# Patient Record
Sex: MALE | Race: WHITE | NOT HISPANIC OR LATINO
[De-identification: names, ages, dates, MRNs, and addresses within clinical notes are randomized per-mention and may not be internally consistent; named-entity substitution may affect disease eponyms.]

---

## 2017-01-04 ENCOUNTER — TRANSCRIPTION ENCOUNTER (OUTPATIENT)
Age: 77
End: 2017-01-04

## 2017-07-09 ENCOUNTER — TRANSCRIPTION ENCOUNTER (OUTPATIENT)
Age: 77
End: 2017-07-09

## 2017-07-18 ENCOUNTER — TRANSCRIPTION ENCOUNTER (OUTPATIENT)
Age: 77
End: 2017-07-18

## 2017-12-07 ENCOUNTER — INPATIENT (INPATIENT)
Facility: HOSPITAL | Age: 77
LOS: 3 days | Discharge: HOME | DRG: 726 | End: 2017-12-11
Attending: INTERNAL MEDICINE | Admitting: HOSPITALIST
Payer: MEDICARE

## 2017-12-07 VITALS
DIASTOLIC BLOOD PRESSURE: 86 MMHG | TEMPERATURE: 98 F | HEART RATE: 99 BPM | RESPIRATION RATE: 18 BRPM | OXYGEN SATURATION: 98 % | SYSTOLIC BLOOD PRESSURE: 132 MMHG | WEIGHT: 315 LBS | HEIGHT: 73 IN

## 2017-12-07 DIAGNOSIS — Z98.89 OTHER SPECIFIED POSTPROCEDURAL STATES: Chronic | ICD-10-CM

## 2017-12-07 NOTE — ED PROVIDER NOTE - MEDICAL DECISION MAKING DETAILS
hematuria, no obstruction from catheter, UA shows +nitrite, IV abx, h/h 11/33, repeat due at 5am, urology consulted for the morning

## 2017-12-07 NOTE — ED PROVIDER NOTE - OBJECTIVE STATEMENT
78 yo male hx of CAD with stents on plavix c/o hematuria s/p felix placement this afternoon by urology Dr. Suazo for urinary incontinence.   Here in ED with complaint of continuous bleeding. No fever/chills, no back pain.  No abdominal pain. 78 yo male hx of CAD with stents on plavix c/o hematuria s/p felix placement this afternoon by urology Dr. Suazo for urinary incontinence.   Here in ED with complaint of continuous bleeding and pain at tip of penis. No fever/chills, no back pain.  No abdominal pain.

## 2017-12-07 NOTE — ED PROVIDER NOTE - PROGRESS NOTE DETAILS
discussed case with Dr. Cuevas, recommend to hold overnight, will see patient in the morning discussed case with Dr. Cuevas, recommend to hold overnight, will see patient in the morning, recommend repeat CBC in 4-6 hours Pt seen by Dr Cuevas, should admit for further eval / monitoring, serial H/H. Dw Dr Galeano, will admit.

## 2017-12-07 NOTE — ED PROVIDER NOTE - CARE PLAN
Principal Discharge DX:	Hematuria  Secondary Diagnosis:	Urinary tract infection with hematuria, site unspecified

## 2017-12-07 NOTE — ED ADULT TRIAGE NOTE - CHIEF COMPLAINT QUOTE
" I couldn't pee and went to the doctors office today and they put a felix catheter at 3pm and now I have blood in the felix bag" . pt denies fever, chills, abdominal pain

## 2017-12-07 NOTE — ED PROVIDER NOTE - PMH
CAD (coronary artery disease)  w/ 4 stents  DM (diabetes mellitus)    HLD (hyperlipidemia)    HTN (hypertension)

## 2017-12-08 DIAGNOSIS — I25.10 ATHEROSCLEROTIC HEART DISEASE OF NATIVE CORONARY ARTERY WITHOUT ANGINA PECTORIS: ICD-10-CM

## 2017-12-08 DIAGNOSIS — R31.9 HEMATURIA, UNSPECIFIED: ICD-10-CM

## 2017-12-08 DIAGNOSIS — E11.9 TYPE 2 DIABETES MELLITUS WITHOUT COMPLICATIONS: ICD-10-CM

## 2017-12-08 DIAGNOSIS — Z29.9 ENCOUNTER FOR PROPHYLACTIC MEASURES, UNSPECIFIED: ICD-10-CM

## 2017-12-08 DIAGNOSIS — D62 ACUTE POSTHEMORRHAGIC ANEMIA: ICD-10-CM

## 2017-12-08 DIAGNOSIS — I10 ESSENTIAL (PRIMARY) HYPERTENSION: ICD-10-CM

## 2017-12-08 DIAGNOSIS — E78.5 HYPERLIPIDEMIA, UNSPECIFIED: ICD-10-CM

## 2017-12-08 LAB
ALBUMIN SERPL ELPH-MCNC: 2.9 G/DL — LOW (ref 3.3–5)
ALP SERPL-CCNC: 40 U/L — SIGNIFICANT CHANGE UP (ref 40–120)
ALT FLD-CCNC: 45 U/L — SIGNIFICANT CHANGE UP (ref 12–78)
ANION GAP SERPL CALC-SCNC: 7 MMOL/L — SIGNIFICANT CHANGE UP (ref 5–17)
APPEARANCE UR: ABNORMAL
APTT BLD: 30.1 SEC — SIGNIFICANT CHANGE UP (ref 27.5–37.4)
AST SERPL-CCNC: 29 U/L — SIGNIFICANT CHANGE UP (ref 15–37)
BACTERIA # UR AUTO: ABNORMAL
BASOPHILS # BLD AUTO: 0 K/UL — SIGNIFICANT CHANGE UP (ref 0–0.2)
BASOPHILS NFR BLD AUTO: 0.3 % — SIGNIFICANT CHANGE UP (ref 0–2)
BILIRUB SERPL-MCNC: 0.7 MG/DL — SIGNIFICANT CHANGE UP (ref 0.2–1.2)
BILIRUB UR-MCNC: NEGATIVE — SIGNIFICANT CHANGE UP
BUN SERPL-MCNC: 27 MG/DL — HIGH (ref 7–23)
CALCIUM SERPL-MCNC: 8.5 MG/DL — SIGNIFICANT CHANGE UP (ref 8.5–10.1)
CHLORIDE SERPL-SCNC: 102 MMOL/L — SIGNIFICANT CHANGE UP (ref 96–108)
CO2 SERPL-SCNC: 29 MMOL/L — SIGNIFICANT CHANGE UP (ref 22–31)
COLOR SPEC: ABNORMAL
CREAT SERPL-MCNC: 1 MG/DL — SIGNIFICANT CHANGE UP (ref 0.5–1.3)
DIFF PNL FLD: ABNORMAL
EOSINOPHIL # BLD AUTO: 0.1 K/UL — SIGNIFICANT CHANGE UP (ref 0–0.5)
EOSINOPHIL NFR BLD AUTO: 1.3 % — SIGNIFICANT CHANGE UP (ref 0–6)
GLUCOSE SERPL-MCNC: 157 MG/DL — HIGH (ref 70–99)
GLUCOSE UR QL: NEGATIVE — SIGNIFICANT CHANGE UP
HCT VFR BLD CALC: 32.1 % — LOW (ref 39–50)
HCT VFR BLD CALC: 33.6 % — LOW (ref 39–50)
HCT VFR BLD CALC: 34.5 % — LOW (ref 39–50)
HGB BLD-MCNC: 10.1 G/DL — LOW (ref 13–17)
HGB BLD-MCNC: 10.8 G/DL — LOW (ref 13–17)
HGB BLD-MCNC: 11.2 G/DL — LOW (ref 13–17)
INR BLD: 1.16 RATIO — SIGNIFICANT CHANGE UP (ref 0.88–1.16)
KETONES UR-MCNC: NEGATIVE — SIGNIFICANT CHANGE UP
LEUKOCYTE ESTERASE UR-ACNC: NEGATIVE — SIGNIFICANT CHANGE UP
LYMPHOCYTES # BLD AUTO: 1.5 K/UL — SIGNIFICANT CHANGE UP (ref 1–3.3)
LYMPHOCYTES # BLD AUTO: 16.6 % — SIGNIFICANT CHANGE UP (ref 13–44)
MCHC RBC-ENTMCNC: 29 PG — SIGNIFICANT CHANGE UP (ref 27–34)
MCHC RBC-ENTMCNC: 29.1 PG — SIGNIFICANT CHANGE UP (ref 27–34)
MCHC RBC-ENTMCNC: 30.4 PG — SIGNIFICANT CHANGE UP (ref 27–34)
MCHC RBC-ENTMCNC: 31.4 GM/DL — LOW (ref 32–36)
MCHC RBC-ENTMCNC: 31.4 GM/DL — LOW (ref 32–36)
MCHC RBC-ENTMCNC: 33.3 GM/DL — SIGNIFICANT CHANGE UP (ref 32–36)
MCV RBC AUTO: 91.2 FL — SIGNIFICANT CHANGE UP (ref 80–100)
MCV RBC AUTO: 92.4 FL — SIGNIFICANT CHANGE UP (ref 80–100)
MCV RBC AUTO: 92.7 FL — SIGNIFICANT CHANGE UP (ref 80–100)
MONOCYTES # BLD AUTO: 0.4 K/UL — SIGNIFICANT CHANGE UP (ref 0–0.9)
MONOCYTES NFR BLD AUTO: 4.3 % — SIGNIFICANT CHANGE UP (ref 1–9)
NEUTROPHILS # BLD AUTO: 6.9 K/UL — SIGNIFICANT CHANGE UP (ref 1.8–7.4)
NEUTROPHILS NFR BLD AUTO: 77.6 % — HIGH (ref 43–77)
NITRITE UR-MCNC: POSITIVE
NT-PROBNP SERPL-SCNC: 523 PG/ML — HIGH (ref 0–450)
PH UR: 8 — SIGNIFICANT CHANGE UP (ref 5–8)
PLATELET # BLD AUTO: 252 K/UL — SIGNIFICANT CHANGE UP (ref 150–400)
PLATELET # BLD AUTO: 258 K/UL — SIGNIFICANT CHANGE UP (ref 150–400)
PLATELET # BLD AUTO: 269 K/UL — SIGNIFICANT CHANGE UP (ref 150–400)
POTASSIUM SERPL-MCNC: 4.6 MMOL/L — SIGNIFICANT CHANGE UP (ref 3.5–5.3)
POTASSIUM SERPL-SCNC: 4.6 MMOL/L — SIGNIFICANT CHANGE UP (ref 3.5–5.3)
PROT SERPL-MCNC: 7 G/DL — SIGNIFICANT CHANGE UP (ref 6–8.3)
PROT UR-MCNC: 500 MG/DL
PROTHROM AB SERPL-ACNC: 12.7 SEC — SIGNIFICANT CHANGE UP (ref 9.8–12.7)
RBC # BLD: 3.47 M/UL — LOW (ref 4.2–5.8)
RBC # BLD: 3.69 M/UL — LOW (ref 4.2–5.8)
RBC # BLD: 3.73 M/UL — LOW (ref 4.2–5.8)
RBC # FLD: 14.3 % — SIGNIFICANT CHANGE UP (ref 10.3–14.5)
RBC # FLD: 14.3 % — SIGNIFICANT CHANGE UP (ref 10.3–14.5)
RBC # FLD: 14.4 % — SIGNIFICANT CHANGE UP (ref 10.3–14.5)
RBC CASTS # UR COMP ASSIST: >50 /HPF (ref 0–4)
SODIUM SERPL-SCNC: 138 MMOL/L — SIGNIFICANT CHANGE UP (ref 135–145)
SP GR SPEC: 1.01 — SIGNIFICANT CHANGE UP (ref 1.01–1.02)
UROBILINOGEN FLD QL: NEGATIVE — SIGNIFICANT CHANGE UP
WBC # BLD: 7.5 K/UL — SIGNIFICANT CHANGE UP (ref 3.8–10.5)
WBC # BLD: 8.9 K/UL — SIGNIFICANT CHANGE UP (ref 3.8–10.5)
WBC # BLD: 9.3 K/UL — SIGNIFICANT CHANGE UP (ref 3.8–10.5)
WBC # FLD AUTO: 7.5 K/UL — SIGNIFICANT CHANGE UP (ref 3.8–10.5)
WBC # FLD AUTO: 8.9 K/UL — SIGNIFICANT CHANGE UP (ref 3.8–10.5)
WBC # FLD AUTO: 9.3 K/UL — SIGNIFICANT CHANGE UP (ref 3.8–10.5)
WBC UR QL: ABNORMAL

## 2017-12-08 PROCEDURE — 99223 1ST HOSP IP/OBS HIGH 75: CPT

## 2017-12-08 PROCEDURE — 99223 1ST HOSP IP/OBS HIGH 75: CPT | Mod: AI

## 2017-12-08 PROCEDURE — 99285 EMERGENCY DEPT VISIT HI MDM: CPT

## 2017-12-08 PROCEDURE — 93010 ELECTROCARDIOGRAM REPORT: CPT

## 2017-12-08 PROCEDURE — 71010: CPT | Mod: 26

## 2017-12-08 RX ORDER — DEXTROSE 50 % IN WATER 50 %
12.5 SYRINGE (ML) INTRAVENOUS ONCE
Qty: 0 | Refills: 0 | Status: DISCONTINUED | OUTPATIENT
Start: 2017-12-08 | End: 2017-12-11

## 2017-12-08 RX ORDER — INSULIN LISPRO 100/ML
3 VIAL (ML) SUBCUTANEOUS
Qty: 0 | Refills: 0 | Status: DISCONTINUED | OUTPATIENT
Start: 2017-12-08 | End: 2017-12-11

## 2017-12-08 RX ORDER — OXYCODONE AND ACETAMINOPHEN 5; 325 MG/1; MG/1
1 TABLET ORAL ONCE
Qty: 0 | Refills: 0 | Status: DISCONTINUED | OUTPATIENT
Start: 2017-12-08 | End: 2017-12-08

## 2017-12-08 RX ORDER — DEXTROSE 50 % IN WATER 50 %
25 SYRINGE (ML) INTRAVENOUS ONCE
Qty: 0 | Refills: 0 | Status: DISCONTINUED | OUTPATIENT
Start: 2017-12-08 | End: 2017-12-11

## 2017-12-08 RX ORDER — ATORVASTATIN CALCIUM 80 MG/1
80 TABLET, FILM COATED ORAL AT BEDTIME
Qty: 0 | Refills: 0 | Status: DISCONTINUED | OUTPATIENT
Start: 2017-12-08 | End: 2017-12-11

## 2017-12-08 RX ORDER — FUROSEMIDE 40 MG
20 TABLET ORAL DAILY
Qty: 0 | Refills: 0 | Status: DISCONTINUED | OUTPATIENT
Start: 2017-12-08 | End: 2017-12-11

## 2017-12-08 RX ORDER — SODIUM CHLORIDE 9 MG/ML
1000 INJECTION INTRAMUSCULAR; INTRAVENOUS; SUBCUTANEOUS ONCE
Qty: 0 | Refills: 0 | Status: COMPLETED | OUTPATIENT
Start: 2017-12-08 | End: 2017-12-08

## 2017-12-08 RX ORDER — TAMSULOSIN HYDROCHLORIDE 0.4 MG/1
0.4 CAPSULE ORAL AT BEDTIME
Qty: 0 | Refills: 0 | Status: DISCONTINUED | OUTPATIENT
Start: 2017-12-08 | End: 2017-12-10

## 2017-12-08 RX ORDER — INSULIN LISPRO 100/ML
VIAL (ML) SUBCUTANEOUS
Qty: 0 | Refills: 0 | Status: DISCONTINUED | OUTPATIENT
Start: 2017-12-08 | End: 2017-12-11

## 2017-12-08 RX ORDER — ACETAMINOPHEN 500 MG
650 TABLET ORAL EVERY 6 HOURS
Qty: 0 | Refills: 0 | Status: DISCONTINUED | OUTPATIENT
Start: 2017-12-08 | End: 2017-12-11

## 2017-12-08 RX ORDER — INSULIN GLARGINE 100 [IU]/ML
20 INJECTION, SOLUTION SUBCUTANEOUS AT BEDTIME
Qty: 0 | Refills: 0 | Status: DISCONTINUED | OUTPATIENT
Start: 2017-12-08 | End: 2017-12-08

## 2017-12-08 RX ORDER — CEFTRIAXONE 500 MG/1
1 INJECTION, POWDER, FOR SOLUTION INTRAMUSCULAR; INTRAVENOUS ONCE
Qty: 0 | Refills: 0 | Status: COMPLETED | OUTPATIENT
Start: 2017-12-08 | End: 2017-12-08

## 2017-12-08 RX ORDER — DEXTROSE 50 % IN WATER 50 %
1 SYRINGE (ML) INTRAVENOUS ONCE
Qty: 0 | Refills: 0 | Status: DISCONTINUED | OUTPATIENT
Start: 2017-12-08 | End: 2017-12-11

## 2017-12-08 RX ORDER — INSULIN GLARGINE 100 [IU]/ML
10 INJECTION, SOLUTION SUBCUTANEOUS AT BEDTIME
Qty: 0 | Refills: 0 | Status: DISCONTINUED | OUTPATIENT
Start: 2017-12-08 | End: 2017-12-11

## 2017-12-08 RX ORDER — MORPHINE SULFATE 50 MG/1
4 CAPSULE, EXTENDED RELEASE ORAL ONCE
Qty: 0 | Refills: 0 | Status: DISCONTINUED | OUTPATIENT
Start: 2017-12-08 | End: 2017-12-08

## 2017-12-08 RX ORDER — SODIUM CHLORIDE 9 MG/ML
1000 INJECTION, SOLUTION INTRAVENOUS
Qty: 0 | Refills: 0 | Status: DISCONTINUED | OUTPATIENT
Start: 2017-12-08 | End: 2017-12-11

## 2017-12-08 RX ORDER — INSULIN ASPART 100 [IU]/ML
10 INJECTION, SOLUTION SUBCUTANEOUS ONCE
Qty: 0 | Refills: 0 | Status: DISCONTINUED | OUTPATIENT
Start: 2017-12-08 | End: 2017-12-08

## 2017-12-08 RX ORDER — CEFTRIAXONE 500 MG/1
1 INJECTION, POWDER, FOR SOLUTION INTRAMUSCULAR; INTRAVENOUS EVERY 24 HOURS
Qty: 0 | Refills: 0 | Status: DISCONTINUED | OUTPATIENT
Start: 2017-12-09 | End: 2017-12-10

## 2017-12-08 RX ORDER — OXYCODONE AND ACETAMINOPHEN 5; 325 MG/1; MG/1
1 TABLET ORAL EVERY 6 HOURS
Qty: 0 | Refills: 0 | Status: DISCONTINUED | OUTPATIENT
Start: 2017-12-08 | End: 2017-12-11

## 2017-12-08 RX ORDER — INSULIN LISPRO 100/ML
10 VIAL (ML) SUBCUTANEOUS ONCE
Qty: 0 | Refills: 0 | Status: COMPLETED | OUTPATIENT
Start: 2017-12-08 | End: 2017-12-08

## 2017-12-08 RX ORDER — METOPROLOL TARTRATE 50 MG
25 TABLET ORAL
Qty: 0 | Refills: 0 | Status: DISCONTINUED | OUTPATIENT
Start: 2017-12-08 | End: 2017-12-11

## 2017-12-08 RX ORDER — GLUCAGON INJECTION, SOLUTION 0.5 MG/.1ML
1 INJECTION, SOLUTION SUBCUTANEOUS ONCE
Qty: 0 | Refills: 0 | Status: DISCONTINUED | OUTPATIENT
Start: 2017-12-08 | End: 2017-12-11

## 2017-12-08 RX ORDER — ASPIRIN/CALCIUM CARB/MAGNESIUM 324 MG
81 TABLET ORAL DAILY
Qty: 0 | Refills: 0 | Status: DISCONTINUED | OUTPATIENT
Start: 2017-12-08 | End: 2017-12-11

## 2017-12-08 RX ORDER — LISINOPRIL 2.5 MG/1
40 TABLET ORAL DAILY
Qty: 0 | Refills: 0 | Status: DISCONTINUED | OUTPATIENT
Start: 2017-12-08 | End: 2017-12-11

## 2017-12-08 RX ADMIN — MORPHINE SULFATE 4 MILLIGRAM(S): 50 CAPSULE, EXTENDED RELEASE ORAL at 02:37

## 2017-12-08 RX ADMIN — Medication 10 UNIT(S): at 01:58

## 2017-12-08 RX ADMIN — INSULIN GLARGINE 10 UNIT(S): 100 INJECTION, SOLUTION SUBCUTANEOUS at 21:25

## 2017-12-08 RX ADMIN — OXYCODONE AND ACETAMINOPHEN 1 TABLET(S): 5; 325 TABLET ORAL at 12:00

## 2017-12-08 RX ADMIN — Medication 81 MILLIGRAM(S): at 11:06

## 2017-12-08 RX ADMIN — OXYCODONE AND ACETAMINOPHEN 1 TABLET(S): 5; 325 TABLET ORAL at 01:58

## 2017-12-08 RX ADMIN — Medication 3 UNIT(S): at 12:07

## 2017-12-08 RX ADMIN — Medication 3 UNIT(S): at 17:20

## 2017-12-08 RX ADMIN — Medication 1: at 12:07

## 2017-12-08 RX ADMIN — OXYCODONE AND ACETAMINOPHEN 1 TABLET(S): 5; 325 TABLET ORAL at 21:24

## 2017-12-08 RX ADMIN — ATORVASTATIN CALCIUM 80 MILLIGRAM(S): 80 TABLET, FILM COATED ORAL at 21:24

## 2017-12-08 RX ADMIN — OXYCODONE AND ACETAMINOPHEN 1 TABLET(S): 5; 325 TABLET ORAL at 00:21

## 2017-12-08 RX ADMIN — OXYCODONE AND ACETAMINOPHEN 1 TABLET(S): 5; 325 TABLET ORAL at 22:24

## 2017-12-08 RX ADMIN — OXYCODONE AND ACETAMINOPHEN 1 TABLET(S): 5; 325 TABLET ORAL at 07:00

## 2017-12-08 RX ADMIN — Medication 25 MILLIGRAM(S): at 17:20

## 2017-12-08 RX ADMIN — OXYCODONE AND ACETAMINOPHEN 1 TABLET(S): 5; 325 TABLET ORAL at 08:11

## 2017-12-08 RX ADMIN — CEFTRIAXONE 100 GRAM(S): 500 INJECTION, POWDER, FOR SOLUTION INTRAMUSCULAR; INTRAVENOUS at 01:58

## 2017-12-08 RX ADMIN — OXYCODONE AND ACETAMINOPHEN 1 TABLET(S): 5; 325 TABLET ORAL at 11:00

## 2017-12-08 RX ADMIN — LISINOPRIL 40 MILLIGRAM(S): 2.5 TABLET ORAL at 21:24

## 2017-12-08 RX ADMIN — SODIUM CHLORIDE 1000 MILLILITER(S): 9 INJECTION INTRAMUSCULAR; INTRAVENOUS; SUBCUTANEOUS at 06:08

## 2017-12-08 NOTE — ED ADULT NURSE NOTE - PLAN OF CARE
Call bell/Explanation of exam/test/Position of comfort/Fall precautions/Side rails/Bedside visitors/NPO

## 2017-12-08 NOTE — CONSULT NOTE ADULT - SUBJECTIVE AND OBJECTIVE BOX
[facility] Consultation    CHIEF COMPLAINT: Patient is a 77y old  Male who presents with a chief complaint of Hematuria (08 Dec 2017 08:00)      HPI:  78 y/o male PMH of HTN, DM2, CAD (PCI stents X 4 , 14 years ago), BPH presents c/o gross hematuria and pain at the Kramer catheter placement site. Patient states that had left knee surgery done a month ago and was having problem urinating sometime with incontinence so he went to see Dr. Habermann and US showed BPH. He had Kramer Catheter placed yesterday and later in the day developed hematuria so he called Dr. Suazo and was advised to go to ER. Denies any other symptoms, complaints, no chest pain, palpitation, sob, nausea, vomiting or diarrhea. (08 Dec 2017 08:00)      PAST MEDICAL & SURGICAL HISTORY:  DM (diabetes mellitus)  HLD (hyperlipidemia)  CAD (coronary artery disease): w/ 4 stents  HTN (hypertension)  H/O knee surgery      SOCIAL HISTORY: No history of smoking, alcohol or illicit drugs    FAMILY HISTORY: FAMILY HISTORY:  Family history of essential hypertension (Father)      MEDICATIONS  (STANDING):  aspirin  chewable 81 milliGRAM(s) Oral daily  atorvastatin 80 milliGRAM(s) Oral at bedtime  dextrose 5%. 1000 milliLiter(s) (50 mL/Hr) IV Continuous <Continuous>  dextrose 50% Injectable 12.5 Gram(s) IV Push once  dextrose 50% Injectable 25 Gram(s) IV Push once  dextrose 50% Injectable 25 Gram(s) IV Push once  furosemide    Tablet 20 milliGRAM(s) Oral daily  insulin glargine Injectable (LANTUS) 20 Unit(s) SubCutaneous at bedtime  insulin lispro (HumaLOG) corrective regimen sliding scale   SubCutaneous three times a day before meals  insulin lispro Injectable (HumaLOG) 3 Unit(s) SubCutaneous three times a day before meals  lisinopril 40 milliGRAM(s) Oral daily  metoprolol     tartrate 25 milliGRAM(s) Oral two times a day  tamsulosin 0.4 milliGRAM(s) Oral at bedtime    MEDICATIONS  (PRN):  acetaminophen   Tablet. 650 milliGRAM(s) Oral every 6 hours PRN Mild to moderate pain  dextrose Gel 1 Dose(s) Oral once PRN Blood Glucose LESS THAN 70 milliGRAM(s)/deciliter  glucagon  Injectable 1 milliGRAM(s) IntraMuscular once PRN Glucose LESS THAN 70 milligrams/deciliter  oxyCODONE    5 mG/acetaminophen 325 mG 1 Tablet(s) Oral every 6 hours PRN Severe Pain (7 - 10)      Allergies    No Known Allergies    Intolerances        REVIEW OF SYSTEMS:    CONSTITUTIONAL: No weakness, fevers or chills  EYES: No visual changes, No diplopia  ENMT: No throat pain , No exudate  NECK: No pain or stiffness  RESPIRATORY: No cough, wheezing, hemoptysis; No shortness of breath  CARDIOVASCULAR: No chest pain or palpitations  GASTROINTESTINAL: No abdominal pain. No nausea, vomiting, or hematemesis; No diarrhea or constipation. No melena or hematochezia.  GENITOURINARY: No dysuria, frequency or hematuria  NEUROLOGICAL: No numbness or weakness  SKIN: No itching or rash  All other review of systems is negative unless indicated above    VITAL SIGNS:   Vital Signs Last 24 Hrs  T(C): 36.5 (08 Dec 2017 06:11), Max: 36.8 (07 Dec 2017 22:29)  T(F): 97.7 (08 Dec 2017 06:11), Max: 98.3 (07 Dec 2017 22:29)  HR: 88 (08 Dec 2017 08:50) (87 - 99)  BP: 104/50 (08 Dec 2017 08:50) (104/50 - 132/86)  BP(mean): --  RR: 17 (08 Dec 2017 08:50) (17 - 19)  SpO2: 98% (08 Dec 2017 08:50) (97% - 99%)    I&O's Summary      PHYSICAL EXAM:    Constitutional: NAD, awake and alert, well-developed  Eyes:  EOMI,  Pupils round, no lesions  ENMT: no exudate or erythema  Pulmonary: Non-labored, breath sounds are clear bilaterally, No wheezing, rales or rhonchi, decreased bs b/l bases  Cardiovascular: PMI not palpable non-displaced Regular S1 and S2, no murmurs, rubs, gallops or clicks  Gastrointestinal: Bowel Sounds present, soft, nontender, obese  Lymph: 3+ pitting edema b/l LE  Neurological: Alert, no focal deficits  Skin: No rashes. No changes of chronic venous stasis. No cyanosis.  Psych:  Mood & affect appropriate.    LABS: All Labs Reviewed:                        10.1   9.3   )-----------( 269      ( 08 Dec 2017 05:01 )             32.1                         11.2   8.9   )-----------( 252      ( 08 Dec 2017 00:17 )             33.6     08 Dec 2017 00:17    138    |  102    |  27     ----------------------------<  157    4.6     |  29     |  1.00     Ca    8.5        08 Dec 2017 00:17    TPro  7.0    /  Alb  2.9    /  TBili  0.7    /  DBili  x      /  AST  29     /  ALT  45     /  AlkPhos  40     08 Dec 2017 00:17    PT/INR - ( 08 Dec 2017 00:17 )   PT: 12.7 sec;   INR: 1.16 ratio         PTT - ( 08 Dec 2017 00:17 )  PTT:30.1 sec      Blood Culture:   12-08 @ 00:17  Pro Bnp 523        RADIOLOGY/EKG: [facility] Consultation    CHIEF COMPLAINT: Patient is a 77y old  Male who presents with a chief complaint of Hematuria (08 Dec 2017 08:00)      HPI:  76 y/o male PMH of HTN, DM2, CAD (PCI stents X 4 , 14 years ago), BPH presents c/o gross hematuria and pain at the Felix catheter placement site. Patient states that had left knee surgery done a month ago and was having problem urinating sometime with incontinence so he went to see Dr. Habermann and US showed BPH. He had Felix Catheter placed yesterday and later in the day developed hematuria so he called Dr. Suazo and was advised to go to ER. Denies any other symptoms, complaints, no chest pain, palpitation, sob, nausea, vomiting or diarrhea. (08 Dec 2017 08:00)    76 y/o m pmhx CAD s/p stents x4 (14 years ago), HLD, HTN, DMII, presents for urinary frequency, incontinence and gross hematuria. Pt states he continues to have gross blood in felix. Denies bleeding elsewhere. He has no other complaints. Denies CP, palpitations, SOB, HA, weakness, melena, BRBPR.     PAST MEDICAL & SURGICAL HISTORY:  DM (diabetes mellitus)  HLD (hyperlipidemia)  CAD (coronary artery disease): w/ 4 stents  HTN (hypertension)  H/O knee surgery      SOCIAL HISTORY: No history of smoking, alcohol or illicit drugs    FAMILY HISTORY: FAMILY HISTORY:  Family history of essential hypertension (Father)      MEDICATIONS  (STANDING):  aspirin  chewable 81 milliGRAM(s) Oral daily  atorvastatin 80 milliGRAM(s) Oral at bedtime  dextrose 5%. 1000 milliLiter(s) (50 mL/Hr) IV Continuous <Continuous>  dextrose 50% Injectable 12.5 Gram(s) IV Push once  dextrose 50% Injectable 25 Gram(s) IV Push once  dextrose 50% Injectable 25 Gram(s) IV Push once  furosemide    Tablet 20 milliGRAM(s) Oral daily  insulin glargine Injectable (LANTUS) 20 Unit(s) SubCutaneous at bedtime  insulin lispro (HumaLOG) corrective regimen sliding scale   SubCutaneous three times a day before meals  insulin lispro Injectable (HumaLOG) 3 Unit(s) SubCutaneous three times a day before meals  lisinopril 40 milliGRAM(s) Oral daily  metoprolol     tartrate 25 milliGRAM(s) Oral two times a day  tamsulosin 0.4 milliGRAM(s) Oral at bedtime    MEDICATIONS  (PRN):  acetaminophen   Tablet. 650 milliGRAM(s) Oral every 6 hours PRN Mild to moderate pain  dextrose Gel 1 Dose(s) Oral once PRN Blood Glucose LESS THAN 70 milliGRAM(s)/deciliter  glucagon  Injectable 1 milliGRAM(s) IntraMuscular once PRN Glucose LESS THAN 70 milligrams/deciliter  oxyCODONE    5 mG/acetaminophen 325 mG 1 Tablet(s) Oral every 6 hours PRN Severe Pain (7 - 10)      Allergies    No Known Allergies    Intolerances        REVIEW OF SYSTEMS:    CONSTITUTIONAL: No weakness, fevers or chills  EYES: No visual changes, No diplopia  ENMT: No throat pain , No exudate  NECK: No pain or stiffness  RESPIRATORY: No cough, wheezing, hemoptysis; No shortness of breath  CARDIOVASCULAR: No chest pain or palpitations  GASTROINTESTINAL: No abdominal pain. No nausea, vomiting, or hematemesis; No diarrhea or constipation. No melena or hematochezia.  GENITOURINARY: No dysuria, frequency or hematuria  NEUROLOGICAL: No numbness or weakness  SKIN: No itching or rash  All other review of systems is negative unless indicated above    VITAL SIGNS:   Vital Signs Last 24 Hrs  T(C): 36.5 (08 Dec 2017 06:11), Max: 36.8 (07 Dec 2017 22:29)  T(F): 97.7 (08 Dec 2017 06:11), Max: 98.3 (07 Dec 2017 22:29)  HR: 88 (08 Dec 2017 08:50) (87 - 99)  BP: 104/50 (08 Dec 2017 08:50) (104/50 - 132/86)  BP(mean): --  RR: 17 (08 Dec 2017 08:50) (17 - 19)  SpO2: 98% (08 Dec 2017 08:50) (97% - 99%)    I&O's Summary      PHYSICAL EXAM:    Constitutional: NAD, awake and alert, well-developed  Eyes:  EOMI,  Pupils round, no lesions  ENMT: no exudate or erythema  Pulmonary: Non-labored, breath sounds are clear bilaterally, No wheezing, rales or rhonchi, decreased bs b/l bases  Cardiovascular: PMI not palpable non-displaced Regular S1 and S2, no murmurs, rubs, gallops or clicks  Gastrointestinal: Bowel Sounds present, soft, nontender, obese  Lymph: 3+ pitting edema b/l LE  Neurological: Alert, no focal deficits  Skin: No rashes. No changes of chronic venous stasis. No cyanosis.  Psych:  Mood & affect appropriate.    LABS: All Labs Reviewed:                        10.1   9.3   )-----------( 269      ( 08 Dec 2017 05:01 )             32.1                         11.2   8.9   )-----------( 252      ( 08 Dec 2017 00:17 )             33.6     08 Dec 2017 00:17    138    |  102    |  27     ----------------------------<  157    4.6     |  29     |  1.00     Ca    8.5        08 Dec 2017 00:17    TPro  7.0    /  Alb  2.9    /  TBili  0.7    /  DBili  x      /  AST  29     /  ALT  45     /  AlkPhos  40     08 Dec 2017 00:17    PT/INR - ( 08 Dec 2017 00:17 )   PT: 12.7 sec;   INR: 1.16 ratio         PTT - ( 08 Dec 2017 00:17 )  PTT:30.1 sec      Blood Culture:   12-08 @ 00:17  Pro Bnp 523        RADIOLOGY/EKG:

## 2017-12-08 NOTE — CONSULT NOTE ADULT - ASSESSMENT
The pt had episode of somewhat lower BP during the night, Hgb down to 10.  Given these additional factors, agree that pt should be admitted.  Will f/u with serial H/H.  No need to keep NPO.  May consider TOV while pt is in hospital.  Thanks, will follow

## 2017-12-08 NOTE — H&P ADULT - NSHPREVIEWOFSYSTEMS_GEN_ALL_CORE
All 10 systems are reviewed and are negative except per HPI. CONSTITUTIONAL: No fever, weight loss, or fatigue  EYES: No eye pain, visual disturbances, or discharge  ENMT:  No difficulty hearing, tinnitus, vertigo; No sinus or throat pain  NECK: No pain or stiffness  RESPIRATORY: No cough, wheezing, chills or hemoptysis; No shortness of breath  CARDIOVASCULAR: No chest pain, palpitations, dizziness, or leg swelling  GASTROINTESTINAL: No abdominal or epigastric pain. No nausea, vomiting, or hematemesis; No diarrhea or constipation. No melena or hematochezia.  GENITOURINARY: + gross hematuria, Kramer in place.   NEUROLOGICAL: No headaches, memory loss, loss of strength, numbness, or tremors  SKIN: No itching, burning, rashes, or lesions   LYMPH NODES: No enlarged glands  ENDOCRINE: No heat or cold intolerance; No hair loss; No polydipsia or polyuria  MUSCULOSKELETAL: No joint pain or swelling; No muscle, back, or extremity pain  PSYCHIATRIC: No depression, anxiety, mood swings, or difficulty sleeping  HEME/LYMPH: No easy bruising, or bleeding gums  ALLERGY AND IMMUNOLOGIC: No hives or eczema

## 2017-12-08 NOTE — H&P ADULT - PROBLEM SELECTOR PLAN 5
ISS, Lantus, Pre meal humalog as prescribed. Hypoglycemia protocol.  Resume Novolog 70/30, upon discharge , home dose and frequency Continue Lasix, metoprolol as prescribed with hold parameters.

## 2017-12-08 NOTE — H&P ADULT - PROBLEM SELECTOR PLAN 6
IMPROVE VTE Individual Risk Assessment          RISK                                                          Points  [  ] Previous VTE                                                3  [  ] Thrombophilia                                             2  [  ] Lower limb paralysis                                   2        (unable to hold up >15 seconds)    [  ] Current Cancer                                             2         (within 6 months)  [ X ] Immobilization > 24 hrs                              1  [  ] ICU/CCU stay > 24 hours                             1  [ X ] Age > 60                                                         1    IMPROVE VTE Score: 2  SCD , no Ac secondary to hematuria ISS, Lantus, Pre meal humalog as prescribed. Hypoglycemia protocol.  Resume Novolog 70/30, upon discharge , home dose and frequency ISS, Lantus, Pre meal humalog as prescribed. Hypoglycemia protocol.  Resume Novolog 70/30, Actos, upon discharge , home dose and frequency

## 2017-12-08 NOTE — ED ADULT NURSE NOTE - ED COMFORT CARE
Family informed/Explanation of wait/Warm blanket/TV/Pillow/Darkened room/Patient informed/Incontinence care

## 2017-12-08 NOTE — H&P ADULT - NSHPLABSRESULTS_GEN_ALL_CORE
LABS:                        10.1   9.3   )-----------( 269      ( 08 Dec 2017 05:01 )             32.1     08 Dec 2017 00:17    138    |  102    |  27     ----------------------------<  157    4.6     |  29     |  1.00     Ca    8.5        08 Dec 2017 00:17    TPro  7.0    /  Alb  2.9    /  TBili  0.7    /  DBili  x      /  AST  29     /  ALT  45     /  AlkPhos  40     08 Dec 2017 00:17    PT/INR - ( 08 Dec 2017 00:17 )   PT: 12.7 sec;   INR: 1.16 ratio

## 2017-12-08 NOTE — H&P ADULT - NSHPPHYSICALEXAM_GEN_ALL_CORE
Vital Signs Last 24 Hrs  T(C): 36.5 (08 Dec 2017 06:11), Max: 36.8 (07 Dec 2017 22:29)  T(F): 97.7 (08 Dec 2017 06:11), Max: 98.3 (07 Dec 2017 22:29)  HR: 92 (08 Dec 2017 06:11) (87 - 99)  BP: 118/52 (08 Dec 2017 06:11) (105/68 - 132/86)  BP(mean): --  RR: 18 (08 Dec 2017 06:11) (18 - 19)  SpO2: 99% (08 Dec 2017 06:11) (97% - 99%)    PHYSICAL EXAM:  GENERAL: NAD, well-groomed, well-developed  HEAD:  Atraumatic, Normocephalic  EYES: EOMI, PERRLA, conjunctiva and sclera clear  ENMT: No tonsillar erythema, exudates, or enlargement; Moist mucous membranes, Good dentition, No lesions  NECK: Supple, No JVD, Normal thyroid  NERVOUS SYSTEM:  Alert & Oriented X3, Good concentration; Motor Strength 5/5 B/L upper and lower extremities; DTRs 2+ intact and symmetric  CHEST/LUNG: Clear to auscultation bilaterally; No rales, rhonchi, wheezing, or rubs  HEART: Regular rate and rhythm; No murmurs, rubs, or gallops  ABDOMEN: Soft, Nontender, Nondistended; Bowel sounds present  EXTREMITIES:  2+ Peripheral Pulses, No clubbing, cyanosis, or edema  LYMPH: No lymphadenopathy noted  SKIN: No rashes or lesions  : Kramer catheter in place. + gross hematuria

## 2017-12-08 NOTE — CONSULT NOTE ADULT - ASSESSMENT
78 y/o male PMH of HTN, DM2, CAD (PCI stents X 4 , 14 years ago), BPH presents c/o gross hematuria and pain at the Kramer catheter placement site admitted with gross hematuria.    Recommend:    - Hold Plavix for now, continue ASA. Pt prefers that any decisions regarding his cardiac care including the changing of medications be discussed with his cardiologist Dr. Robertson of [facility]  - No evidence of acute ischemia as pt asymptomatic. EKG pending.  - EKG pending  - ECHO: sees Dr. Chiki Robertson at [facility]  - Hypotension, possibly 2/2 acute blood loss vs medication, follow hold parameters on anti-hypertensives (Metoprolol, Lasix), give IVF as necessary  - Continue Lipitor  - Monitor and replete lytes, keep K>4, Mg>2  - Other cardiovascular workup will depend on clinical course  - All other workup per primary team  - Will follow 76 y/o male PMH of HTN, DM2, CAD (PCI stents X 4 , 14 years ago), BPH presents c/o gross hematuria and pain at the Kramer catheter placement site admitted with gross hematuria.    Recommend:    - Plavix discontinued as there is no clear indication for DAPT 14 years s/p stent placement (non-drug eluting), continue ASA. Pt to f/u with Dr. Robertson outpatient.  - No evidence of acute ischemia as pt asymptomatic.   - EKG pending  - Last stress test x1 month ago with Dr. Robertson at [facility]  - ECHO: sees Dr. Chiki Robertson at [facility]  - Hypotension, possibly 2/2 acute blood loss vs medication, follow hold parameters on anti-hypertensives (Metoprolol, Lasix), give IVF as necessary  - Continue Lipitor  - Monitor and replete lytes, keep K>4, Mg>2  - Other cardiovascular workup will depend on clinical course  - All other workup per primary team  - Will follow 78 y/o male PMH of HTN, DM2, CAD (PCI stents X 4 , 14 years ago), BPH presents c/o gross hematuria and pain at the Kramer catheter placement site admitted with gross hematuria.    Recommend:    - Plavix discontinued as there is no clear indication for DAPT 14 years s/p stent placement (non-drug eluting), continue ASA. Pt to f/u with Dr. Robertson outpatient.  - No evidence of acute ischemia as pt asymptomatic.   - EKG shows NSR at 98bpm, inverted p-waves (ectopic activity)  - Last stress test x1 month ago with Dr. Robertson at [facility]  - ECHO: sees Dr. Chiki Robertson at [facility]  - Hypotension, possibly 2/2 acute blood loss vs medication, follow hold parameters on anti-hypertensives (Metoprolol, Lasix), give IVF as necessary  - Continue Lipitor  - Monitor and replete lytes, keep K>4, Mg>2  - Other cardiovascular workup will depend on clinical course  - All other workup per primary team  - Will follow

## 2017-12-08 NOTE — H&P ADULT - PROBLEM SELECTOR PLAN 1
Admit to [facility].  Monitor H/H.  Flomax 0.4mg po QHS  Monitor urine out put.   NATHALIA Black   Tylenol PRn  Ceftriaxone 1g Q24 hours.

## 2017-12-08 NOTE — H&P ADULT - PROBLEM SELECTOR PLAN 2
Hold Plavix secondary to hematuria  Continue ASA 81mg po daily.  D/W Cardio Dr. Rodriges. Secondary to gross hematuria.  Monitor H/H   Transfuse to keep  Hb >8.00

## 2017-12-08 NOTE — CONSULT NOTE ADULT - SUBJECTIVE AND OBJECTIVE BOX
CHIEF COMPLAINT: gross hematuria    HISTORY OF PRESENT ILLNESS:76 yo male s/p recent TKR one month ago.  Presented to office yesterday with urinary incont, found to be in overflow and retention, cath'd by dr mcneil.  Gross hematuria began shortly after and became severe later in the evening.  Pt presented to ER here with signif gross hematuria via the felix.  Felix has also been very uncomfortable for him since yesterday, but draining well.    PAST MEDICAL & SURGICAL HISTORY:  DM (diabetes mellitus)  HLD (hyperlipidemia)  CAD (coronary artery disease): w/ 4 stents  HTN (hypertension)  H/O knee surgery      REVIEW OF SYSTEMS:    CONSTITUTIONAL: No weakness, fevers or chills  EYES/ENT: No visual changes;  No vertigo or throat pain   NECK: No pain or stiffness  RESPIRATORY: No cough, wheezing, hemoptysis; No shortness of breath  CARDIOVASCULAR: No chest pain or palpitations  GASTROINTESTINAL: No abdominal or epigastric pain. No nausea, vomiting, or hematemesis; No diarrhea or constipation. No melena or hematochezia.  GENITOURINARY: No dysuria, frequency or hematuria  NEUROLOGICAL: No numbness or weakness  SKIN: No itching, burning, rashes, or lesions   All other review of systems is negative unless indicated above.    MEDICATIONS  (STANDING):  See H & P    MEDICATIONS  (PRN):      Allergies    No Known Allergies    Intolerances        SOCIAL HISTORY:    FAMILY HISTORY:  No pertinent family history in first degree relatives      Vital Signs Last 24 Hrs  T(C): 36.5 (08 Dec 2017 06:11), Max: 36.8 (07 Dec 2017 22:29)  T(F): 97.7 (08 Dec 2017 06:11), Max: 98.3 (07 Dec 2017 22:29)  HR: 92 (08 Dec 2017 06:11) (87 - 99)  BP: 118/52 (08 Dec 2017 06:11) (105/68 - 132/86)  BP(mean): --  RR: 18 (08 Dec 2017 06:11) (18 - 19)  SpO2: 99% (08 Dec 2017 06:11) (97% - 99%)    PHYSICAL EXAM:    Constitutional: NAD, well-developed  HEENT: AMOS, EOMI, Normal Hearing, MMM  Neck: No LAD, No JVD  Back: Normal spine flexure, No CVA tenderness    Cardiovascular: S1 and S2, RRR, no M/G/R  Abd: BS+, soft, NT/ND, No CVAT, obese  : Normal phallus,open meatus,bilateral descended testes, no masses, felix per urethra, urine mod bloody no clots    Extremities: Mod peripheral edema both LE's  Vascular: 2+ peripheral pulses  Neurological: A/O x 3, no focal deficits  Psychiatric: Normal mood, normal affect  Musculoskeletal: 5/5 strength b/l upper and lower extremities  Skin: No rashes    LABS:                        10.1   9.3   )-----------( 269      ( 08 Dec 2017 05:01 )             32.1         138  |  102  |  27<H>  ----------------------------<  157<H>  4.6   |  29  |  1.00    Ca    8.5      08 Dec 2017 00:17    TPro  7.0  /  Alb  2.9<L>  /  TBili  0.7  /  DBili  x   /  AST  29  /  ALT  45  /  AlkPhos  40      PT/INR - ( 08 Dec 2017 00:17 )   PT: 12.7 sec;   INR: 1.16 ratio         PTT - ( 08 Dec 2017 00:17 )  PTT:30.1 sec  Urinalysis Basic - ( 08 Dec 2017 00:17 )    Color: Red / Appearance: Slightly Turbid / S.015 / pH: x  Gluc: x / Ketone: Negative  / Bili: Negative / Urobili: Negative   Blood: x / Protein: 500 mg/dL / Nitrite: Positive   Leuk Esterase: Negative / RBC: >50 /HPF / WBC 11-25   Sq Epi: x / Non Sq Epi: x / Bacteria: Few      Urine Culture:   Hemoglobin: 10.1 g/dL ( @ 05:01)  Hematocrit: 32.1 % ( @ 05:01)  Hemoglobin: 11.2 g/dL ( @ 00:17); f/u was approx 10  Hematocrit: 33.6 % ( @ 00:17)      RADIOLOGY & ADDITIONAL STUDIES:

## 2017-12-08 NOTE — H&P ADULT - ASSESSMENT
78 y/o male PMH of HTN, DM2, Cad (PCI stents X 4 , 14 years ago), BPH presents c/o gross hematuria and pain at the Kramer catheter placement site, placed by Dr. Suazo yesterday 78 y/o male PMH of HTN, DM2, Cad (PCI stents X 4 , 14 years ago), BPH s/p Kramer Catheter ( POA), presents c/o hematuria and pain at the Kramer Catheter site, place by NATHALIA Suazo yesterday as out patient in the office.

## 2017-12-08 NOTE — ED ADULT NURSE NOTE - OBJECTIVE STATEMENT
77 year old male presents to the ED with c/o urinary bleeding. A+O x 4. Wife at the bedside. States he went to the urologist today for frequent urination. Urologist scanned the bladder and found it to be full, so felix catheter was placed. Pt was sent home on a one time antibiotic and leg bag. Pt now states he has bloody urine through the catheter and severe pain at the penis. Abdomen round and obese. Not firm or distended. Bladder not non distended and non tender. Moderate bleeding noted from the penis around the catheter. Gross hematuria with clots noted in the leg bag. Leg bag last changed around 930 pm. 750 mL emptied on admission. Bilateral legs have + 4 edema. Weak Pedal pulses bilaterally. 77 year old male presents to the ED with c/o urinary bleeding. A+O x 4. Wife at the bedside. States he went to the urologist today for frequent urination. Urologist scanned the bladder and found it to be full, so felix catheter was placed. Pt was sent home on a one time antibiotic and leg bag. Pt now states he has bloody urine through the catheter and severe pain at the penis. Abdomen round and obese. Not firm or distended. Bladder not non distended and non tender. Moderate bleeding noted from the penis around the catheter. Gross hematuria with clots noted in the leg bag. Leg bag last changed around 930 pm. 750 mL emptied on admission. Bilateral legs have + 4 edema. Weak Pedal pulses bilaterally. Surgical incision noted to the left knee. Incision well approximated. no drainage or bleeding noted. Slight redness and swelling noted.

## 2017-12-08 NOTE — H&P ADULT - HISTORY OF PRESENT ILLNESS
78 y/o male PMH of HTN, DM2, Cad (PCI stents X 4 , 14 years ago), BPH presents c/o gross hematuria and pain at the Kramer catheter placement site. Patient states that had left knee surgery done a month ago and was having problem urinating sometime with incontinence so he went to see Dr. Habermann and US showed BPH. He had Kramer Catheter placed yesterday and later in the day developed hematuria so he called Dr. Suazo and was advised to go to ER. Denies any other symptoms, complaints, no chest pain, palpitation, sob, nausea, vomiting or diarrhea. 76 y/o male PMH of HTN, DM2, Cad (PCI stents X 4 , 14 years ago), BPH s/p  Kramer catheter (POA), presents c/o gross hematuria and pain at the Kramer catheter placement site. Patient states that had left knee surgery done a month ago and was having problem urinating sometime with incontinence so he went to see Dr. Habermann and US showed BPH. He had Kramer Catheter placed yesterday and later in the day developed hematuria so he called Dr. Suazo and was advised to go to ER. Denies any other symptoms, complaints, no chest pain, palpitation, sob, nausea, vomiting or diarrhea.

## 2017-12-08 NOTE — H&P ADULT - PROBLEM SELECTOR PLAN 3
Continue Lipitor 80mg po QHS Hold Plavix secondary to hematuria  Continue ASA 81mg po daily.  D/W Cardio Dr. Rodriges.

## 2017-12-08 NOTE — ED ADULT NURSE REASSESSMENT NOTE - NS ED NURSE REASSESS COMMENT FT1
As per Dr. Diaz, Kramer catheter not to be changed. Kramer newly inserted in Urology ( [facility]) office prior to ED arrival. Awaiting Urology consult.

## 2017-12-08 NOTE — ED ADULT NURSE REASSESSMENT NOTE - COMFORT CARE
assisted to bedpan/plan of care explained/po fluids offered/repositioned/side rails up/warm blanket provided/meal provided

## 2017-12-08 NOTE — H&P ADULT - PROBLEM SELECTOR PLAN 7
IMPROVE VTE Individual Risk Assessment          RISK                                                          Points  [  ] Previous VTE                                                3  [  ] Thrombophilia                                             2  [  ] Lower limb paralysis                                   2        (unable to hold up >15 seconds)    [  ] Current Cancer                                             2         (within 6 months)  [ X ] Immobilization > 24 hrs                              1  [  ] ICU/CCU stay > 24 hours                             1  [ X ] Age > 60                                                         1    IMPROVE VTE Score: 2  SCD , no Ac secondary to hematuria

## 2017-12-08 NOTE — ED ADULT NURSE REASSESSMENT NOTE - NS ED NURSE REASSESS COMMENT FT1
Patient and report received at 0700.  Patient will be admitted to hospitalist.  Requested hospital bed from Environmental Representative. Patient and report received at 0700.  Patient will be admitted to hospitalist.  Patient has bilateral +4 pedal edema.  Requested hospital bed from Environmental Representative.

## 2017-12-09 DIAGNOSIS — R33.9 RETENTION OF URINE, UNSPECIFIED: ICD-10-CM

## 2017-12-09 LAB
ANION GAP SERPL CALC-SCNC: 9 MMOL/L — SIGNIFICANT CHANGE UP (ref 5–17)
BUN SERPL-MCNC: 17 MG/DL — SIGNIFICANT CHANGE UP (ref 7–23)
CALCIUM SERPL-MCNC: 8.2 MG/DL — LOW (ref 8.5–10.1)
CHLORIDE SERPL-SCNC: 103 MMOL/L — SIGNIFICANT CHANGE UP (ref 96–108)
CO2 SERPL-SCNC: 28 MMOL/L — SIGNIFICANT CHANGE UP (ref 22–31)
CREAT SERPL-MCNC: 0.77 MG/DL — SIGNIFICANT CHANGE UP (ref 0.5–1.3)
CULTURE RESULTS: NO GROWTH — SIGNIFICANT CHANGE UP
GLUCOSE SERPL-MCNC: 138 MG/DL — HIGH (ref 70–99)
HBA1C BLD-MCNC: 6.7 % — HIGH (ref 4–5.6)
HCT VFR BLD CALC: 32.6 % — LOW (ref 39–50)
HGB BLD-MCNC: 10.2 G/DL — LOW (ref 13–17)
MCHC RBC-ENTMCNC: 29.2 PG — SIGNIFICANT CHANGE UP (ref 27–34)
MCHC RBC-ENTMCNC: 31.4 GM/DL — LOW (ref 32–36)
MCV RBC AUTO: 93.2 FL — SIGNIFICANT CHANGE UP (ref 80–100)
PLATELET # BLD AUTO: 268 K/UL — SIGNIFICANT CHANGE UP (ref 150–400)
POTASSIUM SERPL-MCNC: 3.9 MMOL/L — SIGNIFICANT CHANGE UP (ref 3.5–5.3)
POTASSIUM SERPL-SCNC: 3.9 MMOL/L — SIGNIFICANT CHANGE UP (ref 3.5–5.3)
RBC # BLD: 3.5 M/UL — LOW (ref 4.2–5.8)
RBC # FLD: 14.6 % — HIGH (ref 10.3–14.5)
SODIUM SERPL-SCNC: 140 MMOL/L — SIGNIFICANT CHANGE UP (ref 135–145)
SPECIMEN SOURCE: SIGNIFICANT CHANGE UP
WBC # BLD: 10 K/UL — SIGNIFICANT CHANGE UP (ref 3.8–10.5)
WBC # FLD AUTO: 10 K/UL — SIGNIFICANT CHANGE UP (ref 3.8–10.5)

## 2017-12-09 PROCEDURE — 99233 SBSQ HOSP IP/OBS HIGH 50: CPT

## 2017-12-09 PROCEDURE — 99232 SBSQ HOSP IP/OBS MODERATE 35: CPT

## 2017-12-09 RX ADMIN — Medication 3 UNIT(S): at 17:18

## 2017-12-09 RX ADMIN — Medication 650 MILLIGRAM(S): at 01:21

## 2017-12-09 RX ADMIN — INSULIN GLARGINE 10 UNIT(S): 100 INJECTION, SOLUTION SUBCUTANEOUS at 21:14

## 2017-12-09 RX ADMIN — Medication 1: at 17:18

## 2017-12-09 RX ADMIN — Medication 3 UNIT(S): at 12:16

## 2017-12-09 RX ADMIN — Medication 650 MILLIGRAM(S): at 02:21

## 2017-12-09 RX ADMIN — CEFTRIAXONE 100 GRAM(S): 500 INJECTION, POWDER, FOR SOLUTION INTRAMUSCULAR; INTRAVENOUS at 23:36

## 2017-12-09 RX ADMIN — CEFTRIAXONE 100 GRAM(S): 500 INJECTION, POWDER, FOR SOLUTION INTRAMUSCULAR; INTRAVENOUS at 01:31

## 2017-12-09 RX ADMIN — Medication 650 MILLIGRAM(S): at 23:45

## 2017-12-09 RX ADMIN — Medication 25 MILLIGRAM(S): at 17:18

## 2017-12-09 RX ADMIN — Medication 650 MILLIGRAM(S): at 23:12

## 2017-12-09 RX ADMIN — Medication 1: at 08:24

## 2017-12-09 RX ADMIN — LISINOPRIL 40 MILLIGRAM(S): 2.5 TABLET ORAL at 05:45

## 2017-12-09 RX ADMIN — Medication 25 MILLIGRAM(S): at 05:44

## 2017-12-09 RX ADMIN — TAMSULOSIN HYDROCHLORIDE 0.4 MILLIGRAM(S): 0.4 CAPSULE ORAL at 21:12

## 2017-12-09 RX ADMIN — ATORVASTATIN CALCIUM 80 MILLIGRAM(S): 80 TABLET, FILM COATED ORAL at 21:12

## 2017-12-09 RX ADMIN — Medication 1: at 12:15

## 2017-12-09 RX ADMIN — Medication 81 MILLIGRAM(S): at 12:15

## 2017-12-09 RX ADMIN — Medication 3 UNIT(S): at 08:24

## 2017-12-09 RX ADMIN — Medication 20 MILLIGRAM(S): at 05:44

## 2017-12-09 NOTE — PROGRESS NOTE ADULT - ASSESSMENT
78 y/o male PMH of HTN, DM2, Cad (PCI stents X 4 , 14 years ago), BPH s/p Kramer Catheter ( POA), presents c/o hematuria and pain at the Kramer Catheter site, placed by NATHALIA Suazo as out patient in the office.

## 2017-12-09 NOTE — PROGRESS NOTE ADULT - ASSESSMENT
76 y/o male PMH of HTN, DM2, CAD (PCI stents X 4 , 14 years ago), BPH presents c/o gross hematuria and pain at the Kramer catheter placement site admitted with gross hematuria.    - Patient s/p PCI with GINNY 14 years ago and remained on DAPT.  Presented with gross hematuria, found to have UTI  - Plavix discontinued as there is no clear indication for DAPT 14 years s/p stent placement (non-drug eluting), continue ASA. Pt to f/u with Dr. Chiki Robertson as outpatient.  - No evidence of acute ischemia as pt asymptomatic.   - EKG shows NSR at 98bpm, inverted p-waves (ectopic activity)  - Last stress test x1 month ago with Dr. Robertson at [facility], which according to patient was normal  - ECHO: sees Dr. Chiki Robertson at [facility]  - Hypotension possibly 2/2 acute blood loss vs medication now resolved, follow hold parameters on anti-hypertensives (Metoprolol, Lasix), give IVF as necessary  - Continue BB and ACEI  - Continue Lipitor  - Monitor and replete lytes, keep K>4, Mg>2  - Other cardiovascular workup will depend on clinical course  - All other workup per primary team  - Will follow    Socorro Villanueva NP  Cardiology

## 2017-12-09 NOTE — PROGRESS NOTE ADULT - PROBLEM SELECTOR PLAN 6
ISS, Lantus, Pre meal humalog as prescribed. Hypoglycemia protocol.  Resume Novolog 70/30, Actos, upon discharge , home dose and frequency

## 2017-12-10 LAB
ANION GAP SERPL CALC-SCNC: 7 MMOL/L — SIGNIFICANT CHANGE UP (ref 5–17)
BUN SERPL-MCNC: 20 MG/DL — SIGNIFICANT CHANGE UP (ref 7–23)
CALCIUM SERPL-MCNC: 8.2 MG/DL — LOW (ref 8.5–10.1)
CHLORIDE SERPL-SCNC: 104 MMOL/L — SIGNIFICANT CHANGE UP (ref 96–108)
CO2 SERPL-SCNC: 29 MMOL/L — SIGNIFICANT CHANGE UP (ref 22–31)
CREAT SERPL-MCNC: 0.82 MG/DL — SIGNIFICANT CHANGE UP (ref 0.5–1.3)
GLUCOSE SERPL-MCNC: 145 MG/DL — HIGH (ref 70–99)
HCT VFR BLD CALC: 33 % — LOW (ref 39–50)
HGB BLD-MCNC: 10.4 G/DL — LOW (ref 13–17)
MCHC RBC-ENTMCNC: 29.1 PG — SIGNIFICANT CHANGE UP (ref 27–34)
MCHC RBC-ENTMCNC: 31.4 GM/DL — LOW (ref 32–36)
MCV RBC AUTO: 92.8 FL — SIGNIFICANT CHANGE UP (ref 80–100)
PLATELET # BLD AUTO: 282 K/UL — SIGNIFICANT CHANGE UP (ref 150–400)
POTASSIUM SERPL-MCNC: 3.7 MMOL/L — SIGNIFICANT CHANGE UP (ref 3.5–5.3)
POTASSIUM SERPL-SCNC: 3.7 MMOL/L — SIGNIFICANT CHANGE UP (ref 3.5–5.3)
RBC # BLD: 3.56 M/UL — LOW (ref 4.2–5.8)
RBC # FLD: 14.4 % — SIGNIFICANT CHANGE UP (ref 10.3–14.5)
SODIUM SERPL-SCNC: 140 MMOL/L — SIGNIFICANT CHANGE UP (ref 135–145)
WBC # BLD: 7 K/UL — SIGNIFICANT CHANGE UP (ref 3.8–10.5)
WBC # FLD AUTO: 7 K/UL — SIGNIFICANT CHANGE UP (ref 3.8–10.5)

## 2017-12-10 PROCEDURE — 99232 SBSQ HOSP IP/OBS MODERATE 35: CPT

## 2017-12-10 PROCEDURE — 99233 SBSQ HOSP IP/OBS HIGH 50: CPT

## 2017-12-10 RX ORDER — TAMSULOSIN HYDROCHLORIDE 0.4 MG/1
0.8 CAPSULE ORAL AT BEDTIME
Qty: 0 | Refills: 0 | Status: DISCONTINUED | OUTPATIENT
Start: 2017-12-10 | End: 2017-12-11

## 2017-12-10 RX ADMIN — Medication 3 UNIT(S): at 07:45

## 2017-12-10 RX ADMIN — Medication 81 MILLIGRAM(S): at 12:38

## 2017-12-10 RX ADMIN — TAMSULOSIN HYDROCHLORIDE 0.8 MILLIGRAM(S): 0.4 CAPSULE ORAL at 21:09

## 2017-12-10 RX ADMIN — ATORVASTATIN CALCIUM 80 MILLIGRAM(S): 80 TABLET, FILM COATED ORAL at 21:09

## 2017-12-10 RX ADMIN — Medication 25 MILLIGRAM(S): at 05:29

## 2017-12-10 RX ADMIN — Medication 25 MILLIGRAM(S): at 17:17

## 2017-12-10 RX ADMIN — Medication 2: at 12:00

## 2017-12-10 RX ADMIN — Medication 2: at 17:16

## 2017-12-10 RX ADMIN — Medication 20 MILLIGRAM(S): at 05:29

## 2017-12-10 RX ADMIN — Medication 3 UNIT(S): at 17:17

## 2017-12-10 RX ADMIN — INSULIN GLARGINE 10 UNIT(S): 100 INJECTION, SOLUTION SUBCUTANEOUS at 21:40

## 2017-12-10 RX ADMIN — LISINOPRIL 40 MILLIGRAM(S): 2.5 TABLET ORAL at 05:29

## 2017-12-10 RX ADMIN — Medication 3 UNIT(S): at 12:39

## 2017-12-10 NOTE — PROGRESS NOTE ADULT - ASSESSMENT
78 y/o male PMH of HTN, DM2, CAD (PCI stents X 4 , 14 years ago), BPH presents c/o gross hematuria and pain at the Kramer catheter placement site admitted with gross hematuria.    - Patient s/p PCI with GINNY 14 years ago and remained on DAPT.  Presented with gross hematuria, found to have UTI  - Plavix discontinued as there is no clear indication for DAPT 14 years s/p stent placement (non-drug eluting), continue ASA. Pt to f/u with Dr. Chiki Robertson as outpatient.  - No evidence of acute ischemia as pt asymptomatic.   - EKG shows NSR at 98bpm, inverted p-waves (ectopic activity)  - Last stress test x1 month ago with Dr. Robertson at [facility], which according to patient was normal  - ECHO: sees Dr. Chiki Robertson at [facility]  - Hypotension possibly 2/2 acute blood loss vs medication now resolved, follow hold parameters on anti-hypertensives (Metoprolol, Lasix), give IVF as necessary  - Continue BB and ACEI   - Continue Lipitor  - Monitor and replete lytes, keep K>4, Mg>2  - Other cardiovascular workup will depend on clinical course  - All other workup per primary team  - Will follow    Belkis Vincent NP-C  Cardiology

## 2017-12-10 NOTE — PROCEDURE NOTE - NSURITECHNIQUE_GU_A_CORE
The catheter was appropriately lubricated/The collection bag is below the level of the patient and urinary bladder/Proper hand hygiene was performed/The catheter was secured with a securement device (e.g. StatLock)/The site was cleaned with soap/water and sterile solution (betadine)/The urinary drainage system is closed at the end of the procedure/All applicable medical record documentation is completed

## 2017-12-10 NOTE — PROGRESS NOTE ADULT - ASSESSMENT
BPH with urinary retention. Kramer replaced by MD. Greater than 600 cc in bladder.    Increase tamsulosin 0.8 mg daily.    Hematuria resolved.

## 2017-12-10 NOTE — PROGRESS NOTE ADULT - ASSESSMENT
76 y/o male PMH of HTN, DM2, Cad (PCI stents X 4 , 14 years ago), BPH s/p Kramer Catheter ( POA), presents c/o hematuria and pain at the Kramer Catheter site, placed by NATHALIA Suazo as out patient in the office.

## 2017-12-10 NOTE — PROGRESS NOTE ADULT - PROBLEM SELECTOR PLAN 2
On tamsulosin 0.4 mg daily.  Kramer removed. Oral fluid intake and ambulation encouraged. Bladder scan to assess PVR this PM.
Secondary to gross hematuria.  Monitor H/H
Secondary to gross hematuria.  Monitor H/H

## 2017-12-11 ENCOUNTER — TRANSCRIPTION ENCOUNTER (OUTPATIENT)
Age: 77
End: 2017-12-11

## 2017-12-11 VITALS
OXYGEN SATURATION: 95 % | HEART RATE: 90 BPM | TEMPERATURE: 99 F | DIASTOLIC BLOOD PRESSURE: 65 MMHG | RESPIRATION RATE: 16 BRPM | SYSTOLIC BLOOD PRESSURE: 112 MMHG

## 2017-12-11 LAB
ANION GAP SERPL CALC-SCNC: 5 MMOL/L — SIGNIFICANT CHANGE UP (ref 5–17)
BUN SERPL-MCNC: 20 MG/DL — SIGNIFICANT CHANGE UP (ref 7–23)
CALCIUM SERPL-MCNC: 8.4 MG/DL — LOW (ref 8.5–10.1)
CHLORIDE SERPL-SCNC: 106 MMOL/L — SIGNIFICANT CHANGE UP (ref 96–108)
CO2 SERPL-SCNC: 30 MMOL/L — SIGNIFICANT CHANGE UP (ref 22–31)
CREAT SERPL-MCNC: 0.83 MG/DL — SIGNIFICANT CHANGE UP (ref 0.5–1.3)
GLUCOSE SERPL-MCNC: 155 MG/DL — HIGH (ref 70–99)
HCT VFR BLD CALC: 31.9 % — LOW (ref 39–50)
HGB BLD-MCNC: 10.1 G/DL — LOW (ref 13–17)
MCHC RBC-ENTMCNC: 29.3 PG — SIGNIFICANT CHANGE UP (ref 27–34)
MCHC RBC-ENTMCNC: 31.8 GM/DL — LOW (ref 32–36)
MCV RBC AUTO: 92.2 FL — SIGNIFICANT CHANGE UP (ref 80–100)
PLATELET # BLD AUTO: 294 K/UL — SIGNIFICANT CHANGE UP (ref 150–400)
POTASSIUM SERPL-MCNC: 4.1 MMOL/L — SIGNIFICANT CHANGE UP (ref 3.5–5.3)
POTASSIUM SERPL-SCNC: 4.1 MMOL/L — SIGNIFICANT CHANGE UP (ref 3.5–5.3)
RBC # BLD: 3.46 M/UL — LOW (ref 4.2–5.8)
RBC # FLD: 15.4 % — HIGH (ref 10.3–14.5)
SODIUM SERPL-SCNC: 141 MMOL/L — SIGNIFICANT CHANGE UP (ref 135–145)
WBC # BLD: 7.9 K/UL — SIGNIFICANT CHANGE UP (ref 3.8–10.5)
WBC # FLD AUTO: 7.9 K/UL — SIGNIFICANT CHANGE UP (ref 3.8–10.5)

## 2017-12-11 PROCEDURE — 99232 SBSQ HOSP IP/OBS MODERATE 35: CPT

## 2017-12-11 PROCEDURE — 99239 HOSP IP/OBS DSCHRG MGMT >30: CPT

## 2017-12-11 RX ORDER — METFORMIN HYDROCHLORIDE 850 MG/1
1 TABLET ORAL
Qty: 0 | Refills: 0 | DISCHARGE
Start: 2017-12-11

## 2017-12-11 RX ORDER — ATORVASTATIN CALCIUM 80 MG/1
1 TABLET, FILM COATED ORAL
Qty: 0 | Refills: 0 | COMMUNITY

## 2017-12-11 RX ORDER — ATORVASTATIN CALCIUM 80 MG/1
1 TABLET, FILM COATED ORAL
Qty: 0 | Refills: 0 | DISCHARGE
Start: 2017-12-11

## 2017-12-11 RX ORDER — FUROSEMIDE 40 MG
1 TABLET ORAL
Qty: 0 | Refills: 0 | COMMUNITY

## 2017-12-11 RX ORDER — CLOPIDOGREL BISULFATE 75 MG/1
1 TABLET, FILM COATED ORAL
Qty: 30 | Refills: 0 | OUTPATIENT
Start: 2017-12-11 | End: 2018-01-09

## 2017-12-11 RX ORDER — ASPIRIN/CALCIUM CARB/MAGNESIUM 324 MG
1 TABLET ORAL
Qty: 0 | Refills: 0 | COMMUNITY

## 2017-12-11 RX ORDER — FUROSEMIDE 40 MG
1 TABLET ORAL
Qty: 0 | Refills: 0 | DISCHARGE
Start: 2017-12-11

## 2017-12-11 RX ORDER — METOPROLOL TARTRATE 50 MG
25 TABLET ORAL
Qty: 0 | Refills: 0 | COMMUNITY

## 2017-12-11 RX ORDER — METOPROLOL TARTRATE 50 MG
1 TABLET ORAL
Qty: 0 | Refills: 0 | DISCHARGE
Start: 2017-12-11

## 2017-12-11 RX ORDER — METFORMIN HYDROCHLORIDE 850 MG/1
1 TABLET ORAL
Qty: 0 | Refills: 0 | COMMUNITY

## 2017-12-11 RX ORDER — ASPIRIN/CALCIUM CARB/MAGNESIUM 324 MG
1 TABLET ORAL
Qty: 0 | Refills: 0 | COMMUNITY
Start: 2017-12-11

## 2017-12-11 RX ORDER — TAMSULOSIN HYDROCHLORIDE 0.4 MG/1
2 CAPSULE ORAL
Qty: 30 | Refills: 0 | OUTPATIENT
Start: 2017-12-11 | End: 2017-12-25

## 2017-12-11 RX ORDER — ATORVASTATIN CALCIUM 80 MG/1
1 TABLET, FILM COATED ORAL
Qty: 0 | Refills: 0 | COMMUNITY
Start: 2017-12-11

## 2017-12-11 RX ORDER — INSULIN ASPART 100 [IU]/ML
15 INJECTION, SUSPENSION SUBCUTANEOUS
Qty: 0 | Refills: 0 | COMMUNITY
Start: 2017-12-11

## 2017-12-11 RX ORDER — INSULIN ASPART 100 [IU]/ML
0 INJECTION, SUSPENSION SUBCUTANEOUS
Qty: 0 | Refills: 0 | COMMUNITY

## 2017-12-11 RX ORDER — INSULIN ASPART 100 [IU]/ML
0 INJECTION, SUSPENSION SUBCUTANEOUS
Qty: 0 | Refills: 0 | COMMUNITY
Start: 2017-12-11

## 2017-12-11 RX ORDER — CLOPIDOGREL BISULFATE 75 MG/1
1 TABLET, FILM COATED ORAL
Qty: 0 | Refills: 0 | COMMUNITY

## 2017-12-11 RX ADMIN — LISINOPRIL 40 MILLIGRAM(S): 2.5 TABLET ORAL at 07:00

## 2017-12-11 RX ADMIN — Medication 20 MILLIGRAM(S): at 07:00

## 2017-12-11 RX ADMIN — Medication 3 UNIT(S): at 11:30

## 2017-12-11 RX ADMIN — Medication 1: at 11:30

## 2017-12-11 RX ADMIN — Medication 3 UNIT(S): at 07:30

## 2017-12-11 RX ADMIN — Medication 81 MILLIGRAM(S): at 12:16

## 2017-12-11 RX ADMIN — Medication 1: at 07:30

## 2017-12-11 RX ADMIN — Medication 25 MILLIGRAM(S): at 07:00

## 2017-12-11 NOTE — DISCHARGE NOTE ADULT - CARE PLAN
Principal Discharge DX:	Hematuria  Goal:	Clinical resolve  Instructions for follow-up, activity and diet:	Resolved  Follow up with Urology within 1 week  Secondary Diagnosis:	CAD (coronary artery disease)  Instructions for follow-up, activity and diet:	Continue ASA  Discontinue Plavix  Follow up with Cardiology within 1 week  Secondary Diagnosis:	Essential hypertension  Instructions for follow-up, activity and diet:	Continue home meds  Secondary Diagnosis:	HLD (hyperlipidemia)  Instructions for follow-up, activity and diet:	Continue home meds  Secondary Diagnosis:	Urinary retention  Instructions for follow-up, activity and diet:	Go home on Kramer and Flomax  Follow up with Urology next week

## 2017-12-11 NOTE — DISCHARGE NOTE ADULT - NSTOBACCOHOTLINE_GEN_A_CS
[facility] Smokers Quitline ([id number]-AZ-[id number]) [facility] Smokers Quitline ([id number]-[id number])

## 2017-12-11 NOTE — PROGRESS NOTE ADULT - SUBJECTIVE AND OBJECTIVE BOX
HPI:  76 y/o male PMH of HTN, DM2, Cad (PCI stents X 4 , 14 years ago), BPH s/p  Felix catheter (POA), presents c/o gross hematuria and pain at the Felix catheter placement site. Patient states that had left knee surgery done a month ago and was having problem urinating sometime with incontinence so he went to see Dr. Habermann and US showed BPH. He had Felix Catheter placed yesterday and later in the day developed hematuria so he called Dr. Suazo and was advised to go to ER. Denies any other symptoms, complaints, no chest pain, palpitation, sob, nausea, vomiting or diarrhea. (08 Dec 2017 08:00)      SUBJECTIVE:  Patient is a 77y old  Male who presents with a chief complaint of " I went to Urologist clinic and instructed to ED" (08 Dec 2017 10:39)          OBJECTIVE:  Review Of Systems:  Constitutional: [ ] Fever [ ] Chills [ ] Fatigue [ ] Weight change   HEENT: [ ] Blurred vision [ ] Eye Pain [ ] Headache [ ] Runny nose [ ] Sore Throat   Respiratory: [ ] Cough [ ] Wheezing [ ] Shortness of breath  Cardiovascular: [ ] Chest Pain [ ] Palpitations [ ] THEODORE [ ] PND [ ] Orthopnea  Gastrointestinal: [ ] Abdominal Pain [ ] Diarrhea [ ] Constipation [ ] Hemorrhoids [ ] Nausea [ ] Vomiting  Genitourinary: [ ] Nocturia [ ] Dysuria [ ] Incontinence [x] hematuria s/p felix placed 2/2 obstructive BPH with urinary retention  Extremities: [ ] Swelling [ ] Joint Pain  Neurologic: [ ] Focal deficit [ ] Paresthesias [ ] Syncope  Lymphatic: [ ] Swelling [ ] Lymphadenopathy   Skin: [ ] Rash [ ] Ecchymoses [ ] Wounds [ ] Lesions  Psychiatry: [ ] Depression [ ] Suicidal/Homicidal Ideation [ ] Anxiety [ ] Sleep Disturbances  [ ] 10 point review of systems is otherwise negative except as mentioned above            [ ]Unable to obtain    Allergy:  Allergies    No Known Allergies    Intolerances        Medications:  MEDICATIONS  (STANDING):  aspirin  chewable 81 milliGRAM(s) Oral daily  atorvastatin 80 milliGRAM(s) Oral at bedtime  cefTRIAXone   IVPB 1 Gram(s) IV Intermittent every 24 hours  dextrose 5%. 1000 milliLiter(s) (50 mL/Hr) IV Continuous <Continuous>  dextrose 50% Injectable 12.5 Gram(s) IV Push once  dextrose 50% Injectable 25 Gram(s) IV Push once  dextrose 50% Injectable 25 Gram(s) IV Push once  furosemide    Tablet 20 milliGRAM(s) Oral daily  insulin glargine Injectable (LANTUS) 10 Unit(s) SubCutaneous at bedtime  insulin lispro (HumaLOG) corrective regimen sliding scale   SubCutaneous three times a day before meals  insulin lispro Injectable (HumaLOG) 3 Unit(s) SubCutaneous three times a day before meals  lisinopril 40 milliGRAM(s) Oral daily  metoprolol     tartrate 25 milliGRAM(s) Oral two times a day  tamsulosin 0.8 milliGRAM(s) Oral at bedtime    MEDICATIONS  (PRN):  acetaminophen   Tablet. 650 milliGRAM(s) Oral every 6 hours PRN Mild to moderate pain  dextrose Gel 1 Dose(s) Oral once PRN Blood Glucose LESS THAN 70 milliGRAM(s)/deciliter  glucagon  Injectable 1 milliGRAM(s) IntraMuscular once PRN Glucose LESS THAN 70 milligrams/deciliter  oxyCODONE    5 mG/acetaminophen 325 mG 1 Tablet(s) Oral every 6 hours PRN Severe Pain (7 - 10)      PMH/PSH/FH/SH: [ ] Unchanged    Vitals:  T(C): 36.9 (12-10-17 @ 12:24), Max: 36.9 (12-10-17 @ 12:24)  HR: 100 (12-10-17 @ 12:24) (86 - 109)  BP: 111/72 (12-10-17 @ 12:24) (110/65 - 146/78)  BP(mean): --  RR: 20 (12-10-17 @ 12:24) (18 - 21)  SpO2: 100% (12-10-17 @ 12:24) (93% - 100%)  Wt(kg): --  Daily     Daily   I&O's Summary    09 Dec 2017 07:01  -  10 Dec 2017 07:00  --------------------------------------------------------  IN: 690 mL / OUT: 698 mL / NET: -8 mL        Labs:                        10.4   7.0   )-----------( 282      ( 10 Dec 2017 07:42 )             33.0     12-10    140  |  104  |  20  ----------------------------<  145<H>  3.7   |  29  |  0.82    Ca    8.2<L>      10 Dec 2017 07:42                        ECG:  < from: 12 Lead ECG (12.08.17 @ 10:27) >  Ventricular Rate 98 BPM    Atrial Rate 98 BPM    P-R Interval 142 ms    QRS Duration 86 ms    Q-T Interval 374 ms    QTC Calculation(Bezet) 477 ms    P Axis 63 degrees    R Axis -43 degrees    T Axis 43 degrees    Diagnosis Line Normal sinus rhythm  Left axis deviation  Early R wave transition  Abnormal ECG  No previous ECGs available  Confirmed by Yovani Rodriges MD ([id number]) on 12/8/2017 2:23:23 PM    < end of copied text >    Echo:    Stress Testing:     Cath:    Imaging:  < from: Xray Chest 1 View AP-PORTABLE IMMEDIATE (12.08.17 @ 05:46) >  EXAM:  CHEST PORTABLE IMMED                            PROCEDURE DATE:  12/08/2017          INTERPRETATION:  Clinical information: Hematuria. No other clinical   information is provided.    Technique: Portable AP chest film.    Comparison: Prior chest x-ray examination from 1/2/2017.    Findings: There is unchanged mild elevation of the left hemidiaphragm.    The lungs are clear. The cardiomediastinal silhouette is normal. There   are mild multilevel degenerative changes of the thoracic spine.    IMPRESSION: Clear lungs without interval change.                ROLANDO VASQUEZ M.D., ATTENDING RADIOLOGIST  This document has been electronically signed. Dec  8 2017  9:09AM        < end of copied text >    Interpretation of Telemetry:    Not on Tele  Physical Exam:  Appearance: [ ] Normal  [ ] abnormal [x ] NAD [x] obese  Eyes: [x ] PERRL [ ] EOMI  HENT: [x ] Normal [ ] Abnormal oral muscosa [ x]NC/AT  Cardiovascular: [x ] S1 [x ] S2 [x ] tachy [ ] m/r/g [ ]edema [ ] JVP  Genitourinary: catheter draining hematuria   Respiratory: [x ] Clear to auscultation bilaterally  Gastrointestinal: [x ] Soft [ ] tenderness[ ] distension [x ] BS [x] obese abdomen  Musculoskeletal: [ ] clubbing [ ] joint deformity   Neurologic: [x ] Non-focal  Lymphatic: [ ] lymphadenopathy [x] chronic edema left LE 2/2 knee surgery 4+ > Rt  Psychiatry: [x ] AAOx3  [ ] confused [ ] disoriented [x ] Mood & affect appropriate  Skin: [ ]  rashes [ ] ecchymoses [ ] cyanosis
HPI:  76 y/o male PMH of HTN, DM2, Cad (PCI stents X 4 , 14 years ago), BPH s/p  Kramer catheter (POA), presents c/o gross hematuria and pain at the Kramer catheter placement site. Patient states that had left knee surgery done a month ago and was having problem urinating sometime with incontinence so he went to see Dr. Habermann and US showed BPH. He had Kramer Catheter placed yesterday and later in the day developed hematuria so he called Dr. Suazo and was advised to go to ER. Denies any other symptoms, complaints, no chest pain, palpitation, sob, nausea, vomiting or diarrhea. (08 Dec 2017 08:00)    SUBJECTIVE:  Patient is a 77y old  Male who presents with a chief complaint of " I went to Urologist clinic and instructed to ED" (08 Dec 2017 10:39)    seen and examined.  Denies CP, SOB.  Denies dysuria or flank pain.  States he still has some hematuria.    OBJECTIVE:  Review Of Systems:  Constitutional: [ ] Fever [ ] Chills [ ] Fatigue [ ] Weight change   HEENT: [ ] Blurred vision [ ] Eye Pain [ ] Headache [ ] Runny nose [ ] Sore Throat   Respiratory: [ ] Cough [ ] Wheezing [ ] Shortness of breath  Cardiovascular: [ ] Chest Pain [ ] Palpitations [ ] THEODORE [ ] PND [ ] Orthopnea  Gastrointestinal: [ ] Abdominal Pain [ ] Diarrhea [ ] Constipation [ ] Hemorrhoids [ ] Nausea [ ] Vomiting  Genitourinary: [ ] Nocturia [ ] Dysuria [ ] Incontinence  Extremities: [ ] Swelling [ ] Joint Pain  Neurologic: [ ] Focal deficit [ ] Paresthesias [ ] Syncope  Lymphatic: [ ] Swelling [ ] Lymphadenopathy   Skin: [ ] Rash [ ] Ecchymoses [ ] Wounds [ ] Lesions  Psychiatry: [ ] Depression [ ] Suicidal/Homicidal Ideation [ ] Anxiety [ ] Sleep Disturbances  [ x] 10 point review of systems is otherwise negative except as mentioned above            [ ]Unable to obtain    Allergy:  Allergies    No Known Allergies    Intolerances    Medications:  MEDICATIONS  (STANDING):  aspirin  chewable 81 milliGRAM(s) Oral daily  atorvastatin 80 milliGRAM(s) Oral at bedtime  cefTRIAXone   IVPB 1 Gram(s) IV Intermittent every 24 hours  dextrose 5%. 1000 milliLiter(s) (50 mL/Hr) IV Continuous <Continuous>  dextrose 50% Injectable 12.5 Gram(s) IV Push once  dextrose 50% Injectable 25 Gram(s) IV Push once  dextrose 50% Injectable 25 Gram(s) IV Push once  furosemide    Tablet 20 milliGRAM(s) Oral daily  insulin glargine Injectable (LANTUS) 10 Unit(s) SubCutaneous at bedtime  insulin lispro (HumaLOG) corrective regimen sliding scale   SubCutaneous three times a day before meals  insulin lispro Injectable (HumaLOG) 3 Unit(s) SubCutaneous three times a day before meals  lisinopril 40 milliGRAM(s) Oral daily  metoprolol     tartrate 25 milliGRAM(s) Oral two times a day  tamsulosin 0.4 milliGRAM(s) Oral at bedtime    MEDICATIONS  (PRN):  acetaminophen   Tablet. 650 milliGRAM(s) Oral every 6 hours PRN Mild to moderate pain  dextrose Gel 1 Dose(s) Oral once PRN Blood Glucose LESS THAN 70 milliGRAM(s)/deciliter  glucagon  Injectable 1 milliGRAM(s) IntraMuscular once PRN Glucose LESS THAN 70 milligrams/deciliter  oxyCODONE    5 mG/acetaminophen 325 mG 1 Tablet(s) Oral every 6 hours PRN Severe Pain (7 - 10)    PMH/PSH/FH/SH: [ ] Unchanged    Vitals:  T(C): 36.7 (17 @ 08:15), Max: 37.2 (17 @ 23:36)  HR: 86 (17 @ 08:15) (86 - 98)  BP: 120/82 (17 @ 08:15) (109/67 - 154/72)  BP(mean): --  RR: 19 (17 @ 08:15) (18 - 19)  SpO2: 97% (17 @ 08:15) (93% - 97%)  Wt(kg): --  Daily     Daily Weight in k.2 (09 Dec 2017 04:57)  I&O's Summary    08 Dec 2017 07:01  -  09 Dec 2017 07:00  --------------------------------------------------------  IN: 530 mL / OUT: 2500 mL / NET: -1970 mL    Labs:                        10.2   10.0  )-----------( 268      ( 09 Dec 2017 06:27 )             32.6     12    140  |  103  |  17  ----------------------------<  138<H>  3.9   |  28  |  0.77    Ca    8.2<L>      09 Dec 2017 06:27    TPro  7.0  /  Alb  2.9<L>  /  TBili  0.7  /  DBili  x   /  AST  29  /  ALT  45  /  AlkPhos  40  12    PT/INR - ( 08 Dec 2017 00:17 )   PT: 12.7 sec;   INR: 1.16 ratio       PTT - ( 08 Dec 2017 00:17 )  PTT:30.1 sec    ECG:  < from: 12 Lead ECG (17 @ 10:27) >    Ventricular Rate 98 BPM    Atrial Rate 98 BPM    P-R Interval 142 ms    QRS Duration 86 ms    Q-T Interval 374 ms    QTC Calculation(Bezet) 477 ms    P Axis 63 degrees    R Axis -43 degrees    T Axis 43 degrees    Diagnosis Line Normal sinus rhythm  Left axis deviation  Early R wave transition  Abnormal ECG  No previous ECGs available  Confirmed by Yovani Rodriges MD ([id number]) on 2017 2:23:23 PM    < end of copied text >    Echo:    Stress Testing:     Cath:    Imaging:    Interpretation of Telemetry:  Not on tele    Physical Exam:  Appearance: [ ] Normal  [ ] abnormal [ x] NAD [x] Obese  Eyes: [x ] PERRL [ ] EOMI  HENT: [ x] Normal [ ] Abnormal oral muscosa [ ]NC/AT  Cardiovascular: [x ] S1 [x ] S2 [ x] RRR [ ] m/r/g [ ]edema [ ] JVP  Procedural Access Site: [ ]  hematoma [ ] tender to palpation [ ] 2+ pulse [ ] bruit [ ] Ecchymosis  Respiratory: [ x] Clear to auscultation bilaterally  Gastrointestinal: [x ] Soft [ ] tenderness[ ] distension [ x] BS  Musculoskeletal: [ ] clubbing [ ] joint deformity   Neurologic: [ x] Non-focal  Lymphatic: [ ] lymphadenopathy  Psychiatry: [ x] AAOx3  [ ] confused [ ] disoriented [x ] Mood & affect appropriate  Skin: [ ]  rashes [ ] ecchymoses [ ] cyanosis
[facility] - Silverio Marcum, Antelmo, Avinash, Cherrie, Risa Mcmanus  Office Number:  [phone]    Patient resting comfortably in bed in NAD.  Laying flat with no respiratory distress.  No complaints of chest pain, dyspnea, palpitations, PND, or orthopnea.  Still with mild hematuria    MEDICATIONS  (STANDING):  aspirin  chewable 81 milliGRAM(s) Oral daily  atorvastatin 80 milliGRAM(s) Oral at bedtime  dextrose 5%. 1000 milliLiter(s) (50 mL/Hr) IV Continuous <Continuous>  dextrose 50% Injectable 12.5 Gram(s) IV Push once  dextrose 50% Injectable 25 Gram(s) IV Push once  dextrose 50% Injectable 25 Gram(s) IV Push once  furosemide    Tablet 20 milliGRAM(s) Oral daily  insulin glargine Injectable (LANTUS) 10 Unit(s) SubCutaneous at bedtime  insulin lispro (HumaLOG) corrective regimen sliding scale   SubCutaneous three times a day before meals  insulin lispro Injectable (HumaLOG) 3 Unit(s) SubCutaneous three times a day before meals  lisinopril 40 milliGRAM(s) Oral daily  metoprolol     tartrate 25 milliGRAM(s) Oral two times a day  tamsulosin 0.8 milliGRAM(s) Oral at bedtime    MEDICATIONS  (PRN):  acetaminophen   Tablet. 650 milliGRAM(s) Oral every 6 hours PRN Mild to moderate pain  dextrose Gel 1 Dose(s) Oral once PRN Blood Glucose LESS THAN 70 milliGRAM(s)/deciliter  glucagon  Injectable 1 milliGRAM(s) IntraMuscular once PRN Glucose LESS THAN 70 milligrams/deciliter  oxyCODONE    5 mG/acetaminophen 325 mG 1 Tablet(s) Oral every 6 hours PRN Severe Pain (7 - 10)      Allergies    No Known Allergies    Vital Signs Last 24 Hrs  T(C): 36.6 (11 Dec 2017 05:25), Max: 37.4 (10 Dec 2017 21:08)  T(F): 97.8 (11 Dec 2017 05:25), Max: 99.3 (10 Dec 2017 21:08)  HR: 101 (11 Dec 2017 05:25) (95 - 101)  BP: 131/86 (11 Dec 2017 05:25) (111/72 - 156/70)  BP(mean): --  RR: 20 (11 Dec 2017 05:25) (17 - 20)  SpO2: 94% (11 Dec 2017 05:25) (92% - 100%)    I&O's Summary    10 Dec 2017 07:01  -  11 Dec 2017 07:00  --------------------------------------------------------  IN: 340 mL / OUT: 750 mL / NET: -410 mL        ON EXAM:    General: NAD, awake and alert, oriented x 3  HEENT: Mucous membranes are moist, anicteric  Lungs: Non-labored, breath sounds are clear bilaterally, No wheezing, rales or rhonchi  Cardiovascular: Regular, S1 and S2, no murmurs, rubs, or gallops  Gastrointestinal: Bowel Sounds present, soft, nontender.   Lymph: b/l peripheral edema. No lymphadenopathy.  Skin: No rashes or ulcers  Psych:  Mood & affect appropriate    LABS: All Labs Reviewed:                        10.1   7.9   )-----------( 294      ( 11 Dec 2017 07:18 )             31.9                         10.4   7.0   )-----------( 282      ( 10 Dec 2017 07:42 )             33.0                         10.2   10.0  )-----------( 268      ( 09 Dec 2017 06:27 )             32.6     11 Dec 2017 07:18    141    |  106    |  20     ----------------------------<  155    4.1     |  30     |  0.83   10 Dec 2017 07:42    140    |  104    |  20     ----------------------------<  145    3.7     |  29     |  0.82   09 Dec 2017 06:27    140    |  103    |  17     ----------------------------<  138    3.9     |  28     |  0.77     Ca    8.4        11 Dec 2017 07:18  Ca    8.2        10 Dec 2017 07:42  Ca    8.2        09 Dec 2017 06:27            Blood Culture: Organism --  Gram Stain Blood -- Gram Stain --  Specimen Source .Urine Clean Catch (Midstream)  Culture-Blood --
INTERVAL HPI/OVERNIGHT EVENTS: Diffculty voiding    MEDICATIONS  (STANDING):  aspirin  chewable 81 milliGRAM(s) Oral daily  atorvastatin 80 milliGRAM(s) Oral at bedtime  cefTRIAXone   IVPB 1 Gram(s) IV Intermittent every 24 hours  dextrose 5%. 1000 milliLiter(s) (50 mL/Hr) IV Continuous <Continuous>  dextrose 50% Injectable 12.5 Gram(s) IV Push once  dextrose 50% Injectable 25 Gram(s) IV Push once  dextrose 50% Injectable 25 Gram(s) IV Push once  furosemide    Tablet 20 milliGRAM(s) Oral daily  insulin glargine Injectable (LANTUS) 10 Unit(s) SubCutaneous at bedtime  insulin lispro (HumaLOG) corrective regimen sliding scale   SubCutaneous three times a day before meals  insulin lispro Injectable (HumaLOG) 3 Unit(s) SubCutaneous three times a day before meals  lisinopril 40 milliGRAM(s) Oral daily  metoprolol     tartrate 25 milliGRAM(s) Oral two times a day  tamsulosin 0.4 milliGRAM(s) Oral at bedtime    MEDICATIONS  (PRN):  acetaminophen   Tablet. 650 milliGRAM(s) Oral every 6 hours PRN Mild to moderate pain  dextrose Gel 1 Dose(s) Oral once PRN Blood Glucose LESS THAN 70 milliGRAM(s)/deciliter  glucagon  Injectable 1 milliGRAM(s) IntraMuscular once PRN Glucose LESS THAN 70 milligrams/deciliter  oxyCODONE    5 mG/acetaminophen 325 mG 1 Tablet(s) Oral every 6 hours PRN Severe Pain (7 - 10)        Vital Signs Last 24 Hrs  T(C): 36.7 (10 Dec 2017 04:32), Max: 36.7 (09 Dec 2017 13:31)  T(F): 98.1 (10 Dec 2017 04:32), Max: 98.1 (09 Dec 2017 13:31)  HR: 102 (10 Dec 2017 04:32) (86 - 113)  BP: 110/65 (10 Dec 2017 04:32) (110/65 - 146/78)  BP(mean): --  RR: 18 (10 Dec 2017 04:32) (18 - 23)  SpO2: 93% (10 Dec 2017 04:32) (93% - 99%)    PHYSICAL EXAM:    ABDOMEN: Moderate SP distention and tenderness  GENITALIA: Normal. No scrotal swelling.    LABS:                        10.4   7.0   )-----------( 282      ( 10 Dec 2017 07:42 )             33.0     12-10    140  |  104  |  20  ----------------------------<  145<H>  3.7   |  29  |  0.82    Ca    8.2<L>      10 Dec 2017 07:42          Urine culture:  12-08 @ 08:52 --   No growth
INTERVAL HPI/OVERNIGHT EVENTS: Hematuria improving    MEDICATIONS  (STANDING):  aspirin  chewable 81 milliGRAM(s) Oral daily  atorvastatin 80 milliGRAM(s) Oral at bedtime  cefTRIAXone   IVPB 1 Gram(s) IV Intermittent every 24 hours  dextrose 5%. 1000 milliLiter(s) (50 mL/Hr) IV Continuous <Continuous>  dextrose 50% Injectable 12.5 Gram(s) IV Push once  dextrose 50% Injectable 25 Gram(s) IV Push once  dextrose 50% Injectable 25 Gram(s) IV Push once  furosemide    Tablet 20 milliGRAM(s) Oral daily  insulin glargine Injectable (LANTUS) 10 Unit(s) SubCutaneous at bedtime  insulin lispro (HumaLOG) corrective regimen sliding scale   SubCutaneous three times a day before meals  insulin lispro Injectable (HumaLOG) 3 Unit(s) SubCutaneous three times a day before meals  lisinopril 40 milliGRAM(s) Oral daily  metoprolol     tartrate 25 milliGRAM(s) Oral two times a day  tamsulosin 0.4 milliGRAM(s) Oral at bedtime    MEDICATIONS  (PRN):  acetaminophen   Tablet. 650 milliGRAM(s) Oral every 6 hours PRN Mild to moderate pain  dextrose Gel 1 Dose(s) Oral once PRN Blood Glucose LESS THAN 70 milliGRAM(s)/deciliter  glucagon  Injectable 1 milliGRAM(s) IntraMuscular once PRN Glucose LESS THAN 70 milligrams/deciliter  oxyCODONE    5 mG/acetaminophen 325 mG 1 Tablet(s) Oral every 6 hours PRN Severe Pain (7 - 10)        Vital Signs Last 24 Hrs  T(C): 36.4 (09 Dec 2017 04:57), Max: 37.2 (08 Dec 2017 23:36)  T(F): 97.6 (09 Dec 2017 04:57), Max: 99 (08 Dec 2017 23:36)  HR: 98 (09 Dec 2017 04:57) (88 - 100)  BP: 154/72 (09 Dec 2017 04:57) (104/50 - 154/72)  BP(mean): --  RR: 18 (09 Dec 2017 04:57) (17 - 19)  SpO2: 96% (09 Dec 2017 04:57) (93% - 98%)    PHYSICAL EXAM:    ABDOMEN: Soft, No SP tenderness or distention  GENITALIA: Kramer in place. Urine punch colored.    LABS:                        10.2   10.0  )-----------( 268      ( 09 Dec 2017 06:27 )             32.6         140  |  103  |  17  ----------------------------<  138<H>  3.9   |  28  |  0.77    Ca    8.2<L>      09 Dec 2017 06:27    TPro  7.0  /  Alb  2.9<L>  /  TBili  0.7  /  DBili  x   /  AST  29  /  ALT  45  /  AlkPhos  40  12    PT/INR - ( 08 Dec 2017 00:17 )   PT: 12.7 sec;   INR: 1.16 ratio         PTT - ( 08 Dec 2017 00:17 )  PTT:30.1 sec  Urinalysis Basic - ( 08 Dec 2017 00:17 )    Color: Red / Appearance: Slightly Turbid / S.015 / pH: x  Gluc: x / Ketone: Negative  / Bili: Negative / Urobili: Negative   Blood: x / Protein: 500 mg/dL / Nitrite: Positive   Leuk Esterase: Negative / RBC: >50 /HPF / WBC 11-25   Sq Epi: x / Non Sq Epi: x / Bacteria: Few
Patient is a 77y old  Male who presents with a chief complaint of " I went to Urologist clinic and instructed to ED" (08 Dec 2017 10:39)      INTERVAL HPI: Pt seen and examined bedside, has no complaints. Kramer removed. less hematuria  OVERNIGHT EVENTS:  T(F): 97.8 (17 @ 15:49), Max: 99 (17 @ 23:36)  HR: 109 (17 @ 15:49) (86 - 113)  BP: 146/78 (17 @ 15:49) (109/67 - 154/72)  RR: 18 (17 @ 15:49) (18 - 23)  SpO2: 95% (17 @ 15:49) (93% - 99%)  Wt(kg): --  I&O's Summary    08 Dec 2017 07:  -  09 Dec 2017 07:00  --------------------------------------------------------  IN: 530 mL / OUT: 2500 mL / NET: -1970 mL    09 Dec 2017 07:  -  09 Dec 2017 18:06  --------------------------------------------------------  IN: 0 mL / OUT: 300 mL / NET: -300 mL        REVIEW OF SYSTEM:    Constitutional: No fever, chills, fatigue  Neuro: No headache, numbness, weakness  Resp: No cough, wheezing, shortness of breath  CVS: No chest pain, palpitations, leg swelling  GI: No abdominal pain, nausea, vomiting, diarrhea   : No dysuria, frequency, incontinence  Skin: No itching, burning, rashes, or lesions   Msk: No joint pain or swelling  Psych: No depression, anxiety, mood swings          PHYSICAL EXAM:  GENERAL: NAD, well-developed  HEAD:  Atraumatic, Normocephalic  EYES: EOMI, PERRLA, conjunctiva and sclera clear  ENMT: No tonsillar erythema, exudates, or enlargement; Moist mucous membranes,   NECK: Supple, No JVD, Normal thyroid  HEART: Regular rate and rhythm; No murmurs, rubs, or gallops  RESPIRATORY: CTA B/L, No wheezing / rhonchi  ABDOMEN: Soft, Nontender, Nondistended; Bowel sounds present  NEUROLOGY: A&Ox3, nonfocal, moving all extremities.  EXTREMITIES:  2+ Peripheral Pulses, No clubbing, cyanosis, or edema  SKIN: warm, dry, normal color, no rash or abnormal lesions        LABS:                        10.2   10.0  )-----------( 268      ( 09 Dec 2017 06:27 )             32.6         140  |  103  |  17  ----------------------------<  138<H>  3.9   |  28  |  0.77    Ca    8.2<L>      09 Dec 2017 06:27    TPro  7.0  /  Alb  2.9<L>  /  TBili  0.7  /  DBili  x   /  AST  29  /  ALT  45  /  AlkPhos  40      PT/INR - ( 08 Dec 2017 00:17 )   PT: 12.7 sec;   INR: 1.16 ratio         PTT - ( 08 Dec 2017 00:17 )  PTT:30.1 sec  Urinalysis Basic - ( 08 Dec 2017 00:17 )    Color: Red / Appearance: Slightly Turbid / S.015 / pH: x  Gluc: x / Ketone: Negative  / Bili: Negative / Urobili: Negative   Blood: x / Protein: 500 mg/dL / Nitrite: Positive   Leuk Esterase: Negative / RBC: >50 /HPF / WBC 11-25   Sq Epi: x / Non Sq Epi: x / Bacteria: Few      CAPILLARY BLOOD GLUCOSE      POCT Blood Glucose.: 174 mg/dL (09 Dec 2017 16:35)  POCT Blood Glucose.: 153 mg/dL (09 Dec 2017 11:31)  POCT Blood Glucose.: 185 mg/dL (09 Dec 2017 08:03)  POCT Blood Glucose.: 251 mg/dL (08 Dec 2017 21:15)       @ 08:52   No growth  --  --          MEDICATIONS  (STANDING):  aspirin  chewable 81 milliGRAM(s) Oral daily  atorvastatin 80 milliGRAM(s) Oral at bedtime  cefTRIAXone   IVPB 1 Gram(s) IV Intermittent every 24 hours  dextrose 5%. 1000 milliLiter(s) (50 mL/Hr) IV Continuous <Continuous>  dextrose 50% Injectable 12.5 Gram(s) IV Push once  dextrose 50% Injectable 25 Gram(s) IV Push once  dextrose 50% Injectable 25 Gram(s) IV Push once  furosemide    Tablet 20 milliGRAM(s) Oral daily  insulin glargine Injectable (LANTUS) 10 Unit(s) SubCutaneous at bedtime  insulin lispro (HumaLOG) corrective regimen sliding scale   SubCutaneous three times a day before meals  insulin lispro Injectable (HumaLOG) 3 Unit(s) SubCutaneous three times a day before meals  lisinopril 40 milliGRAM(s) Oral daily  metoprolol     tartrate 25 milliGRAM(s) Oral two times a day  tamsulosin 0.4 milliGRAM(s) Oral at bedtime    MEDICATIONS  (PRN):  acetaminophen   Tablet. 650 milliGRAM(s) Oral every 6 hours PRN Mild to moderate pain  dextrose Gel 1 Dose(s) Oral once PRN Blood Glucose LESS THAN 70 milliGRAM(s)/deciliter  glucagon  Injectable 1 milliGRAM(s) IntraMuscular once PRN Glucose LESS THAN 70 milligrams/deciliter  oxyCODONE    5 mG/acetaminophen 325 mG 1 Tablet(s) Oral every 6 hours PRN Severe Pain (7 - 10)
Patient is a 77y old  Male who presents with a chief complaint of " I went to Urologist clinic and instructed to ED" (08 Dec 2017 10:39)      INTERVAL HPI: Pt seen and examined bedside, has no complaints. Kramer replaced today, earlier had no hematuria, now has gross hematuria with clots.  OVERNIGHT EVENTS:  T(F): 97.8 (17 @ 15:49), Max: 99 (17 @ 23:36)  HR: 109 (17 @ 15:49) (86 - 113)  BP: 146/78 (17 @ 15:49) (109/67 - 154/72)  RR: 18 (17 @ 15:49) (18 - 23)  SpO2: 95% (17 @ 15:49) (93% - 99%)  Wt(kg): --  I&O's Summary    08 Dec 2017 07:01  -  09 Dec 2017 07:00  --------------------------------------------------------  IN: 530 mL / OUT: 2500 mL / NET: -1970 mL    09 Dec 2017 07:  -  09 Dec 2017 18:06  --------------------------------------------------------  IN: 0 mL / OUT: 300 mL / NET: -300 mL        REVIEW OF SYSTEM:    Constitutional: No fever, chills, fatigue  Neuro: No headache, numbness, weakness  Resp: No cough, wheezing, shortness of breath  CVS: No chest pain, palpitations, leg swelling  GI: No abdominal pain, nausea, vomiting, diarrhea   : No dysuria, frequency, incontinence, + hematuria  Skin: No itching, burning, rashes, or lesions   Msk: No joint pain or swelling  Psych: No depression, anxiety, mood swings          PHYSICAL EXAM:  GENERAL: NAD, well-developed  HEAD:  Atraumatic, Normocephalic  EYES: EOMI, PERRLA, conjunctiva and sclera clear  ENMT: No tonsillar erythema, exudates, or enlargement; Moist mucous membranes,   NECK: Supple, No JVD, Normal thyroid  HEART: Regular rate and rhythm; No murmurs, rubs, or gallops  RESPIRATORY: CTA B/L, No wheezing / rhonchi  ABDOMEN: Soft, Nontender, Nondistended; Bowel sounds present  NEUROLOGY: A&Ox3, nonfocal, moving all extremities.  EXTREMITIES:  2+ Peripheral Pulses, No clubbing, cyanosis, 2+edema LE  SKIN: warm, dry, normal color, no rash or abnormal lesions        LABS:                        10.2   10.0  )-----------( 268      ( 09 Dec 2017 06:27 )             32.6         140  |  103  |  17  ----------------------------<  138<H>  3.9   |  28  |  0.77    Ca    8.2<L>      09 Dec 2017 06:27    TPro  7.0  /  Alb  2.9<L>  /  TBili  0.7  /  DBili  x   /  AST  29  /  ALT  45  /  AlkPhos  40  12    PT/INR - ( 08 Dec 2017 00:17 )   PT: 12.7 sec;   INR: 1.16 ratio         PTT - ( 08 Dec 2017 00:17 )  PTT:30.1 sec  Urinalysis Basic - ( 08 Dec 2017 00:17 )    Color: Red / Appearance: Slightly Turbid / S.015 / pH: x  Gluc: x / Ketone: Negative  / Bili: Negative / Urobili: Negative   Blood: x / Protein: 500 mg/dL / Nitrite: Positive   Leuk Esterase: Negative / RBC: >50 /HPF / WBC 11-25   Sq Epi: x / Non Sq Epi: x / Bacteria: Few      CAPILLARY BLOOD GLUCOSE      POCT Blood Glucose.: 174 mg/dL (09 Dec 2017 16:35)  POCT Blood Glucose.: 153 mg/dL (09 Dec 2017 11:31)  POCT Blood Glucose.: 185 mg/dL (09 Dec 2017 08:03)  POCT Blood Glucose.: 251 mg/dL (08 Dec 2017 21:15)      12-08 @ 08:52   No growth  --  --          MEDICATIONS  (STANDING):  aspirin  chewable 81 milliGRAM(s) Oral daily  atorvastatin 80 milliGRAM(s) Oral at bedtime  cefTRIAXone   IVPB 1 Gram(s) IV Intermittent every 24 hours  dextrose 5%. 1000 milliLiter(s) (50 mL/Hr) IV Continuous <Continuous>  dextrose 50% Injectable 12.5 Gram(s) IV Push once  dextrose 50% Injectable 25 Gram(s) IV Push once  dextrose 50% Injectable 25 Gram(s) IV Push once  furosemide    Tablet 20 milliGRAM(s) Oral daily  insulin glargine Injectable (LANTUS) 10 Unit(s) SubCutaneous at bedtime  insulin lispro (HumaLOG) corrective regimen sliding scale   SubCutaneous three times a day before meals  insulin lispro Injectable (HumaLOG) 3 Unit(s) SubCutaneous three times a day before meals  lisinopril 40 milliGRAM(s) Oral daily  metoprolol     tartrate 25 milliGRAM(s) Oral two times a day  tamsulosin 0.4 milliGRAM(s) Oral at bedtime    MEDICATIONS  (PRN):  acetaminophen   Tablet. 650 milliGRAM(s) Oral every 6 hours PRN Mild to moderate pain  dextrose Gel 1 Dose(s) Oral once PRN Blood Glucose LESS THAN 70 milliGRAM(s)/deciliter  glucagon  Injectable 1 milliGRAM(s) IntraMuscular once PRN Glucose LESS THAN 70 milligrams/deciliter  oxyCODONE    5 mG/acetaminophen 325 mG 1 Tablet(s) Oral every 6 hours PRN Severe Pain (7 - 10)
INTERVAL HPI/OVERNIGHT EVENTS: No clots, RN reports that felix irrigates easily    MEDICATIONS  (STANDING):  aspirin  chewable 81 milliGRAM(s) Oral daily  atorvastatin 80 milliGRAM(s) Oral at bedtime  dextrose 5%. 1000 milliLiter(s) (50 mL/Hr) IV Continuous <Continuous>  dextrose 50% Injectable 12.5 Gram(s) IV Push once  dextrose 50% Injectable 25 Gram(s) IV Push once  dextrose 50% Injectable 25 Gram(s) IV Push once  furosemide    Tablet 20 milliGRAM(s) Oral daily  insulin glargine Injectable (LANTUS) 10 Unit(s) SubCutaneous at bedtime  insulin lispro (HumaLOG) corrective regimen sliding scale   SubCutaneous three times a day before meals  insulin lispro Injectable (HumaLOG) 3 Unit(s) SubCutaneous three times a day before meals  lisinopril 40 milliGRAM(s) Oral daily  metoprolol     tartrate 25 milliGRAM(s) Oral two times a day  tamsulosin 0.8 milliGRAM(s) Oral at bedtime    MEDICATIONS  (PRN):  acetaminophen   Tablet. 650 milliGRAM(s) Oral every 6 hours PRN Mild to moderate pain  dextrose Gel 1 Dose(s) Oral once PRN Blood Glucose LESS THAN 70 milliGRAM(s)/deciliter  glucagon  Injectable 1 milliGRAM(s) IntraMuscular once PRN Glucose LESS THAN 70 milligrams/deciliter  oxyCODONE    5 mG/acetaminophen 325 mG 1 Tablet(s) Oral every 6 hours PRN Severe Pain (7 - 10)        Vital Signs Last 24 Hrs  T(C): 36.6 (11 Dec 2017 05:25), Max: 37.4 (10 Dec 2017 21:08)  T(F): 97.8 (11 Dec 2017 05:25), Max: 99.3 (10 Dec 2017 21:08)  HR: 101 (11 Dec 2017 05:25) (91 - 101)  BP: 131/86 (11 Dec 2017 05:25) (111/72 - 156/70)  BP(mean): --  RR: 20 (11 Dec 2017 05:25) (17 - 21)  SpO2: 94% (11 Dec 2017 05:25) (92% - 100%)    PHYSICAL EXAM:      GENITALIA: urine clear    LABS:                        10.4   7.0   )-----------( 282      ( 10 Dec 2017 07:42 )             33.0     12-10    140  |  104  |  20  ----------------------------<  145<H>  3.7   |  29  |  0.82    Ca    8.2<L>      10 Dec 2017 07:42          Urine culture:  12-08 @ 08:52 --   No growth

## 2017-12-11 NOTE — DISCHARGE NOTE ADULT - PLAN OF CARE
Clinical resolve Resolved  Follow up with Urology within 1 week Continue ASA  Discontinue Plavix  Follow up with Cardiology within 1 week Continue home meds Go home on Kramer and Flomax  Follow up with Urology next week

## 2017-12-11 NOTE — PROGRESS NOTE ADULT - ASSESSMENT
78 y/o male PMH of HTN, DM2, CAD (PCI stents X 4 , 14 years ago), BPH presents c/o gross hematuria and pain at the Kramer catheter placement site admitted with gross hematuria.    - Patient s/p PCI with GINNY 14 years ago and remained on DAPT.  Presented with gross hematuria, found to have UTI  - Plavix discontinued as there is no clear indication for DAPT 14 years s/p stent placement (non-drug eluting), continue ASA. Pt to f/u with Dr. Chiki Robertson as outpatient.  - No evidence of acute ischemia as pt asymptomatic.   - Last stress test x1 month ago with Dr. Robertson at [facility], which according to patient was normal  - BP controlled  - Continue BB and ACEI   - Continue Lipitor  - Monitor and replete lytes, keep K>4, Mg>2  - Other cardiovascular workup will depend on clinical course  - Will follow with you

## 2017-12-11 NOTE — DISCHARGE NOTE ADULT - MEDICATION SUMMARY - MEDICATIONS TO STOP TAKING
I will STOP taking the medications listed below when I get home from the hospital:    Antivert 25 mg oral tablet  -- 1 tab(s) by mouth 3 times a day, As Needed  -- May cause drowsiness.  Alcohol may intensify this effect.  Use care when operating dangerous machinery.    Plavix 75 mg oral tablet  -- 1 tab(s) by mouth once a day

## 2017-12-11 NOTE — PROGRESS NOTE ADULT - PROBLEM SELECTOR PLAN 1
Suggest d/c pt with isidro when medically stable for outpt voiding trial next week. He should continue flomax 0.8 q hs, and check with his cardiologist re plavix management. Discussed with pt fully
Resolving. Hemoglobin stable.
Kramer replaced today, earlier had no hematuria, now has gross hematuria with clots.  Flomax increased to 0.8mg po QHS  Monitor urine out put.  F/u with    Ceftriaxone 1g Q24 hours.
resolving, H/H stable  Flomax 0.4mg po QHS  Monitor urine out put.  NATHALIA Black   Ceftriaxone 1g Q24 hours.

## 2017-12-11 NOTE — DISCHARGE NOTE ADULT - CARE PROVIDER_API CALL
Ky Suazo), Urology  [address]  [address]  [place] [address]  Phone: [phone]  Fax: [phone]    Michoacano Disla (), Internal Medicine  [address] [place] [address]  [address]  [place] [address]  Phone: [phone]  Fax: [phone]

## 2017-12-11 NOTE — DISCHARGE NOTE ADULT - HOSPITAL COURSE
78 y/o male PMH of HTN, DM2, Cad (PCI stents X 4 , 14 years ago), BPH s/p  Kramer catheter (POA), presents c/o gross hematuria and pain at the Kramer catheter placement site. Patient states that had left knee surgery done a month ago and was having problem urinating sometime with incontinence so he went to see Dr. Habermann and US showed BPH. He had Kramer Catheter placed yesterday and later in the day developed hematuria so he called Dr. Suazo and was advised to go to ER. Denies any other symptoms, complaints, no chest pain, palpitation, sob, nausea, vomiting or diarrhea.     Patient admitted with 78 y/o male PMH of HTN, DM2, Cad (PCI stents X 4 , 14 years ago), BPH s/p  Felix catheter (POA), presents c/o gross hematuria and pain at the Felix catheter placement site. Patient states that had left knee surgery done a month ago and was having problem urinating sometime with incontinence so he went to see Dr. Habermann and US showed BPH. He had Felix Catheter placed yesterday and later in the day developed hematuria so he called Dr. Suazo and was advised to go to ER. Denies any other symptoms, complaints, no chest pain, palpitation, sob, nausea, vomiting or diarrhea.     Patient admitted with hematuria.  Pt was placed on a felix.  He was checked 78 y/o male PMH of HTN, DM2, Cad (PCI stents X 4 , 14 years ago), BPH s/p  Felix catheter (POA), presents c/o gross hematuria and pain at the Felix catheter placement site. Patient states that had left knee surgery done a month ago and was having problem urinating sometime with incontinence so he went to see Dr. Habermann and US showed BPH. He had Felix Catheter placed yesterday and later in the day developed hematuria so he called Dr. Suazo and was advised to go to ER. Denies any other symptoms, complaints, no chest pain, palpitation, sob, nausea, vomiting or diarrhea. Patient admitted with hematuria.      Pt was placed on a felix.  Initially, patient had no hematuria but then noticed to have gross hematuria with clots.  Pt Flomax increased.  Pt went an entire day without hematuria.  Had no complaints and felt well.  Cardiology Dr. Grier saw patient.  Plavix to be discontinued as stents were placed 14+ years ago, continue Aspirin.  To follow with Dr. Robertson as outpatient.  Pt stable to be discharged with felix and Flomax, to follow up with Dr. Suazo for outpatient voiding trial. 78 y/o male PMH of HTN, DM2, Cad (PCI stents X 4 , 14 years ago), BPH s/p  Felix catheter (POA), presents c/o gross hematuria and pain at the Felix catheter placement site. Patient states that had left knee surgery done a month ago and was having problem urinating sometime with incontinence so he went to see Dr. Habermann and US showed BPH. He had Felix Catheter placed yesterday and later in the day developed hematuria so he called Dr. Suazo and was advised to go to ER. Denies any other symptoms, complaints, no chest pain, palpitation, sob, nausea, vomiting or diarrhea.  Patient admitted with hematuria.      Pt was placed on a felix.  Initially, patient had no hematuria but then noticed to have gross hematuria with clots.  Pt Flomax increased.  Pt went an entire day without hematuria.  Had no complaints and felt well.  Cardiology Dr. Grier saw patient.  Plavix to be discontinued as stents were placed 14+ years ago, continue Aspirin.  To follow with Dr. Robertson as outpatient.  Pt stable to be discharged with felix and Flomax, to follow up with Dr. Suazo for outpatient voiding trial.

## 2017-12-11 NOTE — DISCHARGE NOTE ADULT - PATIENT PORTAL LINK FT
“You can access the [facility] Patient Portal, offered by [facility], by registering with the following website: [email or web address]”

## 2017-12-11 NOTE — DISCHARGE NOTE ADULT - MEDICATION SUMMARY - MEDICATIONS TO TAKE
I will START or STAY ON the medications listed below when I get home from the hospital:    aspirin 81 mg oral tablet, chewable  -- 1 tab(s) by mouth once a day  -- Indication: For CAD (coronary artery disease)    ramipril 10 mg oral capsule  -- 1 cap(s) by mouth 2 times a day  -- Indication: For Essential hypertension    tamsulosin 0.4 mg oral capsule  -- 2 cap(s) by mouth once a day (at bedtime)  -- Indication: For Urinary retention    Actos 30 mg oral tablet  -- 1 tab(s) by mouth once a day  -- Indication: For DM (diabetes mellitus)    insulin aspart protamine-insulin aspart 70 units-30 units/mL subcutaneous suspension  -- 15 unit(s) subcutaneous in the morning  10 unit(s) subcutaneous at night  -- Indication: For DM (diabetes mellitus)    metFORMIN 1000 mg oral tablet  -- 1 tab(s) by mouth 2 times a day  -- Indication: For DM (diabetes mellitus)    atorvastatin 80 mg oral tablet  -- 1 tab(s) by mouth once a day (at bedtime)  -- Indication: For HLD (hyperlipidemia)    metoprolol tartrate 25 mg oral tablet  -- 1 tab(s) by mouth 2 times a day  -- Indication: For Essential hypertension    furosemide 20 mg oral tablet  -- 1 tab(s) by mouth once a day  -- Indication: For LE edema

## 2017-12-19 PROCEDURE — 86850 RBC ANTIBODY SCREEN: CPT

## 2017-12-19 PROCEDURE — 86901 BLOOD TYPING SEROLOGIC RH(D): CPT

## 2017-12-19 PROCEDURE — 99285 EMERGENCY DEPT VISIT HI MDM: CPT | Mod: 25

## 2017-12-19 PROCEDURE — 86140 C-REACTIVE PROTEIN: CPT

## 2017-12-19 PROCEDURE — 86900 BLOOD TYPING SEROLOGIC ABO: CPT

## 2017-12-19 PROCEDURE — 81001 URINALYSIS AUTO W/SCOPE: CPT

## 2017-12-19 PROCEDURE — 83036 HEMOGLOBIN GLYCOSYLATED A1C: CPT

## 2017-12-19 PROCEDURE — 96375 TX/PRO/DX INJ NEW DRUG ADDON: CPT

## 2017-12-19 PROCEDURE — 96365 THER/PROPH/DIAG IV INF INIT: CPT

## 2017-12-19 PROCEDURE — 85610 PROTHROMBIN TIME: CPT

## 2017-12-19 PROCEDURE — 87086 URINE CULTURE/COLONY COUNT: CPT

## 2017-12-19 PROCEDURE — 83880 ASSAY OF NATRIURETIC PEPTIDE: CPT

## 2017-12-19 PROCEDURE — 80053 COMPREHEN METABOLIC PANEL: CPT

## 2017-12-19 PROCEDURE — 82962 GLUCOSE BLOOD TEST: CPT

## 2017-12-19 PROCEDURE — 80048 BASIC METABOLIC PNL TOTAL CA: CPT

## 2017-12-19 PROCEDURE — 71045 X-RAY EXAM CHEST 1 VIEW: CPT

## 2017-12-19 PROCEDURE — 93005 ELECTROCARDIOGRAM TRACING: CPT

## 2017-12-19 PROCEDURE — 85730 THROMBOPLASTIN TIME PARTIAL: CPT

## 2017-12-19 PROCEDURE — 85027 COMPLETE CBC AUTOMATED: CPT

## 2017-12-19 PROCEDURE — 96372 THER/PROPH/DIAG INJ SC/IM: CPT | Mod: XU

## 2018-01-16 ENCOUNTER — APPOINTMENT (OUTPATIENT)
Dept: ORTHOPEDIC SURGERY | Facility: CLINIC | Age: 78
End: 2018-01-16

## 2018-01-22 ENCOUNTER — OUTPATIENT (OUTPATIENT)
Dept: OUTPATIENT SERVICES | Facility: HOSPITAL | Age: 78
LOS: 1 days | End: 2018-01-22
Payer: MEDICARE

## 2018-01-22 VITALS
TEMPERATURE: 98 F | HEART RATE: 101 BPM | HEIGHT: 73 IN | RESPIRATION RATE: 16 BRPM | SYSTOLIC BLOOD PRESSURE: 136 MMHG | WEIGHT: 313.94 LBS | DIASTOLIC BLOOD PRESSURE: 73 MMHG

## 2018-01-22 DIAGNOSIS — Z95.9 PRESENCE OF CARDIAC AND VASCULAR IMPLANT AND GRAFT, UNSPECIFIED: Chronic | ICD-10-CM

## 2018-01-22 DIAGNOSIS — Z98.89 OTHER SPECIFIED POSTPROCEDURAL STATES: Chronic | ICD-10-CM

## 2018-01-22 DIAGNOSIS — N40.1 BENIGN PROSTATIC HYPERPLASIA WITH LOWER URINARY TRACT SYMPTOMS: ICD-10-CM

## 2018-01-22 DIAGNOSIS — Z01.818 ENCOUNTER FOR OTHER PREPROCEDURAL EXAMINATION: ICD-10-CM

## 2018-01-22 DIAGNOSIS — Z95.5 PRESENCE OF CORONARY ANGIOPLASTY IMPLANT AND GRAFT: ICD-10-CM

## 2018-01-22 DIAGNOSIS — E11.9 TYPE 2 DIABETES MELLITUS WITHOUT COMPLICATIONS: ICD-10-CM

## 2018-01-22 LAB
ALBUMIN SERPL ELPH-MCNC: 3.3 G/DL — SIGNIFICANT CHANGE UP (ref 3.3–5)
ALP SERPL-CCNC: 42 U/L — SIGNIFICANT CHANGE UP (ref 40–120)
ALT FLD-CCNC: 13 U/L — SIGNIFICANT CHANGE UP (ref 12–78)
ANION GAP SERPL CALC-SCNC: 9 MMOL/L — SIGNIFICANT CHANGE UP (ref 5–17)
AST SERPL-CCNC: 10 U/L — LOW (ref 15–37)
BILIRUB SERPL-MCNC: 0.7 MG/DL — SIGNIFICANT CHANGE UP (ref 0.2–1.2)
BUN SERPL-MCNC: 29 MG/DL — HIGH (ref 7–23)
CALCIUM SERPL-MCNC: 9.1 MG/DL — SIGNIFICANT CHANGE UP (ref 8.5–10.1)
CHLORIDE SERPL-SCNC: 104 MMOL/L — SIGNIFICANT CHANGE UP (ref 96–108)
CO2 SERPL-SCNC: 26 MMOL/L — SIGNIFICANT CHANGE UP (ref 22–31)
CREAT SERPL-MCNC: 1 MG/DL — SIGNIFICANT CHANGE UP (ref 0.5–1.3)
GLUCOSE SERPL-MCNC: 147 MG/DL — HIGH (ref 70–99)
HCT VFR BLD CALC: 35.6 % — LOW (ref 39–50)
HGB BLD-MCNC: 11.5 G/DL — LOW (ref 13–17)
MCHC RBC-ENTMCNC: 29.6 PG — SIGNIFICANT CHANGE UP (ref 27–34)
MCHC RBC-ENTMCNC: 32.3 GM/DL — SIGNIFICANT CHANGE UP (ref 32–36)
MCV RBC AUTO: 91.5 FL — SIGNIFICANT CHANGE UP (ref 80–100)
PLATELET # BLD AUTO: 267 K/UL — SIGNIFICANT CHANGE UP (ref 150–400)
POTASSIUM SERPL-MCNC: 4.4 MMOL/L — SIGNIFICANT CHANGE UP (ref 3.5–5.3)
POTASSIUM SERPL-SCNC: 4.4 MMOL/L — SIGNIFICANT CHANGE UP (ref 3.5–5.3)
PROT SERPL-MCNC: 7.7 G/DL — SIGNIFICANT CHANGE UP (ref 6–8.3)
RBC # BLD: 3.89 M/UL — LOW (ref 4.2–5.8)
RBC # FLD: 13.9 % — SIGNIFICANT CHANGE UP (ref 10.3–14.5)
SODIUM SERPL-SCNC: 139 MMOL/L — SIGNIFICANT CHANGE UP (ref 135–145)
WBC # BLD: 9.6 K/UL — SIGNIFICANT CHANGE UP (ref 3.8–10.5)
WBC # FLD AUTO: 9.6 K/UL — SIGNIFICANT CHANGE UP (ref 3.8–10.5)

## 2018-01-22 PROCEDURE — 80053 COMPREHEN METABOLIC PANEL: CPT

## 2018-01-22 PROCEDURE — G0463: CPT

## 2018-01-22 PROCEDURE — 85027 COMPLETE CBC AUTOMATED: CPT

## 2018-01-22 RX ORDER — GLUCAGON INJECTION, SOLUTION 0.5 MG/.1ML
1 INJECTION, SOLUTION SUBCUTANEOUS ONCE
Qty: 0 | Refills: 0 | Status: DISCONTINUED | OUTPATIENT
Start: 2018-01-31 | End: 2018-02-15

## 2018-01-22 RX ORDER — DEXTROSE 50 % IN WATER 50 %
12.5 SYRINGE (ML) INTRAVENOUS ONCE
Qty: 0 | Refills: 0 | Status: DISCONTINUED | OUTPATIENT
Start: 2018-01-31 | End: 2018-02-15

## 2018-01-22 RX ORDER — PIOGLITAZONE HYDROCHLORIDE 15 MG/1
1 TABLET ORAL
Qty: 0 | Refills: 0 | COMMUNITY

## 2018-01-22 RX ORDER — DEXTROSE 50 % IN WATER 50 %
1 SYRINGE (ML) INTRAVENOUS ONCE
Qty: 0 | Refills: 0 | Status: DISCONTINUED | OUTPATIENT
Start: 2018-01-31 | End: 2018-02-15

## 2018-01-22 RX ORDER — DEXTROSE 50 % IN WATER 50 %
25 SYRINGE (ML) INTRAVENOUS ONCE
Qty: 0 | Refills: 0 | Status: DISCONTINUED | OUTPATIENT
Start: 2018-01-31 | End: 2018-02-15

## 2018-01-22 RX ORDER — SODIUM CHLORIDE 9 MG/ML
1000 INJECTION, SOLUTION INTRAVENOUS
Qty: 0 | Refills: 0 | Status: DISCONTINUED | OUTPATIENT
Start: 2018-01-31 | End: 2018-02-15

## 2018-01-22 NOTE — H&P PST ADULT - PROBLEM SELECTOR PLAN 4
Last A1c 6.7 from 12/9. Hold Metformin night before and DOS. Ni insulin am of surgery. Fingerstick am of surgery. Verbalized understanding

## 2018-01-22 NOTE — H&P PST ADULT - PROBLEM SELECTOR PLAN 2
Labs - CBC, CMP, pending. EKG and CXR from Dec 2017.  Urine C/S to be obtained at Urology office today, while catheter is being changed.   MC with Dr. Disla  Pre op and Hibiclens instructions reviewed and given. Take routine am meds DOS with sip of water. Avoid NSAIDs and OTC supplements. Verbalized understanding

## 2018-01-22 NOTE — H&P PST ADULT - ASSESSMENT
76 yo male with CAD with 4 stents, presenting to PST with BPH scheduled for Greenlight Laser prostate on 1/31/18 with Dr. Davies.

## 2018-01-22 NOTE — H&P PST ADULT - PMH
Benign prostatic hyperplasia with lower urinary tract symptoms, symptom details unspecified    CAD (coronary artery disease)  w/ 4 stents  Chronic obstructive pulmonary disease, unspecified COPD type    Difficulty walking    DM (diabetes mellitus)    HLD (hyperlipidemia)    HTN (hypertension)    Obesity, morbid, BMI 40.0-49.9    SANTO on CPAP    Stented coronary artery

## 2018-01-22 NOTE — H&P PST ADULT - HISTORY OF PRESENT ILLNESS
76 y/o male PMH of HTN, DM2, Cad (PCI stents X 4 , 14 years ago), BPH s/p  Kramer catheter (POA), presents c/o gross hematuria and pain at the Kramer catheter placement site. Patient states that had left knee surgery done a month ago and was having problem urinating sometime with incontinence so he went to see Dr. Habermann and US showed BPH. He had Kramer Catheter placed yesterday and later in the day developed hematuria so he called Dr. Suazo and was advised to go to ER. Denies any other symptoms, complaints, no chest pain, palpitation, sob, nausea, vomiting or diarrhea.     Add-1/22/18- History noted above. Patient presents to PST scheduled for Greenlight Laser prostate on 1/31/18 with Dr. Davies. Kramer cathetrer still in place. Denies hematuria pain and discomfort.

## 2018-01-22 NOTE — H&P PST ADULT - PROBLEM SELECTOR PLAN 3
CC with Dr. TOSHA Robertson  Continue ASA secondary to GINNY. Cardiac meds am of surgery with sip of water. Verbalized understanding.

## 2018-01-29 RX ORDER — SODIUM CHLORIDE 9 MG/ML
1000 INJECTION, SOLUTION INTRAVENOUS
Qty: 0 | Refills: 0 | Status: DISCONTINUED | OUTPATIENT
Start: 2018-01-31 | End: 2018-01-31

## 2018-01-31 ENCOUNTER — TRANSCRIPTION ENCOUNTER (OUTPATIENT)
Age: 78
End: 2018-01-31

## 2018-01-31 ENCOUNTER — OUTPATIENT (OUTPATIENT)
Dept: OUTPATIENT SERVICES | Facility: HOSPITAL | Age: 78
LOS: 1 days | End: 2018-01-31
Payer: MEDICARE

## 2018-01-31 VITALS
OXYGEN SATURATION: 95 % | SYSTOLIC BLOOD PRESSURE: 116 MMHG | HEART RATE: 99 BPM | DIASTOLIC BLOOD PRESSURE: 68 MMHG | TEMPERATURE: 98 F | RESPIRATION RATE: 18 BRPM

## 2018-01-31 VITALS
WEIGHT: 313.94 LBS | TEMPERATURE: 98 F | SYSTOLIC BLOOD PRESSURE: 114 MMHG | RESPIRATION RATE: 16 BRPM | OXYGEN SATURATION: 96 % | HEART RATE: 89 BPM | DIASTOLIC BLOOD PRESSURE: 72 MMHG | HEIGHT: 73 IN

## 2018-01-31 DIAGNOSIS — Z98.89 OTHER SPECIFIED POSTPROCEDURAL STATES: Chronic | ICD-10-CM

## 2018-01-31 DIAGNOSIS — N40.1 BENIGN PROSTATIC HYPERPLASIA WITH LOWER URINARY TRACT SYMPTOMS: ICD-10-CM

## 2018-01-31 DIAGNOSIS — Z95.9 PRESENCE OF CARDIAC AND VASCULAR IMPLANT AND GRAFT, UNSPECIFIED: Chronic | ICD-10-CM

## 2018-01-31 DIAGNOSIS — Z01.818 ENCOUNTER FOR OTHER PREPROCEDURAL EXAMINATION: ICD-10-CM

## 2018-01-31 PROCEDURE — C1889: CPT

## 2018-01-31 PROCEDURE — 52648 LASER SURGERY OF PROSTATE: CPT

## 2018-01-31 PROCEDURE — 82962 GLUCOSE BLOOD TEST: CPT

## 2018-01-31 RX ORDER — HYDROMORPHONE HYDROCHLORIDE 2 MG/ML
0.5 INJECTION INTRAMUSCULAR; INTRAVENOUS; SUBCUTANEOUS
Qty: 0 | Refills: 0 | Status: DISCONTINUED | OUTPATIENT
Start: 2018-01-31 | End: 2018-01-31

## 2018-01-31 RX ORDER — CEFTRIAXONE 500 MG/1
1 INJECTION, POWDER, FOR SOLUTION INTRAMUSCULAR; INTRAVENOUS ONCE
Qty: 0 | Refills: 0 | Status: COMPLETED | OUTPATIENT
Start: 2018-01-31 | End: 2018-01-31

## 2018-01-31 RX ORDER — ONDANSETRON 8 MG/1
4 TABLET, FILM COATED ORAL ONCE
Qty: 0 | Refills: 0 | Status: DISCONTINUED | OUTPATIENT
Start: 2018-01-31 | End: 2018-01-31

## 2018-01-31 RX ORDER — SODIUM CHLORIDE 9 MG/ML
1000 INJECTION, SOLUTION INTRAVENOUS
Qty: 0 | Refills: 0 | Status: DISCONTINUED | OUTPATIENT
Start: 2018-01-31 | End: 2018-01-31

## 2018-01-31 RX ORDER — PHENAZOPYRIDINE HCL 100 MG
1 TABLET ORAL
Qty: 21 | Refills: 0 | OUTPATIENT
Start: 2018-01-31 | End: 2018-02-06

## 2018-01-31 RX ORDER — OXYCODONE HYDROCHLORIDE 5 MG/1
5 TABLET ORAL EVERY 4 HOURS
Qty: 0 | Refills: 0 | Status: DISCONTINUED | OUTPATIENT
Start: 2018-01-31 | End: 2018-01-31

## 2018-01-31 RX ORDER — CEFUROXIME AXETIL 250 MG
1 TABLET ORAL
Qty: 10 | Refills: 0 | OUTPATIENT
Start: 2018-01-31 | End: 2018-02-04

## 2018-01-31 RX ADMIN — SODIUM CHLORIDE 100 MILLILITER(S): 9 INJECTION, SOLUTION INTRAVENOUS at 09:35

## 2018-01-31 NOTE — BRIEF OPERATIVE NOTE - PROCEDURE
<<-----Click on this checkbox to enter Procedure Cystoscopy with photoselective vaporization of prostate (PVP) with 532 nm laser  01/31/2018    Active  MARIBELL

## 2018-01-31 NOTE — BRIEF OPERATIVE NOTE - POST-OP DX
BPH with obstruction/lower urinary tract symptoms  01/31/2018    Active  Hakan Davies  Urinary retention  01/31/2018    Active  Hakan Davies

## 2018-01-31 NOTE — ASU DISCHARGE PLAN (ADULT/PEDIATRIC). - MEDICATION SUMMARY - MEDICATIONS TO TAKE
I will START or STAY ON the medications listed below when I get home from the hospital:    aspirin 81 mg oral tablet, chewable  -- 1 tab(s) by mouth once a day  -- Indication: For as prior to admission    ramipril 10 mg oral capsule  -- 1 cap(s) by mouth 2 times a day  -- Indication: For as prior to admission    insulin aspart protamine-insulin aspart 70 units-30 units/mL subcutaneous suspension  -- 15 unit(s) subcutaneous in the morning  10 unit(s) subcutaneous at night  -- Indication: For as prior to admission    metFORMIN 1000 mg oral tablet  -- 1 tab(s) by mouth 2 times a day  -- Indication: For as prior to admission    atorvastatin 20 mg oral tablet  -- 1 tab(s) by mouth once a day (at bedtime)  -- Indication: For as prior to admission    metoprolol tartrate 25 mg oral tablet  -- 1 tab(s) by mouth 2 times a day  -- Indication: For as prior to admission    cefuroxime 500 mg oral tablet  -- 1 tab(s) by mouth every 12 hours   -- Finish all this medication unless otherwise directed by prescriber.  Medication should be taken with plenty of water.  Take with food or milk.    -- Indication: For antibiotic    furosemide 20 mg oral tablet  -- 1 tab(s) by mouth once a day  -- Indication: For as prior to admission    Pyridium 200 mg oral tablet  -- 1 tab(s) by mouth 3 times a day (after meals) as needed for urinary discomfort  -- May discolor urine or feces.  Medication should be taken with plenty of water.  Take with food or milk.    -- Indication: For as needed for urinary discomfort

## 2018-05-30 ENCOUNTER — INPATIENT (INPATIENT)
Facility: HOSPITAL | Age: 78
LOS: 6 days | Discharge: HOME | DRG: 378 | End: 2018-06-06
Attending: FAMILY MEDICINE | Admitting: FAMILY MEDICINE
Payer: MEDICARE

## 2018-05-30 ENCOUNTER — TRANSCRIPTION ENCOUNTER (OUTPATIENT)
Age: 78
End: 2018-05-30

## 2018-05-30 VITALS
HEART RATE: 108 BPM | SYSTOLIC BLOOD PRESSURE: 95 MMHG | OXYGEN SATURATION: 100 % | HEIGHT: 73 IN | DIASTOLIC BLOOD PRESSURE: 41 MMHG | TEMPERATURE: 98 F | WEIGHT: 285.94 LBS | RESPIRATION RATE: 18 BRPM

## 2018-05-30 DIAGNOSIS — K92.2 GASTROINTESTINAL HEMORRHAGE, UNSPECIFIED: ICD-10-CM

## 2018-05-30 DIAGNOSIS — Z98.89 OTHER SPECIFIED POSTPROCEDURAL STATES: Chronic | ICD-10-CM

## 2018-05-30 DIAGNOSIS — Z95.9 PRESENCE OF CARDIAC AND VASCULAR IMPLANT AND GRAFT, UNSPECIFIED: Chronic | ICD-10-CM

## 2018-05-30 DIAGNOSIS — J44.9 CHRONIC OBSTRUCTIVE PULMONARY DISEASE, UNSPECIFIED: ICD-10-CM

## 2018-05-30 DIAGNOSIS — K92.1 MELENA: ICD-10-CM

## 2018-05-30 DIAGNOSIS — Z95.5 PRESENCE OF CORONARY ANGIOPLASTY IMPLANT AND GRAFT: ICD-10-CM

## 2018-05-30 DIAGNOSIS — Z98.890 OTHER SPECIFIED POSTPROCEDURAL STATES: Chronic | ICD-10-CM

## 2018-05-30 DIAGNOSIS — G47.33 OBSTRUCTIVE SLEEP APNEA (ADULT) (PEDIATRIC): ICD-10-CM

## 2018-05-30 LAB
ABO RH CONFIRMATION: SIGNIFICANT CHANGE UP
ALBUMIN SERPL ELPH-MCNC: 2.6 G/DL — LOW (ref 3.3–5)
ALP SERPL-CCNC: 31 U/L — SIGNIFICANT CHANGE UP (ref 30–120)
ALT FLD-CCNC: 19 U/L DA — SIGNIFICANT CHANGE UP (ref 10–60)
ANION GAP SERPL CALC-SCNC: 8 MMOL/L — SIGNIFICANT CHANGE UP (ref 5–17)
APPEARANCE UR: CLEAR — SIGNIFICANT CHANGE UP
APTT BLD: 24.9 SEC — LOW (ref 27.5–37.4)
AST SERPL-CCNC: 14 U/L — SIGNIFICANT CHANGE UP (ref 10–40)
BASOPHILS # BLD AUTO: 0.1 K/UL — SIGNIFICANT CHANGE UP (ref 0–0.2)
BASOPHILS NFR BLD AUTO: 0.4 % — SIGNIFICANT CHANGE UP (ref 0–2)
BILIRUB SERPL-MCNC: 0.3 MG/DL — SIGNIFICANT CHANGE UP (ref 0.2–1.2)
BILIRUB UR-MCNC: NEGATIVE — SIGNIFICANT CHANGE UP
BUN SERPL-MCNC: 79 MG/DL — HIGH (ref 7–23)
CALCIUM SERPL-MCNC: 8.8 MG/DL — SIGNIFICANT CHANGE UP (ref 8.4–10.5)
CHLORIDE SERPL-SCNC: 102 MMOL/L — SIGNIFICANT CHANGE UP (ref 96–108)
CK MB BLD-MCNC: 6.4 % — HIGH (ref 0–3.5)
CK MB CFR SERPL CALC: 1.4 NG/ML — SIGNIFICANT CHANGE UP (ref 0–3.6)
CK SERPL-CCNC: 22 U/L — LOW (ref 39–308)
CO2 SERPL-SCNC: 26 MMOL/L — SIGNIFICANT CHANGE UP (ref 22–31)
COLOR SPEC: YELLOW — SIGNIFICANT CHANGE UP
CREAT SERPL-MCNC: 1.16 MG/DL — SIGNIFICANT CHANGE UP (ref 0.5–1.3)
DIFF PNL FLD: NEGATIVE — SIGNIFICANT CHANGE UP
EOSINOPHIL # BLD AUTO: 0 K/UL — SIGNIFICANT CHANGE UP (ref 0–0.5)
EOSINOPHIL NFR BLD AUTO: 0.3 % — SIGNIFICANT CHANGE UP (ref 0–6)
GLUCOSE SERPL-MCNC: 279 MG/DL — HIGH (ref 70–99)
GLUCOSE UR QL: NEGATIVE MG/DL — SIGNIFICANT CHANGE UP
HCT VFR BLD CALC: 14.6 % — CRITICAL LOW (ref 39–50)
HCT VFR BLD CALC: 18.1 % — CRITICAL LOW (ref 39–50)
HGB BLD-MCNC: 4.7 G/DL — CRITICAL LOW (ref 13–17)
HGB BLD-MCNC: 6.3 G/DL — CRITICAL LOW (ref 13–17)
INR BLD: 1.22 RATIO — HIGH (ref 0.88–1.16)
KETONES UR-MCNC: NEGATIVE — SIGNIFICANT CHANGE UP
LEUKOCYTE ESTERASE UR-ACNC: ABNORMAL
LYMPHOCYTES # BLD AUTO: 1.8 K/UL — SIGNIFICANT CHANGE UP (ref 1–3.3)
LYMPHOCYTES # BLD AUTO: 14.2 % — SIGNIFICANT CHANGE UP (ref 13–44)
MCHC RBC-ENTMCNC: 29.2 PG — SIGNIFICANT CHANGE UP (ref 27–34)
MCHC RBC-ENTMCNC: 29.9 PG — SIGNIFICANT CHANGE UP (ref 27–34)
MCHC RBC-ENTMCNC: 31.9 GM/DL — LOW (ref 32–36)
MCHC RBC-ENTMCNC: 34.7 GM/DL — SIGNIFICANT CHANGE UP (ref 32–36)
MCV RBC AUTO: 86.1 FL — SIGNIFICANT CHANGE UP (ref 80–100)
MCV RBC AUTO: 91.5 FL — SIGNIFICANT CHANGE UP (ref 80–100)
MONOCYTES # BLD AUTO: 0.5 K/UL — SIGNIFICANT CHANGE UP (ref 0–0.9)
MONOCYTES NFR BLD AUTO: 4.3 % — SIGNIFICANT CHANGE UP (ref 2–14)
NEUTROPHILS # BLD AUTO: 10.1 K/UL — HIGH (ref 1.8–7.4)
NEUTROPHILS NFR BLD AUTO: 80.9 % — HIGH (ref 43–77)
NITRITE UR-MCNC: NEGATIVE — SIGNIFICANT CHANGE UP
NT-PROBNP SERPL-SCNC: 336 PG/ML — SIGNIFICANT CHANGE UP (ref 0–450)
OB PNL STL: POSITIVE
PH UR: 5 — SIGNIFICANT CHANGE UP (ref 5–8)
PLATELET # BLD AUTO: 305 K/UL — SIGNIFICANT CHANGE UP (ref 150–400)
PLATELET # BLD AUTO: 374 K/UL — SIGNIFICANT CHANGE UP (ref 150–400)
POTASSIUM SERPL-MCNC: 4.9 MMOL/L — SIGNIFICANT CHANGE UP (ref 3.5–5.3)
POTASSIUM SERPL-SCNC: 4.9 MMOL/L — SIGNIFICANT CHANGE UP (ref 3.5–5.3)
PROT SERPL-MCNC: 6 G/DL — SIGNIFICANT CHANGE UP (ref 6–8.3)
PROT UR-MCNC: 15 MG/DL
PROTHROM AB SERPL-ACNC: 13.4 SEC — HIGH (ref 9.8–12.7)
RBC # BLD: 1.6 M/UL — LOW (ref 4.2–5.8)
RBC # BLD: 2.1 M/UL — LOW (ref 4.2–5.8)
RBC # FLD: 16.3 % — HIGH (ref 10.3–14.5)
RBC # FLD: 20.4 % — HIGH (ref 10.3–14.5)
SODIUM SERPL-SCNC: 136 MMOL/L — SIGNIFICANT CHANGE UP (ref 135–145)
SP GR SPEC: 1.01 — SIGNIFICANT CHANGE UP (ref 1.01–1.02)
TROPONIN I SERPL-MCNC: 0 NG/ML — LOW (ref 0.02–0.06)
UROBILINOGEN FLD QL: NEGATIVE MG/DL — SIGNIFICANT CHANGE UP
WBC # BLD: 11.6 K/UL — HIGH (ref 3.8–10.5)
WBC # BLD: 12.5 K/UL — HIGH (ref 3.8–10.5)
WBC # FLD AUTO: 11.6 K/UL — HIGH (ref 3.8–10.5)
WBC # FLD AUTO: 12.5 K/UL — HIGH (ref 3.8–10.5)

## 2018-05-30 PROCEDURE — 93010 ELECTROCARDIOGRAM REPORT: CPT

## 2018-05-30 PROCEDURE — 71045 X-RAY EXAM CHEST 1 VIEW: CPT | Mod: 26

## 2018-05-30 PROCEDURE — 93306 TTE W/DOPPLER COMPLETE: CPT | Mod: 26

## 2018-05-30 PROCEDURE — 99291 CRITICAL CARE FIRST HOUR: CPT

## 2018-05-30 PROCEDURE — 99223 1ST HOSP IP/OBS HIGH 75: CPT | Mod: AI

## 2018-05-30 RX ORDER — PANTOPRAZOLE SODIUM 20 MG/1
8 TABLET, DELAYED RELEASE ORAL
Qty: 80 | Refills: 0 | Status: DISCONTINUED | OUTPATIENT
Start: 2018-05-30 | End: 2018-06-02

## 2018-05-30 RX ORDER — DEXTROSE 50 % IN WATER 50 %
12.5 SYRINGE (ML) INTRAVENOUS ONCE
Qty: 0 | Refills: 0 | Status: DISCONTINUED | OUTPATIENT
Start: 2018-05-30 | End: 2018-06-06

## 2018-05-30 RX ORDER — SODIUM CHLORIDE 9 MG/ML
1000 INJECTION INTRAMUSCULAR; INTRAVENOUS; SUBCUTANEOUS ONCE
Qty: 0 | Refills: 0 | Status: COMPLETED | OUTPATIENT
Start: 2018-05-30 | End: 2018-05-30

## 2018-05-30 RX ORDER — DEXTROSE 50 % IN WATER 50 %
15 SYRINGE (ML) INTRAVENOUS ONCE
Qty: 0 | Refills: 0 | Status: DISCONTINUED | OUTPATIENT
Start: 2018-05-30 | End: 2018-06-06

## 2018-05-30 RX ORDER — INSULIN LISPRO 100/ML
VIAL (ML) SUBCUTANEOUS EVERY 6 HOURS
Qty: 0 | Refills: 0 | Status: DISCONTINUED | OUTPATIENT
Start: 2018-05-30 | End: 2018-05-31

## 2018-05-30 RX ORDER — ALBUTEROL 90 UG/1
2 AEROSOL, METERED ORAL EVERY 6 HOURS
Qty: 0 | Refills: 0 | Status: DISCONTINUED | OUTPATIENT
Start: 2018-05-30 | End: 2018-06-06

## 2018-05-30 RX ORDER — SUCRALFATE 1 G
1 TABLET ORAL EVERY 6 HOURS
Qty: 0 | Refills: 0 | Status: DISCONTINUED | OUTPATIENT
Start: 2018-05-30 | End: 2018-06-06

## 2018-05-30 RX ORDER — SODIUM CHLORIDE 9 MG/ML
1000 INJECTION, SOLUTION INTRAVENOUS
Qty: 0 | Refills: 0 | Status: DISCONTINUED | OUTPATIENT
Start: 2018-05-30 | End: 2018-06-06

## 2018-05-30 RX ORDER — METOPROLOL TARTRATE 50 MG
25 TABLET ORAL
Qty: 0 | Refills: 0 | Status: DISCONTINUED | OUTPATIENT
Start: 2018-05-30 | End: 2018-06-06

## 2018-05-30 RX ORDER — LISINOPRIL 2.5 MG/1
40 TABLET ORAL DAILY
Qty: 0 | Refills: 0 | Status: DISCONTINUED | OUTPATIENT
Start: 2018-05-30 | End: 2018-05-30

## 2018-05-30 RX ORDER — ATORVASTATIN CALCIUM 80 MG/1
20 TABLET, FILM COATED ORAL AT BEDTIME
Qty: 0 | Refills: 0 | Status: DISCONTINUED | OUTPATIENT
Start: 2018-05-30 | End: 2018-06-06

## 2018-05-30 RX ORDER — GLUCAGON INJECTION, SOLUTION 0.5 MG/.1ML
1 INJECTION, SOLUTION SUBCUTANEOUS ONCE
Qty: 0 | Refills: 0 | Status: DISCONTINUED | OUTPATIENT
Start: 2018-05-30 | End: 2018-06-06

## 2018-05-30 RX ADMIN — PANTOPRAZOLE SODIUM 10 MG/HR: 20 TABLET, DELAYED RELEASE ORAL at 14:13

## 2018-05-30 RX ADMIN — Medication 25 MILLIGRAM(S): at 17:49

## 2018-05-30 RX ADMIN — ATORVASTATIN CALCIUM 20 MILLIGRAM(S): 80 TABLET, FILM COATED ORAL at 22:16

## 2018-05-30 RX ADMIN — SODIUM CHLORIDE 1000 MILLILITER(S): 9 INJECTION INTRAMUSCULAR; INTRAVENOUS; SUBCUTANEOUS at 11:15

## 2018-05-30 RX ADMIN — Medication 1 GRAM(S): at 17:49

## 2018-05-30 RX ADMIN — PANTOPRAZOLE SODIUM 10 MG/HR: 20 TABLET, DELAYED RELEASE ORAL at 23:25

## 2018-05-30 RX ADMIN — SODIUM CHLORIDE 1000 MILLILITER(S): 9 INJECTION INTRAMUSCULAR; INTRAVENOUS; SUBCUTANEOUS at 12:24

## 2018-05-30 NOTE — H&P ADULT - HISTORY OF PRESENT ILLNESS
Patient is 78 yo male with hx of CAD S/P stent 14 yrs ago, Carpel tunnel syndrome, and HTN presenting with Dizziness, generalized weakness, and SOB on exertional that has been going on for the past week, and progresively worsen.  Patient was started on steroid 1week for carpel tunnel syndrome.  + dark tarry stool.  Patient denies any Chest pain, headache, blurry vision, abdominal pain, N/V/D, fever, chills, numbness or cough Patient is 76 yo male with hx of CAD S/P stent 14 yrs ago, Carpel tunnel syndrome, and HTN presenting with Dizziness, generalized weakness, and SOB on exertional that has been going on for the past week, and progresively worsen.  Patient was started on steroid 1week for carpel tunnel syndrome.  + dark tarry stool.  Patient denies any Chest pain, headache, blurry vision, abdominal pain, N/V/D, fever, chills, numbness or cough    In the ED, HB is 4.  BP improved with IVF bolus.  Stool occult +.  2 unit of RBC ordered

## 2018-05-30 NOTE — ED ADULT NURSE NOTE - PSH
H/O knee surgery  Left - 11/7/2017  History of prostate surgery  laser to relieve a blockage.  S/P angioplasty with stent  2004 and 2005 (Has 4 stents)

## 2018-05-30 NOTE — CONSULT NOTE ADULT - SUBJECTIVE AND OBJECTIVE BOX
Date/Time Patient Seen:  		  Referring MD:   Data Reviewed	       Patient is a 77y old  Male who presents with a chief complaint of Dizziness/SOB (30 May 2018 12:59)      Subjective/HPI  in bed  seen and examined  vs and meds reviewed  GI bleed and anemia admission  pt with extensive medical hx and problem list  pt uses CPAP at home  lives at home  retired , , ex smoker  lives with wife and children  now with dizziness and SOB and weakness  in ER Hgb noted to be 4    H and P and ER provider note reviewed    78 yo male with hx of CAD S/P stent 14 yrs ago, Carpel tunnel syndrome, and HTN presenting with Dizziness, generalized weakness, and SOB on exertional that has been going on for the past week, and progresively worsen.  Patient was started on steroid 1week for carpel tunnel syndrome.  + dark tarry stool.  Patient denies any Chest pain, headache, blurry vision, abdominal pain, N/V/D, fever, chills, numbness or cough    recently started on Prednisone      PAST MEDICAL & SURGICAL HISTORY:  Difficulty walking  Obesity, morbid, BMI 40.0-49.9  Chronic obstructive pulmonary disease, unspecified COPD type  SANTO on CPAP  Stented coronary artery  Benign prostatic hyperplasia with lower urinary tract symptoms, symptom details unspecified  DM (diabetes mellitus)  HLD (hyperlipidemia)  CAD (coronary artery disease): w/ 4 stents  HTN (hypertension)  History of prostate surgery: laser to relieve a blockage.  S/P angioplasty with stent: 2004 and 2005 (Has 4 stents)  H/O knee surgery: Left - 11/7/2017        Medication list         MEDICATIONS  (STANDING):  atorvastatin 20 milliGRAM(s) Oral at bedtime  dextrose 5%. 1000 milliLiter(s) (50 mL/Hr) IV Continuous <Continuous>  dextrose 50% Injectable 12.5 Gram(s) IV Push once  insulin lispro (HumaLOG) corrective regimen sliding scale   SubCutaneous every 6 hours  metoprolol tartrate 25 milliGRAM(s) Oral two times a day  pantoprazole Infusion 8 mG/Hr (10 mL/Hr) IV Continuous <Continuous>  sucralfate suspension 1 Gram(s) Oral every 6 hours    MEDICATIONS  (PRN):  dextrose 40% Gel 15 Gram(s) Oral once PRN Blood Glucose LESS THAN 70 milliGRAM(s)/deciliter  glucagon  Injectable 1 milliGRAM(s) IntraMuscular once PRN Glucose LESS THAN 70 milligrams/deciliter         Vitals log        ICU Vital Signs Last 24 Hrs  T(C): 36.7 (30 May 2018 14:15), Max: 36.7 (30 May 2018 14:15)  T(F): 98.1 (30 May 2018 14:15), Max: 98.1 (30 May 2018 14:15)  HR: 99 (30 May 2018 14:15) (93 - 111)  BP: 131/64 (30 May 2018 14:15) (82/38 - 131/64)  BP(mean): 71 (30 May 2018 12:59) (71 - 71)  ABP: --  ABP(mean): --  RR: 18 (30 May 2018 14:15) (14 - 20)  SpO2: 100% (30 May 2018 14:15) (99% - 100%)           Input and Output:  I&O's Detail    30 May 2018 07:01  -  30 May 2018 14:54  --------------------------------------------------------  IN:    Packed Red Blood Cells: 332 mL    pantoprazole Infusion: 100 mL    Sodium Chloride 0.9% IV Bolus: 2000 mL  Total IN: 2432 mL    OUT:    Voided: 250 mL  Total OUT: 250 mL    Total NET: 2182 mL          Lab Data                        4.7    12.5  )-----------( 374      ( 30 May 2018 11:30 )             14.6     05-30    136  |  102  |  79<H>  ----------------------------<  279<H>  4.9   |  26  |  1.16    Ca    8.8      30 May 2018 11:30    TPro  6.0  /  Alb  2.6<L>  /  TBili  0.3  /  DBili  x   /  AST  14  /  ALT  19  /  AlkPhos  31  05-30      CARDIAC MARKERS ( 30 May 2018 11:30 )  .000 ng/mL / x     / 22 U/L / x     / 1.4 ng/mL      ex smoker    lives at home    Review of Systems	    weakness  dizziness  sob    Objective     Physical Examination    head at  heart s1s2  lung dec BS  abd soft      Pertinent Lab findings & Imaging      Kramer:  NO   Adequate UO     I&O's Detail    30 May 2018 07:01  -  30 May 2018 14:54  --------------------------------------------------------  IN:    Packed Red Blood Cells: 332 mL    pantoprazole Infusion: 100 mL    Sodium Chloride 0.9% IV Bolus: 2000 mL  Total IN: 2432 mL    OUT:    Voided: 250 mL  Total OUT: 250 mL    Total NET: 2182 mL               Discussed with:     Cultures:	        Radiology    EXAM:  XR CHEST PORTABLE IMMED 1V                                  PROCEDURE DATE:  05/30/2018          INTERPRETATION:  Clinical information: Shortness of breath.    Technique: Frontal view of the chest.    Comparison: Prior chest x-ray examination from 12/18/2017.    Findings: There is unchanged elevation of the left diaphragm. There is   left lung base linear atelectasis. A trace left sided pleural effusion is   noted. The right lung is clear. The heart size is normal. There are mild   multilevel degenerative changes of the thoracic spine.     IMPRESSION: Trace left-sided pleural effusion.                  ROLANDO VASQUEZ M.D., ATTENDING RADIOLOGIST  This document has been electronically signed. May 30 2018 11:58AM

## 2018-05-30 NOTE — ED PROVIDER NOTE - CONSTITUTIONAL, MLM
normal... Well appearing, AFEBRILE, well nourished, awake, alert, oriented to person, place, time/situation and in no apparent distress.

## 2018-05-30 NOTE — ED PROVIDER NOTE - PROGRESS NOTE DETAILS
Pt examined by ED attending, Dr. Condon who agreed with disposition and plan. Handoff given to hospitalist, Dr. Au, discussed case and findings, will admit pt to [facility] for acute GI bleed.

## 2018-05-30 NOTE — CONSULT NOTE ADULT - PROBLEM SELECTOR RECOMMENDATION 9
monitor cbc, transfuse prn,  carafate 1 gram bid   PPI drip.  hold A/C and NSAIDs  EGD once optimized

## 2018-05-30 NOTE — CONSULT NOTE ADULT - SUBJECTIVE AND OBJECTIVE BOX
Chief Complaint:  Patient is a 77y old  Male who presents with a chief complaint of Dizziness/Sob Patient is 76 yo male with hx of CAD S/P stent 14 yrs ago, Carpel tunnel syndrome, and HTN presenting with Dizziness, generalized weakness, and SOB on exertional that has been going on for the past week, and progresively worsen.  Patient was started on steroid 1week for carpel tunnel syndrome.  + dark tarry stool.  Patient denies any Chest pain, headache, blurry vision, abdominal pain, N/V/D, fever, chills, numbness or cough    In the ED, HB is 4.  BP improved with IVF bolus.  Stool occult +.  2 unit of RBC ordered never had a bleed colonosiocpy many years ago    Allergies:  No Known Allergies      Medications:  ALBUTerol    90 MICROgram(s) HFA Inhaler 2 Puff(s) Inhalation every 6 hours PRN  atorvastatin 20 milliGRAM(s) Oral at bedtime  dextrose 40% Gel 15 Gram(s) Oral once PRN  dextrose 5%. 1000 milliLiter(s) IV Continuous <Continuous>  dextrose 50% Injectable 12.5 Gram(s) IV Push once  glucagon  Injectable 1 milliGRAM(s) IntraMuscular once PRN  insulin lispro (HumaLOG) corrective regimen sliding scale   SubCutaneous every 6 hours  metoprolol tartrate 25 milliGRAM(s) Oral two times a day  pantoprazole Infusion 8 mG/Hr IV Continuous <Continuous>  sucralfate suspension 1 Gram(s) Oral every 6 hours      PMHX/PSHX:  Difficulty walking  Obesity, morbid, BMI 40.0-49.9  Chronic obstructive pulmonary disease, unspecified COPD type  SANTO on CPAP  Stented coronary artery  Benign prostatic hyperplasia with lower urinary tract symptoms, symptom details unspecified  DM (diabetes mellitus)  HLD (hyperlipidemia)  CAD (coronary artery disease)  HTN (hypertension)  History of prostate surgery  S/P angioplasty with stent  H/O knee surgery      Family history:  Family history of essential hypertension (Father)  No pertinent family history in first degree relatives      Social History:  Lives with wife.  denies    ROS:     General:  No wt loss, fevers, chills, night sweats, fatigue,   Eyes:  Good vision, no reported pain  ENT:  No sore throat, pain, runny nose, dysphagia  CV:  No pain, palpitations, hypo/hypertension  Resp:  No dyspnea, cough, tachypnea, wheezing  GI:  No pain, No nausea, No vomiting, No diarrhea, No constipation, No weight loss, No fever, No pruritis, No rectal bleeding, No tarry stools, No dysphagia,  :  No pain, bleeding, incontinence, nocturia  Muscle:  No pain, weakness  Neuro:  No weakness, tingling, memory problems  Psych:  No fatigue, insomnia, mood problems, depression  Endocrine:  No polyuria, polydipsia, cold/heat intolerance  Heme:  No petechiae, ecchymosis, easy bruisability  Skin:  No rash, tattoos, scars, edema      PHYSICAL EXAM:   Vital Signs:  Vital Signs Last 24 Hrs  T(C): 36.7 (30 May 2018 14:15), Max: 36.7 (30 May 2018 14:15)  T(F): 98.1 (30 May 2018 14:15), Max: 98.1 (30 May 2018 14:15)  HR: 109 (30 May 2018 15:00) (93 - 111)  BP: 117/58 (30 May 2018 15:00) (82/38 - 131/64)  BP(mean): 75 (30 May 2018 15:00) (71 - 75)  RR: 19 (30 May 2018 15:00) (14 - 20)  SpO2: 100% (30 May 2018 15:00) (99% - 100%)  Daily Height in cm: 185.42 (30 May 2018 10:52)    Daily     GENERAL:  Appears stated age, well-groomed, well-nourished, no distress  HEENT:  NC/AT,  conjunctivae clear and pink, no thyromegaly, nodules, adenopathy, no JVD, sclera -anicteric  CHEST:  Full & symmetric excursion, no increased effort, breath sounds clear  HEART:  Regular rhythm, S1, S2, no murmur/rub/S3/S4, no abdominal bruit, no edema  ABDOMEN:  Soft, non-tender, non-distended, normoactive bowel sounds,  no masses ,no hepato-splenomegaly, no signs of chronic liver disease  EXTEREMITIES:  no cyanosis,clubbing or edema  SKIN:  No rash/erythema/ecchymoses/petechiae/wounds/abscess/warm/dry  NEURO:  Alert, oriented, no asterixis, no tremor, no encephalopathy    LABS:                        4.7    12.5  )-----------( 374      ( 30 May 2018 11:30 )             14.6     05-30    136  |  102  |  79<H>  ----------------------------<  279<H>  4.9   |  26  |  1.16    Ca    8.8      30 May 2018 11:30    TPro  6.0  /  Alb  2.6<L>  /  TBili  0.3  /  DBili  x   /  AST  14  /  ALT  19  /  AlkPhos  31  05-30    LIVER FUNCTIONS - ( 30 May 2018 11:30 )  Alb: 2.6 g/dL / Pro: 6.0 g/dL / ALK PHOS: 31 U/L / ALT: 19 U/L DA / AST: 14 U/L / GGT: x           PT/INR - ( 30 May 2018 11:30 )   PT: 13.4 sec;   INR: 1.22 ratio         PTT - ( 30 May 2018 11:30 )  PTT:24.9 sec        Imaging:

## 2018-05-30 NOTE — ED ADULT NURSE NOTE - LINE DESCRIPTION (INCLUDE FLUIDS IF APPLICABLE)
site intact left AC 18g with transfusion infusing, Site intact left metacarpal 20g with protonix infusing

## 2018-05-30 NOTE — CONSULT NOTE ADULT - PROBLEM SELECTOR RECOMMENDATION 2
copd  dyspnea likely due to anemia  monitor vs and Sat  keep sat > 88 pct  I bridgette  proventil PRN

## 2018-05-30 NOTE — ED ADULT NURSE NOTE - OBJECTIVE STATEMENT
Patient c/o weakness and dizziness increasing in the past 1 week with dyspnea on exertion. Patient started prednisone 1 week ago for carpal tunnel pain and swelling and states the concerning symptoms developed 2 days after initiation of prednisone. Patient presents pale and c/o dyspnea on exertion. Patient states he has been sleeping in a recliner to breath better. Patient with 4+ pedal edema right foot and 3+ pedal edema left foot. Capillary refill delayed. Patient c/o weakness and dizziness increasing in the past 1 week with dyspnea on exertion. Patient started prednisone 1 week ago for carpal tunnel pain and swelling and states the concerning symptoms developed 2 days after initiation of prednisone. Patient presents pale and c/o dyspnea on exertion. Patient states he has been sleeping in a recliner to breath better. Patient with 3+ pedal edema right foot and 4+ pedal edema left foot. Capillary refill delayed.

## 2018-05-30 NOTE — H&P ADULT - NEUROLOGICAL DETAILS
responds to verbal commands/deep reflexes intact/responds to pain/cranial nerves intact/alert and oriented x 3/normal strength

## 2018-05-30 NOTE — PATIENT PROFILE ADULT. - LIVING ARRANGEMENTS, TEMPORARY FAMILY, PROFILE
01/04[birth year]        Samanta Angulo [id number]      Dear Charles Ross records indicate that you have outstanding lab work and or testing that was ordered for you and has not yet been completed:  Orders Placed This Encounter none required

## 2018-05-30 NOTE — ED ADULT NURSE REASSESSMENT NOTE - NS ED NURSE REASSESS COMMENT FT1
IV site patent left AC # 18 gauge. First unit PRBCs initiated at this time. Patient on CM showing NSR 90s Patient and patient's family informed of signs of transfusion reaction to watch for during transfusion process.
Confirmatory blood bank tube drawn and sent.

## 2018-05-30 NOTE — H&P ADULT - ASSESSMENT
Patient is 78 yo male with hx of CAD S/P stent 14yrs ago, HTN, and HLD presenting with     1. Anemia.  Seems to be secondary to UGIB.  + melena.  + stool occult.  Being transfused 2 units of RBC.  Monitor post-transfusion CBC.  Will start patient on Protonix drip, NPO, IVF, and Monitor for signs of volume overload.  Hold ASA, steroid and Plavix for now.  GI consult    2. HX of CAD.  Continue with BB, and ACEI.  EKG shows no ischemic changes.  TTE.  No CP.  Last stent was placed in 14 yrs ago.  Hold ASA, plavix for now pending cardiology consult    3. HTN.  Continue with metoprolol, and lisinopril with holding parameter.  Monitor BP Closely    4.  SANTO. Continue with CPAP.  Pulmonary consult    5.  DM2.  Hold oral medications.  Continue with ISS, and Monitor POC.  HBA1C in am    6.  HLD.  Continue with statin    Plan of care was discussed with patient, and wife in great details, All questions were answered to their satisfaction  Seems to understand, and in agreement

## 2018-05-30 NOTE — CONSULT NOTE ADULT - PROBLEM SELECTOR RECOMMENDATION 4
GI Bleed  anemia  serial Hgb  Scope as per GI  monitor HD and VS  admission to ICU  PPI  monitor sx and cont supportive care and regimen  Seq teds for DVT p

## 2018-05-30 NOTE — ED ADULT NURSE NOTE - NURSING MUSC EXTREMITY LIMITED ROM
left upper extremity/right upper extremity/generalized weakness/right lower extremity/left lower extremity

## 2018-05-30 NOTE — CONSULT NOTE ADULT - ASSESSMENT
The patient is a 77 year old male with a history of HTN, HL, DM, SANTO on CPAP, BPH, CAD s/p GINNY to RCAx3 and LAD in 2004/2005, carpal tunnel syndrome who is admitted with UGIB.    Plan:  - Hold aspirin and clopidogrel  - Patient is 14 years out from PCI. Will eventually be restarted on single antiplatelet therapy.  - Continue atorvastatin 20 mg daily  - Hold furosemide  - Hold ramipril. Resume if BPs trend up.  - Continue metoprolol tartrate 25 mg bid  - Transfuse. Monitor hemoglobin.  - Echocardiogram ordered  - GI follow-up. Patient may proceed from a cardiac perspective for urgent EGD.

## 2018-05-30 NOTE — ED PROVIDER NOTE - MUSCULOSKELETAL, MLM
Spine appears normal, range of motion is not limited, no muscle or joint tenderness Spine appears normal, range of motion is not limited, no muscle or joint tenderness; pitting edema noted to B feet, calf NT B

## 2018-05-30 NOTE — CONSULT NOTE ADULT - SUBJECTIVE AND OBJECTIVE BOX
History of Present Illness: The patient is a 77 year old male with a history of HTN, HL, DM, SANTO on CPAP, BPH, CAD s/p GINNY to RCAx3 and LAD in 2004/2005, carpal tunnel syndrome who presents with dizziness and melena. Symptoms started about one week ago. He was started on prednisone one week ago for carpal tunnel syndrome. Some shortness of breath but no chest pain. Chronic lower extremity edema.    Past Medical/Surgical History:  HTN, HL, DM, SANTO on CPAP, BPH, CAD s/p GINNY to RCAx3 and LAD in 2004/2005, carpal tunnel syndrome    Medications:  Home Medications:  aspirin 81 mg oral tablet, chewable: 1 tab(s) orally once a day (30 May 2018 12:20)  atorvastatin 20 mg oral tablet: 1 tab(s) orally once a day (at bedtime) (30 May 2018 12:20)  furosemide 20 mg oral tablet: 1 tab(s) orally once a day (30 May 2018 12:20)  metFORMIN 1000 mg oral tablet: 1 tab(s) orally 2 times a day (30 May 2018 12:20)  metoprolol tartrate 25 mg oral tablet: 1 tab(s) orally 2 times a day (30 May 2018 12:20)  NovoLOG Mix 70/30 subcutaneous suspension: 10 unit(s) subcutaneous 2 times a day (30 May 2018 12:51)  Plavix 75 mg oral tablet: 1 tab(s) orally once a day (30 May 2018 12:51)  predniSONE 10 mg oral tablet: 1 tab(s) orally once a day (30 May 2018 12:51)  ramipril 10 mg oral capsule: 1 cap(s) orally 2 times a day (30 May 2018 12:20)      Family History: Non-contributory family history of premature cardiovascular atherosclerotic disease    Social History: No tobacco, alcohol or drug use    Review of Systems:  General: No fevers, chills, weight loss or gain  Skin: No rashes, color changes  Cardiovascular: No chest pain, orthopnea  Respiratory: No shortness of breath, cough  Gastrointestinal: No nausea, abdominal pain  Genitourinary: No incontinence, pain with urination  Musculoskeletal: No pain, swelling, decreased range of motion  Neurological: No headache, weakness  Psychiatric: No depression, anxiety  Endocrine: No weight loss or gain, increased thirst  All other systems are comprehensively negative.    Physical Exam:  Vitals:        Vital Signs Last 24 Hrs  T(C): 36.4 (30 May 2018 13:35), Max: 36.6 (30 May 2018 10:52)  T(F): 97.6 (30 May 2018 13:35), Max: 97.8 (30 May 2018 10:52)  HR: 97 (30 May 2018 13:35) (93 - 111)  BP: 111/60 (30 May 2018 13:35) (82/38 - 112/51)  BP(mean): 71 (30 May 2018 12:59) (71 - 71)  RR: 14 (30 May 2018 13:35) (14 - 20)  SpO2: 100% (30 May 2018 13:35) (99% - 100%)  General: NAD  HEENT: MMM  Neck: No JVD, no carotid bruit  Lungs: CTAB  CV: RRR, nl S1/S2, no M/R/G  Abdomen: S/NT/ND, +BS  Extremities: No LE edema, no cyanosis  Neuro: AAOx3, non-focal  Skin: No rash    Labs:                        4.7    12.5  )-----------( 374      ( 30 May 2018 11:30 )             14.6     05-30    136  |  102  |  79<H>  ----------------------------<  279<H>  4.9   |  26  |  1.16    Ca    8.8      30 May 2018 11:30    TPro  6.0  /  Alb  2.6<L>  /  TBili  0.3  /  DBili  x   /  AST  14  /  ALT  19  /  AlkPhos  31  05-30    CARDIAC MARKERS ( 30 May 2018 11:30 )  .000 ng/mL / x     / 22 U/L / x     / 1.4 ng/mL      PT/INR - ( 30 May 2018 11:30 )   PT: 13.4 sec;   INR: 1.22 ratio         PTT - ( 30 May 2018 11:30 )  PTT:24.9 sec    ECG: NSR, LAD, early transition R wave progression

## 2018-05-30 NOTE — ED PROVIDER NOTE - MEDICAL DECISION MAKING DETAILS
78 y/o M with hx of CAD on plavix and asa, CHF, DM, HTN here with generalized weakness and dizziness for few days s/p starting on prednisone for carpal tunnel syndrome, also c/o black stools x 2 days, noted to be pale and hypotensive, tachycardic, pt with acute GI bleed; will give IVF, PRBCs, labs, stool occult, admit

## 2018-05-30 NOTE — ED PROVIDER NOTE - OBJECTIVE STATEMENT
77 y.o M wit hx of CHF on Plavix c/o weakness and dizziness for several days since starting prednisone for carpal tunnel syndrome. Noted black stools for the past 2 days. Denies fever, shortness of breath, chest pain, or vomiting. diabetic on insulin and metformin.   PMD- Dr. Disla. 77 y.o M wit hx of CAD with stents, DM, HTN, CHF on Plavix, aspirin c/o weakness and dizziness for several days since starting prednisone for carpal tunnel syndrome. Noted black stools for the past 2 days. Denies fever, shortness of breath, chest pain, or vomiting. diabetic on insulin and metformin.   PMD- Dr. Disla.

## 2018-05-31 ENCOUNTER — APPOINTMENT (OUTPATIENT)
Dept: RHEUMATOLOGY | Facility: CLINIC | Age: 78
End: 2018-05-31

## 2018-05-31 DIAGNOSIS — D62 ACUTE POSTHEMORRHAGIC ANEMIA: ICD-10-CM

## 2018-05-31 DIAGNOSIS — I10 ESSENTIAL (PRIMARY) HYPERTENSION: ICD-10-CM

## 2018-05-31 DIAGNOSIS — I25.10 ATHEROSCLEROTIC HEART DISEASE OF NATIVE CORONARY ARTERY WITHOUT ANGINA PECTORIS: ICD-10-CM

## 2018-05-31 DIAGNOSIS — K92.2 GASTROINTESTINAL HEMORRHAGE, UNSPECIFIED: ICD-10-CM

## 2018-05-31 LAB
ANION GAP SERPL CALC-SCNC: 6 MMOL/L — SIGNIFICANT CHANGE UP (ref 5–17)
BUN SERPL-MCNC: 67 MG/DL — HIGH (ref 7–23)
CALCIUM SERPL-MCNC: 8.4 MG/DL — SIGNIFICANT CHANGE UP (ref 8.4–10.5)
CHLORIDE SERPL-SCNC: 108 MMOL/L — SIGNIFICANT CHANGE UP (ref 96–108)
CO2 SERPL-SCNC: 26 MMOL/L — SIGNIFICANT CHANGE UP (ref 22–31)
CREAT SERPL-MCNC: 0.8 MG/DL — SIGNIFICANT CHANGE UP (ref 0.5–1.3)
CULTURE RESULTS: SIGNIFICANT CHANGE UP
GLUCOSE SERPL-MCNC: 151 MG/DL — HIGH (ref 70–99)
HBA1C BLD-MCNC: 5.5 % — SIGNIFICANT CHANGE UP (ref 4–5.6)
HCT VFR BLD CALC: 20.5 % — CRITICAL LOW (ref 39–50)
HCT VFR BLD CALC: 24.1 % — LOW (ref 39–50)
HCT VFR BLD CALC: 24.2 % — LOW (ref 39–50)
HGB BLD-MCNC: 7.3 G/DL — LOW (ref 13–17)
HGB BLD-MCNC: 8.3 G/DL — LOW (ref 13–17)
HGB BLD-MCNC: 8.5 G/DL — LOW (ref 13–17)
MCHC RBC-ENTMCNC: 30.2 PG — SIGNIFICANT CHANGE UP (ref 27–34)
MCHC RBC-ENTMCNC: 30.6 PG — SIGNIFICANT CHANGE UP (ref 27–34)
MCHC RBC-ENTMCNC: 31.8 PG — SIGNIFICANT CHANGE UP (ref 27–34)
MCHC RBC-ENTMCNC: 34.5 GM/DL — SIGNIFICANT CHANGE UP (ref 32–36)
MCHC RBC-ENTMCNC: 35.1 GM/DL — SIGNIFICANT CHANGE UP (ref 32–36)
MCHC RBC-ENTMCNC: 35.4 GM/DL — SIGNIFICANT CHANGE UP (ref 32–36)
MCV RBC AUTO: 87 FL — SIGNIFICANT CHANGE UP (ref 80–100)
MCV RBC AUTO: 87.5 FL — SIGNIFICANT CHANGE UP (ref 80–100)
MCV RBC AUTO: 89.8 FL — SIGNIFICANT CHANGE UP (ref 80–100)
PLATELET # BLD AUTO: 266 K/UL — SIGNIFICANT CHANGE UP (ref 150–400)
PLATELET # BLD AUTO: 272 K/UL — SIGNIFICANT CHANGE UP (ref 150–400)
PLATELET # BLD AUTO: 284 K/UL — SIGNIFICANT CHANGE UP (ref 150–400)
POTASSIUM SERPL-MCNC: 4.3 MMOL/L — SIGNIFICANT CHANGE UP (ref 3.5–5.3)
POTASSIUM SERPL-SCNC: 4.3 MMOL/L — SIGNIFICANT CHANGE UP (ref 3.5–5.3)
RBC # BLD: 2.28 M/UL — LOW (ref 4.2–5.8)
RBC # BLD: 2.75 M/UL — LOW (ref 4.2–5.8)
RBC # BLD: 2.79 M/UL — LOW (ref 4.2–5.8)
RBC # FLD: 16.1 % — HIGH (ref 10.3–14.5)
RBC # FLD: 16.4 % — HIGH (ref 10.3–14.5)
RBC # FLD: 16.5 % — HIGH (ref 10.3–14.5)
SODIUM SERPL-SCNC: 140 MMOL/L — SIGNIFICANT CHANGE UP (ref 135–145)
SPECIMEN SOURCE: SIGNIFICANT CHANGE UP
WBC # BLD: 10.7 K/UL — HIGH (ref 3.8–10.5)
WBC # BLD: 9.4 K/UL — SIGNIFICANT CHANGE UP (ref 3.8–10.5)
WBC # BLD: 9.8 K/UL — SIGNIFICANT CHANGE UP (ref 3.8–10.5)
WBC # FLD AUTO: 10.7 K/UL — HIGH (ref 3.8–10.5)
WBC # FLD AUTO: 9.4 K/UL — SIGNIFICANT CHANGE UP (ref 3.8–10.5)
WBC # FLD AUTO: 9.8 K/UL — SIGNIFICANT CHANGE UP (ref 3.8–10.5)

## 2018-05-31 PROCEDURE — 99233 SBSQ HOSP IP/OBS HIGH 50: CPT

## 2018-05-31 RX ORDER — FUROSEMIDE 40 MG
20 TABLET ORAL ONCE
Qty: 0 | Refills: 0 | Status: COMPLETED | OUTPATIENT
Start: 2018-05-31 | End: 2018-05-31

## 2018-05-31 RX ORDER — SODIUM CHLORIDE 9 MG/ML
1000 INJECTION, SOLUTION INTRAVENOUS
Qty: 0 | Refills: 0 | Status: DISCONTINUED | OUTPATIENT
Start: 2018-05-31 | End: 2018-05-31

## 2018-05-31 RX ORDER — FENTANYL CITRATE 50 UG/ML
25 INJECTION INTRAVENOUS
Qty: 0 | Refills: 0 | Status: DISCONTINUED | OUTPATIENT
Start: 2018-05-31 | End: 2018-05-31

## 2018-05-31 RX ORDER — INSULIN GLARGINE 100 [IU]/ML
5 INJECTION, SOLUTION SUBCUTANEOUS AT BEDTIME
Qty: 0 | Refills: 0 | Status: DISCONTINUED | OUTPATIENT
Start: 2018-05-31 | End: 2018-06-06

## 2018-05-31 RX ORDER — INSULIN LISPRO 100/ML
VIAL (ML) SUBCUTANEOUS
Qty: 0 | Refills: 0 | Status: DISCONTINUED | OUTPATIENT
Start: 2018-05-31 | End: 2018-06-04

## 2018-05-31 RX ORDER — INSULIN LISPRO 100/ML
VIAL (ML) SUBCUTANEOUS AT BEDTIME
Qty: 0 | Refills: 0 | Status: DISCONTINUED | OUTPATIENT
Start: 2018-05-31 | End: 2018-06-04

## 2018-05-31 RX ORDER — ONDANSETRON 8 MG/1
4 TABLET, FILM COATED ORAL ONCE
Qty: 0 | Refills: 0 | Status: DISCONTINUED | OUTPATIENT
Start: 2018-05-31 | End: 2018-05-31

## 2018-05-31 RX ADMIN — Medication 25 MILLIGRAM(S): at 17:02

## 2018-05-31 RX ADMIN — Medication 1: at 06:06

## 2018-05-31 RX ADMIN — Medication 3: at 12:22

## 2018-05-31 RX ADMIN — Medication 25 MILLIGRAM(S): at 06:06

## 2018-05-31 RX ADMIN — Medication 1 GRAM(S): at 06:06

## 2018-05-31 RX ADMIN — Medication 1 GRAM(S): at 12:21

## 2018-05-31 RX ADMIN — PANTOPRAZOLE SODIUM 10 MG/HR: 20 TABLET, DELAYED RELEASE ORAL at 17:02

## 2018-05-31 RX ADMIN — SODIUM CHLORIDE 100 MILLILITER(S): 9 INJECTION, SOLUTION INTRAVENOUS at 10:44

## 2018-05-31 RX ADMIN — Medication 4: at 17:01

## 2018-05-31 RX ADMIN — ATORVASTATIN CALCIUM 20 MILLIGRAM(S): 80 TABLET, FILM COATED ORAL at 21:36

## 2018-05-31 RX ADMIN — Medication 1 GRAM(S): at 17:02

## 2018-05-31 RX ADMIN — Medication 1 GRAM(S): at 00:05

## 2018-05-31 RX ADMIN — Medication 1 GRAM(S): at 23:24

## 2018-05-31 RX ADMIN — Medication 20 MILLIGRAM(S): at 08:45

## 2018-05-31 NOTE — DIETITIAN INITIAL EVALUATION ADULT. - OTHER INFO
Pt c Pt c hx CAD, DM, CTS, HLD, HTN, COPD, obesity Pt found to have UGIB c occult blood. Pt s/p transfusion and EGD. Diet was advanced to clears. Pt states he was able to tolerate s trouble. Pt c Dm and he manages it c metformin and novolog 70/30 twice daily. He states his hgbA1c was 7.0. Briefly reviewed strategies for DM management. He appears knowledgeable/compliant c diet and medication.  He states that PTA he had decreased appetite and that he lost about 40# over the past 6 months. He states that he had some hospitalizations and a rehab stay and attributes some of it to that. He states over the past week or two he has had decreased intake. No N/V/D or pain just decreased appetite. Pt also started prednisone which can affect intake but pt does not think that is the cause. Weight loss is 13.9% which is significant for 6 months. At this time pt still on clears: will monitor tolerance/appetite once diet advanced. Pt c hx CAD, DM, CTS, HLD, HTN, COPD, obesity Pt found to have UGIB c occult blood. Pt s/p transfusion and EGD. Diet was advanced to clears. Pt states he was able to tolerate s trouble. Pt c Dm and he manages it c metformin and novolog 70/30 twice daily. He states his hgbA1c was 7.0. Briefly reviewed strategies for DM management. He appears knowledgeable/compliant c diet and medication.  He states that PTA he had decreased appetite and that he lost about 40# over the past 6 months. He states that he had some hospitalizations and a rehab stay and attributes some of it to that. He states over the past week or two he has had decreased intake. No N/V/D or pain just decreased appetite. Pt also started prednisone which can affect intake but pt does not think that is the cause. Noted edema 4+ b/l ankles, feet which would likely result in weight gain as opposed to loss: Pt may have had fluid fluctuation Weight loss is 13.9% which is significant for 6 months. At this time pt still on clears: will monitor tolerance/appetite once diet advanced..

## 2018-05-31 NOTE — PROVIDER CONTACT NOTE (CRITICAL VALUE NOTIFICATION) - BACKGROUND
Patient presented to the ED with feeling weak and having dyspnea on exertion that began 5 days ago after starting a prednisone regimen for carpal tunnel. Patient has had dark stools since starting the prednisone.
2 units PRBC's administered and completed
pt has received and completed 4 units PRBC's

## 2018-06-01 DIAGNOSIS — K26.9 DUODENAL ULCER, UNSPECIFIED AS ACUTE OR CHRONIC, WITHOUT HEMORRHAGE OR PERFORATION: ICD-10-CM

## 2018-06-01 DIAGNOSIS — E11.9 TYPE 2 DIABETES MELLITUS WITHOUT COMPLICATIONS: ICD-10-CM

## 2018-06-01 LAB
ALBUMIN SERPL ELPH-MCNC: 2.5 G/DL — LOW (ref 3.3–5)
ALP SERPL-CCNC: 31 U/L — SIGNIFICANT CHANGE UP (ref 30–120)
ALT FLD-CCNC: 16 U/L DA — SIGNIFICANT CHANGE UP (ref 10–60)
ANION GAP SERPL CALC-SCNC: 6 MMOL/L — SIGNIFICANT CHANGE UP (ref 5–17)
AST SERPL-CCNC: 14 U/L — SIGNIFICANT CHANGE UP (ref 10–40)
BILIRUB SERPL-MCNC: 0.9 MG/DL — SIGNIFICANT CHANGE UP (ref 0.2–1.2)
BUN SERPL-MCNC: 42 MG/DL — HIGH (ref 7–23)
CALCIUM SERPL-MCNC: 8.5 MG/DL — SIGNIFICANT CHANGE UP (ref 8.4–10.5)
CHLORIDE SERPL-SCNC: 107 MMOL/L — SIGNIFICANT CHANGE UP (ref 96–108)
CO2 SERPL-SCNC: 28 MMOL/L — SIGNIFICANT CHANGE UP (ref 22–31)
CREAT SERPL-MCNC: 0.79 MG/DL — SIGNIFICANT CHANGE UP (ref 0.5–1.3)
GLUCOSE SERPL-MCNC: 119 MG/DL — HIGH (ref 70–99)
HBA1C BLD-MCNC: 5.6 % — SIGNIFICANT CHANGE UP (ref 4–5.6)
HCT VFR BLD CALC: 22.5 % — LOW (ref 39–50)
HCT VFR BLD CALC: 22.9 % — LOW (ref 39–50)
HCT VFR BLD CALC: 23.3 % — LOW (ref 39–50)
HGB BLD-MCNC: 7.4 G/DL — LOW (ref 13–17)
HGB BLD-MCNC: 8 G/DL — LOW (ref 13–17)
HGB BLD-MCNC: 8.1 G/DL — LOW (ref 13–17)
MAGNESIUM SERPL-MCNC: 1.4 MG/DL — LOW (ref 1.6–2.6)
MCHC RBC-ENTMCNC: 30 PG — SIGNIFICANT CHANGE UP (ref 27–34)
MCHC RBC-ENTMCNC: 30.3 PG — SIGNIFICANT CHANGE UP (ref 27–34)
MCHC RBC-ENTMCNC: 30.8 PG — SIGNIFICANT CHANGE UP (ref 27–34)
MCHC RBC-ENTMCNC: 33 GM/DL — SIGNIFICANT CHANGE UP (ref 32–36)
MCHC RBC-ENTMCNC: 34.7 GM/DL — SIGNIFICANT CHANGE UP (ref 32–36)
MCHC RBC-ENTMCNC: 34.9 GM/DL — SIGNIFICANT CHANGE UP (ref 32–36)
MCV RBC AUTO: 87.3 FL — SIGNIFICANT CHANGE UP (ref 80–100)
MCV RBC AUTO: 88.1 FL — SIGNIFICANT CHANGE UP (ref 80–100)
MCV RBC AUTO: 90.9 FL — SIGNIFICANT CHANGE UP (ref 80–100)
PHOSPHATE SERPL-MCNC: 3.6 MG/DL — SIGNIFICANT CHANGE UP (ref 2.5–4.5)
PLATELET # BLD AUTO: 248 K/UL — SIGNIFICANT CHANGE UP (ref 150–400)
PLATELET # BLD AUTO: 256 K/UL — SIGNIFICANT CHANGE UP (ref 150–400)
PLATELET # BLD AUTO: 262 K/UL — SIGNIFICANT CHANGE UP (ref 150–400)
POTASSIUM SERPL-MCNC: 3.8 MMOL/L — SIGNIFICANT CHANGE UP (ref 3.5–5.3)
POTASSIUM SERPL-SCNC: 3.8 MMOL/L — SIGNIFICANT CHANGE UP (ref 3.5–5.3)
PROT SERPL-MCNC: 5.4 G/DL — LOW (ref 6–8.3)
RBC # BLD: 2.48 M/UL — LOW (ref 4.2–5.8)
RBC # BLD: 2.6 M/UL — LOW (ref 4.2–5.8)
RBC # BLD: 2.67 M/UL — LOW (ref 4.2–5.8)
RBC # FLD: 16.7 % — HIGH (ref 10.3–14.5)
RBC # FLD: 17 % — HIGH (ref 10.3–14.5)
RBC # FLD: 17.3 % — HIGH (ref 10.3–14.5)
SODIUM SERPL-SCNC: 141 MMOL/L — SIGNIFICANT CHANGE UP (ref 135–145)
WBC # BLD: 7.8 K/UL — SIGNIFICANT CHANGE UP (ref 3.8–10.5)
WBC # BLD: 8.8 K/UL — SIGNIFICANT CHANGE UP (ref 3.8–10.5)
WBC # BLD: 8.9 K/UL — SIGNIFICANT CHANGE UP (ref 3.8–10.5)
WBC # FLD AUTO: 7.8 K/UL — SIGNIFICANT CHANGE UP (ref 3.8–10.5)
WBC # FLD AUTO: 8.8 K/UL — SIGNIFICANT CHANGE UP (ref 3.8–10.5)
WBC # FLD AUTO: 8.9 K/UL — SIGNIFICANT CHANGE UP (ref 3.8–10.5)

## 2018-06-01 PROCEDURE — 99233 SBSQ HOSP IP/OBS HIGH 50: CPT

## 2018-06-01 RX ORDER — SODIUM CHLORIDE 9 MG/ML
500 INJECTION INTRAMUSCULAR; INTRAVENOUS; SUBCUTANEOUS ONCE
Qty: 0 | Refills: 0 | Status: COMPLETED | OUTPATIENT
Start: 2018-06-01 | End: 2018-06-01

## 2018-06-01 RX ORDER — TRAMADOL HYDROCHLORIDE 50 MG/1
25 TABLET ORAL
Qty: 0 | Refills: 0 | Status: DISCONTINUED | OUTPATIENT
Start: 2018-06-01 | End: 2018-06-06

## 2018-06-01 RX ORDER — MAGNESIUM SULFATE 500 MG/ML
1 VIAL (ML) INJECTION ONCE
Qty: 0 | Refills: 0 | Status: COMPLETED | OUTPATIENT
Start: 2018-06-01 | End: 2018-06-01

## 2018-06-01 RX ADMIN — Medication 1: at 16:38

## 2018-06-01 RX ADMIN — Medication 1: at 07:25

## 2018-06-01 RX ADMIN — Medication 100 GRAM(S): at 09:55

## 2018-06-01 RX ADMIN — Medication 25 MILLIGRAM(S): at 05:35

## 2018-06-01 RX ADMIN — Medication 1 GRAM(S): at 05:35

## 2018-06-01 RX ADMIN — Medication 1 GRAM(S): at 12:21

## 2018-06-01 RX ADMIN — SODIUM CHLORIDE 500 MILLILITER(S): 9 INJECTION INTRAMUSCULAR; INTRAVENOUS; SUBCUTANEOUS at 09:55

## 2018-06-01 RX ADMIN — Medication 1 GRAM(S): at 17:48

## 2018-06-01 RX ADMIN — TRAMADOL HYDROCHLORIDE 25 MILLIGRAM(S): 50 TABLET ORAL at 07:59

## 2018-06-01 RX ADMIN — Medication 1: at 12:23

## 2018-06-01 RX ADMIN — TRAMADOL HYDROCHLORIDE 25 MILLIGRAM(S): 50 TABLET ORAL at 08:44

## 2018-06-01 RX ADMIN — ATORVASTATIN CALCIUM 20 MILLIGRAM(S): 80 TABLET, FILM COATED ORAL at 21:12

## 2018-06-01 RX ADMIN — PANTOPRAZOLE SODIUM 10 MG/HR: 20 TABLET, DELAYED RELEASE ORAL at 01:40

## 2018-06-01 RX ADMIN — PANTOPRAZOLE SODIUM 10 MG/HR: 20 TABLET, DELAYED RELEASE ORAL at 19:12

## 2018-06-01 RX ADMIN — Medication 25 MILLIGRAM(S): at 17:53

## 2018-06-01 NOTE — OCCUPATIONAL THERAPY INITIAL EVALUATION ADULT - RANGE OF MOTION EXAMINATION, UPPER EXTREMITY
Pt states he needs carpal tunnel surgery Deric hands, Right shoulder ff AROM ~ 80 deg, abd 80 deg, PROM ~ 110 with tighness and discomfort, elbow to wrist AROM WNLs. nPt reports pulling in hand with wrist flexion. Pt states he needs carpal tunnel surgery Deric hands, Right shoulder ff AROM ~ 80 deg, abd 80 deg, PROM ~ 110 with tighness and discomfort, elbow to wrist AROM WNLs. Pt reports pulling in hand with wrist flexion. Left shoulder ff AROM initially ~ 30 deg, after OT performed PRON/stretching to ~ 90 deg with +tightness and discomfort pt able to actively ff shoulder ~ 60 deg with difficulty. Elbow to wrist AROM WNLs. Deric Hands: unable to achieve full grasp actively (passively unable to achieve full grasp with pt c/o pain and tightness), pt only able to oppose thumb to 2nd digit right and thumb to 2nd & 3rd digit left. Pt states he is right hand dominant. Pt with dec FMC, GG , pinch and grasp strength. Pt given built up handle and pink/green resistive hand strengthening sponges and educated re exercises. Pt has Deric wrist cock up splints which he states he usually wears during night. Pt states he also has edema gloves he wears as needed.

## 2018-06-01 NOTE — PROVIDER CONTACT NOTE (OTHER) - ACTION/TREATMENT ORDERED:
Dr. Mortensen aware of BP 84/48, 500ML NS bolus ordered.  Pt back to bed.
no treatment  at this time continue to obeserve for active bleeding

## 2018-06-01 NOTE — OCCUPATIONAL THERAPY INITIAL EVALUATION ADULT - IMPAIRED TRANSFERS: BED/CHAIR, REHAB EVAL
Patient Seen in: [facility] AND [facility] Emergency Department    History   Patient presents with:  Hip Pain      HPI    Patient presents complaining of right hip pain. She states pain is sharp and severe. Only present with moving.   Pain is been increasing ov arthralgias. Negative for back pain and neck pain. Skin: Negative for rash and wound. Neurological: Negative for syncope and headaches. Constitutional and vital signs reviewed. All other systems reviewed and negative except as noted above. right lateral gluteal muscles on examination, no obvious hip injury or deformity. X-rays obtained, no significant acute abnormalities. Patient given pain meds in the ED and felt improved. Able to ambulate in the ED.   Stable for discharge home, patient w decreased strength/decreased flexibility/pain

## 2018-06-01 NOTE — PHYSICAL THERAPY INITIAL EVALUATION ADULT - ACTIVE RANGE OF MOTION EXAMINATION, REHAB EVAL
Bilat UE limited A/PROM all joints/planes due to carpel tunnel syndrome. shoulder pain, weakness. Right knee decreased flexion/extension due to stiffness and pain./deficits as listed below

## 2018-06-01 NOTE — OCCUPATIONAL THERAPY INITIAL EVALUATION ADULT - LEVEL OF INDEPENDENCE: DRESS LOWER BODY, OT EVAL
dependent (less than 25% patients effort)/tba pt in hosp gown . + edema Deric feet and ankles. Pt states he has hip kit at home and wears TEDS.

## 2018-06-01 NOTE — OCCUPATIONAL THERAPY INITIAL EVALUATION ADULT - ADDITIONAL COMMENTS
1 SUN into doorway (pt states he needs assist & uses SAC with spouse behind him). + tub with TTB & grab bar. + commode, RW, SAC 1 SUN into doorway (pt states he needs assist & uses SAC with spouse behind him). + tub with TTB & grab bar. + commode, RW, SAC, hip kit

## 2018-06-01 NOTE — PROVIDER CONTACT NOTE (OTHER) - BACKGROUND
8.0/22.9. Patient s/s 5 untis prbc's and 1 ffp this admit.  s/p egd  with healing bleeding ulcer. Patient s/p soft 500 bolus for this am

## 2018-06-01 NOTE — PHYSICAL THERAPY INITIAL EVALUATION ADULT - ADDITIONAL COMMENTS
Pt lives in house with 1 step to enter into doorway, no handrails. Owns a cane and RW. Was receiving out patient PT.

## 2018-06-01 NOTE — OCCUPATIONAL THERAPY INITIAL EVALUATION ADULT - ANTICIPATED DISCHARGE DISPOSITION, OT EVAL
Acute Rehab rec. pt with deficits in Deric UEs/hands and LE weakness. Pt states recent h/o falls and that he needs right TKR/rehabilitation facility

## 2018-06-01 NOTE — OCCUPATIONAL THERAPY INITIAL EVALUATION ADULT - LOWER BODY DRESSING, PREVIOUS LEVEL OF FUNCTION, OT EVAL
needed assist/Pt states he has hip kit but difficulty using secondary UE & hand weakness. pt states spouse assists with MARLI stockings

## 2018-06-01 NOTE — PHYSICAL THERAPY INITIAL EVALUATION ADULT - IMPAIRMENTS FOUND, PT EVAL
joint integrity and mobility/gait, locomotion, and balance/muscle strength/ROM/gross motor/aerobic capacity/endurance

## 2018-06-01 NOTE — OCCUPATIONAL THERAPY INITIAL EVALUATION ADULT - BED MOBILITY/TRANSFERS, PREVIOUS LEVEL OF FUNCTION, OT EVAL
independent pt states recently was getting weaker and used SAC/RW at times. Pt states he fell this past Tues/Wed while using RW secondary to weakness

## 2018-06-01 NOTE — OCCUPATIONAL THERAPY INITIAL EVALUATION ADULT - EATING, ASSISTIVE DEVICE, OT EVAL
pt on liquid diet at this time. Pt given built up handle to use as needed with utensils as Deric hand  weak

## 2018-06-01 NOTE — OCCUPATIONAL THERAPY INITIAL EVALUATION ADULT - IMPAIRED TRANSFERS: SIT/STAND, REHAB EVAL
Pt states slight dizziness seated EOB. /72, HR 93. Pt able to side step with RW & assist x 2 to high hip bedside chair. /66, 02 100% on RA , HR 96. EMMA Alfredo aware/decreased flexibility/decreased strength/pain

## 2018-06-01 NOTE — OCCUPATIONAL THERAPY INITIAL EVALUATION ADULT - PERTINENT HX OF CURRENT PROBLEM, REHAB EVAL
GI Bleed (UGIB) with occult blood GI Bleed (UGIB) with occult blood. Pt states recent falls and needs right TKR and deric CTS with weakness Deric UEs & LEs

## 2018-06-02 DIAGNOSIS — G56.00 CARPAL TUNNEL SYNDROME, UNSPECIFIED UPPER LIMB: ICD-10-CM

## 2018-06-02 DIAGNOSIS — K27.4 CHRONIC OR UNSPECIFIED PEPTIC ULCER, SITE UNSPECIFIED, WITH HEMORRHAGE: ICD-10-CM

## 2018-06-02 LAB
ANION GAP SERPL CALC-SCNC: 3 MMOL/L — LOW (ref 5–17)
BUN SERPL-MCNC: 26 MG/DL — HIGH (ref 7–23)
CALCIUM SERPL-MCNC: 8.2 MG/DL — LOW (ref 8.4–10.5)
CHLORIDE SERPL-SCNC: 105 MMOL/L — SIGNIFICANT CHANGE UP (ref 96–108)
CO2 SERPL-SCNC: 27 MMOL/L — SIGNIFICANT CHANGE UP (ref 22–31)
CREAT SERPL-MCNC: 0.65 MG/DL — SIGNIFICANT CHANGE UP (ref 0.5–1.3)
GLUCOSE SERPL-MCNC: 133 MG/DL — HIGH (ref 70–99)
HCT VFR BLD CALC: 24.9 % — LOW (ref 39–50)
HCT VFR BLD CALC: 27.2 % — LOW (ref 39–50)
HGB BLD-MCNC: 8.4 G/DL — LOW (ref 13–17)
HGB BLD-MCNC: 8.9 G/DL — LOW (ref 13–17)
MAGNESIUM SERPL-MCNC: 1.7 MG/DL — SIGNIFICANT CHANGE UP (ref 1.6–2.6)
MCHC RBC-ENTMCNC: 30.4 PG — SIGNIFICANT CHANGE UP (ref 27–34)
MCHC RBC-ENTMCNC: 33.8 GM/DL — SIGNIFICANT CHANGE UP (ref 32–36)
MCV RBC AUTO: 89.9 FL — SIGNIFICANT CHANGE UP (ref 80–100)
PHOSPHATE SERPL-MCNC: 3.9 MG/DL — SIGNIFICANT CHANGE UP (ref 2.5–4.5)
PLATELET # BLD AUTO: 239 K/UL — SIGNIFICANT CHANGE UP (ref 150–400)
POTASSIUM SERPL-MCNC: 3.7 MMOL/L — SIGNIFICANT CHANGE UP (ref 3.5–5.3)
POTASSIUM SERPL-SCNC: 3.7 MMOL/L — SIGNIFICANT CHANGE UP (ref 3.5–5.3)
RBC # BLD: 2.77 M/UL — LOW (ref 4.2–5.8)
RBC # FLD: 16.4 % — HIGH (ref 10.3–14.5)
SODIUM SERPL-SCNC: 135 MMOL/L — SIGNIFICANT CHANGE UP (ref 135–145)
WBC # BLD: 7 K/UL — SIGNIFICANT CHANGE UP (ref 3.8–10.5)
WBC # FLD AUTO: 7 K/UL — SIGNIFICANT CHANGE UP (ref 3.8–10.5)

## 2018-06-02 PROCEDURE — 74177 CT ABD & PELVIS W/CONTRAST: CPT | Mod: 26

## 2018-06-02 PROCEDURE — 99233 SBSQ HOSP IP/OBS HIGH 50: CPT

## 2018-06-02 RX ORDER — IOHEXOL 300 MG/ML
30 INJECTION, SOLUTION INTRAVENOUS ONCE
Qty: 0 | Refills: 0 | Status: COMPLETED | OUTPATIENT
Start: 2018-06-02 | End: 2018-06-02

## 2018-06-02 RX ORDER — POTASSIUM CHLORIDE 20 MEQ
40 PACKET (EA) ORAL ONCE
Qty: 0 | Refills: 0 | Status: COMPLETED | OUTPATIENT
Start: 2018-06-02 | End: 2018-06-02

## 2018-06-02 RX ORDER — PANTOPRAZOLE SODIUM 20 MG/1
40 TABLET, DELAYED RELEASE ORAL
Qty: 0 | Refills: 0 | Status: DISCONTINUED | OUTPATIENT
Start: 2018-06-02 | End: 2018-06-06

## 2018-06-02 RX ORDER — MAGNESIUM SULFATE 500 MG/ML
2 VIAL (ML) INJECTION ONCE
Qty: 0 | Refills: 0 | Status: COMPLETED | OUTPATIENT
Start: 2018-06-02 | End: 2018-06-02

## 2018-06-02 RX ADMIN — Medication 1 GRAM(S): at 23:17

## 2018-06-02 RX ADMIN — Medication 40 MILLIEQUIVALENT(S): at 07:04

## 2018-06-02 RX ADMIN — PANTOPRAZOLE SODIUM 10 MG/HR: 20 TABLET, DELAYED RELEASE ORAL at 17:18

## 2018-06-02 RX ADMIN — Medication 1 GRAM(S): at 00:24

## 2018-06-02 RX ADMIN — Medication 1 GRAM(S): at 06:10

## 2018-06-02 RX ADMIN — Medication 1: at 12:06

## 2018-06-02 RX ADMIN — Medication 25 MILLIGRAM(S): at 17:18

## 2018-06-02 RX ADMIN — Medication 25 MILLIGRAM(S): at 06:10

## 2018-06-02 RX ADMIN — Medication 1 GRAM(S): at 12:06

## 2018-06-02 RX ADMIN — PANTOPRAZOLE SODIUM 10 MG/HR: 20 TABLET, DELAYED RELEASE ORAL at 06:15

## 2018-06-02 RX ADMIN — ATORVASTATIN CALCIUM 20 MILLIGRAM(S): 80 TABLET, FILM COATED ORAL at 21:32

## 2018-06-02 RX ADMIN — Medication 2: at 16:34

## 2018-06-02 RX ADMIN — IOHEXOL 30 MILLILITER(S): 300 INJECTION, SOLUTION INTRAVENOUS at 11:38

## 2018-06-02 RX ADMIN — Medication 1 GRAM(S): at 17:18

## 2018-06-02 RX ADMIN — Medication 50 GRAM(S): at 07:04

## 2018-06-03 LAB
ALBUMIN SERPL ELPH-MCNC: 2.6 G/DL — LOW (ref 3.3–5)
ALP SERPL-CCNC: 37 U/L — SIGNIFICANT CHANGE UP (ref 30–120)
ALT FLD-CCNC: 14 U/L DA — SIGNIFICANT CHANGE UP (ref 10–60)
ANION GAP SERPL CALC-SCNC: 7 MMOL/L — SIGNIFICANT CHANGE UP (ref 5–17)
AST SERPL-CCNC: 11 U/L — SIGNIFICANT CHANGE UP (ref 10–40)
BILIRUB SERPL-MCNC: 1 MG/DL — SIGNIFICANT CHANGE UP (ref 0.2–1.2)
BUN SERPL-MCNC: 22 MG/DL — SIGNIFICANT CHANGE UP (ref 7–23)
CALCIUM SERPL-MCNC: 8.5 MG/DL — SIGNIFICANT CHANGE UP (ref 8.4–10.5)
CHLORIDE SERPL-SCNC: 103 MMOL/L — SIGNIFICANT CHANGE UP (ref 96–108)
CO2 SERPL-SCNC: 26 MMOL/L — SIGNIFICANT CHANGE UP (ref 22–31)
CREAT SERPL-MCNC: 0.91 MG/DL — SIGNIFICANT CHANGE UP (ref 0.5–1.3)
GLUCOSE SERPL-MCNC: 174 MG/DL — HIGH (ref 70–99)
HCT VFR BLD CALC: 27.4 % — LOW (ref 39–50)
HGB BLD-MCNC: 9.4 G/DL — LOW (ref 13–17)
MCHC RBC-ENTMCNC: 31.1 PG — SIGNIFICANT CHANGE UP (ref 27–34)
MCHC RBC-ENTMCNC: 34.2 GM/DL — SIGNIFICANT CHANGE UP (ref 32–36)
MCV RBC AUTO: 90.9 FL — SIGNIFICANT CHANGE UP (ref 80–100)
PLATELET # BLD AUTO: 283 K/UL — SIGNIFICANT CHANGE UP (ref 150–400)
POTASSIUM SERPL-MCNC: 4 MMOL/L — SIGNIFICANT CHANGE UP (ref 3.5–5.3)
POTASSIUM SERPL-SCNC: 4 MMOL/L — SIGNIFICANT CHANGE UP (ref 3.5–5.3)
PROT SERPL-MCNC: 6.4 G/DL — SIGNIFICANT CHANGE UP (ref 6–8.3)
RBC # BLD: 3.02 M/UL — LOW (ref 4.2–5.8)
RBC # FLD: 16.1 % — HIGH (ref 10.3–14.5)
SODIUM SERPL-SCNC: 136 MMOL/L — SIGNIFICANT CHANGE UP (ref 135–145)
WBC # BLD: 7.5 K/UL — SIGNIFICANT CHANGE UP (ref 3.8–10.5)
WBC # FLD AUTO: 7.5 K/UL — SIGNIFICANT CHANGE UP (ref 3.8–10.5)

## 2018-06-03 PROCEDURE — 99233 SBSQ HOSP IP/OBS HIGH 50: CPT

## 2018-06-03 RX ADMIN — Medication 1 GRAM(S): at 23:14

## 2018-06-03 RX ADMIN — Medication 1 GRAM(S): at 05:09

## 2018-06-03 RX ADMIN — Medication 2: at 16:41

## 2018-06-03 RX ADMIN — Medication 1: at 12:16

## 2018-06-03 RX ADMIN — Medication 1 GRAM(S): at 17:52

## 2018-06-03 RX ADMIN — Medication 25 MILLIGRAM(S): at 05:10

## 2018-06-03 RX ADMIN — PANTOPRAZOLE SODIUM 40 MILLIGRAM(S): 20 TABLET, DELAYED RELEASE ORAL at 05:10

## 2018-06-03 RX ADMIN — Medication 1: at 08:04

## 2018-06-03 RX ADMIN — ATORVASTATIN CALCIUM 20 MILLIGRAM(S): 80 TABLET, FILM COATED ORAL at 22:26

## 2018-06-03 RX ADMIN — PANTOPRAZOLE SODIUM 40 MILLIGRAM(S): 20 TABLET, DELAYED RELEASE ORAL at 17:52

## 2018-06-03 RX ADMIN — Medication 1 GRAM(S): at 12:16

## 2018-06-03 NOTE — PROGRESS NOTE ADULT - PROBLEM SELECTOR PLAN 2
Observe
Related to UGIB  Stable now s/p 6u PRBC and 1u FFP  Will continue to trend H&H and transfuse for HgB < 7 or for symptomatic anemia.
Related to UGIB.   Stable now s/p 5u PRBC and 1u FFP  Patient noted to have drop in Hgb but Hct is stable, hemodynamically stable, no tachycardia, no active bleeding. Patient was volume resuscitated today, likely dilutional changes. Will hold off on transfusion for now as patient is asymptomatic, and continue to observe.  Will continue to trend H&H and transfuse for HgB < 7 or for symptomatic anemia.
Related to UGIB.   Stable now s/p 6u PRBC and 1u FFP  Will continue to trend H&H and transfuse for HgB < 7 or for symptomatic anemia.
Related to UGIB. Stable now s/p 5u PRBC. Will continue to trend H&H and transfuse for HgB < 7 or for symptomatic anemia.

## 2018-06-03 NOTE — PROGRESS NOTE ADULT - PROBLEM SELECTOR PLAN 5
Accuchecks
BP stable currently. Medication management as above.  DASH diet
BP stable currently. Medication management as above.
BP stable currently. Medication management as above.  DASH diet

## 2018-06-03 NOTE — PROGRESS NOTE ADULT - PROBLEM SELECTOR PLAN 7
Monitor FS AC and HS  Started on Lantus for hyperglycemia  Cover with ISS, has been hyperglycemic will add HS coverage  Consistent carb clear liquid diet
Monitor FS AC and HS  Cover with ISS  Consistent carb clear liquid diet
Monitor FS AC and HS  Started on Lantus for hyperglycemia  Cover with ISS  Consistent carb clear liquid diet

## 2018-06-03 NOTE — PROGRESS NOTE ADULT - PROBLEM SELECTOR PLAN 4
Holding ASA and Plavix. Cardiology note appreciated- will likely restart only one antiplatelet agent when appropriate given remote history of PCI once cleared by GI.   Continue BB and statin.   Will continue to hold ACE-I as BP's have been on lower side, will restart as hemodynamically tolerated.
Inhaler prn
BP stable currently. Medication management as above.
Holding ASA and Plavix. Cardiology note appreciated- will likely restart only one antiplatelet agent when appropriate given remote history of PCI once cleared by GI.   Continue BB and statin.   Will continue to hold ACE-I as BP's have been on lower side, will restart as hemodynamically tolerated.
Holding ASA and Plavix. Cardiology note appreciated- will likely restart only one antiplatelet agent when appropriate given remote history of PCI once cleared by GI.   Continue BB and statin.   Will continue to hold ACE-I as BP's have been on lower side, will restart as hemodynamically tolerated.

## 2018-06-03 NOTE — PROGRESS NOTE ADULT - PROBLEM SELECTOR PROBLEM 2
Acute blood loss anemia
Duodenal ulcer
Acute blood loss anemia

## 2018-06-03 NOTE — PROGRESS NOTE ADULT - PROBLEM SELECTOR PROBLEM 4
CAD (coronary artery disease)
Chronic obstructive pulmonary disease, unspecified COPD type
CAD (coronary artery disease)
CAD (coronary artery disease)
HTN (hypertension)

## 2018-06-03 NOTE — PROGRESS NOTE ADULT - PROBLEM SELECTOR PROBLEM 3
Peptic ulcer disease with hemorrhage
SANTO on CPAP
CAD (coronary artery disease)
Peptic ulcer disease with hemorrhage
Peptic ulcer disease with hemorrhage

## 2018-06-03 NOTE — CHART NOTE - NSCHARTNOTEFT_GEN_A_CORE
Assessment: 76 yo male seen for F/U; current diet (as of 6/2/18) ConsCHO, regular consistency with HS snack, which is well tolerated; feeds self without difficulty and makes his needs known; NKFA; S/P EGD which revealed a healing gastric ulcer; pt was admitted with upper GI bleed and required 5x PRBC due to low H/H; POC glucose range 132-264 recently, which is monitored; per previous RD note, pt with unintended wt loss PTA; current wt 320.5#, which indicates some variability of wts since admit; Admit wt of ~286.6# likely inaccurate per visual observation; current wt status also being affected by noted +1-2 edema of Deric UE; wt loss would benefit pt and overall health status; however, pt does not appear to be receptive to wt loss and instead focusing on previously noted unintended wt loss; will re-attempt education    Factors impacting intake: [ X] none [ ] nausea  [ ] vomiting [ ] diarrhea [ ] constipation  [ ]chewing problems [ ] swallowing issues  [ ] other:     Diet Prescription: Diet, Consistent Carbohydrate w/Evening Snack (06-02-18 @ 14:46)    Intake: good (%)    Current Weight: Weight: 320.5#    Pertinent Medications: MEDICATIONS  (STANDING):  atorvastatin 20 milliGRAM(s) Oral at bedtime  dextrose 5%. 1000 milliLiter(s) (50 mL/Hr) IV Continuous <Continuous>  dextrose 50% Injectable 12.5 Gram(s) IV Push once  insulin glargine Injectable (LANTUS) 5 Unit(s) SubCutaneous at bedtime  insulin lispro (HumaLOG) corrective regimen sliding scale   SubCutaneous three times a day before meals  insulin lispro (HumaLOG) corrective regimen sliding scale   SubCutaneous at bedtime  metoprolol tartrate 25 milliGRAM(s) Oral two times a day  pantoprazole  Injectable 40 milliGRAM(s) IV Push two times a day  sucralfate suspension 1 Gram(s) Oral every 6 hours    MEDICATIONS  (PRN):  ALBUTerol    90 MICROgram(s) HFA Inhaler 2 Puff(s) Inhalation every 6 hours PRN Shortness of Breath and/or Wheezing  dextrose 40% Gel 15 Gram(s) Oral once PRN Blood Glucose LESS THAN 70 milliGRAM(s)/deciliter  glucagon  Injectable 1 milliGRAM(s) IntraMuscular once PRN Glucose LESS THAN 70 milligrams/deciliter  traMADol 25 milliGRAM(s) Oral four times a day PRN Moderate Pain (4 - 6)    Pertinent Labs: 06-03 Na136 mmol/L Glu 174 mg/dL<H> K+ 4.0 mmol/L Cr  0.91 mg/dL BUN 22 mg/dL 06-02 Phos 3.9 mg/dL 06-03 Alb 2.6 g/dL<L> 06-01 [id number] 5.6 %    CAPILLARY BLOOD GLUCOSE  POCT Blood Glucose.: 172 mg/dL (03 Jun 2018 11:52)  POCT Blood Glucose.: 155 mg/dL (03 Jun 2018 07:36)  POCT Blood Glucose.: 170 mg/dL (02 Jun 2018 21:30)  POCT Blood Glucose.: 214 mg/dL (02 Jun 2018 16:31)    Skin: intact    Estimated Needs:   [ ] no change since previous assessment  [X ] recalculated: Adj BW ~207# (est needs = 2350 - 2800 kcal, 75-94 gm protein, 1880 ml fluids)    Previous Nutrition Diagnosis:   [ ] Inadequate Energy Intake [ ]Inadequate Oral Intake [ ] Excessive Energy Intake   [ ] Underweight [ ] Increased Nutrient Needs [ ] Overweight/Obesity   [ ] Altered GI Function [X ] Unintended Weight Loss [ ] Food & Nutrition Related Knowledge Deficit [ ] Malnutrition     Nutrition Diagnosis is [  ] ongoing  [X ] resolved [ ] not applicable     New Nutrition Diagnosis: [ X ] obesity,                          related to excess calorie intake and decreased physical activity                         as evidenced by BMI = 42, abnormal lab values (POC glucose range 132-264), impaired endocrine functioning (type 2 DM), edema      Interventions:   Recommend  [ ] Change Diet To:  [ ] Nutrition Supplement  [ ] Nutrition Support  [ ] Other:     Monitoring and Evaluation:   [X ] PO intake [ x ] Tolerance to diet prescription [ x ] weights [ x ] labs[ x ] follow up per protocol  [ ] other: Assessment: 78 yo male seen for F/U; current diet (as of 6/2/18) ConsCHO, regular consistency with HS snack, which is well tolerated; feeds self without difficulty and makes his needs known; NKFA; S/P EGD which revealed a healing gastric ulcer; pt was admitted with upper GI bleed and required 5x PRBC due to low H/H; POC glucose range 132-264 recently, which is monitored; per previous RD note, pt with unintended wt loss PTA; current wt 320.5#, which indicates some variability of wts since admit; Admit wt of ~286.6# likely inaccurate per visual observation; current wt status also being affected by noted +1-2 edema of Deric UE and edema of LE; wt loss would benefit pt and overall health status; however, pt does not appear to be receptive to wt loss and instead focusing on previously noted unintended wt loss; will re-attempt education    Factors impacting intake: [ X] none [ ] nausea  [ ] vomiting [ ] diarrhea [ ] constipation  [ ]chewing problems [ ] swallowing issues  [ ] other:     Diet Prescription: Diet, Consistent Carbohydrate w/Evening Snack (06-02-18 @ 14:46)    Intake: good (%)    Current Weight: Weight: 320.5#    Pertinent Medications: MEDICATIONS  (STANDING):  atorvastatin 20 milliGRAM(s) Oral at bedtime  dextrose 5%. 1000 milliLiter(s) (50 mL/Hr) IV Continuous <Continuous>  dextrose 50% Injectable 12.5 Gram(s) IV Push once  insulin glargine Injectable (LANTUS) 5 Unit(s) SubCutaneous at bedtime  insulin lispro (HumaLOG) corrective regimen sliding scale   SubCutaneous three times a day before meals  insulin lispro (HumaLOG) corrective regimen sliding scale   SubCutaneous at bedtime  metoprolol tartrate 25 milliGRAM(s) Oral two times a day  pantoprazole  Injectable 40 milliGRAM(s) IV Push two times a day  sucralfate suspension 1 Gram(s) Oral every 6 hours    MEDICATIONS  (PRN):  ALBUTerol    90 MICROgram(s) HFA Inhaler 2 Puff(s) Inhalation every 6 hours PRN Shortness of Breath and/or Wheezing  dextrose 40% Gel 15 Gram(s) Oral once PRN Blood Glucose LESS THAN 70 milliGRAM(s)/deciliter  glucagon  Injectable 1 milliGRAM(s) IntraMuscular once PRN Glucose LESS THAN 70 milligrams/deciliter  traMADol 25 milliGRAM(s) Oral four times a day PRN Moderate Pain (4 - 6)    Pertinent Labs: 06-03 Na136 mmol/L Glu 174 mg/dL<H> K+ 4.0 mmol/L Cr  0.91 mg/dL BUN 22 mg/dL 06-02 Phos 3.9 mg/dL 06-03 Alb 2.6 g/dL<L> 06-01 [id number] 5.6 %    CAPILLARY BLOOD GLUCOSE  POCT Blood Glucose.: 172 mg/dL (03 Jun 2018 11:52)  POCT Blood Glucose.: 155 mg/dL (03 Jun 2018 07:36)  POCT Blood Glucose.: 170 mg/dL (02 Jun 2018 21:30)  POCT Blood Glucose.: 214 mg/dL (02 Jun 2018 16:31)    Skin: intact    Estimated Needs:   [ ] no change since previous assessment  [X ] recalculated: Adj BW ~207# (est needs = 2350 - 2800 kcal, 75-94 gm protein, 1880 ml fluids)    Previous Nutrition Diagnosis:   [ ] Inadequate Energy Intake [ ]Inadequate Oral Intake [ ] Excessive Energy Intake   [ ] Underweight [ ] Increased Nutrient Needs [ ] Overweight/Obesity   [ ] Altered GI Function [X ] Unintended Weight Loss [ ] Food & Nutrition Related Knowledge Deficit [ ] Malnutrition     Nutrition Diagnosis is [  ] ongoing  [X ] resolved [ ] not applicable     New Nutrition Diagnosis: [ X ] obesity,                          related to excess calorie intake and decreased physical activity                         as evidenced by BMI = 42, abnormal lab values (POC glucose range 132-264), impaired endocrine functioning (type 2 DM), edema      Interventions:   Recommend  [ ] Change Diet To:  [ ] Nutrition Supplement  [ ] Nutrition Support  [ ] Other:     Monitoring and Evaluation:   [X ] PO intake [ x ] Tolerance to diet prescription [ x ] weights [ x ] labs[ x ] follow up per protocol  [ ] other:

## 2018-06-04 LAB
ALBUMIN SERPL ELPH-MCNC: 2.2 G/DL — LOW (ref 3.3–5)
ALP SERPL-CCNC: 32 U/L — SIGNIFICANT CHANGE UP (ref 30–120)
ALT FLD-CCNC: 15 U/L DA — SIGNIFICANT CHANGE UP (ref 10–60)
ANION GAP SERPL CALC-SCNC: 7 MMOL/L — SIGNIFICANT CHANGE UP (ref 5–17)
AST SERPL-CCNC: 11 U/L — SIGNIFICANT CHANGE UP (ref 10–40)
BILIRUB SERPL-MCNC: 0.8 MG/DL — SIGNIFICANT CHANGE UP (ref 0.2–1.2)
BUN SERPL-MCNC: 22 MG/DL — SIGNIFICANT CHANGE UP (ref 7–23)
CALCIUM SERPL-MCNC: 8.3 MG/DL — LOW (ref 8.4–10.5)
CHLORIDE SERPL-SCNC: 106 MMOL/L — SIGNIFICANT CHANGE UP (ref 96–108)
CO2 SERPL-SCNC: 26 MMOL/L — SIGNIFICANT CHANGE UP (ref 22–31)
CREAT SERPL-MCNC: 0.75 MG/DL — SIGNIFICANT CHANGE UP (ref 0.5–1.3)
GLUCOSE SERPL-MCNC: 144 MG/DL — HIGH (ref 70–99)
HCT VFR BLD CALC: 24.7 % — LOW (ref 39–50)
HCT VFR BLD CALC: 29.4 % — LOW (ref 39–50)
HGB BLD-MCNC: 8.3 G/DL — LOW (ref 13–17)
HGB BLD-MCNC: 9.7 G/DL — LOW (ref 13–17)
MCHC RBC-ENTMCNC: 29.8 PG — SIGNIFICANT CHANGE UP (ref 27–34)
MCHC RBC-ENTMCNC: 30.4 PG — SIGNIFICANT CHANGE UP (ref 27–34)
MCHC RBC-ENTMCNC: 32.9 GM/DL — SIGNIFICANT CHANGE UP (ref 32–36)
MCHC RBC-ENTMCNC: 33.5 GM/DL — SIGNIFICANT CHANGE UP (ref 32–36)
MCV RBC AUTO: 90.5 FL — SIGNIFICANT CHANGE UP (ref 80–100)
MCV RBC AUTO: 90.8 FL — SIGNIFICANT CHANGE UP (ref 80–100)
PLATELET # BLD AUTO: 255 K/UL — SIGNIFICANT CHANGE UP (ref 150–400)
PLATELET # BLD AUTO: 305 K/UL — SIGNIFICANT CHANGE UP (ref 150–400)
POTASSIUM SERPL-MCNC: 3.7 MMOL/L — SIGNIFICANT CHANGE UP (ref 3.5–5.3)
POTASSIUM SERPL-SCNC: 3.7 MMOL/L — SIGNIFICANT CHANGE UP (ref 3.5–5.3)
PROT SERPL-MCNC: 5.6 G/DL — LOW (ref 6–8.3)
RBC # BLD: 2.72 M/UL — LOW (ref 4.2–5.8)
RBC # BLD: 3.25 M/UL — LOW (ref 4.2–5.8)
RBC # FLD: 15.7 % — HIGH (ref 10.3–14.5)
RBC # FLD: 16 % — HIGH (ref 10.3–14.5)
SODIUM SERPL-SCNC: 139 MMOL/L — SIGNIFICANT CHANGE UP (ref 135–145)
WBC # BLD: 5.4 K/UL — SIGNIFICANT CHANGE UP (ref 3.8–10.5)
WBC # BLD: 6.5 K/UL — SIGNIFICANT CHANGE UP (ref 3.8–10.5)
WBC # FLD AUTO: 5.4 K/UL — SIGNIFICANT CHANGE UP (ref 3.8–10.5)
WBC # FLD AUTO: 6.5 K/UL — SIGNIFICANT CHANGE UP (ref 3.8–10.5)

## 2018-06-04 PROCEDURE — 99233 SBSQ HOSP IP/OBS HIGH 50: CPT

## 2018-06-04 RX ORDER — FUROSEMIDE 40 MG
20 TABLET ORAL DAILY
Qty: 0 | Refills: 0 | Status: DISCONTINUED | OUTPATIENT
Start: 2018-06-04 | End: 2018-06-06

## 2018-06-04 RX ORDER — INSULIN LISPRO 100/ML
VIAL (ML) SUBCUTANEOUS
Qty: 0 | Refills: 0 | Status: DISCONTINUED | OUTPATIENT
Start: 2018-06-04 | End: 2018-06-06

## 2018-06-04 RX ADMIN — PANTOPRAZOLE SODIUM 40 MILLIGRAM(S): 20 TABLET, DELAYED RELEASE ORAL at 05:48

## 2018-06-04 RX ADMIN — Medication 25 MILLIGRAM(S): at 17:07

## 2018-06-04 RX ADMIN — Medication 8: at 11:40

## 2018-06-04 RX ADMIN — PANTOPRAZOLE SODIUM 40 MILLIGRAM(S): 20 TABLET, DELAYED RELEASE ORAL at 17:07

## 2018-06-04 RX ADMIN — Medication 20 MILLIGRAM(S): at 10:40

## 2018-06-04 RX ADMIN — Medication 1 GRAM(S): at 11:40

## 2018-06-04 RX ADMIN — Medication 6: at 22:26

## 2018-06-04 RX ADMIN — Medication 1 GRAM(S): at 05:48

## 2018-06-04 RX ADMIN — Medication 1 GRAM(S): at 17:07

## 2018-06-04 RX ADMIN — Medication 2: at 16:42

## 2018-06-04 RX ADMIN — Medication 1 GRAM(S): at 23:45

## 2018-06-04 RX ADMIN — Medication 25 MILLIGRAM(S): at 05:48

## 2018-06-04 RX ADMIN — ATORVASTATIN CALCIUM 20 MILLIGRAM(S): 80 TABLET, FILM COATED ORAL at 21:15

## 2018-06-05 ENCOUNTER — TRANSCRIPTION ENCOUNTER (OUTPATIENT)
Age: 78
End: 2018-06-05

## 2018-06-05 LAB
ALBUMIN SERPL ELPH-MCNC: 2.5 G/DL — LOW (ref 3.3–5)
ALP SERPL-CCNC: 26 U/L — LOW (ref 30–120)
ALT FLD-CCNC: 17 U/L DA — SIGNIFICANT CHANGE UP (ref 10–60)
ANION GAP SERPL CALC-SCNC: 9 MMOL/L — SIGNIFICANT CHANGE UP (ref 5–17)
AST SERPL-CCNC: 11 U/L — SIGNIFICANT CHANGE UP (ref 10–40)
BILIRUB SERPL-MCNC: 0.8 MG/DL — SIGNIFICANT CHANGE UP (ref 0.2–1.2)
BUN SERPL-MCNC: 18 MG/DL — SIGNIFICANT CHANGE UP (ref 7–23)
CALCIUM SERPL-MCNC: 8.7 MG/DL — SIGNIFICANT CHANGE UP (ref 8.4–10.5)
CHLORIDE SERPL-SCNC: 105 MMOL/L — SIGNIFICANT CHANGE UP (ref 96–108)
CO2 SERPL-SCNC: 27 MMOL/L — SIGNIFICANT CHANGE UP (ref 22–31)
CREAT SERPL-MCNC: 0.73 MG/DL — SIGNIFICANT CHANGE UP (ref 0.5–1.3)
GLUCOSE SERPL-MCNC: 129 MG/DL — HIGH (ref 70–99)
HCT VFR BLD CALC: 26.3 % — LOW (ref 39–50)
HGB BLD-MCNC: 8.6 G/DL — LOW (ref 13–17)
MAGNESIUM SERPL-MCNC: 1.7 MG/DL — SIGNIFICANT CHANGE UP (ref 1.6–2.6)
MCHC RBC-ENTMCNC: 29.7 PG — SIGNIFICANT CHANGE UP (ref 27–34)
MCHC RBC-ENTMCNC: 32.8 GM/DL — SIGNIFICANT CHANGE UP (ref 32–36)
MCV RBC AUTO: 90.5 FL — SIGNIFICANT CHANGE UP (ref 80–100)
PHOSPHATE SERPL-MCNC: 3.7 MG/DL — SIGNIFICANT CHANGE UP (ref 2.5–4.5)
PLATELET # BLD AUTO: 298 K/UL — SIGNIFICANT CHANGE UP (ref 150–400)
POTASSIUM SERPL-MCNC: 3.9 MMOL/L — SIGNIFICANT CHANGE UP (ref 3.5–5.3)
POTASSIUM SERPL-SCNC: 3.9 MMOL/L — SIGNIFICANT CHANGE UP (ref 3.5–5.3)
PROT SERPL-MCNC: 5.7 G/DL — LOW (ref 6–8.3)
RBC # BLD: 2.91 M/UL — LOW (ref 4.2–5.8)
RBC # FLD: 16 % — HIGH (ref 10.3–14.5)
SODIUM SERPL-SCNC: 141 MMOL/L — SIGNIFICANT CHANGE UP (ref 135–145)
WBC # BLD: 6.5 K/UL — SIGNIFICANT CHANGE UP (ref 3.8–10.5)
WBC # FLD AUTO: 6.5 K/UL — SIGNIFICANT CHANGE UP (ref 3.8–10.5)

## 2018-06-05 PROCEDURE — 99232 SBSQ HOSP IP/OBS MODERATE 35: CPT

## 2018-06-05 RX ORDER — HYDROCORTISONE 1 %
1 OINTMENT (GRAM) TOPICAL
Qty: 0 | Refills: 0 | Status: DISCONTINUED | OUTPATIENT
Start: 2018-06-05 | End: 2018-06-06

## 2018-06-05 RX ORDER — NYSTATIN CREAM 100000 [USP'U]/G
1 CREAM TOPICAL
Qty: 0 | Refills: 0 | Status: DISCONTINUED | OUTPATIENT
Start: 2018-06-05 | End: 2018-06-06

## 2018-06-05 RX ADMIN — Medication 1 GRAM(S): at 17:29

## 2018-06-05 RX ADMIN — ATORVASTATIN CALCIUM 20 MILLIGRAM(S): 80 TABLET, FILM COATED ORAL at 21:38

## 2018-06-05 RX ADMIN — Medication 1 GRAM(S): at 11:59

## 2018-06-05 RX ADMIN — Medication 1 GRAM(S): at 23:21

## 2018-06-05 RX ADMIN — Medication 1 APPLICATION(S): at 17:30

## 2018-06-05 RX ADMIN — PANTOPRAZOLE SODIUM 40 MILLIGRAM(S): 20 TABLET, DELAYED RELEASE ORAL at 06:30

## 2018-06-05 RX ADMIN — PANTOPRAZOLE SODIUM 40 MILLIGRAM(S): 20 TABLET, DELAYED RELEASE ORAL at 17:30

## 2018-06-05 RX ADMIN — Medication 1 GRAM(S): at 06:30

## 2018-06-05 RX ADMIN — Medication 4: at 16:49

## 2018-06-05 RX ADMIN — Medication 2: at 21:38

## 2018-06-05 RX ADMIN — Medication 20 MILLIGRAM(S): at 06:30

## 2018-06-05 RX ADMIN — Medication 4: at 12:44

## 2018-06-05 RX ADMIN — Medication 25 MILLIGRAM(S): at 06:30

## 2018-06-05 RX ADMIN — Medication 25 MILLIGRAM(S): at 17:30

## 2018-06-05 RX ADMIN — NYSTATIN CREAM 1 APPLICATION(S): 100000 CREAM TOPICAL at 17:30

## 2018-06-05 NOTE — DISCHARGE NOTE ADULT - PATIENT PORTAL LINK FT
You can access the [facility] Patient Portal, offered by [facility], by registering with the following website: [email or web address]

## 2018-06-05 NOTE — DISCHARGE NOTE ADULT - SECONDARY DIAGNOSIS.
CAD (coronary artery disease) Benign prostatic hyperplasia with lower urinary tract symptoms, symptom details unspecified DM (diabetes mellitus) HTN (hypertension) S/P angioplasty with stent

## 2018-06-05 NOTE — DISCHARGE NOTE ADULT - MEDICATION SUMMARY - MEDICATIONS TO TAKE
I will START or STAY ON the medications listed below when I get home from the hospital:    traMADol 50 mg oral tablet  -- 0.5 tab(s) by mouth 4 times a day, As needed, Moderate Pain (4 - 6)  -- Indication: For Pain    ramipril 10 mg oral capsule  -- 1 cap(s) by mouth 2 times a day  -- Indication: For HTN (hypertension)    metFORMIN 1000 mg oral tablet  -- 1 tab(s) by mouth 2 times a day  -- Indication: For DM (diabetes mellitus)    NovoLOG Mix 70/30 subcutaneous suspension  -- 10 unit(s) subcutaneous 2 times a day  -- Indication: For DM (diabetes mellitus)    atorvastatin 20 mg oral tablet  -- 1 tab(s) by mouth once a day (at bedtime)  -- Indication: For Hld    Plavix 75 mg oral tablet  -- 1 tab(s) by mouth once a day  -- Indication: For Do not take    metoprolol tartrate 25 mg oral tablet  -- 1 tab(s) by mouth 2 times a day  -- Indication: For HTN (hypertension)    hydrocortisone 0.5% topical cream  -- 1 application on skin 2 times a day  -- Indication: For rash    nystatin 100,000 units/g topical cream  -- 1 application on skin 2 times a day  -- Indication: For rash    furosemide 20 mg oral tablet  -- 1 tab(s) by mouth once a day  -- Indication: For venous insuffiency    sucralfate 1 g/10 mL oral suspension  -- 10 milliliter(s) by mouth every 6 hours  -- Indication: For Gastrointestinal hemorrhage    pantoprazole 40 mg intravenous injection  -- 40 milligram(s) intravenous 2 times a day  -- Indication: For Gastrointestinal hemorrhage

## 2018-06-05 NOTE — DISCHARGE NOTE ADULT - PLAN OF CARE
secondary to UGIB secondary to gastritis/duodenal ulcers likely from polypharmacology avoid asa/plavix/nsaids/ until gi clears  repeat CBC on 6/7/18 stable resume home meds resume home med hold asa/plavix for now as pt has massive gi bleed required about 5 units prbc

## 2018-06-05 NOTE — DISCHARGE NOTE ADULT - MEDICATION SUMMARY - MEDICATIONS TO STOP TAKING
I will STOP taking the medications listed below when I get home from the hospital:    aspirin 81 mg oral tablet, chewable  -- 1 tab(s) by mouth once a day    predniSONE 10 mg oral tablet  -- 1 tab(s) by mouth once a day    Plavix 75 mg oral tablet  -- 1 tab(s) by mouth once a day

## 2018-06-05 NOTE — DISCHARGE NOTE ADULT - CARE PROVIDER_API CALL
Olvin Garcia (DO), Gastroenterology; Internal Medicine  [address] [place] [address]  Phone: [phone]  Fax: [phone]

## 2018-06-05 NOTE — DISCHARGE NOTE ADULT - HOSPITAL COURSE
Patient is 76 yo male with hx of CAD S/P stent 14yrs ago, HTN, and HLD presenting with     1. Anemia.  Seems to be secondary to UGIB.  + melena.  + stool occult.  S/P 5 units of RBC transfusion.  H/H droped by 1 gram from 9.5 to 8.5, no overt signs of bleeding, will repeat HH around noon .  EGD shows Gastric ulcer, likely gastric  ulcer bleed secondary to being on nsaids/predinsone/asa/plavix/, no further overt signs of bleeding    Continue with Protonix  tolerating advancement diet  OOB  Physical therapy eval   HH stable  d/c planning     2. HX of CAD.  Continue with BB, and ACEI.  EKG shows no ischemic changes.  TTE shows LVEF estimated at 62% with mild diastolic dysfunction.  No CP.  Last stent was placed in 14 yrs ago.  Hold ASA, plavix for now pending further recommendation by GI.  Cardiology to follow up     3. HTN.  Continue with metoprolol, and lisinopril with holding parameter.  Monitor BP Closely    4.  SANTO. Continue with CPAP.  Pulmonary to follow up     5.  DM2.  Hold oral medications.  Continue with lantus, and ISS, and Monitor POC.  HBA1C in am    6.  HLD.  Continue with statin    7.  Carpel tunnel syndrome.  Ortho consult    Plan of care was discussed with patient in great details, All questions were answered to their satisfaction  Seems to understand, and in agreement 78 yo male with hx of CAD S/P stent 14yrs ago, HTN, and HLD presenting with     1. Anemia.  Seems to be secondary to UGIB.  + melena.  + stool occult.  S/P 5 units of RBC transfusion.    EGD shows Gastric ulcer, likely gastric  ulcer bleed secondary to being on nsaids/predinsone/asa/plavix/, no further overt signs of bleeding    Continue with Protonix  tolerating advancement diet  OOB  Physical therapy eval   HH stable  d/c planning   hold asa/plavix/predisone until gi deems safe    HX of CAD.  Continue with BB, and ACEI.  EKG shows no ischemic changes.  TTE shows LVEF estimated at 62% with mild diastolic dysfunction.  No CP.  Last stent was placed in 14 yrs ago.  Hold ASA, plavix for now pending further recommendation by GI.  Cardiology to follow up     3. HTN.  Continue with metoprolol, and lisinopril with holding parameter.  Monitor BP Closely    4.  SANTO. Continue with CPAP.  Pulmonary to follow up     5.  DM2.  Hold oral medications.  Continue with lantus, and ISS, and Monitor POC.    6.  HLD.  Continue with statin    7.  Carpel tunnel syndrome.  Ortho consult    Plan of care was discussed with patient in great details, All questions were answered to their satisfaction  Seems to understand, and in agreement      PE   NAD  chest s1, s2  lungs decrease air entr jadiel the bases  abd:BS+   pt can proceed with d/c planning

## 2018-06-06 VITALS
OXYGEN SATURATION: 95 % | TEMPERATURE: 98 F | HEART RATE: 93 BPM | DIASTOLIC BLOOD PRESSURE: 70 MMHG | RESPIRATION RATE: 18 BRPM | SYSTOLIC BLOOD PRESSURE: 104 MMHG

## 2018-06-06 LAB
ALBUMIN SERPL ELPH-MCNC: 2.4 G/DL — LOW (ref 3.3–5)
ALP SERPL-CCNC: 31 U/L — SIGNIFICANT CHANGE UP (ref 30–120)
ALT FLD-CCNC: 15 U/L DA — SIGNIFICANT CHANGE UP (ref 10–60)
ANION GAP SERPL CALC-SCNC: 5 MMOL/L — SIGNIFICANT CHANGE UP (ref 5–17)
AST SERPL-CCNC: 11 U/L — SIGNIFICANT CHANGE UP (ref 10–40)
BILIRUB SERPL-MCNC: 0.6 MG/DL — SIGNIFICANT CHANGE UP (ref 0.2–1.2)
BUN SERPL-MCNC: 19 MG/DL — SIGNIFICANT CHANGE UP (ref 7–23)
CALCIUM SERPL-MCNC: 8.7 MG/DL — SIGNIFICANT CHANGE UP (ref 8.4–10.5)
CHLORIDE SERPL-SCNC: 106 MMOL/L — SIGNIFICANT CHANGE UP (ref 96–108)
CO2 SERPL-SCNC: 28 MMOL/L — SIGNIFICANT CHANGE UP (ref 22–31)
CREAT SERPL-MCNC: 0.84 MG/DL — SIGNIFICANT CHANGE UP (ref 0.5–1.3)
GLUCOSE SERPL-MCNC: 152 MG/DL — HIGH (ref 70–99)
HCT VFR BLD CALC: 25.6 % — LOW (ref 39–50)
HGB BLD-MCNC: 8.5 G/DL — LOW (ref 13–17)
MCHC RBC-ENTMCNC: 29.9 PG — SIGNIFICANT CHANGE UP (ref 27–34)
MCHC RBC-ENTMCNC: 33.4 GM/DL — SIGNIFICANT CHANGE UP (ref 32–36)
MCV RBC AUTO: 89.3 FL — SIGNIFICANT CHANGE UP (ref 80–100)
PLATELET # BLD AUTO: 303 K/UL — SIGNIFICANT CHANGE UP (ref 150–400)
POTASSIUM SERPL-MCNC: 3.7 MMOL/L — SIGNIFICANT CHANGE UP (ref 3.5–5.3)
POTASSIUM SERPL-SCNC: 3.7 MMOL/L — SIGNIFICANT CHANGE UP (ref 3.5–5.3)
PROT SERPL-MCNC: 6.3 G/DL — SIGNIFICANT CHANGE UP (ref 6–8.3)
RBC # BLD: 2.86 M/UL — LOW (ref 4.2–5.8)
RBC # FLD: 15.8 % — HIGH (ref 10.3–14.5)
SODIUM SERPL-SCNC: 139 MMOL/L — SIGNIFICANT CHANGE UP (ref 135–145)
WBC # BLD: 6.5 K/UL — SIGNIFICANT CHANGE UP (ref 3.8–10.5)
WBC # FLD AUTO: 6.5 K/UL — SIGNIFICANT CHANGE UP (ref 3.8–10.5)

## 2018-06-06 PROCEDURE — 83880 ASSAY OF NATRIURETIC PEPTIDE: CPT

## 2018-06-06 PROCEDURE — 97166 OT EVAL MOD COMPLEX 45 MIN: CPT

## 2018-06-06 PROCEDURE — 80053 COMPREHEN METABOLIC PANEL: CPT

## 2018-06-06 PROCEDURE — 97530 THERAPEUTIC ACTIVITIES: CPT

## 2018-06-06 PROCEDURE — 97162 PT EVAL MOD COMPLEX 30 MIN: CPT

## 2018-06-06 PROCEDURE — 97535 SELF CARE MNGMENT TRAINING: CPT

## 2018-06-06 PROCEDURE — 80048 BASIC METABOLIC PNL TOTAL CA: CPT

## 2018-06-06 PROCEDURE — 85610 PROTHROMBIN TIME: CPT

## 2018-06-06 PROCEDURE — 36430 TRANSFUSION BLD/BLD COMPNT: CPT

## 2018-06-06 PROCEDURE — 84100 ASSAY OF PHOSPHORUS: CPT

## 2018-06-06 PROCEDURE — 82272 OCCULT BLD FECES 1-3 TESTS: CPT

## 2018-06-06 PROCEDURE — P9016: CPT

## 2018-06-06 PROCEDURE — 86901 BLOOD TYPING SEROLOGIC RH(D): CPT

## 2018-06-06 PROCEDURE — 84484 ASSAY OF TROPONIN QUANT: CPT

## 2018-06-06 PROCEDURE — 99239 HOSP IP/OBS DSCHRG MGMT >30: CPT

## 2018-06-06 PROCEDURE — 85027 COMPLETE CBC AUTOMATED: CPT

## 2018-06-06 PROCEDURE — 97116 GAIT TRAINING THERAPY: CPT

## 2018-06-06 PROCEDURE — 86900 BLOOD TYPING SEROLOGIC ABO: CPT

## 2018-06-06 PROCEDURE — 93306 TTE W/DOPPLER COMPLETE: CPT

## 2018-06-06 PROCEDURE — 82553 CREATINE MB FRACTION: CPT

## 2018-06-06 PROCEDURE — 85730 THROMBOPLASTIN TIME PARTIAL: CPT

## 2018-06-06 PROCEDURE — 81001 URINALYSIS AUTO W/SCOPE: CPT

## 2018-06-06 PROCEDURE — 99285 EMERGENCY DEPT VISIT HI MDM: CPT

## 2018-06-06 PROCEDURE — 71045 X-RAY EXAM CHEST 1 VIEW: CPT

## 2018-06-06 PROCEDURE — 83036 HEMOGLOBIN GLYCOSYLATED A1C: CPT

## 2018-06-06 PROCEDURE — 93005 ELECTROCARDIOGRAM TRACING: CPT

## 2018-06-06 PROCEDURE — 74177 CT ABD & PELVIS W/CONTRAST: CPT

## 2018-06-06 PROCEDURE — 86850 RBC ANTIBODY SCREEN: CPT

## 2018-06-06 PROCEDURE — 85018 HEMOGLOBIN: CPT

## 2018-06-06 PROCEDURE — 97110 THERAPEUTIC EXERCISES: CPT

## 2018-06-06 PROCEDURE — 87086 URINE CULTURE/COLONY COUNT: CPT

## 2018-06-06 PROCEDURE — 36415 COLL VENOUS BLD VENIPUNCTURE: CPT

## 2018-06-06 PROCEDURE — 94660 CPAP INITIATION&MGMT: CPT

## 2018-06-06 PROCEDURE — 82550 ASSAY OF CK (CPK): CPT

## 2018-06-06 PROCEDURE — 85014 HEMATOCRIT: CPT

## 2018-06-06 PROCEDURE — P9059: CPT

## 2018-06-06 PROCEDURE — 83735 ASSAY OF MAGNESIUM: CPT

## 2018-06-06 PROCEDURE — 86923 COMPATIBILITY TEST ELECTRIC: CPT

## 2018-06-06 PROCEDURE — 82962 GLUCOSE BLOOD TEST: CPT

## 2018-06-06 RX ORDER — SUCRALFATE 1 G
10 TABLET ORAL
Qty: 0 | Refills: 0 | DISCHARGE
Start: 2018-06-06

## 2018-06-06 RX ORDER — PANTOPRAZOLE SODIUM 20 MG/1
40 TABLET, DELAYED RELEASE ORAL
Qty: 0 | Refills: 0 | DISCHARGE
Start: 2018-06-06

## 2018-06-06 RX ORDER — TRAMADOL HYDROCHLORIDE 50 MG/1
0.5 TABLET ORAL
Qty: 0 | Refills: 0 | DISCHARGE
Start: 2018-06-06

## 2018-06-06 RX ORDER — CLOPIDOGREL BISULFATE 75 MG/1
75 TABLET, FILM COATED ORAL DAILY
Qty: 0 | Refills: 0 | Status: DISCONTINUED | OUTPATIENT
Start: 2018-06-06 | End: 2018-06-06

## 2018-06-06 RX ORDER — NYSTATIN CREAM 100000 [USP'U]/G
1 CREAM TOPICAL
Qty: 0 | Refills: 0 | DISCHARGE
Start: 2018-06-06

## 2018-06-06 RX ORDER — HYDROCORTISONE 1 %
1 OINTMENT (GRAM) TOPICAL
Qty: 0 | Refills: 0 | DISCHARGE
Start: 2018-06-06

## 2018-06-06 RX ADMIN — Medication 1 APPLICATION(S): at 06:09

## 2018-06-06 RX ADMIN — Medication 1 GRAM(S): at 11:10

## 2018-06-06 RX ADMIN — Medication 25 MILLIGRAM(S): at 06:09

## 2018-06-06 RX ADMIN — Medication 20 MILLIGRAM(S): at 06:09

## 2018-06-06 RX ADMIN — PANTOPRAZOLE SODIUM 40 MILLIGRAM(S): 20 TABLET, DELAYED RELEASE ORAL at 06:08

## 2018-06-06 RX ADMIN — Medication 2: at 12:22

## 2018-06-06 RX ADMIN — CLOPIDOGREL BISULFATE 75 MILLIGRAM(S): 75 TABLET, FILM COATED ORAL at 11:10

## 2018-06-06 RX ADMIN — Medication 2: at 08:10

## 2018-06-06 RX ADMIN — NYSTATIN CREAM 1 APPLICATION(S): 100000 CREAM TOPICAL at 06:09

## 2018-06-06 RX ADMIN — Medication 1 GRAM(S): at 06:08

## 2018-06-06 NOTE — PROGRESS NOTE ADULT - SUBJECTIVE AND OBJECTIVE BOX
INTERVAL HPI/OVERNIGHT EVENTS:  No new overnight event.  No N/V/D.  Tolerating diet.      MEDICATIONS  (STANDING):  atorvastatin 20 milliGRAM(s) Oral at bedtime  dextrose 5%. 1000 milliLiter(s) (50 mL/Hr) IV Continuous <Continuous>  dextrose 50% Injectable 12.5 Gram(s) IV Push once  furosemide    Tablet 20 milliGRAM(s) Oral daily  hydrocortisone 0.5% Cream 1 Application(s) Topical two times a day  insulin glargine Injectable (LANTUS) 5 Unit(s) SubCutaneous at bedtime  insulin lispro (HumaLOG) corrective regimen sliding scale   SubCutaneous Before meals and at bedtime  metoprolol tartrate 25 milliGRAM(s) Oral two times a day  nystatin Cream 1 Application(s) Topical two times a day  pantoprazole  Injectable 40 milliGRAM(s) IV Push two times a day  sucralfate suspension 1 Gram(s) Oral every 6 hours    MEDICATIONS  (PRN):  ALBUTerol    90 MICROgram(s) HFA Inhaler 2 Puff(s) Inhalation every 6 hours PRN Shortness of Breath and/or Wheezing  dextrose 40% Gel 15 Gram(s) Oral once PRN Blood Glucose LESS THAN 70 milliGRAM(s)/deciliter  glucagon  Injectable 1 milliGRAM(s) IntraMuscular once PRN Glucose LESS THAN 70 milligrams/deciliter  traMADol 25 milliGRAM(s) Oral four times a day PRN Moderate Pain (4 - 6)      Allergies    No Known Allergies    Intolerances          General:  No wt loss, fevers, chills, night sweats, fatigue,   Eyes:  Good vision, no reported pain  ENT:  No sore throat, pain, runny nose, dysphagia  CV:  No pain, palpitations, hypo/hypertension  Resp:  No dyspnea, cough, tachypnea, wheezing  GI:  No pain, No nausea, No vomiting, No diarrhea, No constipation, No weight loss, No fever, No pruritis, No rectal bleeding, No tarry stools, No dysphagia,  :  No pain, bleeding, incontinence, nocturia  Muscle:  No pain, weakness  Neuro:  No weakness, tingling, memory problems  Psych:  No fatigue, insomnia, mood problems, depression  Endocrine:  No polyuria, polydipsia, cold/heat intolerance  Heme:  No petechiae, ecchymosis, easy bruisability  Skin:  No rash, tattoos, scars, edema      PHYSICAL EXAM:   Vital Signs:  Vital Signs Last 24 Hrs  T(C): 36.7 (05 Jun 2018 14:24), Max: 36.9 (04 Jun 2018 22:04)  T(F): 98 (05 Jun 2018 14:24), Max: 98.4 (04 Jun 2018 22:04)  HR: 85 (05 Jun 2018 14:24) (68 - 98)  BP: 128/81 (05 Jun 2018 14:24) (110/73 - 131/79)  BP(mean): 86 (04 Jun 2018 20:00) (86 - 95)  RR: 16 (05 Jun 2018 14:24) (16 - 21)  SpO2: 96% (05 Jun 2018 14:24) (92% - 99%)  Daily     Daily I&O's Summary    04 Jun 2018 07:01  -  05 Jun 2018 07:00  --------------------------------------------------------  IN: 300 mL / OUT: 600 mL / NET: -300 mL    05 Jun 2018 07:01  -  05 Jun 2018 16:50  --------------------------------------------------------  IN: 400 mL / OUT: 300 mL / NET: 100 mL        GENERAL:  Appears stated age, well-groomed, well-nourished, no distress  HEENT:  NC/AT,  conjunctivae clear and pink, no thyromegaly, nodules, adenopathy, no JVD, sclera -anicteric  CHEST:  Full & symmetric excursion, no increased effort, breath sounds clear  HEART:  Regular rhythm, S1, S2, no murmur/rub/S3/S4, no abdominal bruit, no edema  ABDOMEN:  Soft, non-tender, non-distended, normoactive bowel sounds,  no masses ,no hepato-splenomegaly, no signs of chronic liver disease  EXTEREMITIES:  no cyanosis,clubbing or edema  SKIN:  No rash/erythema/ecchymoses/petechiae/wounds/abscess/warm/dry  NEURO:  Alert, oriented, no asterixis, no tremor, no encephalopathy      LABS:                        8.6    6.5   )-----------( 298      ( 05 Jun 2018 07:32 )             26.3     06-05    141  |  105  |  18  ----------------------------<  129<H>  3.9   |  27  |  0.73    Ca    8.7      05 Jun 2018 07:32  Phos  3.7     06-05  Mg     1.7     06-05    TPro  5.7<L>  /  Alb  2.5<L>  /  TBili  0.8  /  DBili  x   /  AST  11  /  ALT  17  /  AlkPhos  26<L>  06-05        amylase   lipase  RADIOLOGY & ADDITIONAL TESTS:
PULMONARY/CRITICAL CARE      INTERVAL HPI/OVERNIGHT EVENTS:  Pt. stable, dark stool but no abd pain. C/o pain right wrist chronically. EGD noted--duodenal ulcer. CPAP used.    77y MaleHPI:  Patient is 76 yo male with hx of CAD S/P stent 14 yrs ago, Carpel tunnel syndrome, and HTN presenting with Dizziness, generalized weakness, and SOB on exertional that has been going on for the past week, and progressively worsen.  Patient was started on steroid 1week for carpel tunnel syndrome.  + dark tarry stool.  Patient denies any Chest pain, headache, blurry vision, abdominal pain, N/V/D, fever, chills, numbness or cough    In the ED, HB is 4.  BP improved with IVF bolus.  Stool occult +.  2 unit of RBC ordered (30 May 2018 12:59)        PAST MEDICAL & SURGICAL HISTORY:  Difficulty walking  Obesity, morbid, BMI 40.0-49.9  Chronic obstructive pulmonary disease, unspecified COPD type  SANTO on CPAP  Stented coronary artery  Benign prostatic hyperplasia with lower urinary tract symptoms, symptom details unspecified  DM (diabetes mellitus)  HLD (hyperlipidemia)  CAD (coronary artery disease): w/ 4 stents  HTN (hypertension)  History of prostate surgery: laser to relieve a blockage.  S/P angioplasty with stent:  and  (Has 4 stents)  H/O knee surgery: Left - 2017        ICU Vital Signs Last 24 Hrs  T(C): 36.9 (2018 04:01), Max: 36.9 (2018 04:01)  T(F): 98.4 (2018 04:01), Max: 98.4 (2018 04:01)  HR: 94 (2018 06:00) (78 - 103)  BP: 129/71 (2018 06:00) (97/82 - 133/81)  BP(mean): 88 (2018 06:00) (78 - 97)  ABP: --  ABP(mean): --  RR: 15 (2018 06:00) (14 - 24)  SpO2: 94% (2018 06:00) (94% - 100%)    Qtts:     I&O's Summary    31 May 2018 07:01  -  2018 07:00  --------------------------------------------------------  IN: 2179 mL / OUT: 2895 mL / NET: -716 mL    2018 07:  -  2018 07:52  --------------------------------------------------------  IN: 10 mL / OUT: 0 mL / NET: 10 mL            REVIEW OF SYSTEMS:    CONSTITUTIONAL: No fever, weight loss, or fatigue  EYES: No eye pain, visual disturbances, or discharge  ENMT:  No difficulty hearing, tinnitus, vertigo; No sinus or throat pain  NECK: No pain or stiffness  BREASTS: No pain, masses, or nipple discharge  RESPIRATORY: No cough, wheezing, chills or hemoptysis; No shortness of breath  CARDIOVASCULAR: No chest pain, palpitations, dizziness, or leg swelling  GASTROINTESTINAL: No abdominal or epigastric pain. No nausea, vomiting, or hematemesis; No diarrhea or constipation. some melena  GENITOURINARY: No dysuria, frequency, hematuria, or incontinence  NEUROLOGICAL: No headaches, memory loss, loss of strength, numbness, or tremors  SKIN: No itching, burning, rashes, or lesions   LYMPH NODES: No enlarged glands  ENDOCRINE: No heat or cold intolerance; No hair loss  MUSCULOSKELETAL: No joint pain or swelling; right upper extremity pain, no calf tenderness  PSYCHIATRIC: No depression, anxiety, mood swings, or difficulty sleeping  HEME/LYMPH: No easy bruising, or bleeding gums  ALLERGY AND IMMUNOLOGIC: No hives or eczema      PHYSICAL EXAM:    GENERAL: NAD, well-groomed, well-developed, NAD  HEAD:  Atraumatic, Normocephalic  EYES: EOMI, PERRLA, conjunctiva and sclera clear  ENMT: No tonsillar erythema, exudates, or enlargement; Moist mucous membranes, Good dentition, No lesions  NECK: Supple, No JVD, Normal thyroid  NERVOUS SYSTEM:  Alert & Oriented X3, Good concentration; Motor Strength 5/5 B/L upper and lower extremities  CHEST/LUNG: Clear to percussion bilaterally; No rales, rhonchi, wheezing, or rubs  HEART: Regular rate and rhythm; No murmurs, rubs, or gallops  ABDOMEN: Soft, Nontender, Nondistended; Bowel sounds present  EXTREMITIES:  2+ Peripheral Pulses, No clubbing, cyanosis, or edema  LYMPH: No lymphadenopathy noted  SKIN: No rashes or lesions        LABS:                        8.1    8.8   )-----------( 262      ( 2018 05:53 )             23.3     06-01    141  |  107  |  42<H>  ----------------------------<  119<H>  3.8   |  28  |  0.79    Ca    8.5      2018 05:53  Phos  3.6     -  Mg     1.4     -    TPro  5.4<L>  /  Alb  2.5<L>  /  TBili  0.9  /  DBili  x   /  AST  14  /  ALT  16  /  AlkPhos  31      PT/INR - ( 30 May 2018 11:30 )   PT: 13.4 sec;   INR: 1.22 ratio         PTT - ( 30 May 2018 11:30 )  PTT:24.9 sec  Urinalysis Basic - ( 30 May 2018 15:35 )    Color: Yellow / Appearance: Clear / S.010 / pH: x  Gluc: x / Ketone: Negative  / Bili: Negative / Urobili: Negative mg/dL   Blood: x / Protein: 15 mg/dL / Nitrite: Negative   Leuk Esterase: Small / RBC: 0-2 /HPF / WBC 6-10   Sq Epi: x / Non Sq Epi: Few / Bacteria: Few        vanco through     RADIOLOGY & ADDITIONAL STUDIES:< from: US Transthoracic Echocardiogram w/Doppler Complete (18 @ 14:29) >  EXAM:  US TTE W DOPPLER COMPLETE                                  PROCEDURE DATE:  2018          INTERPRETATION:  Ordering Physician: RUT MCCULLOUGH [id number]    Indication: Shortness of breath    Technician: Yasmani Stone    Study Quality: Poor     Height: 6 feet 1 inch  Weight: 284 pounds  Blood Pressure: 124/76    MEASUREMENTS  LA: 3.3 cm  Aortic Root: 3.8 cm  LVIDd: 4.4 cm  LVIDs: 3.0 cm    LVEF: 62%    FINDINGS  Left Ventricle: Endocardium not well visualized. Limited views suggest   grossly normal left ventricular size and overall systolic function. LVEF   estimated at 62%. Mild diastolic dysfunction (stage I).  Right Ventricle: Right ventricle not well visualized; grossly normal size   and function on limited views.  Left Atrium: Normal left atrium.  Right Atrium: Normal right atrium.  Mitral Valve: Normal mitral valve. Minimal mitral regurgitation.  Aortic Valve: Aortic valve not well visualized; grossly normal.  Tricuspid Valve: Normal tricuspid valve. Mild tricuspid regurgitation.   Pulmonary artery systolic pressure estimated at 35 mmHg, assuming a right   atrial pressure of 3 mmHg, which is normal.  Pulmonic Valve: Pulmonic valve not well visualized.  Pericardium/Pleura: No pericardial effusion.      CONCLUSIONS:  1. Technically difficult and limited study with poor acoustic windows.  2. Endocardium not well visualized. Limited views suggest grossly normal   left ventricular size and overall systolic function. LVEF estimated at   62%.   3. Mild diastolicdysfunction (stage I).  4. Mild tricuspid regurgitation.                     GLADYS MORENO M.D., ATTENDING CARDIOLOGIST  This document has been electronically signed. May 31 2018  7:40AM    < end of copied text >        CRITICAL CARE TIME SPENT:
Patient is a 77y Male with a known history of :  Peptic ulcer disease with hemorrhage (K27.4)  DM (diabetes mellitus) (E11.9)  Duodenal ulcer (K26.9)  HTN (hypertension) (I10)  CAD (coronary artery disease) (I25.10)  Acute blood loss anemia (D62)  UGIB (upper gastrointestinal bleed) (K92.2)  Gastrointestinal hemorrhage with melena (K92.1)  GI bleed (K92.2)  Stented coronary artery (Z95.5)  Chronic obstructive pulmonary disease, unspecified COPD type (J44.9)  SANTO on CPAP (G47.33)    HPI:  Patient is 78 yo male with hx of CAD S/P stent 14 yrs ago, Carpel tunnel syndrome, and HTN presenting with Dizziness, generalized weakness, and SOB on exertional that has been going on for the past week, and progresively worsen.  Patient was started on steroid 1week for carpel tunnel syndrome.  + dark tarry stool.  Patient denies any Chest pain, headache, blurry vision, abdominal pain, N/V/D, fever, chills, numbness or cough    In the ED, HB is 4.  BP improved with IVF bolus.  Stool occult +.  2 unit of RBC ordered (30 May 2018 12:59)      REVIEW OF SYSTEMS:    CONSTITUTIONAL: No fever, weight loss, or fatigue  EYES: No eye pain, visual disturbances, or discharge  ENMT:  No difficulty hearing, tinnitus, vertigo; No sinus or throat pain  NECK: No pain or stiffness  BREASTS: No pain, masses, or nipple discharge  RESPIRATORY: No cough, wheezing, chills or hemoptysis; No shortness of breath  CARDIOVASCULAR: No chest pain, palpitations, dizziness, or leg swelling  GASTROINTESTINAL: No abdominal or epigastric pain. No nausea, vomiting, or hematemesis; No diarrhea or constipation. No melena or hematochezia.  GENITOURINARY: No dysuria, frequency, hematuria, or incontinence  NEUROLOGICAL: No headaches, memory loss, loss of strength, numbness, or tremors  SKIN: No itching, burning, rashes, or lesions   LYMPH NODES: No enlarged glands  ENDOCRINE: No heat or cold intolerance; No hair loss  MUSCULOSKELETAL: No joint pain or swelling; No muscle, back, or extremity pain  PSYCHIATRIC: No depression, anxiety, mood swings, or difficulty sleeping  HEME/LYMPH: No easy bruising, or bleeding gums  ALLERGY AND IMMUNOLOGIC: No hives or eczema    MEDICATIONS  (STANDING):  atorvastatin 20 milliGRAM(s) Oral at bedtime  dextrose 5%. 1000 milliLiter(s) (50 mL/Hr) IV Continuous <Continuous>  dextrose 50% Injectable 12.5 Gram(s) IV Push once  insulin glargine Injectable (LANTUS) 5 Unit(s) SubCutaneous at bedtime  insulin lispro (HumaLOG) corrective regimen sliding scale   SubCutaneous three times a day before meals  insulin lispro (HumaLOG) corrective regimen sliding scale   SubCutaneous at bedtime  metoprolol tartrate 25 milliGRAM(s) Oral two times a day  pantoprazole Infusion 8 mG/Hr (10 mL/Hr) IV Continuous <Continuous>  sucralfate suspension 1 Gram(s) Oral every 6 hours    MEDICATIONS  (PRN):  ALBUTerol    90 MICROgram(s) HFA Inhaler 2 Puff(s) Inhalation every 6 hours PRN Shortness of Breath and/or Wheezing  dextrose 40% Gel 15 Gram(s) Oral once PRN Blood Glucose LESS THAN 70 milliGRAM(s)/deciliter  glucagon  Injectable 1 milliGRAM(s) IntraMuscular once PRN Glucose LESS THAN 70 milligrams/deciliter  traMADol 25 milliGRAM(s) Oral four times a day PRN Moderate Pain (4 - 6)      ALLERGIES: No Known Allergies      FAMILY HISTORY:  Family history of essential hypertension (Father)      Social history:  Alochol:   Smoking:   Drug Use:   Marital Status:     PHYSICAL EXAMINATION:  -----------------------------  T(C): 36.7 (06-02-18 @ 08:39), Max: 36.9 (06-01-18 @ 20:00)  HR: 88 (06-02-18 @ 10:00) (75 - 96)  BP: 128/80 (06-02-18 @ 10:00) (101/64 - 128/80)  RR: 17 (06-02-18 @ 10:00) (13 - 21)  SpO2: 99% (06-02-18 @ 10:00) (97% - 100%)  Wt(kg): --    06-01 @ 07:01  -  06-02 @ 07:00  --------------------------------------------------------  IN:    IV PiggyBack: 100 mL    Oral Fluid: 640 mL    pantoprazole Infusion: 230 mL    RBC Washed Cells: 344 mL    Sodium Chloride 0.9% IV Bolus: 500 mL  Total IN: 1814 mL    OUT:    Voided: 1685 mL  Total OUT: 1685 mL    Total NET: 129 mL      06-02 @ 07:01  -  06-02 @ 11:50  --------------------------------------------------------  IN:    Oral Fluid: 300 mL    pantoprazole Infusion: 40 mL  Total IN: 340 mL    OUT:    Voided: 500 mL  Total OUT: 500 mL    Total NET: -160 mL            Constitutional: well developed, normal appearance, well groomed, well nourished, no deformities and no acute distress.   Eyes: the conjunctiva exhibited no abnormalities and the eyelids demonstrated no xanthelasmas.   HEENT: normal oral mucosa, no oral pallor and no oral cyanosis.   Neck: normal jugular venous A waves present, normal jugular venous V waves present and no jugular venous fung A waves.   Pulmonary: no respiratory distress, normal respiratory rhythm and effort, no accessory muscle use and lungs were clear to auscultation bilaterally. Anteriorly  Cardiovascular: heart rate and rhythm were normal, normal S1 and S2 and no murmur, gallop, rub, heave or thrill are present.    Musculoskeletal: the gait could not be assessed.  Extremities: B/L 2-Plus edema.  Skin: normal skin color and pigmentation, no rash, no venous stasis, no skin lesions, no skin ulcer and no xanthoma was observed.   Psychiatric: oriented to person, place, and time, the affect was normal, the mood was normal and not feeling anxious.     LABS:   --------  06-02    135  |  105  |  26<H>  ----------------------------<  133<H>  3.7   |  27  |  0.65    Ca    8.2<L>      02 Jun 2018 06:02  Phos  3.9     06-02  Mg     1.7     06-02    TPro  5.4<L>  /  Alb  2.5<L>  /  TBili  0.9  /  DBili  x   /  AST  14  /  ALT  16  /  AlkPhos  31  06-01                         8.4    7.0   )-----------( 239      ( 02 Jun 2018 06:02 )             24.9                   Radiology:    < from: US Transthoracic Echocardiogram w/Doppler Complete (05.30.18 @ 14:29) >    EXAM:  US TTE W DOPPLER COMPLETE                                  PROCEDURE DATE:  05/30/2018          INTERPRETATION:  Ordering Physician: RUT MCCULLOUGH [id number]    Indication: Shortness of breath    Technician: aYsmani Stone    Study Quality: Poor     Height: 6 feet 1 inch  Weight: 284 pounds  Blood Pressure: 124/76    MEASUREMENTS  LA: 3.3 cm  Aortic Root: 3.8 cm  LVIDd: 4.4 cm  LVIDs: 3.0 cm    LVEF: 62%    FINDINGS  Left Ventricle: Endocardium not well visualized. Limited views suggest   grossly normal left ventricular size and overall systolic function. LVEF   estimated at 62%. Mild diastolic dysfunction (stage I).  Right Ventricle: Right ventricle not well visualized; grossly normal size   and function on limited views.  Left Atrium: Normal left atrium.  Right Atrium: Normal right atrium.  Mitral Valve: Normal mitral valve. Minimal mitral regurgitation.  Aortic Valve: Aortic valve not well visualized; grossly normal.  Tricuspid Valve: Normal tricuspid valve. Mild tricuspid regurgitation.   Pulmonary artery systolic pressure estimated at 35 mmHg, assuming a right   atrial pressure of 3 mmHg, which is normal.  Pulmonic Valve: Pulmonic valve not well visualized.  Pericardium/Pleura: No pericardial effusion.      CONCLUSIONS:  1. Technically difficult and limited study with poor acoustic windows.  2. Endocardium not well visualized. Limited views suggest grossly normal   left ventricular size and overall systolic function. LVEF estimated at   62%.   3. Mild diastolicdysfunction (stage I).  4. Mild tricuspid regurgitation.                     GLADYS MORENO M.D., ATTENDING CARDIOLOGIST  This document has been electronically signed. May 31 2018  7:40AM                < end of copied text >
Patient is a 77y old  Male who presents with a chief complaint of Dizziness/SOB (30 May 2018 15:52)      BRIEF HOSPITAL COURSE: 76 y/o M with a h/o CAD (s/p remote PCI), Carpel tunnel syndrome, HTN, admitted on 5/30 with progressively worsening dizziness, generalized weakness, and SOB on exertion x 1 week.  Patient was started on steroid 1 week ago for carpel tunnel syndrome. Patient also reported melena. Noted to be profoundly anemic (HgB: 4.7). Received 5u PRBC and 1u FFP total. s/p EGD 6/1/18 healing gastric ulcer found, no intervention performed     Events last 24 hours: Hemodynamically stable, no further bleeding, tolerating diet    PAST MEDICAL & SURGICAL HISTORY:  Difficulty walking  Obesity, morbid, BMI 40.0-49.9  Chronic obstructive pulmonary disease, unspecified COPD type  SANTO on CPAP  Stented coronary artery  Benign prostatic hyperplasia with lower urinary tract symptoms, symptom details unspecified  DM (diabetes mellitus)  HLD (hyperlipidemia)  CAD (coronary artery disease): w/ 4 stents  HTN (hypertension)  History of prostate surgery: laser to relieve a blockage.  S/P angioplasty with stent: 2004 and 2005 (Has 4 stents)  H/O knee surgery: Left - 11/7/2017      Review of Systems:  CONSTITUTIONAL: No fever, chills, or fatigue  EYES: No eye pain, visual disturbances, or discharge  ENMT:  No difficulty hearing, tinnitus, vertigo; No sinus or throat pain  NECK: No pain or stiffness  RESPIRATORY: No cough, wheezing, chills or hemoptysis; No shortness of breath  CARDIOVASCULAR: No chest pain, palpitations, dizziness, or leg swelling  GASTROINTESTINAL: No abdominal or epigastric pain. No nausea, vomiting, or hematemesis; No diarrhea or constipation. No melena or hematochezia.  GENITOURINARY: No dysuria, frequency, hematuria, or incontinence  NEUROLOGICAL: No headaches, memory loss, loss of strength, numbness, or tremors  SKIN: No itching, burning, rashes, or lesions   MUSCULOSKELETAL: + R WRIST PAIN SWELLING  PSYCHIATRIC: No depression, anxiety, mood swings, or difficulty sleeping        Medications:  metoprolol tartrate 25 milliGRAM(s) Oral two times a day  ALBUTerol    90 MICROgram(s) HFA Inhaler 2 Puff(s) Inhalation every 6 hours PRN  traMADol 25 milliGRAM(s) Oral four times a day PRN  pantoprazole  Injectable 40 milliGRAM(s) IV Push two times a day  sucralfate suspension 1 Gram(s) Oral every 6 hours  atorvastatin 20 milliGRAM(s) Oral at bedtime  dextrose 40% Gel 15 Gram(s) Oral once PRN  dextrose 50% Injectable 12.5 Gram(s) IV Push once  glucagon  Injectable 1 milliGRAM(s) IntraMuscular once PRN  insulin glargine Injectable (LANTUS) 5 Unit(s) SubCutaneous at bedtime  insulin lispro (HumaLOG) corrective regimen sliding scale   SubCutaneous three times a day before meals  insulin lispro (HumaLOG) corrective regimen sliding scale   SubCutaneous at bedtime  dextrose 5%. 1000 milliLiter(s) IV Continuous <Continuous>      ICU Vital Signs Last 24 Hrs  T(C): 36.9 (03 Jun 2018 20:12), Max: 36.9 (03 Jun 2018 16:24)  T(F): 98.4 (03 Jun 2018 20:12), Max: 98.4 (03 Jun 2018 16:24)  HR: 76 (04 Jun 2018 00:06) (76 - 106)  BP: 115/68 (04 Jun 2018 00:00) (108/79 - 128/76)  BP(mean): 81 (04 Jun 2018 00:00) (77 - 95)  ABP: --  ABP(mean): --  RR: 18 (04 Jun 2018 00:00) (14 - 25)  SpO2: 99% (04 Jun 2018 00:06) (95% - 100%)          I&O's Detail    02 Jun 2018 07:01  -  03 Jun 2018 07:00  --------------------------------------------------------  IN:    Oral Fluid: 1920 mL    pantoprazole Infusion: 120 mL  Total IN: 2040 mL    OUT:    Voided: 2500 mL  Total OUT: 2500 mL    Total NET: -460 mL      03 Jun 2018 07:01  -  04 Jun 2018 00:46  --------------------------------------------------------  IN:    Oral Fluid: 1130 mL  Total IN: 1130 mL    OUT:    Voided: 700 mL  Total OUT: 700 mL    Total NET: 430 mL            LABS:                        9.4    7.5   )-----------( 283      ( 03 Jun 2018 11:55 )             27.4     06-03    136  |  103  |  22  ----------------------------<  174<H>  4.0   |  26  |  0.91    Ca    8.5      03 Jun 2018 11:55  Phos  3.9     06-02  Mg     1.7     06-02    TPro  6.4  /  Alb  2.6<L>  /  TBili  1.0  /  DBili  x   /  AST  11  /  ALT  14  /  AlkPhos  37  06-03          CAPILLARY BLOOD GLUCOSE      POCT Blood Glucose.: 212 mg/dL (03 Jun 2018 22:23)        CULTURES:  Culture Results:   10,000 - 49,000 CFU/mL Coag Negative Staphylococcus (05-30 @ 20:47)      Physical Examination:    General: No acute distress.      HEENT: Pupils equal, reactive to light.  Symmetric.    PULM: Clear to auscultation bilaterally, no significant sputum production    CVS: Regular rate and rhythm, no murmurs, rubs, or gallops    ABD: Soft, nondistended, nontender, normoactive bowel sounds, no masses    EXT: No edema, nontender    SKIN: Warm and well perfused, no rashes noted.    NEURO: Alert, oriented, interactive, nonfocal    RADIOLOGY: old images reviewed    INVASIVE LINES: X   INDWELLING BADILLO: X   VTE PROPHYLAXIS: N/A GIB  CAM ICU: N   CODE STATUS: FULL    TIME SPENT: 21 minutes assessing presenting problems of acute illness, which pose high probability of life threatening deterioration or end organ damage/dysfunction, as well as medical decision making including initiating plan of care, reviewing data, reviewing radiologic exams, discussing with multidisciplinary team, non-inclusive of procedures performed, discussing goals of care with patient/family
CC.  Dizziness/SOB  HPI.  Patient reports dizziness, and SOB are  Resolving.  + generalized weakness.  No BM overnight.  denies any Chest pain, palpitation, headache, dizziness, blurry vision, abdominal pain, N/V/D, numbness or fever    Patient is complaining of B/L Wrist pain.  Patient was diagnosed with carpel tunnel syndrome, and was started on Prednisone.  Now prednisone on hold due to UGIB                Constitutional: No fever, fatigue or weight loss.  Skin: No rash.  Eyes: No recent vision problems or eye pain.  ENT: No congestion, ear pain, or sore throat.  Endocrine: No thyroid problems.  Cardiovascular: No chest pain or palpation.  Respiratory: No cough, or wheezing.  Gastrointestinal: No abdominal pain, nausea, vomiting, or diarrhea.  Genitourinary: No dysuria.  Musculoskeletal: No joint swelling.  Neurologic: No headache.      Vital Signs Last 24 Hrs  T(C): 36.7 (2018 07:15), Max: 36.9 (2018 04:01)  T(F): 98 (2018 07:15), Max: 98.4 (2018 04:01)  HR: 87 (2018 10:00) (67 - 103)  BP: 134/75 (2018 10:00) (84/48 - 134/75)  BP(mean): 93 (2018 10:00) (59 - 97)  RR: 18 (2018 10:00) (14 - 24)  SpO2: 100% (2018 10:00) (94% - 100%)        PHYSICAL EXAM-  GENERAL: Chronic ill appearing male  HEAD:  Atraumatic, Normocephalic  EYES: EOMI, PERRLA, conjunctiva and sclera clear  NECK: Supple, No JVD, Normal thyroid  NERVOUS SYSTEM:  Alert & Oriented X3, Moving all extremities  CHEST/LUNG: Clear to percussion bilaterally; No rales, rhonchi, wheezing, or rubs  HEART: Regular rate and rhythm; No murmurs, rubs, or gallops  ABDOMEN: Soft, Nontender, Nondistended; Bowel sounds present  EXTREMITIES:  No clubbing, cyanosis  SKIN: No rashes or lesions                                8.1    8.8   )-----------( 262      ( 2018 05:53 )             23.3     06-    141  |  107  |  42<H>  ----------------------------<  119<H>  3.8   |  28  |  0.79    Ca    8.5      2018 05:53  Phos  3.6     06-  Mg     1.4         TPro  5.4<L>  /  Alb  2.5<L>  /  TBili  0.9  /  DBili  x   /  AST  14  /  ALT  16  /  AlkPhos  31      CARDIAC MARKERS ( 30 May 2018 11:30 )  .000 ng/mL / x     / 22 U/L / x     / 1.4 ng/mL        Urinalysis Basic - ( 30 May 2018 15:35 )    Color: Yellow / Appearance: Clear / S.010 / pH: x  Gluc: x / Ketone: Negative  / Bili: Negative / Urobili: Negative mg/dL   Blood: x / Protein: 15 mg/dL / Nitrite: Negative   Leuk Esterase: Small / RBC: 0-2 /HPF / WBC 6-10   Sq Epi: x / Non Sq Epi: Few / Bacteria: Few      PT/INR - ( 30 May 2018 11:30 )   PT: 13.4 sec;   INR: 1.22 ratio         PTT - ( 30 May 2018 11:30 )  PTT:24.9 sec    MEDICATIONS  (STANDING):  atorvastatin 20 milliGRAM(s) Oral at bedtime  dextrose 5%. 1000 milliLiter(s) (50 mL/Hr) IV Continuous <Continuous>  dextrose 50% Injectable 12.5 Gram(s) IV Push once  insulin glargine Injectable (LANTUS) 5 Unit(s) SubCutaneous at bedtime  insulin lispro (HumaLOG) corrective regimen sliding scale   SubCutaneous three times a day before meals  insulin lispro (HumaLOG) corrective regimen sliding scale   SubCutaneous at bedtime  metoprolol tartrate 25 milliGRAM(s) Oral two times a day  pantoprazole Infusion 8 mG/Hr (10 mL/Hr) IV Continuous <Continuous>  sucralfate suspension 1 Gram(s) Oral every 6 hours    MEDICATIONS  (PRN):  ALBUTerol    90 MICROgram(s) HFA Inhaler 2 Puff(s) Inhalation every 6 hours PRN Shortness of Breath and/or Wheezing  dextrose 40% Gel 15 Gram(s) Oral once PRN Blood Glucose LESS THAN 70 milliGRAM(s)/deciliter  glucagon  Injectable 1 milliGRAM(s) IntraMuscular once PRN Glucose LESS THAN 70 milligrams/deciliter  traMADol 25 milliGRAM(s) Oral four times a day PRN Moderate Pain (4 - 6)
CC.  Dizziness/SOB  HPI.  Patient reports dizziness, and SOB are improving.  + generalized weakness.  No BM overnight.  denies any Chest pain, palpitation, headache, dizziness, blurry vision, abdominal pain, N/V/D, numbness or fever                Constitutional: No fever, fatigue or weight loss.  Skin: No rash.  Eyes: No recent vision problems or eye pain.  ENT: No congestion, ear pain, or sore throat.  Endocrine: No thyroid problems.  Cardiovascular: No chest pain or palpation.  Respiratory: No cough, or wheezing.  Gastrointestinal: No abdominal pain, nausea, vomiting, or diarrhea.  Genitourinary: No dysuria.  Musculoskeletal: No joint swelling.  Neurologic: No headache.      Vital Signs Last 24 Hrs  T(C): 36.7 (18 @ 04:01), Max: 37 (18 @ 21:50)  T(F): 98 (18 @ 04:01), Max: 98.6 (18 @ 21:50)  HR: 88 (18 @ 09:00) (76 - 111)  BP: 105/83 (18 @ 09:00) (82/38 - 131/64)  BP(mean): 92 (18 @ 09:00) (71 - 92)  RR: 20 (18 @ 09:00) (14 - 22)  SpO2: 100% (18 @ 09:00) (99% - 100%)        PHYSICAL EXAM-  GENERAL: Chronic ill appearing male  HEAD:  Atraumatic, Normocephalic  EYES: EOMI, PERRLA, conjunctiva and sclera clear  NECK: Supple, No JVD, Normal thyroid  NERVOUS SYSTEM:  Alert & Oriented X3, Moving all extremities  CHEST/LUNG: Clear to percussion bilaterally; No rales, rhonchi, wheezing, or rubs  HEART: Regular rate and rhythm; No murmurs, rubs, or gallops  ABDOMEN: Soft, Nontender, Nondistended; Bowel sounds present  EXTREMITIES:  No clubbing, cyanosis  SKIN: No rashes or lesions                                  7.3    10.7  )-----------( 266      ( 31 May 2018 06:25 )             20.5     -    140  |  108  |  67<H>  ----------------------------<  151<H>  4.3   |  26  |  0.80    Ca    8.4      31 May 2018 06:25    TPro  6.0  /  Alb  2.6<L>  /  TBili  0.3  /  DBili  x   /  AST  14  /  ALT  19  /  AlkPhos  31  05-30    CARDIAC MARKERS ( 30 May 2018 11:30 )  .000 ng/mL / x     / 22 U/L / x     / 1.4 ng/mL        Urinalysis Basic - ( 30 May 2018 15:35 )    Color: Yellow / Appearance: Clear / S.010 / pH: x  Gluc: x / Ketone: Negative  / Bili: Negative / Urobili: Negative mg/dL   Blood: x / Protein: 15 mg/dL / Nitrite: Negative   Leuk Esterase: Small / RBC: 0-2 /HPF / WBC 6-10   Sq Epi: x / Non Sq Epi: Few / Bacteria: Few      PT/INR - ( 30 May 2018 11:30 )   PT: 13.4 sec;   INR: 1.22 ratio         PTT - ( 30 May 2018 11:30 )  PTT:24.9 sec        MEDICATIONS  (STANDING):  atorvastatin 20 milliGRAM(s) Oral at bedtime  dextrose 5%. 1000 milliLiter(s) (50 mL/Hr) IV Continuous <Continuous>  dextrose 50% Injectable 12.5 Gram(s) IV Push once  insulin lispro (HumaLOG) corrective regimen sliding scale   SubCutaneous every 6 hours  metoprolol tartrate 25 milliGRAM(s) Oral two times a day  pantoprazole Infusion 8 mG/Hr (10 mL/Hr) IV Continuous <Continuous>  sucralfate suspension 1 Gram(s) Oral every 6 hours    MEDICATIONS  (PRN):  ALBUTerol    90 MICROgram(s) HFA Inhaler 2 Puff(s) Inhalation every 6 hours PRN Shortness of Breath and/or Wheezing  dextrose 40% Gel 15 Gram(s) Oral once PRN Blood Glucose LESS THAN 70 milliGRAM(s)/deciliter  glucagon  Injectable 1 milliGRAM(s) IntraMuscular once PRN Glucose LESS THAN 70 milligrams/deciliter          RADIOLOGY RESULTS:
Chief Complaint: UGIB    Interval Events: No events overnight. No complaints.    Review of Systems:  General: No fevers, chills, weight loss or gain  Skin: No rashes, color changes  Cardiovascular: No chest pain, orthopnea  Respiratory: No shortness of breath, cough  Gastrointestinal: No nausea, abdominal pain  Genitourinary: No incontinence, pain with urination  Musculoskeletal: No pain, swelling, decreased range of motion  Neurological: No headache, weakness  Psychiatric: No depression, anxiety  Endocrine: No weight loss or gain, increased thirst  All other systems are comprehensively negative.    Physical Exam:  Vital Signs Last 24 Hrs  T(C): 36.7 (04 Jun 2018 08:25), Max: 36.9 (03 Jun 2018 16:24)  T(F): 98.1 (04 Jun 2018 08:25), Max: 98.5 (04 Jun 2018 04:01)  HR: 89 (04 Jun 2018 08:00) (76 - 106)  BP: 114/81 (04 Jun 2018 08:00) (108/79 - 126/80)  BP(mean): 92 (04 Jun 2018 08:00) (77 - 95)  RR: 16 (04 Jun 2018 08:00) (14 - 25)  SpO2: 97% (04 Jun 2018 08:00) (95% - 100%)  General: NAD  HEENT: MMM  Neck: No JVD, no carotid bruit  Lungs: CTAB  CV: RRR, nl S1/S2, no M/R/G  Abdomen: S/NT/ND, +BS  Extremities: 2+ LE edema, no cyanosis  Neuro: AAOx3, non-focal  Skin: No rash    Labs:             06-04    139  |  106  |  22  ----------------------------<  144<H>  3.7   |  26  |  0.75    Ca    8.3<L>      04 Jun 2018 06:03    TPro  5.6<L>  /  Alb  2.2<L>  /  TBili  0.8  /  DBili  x   /  AST  11  /  ALT  15  /  AlkPhos  32  06-04                        8.3    5.4   )-----------( 255      ( 04 Jun 2018 06:03 )             24.7         Telemetry: Sinus rhythm
Chief Complaint: UGIB    Interval Events: No events overnight. No complaints.    Review of Systems:  General: No fevers, chills, weight loss or gain  Skin: No rashes, color changes  Cardiovascular: No chest pain, orthopnea  Respiratory: No shortness of breath, cough  Gastrointestinal: No nausea, abdominal pain  Genitourinary: No incontinence, pain with urination  Musculoskeletal: No pain, swelling, decreased range of motion  Neurological: No headache, weakness  Psychiatric: No depression, anxiety  Endocrine: No weight loss or gain, increased thirst  All other systems are comprehensively negative.    Physical Exam:  Vital Signs Last 24 Hrs  T(C): 36.7 (05 Jun 2018 09:53), Max: 36.9 (04 Jun 2018 16:05)  T(F): 98 (05 Jun 2018 09:53), Max: 98.5 (04 Jun 2018 16:05)  HR: 98 (05 Jun 2018 09:53) (68 - 103)  BP: 120/74 (05 Jun 2018 09:53) (110/73 - 131/79)  BP(mean): 86 (04 Jun 2018 20:00) (84 - 95)  RR: 18 (05 Jun 2018 09:53) (18 - 22)  SpO2: 94% (05 Jun 2018 09:53) (92% - 100%)  General: NAD  HEENT: MMM  Neck: No JVD, no carotid bruit  Lungs: CTAB  CV: RRR, nl S1/S2, no M/R/G  Abdomen: S/NT/ND, +BS  Extremities: 2+ LE edema, no cyanosis  Neuro: AAOx3, non-focal  Skin: No rash    Labs:             06-05    141  |  105  |  18  ----------------------------<  129<H>  3.9   |  27  |  0.73    Ca    8.7      05 Jun 2018 07:32  Phos  3.7     06-05  Mg     1.7     06-05    TPro  5.7<L>  /  Alb  2.5<L>  /  TBili  0.8  /  DBili  x   /  AST  11  /  ALT  17  /  AlkPhos  26<L>  06-05                        8.6    6.5   )-----------( 298      ( 05 Jun 2018 07:32 )             26.3         Telemetry: Sinus rhythm
Chief Complaint: UGIB    Interval Events: No events overnight. No complaints.    Review of Systems:  General: No fevers, chills, weight loss or gain  Skin: No rashes, color changes  Cardiovascular: No chest pain, orthopnea  Respiratory: No shortness of breath, cough  Gastrointestinal: No nausea, abdominal pain  Genitourinary: No incontinence, pain with urination  Musculoskeletal: No pain, swelling, decreased range of motion  Neurological: No headache, weakness  Psychiatric: No depression, anxiety  Endocrine: No weight loss or gain, increased thirst  All other systems are comprehensively negative.    Physical Exam:  Vital Signs Last 24 Hrs  T(C): 36.7 (06 Jun 2018 09:39), Max: 36.8 (05 Jun 2018 17:10)  T(F): 98.1 (06 Jun 2018 09:39), Max: 98.2 (05 Jun 2018 17:10)  HR: 93 (06 Jun 2018 09:39) (68 - 99)  BP: 104/70 (06 Jun 2018 09:39) (104/70 - 129/78)  BP(mean): --  RR: 18 (06 Jun 2018 09:39) (16 - 18)  SpO2: 95% (06 Jun 2018 09:39) (95% - 98%)  General: NAD  HEENT: MMM  Neck: No JVD, no carotid bruit  Lungs: CTAB  CV: RRR, nl S1/S2, no M/R/G  Abdomen: S/NT/ND, +BS  Extremities: 2+ LE edema, no cyanosis  Neuro: AAOx3, non-focal  Skin: No rash    Labs:             06-06    139  |  106  |  19  ----------------------------<  152<H>  3.7   |  28  |  0.84    Ca    8.7      06 Jun 2018 07:37  Phos  3.7     06-05  Mg     1.7     06-05    TPro  6.3  /  Alb  2.4<L>  /  TBili  0.6  /  DBili  x   /  AST  11  /  ALT  15  /  AlkPhos  31  06-06                        8.5    6.5   )-----------( 303      ( 06 Jun 2018 07:37 )             25.6         Telemetry: Sinus rhythm
Chief Complaint: UGIB    Interval Events: No events overnight. No complaints.    Review of Systems:  General: No fevers, chills, weight loss or gain  Skin: No rashes, color changes  Cardiovascular: No chest pain, orthopnea  Respiratory: No shortness of breath, cough  Gastrointestinal: No nausea, abdominal pain  Genitourinary: No incontinence, pain with urination  Musculoskeletal: No pain, swelling, decreased range of motion  Neurological: No headache, weakness  Psychiatric: No depression, anxiety  Endocrine: No weight loss or gain, increased thirst  All other systems are comprehensively negative.    Physical Exam:  Vitals:        Vital Signs Last 24 Hrs  T(C): 36.8 (31 May 2018 08:40), Max: 37 (30 May 2018 21:50)  T(F): 98.2 (31 May 2018 08:40), Max: 98.6 (30 May 2018 21:50)  HR: 88 (31 May 2018 09:00) (76 - 111)  BP: 105/83 (31 May 2018 09:00) (82/38 - 131/64)  BP(mean): 92 (31 May 2018 09:00) (71 - 92)  RR: 20 (31 May 2018 09:00) (14 - 22)  SpO2: 100% (31 May 2018 09:00) (99% - 100%)  General: NAD  HEENT: MMM  Neck: No JVD, no carotid bruit  Lungs: CTAB  CV: RRR, nl S1/S2, no M/R/G  Abdomen: S/NT/ND, +BS  Extremities: No LE edema, no cyanosis  Neuro: AAOx3, non-focal  Skin: No rash    Labs:                        7.3    10.7  )-----------( 266      ( 31 May 2018 06:25 )             20.5     05-31    140  |  108  |  67<H>  ----------------------------<  151<H>  4.3   |  26  |  0.80    Ca    8.4      31 May 2018 06:25    TPro  6.0  /  Alb  2.6<L>  /  TBili  0.3  /  DBili  x   /  AST  14  /  ALT  19  /  AlkPhos  31  05-30    CARDIAC MARKERS ( 30 May 2018 11:30 )  .000 ng/mL / x     / 22 U/L / x     / 1.4 ng/mL      PT/INR - ( 30 May 2018 11:30 )   PT: 13.4 sec;   INR: 1.22 ratio         PTT - ( 30 May 2018 11:30 )  PTT:24.9 sec    Telemetry: Sinus rhythm, PACs, atrial run
Chief Complaint: UGIB    Interval Events: Orthostatic hypotension this morning    Review of Systems:  General: No fevers, chills, weight loss or gain  Skin: No rashes, color changes  Cardiovascular: No chest pain, orthopnea  Respiratory: No shortness of breath, cough  Gastrointestinal: No nausea, abdominal pain  Genitourinary: No incontinence, pain with urination  Musculoskeletal: No pain, swelling, decreased range of motion  Neurological: No headache, weakness  Psychiatric: No depression, anxiety  Endocrine: No weight loss or gain, increased thirst  All other systems are comprehensively negative.    Physical Exam:  Vital Signs Last 24 Hrs  T(C): 36.7 (01 Jun 2018 07:15), Max: 36.9 (01 Jun 2018 04:01)  T(F): 98 (01 Jun 2018 07:15), Max: 98.4 (01 Jun 2018 04:01)  HR: 87 (01 Jun 2018 10:00) (67 - 103)  BP: 134/75 (01 Jun 2018 10:00) (84/48 - 134/75)  BP(mean): 93 (01 Jun 2018 10:00) (59 - 97)  RR: 18 (01 Jun 2018 10:00) (14 - 24)  SpO2: 100% (01 Jun 2018 10:00) (94% - 100%)  General: NAD  HEENT: MMM  Neck: No JVD, no carotid bruit  Lungs: CTAB  CV: RRR, nl S1/S2, no M/R/G  Abdomen: S/NT/ND, +BS  Extremities: No LE edema, no cyanosis  Neuro: AAOx3, non-focal  Skin: No rash    Labs:             06-01    141  |  107  |  42<H>  ----------------------------<  119<H>  3.8   |  28  |  0.79    Ca    8.5      01 Jun 2018 05:53  Phos  3.6     06-01  Mg     1.4     06-01    TPro  5.4<L>  /  Alb  2.5<L>  /  TBili  0.9  /  DBili  x   /  AST  14  /  ALT  16  /  AlkPhos  31  06-01                        8.1    8.8   )-----------( 262      ( 01 Jun 2018 05:53 )             23.3       Telemetry: Sinus rhythm, PACs
Date/Time Patient Seen:  		  Referring MD:   Data Reviewed	       Patient is a 77y old  Male who presents with a chief complaint of Dizziness/SOB (05 Jun 2018 13:38)  in bed  seen and examined  vs and meds reviewed  planned for JAGJIT  CM notes reviewed        Subjective/HPI     PAST MEDICAL & SURGICAL HISTORY:  Difficulty walking  Obesity, morbid, BMI 40.0-49.9  Chronic obstructive pulmonary disease, unspecified COPD type  SANTO on CPAP  Stented coronary artery  Benign prostatic hyperplasia with lower urinary tract symptoms, symptom details unspecified  DM (diabetes mellitus)  HLD (hyperlipidemia)  CAD (coronary artery disease): w/ 4 stents  HTN (hypertension)  History of prostate surgery: laser to relieve a blockage.  S/P angioplasty with stent: 2004 and 2005 (Has 4 stents)  H/O knee surgery: Left - 11/7/2017        Medication list         MEDICATIONS  (STANDING):  atorvastatin 20 milliGRAM(s) Oral at bedtime  dextrose 5%. 1000 milliLiter(s) (50 mL/Hr) IV Continuous <Continuous>  dextrose 50% Injectable 12.5 Gram(s) IV Push once  furosemide    Tablet 20 milliGRAM(s) Oral daily  hydrocortisone 0.5% Cream 1 Application(s) Topical two times a day  insulin glargine Injectable (LANTUS) 5 Unit(s) SubCutaneous at bedtime  insulin lispro (HumaLOG) corrective regimen sliding scale   SubCutaneous Before meals and at bedtime  metoprolol tartrate 25 milliGRAM(s) Oral two times a day  nystatin Cream 1 Application(s) Topical two times a day  pantoprazole  Injectable 40 milliGRAM(s) IV Push two times a day  sucralfate suspension 1 Gram(s) Oral every 6 hours    MEDICATIONS  (PRN):  ALBUTerol    90 MICROgram(s) HFA Inhaler 2 Puff(s) Inhalation every 6 hours PRN Shortness of Breath and/or Wheezing  dextrose 40% Gel 15 Gram(s) Oral once PRN Blood Glucose LESS THAN 70 milliGRAM(s)/deciliter  glucagon  Injectable 1 milliGRAM(s) IntraMuscular once PRN Glucose LESS THAN 70 milligrams/deciliter  traMADol 25 milliGRAM(s) Oral four times a day PRN Moderate Pain (4 - 6)         Vitals log        ICU Vital Signs Last 24 Hrs  T(C): 36.7 (06 Jun 2018 05:49), Max: 36.8 (05 Jun 2018 17:10)  T(F): 98 (06 Jun 2018 05:49), Max: 98.2 (05 Jun 2018 17:10)  HR: 94 (06 Jun 2018 05:49) (68 - 99)  BP: 129/78 (06 Jun 2018 05:49) (118/69 - 129/78)  BP(mean): --  ABP: --  ABP(mean): --  RR: 18 (06 Jun 2018 05:49) (16 - 18)  SpO2: 98% (06 Jun 2018 05:49) (94% - 98%)           Input and Output:  I&O's Detail    05 Jun 2018 07:01  -  06 Jun 2018 07:00  --------------------------------------------------------  IN:    Oral Fluid: 400 mL  Total IN: 400 mL    OUT:    Voided: 1100 mL  Total OUT: 1100 mL    Total NET: -700 mL          Lab Data                        8.6    6.5   )-----------( 298      ( 05 Jun 2018 07:32 )             26.3     06-05    141  |  105  |  18  ----------------------------<  129<H>  3.9   |  27  |  0.73    Ca    8.7      05 Jun 2018 07:32  Phos  3.7     06-05  Mg     1.7     06-05    TPro  5.7<L>  /  Alb  2.5<L>  /  TBili  0.8  /  DBili  x   /  AST  11  /  ALT  17  /  AlkPhos  26<L>  06-05            Review of Systems	      Objective     Physical Examination  head at  heart s1s2  lung dec BS  abd soft  cn grossly int        Pertinent Lab findings & Imaging      Williams:  NO   Adequate UO     I&O's Detail    05 Jun 2018 07:01  -  06 Jun 2018 07:00  --------------------------------------------------------  IN:    Oral Fluid: 400 mL  Total IN: 400 mL    OUT:    Voided: 1100 mL  Total OUT: 1100 mL    Total NET: -700 mL               Discussed with:     Cultures:	        Radiology
Date/Time Patient Seen:  		  Referring MD:   Data Reviewed	       Patient is a 77y old  Male who presents with a chief complaint of Dizziness/SOB (30 May 2018 15:52)    in bed  seen and examined  vs and meds reviewed      Subjective/HPI     PAST MEDICAL & SURGICAL HISTORY:  Difficulty walking  Obesity, morbid, BMI 40.0-49.9  Chronic obstructive pulmonary disease, unspecified COPD type  SANTO on CPAP  Stented coronary artery  Benign prostatic hyperplasia with lower urinary tract symptoms, symptom details unspecified  DM (diabetes mellitus)  HLD (hyperlipidemia)  CAD (coronary artery disease): w/ 4 stents  HTN (hypertension)  History of prostate surgery: laser to relieve a blockage.  S/P angioplasty with stent: 2004 and 2005 (Has 4 stents)  H/O knee surgery: Left - 11/7/2017        Medication list         MEDICATIONS  (STANDING):  atorvastatin 20 milliGRAM(s) Oral at bedtime  dextrose 5%. 1000 milliLiter(s) (50 mL/Hr) IV Continuous <Continuous>  dextrose 50% Injectable 12.5 Gram(s) IV Push once  insulin glargine Injectable (LANTUS) 5 Unit(s) SubCutaneous at bedtime  insulin lispro (HumaLOG) corrective regimen sliding scale   SubCutaneous Before meals and at bedtime  metoprolol tartrate 25 milliGRAM(s) Oral two times a day  pantoprazole  Injectable 40 milliGRAM(s) IV Push two times a day  sucralfate suspension 1 Gram(s) Oral every 6 hours    MEDICATIONS  (PRN):  ALBUTerol    90 MICROgram(s) HFA Inhaler 2 Puff(s) Inhalation every 6 hours PRN Shortness of Breath and/or Wheezing  dextrose 40% Gel 15 Gram(s) Oral once PRN Blood Glucose LESS THAN 70 milliGRAM(s)/deciliter  glucagon  Injectable 1 milliGRAM(s) IntraMuscular once PRN Glucose LESS THAN 70 milligrams/deciliter  traMADol 25 milliGRAM(s) Oral four times a day PRN Moderate Pain (4 - 6)         Vitals log        ICU Vital Signs Last 24 Hrs  T(C): 36.9 (04 Jun 2018 04:01), Max: 36.9 (03 Jun 2018 16:24)  T(F): 98.5 (04 Jun 2018 04:01), Max: 98.5 (04 Jun 2018 04:01)  HR: 85 (04 Jun 2018 04:00) (76 - 106)  BP: 126/80 (04 Jun 2018 04:00) (108/79 - 126/80)  BP(mean): 92 (04 Jun 2018 04:00) (77 - 95)  ABP: --  ABP(mean): --  RR: 16 (04 Jun 2018 04:00) (14 - 25)  SpO2: 100% (04 Jun 2018 04:00) (95% - 100%)           Input and Output:  I&O's Detail    02 Jun 2018 07:01  -  03 Jun 2018 07:00  --------------------------------------------------------  IN:    Oral Fluid: 1920 mL    pantoprazole Infusion: 120 mL  Total IN: 2040 mL    OUT:    Voided: 2500 mL  Total OUT: 2500 mL    Total NET: -460 mL      03 Jun 2018 07:01  -  04 Jun 2018 06:36  --------------------------------------------------------  IN:    Oral Fluid: 1130 mL  Total IN: 1130 mL    OUT:    Voided: 900 mL  Total OUT: 900 mL    Total NET: 230 mL          Lab Data                        8.3    5.4   )-----------( 255      ( 04 Jun 2018 06:03 )             24.7     06-04    139  |  106  |  22  ----------------------------<  144<H>  3.7   |  26  |  0.75    Ca    8.3<L>      04 Jun 2018 06:03    TPro  5.6<L>  /  Alb  2.2<L>  /  TBili  0.8  /  DBili  x   /  AST  11  /  ALT  15  /  AlkPhos  32  06-04            Review of Systems	      Objective     Physical Examination  head at  heart s1s2  lung dec BS  abd soft        Pertinent Lab findings & Imaging      Williams:  NO   Adequate UO     I&O's Detail    02 Jun 2018 07:01  -  03 Jun 2018 07:00  --------------------------------------------------------  IN:    Oral Fluid: 1920 mL    pantoprazole Infusion: 120 mL  Total IN: 2040 mL    OUT:    Voided: 2500 mL  Total OUT: 2500 mL    Total NET: -460 mL      03 Jun 2018 07:01  -  04 Jun 2018 06:36  --------------------------------------------------------  IN:    Oral Fluid: 1130 mL  Total IN: 1130 mL    OUT:    Voided: 900 mL  Total OUT: 900 mL    Total NET: 230 mL               Discussed with:     Cultures:	        Radiology
Date/Time Patient Seen:  		  Referring MD:   Data Reviewed	       Patient is a 77y old  Male who presents with a chief complaint of Dizziness/SOB (30 May 2018 15:52)  in bed  seen and examined  vs and meds reviewed  on CPAP overnight      Subjective/HPI     PAST MEDICAL & SURGICAL HISTORY:  Difficulty walking  Obesity, morbid, BMI 40.0-49.9  Chronic obstructive pulmonary disease, unspecified COPD type  SANTO on CPAP  Stented coronary artery  Benign prostatic hyperplasia with lower urinary tract symptoms, symptom details unspecified  DM (diabetes mellitus)  HLD (hyperlipidemia)  CAD (coronary artery disease): w/ 4 stents  HTN (hypertension)  History of prostate surgery: laser to relieve a blockage.  S/P angioplasty with stent: 2004 and 2005 (Has 4 stents)  H/O knee surgery: Left - 11/7/2017        Medication list         MEDICATIONS  (STANDING):  atorvastatin 20 milliGRAM(s) Oral at bedtime  dextrose 5%. 1000 milliLiter(s) (50 mL/Hr) IV Continuous <Continuous>  dextrose 50% Injectable 12.5 Gram(s) IV Push once  insulin glargine Injectable (LANTUS) 5 Unit(s) SubCutaneous at bedtime  insulin lispro (HumaLOG) corrective regimen sliding scale   SubCutaneous three times a day before meals  insulin lispro (HumaLOG) corrective regimen sliding scale   SubCutaneous at bedtime  metoprolol tartrate 25 milliGRAM(s) Oral two times a day  pantoprazole  Injectable 40 milliGRAM(s) IV Push two times a day  sucralfate suspension 1 Gram(s) Oral every 6 hours    MEDICATIONS  (PRN):  ALBUTerol    90 MICROgram(s) HFA Inhaler 2 Puff(s) Inhalation every 6 hours PRN Shortness of Breath and/or Wheezing  dextrose 40% Gel 15 Gram(s) Oral once PRN Blood Glucose LESS THAN 70 milliGRAM(s)/deciliter  glucagon  Injectable 1 milliGRAM(s) IntraMuscular once PRN Glucose LESS THAN 70 milligrams/deciliter  traMADol 25 milliGRAM(s) Oral four times a day PRN Moderate Pain (4 - 6)         Vitals log        ICU Vital Signs Last 24 Hrs  T(C): 36.8 (03 Jun 2018 04:34), Max: 36.9 (02 Jun 2018 16:00)  T(F): 98.3 (03 Jun 2018 04:34), Max: 98.4 (02 Jun 2018 16:00)  HR: 84 (03 Jun 2018 06:00) (79 - 98)  BP: 117/70 (03 Jun 2018 06:00) (116/69 - 129/78)  BP(mean): 81 (03 Jun 2018 06:00) (81 - 95)  ABP: --  ABP(mean): --  RR: 17 (03 Jun 2018 06:00) (15 - 21)  SpO2: 98% (03 Jun 2018 06:00) (97% - 100%)           Input and Output:  I&O's Detail    01 Jun 2018 07:01  -  02 Jun 2018 07:00  --------------------------------------------------------  IN:    IV PiggyBack: 100 mL    Oral Fluid: 640 mL    pantoprazole Infusion: 230 mL    RBC Washed Cells: 344 mL    Sodium Chloride 0.9% IV Bolus: 500 mL  Total IN: 1814 mL    OUT:    Voided: 1685 mL  Total OUT: 1685 mL    Total NET: 129 mL      02 Jun 2018 07:01  -  03 Jun 2018 06:36  --------------------------------------------------------  IN:    Oral Fluid: 1920 mL    pantoprazole Infusion: 120 mL  Total IN: 2040 mL    OUT:    Voided: 2500 mL  Total OUT: 2500 mL    Total NET: -460 mL          Lab Data                        8.9    x     )-----------( x        ( 02 Jun 2018 16:06 )             27.2     06-02    135  |  105  |  26<H>  ----------------------------<  133<H>  3.7   |  27  |  0.65    Ca    8.2<L>      02 Jun 2018 06:02  Phos  3.9     06-02  Mg     1.7     06-02              Review of Systems	      Objective     Physical Examination    head at  heart s1s2  lung dec BS  abd soft  cn grossly int      Pertinent Lab findings & Imaging      Williams:  NO   Adequate UO     I&O's Detail    01 Jun 2018 07:01  -  02 Jun 2018 07:00  --------------------------------------------------------  IN:    IV PiggyBack: 100 mL    Oral Fluid: 640 mL    pantoprazole Infusion: 230 mL    RBC Washed Cells: 344 mL    Sodium Chloride 0.9% IV Bolus: 500 mL  Total IN: 1814 mL    OUT:    Voided: 1685 mL  Total OUT: 1685 mL    Total NET: 129 mL      02 Jun 2018 07:01  -  03 Jun 2018 06:36  --------------------------------------------------------  IN:    Oral Fluid: 1920 mL    pantoprazole Infusion: 120 mL  Total IN: 2040 mL    OUT:    Voided: 2500 mL  Total OUT: 2500 mL    Total NET: -460 mL               Discussed with:     Cultures:	        Radiology
Date/Time Patient Seen:  		  Referring MD:   Data Reviewed	       Patient is a 77y old  Male who presents with a chief complaint of Dizziness/SOB (30 May 2018 15:52)  in bed  seen and examined  vs and meds reviewed  on CPAP overnight  am labs pending      Subjective/HPI     PAST MEDICAL & SURGICAL HISTORY:  Difficulty walking  Obesity, morbid, BMI 40.0-49.9  Chronic obstructive pulmonary disease, unspecified COPD type  SANTO on CPAP  Stented coronary artery  Benign prostatic hyperplasia with lower urinary tract symptoms, symptom details unspecified  DM (diabetes mellitus)  HLD (hyperlipidemia)  CAD (coronary artery disease): w/ 4 stents  HTN (hypertension)  History of prostate surgery: laser to relieve a blockage.  S/P angioplasty with stent: 2004 and 2005 (Has 4 stents)  H/O knee surgery: Left - 11/7/2017        Medication list         MEDICATIONS  (STANDING):  atorvastatin 20 milliGRAM(s) Oral at bedtime  dextrose 5%. 1000 milliLiter(s) (50 mL/Hr) IV Continuous <Continuous>  dextrose 50% Injectable 12.5 Gram(s) IV Push once  insulin glargine Injectable (LANTUS) 5 Unit(s) SubCutaneous at bedtime  insulin lispro (HumaLOG) corrective regimen sliding scale   SubCutaneous three times a day before meals  insulin lispro (HumaLOG) corrective regimen sliding scale   SubCutaneous at bedtime  metoprolol tartrate 25 milliGRAM(s) Oral two times a day  pantoprazole Infusion 8 mG/Hr (10 mL/Hr) IV Continuous <Continuous>  sucralfate suspension 1 Gram(s) Oral every 6 hours    MEDICATIONS  (PRN):  ALBUTerol    90 MICROgram(s) HFA Inhaler 2 Puff(s) Inhalation every 6 hours PRN Shortness of Breath and/or Wheezing  dextrose 40% Gel 15 Gram(s) Oral once PRN Blood Glucose LESS THAN 70 milliGRAM(s)/deciliter  glucagon  Injectable 1 milliGRAM(s) IntraMuscular once PRN Glucose LESS THAN 70 milligrams/deciliter  traMADol 25 milliGRAM(s) Oral four times a day PRN Moderate Pain (4 - 6)         Vitals log        ICU Vital Signs Last 24 Hrs  T(C): 36.7 (02 Jun 2018 04:01), Max: 36.9 (01 Jun 2018 20:00)  T(F): 98.1 (02 Jun 2018 04:01), Max: 98.5 (02 Jun 2018 01:01)  HR: 86 (02 Jun 2018 06:02) (67 - 96)  BP: 127/77 (02 Jun 2018 06:02) (84/48 - 134/75)  BP(mean): 92 (02 Jun 2018 06:02) (59 - 97)  ABP: --  ABP(mean): --  RR: 16 (02 Jun 2018 06:02) (13 - 21)  SpO2: 99% (02 Jun 2018 06:02) (97% - 100%)           Input and Output:  I&O's Detail    31 May 2018 07:01  -  01 Jun 2018 07:00  --------------------------------------------------------  IN:    FFP (Fresh Frozen Plasma): 320 mL    lactated ringers.: 250 mL    Oral Fluid: 940 mL    pantoprazole Infusion: 210 mL    PRBCs (Packed Red Blood Cells): 459 mL  Total IN: 2179 mL    OUT:    Voided: 2895 mL  Total OUT: 2895 mL    Total NET: -716 mL      01 Jun 2018 07:01  -  02 Jun 2018 06:26  --------------------------------------------------------  IN:    IV PiggyBack: 100 mL    Oral Fluid: 640 mL    pantoprazole Infusion: 220 mL    RBC Washed Cells: 344 mL    Sodium Chloride 0.9% IV Bolus: 500 mL  Total IN: 1804 mL    OUT:    Voided: 1685 mL  Total OUT: 1685 mL    Total NET: 119 mL          Lab Data                        8.4    7.0   )-----------( 239      ( 02 Jun 2018 06:02 )             24.9     06-01    141  |  107  |  42<H>  ----------------------------<  119<H>  3.8   |  28  |  0.79    Ca    8.5      01 Jun 2018 05:53  Phos  3.6     06-01  Mg     1.4     06-01    TPro  5.4<L>  /  Alb  2.5<L>  /  TBili  0.9  /  DBili  x   /  AST  14  /  ALT  16  /  AlkPhos  31  06-01            Review of Systems	      Objective     Physical Examination    head at  heart s1s2  lung dec BS  abd soft  cn grossly int      Pertinent Lab findings & Imaging      Williams:  NO   Adequate UO     I&O's Detail    31 May 2018 07:01  -  01 Jun 2018 07:00  --------------------------------------------------------  IN:    FFP (Fresh Frozen Plasma): 320 mL    lactated ringers.: 250 mL    Oral Fluid: 940 mL    pantoprazole Infusion: 210 mL    PRBCs (Packed Red Blood Cells): 459 mL  Total IN: 2179 mL    OUT:    Voided: 2895 mL  Total OUT: 2895 mL    Total NET: -716 mL      01 Jun 2018 07:01  -  02 Jun 2018 06:26  --------------------------------------------------------  IN:    IV PiggyBack: 100 mL    Oral Fluid: 640 mL    pantoprazole Infusion: 220 mL    RBC Washed Cells: 344 mL    Sodium Chloride 0.9% IV Bolus: 500 mL  Total IN: 1804 mL    OUT:    Voided: 1685 mL  Total OUT: 1685 mL    Total NET: 119 mL               Discussed with:     Cultures:	        Radiology
Date/Time Patient Seen:  		  Referring MD:   Data Reviewed	       Patient is a 77y old  Male who presents with a chief complaint of Dizziness/SOB (30 May 2018 15:52)  in bed  seen and examined  vs and meds reviewed  on PPI  planned for EGD this am  used CPAP overnight        Subjective/HPI     PAST MEDICAL & SURGICAL HISTORY:  Difficulty walking  Obesity, morbid, BMI 40.0-49.9  Chronic obstructive pulmonary disease, unspecified COPD type  SANTO on CPAP  Stented coronary artery  Benign prostatic hyperplasia with lower urinary tract symptoms, symptom details unspecified  DM (diabetes mellitus)  HLD (hyperlipidemia)  CAD (coronary artery disease): w/ 4 stents  HTN (hypertension)  History of prostate surgery: laser to relieve a blockage.  S/P angioplasty with stent: 2004 and 2005 (Has 4 stents)  H/O knee surgery: Left - 11/7/2017        Medication list         MEDICATIONS  (STANDING):  atorvastatin 20 milliGRAM(s) Oral at bedtime  dextrose 5%. 1000 milliLiter(s) (50 mL/Hr) IV Continuous <Continuous>  dextrose 50% Injectable 12.5 Gram(s) IV Push once  insulin lispro (HumaLOG) corrective regimen sliding scale   SubCutaneous every 6 hours  metoprolol tartrate 25 milliGRAM(s) Oral two times a day  pantoprazole Infusion 8 mG/Hr (10 mL/Hr) IV Continuous <Continuous>  sucralfate suspension 1 Gram(s) Oral every 6 hours    MEDICATIONS  (PRN):  ALBUTerol    90 MICROgram(s) HFA Inhaler 2 Puff(s) Inhalation every 6 hours PRN Shortness of Breath and/or Wheezing  dextrose 40% Gel 15 Gram(s) Oral once PRN Blood Glucose LESS THAN 70 milliGRAM(s)/deciliter  glucagon  Injectable 1 milliGRAM(s) IntraMuscular once PRN Glucose LESS THAN 70 milligrams/deciliter         Vitals log        ICU Vital Signs Last 24 Hrs  T(C): 36.7 (31 May 2018 04:01), Max: 37 (30 May 2018 21:50)  T(F): 98 (31 May 2018 04:01), Max: 98.6 (30 May 2018 21:50)  HR: 93 (31 May 2018 06:00) (76 - 111)  BP: 114/61 (31 May 2018 06:00) (82/38 - 131/64)  BP(mean): 76 (31 May 2018 06:00) (71 - 88)  ABP: --  ABP(mean): --  RR: 14 (31 May 2018 06:00) (14 - 22)  SpO2: 100% (31 May 2018 06:00) (99% - 100%)           Input and Output:  I&O's Detail    30 May 2018 07:01  -  31 May 2018 07:00  --------------------------------------------------------  IN:    Packed Red Blood Cells: 1212 mL    pantoprazole Infusion: 270 mL    Sodium Chloride 0.9% IV Bolus: 2000 mL  Total IN: 3482 mL    OUT:    Voided: 3125 mL  Total OUT: 3125 mL    Total NET: 357 mL          Lab Data                        7.3    10.7  )-----------( 266      ( 31 May 2018 06:25 )             20.5     05-31    140  |  108  |  67<H>  ----------------------------<  151<H>  4.3   |  26  |  0.80    Ca    8.4      31 May 2018 06:25    TPro  6.0  /  Alb  2.6<L>  /  TBili  0.3  /  DBili  x   /  AST  14  /  ALT  19  /  AlkPhos  31  05-30      CARDIAC MARKERS ( 30 May 2018 11:30 )  .000 ng/mL / x     / 22 U/L / x     / 1.4 ng/mL        Review of Systems	      Objective     Physical Examination  head at  heart s1s2  lung dec BS  abd soft  cn grossly int        Pertinent Lab findings & Imaging      Williams:  NO   Adequate UO     I&O's Detail    30 May 2018 07:01  -  31 May 2018 07:00  --------------------------------------------------------  IN:    Packed Red Blood Cells: 1212 mL    pantoprazole Infusion: 270 mL    Sodium Chloride 0.9% IV Bolus: 2000 mL  Total IN: 3482 mL    OUT:    Voided: 3125 mL  Total OUT: 3125 mL    Total NET: 357 mL               Discussed with:     Cultures:	        Radiology
Date/Time Patient Seen:  		  Referring MD:   Data Reviewed	       Patient is a 77y old  Male who presents with a chief complaint of Dizziness/SOB (30 May 2018 15:52)  in bed  seen and examined  vs and meds reviewed  on room air  on LASIX         Subjective/HPI     PAST MEDICAL & SURGICAL HISTORY:  Difficulty walking  Obesity, morbid, BMI 40.0-49.9  Chronic obstructive pulmonary disease, unspecified COPD type  SANTO on CPAP  Stented coronary artery  Benign prostatic hyperplasia with lower urinary tract symptoms, symptom details unspecified  DM (diabetes mellitus)  HLD (hyperlipidemia)  CAD (coronary artery disease): w/ 4 stents  HTN (hypertension)  History of prostate surgery: laser to relieve a blockage.  S/P angioplasty with stent: 2004 and 2005 (Has 4 stents)  H/O knee surgery: Left - 11/7/2017        Medication list         MEDICATIONS  (STANDING):  atorvastatin 20 milliGRAM(s) Oral at bedtime  dextrose 5%. 1000 milliLiter(s) (50 mL/Hr) IV Continuous <Continuous>  dextrose 50% Injectable 12.5 Gram(s) IV Push once  furosemide    Tablet 20 milliGRAM(s) Oral daily  insulin glargine Injectable (LANTUS) 5 Unit(s) SubCutaneous at bedtime  insulin lispro (HumaLOG) corrective regimen sliding scale   SubCutaneous Before meals and at bedtime  metoprolol tartrate 25 milliGRAM(s) Oral two times a day  pantoprazole  Injectable 40 milliGRAM(s) IV Push two times a day  sucralfate suspension 1 Gram(s) Oral every 6 hours    MEDICATIONS  (PRN):  ALBUTerol    90 MICROgram(s) HFA Inhaler 2 Puff(s) Inhalation every 6 hours PRN Shortness of Breath and/or Wheezing  dextrose 40% Gel 15 Gram(s) Oral once PRN Blood Glucose LESS THAN 70 milliGRAM(s)/deciliter  glucagon  Injectable 1 milliGRAM(s) IntraMuscular once PRN Glucose LESS THAN 70 milligrams/deciliter  traMADol 25 milliGRAM(s) Oral four times a day PRN Moderate Pain (4 - 6)         Vitals log        ICU Vital Signs Last 24 Hrs  T(C): 36.7 (05 Jun 2018 05:23), Max: 36.9 (04 Jun 2018 16:05)  T(F): 98.1 (05 Jun 2018 05:23), Max: 98.5 (04 Jun 2018 16:05)  HR: 91 (05 Jun 2018 05:23) (68 - 103)  BP: 112/71 (05 Jun 2018 05:23) (110/73 - 131/79)  BP(mean): 86 (04 Jun 2018 20:00) (84 - 95)  ABP: --  ABP(mean): --  RR: 18 (05 Jun 2018 05:23) (16 - 25)  SpO2: 92% (05 Jun 2018 05:23) (92% - 100%)           Input and Output:  I&O's Detail    04 Jun 2018 07:01  -  05 Jun 2018 07:00  --------------------------------------------------------  IN:    Oral Fluid: 300 mL  Total IN: 300 mL    OUT:    Voided: 600 mL  Total OUT: 600 mL    Total NET: -300 mL          Lab Data                        9.7    6.5   )-----------( 305      ( 04 Jun 2018 14:10 )             29.4     06-04    139  |  106  |  22  ----------------------------<  144<H>  3.7   |  26  |  0.75    Ca    8.3<L>      04 Jun 2018 06:03    TPro  5.6<L>  /  Alb  2.2<L>  /  TBili  0.8  /  DBili  x   /  AST  11  /  ALT  15  /  AlkPhos  32  06-04            Review of Systems	      Objective     Physical Examination    head at  heart s1s2  lung dec BS  abd soft      Pertinent Lab findings & Imaging      Williams:  NO   Adequate UO     I&O's Detail    04 Jun 2018 07:01  -  05 Jun 2018 07:00  --------------------------------------------------------  IN:    Oral Fluid: 300 mL  Total IN: 300 mL    OUT:    Voided: 600 mL  Total OUT: 600 mL    Total NET: -300 mL               Discussed with:     Cultures:	        Radiology
INTERVAL HPI/OVERNIGHT EVENTS:  HPI:  No new overnight event.  No N/V/D.  Tolerating diet.  no brbpr    MEDICATIONS  (STANDING):  atorvastatin 20 milliGRAM(s) Oral at bedtime  dextrose 5%. 1000 milliLiter(s) (50 mL/Hr) IV Continuous <Continuous>  dextrose 50% Injectable 12.5 Gram(s) IV Push once  insulin glargine Injectable (LANTUS) 5 Unit(s) SubCutaneous at bedtime  insulin lispro (HumaLOG) corrective regimen sliding scale   SubCutaneous three times a day before meals  insulin lispro (HumaLOG) corrective regimen sliding scale   SubCutaneous at bedtime  metoprolol tartrate 25 milliGRAM(s) Oral two times a day  pantoprazole Infusion 8 mG/Hr (10 mL/Hr) IV Continuous <Continuous>  sucralfate suspension 1 Gram(s) Oral every 6 hours    MEDICATIONS  (PRN):  ALBUTerol    90 MICROgram(s) HFA Inhaler 2 Puff(s) Inhalation every 6 hours PRN Shortness of Breath and/or Wheezing  dextrose 40% Gel 15 Gram(s) Oral once PRN Blood Glucose LESS THAN 70 milliGRAM(s)/deciliter  glucagon  Injectable 1 milliGRAM(s) IntraMuscular once PRN Glucose LESS THAN 70 milligrams/deciliter  traMADol 25 milliGRAM(s) Oral four times a day PRN Moderate Pain (4 - 6)      Allergies    No Known Allergies    Intolerances          General:  No wt loss, fevers, chills, night sweats, fatigue,   Eyes:  Good vision, no reported pain  ENT:  No sore throat, pain, runny nose, dysphagia  CV:  No pain, palpitations, hypo/hypertension  Resp:  No dyspnea, cough, tachypnea, wheezing  GI:  No pain, No nausea, No vomiting, No diarrhea, No constipation, No weight loss, No fever, No pruritis, No rectal bleeding, No tarry stools, No dysphagia,  :  No pain, bleeding, incontinence, nocturia  Muscle:  No pain, weakness  Neuro:  No weakness, tingling, memory problems  Psych:  No fatigue, insomnia, mood problems, depression  Endocrine:  No polyuria, polydipsia, cold/heat intolerance  Heme:  No petechiae, ecchymosis, easy bruisability  Skin:  No rash, tattoos, scars, edema      PHYSICAL EXAM:   Vital Signs:  Vital Signs Last 24 Hrs  T(C): 36.7 (2018 16:14), Max: 36.9 (2018 04:01)  T(F): 98.1 (2018 16:14), Max: 98.4 (2018 04:01)  HR: 86 (2018 16:14) (67 - 96)  BP: 115/68 (2018 16:14) (84/48 - 134/75)  BP(mean): 79 (2018 12:26) (59 - 97)  RR: 15 (2018 16:14) (14 - 24)  SpO2: 99% (2018 16:14) (94% - 100%)  Daily     Daily Weight in k.1 (2018 06:58)I&O's Summary    31 May 2018 07:  -  2018 07:00  --------------------------------------------------------  IN: 2179 mL / OUT: 2895 mL / NET: -716 mL    2018 07:01  -  2018 17:31  --------------------------------------------------------  IN: 680 mL / OUT: 460 mL / NET: 220 mL        GENERAL:  Appears stated age, well-groomed, well-nourished, no distress  HEENT:  NC/AT,  conjunctivae clear and pink, no thyromegaly, nodules, adenopathy, no JVD, sclera -anicteric  CHEST:  Full & symmetric excursion, no increased effort, breath sounds clear  HEART:  Regular rhythm, S1, S2, no murmur/rub/S3/S4, no abdominal bruit, no edema  ABDOMEN:  Soft, non-tender, non-distended, normoactive bowel sounds,  no masses ,no hepato-splenomegaly, no signs of chronic liver disease  EXTEREMITIES:  no cyanosis,clubbing or edema  SKIN:  No rash/erythema/ecchymoses/petechiae/wounds/abscess/warm/dry  NEURO:  Alert, oriented, no asterixis, no tremor, no encephalopathy      LABS:                        8.0    8.9   )-----------( 256      ( 2018 11:41 )             22.9     06-    141  |  107  |  42<H>  ----------------------------<  119<H>  3.8   |  28  |  0.79    Ca    8.5      2018 05:53  Phos  3.6     06-  Mg     1.4     06-    TPro  5.4<L>  /  Alb  2.5<L>  /  TBili  0.9  /  DBili  x   /  AST  14  /  ALT  16  /  AlkPhos  31  06-        amylase   lipase  RADIOLOGY & ADDITIONAL TESTS:
INTERVAL HPI/OVERNIGHT EVENTS:  no BM    MEDICATIONS  (STANDING):  atorvastatin 20 milliGRAM(s) Oral at bedtime  dextrose 5%. 1000 milliLiter(s) (50 mL/Hr) IV Continuous <Continuous>  dextrose 50% Injectable 12.5 Gram(s) IV Push once  insulin glargine Injectable (LANTUS) 5 Unit(s) SubCutaneous at bedtime  insulin lispro (HumaLOG) corrective regimen sliding scale   SubCutaneous three times a day before meals  insulin lispro (HumaLOG) corrective regimen sliding scale   SubCutaneous at bedtime  metoprolol tartrate 25 milliGRAM(s) Oral two times a day  pantoprazole  Injectable 40 milliGRAM(s) IV Push two times a day  sucralfate suspension 1 Gram(s) Oral every 6 hours    MEDICATIONS  (PRN):  ALBUTerol    90 MICROgram(s) HFA Inhaler 2 Puff(s) Inhalation every 6 hours PRN Shortness of Breath and/or Wheezing  dextrose 40% Gel 15 Gram(s) Oral once PRN Blood Glucose LESS THAN 70 milliGRAM(s)/deciliter  glucagon  Injectable 1 milliGRAM(s) IntraMuscular once PRN Glucose LESS THAN 70 milligrams/deciliter  traMADol 25 milliGRAM(s) Oral four times a day PRN Moderate Pain (4 - 6)      Allergies    No Known Allergies    Intolerances        Review of Systems:    General:  No wt loss, fevers, chills, night sweats,fatigue,   Eyes:  Good vision, no reported pain  ENT:  No sore throat, pain, runny nose, dysphagia  CV:  No pain, palpitatioins, hypo/hypertension  Resp:  No dyspnea, cough, tachypnea, wheezing  GI:  No pain, No nausea, No vomiting, No diarrhea, No constipatiion, No weight loss, No fever, No pruritis, No rectal bleeding, No tarry stools, No dysphagia,  :  No pain, bleeding, incontinence, nocturia  Muscle:  No pain, weakness  Neuro:  No weakness, tingling, memory problems  Psych:  No fatigue, insomnia, mood problems, depression  Endocrine:  No polyuria, polydypsia, cold/heat intolerance  Heme:  No petechiae, ecchymosis, easy bruisability  Skin:  No rash, tattoos, scars, edema      Vital Signs Last 24 Hrs  T(C): 36.7 (03 Jun 2018 08:27), Max: 36.9 (02 Jun 2018 16:00)  T(F): 98.1 (03 Jun 2018 08:27), Max: 98.4 (02 Jun 2018 16:00)  HR: 88 (03 Jun 2018 08:58) (79 - 98)  BP: 124/76 (03 Jun 2018 08:00) (117/69 - 129/78)  BP(mean): 91 (03 Jun 2018 08:00) (81 - 94)  RR: 14 (03 Jun 2018 08:00) (14 - 21)  SpO2: 100% (03 Jun 2018 08:58) (97% - 100%)    PHYSICAL EXAM:    Constitutional: NAD, well-developed  HEENT: EOMI, throat clear  Neck: No LAD, supple  Respiratory: CTA and P  Cardiovascular: S1 and S2, RRR, no M  Gastrointestinal: BS+, soft, NT/ND, neg HSM,  Extremities: No peripheral edema, neg clubing, cyanosis  Vascular: 2+ peripheral pulses  Neurological: A/O x 3, no focal deficits  Psychiatric: Normal mood, normal affect  Skin: No rashes      LABS:                        8.9    x     )-----------( x        ( 02 Jun 2018 16:06 )             27.2     06-02    135  |  105  |  26<H>  ----------------------------<  133<H>  3.7   |  27  |  0.65    Ca    8.2<L>      02 Jun 2018 06:02  Phos  3.9     06-02  Mg     1.7     06-02            RADIOLOGY & ADDITIONAL TESTS:
INTERVAL HPI/OVERNIGHT EVENTS:  no melena no brbpr  No new overnight event.  No N/V/D.  Tolerating diet.      MEDICATIONS  (STANDING):  atorvastatin 20 milliGRAM(s) Oral at bedtime  dextrose 5%. 1000 milliLiter(s) (50 mL/Hr) IV Continuous <Continuous>  dextrose 50% Injectable 12.5 Gram(s) IV Push once  furosemide    Tablet 20 milliGRAM(s) Oral daily  insulin glargine Injectable (LANTUS) 5 Unit(s) SubCutaneous at bedtime  insulin lispro (HumaLOG) corrective regimen sliding scale   SubCutaneous Before meals and at bedtime  metoprolol tartrate 25 milliGRAM(s) Oral two times a day  pantoprazole  Injectable 40 milliGRAM(s) IV Push two times a day  sucralfate suspension 1 Gram(s) Oral every 6 hours    MEDICATIONS  (PRN):  ALBUTerol    90 MICROgram(s) HFA Inhaler 2 Puff(s) Inhalation every 6 hours PRN Shortness of Breath and/or Wheezing  dextrose 40% Gel 15 Gram(s) Oral once PRN Blood Glucose LESS THAN 70 milliGRAM(s)/deciliter  glucagon  Injectable 1 milliGRAM(s) IntraMuscular once PRN Glucose LESS THAN 70 milligrams/deciliter  traMADol 25 milliGRAM(s) Oral four times a day PRN Moderate Pain (4 - 6)      Allergies    No Known Allergies    Intolerances          General:  No wt loss, fevers, chills, night sweats, fatigue,   Eyes:  Good vision, no reported pain  ENT:  No sore throat, pain, runny nose, dysphagia  CV:  No pain, palpitations, hypo/hypertension  Resp:  No dyspnea, cough, tachypnea, wheezing  GI:  No pain, No nausea, No vomiting, No diarrhea, No constipation, No weight loss, No fever, No pruritis, No rectal bleeding, No tarry stools, No dysphagia,  :  No pain, bleeding, incontinence, nocturia  Muscle:  No pain, weakness  Neuro:  No weakness, tingling, memory problems  Psych:  No fatigue, insomnia, mood problems, depression  Endocrine:  No polyuria, polydipsia, cold/heat intolerance  Heme:  No petechiae, ecchymosis, easy bruisability  Skin:  No rash, tattoos, scars, edema      PHYSICAL EXAM:   Vital Signs:  Vital Signs Last 24 Hrs  T(C): 36.9 (2018 16:05), Max: 36.9 (2018 16:24)  T(F): 98.5 (2018 16:05), Max: 98.5 (2018 04:01)  HR: 103 (2018 14:00) (76 - 106)  BP: 110/74 (2018 14:00) (108/79 - 126/80)  BP(mean): 84 (2018 14:00) (77 - 92)  RR: 19 (2018 14:00) (15 - 25)  SpO2: 100% (2018 14:00) (95% - 100%)  Daily     Daily Weight in k.1 (2018 04:01)I&O's Summary    2018 07:01  -  2018 07:00  --------------------------------------------------------  IN: 1250 mL / OUT: 900 mL / NET: 350 mL    2018 07:01  -  2018 16:09  --------------------------------------------------------  IN: 0 mL / OUT: 600 mL / NET: -600 mL        GENERAL:  Appears stated age, well-groomed, well-nourished, no distress  HEENT:  NC/AT,  conjunctivae clear and pink, no thyromegaly, nodules, adenopathy, no JVD, sclera -anicteric  CHEST:  Full & symmetric excursion, no increased effort, breath sounds clear  HEART:  Regular rhythm, S1, S2, no murmur/rub/S3/S4, no abdominal bruit, no edema  ABDOMEN:  Soft, non-tender, non-distended, normoactive bowel sounds,  no masses ,no hepato-splenomegaly, no signs of chronic liver disease  EXTEREMITIES:  no cyanosis,clubbing or edema  SKIN:  No rash/erythema/ecchymoses/petechiae/wounds/abscess/warm/dry  NEURO:  Alert, oriented, no asterixis, no tremor, no encephalopathy      LABS:                        9.7    6.5   )-----------( 305      ( 2018 14:10 )             29.4     06-04    139  |  106  |  22  ----------------------------<  144<H>  3.7   |  26  |  0.75    Ca    8.3<L>      2018 06:03    TPro  5.6<L>  /  Alb  2.2<L>  /  TBili  0.8  /  DBili  x   /  AST  11  /  ALT  15  /  AlkPhos  32  06-04        amylase   lipase  RADIOLOGY & ADDITIONAL TESTS:
INTERVAL HPI/OVERNIGHT EVENTS:  sp 1 unit yesterday   no BM     MEDICATIONS  (STANDING):  atorvastatin 20 milliGRAM(s) Oral at bedtime  dextrose 5%. 1000 milliLiter(s) (50 mL/Hr) IV Continuous <Continuous>  dextrose 50% Injectable 12.5 Gram(s) IV Push once  insulin glargine Injectable (LANTUS) 5 Unit(s) SubCutaneous at bedtime  insulin lispro (HumaLOG) corrective regimen sliding scale   SubCutaneous three times a day before meals  insulin lispro (HumaLOG) corrective regimen sliding scale   SubCutaneous at bedtime  iohexol 300 mG (iodine)/mL Oral Solution 30 milliLiter(s) Oral once  metoprolol tartrate 25 milliGRAM(s) Oral two times a day  pantoprazole Infusion 8 mG/Hr (10 mL/Hr) IV Continuous <Continuous>  sucralfate suspension 1 Gram(s) Oral every 6 hours    MEDICATIONS  (PRN):  ALBUTerol    90 MICROgram(s) HFA Inhaler 2 Puff(s) Inhalation every 6 hours PRN Shortness of Breath and/or Wheezing  dextrose 40% Gel 15 Gram(s) Oral once PRN Blood Glucose LESS THAN 70 milliGRAM(s)/deciliter  glucagon  Injectable 1 milliGRAM(s) IntraMuscular once PRN Glucose LESS THAN 70 milligrams/deciliter  traMADol 25 milliGRAM(s) Oral four times a day PRN Moderate Pain (4 - 6)      Allergies    No Known Allergies    Intolerances        Review of Systems:    General:  No wt loss, fevers, chills, night sweats,fatigue,   Eyes:  Good vision, no reported pain  ENT:  No sore throat, pain, runny nose, dysphagia  CV:  No pain, palpitatioins, hypo/hypertension  Resp:  No dyspnea, cough, tachypnea, wheezing  GI:  No pain, No nausea, No vomiting, No diarrhea, No constipatiion, No weight loss, No fever, No pruritis, No rectal bleeding, No tarry stools, No dysphagia,  :  No pain, bleeding, incontinence, nocturia  Muscle:  No pain, weakness  Neuro:  No weakness, tingling, memory problems  Psych:  No fatigue, insomnia, mood problems, depression  Endocrine:  No polyuria, polydypsia, cold/heat intolerance  Heme:  No petechiae, ecchymosis, easy bruisability  Skin:  No rash, tattoos, scars, edema      Vital Signs Last 24 Hrs  T(C): 36.7 (02 Jun 2018 08:39), Max: 36.9 (01 Jun 2018 20:00)  T(F): 98.1 (02 Jun 2018 08:39), Max: 98.5 (02 Jun 2018 01:01)  HR: 82 (02 Jun 2018 08:00) (75 - 96)  BP: 116/69 (02 Jun 2018 08:00) (101/64 - 127/77)  BP(mean): 84 (02 Jun 2018 08:00) (75 - 97)  RR: 17 (02 Jun 2018 08:00) (13 - 21)  SpO2: 98% (02 Jun 2018 08:00) (97% - 100%)    PHYSICAL EXAM:    Constitutional: NAD, well-developed  HEENT: EOMI, throat clear  Neck: No LAD, supple  Respiratory: CTA and P  Cardiovascular: S1 and S2, RRR, no M  Gastrointestinal: BS+, soft, NT/ND, neg HSM,  Extremities: No peripheral edema, neg clubing, cyanosis  Vascular: 2+ peripheral pulses  Neurological: A/O x 3, no focal deficits  Psychiatric: Normal mood, normal affect  Skin: No rashes      LABS:                        8.4    7.0   )-----------( 239      ( 02 Jun 2018 06:02 )             24.9     06-02    135  |  105  |  26<H>  ----------------------------<  133<H>  3.7   |  27  |  0.65    Ca    8.2<L>      02 Jun 2018 06:02  Phos  3.9     06-02  Mg     1.7     06-02    TPro  5.4<L>  /  Alb  2.5<L>  /  TBili  0.9  /  DBili  x   /  AST  14  /  ALT  16  /  AlkPhos  31  06-01          RADIOLOGY & ADDITIONAL TESTS:
Patient is a 77y Male with a known history of :  Carpal tunnel syndrome (G56.00)  Peptic ulcer disease with hemorrhage (K27.4)  DM (diabetes mellitus) (E11.9)  Duodenal ulcer (K26.9)  HTN (hypertension) (I10)  CAD (coronary artery disease) (I25.10)  Acute blood loss anemia (D62)  UGIB (upper gastrointestinal bleed) (K92.2)  Gastrointestinal hemorrhage with melena (K92.1)  GI bleed (K92.2)  Stented coronary artery (Z95.5)  Chronic obstructive pulmonary disease, unspecified COPD type (J44.9)  SANTO on CPAP (G47.33)    HPI:  Patient is 78 yo male with hx of CAD S/P stent 14 yrs ago, Carpel tunnel syndrome, and HTN presenting with Dizziness, generalized weakness, and SOB on exertional that has been going on for the past week, and progresively worsen.  Patient was started on steroid 1week for carpel tunnel syndrome.  + dark tarry stool.  Patient denies any Chest pain, headache, blurry vision, abdominal pain, N/V/D, fever, chills, numbness or cough    In the ED, HB is 4.  BP improved with IVF bolus.  Stool occult +.  2 unit of RBC ordered (30 May 2018 12:59)      REVIEW OF SYSTEMS:    CONSTITUTIONAL: No fever, weight loss, or fatigue  EYES: No eye pain, visual disturbances, or discharge  ENMT:  No difficulty hearing, tinnitus, vertigo; No sinus or throat pain  NECK: No pain or stiffness  BREASTS: No pain, masses, or nipple discharge  RESPIRATORY: No cough, wheezing, chills or hemoptysis; No shortness of breath  CARDIOVASCULAR: No chest pain, palpitations, dizziness, or leg swelling  GASTROINTESTINAL: No abdominal or epigastric pain. No nausea, vomiting, or hematemesis; No diarrhea or constipation. No melena or hematochezia.  GENITOURINARY: No dysuria, frequency, hematuria, or incontinence  NEUROLOGICAL: No headaches, memory loss, loss of strength, numbness, or tremors  SKIN: No itching, burning, rashes, or lesions   LYMPH NODES: No enlarged glands  ENDOCRINE: No heat or cold intolerance; No hair loss  MUSCULOSKELETAL: No joint pain or swelling; No muscle, back, or extremity pain  PSYCHIATRIC: No depression, anxiety, mood swings, or difficulty sleeping  HEME/LYMPH: No easy bruising, or bleeding gums  ALLERGY AND IMMUNOLOGIC: No hives or eczema    MEDICATIONS  (STANDING):  atorvastatin 20 milliGRAM(s) Oral at bedtime  dextrose 5%. 1000 milliLiter(s) (50 mL/Hr) IV Continuous <Continuous>  dextrose 50% Injectable 12.5 Gram(s) IV Push once  insulin glargine Injectable (LANTUS) 5 Unit(s) SubCutaneous at bedtime  insulin lispro (HumaLOG) corrective regimen sliding scale   SubCutaneous three times a day before meals  insulin lispro (HumaLOG) corrective regimen sliding scale   SubCutaneous at bedtime  metoprolol tartrate 25 milliGRAM(s) Oral two times a day  pantoprazole  Injectable 40 milliGRAM(s) IV Push two times a day  sucralfate suspension 1 Gram(s) Oral every 6 hours    MEDICATIONS  (PRN):  ALBUTerol    90 MICROgram(s) HFA Inhaler 2 Puff(s) Inhalation every 6 hours PRN Shortness of Breath and/or Wheezing  dextrose 40% Gel 15 Gram(s) Oral once PRN Blood Glucose LESS THAN 70 milliGRAM(s)/deciliter  glucagon  Injectable 1 milliGRAM(s) IntraMuscular once PRN Glucose LESS THAN 70 milligrams/deciliter  traMADol 25 milliGRAM(s) Oral four times a day PRN Moderate Pain (4 - 6)      ALLERGIES: No Known Allergies      FAMILY HISTORY:  Family history of essential hypertension (Father)      Social history:  Alochol:   Smoking:   Drug Use:   Marital Status:     PHYSICAL EXAMINATION:  -----------------------------  T(C): 36.7 (06-03-18 @ 08:27), Max: 36.9 (06-02-18 @ 16:00)  HR: 88 (06-03-18 @ 08:58) (79 - 98)  BP: 124/76 (06-03-18 @ 08:00) (117/69 - 129/78)  RR: 14 (06-03-18 @ 08:00) (14 - 21)  SpO2: 100% (06-03-18 @ 08:58) (97% - 100%)  Wt(kg): --    06-02 @ 07:01  -  06-03 @ 07:00  --------------------------------------------------------  IN:    Oral Fluid: 1920 mL    pantoprazole Infusion: 120 mL  Total IN: 2040 mL    OUT:    Voided: 2500 mL  Total OUT: 2500 mL    Total NET: -460 mL            Constitutional: well developed, normal appearance, well groomed, well nourished, no deformities and no acute distress.   Eyes: the conjunctiva exhibited no abnormalities and the eyelids demonstrated no xanthelasmas.   HEENT: normal oral mucosa, no oral pallor and no oral cyanosis.   Neck: normal jugular venous A waves present, normal jugular venous V waves present and no jugular venous fung A waves.   Pulmonary: no respiratory distress, normal respiratory rhythm and effort, no accessory muscle use and lungs were clear to auscultation bilaterally.   Cardiovascular: heart rate and rhythm were normal, normal S1 and S2 and no murmur, gallop, rub, heave or thrill are present.    Musculoskeletal: the gait could not be assessed.   Extremities: B/L soft 1-2 Plus edema.   Skin: normal skin color and pigmentation, no rash, no venous stasis, no skin lesions, no skin ulcer and no xanthoma was observed.   Psychiatric: oriented to person, place, and time, the affect was normal, the mood was normal and not feeling anxious.     LABS:   --------  06-02    135  |  105  |  26<H>  ----------------------------<  133<H>  3.7   |  27  |  0.65    Ca    8.2<L>      02 Jun 2018 06:02  Phos  3.9     06-02  Mg     1.7     06-02                           8.9    x     )-----------( x        ( 02 Jun 2018 16:06 )             27.2                   Radiology:
Patient is a 77y old  Male who presents with a chief complaint of Dizziness/SOB (05 Jun 2018 13:38)        HPI:  Patient is 76 yo male with hx of CAD S/P stent 14 yrs ago, Carpel tunnel syndrome, and HTN presenting with Dizziness, generalized weakness, and SOB on exertional that has been going on for the past week, and progresively worsen.  Patient was started on steroid 1week for carpel tunnel syndrome.  + dark tarry stool.  Patient denies any Chest pain, headache, blurry vision, abdominal pain, N/V/D, fever, chills, numbness or cough    In the ED, HB is 4.  BP improved with IVF bolus.  Stool occult +.  2 unit of RBC ordered (30 May 2018 12:59)      SUBJECTIVE & OBJECTIVE: Pt seen and examined at bedside.     PHYSICAL EXAM:  T(C): 36.7 (06-06-18 @ 05:49), Max: 36.8 (06-05-18 @ 17:10)  HR: 94 (06-06-18 @ 05:49) (68 - 99)  BP: 129/78 (06-06-18 @ 05:49) (118/69 - 129/78)  RR: 18 (06-06-18 @ 05:49) (16 - 18)  SpO2: 98% (06-06-18 @ 05:49) (94% - 98%)  Wt(kg): --   GENERAL: NAD, well-groomed, well-developed  HEAD:  Atraumatic, Normocephalic  EYES: EOMI, PERRLA, conjunctiva and sclera clear  ENMT: Moist mucous membranes  NECK: Supple, No JVD  NERVOUS SYSTEM:  Alert & Oriented X3, Motor Strength 5/5 B/L upper and lower extremities; DTRs 2+ intact and symmetric  CHEST/LUNG: Clear to auscultation bilaterally; No rales, rhonchi, wheezing, or rubs  HEART: Regular rate and rhythm; No murmurs, rubs, or gallops  ABDOMEN: Soft, Nontender, Nondistended; Bowel sounds present  EXTREMITIES:  2+ Peripheral Pulses, No clubbing, cyanosis, or edema        MEDICATIONS  (STANDING):  atorvastatin 20 milliGRAM(s) Oral at bedtime  dextrose 5%. 1000 milliLiter(s) (50 mL/Hr) IV Continuous <Continuous>  dextrose 50% Injectable 12.5 Gram(s) IV Push once  furosemide    Tablet 20 milliGRAM(s) Oral daily  hydrocortisone 0.5% Cream 1 Application(s) Topical two times a day  insulin glargine Injectable (LANTUS) 5 Unit(s) SubCutaneous at bedtime  insulin lispro (HumaLOG) corrective regimen sliding scale   SubCutaneous Before meals and at bedtime  metoprolol tartrate 25 milliGRAM(s) Oral two times a day  nystatin Cream 1 Application(s) Topical two times a day  pantoprazole  Injectable 40 milliGRAM(s) IV Push two times a day  sucralfate suspension 1 Gram(s) Oral every 6 hours    MEDICATIONS  (PRN):  ALBUTerol    90 MICROgram(s) HFA Inhaler 2 Puff(s) Inhalation every 6 hours PRN Shortness of Breath and/or Wheezing  dextrose 40% Gel 15 Gram(s) Oral once PRN Blood Glucose LESS THAN 70 milliGRAM(s)/deciliter  glucagon  Injectable 1 milliGRAM(s) IntraMuscular once PRN Glucose LESS THAN 70 milligrams/deciliter  traMADol 25 milliGRAM(s) Oral four times a day PRN Moderate Pain (4 - 6)      LABS:                        8.5    6.5   )-----------( 303      ( 06 Jun 2018 07:37 )             25.6     06-06    139  |  106  |  19  ----------------------------<  152<H>  3.7   |  28  |  0.84    Ca    8.7      06 Jun 2018 07:37  Phos  3.7     06-05  Mg     1.7     06-05    TPro  6.3  /  Alb  2.4<L>  /  TBili  0.6  /  DBili  x   /  AST  11  /  ALT  15  /  AlkPhos  31  06-06          CAPILLARY BLOOD GLUCOSE      POCT Blood Glucose.: 173 mg/dL (06 Jun 2018 07:57)  POCT Blood Glucose.: 186 mg/dL (05 Jun 2018 21:34)  POCT Blood Glucose.: 231 mg/dL (05 Jun 2018 16:41)  POCT Blood Glucose.: 250 mg/dL (05 Jun 2018 12:23)      CAPILLARY BLOOD GLUCOSE      POCT Blood Glucose.: 173 mg/dL (06 Jun 2018 07:57)  POCT Blood Glucose.: 186 mg/dL (05 Jun 2018 21:34)  POCT Blood Glucose.: 231 mg/dL (05 Jun 2018 16:41)  POCT Blood Glucose.: 250 mg/dL (05 Jun 2018 12:23)    CAPILLARY BLOOD GLUCOSE      POCT Blood Glucose.: 173 mg/dL (06 Jun 2018 07:57)            RECENT CULTURES:      RADIOLOGY & ADDITIONAL TESTS:                        DVT/GI ppx  Discussed with pt @ bedside
Patient is a 77y old  Male who presents with a chief complaint of Dizziness/SOB (30 May 2018 15:52)        HPI:  Patient is 76 yo male with hx of CAD S/P stent 14 yrs ago, Carpel tunnel syndrome, and HTN presenting with Dizziness, generalized weakness, and SOB on exertional that has been going on for the past week, and progresively worsen.  Patient was started on steroid 1week for carpel tunnel syndrome.  + dark tarry stool.  Patient denies any Chest pain, headache, blurry vision, abdominal pain, N/V/D, fever, chills, numbness or cough    In the ED, HB is 4.  BP improved with IVF bolus.  Stool occult +.  2 unit of RBC ordered (30 May 2018 12:59)      SUBJECTIVE & OBJECTIVE: Pt seen and examined at bedside, no acute complaints, drop in HH     PHYSICAL EXAM:  T(C): 36.7 (06-04-18 @ 08:25), Max: 36.9 (06-03-18 @ 16:24)  HR: 89 (06-04-18 @ 08:00) (76 - 106)  BP: 114/81 (06-04-18 @ 08:00) (108/79 - 126/80)  RR: 16 (06-04-18 @ 08:00) (14 - 25)  SpO2: 97% (06-04-18 @ 08:00) (95% - 100%)  Wt(kg): --   GENERAL: NAD, well-groomed, well-developed  HEAD:  Atraumatic, Normocephalic  EYES: EOMI, PERRLA, conjunctiva and sclera clear  ENMT: Moist mucous membranes  NECK: Supple, No JVD  NERVOUS SYSTEM:  Alert & Oriented X3,  CHEST/LUNG: Clear to auscultation bilaterally; No rales, rhonchi, wheezing, or rubs  HEART: Regular rate and rhythm; No murmurs, rubs, or gallops  ABDOMEN: Soft, Nontender, Nondistended; Bowel sounds present  EXTREMITIES:  2+ Peripheral Pulses, No clubbing, cyanosis, or edema        MEDICATIONS  (STANDING):  atorvastatin 20 milliGRAM(s) Oral at bedtime  dextrose 5%. 1000 milliLiter(s) (50 mL/Hr) IV Continuous <Continuous>  dextrose 50% Injectable 12.5 Gram(s) IV Push once  furosemide    Tablet 20 milliGRAM(s) Oral daily  insulin glargine Injectable (LANTUS) 5 Unit(s) SubCutaneous at bedtime  insulin lispro (HumaLOG) corrective regimen sliding scale   SubCutaneous Before meals and at bedtime  metoprolol tartrate 25 milliGRAM(s) Oral two times a day  pantoprazole  Injectable 40 milliGRAM(s) IV Push two times a day  sucralfate suspension 1 Gram(s) Oral every 6 hours    MEDICATIONS  (PRN):  ALBUTerol    90 MICROgram(s) HFA Inhaler 2 Puff(s) Inhalation every 6 hours PRN Shortness of Breath and/or Wheezing  dextrose 40% Gel 15 Gram(s) Oral once PRN Blood Glucose LESS THAN 70 milliGRAM(s)/deciliter  glucagon  Injectable 1 milliGRAM(s) IntraMuscular once PRN Glucose LESS THAN 70 milligrams/deciliter  traMADol 25 milliGRAM(s) Oral four times a day PRN Moderate Pain (4 - 6)      LABS:                        8.3    5.4   )-----------( 255      ( 04 Jun 2018 06:03 )             24.7     06-04    139  |  106  |  22  ----------------------------<  144<H>  3.7   |  26  |  0.75    Ca    8.3<L>      04 Jun 2018 06:03    TPro  5.6<L>  /  Alb  2.2<L>  /  TBili  0.8  /  DBili  x   /  AST  11  /  ALT  15  /  AlkPhos  32  06-04          CAPILLARY BLOOD GLUCOSE      POCT Blood Glucose.: 148 mg/dL (04 Jun 2018 07:45)  POCT Blood Glucose.: 212 mg/dL (03 Jun 2018 22:23)  POCT Blood Glucose.: 238 mg/dL (03 Jun 2018 16:20)  POCT Blood Glucose.: 172 mg/dL (03 Jun 2018 11:52)      CAPILLARY BLOOD GLUCOSE      POCT Blood Glucose.: 148 mg/dL (04 Jun 2018 07:45)  POCT Blood Glucose.: 212 mg/dL (03 Jun 2018 22:23)  POCT Blood Glucose.: 238 mg/dL (03 Jun 2018 16:20)  POCT Blood Glucose.: 172 mg/dL (03 Jun 2018 11:52)    CAPILLARY BLOOD GLUCOSE      POCT Blood Glucose.: 148 mg/dL (04 Jun 2018 07:45)            RECENT CULTURES:      RADIOLOGY & ADDITIONAL TESTS:                        DVT/GI ppx  Discussed with pt @ bedside
Patient is a 77y old  Male who presents with a chief complaint of Dizziness/SOB (30 May 2018 15:52)        HPI:  Patient is 76 yo male with hx of CAD S/P stent 14 yrs ago, Carpel tunnel syndrome, and HTN presenting with Dizziness, generalized weakness, and SOB on exertional that has been going on for the past week, and progresively worsen.  Patient was started on steroid 1week for carpel tunnel syndrome.  + dark tarry stool.  Patient denies any Chest pain, headache, blurry vision, abdominal pain, N/V/D, fever, chills, numbness or cough    In the ED, HB is 4.  BP improved with IVF bolus.  Stool occult +.  2 unit of RBC ordered (30 May 2018 12:59)      SUBJECTIVE & OBJECTIVE: Pt seen and examined at bedside, no acute complaints, no recurrent episodes of gi bleed reported overnight    PHYSICAL EXAM:  T(C): 36.7 (06-02-18 @ 04:01), Max: 36.9 (06-01-18 @ 20:00)  HR: 86 (06-02-18 @ 06:02) (67 - 96)  BP: 127/77 (06-02-18 @ 06:02) (84/48 - 134/75)  RR: 16 (06-02-18 @ 06:02) (13 - 21)  SpO2: 99% (06-02-18 @ 06:02) (97% - 100%)  Wt(kg): --   GENERAL: NAD, well-groomed, well-developed  HEAD:  Atraumatic, Normocephalic  EYES: EOMI, PERRLA, conjunctiva and sclera clear  ENMT: Moist mucous membranes  NECK: Supple, No JVD  NERVOUS SYSTEM:  Alert & Oriented X3, Motor Strength 5/5 B/L upper and lower extremities; DTRs 2+ intact and symmetric  CHEST/LUNG: Clear to auscultation bilaterally; No rales, rhonchi, wheezing, or rubs  HEART: Regular rate and rhythm; No murmurs, rubs, or gallops  ABDOMEN: Soft, Nontender, Nondistended; Bowel sounds present  EXTREMITIES:  2+ Peripheral Pulses, No clubbing, cyanosis, or edema        MEDICATIONS  (STANDING):  atorvastatin 20 milliGRAM(s) Oral at bedtime  dextrose 5%. 1000 milliLiter(s) (50 mL/Hr) IV Continuous <Continuous>  dextrose 50% Injectable 12.5 Gram(s) IV Push once  insulin glargine Injectable (LANTUS) 5 Unit(s) SubCutaneous at bedtime  insulin lispro (HumaLOG) corrective regimen sliding scale   SubCutaneous three times a day before meals  insulin lispro (HumaLOG) corrective regimen sliding scale   SubCutaneous at bedtime  metoprolol tartrate 25 milliGRAM(s) Oral two times a day  pantoprazole Infusion 8 mG/Hr (10 mL/Hr) IV Continuous <Continuous>  sucralfate suspension 1 Gram(s) Oral every 6 hours    MEDICATIONS  (PRN):  ALBUTerol    90 MICROgram(s) HFA Inhaler 2 Puff(s) Inhalation every 6 hours PRN Shortness of Breath and/or Wheezing  dextrose 40% Gel 15 Gram(s) Oral once PRN Blood Glucose LESS THAN 70 milliGRAM(s)/deciliter  glucagon  Injectable 1 milliGRAM(s) IntraMuscular once PRN Glucose LESS THAN 70 milligrams/deciliter  traMADol 25 milliGRAM(s) Oral four times a day PRN Moderate Pain (4 - 6)      LABS:                        8.4    7.0   )-----------( 239      ( 02 Jun 2018 06:02 )             24.9     06-02    135  |  105  |  26<H>  ----------------------------<  133<H>  3.7   |  27  |  0.65    Ca    8.2<L>      02 Jun 2018 06:02  Phos  3.9     06-02  Mg     1.7     06-02    TPro  5.4<L>  /  Alb  2.5<L>  /  TBili  0.9  /  DBili  x   /  AST  14  /  ALT  16  /  AlkPhos  31  06-01        Magnesium, Serum: 1.7 mg/dL (06-02 @ 06:02)    CAPILLARY BLOOD GLUCOSE      POCT Blood Glucose.: 138 mg/dL (02 Jun 2018 06:11)  POCT Blood Glucose.: 164 mg/dL (01 Jun 2018 21:11)  POCT Blood Glucose.: 178 mg/dL (01 Jun 2018 16:31)  POCT Blood Glucose.: 193 mg/dL (01 Jun 2018 11:43)  POCT Blood Glucose.: 208 mg/dL (01 Jun 2018 09:36)      CAPILLARY BLOOD GLUCOSE      POCT Blood Glucose.: 138 mg/dL (02 Jun 2018 06:11)  POCT Blood Glucose.: 164 mg/dL (01 Jun 2018 21:11)  POCT Blood Glucose.: 178 mg/dL (01 Jun 2018 16:31)  POCT Blood Glucose.: 193 mg/dL (01 Jun 2018 11:43)  POCT Blood Glucose.: 208 mg/dL (01 Jun 2018 09:36)    CAPILLARY BLOOD GLUCOSE      POCT Blood Glucose.: 138 mg/dL (02 Jun 2018 06:11)            RECENT CULTURES:      RADIOLOGY & ADDITIONAL TESTS:                        DVT/GI ppx  Discussed with pt @ bedside
Patient is a 77y old  Male who presents with a chief complaint of Dizziness/SOB (30 May 2018 15:52)        HPI:  Patient is 78 yo male with hx of CAD S/P stent 14 yrs ago, Carpel tunnel syndrome, and HTN presenting with Dizziness, generalized weakness, and SOB on exertional that has been going on for the past week, and progresively worsen.  Patient was started on steroid 1week for carpel tunnel syndrome.  + dark tarry stool.  Patient denies any Chest pain, headache, blurry vision, abdominal pain, N/V/D, fever, chills, numbness or cough    In the ED, HB is 4.  BP improved with IVF bolus.  Stool occult +.  2 unit of RBC ordered (30 May 2018 12:59)      SUBJECTIVE & OBJECTIVE: Pt seen and examined at bedside, no acute complaints, no further episode of bleeding     PHYSICAL EXAM:  T(C): 36.7 (06-05-18 @ 09:53), Max: 36.9 (06-04-18 @ 16:05)  HR: 98 (06-05-18 @ 09:53) (68 - 103)  BP: 120/74 (06-05-18 @ 09:53) (110/73 - 131/79)  RR: 18 (06-05-18 @ 09:53) (18 - 21)  SpO2: 94% (06-05-18 @ 09:53) (92% - 100%)  Wt(kg): --   GENERAL: NAD, well-groomed, well-developed  HEAD:  Atraumatic, Normocephalic  EYES: EOMI, PERRLA, conjunctiva and sclera clear  ENMT: Moist mucous membranes  NECK: Supple, No JVD  NERVOUS SYSTEM:  Alert & Oriented X3,   CHEST/LUNG: Clear to auscultation bilaterally; No rales, rhonchi, wheezing, or rubs  HEART: Regular rate and rhythm; No murmurs, rubs, or gallops  ABDOMEN: Soft, Nontender, Nondistended; Bowel sounds present  EXTREMITIES:  2+ Peripheral Pulses, No clubbing, cyanosis, or edema        MEDICATIONS  (STANDING):  atorvastatin 20 milliGRAM(s) Oral at bedtime  dextrose 5%. 1000 milliLiter(s) (50 mL/Hr) IV Continuous <Continuous>  dextrose 50% Injectable 12.5 Gram(s) IV Push once  furosemide    Tablet 20 milliGRAM(s) Oral daily  insulin glargine Injectable (LANTUS) 5 Unit(s) SubCutaneous at bedtime  insulin lispro (HumaLOG) corrective regimen sliding scale   SubCutaneous Before meals and at bedtime  metoprolol tartrate 25 milliGRAM(s) Oral two times a day  pantoprazole  Injectable 40 milliGRAM(s) IV Push two times a day  sucralfate suspension 1 Gram(s) Oral every 6 hours    MEDICATIONS  (PRN):  ALBUTerol    90 MICROgram(s) HFA Inhaler 2 Puff(s) Inhalation every 6 hours PRN Shortness of Breath and/or Wheezing  dextrose 40% Gel 15 Gram(s) Oral once PRN Blood Glucose LESS THAN 70 milliGRAM(s)/deciliter  glucagon  Injectable 1 milliGRAM(s) IntraMuscular once PRN Glucose LESS THAN 70 milligrams/deciliter  traMADol 25 milliGRAM(s) Oral four times a day PRN Moderate Pain (4 - 6)      LABS:                        8.6    6.5   )-----------( 298      ( 05 Jun 2018 07:32 )             26.3     06-05    141  |  105  |  18  ----------------------------<  129<H>  3.9   |  27  |  0.73    Ca    8.7      05 Jun 2018 07:32  Phos  3.7     06-05  Mg     1.7     06-05    TPro  5.7<L>  /  Alb  2.5<L>  /  TBili  0.8  /  DBili  x   /  AST  11  /  ALT  17  /  AlkPhos  26<L>  06-05        Magnesium, Serum: 1.7 mg/dL (06-05 @ 07:32)    CAPILLARY BLOOD GLUCOSE      POCT Blood Glucose.: 250 mg/dL (05 Jun 2018 12:23)  POCT Blood Glucose.: 136 mg/dL (05 Jun 2018 07:55)  POCT Blood Glucose.: 282 mg/dL (04 Jun 2018 22:13)  POCT Blood Glucose.: 156 mg/dL (04 Jun 2018 16:36)      CAPILLARY BLOOD GLUCOSE      POCT Blood Glucose.: 250 mg/dL (05 Jun 2018 12:23)  POCT Blood Glucose.: 136 mg/dL (05 Jun 2018 07:55)  POCT Blood Glucose.: 282 mg/dL (04 Jun 2018 22:13)  POCT Blood Glucose.: 156 mg/dL (04 Jun 2018 16:36)    CAPILLARY BLOOD GLUCOSE      POCT Blood Glucose.: 250 mg/dL (05 Jun 2018 12:23)            RECENT CULTURES:      RADIOLOGY & ADDITIONAL TESTS:                        DVT/GI ppx  Discussed with pt @ bedside
Patient is a 77y old  Male who presents with a chief complaint of Dizziness/SOB (30 May 2018 15:52)        HPI:  Patient is 78 yo male with hx of CAD S/P stent 14 yrs ago, Carpel tunnel syndrome, and HTN presenting with Dizziness, generalized weakness, and SOB on exertional that has been going on for the past week, and progresively worsen.  Patient was started on steroid 1week for carpel tunnel syndrome.  + dark tarry stool.  Patient denies any Chest pain, headache, blurry vision, abdominal pain, N/V/D, fever, chills, numbness or cough    In the ED, HB is 4.  BP improved with IVF bolus.  Stool occult +.  2 unit of RBC ordered (30 May 2018 12:59)      SUBJECTIVE & OBJECTIVE: Pt seen and examined at bedside.     PHYSICAL EXAM:  T(C): 36.8 (06-03-18 @ 04:34), Max: 36.9 (06-02-18 @ 16:00)  HR: 84 (06-03-18 @ 06:00) (79 - 98)  BP: 117/70 (06-03-18 @ 06:00) (116/69 - 129/78)  RR: 17 (06-03-18 @ 06:00) (15 - 21)  SpO2: 98% (06-03-18 @ 06:00) (97% - 100%)  Wt(kg): --   GENERAL: NAD, well-groomed, well-developed  HEAD:  Atraumatic, Normocephalic  EYES: EOMI, PERRLA, conjunctiva and sclera clear  ENMT: Moist mucous membranes  NECK: Supple, No JVD  NERVOUS SYSTEM:  Alert & Oriented X3, Motor Strength 5/5 B/L upper and lower extremities; DTRs 2+ intact and symmetric  CHEST/LUNG: Clear to auscultation bilaterally; No rales, rhonchi, wheezing, or rubs  HEART: Regular rate and rhythm; No murmurs, rubs, or gallops  ABDOMEN: Soft, Nontender, Nondistended; Bowel sounds present  EXTREMITIES:  2+ Peripheral Pulses, No clubbing, cyanosis, or edema        MEDICATIONS  (STANDING):  atorvastatin 20 milliGRAM(s) Oral at bedtime  dextrose 5%. 1000 milliLiter(s) (50 mL/Hr) IV Continuous <Continuous>  dextrose 50% Injectable 12.5 Gram(s) IV Push once  insulin glargine Injectable (LANTUS) 5 Unit(s) SubCutaneous at bedtime  insulin lispro (HumaLOG) corrective regimen sliding scale   SubCutaneous three times a day before meals  insulin lispro (HumaLOG) corrective regimen sliding scale   SubCutaneous at bedtime  metoprolol tartrate 25 milliGRAM(s) Oral two times a day  pantoprazole  Injectable 40 milliGRAM(s) IV Push two times a day  sucralfate suspension 1 Gram(s) Oral every 6 hours    MEDICATIONS  (PRN):  ALBUTerol    90 MICROgram(s) HFA Inhaler 2 Puff(s) Inhalation every 6 hours PRN Shortness of Breath and/or Wheezing  dextrose 40% Gel 15 Gram(s) Oral once PRN Blood Glucose LESS THAN 70 milliGRAM(s)/deciliter  glucagon  Injectable 1 milliGRAM(s) IntraMuscular once PRN Glucose LESS THAN 70 milligrams/deciliter  traMADol 25 milliGRAM(s) Oral four times a day PRN Moderate Pain (4 - 6)      LABS:                        8.9    x     )-----------( x        ( 02 Jun 2018 16:06 )             27.2     06-02    135  |  105  |  26<H>  ----------------------------<  133<H>  3.7   |  27  |  0.65    Ca    8.2<L>      02 Jun 2018 06:02  Phos  3.9     06-02  Mg     1.7     06-02            CAPILLARY BLOOD GLUCOSE      POCT Blood Glucose.: 155 mg/dL (03 Jun 2018 07:36)  POCT Blood Glucose.: 170 mg/dL (02 Jun 2018 21:30)  POCT Blood Glucose.: 214 mg/dL (02 Jun 2018 16:31)  POCT Blood Glucose.: 173 mg/dL (02 Jun 2018 11:52)      CAPILLARY BLOOD GLUCOSE      POCT Blood Glucose.: 155 mg/dL (03 Jun 2018 07:36)  POCT Blood Glucose.: 170 mg/dL (02 Jun 2018 21:30)  POCT Blood Glucose.: 214 mg/dL (02 Jun 2018 16:31)  POCT Blood Glucose.: 173 mg/dL (02 Jun 2018 11:52)    CAPILLARY BLOOD GLUCOSE      POCT Blood Glucose.: 155 mg/dL (03 Jun 2018 07:36)            RECENT CULTURES:      RADIOLOGY & ADDITIONAL TESTS:                        DVT/GI ppx  Discussed with pt @ bedside
Patient is a 77y old  Male who presents with a chief complaint of Dizziness/SOB (30 May 2018 15:52)      BRIEF HOSPITAL COURSE: 76 y/o M with a h/o CAD (s/p remote PCI), Carpel tunnel syndrome, HTN, admitted on 5/30 with progressively worsening dizziness, generalized weakness, and SOB on exertion x 1 week.  Patient was started on steroid 1 week ago for carpel tunnel syndrome. Patient also reported melena. Noted to be profoundly anemic (HgB: 4.7). Received 5u PRBC and 1u FFP total. s/p EGD 6/1/18 healing gastric ulcer found, no intervention performed     Events last 24 hours: Patient laying in bed comfortably without complaints, reports no BM yet since admission. No further bleeding noted. Hemodynamically stable, no tachycardia HR 80's. Had a drop in Hgb from 8.0 --> 7.4, though Hct stable. Requesting transition to full diet      PAST MEDICAL & SURGICAL HISTORY:  Difficulty walking  Obesity, morbid, BMI 40.0-49.9  Chronic obstructive pulmonary disease, unspecified COPD type  SANTO on CPAP  Stented coronary artery  Benign prostatic hyperplasia with lower urinary tract symptoms, symptom details unspecified  DM (diabetes mellitus)  HLD (hyperlipidemia)  CAD (coronary artery disease): w/ 4 stents  HTN (hypertension)  History of prostate surgery: laser to relieve a blockage.  S/P angioplasty with stent: 2004 and 2005 (Has 4 stents)  H/O knee surgery: Left - 11/7/2017      Review of Systems:  CONSTITUTIONAL: No fever, chills, or fatigue  EYES: No eye pain, visual disturbances, or discharge  ENMT:  No difficulty hearing, tinnitus, vertigo; No sinus or throat pain  NECK: No pain or stiffness  RESPIRATORY: No cough, wheezing, chills or hemoptysis; No shortness of breath  CARDIOVASCULAR: No chest pain, palpitations, dizziness, or leg swelling  GASTROINTESTINAL: +CONSTIPATION. No abdominal or epigastric pain. No nausea, vomiting, or hematemesis; No melena or hematochezia.  GENITOURINARY: No dysuria, frequency, hematuria, or incontinence  NEUROLOGICAL: No headaches, memory loss, loss of strength, numbness, or tremors  SKIN: No itching, burning, rashes, or lesions   MUSCULOSKELETAL: No joint pain or swelling; No muscle, back, or extremity pain  PSYCHIATRIC: No depression, anxiety, mood swings, or difficulty sleeping      Medications:  metoprolol tartrate 25 milliGRAM(s) Oral two times a day  ALBUTerol    90 MICROgram(s) HFA Inhaler 2 Puff(s) Inhalation every 6 hours PRN  traMADol 25 milliGRAM(s) Oral four times a day PRN  pantoprazole Infusion 8 mG/Hr IV Continuous <Continuous>  sucralfate suspension 1 Gram(s) Oral every 6 hours  atorvastatin 20 milliGRAM(s) Oral at bedtime  dextrose 40% Gel 15 Gram(s) Oral once PRN  dextrose 50% Injectable 12.5 Gram(s) IV Push once  glucagon  Injectable 1 milliGRAM(s) IntraMuscular once PRN  insulin glargine Injectable (LANTUS) 5 Unit(s) SubCutaneous at bedtime  insulin lispro (HumaLOG) corrective regimen sliding scale   SubCutaneous three times a day before meals  insulin lispro (HumaLOG) corrective regimen sliding scale   SubCutaneous at bedtime  dextrose 5%. 1000 milliLiter(s) IV Continuous <Continuous>      ICU Vital Signs Last 24 Hrs  T(C): 36.9 (02 Jun 2018 01:01), Max: 36.9 (01 Jun 2018 04:01)  T(F): 98.5 (02 Jun 2018 01:01), Max: 98.5 (02 Jun 2018 01:01)  HR: 77 (02 Jun 2018 01:01) (67 - 96)  BP: 108/90 (02 Jun 2018 01:01) (84/48 - 134/75)  BP(mean): 97 (02 Jun 2018 01:01) (59 - 97)  ABP: --  ABP(mean): --  RR: 15 (02 Jun 2018 01:01) (13 - 24)  SpO2: 100% (02 Jun 2018 01:01) (94% - 100%)          I&O's Detail    31 May 2018 07:01  -  01 Jun 2018 07:00  --------------------------------------------------------  IN:    FFP (Fresh Frozen Plasma): 320 mL    lactated ringers.: 250 mL    Oral Fluid: 940 mL    pantoprazole Infusion: 210 mL    PRBCs (Packed Red Blood Cells): 459 mL  Total IN: 2179 mL    OUT:    Voided: 2895 mL  Total OUT: 2895 mL    Total NET: -716 mL      01 Jun 2018 07:01  -  02 Jun 2018 02:13  --------------------------------------------------------  IN:    IV PiggyBack: 100 mL    Oral Fluid: 400 mL    pantoprazole Infusion: 110 mL    Sodium Chloride 0.9% IV Bolus: 500 mL  Total IN: 1110 mL    OUT:    Voided: 1460 mL  Total OUT: 1460 mL    Total NET: -350 mL            LABS:                        7.4    7.8   )-----------( 248      ( 01 Jun 2018 19:52 )             22.5     06-01    141  |  107  |  42<H>  ----------------------------<  119<H>  3.8   |  28  |  0.79    Ca    8.5      01 Jun 2018 05:53  Phos  3.6     06-01  Mg     1.4     06-01    TPro  5.4<L>  /  Alb  2.5<L>  /  TBili  0.9  /  DBili  x   /  AST  14  /  ALT  16  /  AlkPhos  31  06-01          CAPILLARY BLOOD GLUCOSE      POCT Blood Glucose.: 164 mg/dL (01 Jun 2018 21:11)        CULTURES:  Culture Results:   10,000 - 49,000 CFU/mL Coag Negative Staphylococcus (05-30 @ 20:47)      Physical Examination:    General: No acute distress.      HEENT: Pupils equal, reactive to light.  Symmetric.    PULM: Clear to auscultation bilaterally, no significant sputum production    CVS: Regular rate and rhythm, no murmurs, rubs, or gallops    ABD: Soft, nondistended, nontender, normoactive bowel sounds, no masses    EXT: generalized edema of R hand. No pedal edema    SKIN: Warm and well perfused, no rashes noted.    NEURO: Alert, oriented, interactive, nonfocal    RADIOLOGY: Old images reviewed      < from: US Transthoracic Echocardiogram w/Doppler Complete (05.30.18 @ 14:29) >  CONCLUSIONS:  1. Technically difficult and limited study with poor acoustic windows.  2. Endocardium not well visualized. Limited views suggest grossly normal   left ventricular size and overall systolic function. LVEF estimated at   62%.   3. Mild diastolicdysfunction (stage I).  4. Mild tricuspid regurgitation.     GLADYS MORENO M.D., ATTENDING CARDIOLOGIST  This document has been electronically signed. May 31 2018  7:40AM        < end of copied text >      INVASIVE LINES:  INDWELLING BADILLO:   VTE PROPHYLAXIS: n/a in the setting of GIB  CAM ICU: negative  CODE STATUS: FULL    TIME SPENT: 40 minutes assessing presenting problems of acute illness, which pose high probability of life threatening deterioration or end organ damage/dysfunction, as well as medical decision making including initiating plan of care, reviewing data, reviewing radiologic exams, discussing with multidisciplinary team, non-inclusive of procedures performed, discussing goals of care with patient/family
Patient is a 77y old  Male who presents with a chief complaint of Dizziness/SOB (30 May 2018 15:52)      BRIEF HOSPITAL COURSE: 78 y/o M with a h/o CAD (s/p remote PCI), Carpel tunnel syndrome, HTN, admitted on  with progressively worsening dizziness, generalized weakness, and SOB on exertion x 1 week.  Patient was started on steroid 1 week ago for carpel tunnel syndrome. Patient also reported melena. Noted to be profoundly anemic (HgB: 4.7). Received 5u PRBC and 1u FFP total.     Events last 24 hours: Patient s/p EGD- healing gastric ulcer found- no intervention performed (official report not posted yet). Patient laying in bed comfortably without complaints, reports no BM yet today. No further bleeding noted. H&H stable. Hemodynamically stable.    PAST MEDICAL & SURGICAL HISTORY:  Difficulty walking  Obesity, morbid, BMI 40.0-49.9  Chronic obstructive pulmonary disease, unspecified COPD type  SANTO on CPAP  Stented coronary artery  Benign prostatic hyperplasia with lower urinary tract symptoms, symptom details unspecified  DM (diabetes mellitus)  HLD (hyperlipidemia)  CAD (coronary artery disease): w/ 4 stents  HTN (hypertension)  History of prostate surgery: laser to relieve a blockage.  S/P angioplasty with stent:  and  (Has 4 stents)  H/O knee surgery: Left - 2017      Review of Systems:  CONSTITUTIONAL: No fever, chills, or fatigue  EYES: No eye pain, visual disturbances, or discharge  ENMT:  No difficulty hearing, tinnitus, vertigo; No sinus or throat pain  NECK: No pain or stiffness  RESPIRATORY: No cough, wheezing, chills or hemoptysis; No shortness of breath  CARDIOVASCULAR: No chest pain, palpitations, dizziness, or leg swelling  GASTROINTESTINAL: No abdominal or epigastric pain. No nausea, vomiting, or hematemesis; No diarrhea or constipation. No melena or hematochezia.  GENITOURINARY: No dysuria, frequency, hematuria, or incontinence  NEUROLOGICAL: No headaches, memory loss, loss of strength, numbness, or tremors  SKIN: No itching, burning, rashes, or lesions   MUSCULOSKELETAL: No joint pain or swelling; No muscle, back, or extremity pain  PSYCHIATRIC: No depression, anxiety, mood swings, or difficulty sleeping      Medications:    metoprolol tartrate 25 milliGRAM(s) Oral two times a day  ALBUTerol    90 MICROgram(s) HFA Inhaler 2 Puff(s) Inhalation every 6 hours PRN  pantoprazole Infusion 8 mG/Hr IV Continuous <Continuous>  sucralfate suspension 1 Gram(s) Oral every 6 hours  atorvastatin 20 milliGRAM(s) Oral at bedtime  dextrose 40% Gel 15 Gram(s) Oral once PRN  dextrose 50% Injectable 12.5 Gram(s) IV Push once  glucagon  Injectable 1 milliGRAM(s) IntraMuscular once PRN  insulin glargine Injectable (LANTUS) 5 Unit(s) SubCutaneous at bedtime  insulin lispro (HumaLOG) corrective regimen sliding scale   SubCutaneous three times a day before meals  insulin lispro (HumaLOG) corrective regimen sliding scale   SubCutaneous at bedtime  dextrose 5%. 1000 milliLiter(s) IV Continuous <Continuous>      ICU Vital Signs Last 24 Hrs  T(C): 36.5 (31 May 2018 20:08), Max: 37 (30 May 2018 21:50)  T(F): 97.7 (31 May 2018 20:08), Max: 98.6 (30 May 2018 21:50)  HR: 87 (31 May 2018 20:08) (76 - 107)  BP: 124/74 (31 May 2018 20:08) (97/82 - 133/81)  BP(mean): 88 (31 May 2018 20:08) (74 - 97)  ABP: --  ABP(mean): --  RR: 18 (31 May 2018 20:08) (14 - 21)  SpO2: 100% (31 May 2018 20:08) (97% - 100%)          I&O's Detail    30 May 2018 07:01  -  31 May 2018 07:00  --------------------------------------------------------  IN:    pantoprazole Infusion: 270 mL    PRBCs (Packed Red Blood Cells): 1212 mL    Sodium Chloride 0.9% IV Bolus: 2000 mL  Total IN: 3482 mL    OUT:    Voided: 3125 mL  Total OUT: 3125 mL    Total NET: 357 mL      31 May 2018 07:01  -  31 May 2018 20:30  --------------------------------------------------------  IN:    FFP (Fresh Frozen Plasma): 320 mL    lactated ringers.: 250 mL    Oral Fluid: 820 mL    pantoprazole Infusion: 90 mL    PRBCs (Packed Red Blood Cells): 459 mL  Total IN: 1939 mL    OUT:    Voided: 1595 mL  Total OUT: 1595 mL    Total NET: 344 mL            LABS:                        8.3    9.8   )-----------( 284      ( 31 May 2018 16:20 )             24.1     05-    140  |  108  |  67<H>  ----------------------------<  151<H>  4.3   |  26  |  0.80    Ca    8.4      31 May 2018 06:25    TPro  6.0  /  Alb  2.6<L>  /  TBili  0.3  /  DBili  x   /  AST  14  /  ALT  19  /  AlkPhos  31        CARDIAC MARKERS ( 30 May 2018 11:30 )  .000 ng/mL / x     / 22 U/L / x     / 1.4 ng/mL      CAPILLARY BLOOD GLUCOSE      POCT Blood Glucose.: 264 mg/dL (31 May 2018 12:19)    PT/INR - ( 30 May 2018 11:30 )   PT: 13.4 sec;   INR: 1.22 ratio         PTT - ( 30 May 2018 11:30 )  PTT:24.9 sec  Urinalysis Basic - ( 30 May 2018 15:35 )    Color: Yellow / Appearance: Clear / S.010 / pH: x  Gluc: x / Ketone: Negative  / Bili: Negative / Urobili: Negative mg/dL   Blood: x / Protein: 15 mg/dL / Nitrite: Negative   Leuk Esterase: Small / RBC: 0-2 /HPF / WBC 6-10   Sq Epi: x / Non Sq Epi: Few / Bacteria: Few      CULTURES:      Physical Examination:    General: No acute distress.  Alert, oriented, interactive, nonfocal    HEENT: Pupils equal, reactive to light.  Symmetric.    PULM: Clear to auscultation bilaterally, no significant sputum production    CVS: Regular rate and rhythm, no murmurs, rubs, or gallops    ABD: Soft, nondistended, nontender, normoactive bowel sounds, no masses    EXT: b/l trace edema, nontender    SKIN: Warm and well perfused, no rashes noted.    NEURO: A&Ox3, strength 5/5 all extremities, cranial nerves grossly intact, no focal deficits      RADIOLOGY:     < from: Xray Chest 1 View-PORTABLE IMMEDIATE (18 @ 11:56) >  Findings: There is unchanged elevation of the left diaphragm. There is   left lung base linear atelectasis. A trace left sided pleural effusion is   noted. The right lung is clear. The heart size is normal. There are mild   multilevel degenerative changes of the thoracic spine.     IMPRESSION: Trace left-sided pleural effusion.        TOTAL TIME SPENT SPENT ON ENCOUNTER: 38 MINS  Evaluating/treating patient, reviewing data/labs/imaging, discussing case with multidisciplinary team, discussing plan/goals of care with patient/family. Non-inclusive of procedure time.
Patient is a 77y old  Male who presents with a chief complaint of Dizziness/SOB (30 May 2018 15:52)      BRIEF HOSPITAL COURSE: 78 y/o M with a h/o CAD (s/p remote PCI), Carpel tunnel syndrome, HTN, admitted on 5/30 with progressively worsening dizziness, generalized weakness, and SOB on exertion x 1 week.  Patient was started on steroid 1 week ago for carpel tunnel syndrome. Patient also reported melena. Noted to be profoundly anemic (HgB: 4.7). Received 5u PRBC and 1u FFP total. s/p EGD 6/1/18 healing gastric ulcer found, no intervention performed     Events last 24 hours: Diet advanced today, tolerating well. No further bleeding. H/H stabilized today, hemodynamically stable.    PAST MEDICAL & SURGICAL HISTORY:  Difficulty walking  Obesity, morbid, BMI 40.0-49.9  Chronic obstructive pulmonary disease, unspecified COPD type  SANTO on CPAP  Stented coronary artery  Benign prostatic hyperplasia with lower urinary tract symptoms, symptom details unspecified  DM (diabetes mellitus)  HLD (hyperlipidemia)  CAD (coronary artery disease): w/ 4 stents  HTN (hypertension)  History of prostate surgery: laser to relieve a blockage.  S/P angioplasty with stent: 2004 and 2005 (Has 4 stents)  H/O knee surgery: Left - 11/7/2017      Review of Systems:  CONSTITUTIONAL: No fever, chills, or fatigue  EYES: No eye pain, visual disturbances, or discharge  ENMT:  No difficulty hearing, tinnitus, vertigo; No sinus or throat pain  NECK: No pain or stiffness  RESPIRATORY: No cough, wheezing, chills or hemoptysis; No shortness of breath  CARDIOVASCULAR: No chest pain, palpitations, dizziness, or leg swelling  GASTROINTESTINAL: No abdominal or epigastric pain. No nausea, vomiting, or hematemesis; No diarrhea or constipation. No melena or hematochezia.  GENITOURINARY: No dysuria, frequency, hematuria, or incontinence  NEUROLOGICAL: No headaches, memory loss, loss of strength, numbness, or tremors  SKIN: No itching, burning, rashes, or lesions   MUSCULOSKELETAL: No joint pain or swelling; No muscle, back, or extremity pain  PSYCHIATRIC: No depression, anxiety, mood swings, or difficulty sleeping      Medications:  metoprolol tartrate 25 milliGRAM(s) Oral two times a day  ALBUTerol    90 MICROgram(s) HFA Inhaler 2 Puff(s) Inhalation every 6 hours PRN  traMADol 25 milliGRAM(s) Oral four times a day PRN  pantoprazole  Injectable 40 milliGRAM(s) IV Push two times a day  sucralfate suspension 1 Gram(s) Oral every 6 hours  atorvastatin 20 milliGRAM(s) Oral at bedtime  dextrose 40% Gel 15 Gram(s) Oral once PRN  dextrose 50% Injectable 12.5 Gram(s) IV Push once  glucagon  Injectable 1 milliGRAM(s) IntraMuscular once PRN  insulin glargine Injectable (LANTUS) 5 Unit(s) SubCutaneous at bedtime  insulin lispro (HumaLOG) corrective regimen sliding scale   SubCutaneous three times a day before meals  insulin lispro (HumaLOG) corrective regimen sliding scale   SubCutaneous at bedtime  dextrose 5%. 1000 milliLiter(s) IV Continuous <Continuous>      ICU Vital Signs Last 24 Hrs  T(C): 36.7 (02 Jun 2018 20:14), Max: 36.9 (02 Jun 2018 01:01)  T(F): 98.1 (02 Jun 2018 20:14), Max: 98.5 (02 Jun 2018 01:01)  HR: 94 (02 Jun 2018 18:00) (75 - 98)  BP: 127/79 (02 Jun 2018 18:00) (103/61 - 129/78)  BP(mean): 94 (02 Jun 2018 18:00) (75 - 97)  ABP: --  ABP(mean): --  RR: 19 (02 Jun 2018 18:00) (13 - 21)  SpO2: 98% (02 Jun 2018 18:00) (97% - 100%)      I&O's Detail    01 Jun 2018 07:01  -  02 Jun 2018 07:00  --------------------------------------------------------  IN:    IV PiggyBack: 100 mL    Oral Fluid: 640 mL    pantoprazole Infusion: 230 mL    RBC Washed Cells: 344 mL    Sodium Chloride 0.9% IV Bolus: 500 mL  Total IN: 1814 mL    OUT:    Voided: 1685 mL  Total OUT: 1685 mL    Total NET: 129 mL      02 Jun 2018 07:01  -  02 Jun 2018 20:58  --------------------------------------------------------  IN:    Oral Fluid: 1420 mL    pantoprazole Infusion: 120 mL  Total IN: 1540 mL    OUT:    Voided: 1400 mL  Total OUT: 1400 mL    Total NET: 140 mL        LABS:                        8.9    x     )-----------( x        ( 02 Jun 2018 16:06 )             27.2     06-02    135  |  105  |  26<H>  ----------------------------<  133<H>  3.7   |  27  |  0.65    Ca    8.2<L>      02 Jun 2018 06:02  Phos  3.9     06-02  Mg     1.7     06-02    TPro  5.4<L>  /  Alb  2.5<L>  /  TBili  0.9  /  DBili  x   /  AST  14  /  ALT  16  /  AlkPhos  31  06-01          CAPILLARY BLOOD GLUCOSE      POCT Blood Glucose.: 214 mg/dL (02 Jun 2018 16:31)        CULTURES:  Culture Results:   10,000 - 49,000 CFU/mL Coag Negative Staphylococcus (05-30 @ 20:47)      Physical Examination:    General: No acute distress.      HEENT: Pupils equal, reactive to light.  Symmetric.    PULM: Clear to auscultation bilaterally, no significant sputum production    CVS: Regular rate and rhythm, no murmurs, rubs, or gallops    ABD: Soft, nondistended, nontender, normoactive bowel sounds, no masses    EXT: No edema, nontender    SKIN: Warm and well perfused, no rashes noted.    NEURO: Alert, oriented, interactive, nonfocal    RADIOLOGY:   < from: CT Abdomen and Pelvis w/ Oral Cont and w/ IV Cont (06.02.18 @ 13:58) >  EXAM:  CT ABDOMEN AND PELVIS OC IC                          PROCEDURE DATE:  06/02/2018      INTERPRETATION:  History: Anemia, evaluate for occult malignancy.     CT abdomen and pelvis oral and IV contrast.  95 cc Omnipaque 350 injectedintravenously. Prior dated 12/5/2011.  Patchy atelectasis/infiltrate at the left base. Coronary artery   calcification.  Gallbladder liver pancreas spleen not remarkable. Left upper quadrant   splenule. No adrenal nodules. Stable bilateral renal cysts.   Atherosclerotic nonaneurysmal aorta. No suspicious retroperitoneal   mesenteric adenopathy.   No evidence of appendicitis actively inflamed or obstructed bowel.   Moderate retained colonic stool. Loss Prostate bladder not remarkable. No   extraluminal fluid or gas.  No aggressive osseous lesions.    Impression:    No evidence of abdominal pelvic neoplasia.    YVONNE ELLISON M.D., ATTENDING RADIOLOGIST  This document has been electronically signed. Jun 2 2018  2:12PM        INVASIVE LINES: X   INDWELLING BADILLO: X  VTE PROPHYLAXIS: N/A in the setting of GIB  CAM ICU: N  CODE STATUS: FULL    CRITICAL CARE TIME SPENT: 25 minutes assessing presenting problems of acute illness, which pose high probability of life threatening deterioration or end organ damage/dysfunction, as well as medical decision making including initiating plan of care, reviewing data, reviewing radiologic exams, discussing with multidisciplinary team, non-inclusive of procedures performed, discussing goals of care with patient/family

## 2018-06-06 NOTE — PROGRESS NOTE ADULT - ASSESSMENT
76 y/o M with a h/o CAD (s/p remote PCI), Carpel tunnel syndrome, HTN, with UGIB, acute blood loss anemia secondary to PUD.
Pt stable post EGD. Duodenal ulcer with bleeding which has stopped.  Hx SANTO  May try Ultram for chronic wrist pain.
The patient is a 77 year old male with a history of HTN, HL, DM, SANTO on CPAP, BPH, CAD s/p GINNY to RCAx3 and LAD in 2004/2005, carpal tunnel syndrome who is admitted with UGIB.    6/2/18  Patient sitting up.  Wife at bedside.  Feeling better.  S/P transfusion yesterday.    Plan:  - Hold aspirin and clopidogrel  - Patient is 14 years out from PCI. Will eventually be restarted on single antiplatelet therapy when ok with GI.  - Continue atorvastatin 20 mg daily  - Hold furosemide  - Hold ramipril. Remains hypotensive  - Continue IVF  - Continue metoprolol tartrate 25 mg bid with holding parameters
76 y/o M with a h/o CAD (s/p remote PCI), Carpel tunnel syndrome, HTN, with UGIB, acute blood loss anemia secondary to PUD.
76 y/o M with a h/o CAD (s/p remote PCI), Carpel tunnel syndrome, HTN, with UGIB, acute blood loss anemia.
78 y/o M with a h/o CAD (s/p remote PCI), Carpel tunnel syndrome, HTN, with UGIB, acute blood loss anemia secondary to PUD.
Patient is 76 yo male with hx of CAD S/P stent 14yrs ago, HTN, and HLD presenting with     1. Anemia.  Seems to be secondary to UGIB.  + melena.  + stool occult.  S/P 4 units of RBC transfusion.  H/H is still low.  Will transfuse one unit of RBC + IV lasix.  Risks, and benefit of blood transfusion were discussed with patient in details.  Seems to understand, and in agreement.  Monitor H/H closely.  Continue with Protonix drip, NPO, IVF, and Monitor for signs of volume overload.  Hold ASA, steroid and Plavix for now.  Medical optimized for Emergency EGD.  GI to follow up    2. HX of CAD.  Continue with BB, and ACEI.  EKG shows no ischemic changes.  TTE shows LVEF estimated at 62% with mild diastolic dysfunction.  No CP.  Last stent was placed in 14 yrs ago.  Hold ASA, plavix for now.  Cardiology to follow up     3. HTN.  Continue with metoprolol, and lisinopril with holding parameter.  Monitor BP Closely    4.  SANTO. Continue with CPAP.  Pulmonary to follow up     5.  DM2.  Hold oral medications.  Continue with ISS, and Monitor POC.  HBA1C in am    6.  HLD.  Continue with statin    Plan of care was discussed with patient, and wife in great details, All questions were answered to their satisfaction  Seems to understand, and in agreement
Patient is 76 yo male with hx of CAD S/P stent 14yrs ago, HTN, and HLD presenting with     1. Anemia.  Seems to be secondary to UGIB.  + melena.  + stool occult.  S/P 5 units of RBC transfusion.  H/H droped by 1 gram from 9.5 to 8.5, no overt signs of bleeding, and HH sill 8.5 .  EGD shows Gastric ulcer, likely gastric  ulcer bleed secondary to being on nsaids/predinsone/asa/plavix/, no further overt signs of bleeding    Continue with Protonix  tolerating advancement diet  OOB  Physical therapy eval   HH stable  d/c planning     2. HX of CAD.  Continue with BB, and ACEI.  EKG shows no ischemic changes.  TTE shows LVEF estimated at 62% with mild diastolic dysfunction.  No CP.  Last stent was placed in 14 yrs ago.  Hold ASA, plavix for now pending further recommendation by GI.  Cardiology to follow up     3. HTN.  Continue with metoprolol, and lisinopril with holding parameter.  Monitor BP Closely    4.  SANTO. Continue with CPAP.  Pulmonary to follow up     5.  DM2.  Hold oral medications.  Continue with lantus, and ISS, and Monitor POC.    6.  HLD.  Continue with statin    7.  Carpel tunnel syndrome.  Ortho consult    Plan of care was discussed with patient in great details, All questions were answered to their satisfaction  Seems to understand, and in agreement
Patient is 76 yo male with hx of CAD S/P stent 14yrs ago, HTN, and HLD presenting with     1. Anemia.  Seems to be secondary to UGIB.  + melena.  + stool occult.  S/P 5 units of RBC transfusion.  H/H stable.  Monitor H/H closely.  EGD shows Gastric ulcer, likely gastric ulcer bleed secondary to being on nsaids/predinsone/asa/plavix/, no further overt signs of bleeding    Continue with Protonix drip, tolerating advancement diet  OOB  Physical therapy eval     2. HX of CAD.  Continue with BB, and ACEI.  EKG shows no ischemic changes.  TTE shows LVEF estimated at 62% with mild diastolic dysfunction.  No CP.  Last stent was placed in 14 yrs ago.  Hold ASA, plavix for now pending further recommendation by GI.  Cardiology to follow up     3. HTN.  Continue with metoprolol, and lisinopril with holding parameter.  Monitor BP Closely    4.  SANTO. Continue with CPAP.  Pulmonary to follow up     5.  DM2.  Hold oral medications.  Continue with lantus, and ISS, and Monitor POC.  HBA1C in am    6.  HLD.  Continue with statin    7.  Carpel tunnel syndrome.  Ortho consult    Plan of care was discussed with patient in great details, All questions were answered to their satisfaction  Seems to understand, and in agreement
Patient is 78 yo male with hx of CAD S/P stent 14yrs ago, HTN, and HLD presenting with     1. Anemia.  Seems to be secondary to UGIB.  + melena.  + stool occult.  S/P 5 units of RBC transfusion.  H/H droped by 1 gram from 9.5 to 8.5, no overt signs of bleeding, will repeat HH around noon .  EGD shows Gastric ulcer, likely gastric  ulcer bleed secondary to being on nsaids/predinsone/asa/plavix/, no further overt signs of bleeding    Continue with Protonix  tolerating advancement diet  OOB  Physical therapy eval   HH stable  d/c planning     2. HX of CAD.  Continue with BB, and ACEI.  EKG shows no ischemic changes.  TTE shows LVEF estimated at 62% with mild diastolic dysfunction.  No CP.  Last stent was placed in 14 yrs ago.  Hold ASA, plavix for now pending further recommendation by GI.  Cardiology to follow up     3. HTN.  Continue with metoprolol, and lisinopril with holding parameter.  Monitor BP Closely    4.  SANTO. Continue with CPAP.  Pulmonary to follow up     5.  DM2.  Hold oral medications.  Continue with lantus, and ISS, and Monitor POC.  HBA1C in am    6.  HLD.  Continue with statin    7.  Carpel tunnel syndrome.  Ortho consult    Plan of care was discussed with patient in great details, All questions were answered to their satisfaction  Seems to understand, and in agreement
Patient is 78 yo male with hx of CAD S/P stent 14yrs ago, HTN, and HLD presenting with     1. Anemia.  Seems to be secondary to UGIB.  + melena.  + stool occult.  S/P 5 units of RBC transfusion.  H/H droped by 1 gram from 9.5 to 8.5, no overt signs of bleeding, will repeat HH around noon .  EGD shows Gastric ulcer, likely gastric  ulcer bleed secondary to being on nsaids/predinsone/asa/plavix/, no further overt signs of bleeding    Continue with Protonix drip, tolerating advancement diet  OOB  Physical therapy eval     2. HX of CAD.  Continue with BB, and ACEI.  EKG shows no ischemic changes.  TTE shows LVEF estimated at 62% with mild diastolic dysfunction.  No CP.  Last stent was placed in 14 yrs ago.  Hold ASA, plavix for now pending further recommendation by GI.  Cardiology to follow up     3. HTN.  Continue with metoprolol, and lisinopril with holding parameter.  Monitor BP Closely    4.  SANTO. Continue with CPAP.  Pulmonary to follow up     5.  DM2.  Hold oral medications.  Continue with lantus, and ISS, and Monitor POC.  HBA1C in am    6.  HLD.  Continue with statin    7.  Carpel tunnel syndrome.  Ortho consult    Plan of care was discussed with patient in great details, All questions were answered to their satisfaction  Seems to understand, and in agreement
Patient is 78 yo male with hx of CAD S/P stent 14yrs ago, HTN, and HLD presenting with     1. Anemia.  Seems to be secondary to UGIB.  + melena.  + stool occult.  S/P 5 units of RBC transfusion.  H/H stable.  Monitor H/H closely.  EGD shows Gastric ulcer.  Continue with Protonix drip, Clear, IVF, and Monitor for signs of volume overload.  Hold ASA, steroid and Plavix for now.  GI to follow up    2. HX of CAD.  Continue with BB, and ACEI.  EKG shows no ischemic changes.  TTE shows LVEF estimated at 62% with mild diastolic dysfunction.  No CP.  Last stent was placed in 14 yrs ago.  Hold ASA, plavix for now pending further recommendation by GI.  Cardiology to follow up     3. HTN.  Continue with metoprolol, and lisinopril with holding parameter.  Monitor BP Closely    4.  SANTO. Continue with CPAP.  Pulmonary to follow up     5.  DM2.  Hold oral medications.  Continue with lantus, and ISS, and Monitor POC.  HBA1C in am    6.  HLD.  Continue with statin    7.  Carpel tunnel syndrome.  Ortho consult    Plan of care was discussed with patient in great details, All questions were answered to their satisfaction  Seems to understand, and in agreement
Patient is 78 yo male with hx of CAD S/P stent 14yrs ago, HTN, and HLD presenting with     1. Anemia.  Seems to be secondary to UGIB.  + melena.  + stool occult.  S/P 5 units of RBC transfusion.  H/H stable.  Monitor H/H closely.  EGD shows Gastric ulcer.  Continue with Protonix drip, Clear, IVF, and Monitor for signs of volume overload.  Hold ASA, steroid and Plavix for now.  GI to follow up  PPI, tolerating clears, had drop in HH,s/p transfused 1 unit on 6/2 , raised appropirately , no recurrent episodes noted       2. HX of CAD.  Continue with BB, and ACEI.  EKG shows no ischemic changes.  TTE shows LVEF estimated at 62% with mild diastolic dysfunction.  No CP.  Last stent was placed in 14 yrs ago.  Hold ASA, plavix for now pending further recommendation by GI.  Cardiology to follow up     3. HTN.  Continue with metoprolol, and lisinopril with holding parameter.  Monitor BP Closely    4.  SANTO. Continue with CPAP.  Pulmonary to follow up     5.  DM2.  Hold oral medications.  Continue with lantus, and ISS, and Monitor POC.  HBA1C in am    6.  HLD.  Continue with statin    7.  Carpel tunnel syndrome.  Ortho consult    Plan of care was discussed with patient in great details, All questions were answered to their satisfaction  Seems to understand, and in agreement
The patient is a 77 year old male with a history of HTN, HL, DM, SANTO on CPAP, BPH, CAD s/p GINNY to RCAx3 and LAD in 2004/2005, carpal tunnel syndrome who is admitted with UGIB.    6/3/18  Patient sitting up.  No significant complaints offered.  Monitor compatible with NSR.    Plan:  - Hold aspirin and clopidogrel  - Patient is 14 years out from PCI. Will eventually be restarted on single antiplatelet therapy when ok with GI.  - Continue atorvastatin 20 mg daily  - Hold furosemide  - Hold ramipril. Remains hypotensive  - Continue IVF  - Continue metoprolol tartrate 25 mg bid with holding parameters  - Follow labs
The patient is a 77 year old male with a history of HTN, HL, DM, SANTO on CPAP, BPH, CAD s/p GINNY to RCAx3 and LAD in 2004/2005, carpal tunnel syndrome who is admitted with UGIB.    Plan:  - Hold aspirin and clopidogrel  - Patient is 14 years out from PCI. Will eventually be restarted on single antiplatelet therapy when ok with GI.  - Continue atorvastatin 20 mg daily  - Hold furosemide  - Hold ramipril. Remains hypotensive  - Continue IVF  - Continue metoprolol tartrate 25 mg bid with holding parameters
The patient is a 77 year old male with a history of HTN, HL, DM, SANTO on CPAP, BPH, CAD s/p GINNY to RCAx3 and LAD in 2004/2005, carpal tunnel syndrome who is admitted with UGIB.    Plan:  - Hold aspirin and clopidogrel  - Patient is 14 years out from PCI. Will eventually be restarted on single antiplatelet therapy.  - Continue atorvastatin 20 mg daily  - Hold furosemide  - Hold ramipril. Resume if BPs trend up.  - Continue metoprolol tartrate 25 mg bid  - Echocardiogram with normal LV systolic function, no significant valve issues  - For EGD this morning
The patient is a 77 year old male with a history of HTN, HL, DM, SANTO on CPAP, BPH, CAD s/p GINNY to RCAx3 and LAD in 2004/2005, carpal tunnel syndrome who is admitted with UGIB.    Plan:  - Restart clopidogrel 75 mg daily  - Continue atorvastatin 20 mg daily  - Continue furosemide 20 mg PO daily  - Hold ramipril. Would not restart on discharge. Can be re-evaluated as outpatient.  - Continue metoprolol tartrate 25 mg bid  - GI follow-up  - Discharge planning
The patient is a 77 year old male with a history of HTN, HL, DM, SANTO on CPAP, BPH, CAD s/p GINNY to RCAx3 and LAD in 2004/2005, carpal tunnel syndrome who is admitted with UGIB.    Plan:  - Resume single antiplatelet therapy when ok with GI  - Continue atorvastatin 20 mg daily  - Continue furosemide 20 mg PO daily  - Hold ramipril  - Continue metoprolol tartrate 25 mg bid  - GI follow-up  - Discharge planning
The patient is a 77 year old male with a history of HTN, HL, DM, SANTO on CPAP, BPH, CAD s/p GINNY to RCAx3 and LAD in 2004/2005, carpal tunnel syndrome who is admitted with UGIB.    Plan:  - Resume single antiplatelet therapy when ok with GI  - Continue atorvastatin 20 mg daily  - Resume furosemide 20 mg PO daily  - Hold ramipril  - Continue metoprolol tartrate 25 mg bid  - GI follow-up

## 2018-06-06 NOTE — PROGRESS NOTE ADULT - PROBLEM SELECTOR PROBLEM 1
UGIB (upper gastrointestinal bleed)
Gastrointestinal hemorrhage with melena
Gastrointestinal hemorrhage with melena
Peptic ulcer disease with hemorrhage
Peptic ulcer disease with hemorrhage
UGIB (upper gastrointestinal bleed)

## 2018-06-06 NOTE — PROGRESS NOTE ADULT - PROVIDER SPECIALTY LIST ADULT
Cardiology
Critical Care
Gastroenterology
Hospitalist
Pulmonology
Cardiology
Gastroenterology
Critical Care
Critical Care

## 2018-06-11 ENCOUNTER — FORM ENCOUNTER (OUTPATIENT)
Age: 78
End: 2018-06-11

## 2018-06-12 ENCOUNTER — OUTPATIENT (OUTPATIENT)
Dept: OUTPATIENT SERVICES | Facility: HOSPITAL | Age: 78
LOS: 1 days | End: 2018-06-12
Payer: MEDICARE

## 2018-06-12 DIAGNOSIS — I82.401 ACUTE EMBOLISM AND THROMBOSIS OF UNSPECIFIED DEEP VEINS OF RIGHT LOWER EXTREMITY: ICD-10-CM

## 2018-06-12 DIAGNOSIS — Z98.89 OTHER SPECIFIED POSTPROCEDURAL STATES: Chronic | ICD-10-CM

## 2018-06-12 DIAGNOSIS — Z98.890 OTHER SPECIFIED POSTPROCEDURAL STATES: Chronic | ICD-10-CM

## 2018-06-12 DIAGNOSIS — Z95.9 PRESENCE OF CARDIAC AND VASCULAR IMPLANT AND GRAFT, UNSPECIFIED: Chronic | ICD-10-CM

## 2018-06-12 PROCEDURE — 93970 EXTREMITY STUDY: CPT | Mod: 26

## 2018-06-12 PROCEDURE — 93970 EXTREMITY STUDY: CPT

## 2019-02-05 PROBLEM — Z95.5 PRESENCE OF CORONARY ANGIOPLASTY IMPLANT AND GRAFT: Chronic | Status: ACTIVE | Noted: 2018-01-22

## 2019-02-05 PROBLEM — E66.01 MORBID (SEVERE) OBESITY DUE TO EXCESS CALORIES: Chronic | Status: ACTIVE | Noted: 2018-01-22

## 2019-02-05 PROBLEM — G47.33 OBSTRUCTIVE SLEEP APNEA (ADULT) (PEDIATRIC): Chronic | Status: ACTIVE | Noted: 2018-01-22

## 2019-02-05 PROBLEM — J44.9 CHRONIC OBSTRUCTIVE PULMONARY DISEASE, UNSPECIFIED: Chronic | Status: ACTIVE | Noted: 2018-01-22

## 2019-02-05 PROBLEM — N40.1 BENIGN PROSTATIC HYPERPLASIA WITH LOWER URINARY TRACT SYMPTOMS: Chronic | Status: ACTIVE | Noted: 2018-01-22

## 2019-02-05 PROBLEM — R26.2 DIFFICULTY IN WALKING, NOT ELSEWHERE CLASSIFIED: Chronic | Status: ACTIVE | Noted: 2018-01-22

## 2019-09-26 NOTE — OCCUPATIONAL THERAPY INITIAL EVALUATION ADULT - LEVEL OF INDEPENDENCE: TOILET, REHAB EVAL
88yo F recently s/p cholecystectomy about 3 weeks ago presented to [facility] with worsening SOB/GONZÁLES x1wks worsening x2 days found to have extensive bilateral submassive PE.
tba pt using urinal with assist to hold as per nursing since dec hand use

## 2019-10-11 NOTE — PHYSICAL THERAPY INITIAL EVALUATION ADULT - PERTINENT HX OF CURRENT PROBLEM, REHAB EVAL
90.7 Admitted with c/o dizzy and SOB, GI Bleed, carpal tunnel syndrome, low H/H. Hx of fall recently at home.

## 2019-10-17 NOTE — ED ADULT NURSE NOTE - PRIVACY CONCERNS
[>50% of Time Spent on Counseling for ____] : Greater than 50% of the encounter time was spent on counseling for [unfilled] [Time Spent: ___ minutes] : I have spent [unfilled] minutes of face to face time with the patient No

## 2019-10-30 NOTE — PROGRESS NOTE ADULT - PROBLEM SELECTOR PLAN 3
Cpap hs
Management as above
Holding ASA and Plavix. Cardiology note appreciated- will likely restart only one antiplatelet agent when appropriate given remote history of PCI. Continue BB and statin. Will continue to hold ACE-I as BP's have been on lower side- will restart as necessary.
Management as above
Management as above
n/a

## 2019-12-04 NOTE — DISCHARGE NOTE ADULT - NS MD DC PLAN IMMU FLU PROVIDE INFO
Negative
Risks/benefits discussed with patient or patient surrogate/Vaccine Information Sheet (VIS) provided-VIS date: 8/07/15

## 2021-06-25 NOTE — PATIENT PROFILE ADULT. - PRO SERVICES AT [facility]
none Terbinafine Counseling: Patient counseling regarding adverse effects of terbinafine including but not limited to headache, diarrhea, rash, upset stomach, liver function test abnormalities, itching, taste/smell disturbance, nausea, abdominal pain, and flatulence.  There is a rare possibility of liver failure that can occur when taking terbinafine.  The patient understands that a baseline LFT and kidney function test may be required. The patient verbalized understanding of the proper use and possible adverse effects of terbinafine.  All of the patient's questions and concerns were addressed.

## 2021-08-22 ENCOUNTER — FORM ENCOUNTER (OUTPATIENT)
Age: 81
End: 2021-08-22

## 2021-08-23 ENCOUNTER — TRANSCRIPTION ENCOUNTER (OUTPATIENT)
Age: 81
End: 2021-08-23

## 2021-08-29 ENCOUNTER — TRANSCRIPTION ENCOUNTER (OUTPATIENT)
Age: 81
End: 2021-08-29

## 2021-08-30 ENCOUNTER — TRANSCRIPTION ENCOUNTER (OUTPATIENT)
Age: 81
End: 2021-08-30

## 2021-08-30 ENCOUNTER — FORM ENCOUNTER (OUTPATIENT)
Age: 81
End: 2021-08-30

## 2022-05-10 ENCOUNTER — OUTPATIENT (OUTPATIENT)
Dept: OUTPATIENT SERVICES | Facility: HOSPITAL | Age: 82
LOS: 1 days | End: 2022-05-10
Payer: MEDICARE

## 2022-05-10 ENCOUNTER — APPOINTMENT (OUTPATIENT)
Dept: ULTRASOUND IMAGING | Facility: HOSPITAL | Age: 82
End: 2022-05-10
Payer: MEDICARE

## 2022-05-10 DIAGNOSIS — Z98.890 OTHER SPECIFIED POSTPROCEDURAL STATES: Chronic | ICD-10-CM

## 2022-05-10 DIAGNOSIS — Z00.8 ENCOUNTER FOR OTHER GENERAL EXAMINATION: ICD-10-CM

## 2022-05-10 DIAGNOSIS — Z95.9 PRESENCE OF CARDIAC AND VASCULAR IMPLANT AND GRAFT, UNSPECIFIED: Chronic | ICD-10-CM

## 2022-05-10 DIAGNOSIS — Z98.89 OTHER SPECIFIED POSTPROCEDURAL STATES: Chronic | ICD-10-CM

## 2022-05-10 PROCEDURE — 93971 EXTREMITY STUDY: CPT

## 2022-05-10 PROCEDURE — 93971 EXTREMITY STUDY: CPT | Mod: 26,LT

## 2022-07-05 NOTE — PROGRESS NOTE ADULT - PROBLEM SELECTOR PLAN 1
cbc stable    ppi bid   hold AC  advance diet   carafate qid   can transfer to floor
GI Bleed  Anemia  off Steroids and NSAID  EGD shows Gastric Ulcer  PPI  serial labs  monitor vs and HD and Sat  GI follow up  SANTO - cpap nightly  keep sat > 88 pct  sleep hygiene discussed  overall better  transfer from [facility] to medical floor
GI Bleed and Anemia  SANTO and CAD  OA and CTS  bronchodilators for Asthma  CPAP for night time use for hx of SANTO  keep sat > 88 pct  sleep hygiene discussed  PPI and serial Hgb monitoring  keep Hgb > 7 in the light of CAD  out of bed as tolerated  monitor vs and HD and I and O  overall better
GI bleed  anemia  serial labs  PPI and Carafate  supportive measures  assist with ADL  CPAP for night time for SANTO  prognosis guarded  will follow
GI bleed  anemia and SANTO  OA and Carpal T Syndrome  for EGD this am  no pulm objections  cont cpap for night time for SANTO  serial Hgb  monitor vs and HD and Sat  keep sat > 88 pct  keep Hgb > 7 in pt with CAD  cardio and GI follow up  will follow  pt is s/p PRBC overnight
Likely related to gastric ulcer. Now s/p EGD. No intervention performed.   Tolerating full diet  PPI BID  Carafate  Avoid NSAIDS, off steroids for now  GI following
PPI  OOB   GI fu
PUD, SANTO, OA, CTS  CPAP nightly  keep sat > 88 pct  oral and skin care  cvs regimen and BP control  keep Hgb > 7  I and O  monitor VS and HD and Sat  out of bed as tolerated
cad, gi bleed, anemia, santo, copd, weakness  planned for JAGJIT  serial labs  keep Hgb > 7  CPAP for night time for SANTO, keep sat > 88 pct  pt with COPD - on proventil PRN, keep sat > 88 pct  dc planning under way  overall much better  assist with ADL
cbc stable    ppi bid   hold AC  advance diet   carafate qid   can transfer to floor
cbc stable    ppi bid   hold AC  advance diet   carafate qid   can transfer to floor
ivf   repeat cbc  in and out  ppi drip and carafate 1 gram q 6 hours
ivf   repeat cbc  in and out  ppi drip and carafate 1 gram q 6 hours  check CT ab/pel today to r/o occult malignancy  monitor for signs of bleeding
Likely related to gastric ulcer. Now s/p EGD. No intervention performed.   Continue protonix infusion for now, consider switching to IV BID tomorrow.   Continue Carafate  Clear liquid diet- advance as per GI.  Avoid NSAIDS, off steroids for now
Likely related to gastric ulcer. Now s/p EGD. No intervention performed.   Diet advanced to full  PPI infusion d/c, transitioned to PPI BID  Continue Carafate  Avoid NSAIDS, off steroids for now  GI following
Likely related to gastric ulcer. Now s/p EGD. No intervention performed. Continue protonix infusion for now, consider switching to IV BID tomorrow. Monitor BM's. Clear liquid diet- advance as per GI.
General

## 2022-09-17 ENCOUNTER — EMERGENCY (EMERGENCY)
Facility: HOSPITAL | Age: 82
LOS: 1 days | Discharge: HOME | End: 2022-09-17
Attending: EMERGENCY MEDICINE | Admitting: EMERGENCY MEDICINE
Payer: MEDICARE

## 2022-09-17 VITALS
TEMPERATURE: 98 F | OXYGEN SATURATION: 97 % | HEART RATE: 97 BPM | HEIGHT: 73 IN | WEIGHT: 300.05 LBS | DIASTOLIC BLOOD PRESSURE: 92 MMHG | RESPIRATION RATE: 20 BRPM | SYSTOLIC BLOOD PRESSURE: 160 MMHG

## 2022-09-17 VITALS
SYSTOLIC BLOOD PRESSURE: 136 MMHG | OXYGEN SATURATION: 97 % | HEART RATE: 74 BPM | DIASTOLIC BLOOD PRESSURE: 86 MMHG | RESPIRATION RATE: 18 BRPM

## 2022-09-17 DIAGNOSIS — Z98.890 OTHER SPECIFIED POSTPROCEDURAL STATES: Chronic | ICD-10-CM

## 2022-09-17 DIAGNOSIS — Z98.89 OTHER SPECIFIED POSTPROCEDURAL STATES: Chronic | ICD-10-CM

## 2022-09-17 DIAGNOSIS — Z95.9 PRESENCE OF CARDIAC AND VASCULAR IMPLANT AND GRAFT, UNSPECIFIED: Chronic | ICD-10-CM

## 2022-09-17 PROCEDURE — 99282 EMERGENCY DEPT VISIT SF MDM: CPT

## 2022-09-17 PROCEDURE — 99283 EMERGENCY DEPT VISIT LOW MDM: CPT

## 2022-09-17 RX ORDER — INSULIN ASPART 100 [IU]/ML
10 INJECTION, SUSPENSION SUBCUTANEOUS
Qty: 0 | Refills: 0 | DISCHARGE

## 2022-09-17 RX ORDER — RAMIPRIL 5 MG
1 CAPSULE ORAL
Qty: 0 | Refills: 0 | DISCHARGE

## 2022-09-17 RX ORDER — CLOPIDOGREL BISULFATE 75 MG/1
1 TABLET, FILM COATED ORAL
Qty: 0 | Refills: 0 | DISCHARGE

## 2022-09-17 NOTE — ED ADULT NURSE NOTE - OBJECTIVE STATEMENT
woke up with cpap gurgling, nose bleeding not resolved with few gauge packings as previous successful attempts

## 2022-09-17 NOTE — ED PROVIDER NOTE - CARE PROVIDER_API CALL
Murphy Mendoza)  Otolaryngology  [address], [address]  [place] [address]  Phone: [phone]  Fax: [phone]  Follow Up Time: 1-3 Days

## 2022-09-17 NOTE — ED PROVIDER NOTE - PATIENT PORTAL LINK FT
You can access the FollowMyHealth Patient Portal offered by [facility] by registering at the following website: [email or web address]. By joining [employer]’s FollowMyHealth portal, you will also be able to view your health information using other applications (apps) compatible with our system.

## 2022-09-17 NOTE — ED PROVIDER NOTE - CLINICAL SUMMARY MEDICAL DECISION MAKING FREE TEXT BOX
81 male with minor nosebleed appears to have stopped.  If recurs may need cauterization or packing.  Has ENT follow-up next week.

## 2022-09-17 NOTE — ED PROVIDER NOTE - OBJECTIVE STATEMENT
81-year-old male with history of DVT on Eliquis complaining of right nostril nosebleed this morning.  States he wears CPAP mask at night while sleeping and feels like mask dries out his nostrils.  Denies bleeding at present.  Denies fever cough pain shortness of breath or other symptom.  His PCP is Dr. Cardona.  Patient has appointment with ENT next week.

## 2022-09-17 NOTE — ED PROVIDER NOTE - NSICDXPASTSURGICALHX_GEN_ALL_CORE_FT
PAST SURGICAL HISTORY:  H/O knee surgery Left - 11/7/2017    History of prostate surgery laser to relieve a blockage.    S/P angioplasty with stent 2004 and 2005 (Has 4 stents)

## 2022-09-17 NOTE — ED PROVIDER NOTE - MUSCULOSKELETAL, MLM
Spine appears normal, range of motion is not limited, no muscle or joint tenderness. +2 pitting edema noted in both feet.

## 2022-09-17 NOTE — ED PROVIDER NOTE - NSICDXPASTMEDICALHX_GEN_ALL_CORE_FT
PAST MEDICAL HISTORY:  Benign prostatic hyperplasia with lower urinary tract symptoms, symptom details unspecified     CAD (coronary artery disease) w/ 4 stents    Chronic obstructive pulmonary disease, unspecified COPD type     Difficulty walking     DM (diabetes mellitus)     HLD (hyperlipidemia)     HTN (hypertension)     Obesity, morbid, BMI 40.0-49.9     SANTO on CPAP     Stented coronary artery

## 2022-09-17 NOTE — ED PROVIDER NOTE - ENMT, MLM
Airway patent, Dried blood noted in right nostril no active bleeding, no mass or lesion.  Left nostril clear posterior pharynx clear.. Mouth with normal mucosa. Throat has no vesicles, no oropharyngeal exudates and uvula is midline.

## 2022-09-17 NOTE — ED ADULT NURSE REASSESSMENT NOTE - NS ED NURSE REASSESS COMMENT FT1
. NO BENDING OVER. NO NOSE BLOWING.  NO HOT SHOWERS. NO ALCOHOLIC BEVERAGES. NO HOT FOODS OR LIQUIDS.

## 2022-09-20 ENCOUNTER — APPOINTMENT (OUTPATIENT)
Dept: OTOLARYNGOLOGY | Facility: CLINIC | Age: 82
End: 2022-09-20

## 2022-09-20 VITALS
DIASTOLIC BLOOD PRESSURE: 86 MMHG | HEIGHT: 73 IN | BODY MASS INDEX: 39.76 KG/M2 | SYSTOLIC BLOOD PRESSURE: 142 MMHG | WEIGHT: 300 LBS | HEART RATE: 89 BPM

## 2022-09-20 DIAGNOSIS — Z83.2 FAMILY HISTORY OF DISEASES OF THE BLOOD AND BLOOD-FORMING ORGANS AND CERTAIN DISORDERS INVOLVING THE IMMUNE MECHANISM: ICD-10-CM

## 2022-09-20 DIAGNOSIS — Z86.39 PERSONAL HISTORY OF OTHER ENDOCRINE, NUTRITIONAL AND METABOLIC DISEASE: ICD-10-CM

## 2022-09-20 DIAGNOSIS — Z83.3 FAMILY HISTORY OF DIABETES MELLITUS: ICD-10-CM

## 2022-09-20 PROCEDURE — 99203 OFFICE O/P NEW LOW 30 MIN: CPT | Mod: 25

## 2022-09-20 PROCEDURE — 30901 CONTROL OF NOSEBLEED: CPT

## 2022-09-20 NOTE — ASSESSMENT
[FreeTextEntry1] : Jignesh Mccain presents for evaluation of recurrent rihgt epistaxis s/p silver nitrate cautery in the ED on 9/17/22. He has bilateral septal deviation. Clot was suctioned and area of rpevious cautery visualized. Silver nitrate was used to cauterize two areas of the caudal septum. Epistaxs precautions were provided.\par \par Prevention of epistaxis:\par 1. The goal is to maintain good hydration of the lining of the nose. Dryness inside the nose is a\par major cause of bleeding.\par 2. Use 2-3 sprays of nasal saline to both sides of nose several times daily.\par 3. Apply small amount of antibiotic ointment to lining of the front of nose daily.\par 4. Avoid the use of ceiling fans or blowing air which can dry out your nose.\par 5. Consider use of a humidifier in the bedroom.\par 6. Avoid vigorous nose blowing. Cough and sneeze with your mouth open.\par 7. Your doctor may recommend stopping medicated nasal sprays for allergy or sinusitis, as\par these can sometimes worsen nosebleeds.\par 8. Maintain good control of your blood pressure.\par 9. If you are taking blood thinning medications, maintain careful follow-up with your prescribing\par physician to make sure these are properly dosed.\par \par What to do if you have a nosebleed:\par 1. Use 2-3 sprays of topical nasal decongestant (ex: oxymetazoline, Afrin) on each side of the\par nose.\par 2. Hold firm pressure to soft part of nose for at least 5 minutes. Repeat as needed.\par 3. If bleeding is severe or does not resolve with these directions, seek immediate medical care.\par \par Follow up prn

## 2022-09-20 NOTE — PHYSICAL EXAM
[] : septum deviated bilaterally [de-identified] : Clot of right nasal cavity suctioned. Prior area of cautery of right caudal septum visualized. Two areas of bleeding cauterized [Midline] : trachea located in midline position [Normal] : no rashes

## 2022-09-20 NOTE — HISTORY OF PRESENT ILLNESS
[de-identified] : Jignesh Mccain is a 82 yo male with hx CAD s/p stent placement currently on eliquis who presents for evaluation of right epistaxis. He states that on 9/17, he took his CPAP off and noted right epistaxis. He had several instances that day and went to the ED. He states that he had silver nitrate cautery at that time. Since then, he has several bleeds from the right nostril. He notes that each nosebleed lasts for about 10 minutes and he stops this by stuffing his nose with gauze. He denies headaches, lightheadedness, or visoin changes. He denies fevers or chills.

## 2022-09-30 ENCOUNTER — APPOINTMENT (OUTPATIENT)
Dept: OTOLARYNGOLOGY | Facility: CLINIC | Age: 82
End: 2022-09-30

## 2022-09-30 VITALS
TEMPERATURE: 97.7 F | DIASTOLIC BLOOD PRESSURE: 85 MMHG | HEART RATE: 89 BPM | BODY MASS INDEX: 39.76 KG/M2 | SYSTOLIC BLOOD PRESSURE: 140 MMHG | OXYGEN SATURATION: 96 % | HEIGHT: 73 IN | WEIGHT: 300 LBS

## 2022-09-30 DIAGNOSIS — J34.2 DEVIATED NASAL SEPTUM: ICD-10-CM

## 2022-09-30 DIAGNOSIS — R04.0 EPISTAXIS: ICD-10-CM

## 2022-09-30 PROCEDURE — 99214 OFFICE O/P EST MOD 30 MIN: CPT | Mod: 25

## 2022-09-30 PROCEDURE — 31231 NASAL ENDOSCOPY DX: CPT

## 2022-10-11 NOTE — HISTORY OF PRESENT ILLNESS
[de-identified] : 81M presents for evaluation of R-sided epistaxis\par Underwent cauterization w Dr. Ngo 9/20-- note reviewed\par On eliquis s/p stent placement for CAD\par Minor oozing since cauterization but no other major bleeding\par Mild nasal congestion\par No other sinonasal sx

## 2023-01-10 NOTE — H&P ADULT - PMH
[FreeTextEntry1] : The patient wants to reduce her statins to 5 mg simvastatin daily.  Check lipid profile after 4 to 6 weeks.  Also CPK.  Target LDL discussed.  Dietary changes and restrictions also discussed.\par \par It would be important to obtain records of the previous cardiac work-up.  If it shows moderate coronary atherosclerosis, the patient would be a candidate for PCSK9 inhibitor treatment in case she cannot tolerate statins\par \par Patient should have echo and ETT.\par \par Follow-up after above tests\par \par Thank you for this referral and allowing me to participate in the care of this patient.  If I can be of any further help or  if you have any questions, please do not hesitate to contact me\par \par \par Sincerely,\par \par Meño Dumont MD, FACC, DANA\par \par  Benign prostatic hyperplasia with lower urinary tract symptoms, symptom details unspecified    CAD (coronary artery disease)  w/ 4 stents  Chronic obstructive pulmonary disease, unspecified COPD type    Difficulty walking    DM (diabetes mellitus)    HLD (hyperlipidemia)    HTN (hypertension)    Obesity, morbid, BMI 40.0-49.9    SANTO on CPAP    Stented coronary artery

## 2023-08-18 ENCOUNTER — APPOINTMENT (OUTPATIENT)
Dept: INTERNAL MEDICINE | Facility: CLINIC | Age: 83
End: 2023-08-18
Payer: MEDICARE

## 2023-08-18 VITALS
HEIGHT: 73 IN | DIASTOLIC BLOOD PRESSURE: 60 MMHG | BODY MASS INDEX: 39.23 KG/M2 | TEMPERATURE: 97.4 F | RESPIRATION RATE: 16 BRPM | HEART RATE: 103 BPM | SYSTOLIC BLOOD PRESSURE: 134 MMHG | WEIGHT: 296 LBS | OXYGEN SATURATION: 94 %

## 2023-08-18 PROCEDURE — 99203 OFFICE O/P NEW LOW 30 MIN: CPT

## 2023-08-18 RX ORDER — CYANOCOBALAMIN (VITAMIN B-12) 1000 MCG
1000 TABLET ORAL
Qty: 90 | Refills: 1 | Status: ACTIVE | COMMUNITY
Start: 2023-08-18

## 2023-08-18 RX ORDER — METOPROLOL SUCCINATE 25 MG/1
25 TABLET, EXTENDED RELEASE ORAL
Refills: 0 | Status: DISCONTINUED | COMMUNITY
End: 2023-08-18

## 2023-08-18 RX ORDER — APIXABAN 5 MG/1
TABLET, FILM COATED ORAL
Refills: 0 | Status: DISCONTINUED | COMMUNITY
End: 2023-08-18

## 2023-08-18 RX ORDER — FUROSEMIDE 20 MG/1
20 TABLET ORAL DAILY
Qty: 30 | Refills: 1 | Status: ACTIVE | COMMUNITY

## 2023-08-18 RX ORDER — METFORMIN HYDROCHLORIDE 1000 MG/1
1000 TABLET, COATED ORAL
Refills: 3 | Status: ACTIVE | COMMUNITY

## 2023-08-18 RX ORDER — HUMAN INSULIN 100 [IU]/ML
(70-30) 100 INJECTION, SUSPENSION SUBCUTANEOUS
Refills: 0 | Status: DISCONTINUED | COMMUNITY
Start: 2023-08-18 | End: 2023-08-18

## 2023-08-18 RX ORDER — CLOPIDOGREL BISULFATE 75 MG/1
75 TABLET, FILM COATED ORAL
Qty: 30 | Refills: 0 | Status: ACTIVE | COMMUNITY
Start: 2023-08-18

## 2023-08-18 NOTE — ASSESSMENT
[FreeTextEntry1] : CHD has coronary stents continue clopidogrel and aspirin continue metoprolol  DM continue metformin and novolog sees ophthalmologist  B12 deficiency continue B12  leg edema continue lasix  hyperlipidemia continue atorvastatin

## 2023-08-18 NOTE — HEALTH RISK ASSESSMENT
[Very Good] : ~his/her~  mood as very good [No] : No [Little interest or pleasure doing things] : 1) Little interest or pleasure doing things [Feeling down, depressed, or hopeless] : 2) Feeling down, depressed, or hopeless [0] : 2) Feeling down, depressed, or hopeless: Not at all (0) [PHQ-2 Negative - No further assessment needed] : PHQ-2 Negative - No further assessment needed [Fully functional (bathing, dressing, toileting, transferring, walking, feeding)] : Fully functional (bathing, dressing, toileting, transferring, walking, feeding) [Fully functional (using the telephone, shopping, preparing meals, housekeeping, doing laundry, using] : Fully functional and needs no help or supervision to perform IADLs (using the telephone, shopping, preparing meals, housekeeping, doing laundry, using transportation, managing medications and managing finances) [Former] : Former [> 15 Years] : > 15 Years [GRG1Thbre] : 0 [de-identified] : quit 35 yrs ago

## 2023-08-18 NOTE — HISTORY OF PRESENT ILLNESS
[de-identified] : Here to establish care and has multiple med conditions.  Recently seen by Dr Farris for low WBC. His B12 is low.  h/o bilat knee replacements, CTS.  had green laser treatment for the prostate and his numbers have been good.  Has h/o 5 stents in LAD/ RCA. Most recent was RCA 2 weeks ago.  Cardiologist = Dr Chiki Robertson.  Uses a cane to walk. Has venous insufficiency.  [FreeTextEntry1] : DM

## 2023-11-08 NOTE — CONSULT NOTE ADULT - PROBLEM SELECTOR RECOMMENDATION 2
Wellness Visit for Adults   AMBULATORY CARE:   A wellness visit  is when you see your healthcare provider to get screened for health problems. Your healthcare provider will also give you advice on how to stay healthy. Write down your questions so you remember to ask them. Ask your healthcare provider how often you should have a wellness visit. What happens at a wellness visit:  Your healthcare provider will ask about your health, and your family history of health problems. This includes high blood pressure, heart disease, and cancer. He or she will ask if you have symptoms that concern you, if you smoke, and about your mood. You may also be asked about your intake of medicines, supplements, food, and alcohol. Any of the following may be done: Your weight  will be checked. Your height may also be checked so your body mass index (BMI) can be calculated. Your BMI shows if you are at a healthy weight. Your blood pressure  and heart rate will be checked. Your temperature may also be checked. Blood and urine tests  may be done. Blood tests may be done to check your cholesterol levels. Abnormal cholesterol levels increase your risk for heart disease and stroke. You may also need a blood or urine test to check for diabetes if you are at increased risk. Urine tests may be done to look for signs of an infection or kidney disease. A physical exam  includes checking your heartbeat and lungs with a stethoscope. Your healthcare provider may also check your skin to look for sun damage. Screening tests  may be recommended. A screening test is done to check for diseases that may not cause symptoms. The screening tests you may need depend on your age, gender, family history, and lifestyle habits. For example, colorectal screening may be recommended if you are 48years old or older. Screening tests you need if you are a woman:   A Pap smear  is used to screen for cervical cancer.  Pap smears are usually done every 3 to 5 years depending on your age. You may need them more often if you have had abnormal Pap smear test results in the past. Ask your healthcare provider how often you should have a Pap smear. A mammogram  is an x-ray of your breasts to screen for breast cancer. Experts recommend mammograms every 2 years starting at age 48 years. You may need a mammogram at age 52 years or younger if you have an increased risk for breast cancer. Talk to your healthcare provider about when you should start having mammograms and how often you need them. Vaccines you may need:   Get an influenza vaccine  every year. The influenza vaccine protects you from the flu. Several types of viruses cause the flu. The viruses change over time, so new vaccines are made each year. Get a tetanus-diphtheria (Td) booster vaccine  every 10 years. This vaccine protects you against tetanus and diphtheria. Tetanus is a severe infection that may cause painful muscle spasms and lockjaw. Diphtheria is a severe bacterial infection that causes a thick covering in the back of your mouth and throat. Get a human papillomavirus (HPV) vaccine  if you are female and aged 23 to 32 or male 23 to 24 and never received it. This vaccine protects you from HPV infection. HPV is the most common infection spread by sexual contact. HPV may also cause vaginal, penile, and anal cancers. Get a pneumococcal vaccine  if you are aged 72 years or older. The pneumococcal vaccine is an injection given to protect you from pneumococcal disease. Pneumococcal disease is an infection caused by pneumococcal bacteria. The infection may cause pneumonia, meningitis, or an ear infection. Get a shingles vaccine  if you are 60 or older, even if you have had shingles before. The shingles vaccine is an injection to protect you from the varicella-zoster virus. This is the same virus that causes chickenpox.  Shingles is a painful rash that develops in people who had chickenpox or have been exposed to the virus. How to eat healthy:  My Plate is a model for planning healthy meals. It shows the types and amounts of foods that should go on your plate. Fruits and vegetables make up about half of your plate, and grains and protein make up the other half. A serving of dairy is included on the side of your plate. The amount of calories and serving sizes you need depends on your age, gender, weight, and height. Examples of healthy foods are listed below:  Eat a variety of vegetables  such as dark green, red, and orange vegetables. You can also include canned vegetables low in sodium (salt) and frozen vegetables without added butter or sauces. Eat a variety of fresh fruits , canned fruit in 100% juice, frozen fruit, and dried fruit. Include whole grains. At least half of the grains you eat should be whole grains. Examples include whole-wheat bread, wheat pasta, brown rice, and whole-grain cereals such as oatmeal.    Eat a variety of protein foods such as seafood (fish and shellfish), lean meat, and poultry without skin (turkey and chicken). Examples of lean meats include pork leg, shoulder, or tenderloin, and beef round, sirloin, tenderloin, and extra lean ground beef. Other protein foods include eggs and egg substitutes, beans, peas, soy products, nuts, and seeds. Choose low-fat dairy products such as skim or 1% milk or low-fat yogurt, cheese, and cottage cheese. Limit unhealthy fats  such as butter, hard margarine, and shortening. Exercise:  Exercise at least 30 minutes per day on most days of the week. Some examples of exercise include walking, biking, dancing, and swimming. You can also fit in more physical activity by taking the stairs instead of the elevator or parking farther away from stores. Include muscle strengthening activities 2 days each week. Regular exercise provides many health benefits.  It helps you manage your weight, and decreases your risk for type 2 diabetes, heart disease, stroke, and high blood pressure. Exercise can also help improve your mood. Ask your healthcare provider about the best exercise plan for you. General health and safety guidelines:   Do not smoke. Nicotine and other chemicals in cigarettes and cigars can cause lung damage. Ask your healthcare provider for information if you currently smoke and need help to quit. E-cigarettes or smokeless tobacco still contain nicotine. Talk to your healthcare provider before you use these products. Limit alcohol. A drink of alcohol is 12 ounces of beer, 5 ounces of wine, or 1½ ounces of liquor. Lose weight, if needed. Being overweight increases your risk of certain health conditions. These include heart disease, high blood pressure, type 2 diabetes, and certain types of cancer. Protect your skin. Do not sunbathe or use tanning beds. Use sunscreen with a SPF 15 or higher. Apply sunscreen at least 15 minutes before you go outside. Reapply sunscreen every 2 hours. Wear protective clothing, hats, and sunglasses when you are outside. Drive safely. Always wear your seatbelt. Make sure everyone in your car wears a seatbelt. A seatbelt can save your life if you are in an accident. Do not use your cell phone when you are driving. This could distract you and cause an accident. Pull over if you need to make a call or send a text message. Practice safe sex. Use latex condoms if are sexually active and have more than one partner. Your healthcare provider may recommend screening tests for sexually transmitted infections (STIs). Wear helmets, lifejackets, and protective gear. Always wear a helmet when you ride a bike or motorcycle, go skiing, or play sports that could cause a head injury. Wear protective equipment when you play sports. Wear a lifejacket when you are on a boat or doing water sports.     © Copyright [employer] 2023 Information is for End User's use only and may not be sold, redistributed or otherwise used for commercial purposes. The above information is an  only. It is not intended as medical advice for individual conditions or treatments. Talk to your doctor, nurse or pharmacist before following any medical regimen to see if it is safe and effective for you. hold a/c   in and out

## 2023-11-14 ENCOUNTER — APPOINTMENT (OUTPATIENT)
Dept: INTERNAL MEDICINE | Facility: CLINIC | Age: 83
End: 2023-11-14
Payer: MEDICARE

## 2023-11-14 ENCOUNTER — APPOINTMENT (OUTPATIENT)
Dept: RADIOLOGY | Facility: CLINIC | Age: 83
End: 2023-11-14
Payer: MEDICARE

## 2023-11-14 ENCOUNTER — OUTPATIENT (OUTPATIENT)
Dept: OUTPATIENT SERVICES | Facility: HOSPITAL | Age: 83
LOS: 1 days | End: 2023-11-14
Payer: MEDICARE

## 2023-11-14 VITALS
TEMPERATURE: 97.6 F | BODY MASS INDEX: 58.11 KG/M2 | WEIGHT: 296 LBS | RESPIRATION RATE: 16 BRPM | DIASTOLIC BLOOD PRESSURE: 84 MMHG | HEART RATE: 103 BPM | OXYGEN SATURATION: 95 % | SYSTOLIC BLOOD PRESSURE: 142 MMHG | HEIGHT: 60 IN

## 2023-11-14 DIAGNOSIS — Z98.890 OTHER SPECIFIED POSTPROCEDURAL STATES: Chronic | ICD-10-CM

## 2023-11-14 DIAGNOSIS — Z95.9 PRESENCE OF CARDIAC AND VASCULAR IMPLANT AND GRAFT, UNSPECIFIED: Chronic | ICD-10-CM

## 2023-11-14 DIAGNOSIS — D51.8 OTHER VITAMIN B12 DEFICIENCY ANEMIAS: ICD-10-CM

## 2023-11-14 DIAGNOSIS — Z98.89 OTHER SPECIFIED POSTPROCEDURAL STATES: Chronic | ICD-10-CM

## 2023-11-14 DIAGNOSIS — D64.89 OTHER SPECIFIED ANEMIAS: ICD-10-CM

## 2023-11-14 LAB — SARS-COV-2 AG RESP QL IA.RAPID: NEGATIVE

## 2023-11-14 PROCEDURE — 77075 RADEX OSSEOUS SURVEY COMPL: CPT

## 2023-11-14 PROCEDURE — 87811 SARS-COV-2 COVID19 W/OPTIC: CPT | Mod: QW

## 2023-11-14 PROCEDURE — 99213 OFFICE O/P EST LOW 20 MIN: CPT | Mod: 25

## 2023-11-14 PROCEDURE — 77075 RADEX OSSEOUS SURVEY COMPL: CPT | Mod: 26

## 2023-11-20 ENCOUNTER — APPOINTMENT (OUTPATIENT)
Dept: INTERNAL MEDICINE | Facility: CLINIC | Age: 83
End: 2023-11-20
Payer: MEDICARE

## 2023-11-20 VITALS
RESPIRATION RATE: 15 BRPM | WEIGHT: 296 LBS | OXYGEN SATURATION: 94 % | HEIGHT: 73 IN | HEART RATE: 119 BPM | TEMPERATURE: 97.8 F | DIASTOLIC BLOOD PRESSURE: 84 MMHG | BODY MASS INDEX: 39.23 KG/M2 | SYSTOLIC BLOOD PRESSURE: 120 MMHG

## 2023-11-20 DIAGNOSIS — J06.9 ACUTE UPPER RESPIRATORY INFECTION, UNSPECIFIED: ICD-10-CM

## 2023-11-20 PROCEDURE — 99214 OFFICE O/P EST MOD 30 MIN: CPT

## 2023-11-20 RX ORDER — AZITHROMYCIN 250 MG/1
250 TABLET, FILM COATED ORAL
Qty: 1 | Refills: 0 | Status: DISCONTINUED | COMMUNITY
Start: 2023-11-14 | End: 2023-11-20

## 2023-11-29 RX ORDER — INSULIN ASPART 100 [IU]/ML
(70-30) 100 INJECTION, SUSPENSION SUBCUTANEOUS
Qty: 1 | Refills: 3 | Status: ACTIVE | COMMUNITY
Start: 2023-08-18 | End: 1900-01-01

## 2024-01-18 NOTE — CONSULT NOTE ADULT - NSHPATTENDINGPLANDISCUSS_GEN_ALL_CORE
Structural Cardiology Clinic Note  Date: 1/18/24    Patient: Dulce Root, 1954, [id number]  Primary Care Provider: Patrick Hernandez MD     Chief Complaint/Reason for Referral: prosthetic valve dysfunction     Subjective:       Dulce Root is a 69 y.o. female who presents for follow-up. They were referred by Dr. Sims. She had previously followed at [facility]. Kaylah her daughter is with her.     Did not tolerate Entresto or higher GDMT doses because of hypotension. Lightheaded and SOB with minimal walking. Cannot go up a flight of stairs or lift her pet. Almost fainted walking to garbage can. No pathologic bleeding. No new symptoms today.    Focused Past History includes:  CAD status post CABG and AVR with Medium Perceval in 2017 (at [facility]):   Cath October 2023: Severely elevated right and left filling pressures with severely reduced cardiac output.  Widely patent SVG that supplies most of the LAD distribution; occluded SVG to RCA (Widely patent RCA).  Equivocal ISR in the ostial circumflex, negative by IFR.    Dobutamine stress echo December 2023:  LVEF 15%.  Maximum stroke volume index reached 34.5.  Severe aortic stenosis with area 0.86, peak velocity 3.7, mean gradient 33, dimensionless index 0.3.  Achieved 78% MPHR  Chronic HFrEF  History of Hodgkin's lymphoma status post chemo and radiation - 1991  Carotid stenosis.   Duplex ultrasound 1/2024:  ANNA 50-69%, LICA 70-99%. 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  Former smoker 15 p-y, quit in 1981     Review of Systems  Constitutional: negative for fevers, night sweats, and weight loss  Eyes: negative for visual disturbance, diplopia  Respiratory: negative for cough, hemoptysis, sputum, and wheezing  Cardiovascular: see HPI  Gastrointestinal: negative for abdominal pain, bright red blood per rectum, change in bowel habits, dysphagia, melena, and reflux symptoms  Genitourinary:negative  for dysuria, frequency, and hematuria  Hematologic/lymphatic: negative for bleeding, easy bruising, and lymphadenopathy  Musculoskeletal:negative for arthralgias, back pain, and myalgias  Neurological: negative for gait problems, paresthesia, speech problems, vertigo, and weakness  Behavioral/Psych: negative for excessive alcohol consumption, illegal drug usage, and sleep disturbance    ----------------------------  Allergy  Anticoagulant long-term use      Comment:  Plavix  Breast cancer  Carotid stenosis, bilateral  CHF (congestive heart failure)  Coronary artery disease  Coronary artery disease involving coronary bypass graft of native   heart without angina pectoris      Comment:  2/19  1.  Left main is a small vessel with a 60%-65%                ostial lesion. 2.  LAD is a medium-sized vessel diffuse                70% proximally  Ramus intermedius is a medium size vessel               with a 40%-50% ostial lesion. 3.  Circumflex 60%-70%                ostial  remainder of circumflex and obtuse marginal                normal in appearance. Right coronary artery is normal                size vessel, short discrete 70%mid 4.  Vein graft to                right coronary is occ  Coronary artery disease involving native coronary artery of native   heart without angina pectoris  DCIS (ductal carcinoma in situ) of breast  Encounter for blood transfusion  Epigastric pain  Essential hypertension  GERD (gastroesophageal reflux disease)  GI bleed  Hematemesis with nausea  Hodgkin lymphoma  Hx of Hodgkin's disease - s/p chemo and radiation  Hyperlipidemia  Hyperlipidemia      Comment:  The patient presents with hyperlipidemia.  The patient                reports tolerating the medication well and is in                excellent compliance.  There have been no medication side               effects.  The patient denies chest pain, neuropathy, and                myalgias.  The patient has reduced fat intake and has                 been exercising.  Current treatment has included the                medications listed in the med card.   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  She has had CAD and                bypasses in the past in 2017.    Thyroid disease  ----------------------------  Angiogram, coronary, with left heart catheterization      Comment:  Procedure: Left Heart Cath w/Graft study;  Surgeon:                Jeff Guzman MD;  Location: [facility] CATH;  Service:                Cardiology;;  Aortography      Comment:  Procedure: AO Root angiogram;  Surgeon: Jeff Guzman MD;                Location: [facility];  Service: Cardiology;  Laterality:                N/A;  Arteriography of aortic root      Comment:  Procedure: ARTERIOGRAM, AORTIC ROOT;  Surgeon: Sujit Chaudhry MD;  Location: [facility];  Service: Cardiology;                 Laterality: N/A;  Axillary node dissection      Comment:  Procedure: LYMPHADENECTOMY, AXILLARY-Left;  Surgeon: Rosa Maradiaga MD;  Location: [facility];  Service: General;                 Laterality: Left;  Breast biopsy  Breast reconstruction      Comment:  bilateral mastectomy  Cardiac catheterization      Comment:  stents x 2  Cardiac surgery      Comment:  Aortic valve replacement  , CABG 2 vessel  Cardiac valve replacement      Comment:  aortic valve, bovine per pt  Coronary angiography      Comment:  Procedure: ANGIOGRAM, CORONARY ARTERY;  Surgeon: Sujit Chaudhry MD;  Location: [facility];  Service: Cardiology;                 Laterality: N/A;  Coronary angiography      Comment:  Procedure: ANGIOGRAM, CORONARY ARTERY;  Surgeon: Sujit Chaudhry MD;  [facility];  Service: Cardiology;                 Laterality: N/A;  Coronary bypass graft angiography      Comment:  Procedure: Bypass graft study;  Surgeon: Sujit Chaudhry MD;  [facility];  Service: Cardiology;;  Cosmetic surgery      Comment:  bereast reconstruction  Esophagogastroduodenoscopy      Comment:  Procedure: EGD (ESOPHAGOGASTRODUODENOSCOPY);  Surgeon:                Tracy Flower MD;  Location: [facility];  Service:                Endoscopy;  Laterality: N/A;  Esophagogastroduodenoscopy      Comment:  Procedure: EGD (ESOPHAGOGASTRODUODENOSCOPY);  Surgeon:                Tracy Flower MD;  Location: [facility];  Service:                Endoscopy;  Laterality: N/A;  Esophagogastroduodenoscopy      Comment:  Procedure: EGD (ESOPHAGOGASTRODUODENOSCOPY);  Surgeon:                Tracy Flower MD;  Location: [facility];  Service:                Endoscopy;  Laterality: N/A;  Esophagogastroduodenoscopy      Comment:  Procedure: EGD (ESOPHAGOGASTRODUODENOSCOPY);  Surgeon:                Mk Sanchez MD;  [facility]                 Service: Endoscopy;  Laterality: N/A;  inst via                portal  cardiac clearance-see encounter dated                5/31/23  precall confirmed 6/2 EB  Eye surgery      Comment:  eye lids  Fractional flow reserve (ffr), coronary      Comment:  Procedure: (IFR) Ramus;  Surgeon: Jeff Guzman MD;                 Location: [facility] CATH;  Service: Cardiology;;  Injection for sentinel node identification      Comment:   Procedure: INJECTION, FOR SENTINEL NODE                IDENTIFICATION-Left;  Surgeon: Rosa Maradiaga MD;                 Location: [facility] OR;  Service: General;  Laterality: Left;  Instantaneous wave-free ratio (ifr)      Comment:  Procedure: (IFR) LCX;  Surgeon: Jeff Guzman MD;                 Location: [facility];  Service: Cardiology;;  Left heart catheterization      Comment:  Procedure: Left heart cath;  Surgeon: Sujit Chaudhry MD;               Location: [facility];  Service: Cardiology;  Laterality:                Right;  Left heart catheterization      Comment:  Procedure: Left heart cath;  Surgeon: Sujit Chaudhry MD;               Location: [facility];  Service: Cardiology;  Laterality:                Left;  Left heart catheterization      Comment:  Procedure: Left heart cath;  Surgeon: Jeff Guzman MD;                 Location: [facility];  Service: Cardiology;;  Lumbar laminectomy with discectomy      Comment:  Procedure: LAMINECTOMY, SPINE, LUMBAR, WITH DISCECTOMY;                Surgeon: Vimal Villegas MD;  Location: [facility] OR;                 Service: Neurosurgery;  Laterality: N/A;  left                L5-S1  Laminectomy L4-5  Lymphadenectomy  Mastectomy  Mastectomy, partial      Comment:  Procedure: MASTECTOMY, PARTIAL-Left ultrasound guided;                 Surgeon: Rosa Maradiaga MD;  Location: [facility];  Service:               General;  Laterality: Left;  Right heart catheterization      Comment:  Procedure: INSERTION, CATHETER, RIGHT HEART;  Surgeon:                Sujit Chaudhry MD;  Location: [facility]ATH;  Service:                Cardiology;  Laterality: Right;  Right heart catheterization      Comment:  Procedure: Right heart cath;  Surgeon: Jeff Guzman MD;                 Location: [facility]ATH;  Service: Cardiology;;  Sabina lymph node biopsy      Comment:  Procedure: BIOPSY, LYMPH NODE, SENTINEL-Left;  Surgeon:                Rosa Maradiaga MD;  Location: [facility];  Service: General;                Laterality: Left;  Skin biopsy  Splenectomy, total  Transforaminal epidural injection of steroid      Comment:  Procedure: Left L5/S1 TF SKIP with local;  Surgeon:                Canelo Niño MD;  Location: [facility] PAIN MGT;  Service:                Pain Management;  Laterality: Left;  Tumor removal      Comment:  neck- lymphoma     Family History   Problem Relation Age of Onset    Hypertension Mother     Hypertension Father     Cancer Neg Hx      Social History     Tobacco Use    Smoking status: Former     Current packs/day: 0.00     Average packs/day: 1 pack/day for 20.0 years (20.0 ttl pk-yrs)     Types: Cigarettes     Start date: 1961     Quit date: 1981     Years since quittin.2    Smokeless tobacco: Never   Substance Use Topics    Alcohol use: No     Comment: 2 drinks/month; none 72 hrs prior to surgery    Drug use: No       Current Outpatient Medications   Medication Sig Dispense Refill    ALPRAZolam (XANAX) 1 MG tablet Take 1 mg by mouth nightly as needed for Anxiety.      aspirin (ECOTRIN) 81 MG EC tablet Take 1 tablet (81 mg total) by mouth once daily. 30 tablet 11    calcium carbonate/vitamin D3 (CALCIUM WITH VITAMIN D3 ORAL) Take 1 Dose by mouth 2 (two) times a day. 1 dose = 2 gummies      cholecalciferol, vitamin D3, (VITAMIN D3 ORAL) Take 1 Dose by mouth 2 (two) times a day. 1 dose = 1 gummy      clopidogreL (PLAVIX) 75 mg tablet Take 1 tablet (75 mg total) by mouth once daily. 90 tablet 3    diphenhydrAMINE (BENADRYL) 50 MG capsule Take 50mg by mouth 1 hour before contrast administration. 1 capsule 0    empagliflozin (JARDIANCE) 10 mg tablet Take 1 tablet (10 mg total) by mouth once daily. 90 tablet 3    EScitalopram oxalate (LEXAPRO) 10 MG tablet Take 1 tablet (10 mg total) by mouth once daily. 90 tablet 3    folic acid (FOLVITE) 1 MG tablet Take 1 mg by mouth once daily. Except on Friday      furosemide (LASIX) 20 MG tablet Take 1 tablet (20 mg total) by mouth once daily. 90 tablet 3     hypromellose (TEARS LUBRICANT EYE DROP OPHT) Place 1 drop into both eyes daily as needed (dry eyes).      levothyroxine (SYNTHROID) 75 MCG tablet Take 1 tablet (75 mcg total) by mouth before breakfast. 90 tablet 3    lifitegrast (XIIDRA) 5 % Dpet Place 1 drop into both eyes 2 (two) times daily as needed (dry eyes).      losartan (COZAAR) 25 MG tablet Take 1 tablet (25 mg total) by mouth once daily. 90 tablet 3    methotrexate 2.5 MG Tab Take 5 mg by mouth 2 (two) times a day. On Friday only.      metoprolol succinate (TOPROL-XL) 25 MG 24 hr tablet Take 1 tablet (25 mg total) by mouth every evening. 90 tablet 3    pantoprazole (PROTONIX) 40 MG tablet TAKE 1 TABLET BY MOUTH EVERY DAY 90 tablet 3    predniSONE (DELTASONE) 50 MG Tab Take 50mg by mouth at 13 hours, 7 hours, and 1 hour before contrast administration. 3 tablet 0    rosuvastatin (CRESTOR) 20 MG tablet Take 1 tablet (20 mg total) by mouth once daily. 90 tablet 3    sod picosulf-mag ox-citric ac (CLENPIQ) 10 mg-3.5 gram- 12 gram/175 mL Soln Take 2 Bottles by mouth As instructed (use as directed by facility). 350 mL 0     Current Facility-Administered Medications   Medication Dose Route Frequency Provider Last Rate Last Admin    sodium chloride 0.9% flush 10 mL  10 mL Intravenous PRN Manuel Sims MD            Objective:      Physical Exam  deferred      Lab Review   Lab Results   Component Value Date    WBC 5.55 11/06/2023    HGB 11.7 (L) 11/06/2023    HCT 36.1 (L) 11/06/2023    MCV 98 11/06/2023     11/06/2023         BMP  Lab Results   Component Value Date     11/06/2023    K 3.1 (L) 11/06/2023     11/06/2023    CO2 29 11/06/2023    BUN 16 11/06/2023    CREATININE 0.7 11/06/2023    CALCIUM 9.6 11/06/2023    ANIONGAP 10 11/06/2023    ESTGFRAFRICA >60 04/01/2022    EGFRNONAA >60 04/01/2022       Lab Results   Component Value Date    LABPROT 11.3 03/31/2023    ALBUMIN 4.0 11/06/2023       Lab Results   Component Value Date    ALT 19  2023    AST 23 2023    ALKPHOS 86 2023    BILITOT 0.9 2023       Lab Results   Component Value Date    TSH 1.328 2023       Lab Results   Component Value Date    CHOL 112 (L) 2023    CHOL 130 2022    CHOL 166 2020     Lab Results   Component Value Date    HDL 39 (L) 2023    HDL 30 (L) 2022    HDL 53 2020     Lab Results   Component Value Date    LDLCALC 58.8 (L) 2023    LDLCALC 76.2 2022    LDLCALC 89.8 2020     Lab Results   Component Value Date    TRIG 71 2023    TRIG 119 2022    TRIG 116 2020     Lab Results   Component Value Date    CHOLHDL 34.8 2023    CHOLHDL 23.1 2022    CHOLHDL 31.9 2020      LVOT VTI AoV VTI AoV MG JORI      0 11.1 34.41 11 [id number]  LVOT area 2.8   5 13.4 35.7 10 1.09833      10 16.3 41.9 14 1.07115      15 16 57 22 0.946444      20 20.4 62.5 29 0.50600      reserve [id number]          EKG 2023:  Normal sinus rhythm with left bundle-branch block,      Assessment & Plan:      This is a 69 y.o. pleasant  female with NYHA class 3 functional class and severely reduced LVEF.  She is adequately revascularized and on maximum tolerated heart failure medical therapy.  She has severe prosthetic aortic valve dysfunction due to a combination of valve degeneration and patient prosthesis mismatch.  Despite her severely reduced LVEF she is able to mount a maximum velocity of 3.7 and has excellent contractile reserve.  I personally performed measurements from her dobutamine stress echo and they are summarized above.  The measurements are consistent with adequate contractile reserve and severe obstruction at her aortic valve.     1. Prosthetic valve dysfunction, sequela        2. S/P AVR (aortic valve replacement)        3. Carotid stenosis, bilateral        4. LBBB (left bundle branch block)        5. S/P CABG x 2        6. Chronic HFrEF (heart failure with  reduced ejection fraction)               She would definitely benefit from biventricular pacing.  She will see Dr. Gill regarding CRT-D placement  She will also meet Dr. Reynoso for TAVR workup and discuss her carotid stenosis  Will discuss her case at [facility] meeting next Monday 1/22  Despite her chronological age being young she has not a candidate for redo open heart surgery given her severe LV dysfunction, chest radiation, carotid disease, immunosuppression, insufficient internal mammary conduit (noted during her first open heart surgery)  She would also benefit from valve in valve TAVR to relieve both her patient prosthesis mismatch and prosthetic valve degeneration especially given the expandability of the Perceval valve. Her TAVR CTAs were personally analyzed and anatomy is amenable to TF TAVR with a 23mm CRYSTAL 3 Ultra      I appreciate the opportunity to participate in Dulce Root 's care today.  Please follow up with me in 2 months tentatively.      Jeff Guzman MD, Located within [facility]  Interventional Cardiology/Structural Heart Disease  [facility]  Office: [phone]     Parts of this note were completed using voice recognition software. Please excuse any misspellings or syntax errors and reach out to me with questions.    The patient location is: Home   The chief complaint leading to consultation is:  Please see problem list above  Visit type: Virtual visit with synchronous audio and video  Total time spent with patient: 15 minutes   Each patient to whom he or she provides medical services by telemedicine is:  (1) informed of the relationship between the physician and patient and the respective role of any other health care provider with respect to management of the patient; and (2) notified that he or she may decline to receive medical services by telemedicine and may withdraw from such care at any time.    Notes: See above        team

## 2024-02-21 ENCOUNTER — APPOINTMENT (OUTPATIENT)
Dept: INTERNAL MEDICINE | Facility: CLINIC | Age: 84
End: 2024-02-21
Payer: MEDICARE

## 2024-02-21 VITALS
BODY MASS INDEX: 38.17 KG/M2 | HEART RATE: 97 BPM | RESPIRATION RATE: 15 BRPM | WEIGHT: 288 LBS | OXYGEN SATURATION: 93 % | TEMPERATURE: 97.6 F | SYSTOLIC BLOOD PRESSURE: 122 MMHG | DIASTOLIC BLOOD PRESSURE: 70 MMHG | HEIGHT: 73 IN

## 2024-02-21 DIAGNOSIS — M62.81 MUSCLE WEAKNESS (GENERALIZED): ICD-10-CM

## 2024-02-21 DIAGNOSIS — R05.9 COUGH, UNSPECIFIED: ICD-10-CM

## 2024-02-21 PROCEDURE — 99214 OFFICE O/P EST MOD 30 MIN: CPT

## 2024-02-21 PROCEDURE — 87811 SARS-COV-2 COVID19 W/OPTIC: CPT | Mod: QW

## 2024-02-21 PROCEDURE — G2211 COMPLEX E/M VISIT ADD ON: CPT

## 2024-02-21 RX ORDER — ADHESIVE TAPE 3"X 2.3 YD
50 MCG TAPE, NON-MEDICATED TOPICAL
Refills: 0 | Status: ACTIVE | COMMUNITY
Start: 2024-02-21

## 2024-02-21 NOTE — PHYSICAL EXAM
[No Acute Distress] : no acute distress [Well Nourished] : well nourished [Well Developed] : well developed [Well-Appearing] : well-appearing [No Respiratory Distress] : no respiratory distress  [No Accessory Muscle Use] : no accessory muscle use [Clear to Auscultation] : lungs were clear to auscultation bilaterally [Normal Rate] : normal rate  [Regular Rhythm] : with a regular rhythm [Normal S1, S2] : normal S1 and S2 [No Murmur] : no murmur heard [Soft] : abdomen soft [Non Tender] : non-tender [Non-distended] : non-distended [No Masses] : no abdominal mass palpated [No HSM] : no HSM [Normal Bowel Sounds] : normal bowel sounds [No Rash] : no rash [Normal Affect] : the affect was normal [Normal Insight/Judgement] : insight and judgment were intact [de-identified] : edema bilateral legs [de-identified] : weakness LE's, using a walker [de-identified] : unsteady gait

## 2024-02-21 NOTE — HEALTH RISK ASSESSMENT
[Little interest or pleasure doing things] : 1) Little interest or pleasure doing things [Feeling down, depressed, or hopeless] : 2) Feeling down, depressed, or hopeless [0] : 2) Feeling down, depressed, or hopeless: Not at all (0) [PHQ-2 Negative - No further assessment needed] : PHQ-2 Negative - No further assessment needed [[id number]] : 0 [Never] : Never

## 2024-02-21 NOTE — HISTORY OF PRESENT ILLNESS
[FreeTextEntry1] : BP check and follow up [de-identified] : c/o nagging cough for 2 weeks. It is dry. No fever. Has a runny nose. Chills at night.  Not sob.

## 2024-02-21 NOTE — ASSESSMENT
[FreeTextEntry1] : cough wants covid testing = negative  DM continue insulin and metformin  hyperlipidema continue atorvastatin  CAD continue clopidogrel and metoprolol  f/u 3-4 months

## 2024-02-22 LAB — SARS-COV-2 AG RESP QL IA.RAPID: NEGATIVE

## 2024-03-06 NOTE — PROGRESS NOTE ADULT - PROBLEM SELECTOR PLAN 7
Fadi  Received: Today  Sheila Britt D Np Dima Nurse Msg Pool  Skyrizi 150mg pen reauth - Pending Provider Response     Rx Insurance: Medicaid     Pending signed PA/PDL.     3/6/24  Sheila Britt   IMPROVE VTE Individual Risk Assessment          RISK                                                          Points  [  ] Previous VTE                                                3  [  ] Thrombophilia                                             2  [  ] Lower limb paralysis                                   2        (unable to hold up >15 seconds)    [  ] Current Cancer                                             2         (within 6 months)  [ X ] Immobilization > 24 hrs                              1  [  ] ICU/CCU stay > 24 hours                             1  [ X ] Age > 60                                                         1    IMPROVE VTE Score: 2  SCD , no Ac secondary to hematuria

## 2024-03-14 VITALS — WEIGHT: 285.94 LBS | HEIGHT: 73 IN

## 2024-03-14 RX ORDER — APIXABAN 2.5 MG/1
1 TABLET, FILM COATED ORAL
Qty: 0 | Refills: 0 | DISCHARGE

## 2024-03-14 RX ORDER — ASPIRIN/CALCIUM CARB/MAGNESIUM 324 MG
0 TABLET ORAL
Refills: 0 | DISCHARGE

## 2024-03-14 NOTE — ASU PATIENT PROFILE, ADULT - NS PREOP MEDICATION GIVEN
[Former] : Former [10-14] : 10-14 [Yes] : Yes [2 - 4 times a month (2 pts)] : 2-4 times a month (2 points) [1 or 2 (0 pts)] : 1 or 2 (0 points) [Never (0 pts)] : Never (0 points) [No] : In the past 12 months have you used drugs other than those required for medical reasons? No [No falls in past year] : Patient reported no falls in the past year [0] : 2) Feeling down, depressed, or hopeless: Not at all (0) [PHQ-2 Negative - No further assessment needed] : PHQ-2 Negative - No further assessment needed [Patient reported colonoscopy was normal] : Patient reported colonoscopy was normal [YearQuit] : 2000 [Audit-CScore] : 2 [[id number]] : 0 [ColonoscopyDate] : 06/21 yes

## 2024-03-14 NOTE — ASU PATIENT PROFILE, ADULT - FALL HARM RISK - RISK INTERVENTIONS

## 2024-03-14 NOTE — ASU PATIENT PROFILE, ADULT - MEDICATIONS TO HOLD
Hold plavix x 5days.hold Metformin 3/28/24 Do not take insulin or metformin am of procedure, stay off Plavix as instructed Hold plavix x 5days.

## 2024-03-14 NOTE — ASU PATIENT PROFILE, ADULT - MEDICATIONS TO TAKE
Continue BASA ,insulin Novolog 70/30 18 units at nite on 3/27/24 Take metoprolol in am with sip of water, continue ASA as instructed by your MD Continue BASA ,

## 2024-03-14 NOTE — ASU PATIENT PROFILE, ADULT - NSICDXPASTMEDICALHX_GEN_ALL_CORE_FT
PAST MEDICAL HISTORY:  Benign prostatic hyperplasia with lower urinary tract symptoms, symptom details unspecified     CAD (coronary artery disease) w/ 4 stents    Chronic obstructive pulmonary disease, unspecified COPD type     Difficulty walking     DM (diabetes mellitus)     HLD (hyperlipidemia)     HTN (hypertension)     Obesity, morbid, BMI 40.0-49.9     SANTO on CPAP     Stented coronary artery      PAST MEDICAL HISTORY:  Benign prostatic hyperplasia with lower urinary tract symptoms, symptom details unspecified     CAD (coronary artery disease) w/ 4 stents    Chronic obstructive pulmonary disease, unspecified COPD type     Difficulty walking     DM (diabetes mellitus)     GI bleed 2020    History of blood transfusion Transfusion of Plasma    HLD (hyperlipidemia)     HTN (hypertension)     Obesity, morbid, BMI 40.0-49.9     SANTO on CPAP     Stented coronary artery

## 2024-03-26 NOTE — ASU PATIENT PROFILE, ADULT - AS SC BRADEN NUTRITION
2000: Son at bedside.  Updated on plan of care.  2130: Rounds completed with Dr. Lara.  Updated on plan of care.  Marginal blood pressures with occasional MAPs low 60s.  Dr RAHMAN with MAPs and midodrine increased in afternoon.     (3) adequate

## 2024-04-01 ENCOUNTER — RX RENEWAL (OUTPATIENT)
Age: 84
End: 2024-04-01

## 2024-04-01 RX ORDER — METOPROLOL TARTRATE 25 MG/1
25 TABLET, FILM COATED ORAL TWICE DAILY
Qty: 180 | Refills: 1 | Status: ACTIVE | COMMUNITY
Start: 2023-08-18 | End: 1900-01-01

## 2024-04-04 ENCOUNTER — TRANSCRIPTION ENCOUNTER (OUTPATIENT)
Age: 84
End: 2024-04-04

## 2024-04-04 ENCOUNTER — OUTPATIENT (OUTPATIENT)
Dept: OUTPATIENT SERVICES | Facility: HOSPITAL | Age: 84
LOS: 1 days | End: 2024-04-04
Payer: MEDICARE

## 2024-04-04 VITALS
OXYGEN SATURATION: 95 % | SYSTOLIC BLOOD PRESSURE: 113 MMHG | HEART RATE: 93 BPM | RESPIRATION RATE: 18 BRPM | DIASTOLIC BLOOD PRESSURE: 80 MMHG

## 2024-04-04 DIAGNOSIS — D64.89 OTHER SPECIFIED ANEMIAS: ICD-10-CM

## 2024-04-04 DIAGNOSIS — Z95.9 PRESENCE OF CARDIAC AND VASCULAR IMPLANT AND GRAFT, UNSPECIFIED: Chronic | ICD-10-CM

## 2024-04-04 DIAGNOSIS — Z98.89 OTHER SPECIFIED POSTPROCEDURAL STATES: Chronic | ICD-10-CM

## 2024-04-04 DIAGNOSIS — Z98.890 OTHER SPECIFIED POSTPROCEDURAL STATES: Chronic | ICD-10-CM

## 2024-04-04 PROCEDURE — 88365 INSITU HYBRIDIZATION (FISH): CPT | Mod: 26

## 2024-04-04 PROCEDURE — 88341 IMHCHEM/IMCYTCHM EA ADD ANTB: CPT | Mod: 26

## 2024-04-04 PROCEDURE — 85097 BONE MARROW INTERPRETATION: CPT

## 2024-04-04 PROCEDURE — 88360 TUMOR IMMUNOHISTOCHEM/MANUAL: CPT | Mod: 26,59

## 2024-04-04 PROCEDURE — 88313 SPECIAL STAINS GROUP 2: CPT | Mod: 26

## 2024-04-04 PROCEDURE — 88305 TISSUE EXAM BY PATHOLOGIST: CPT | Mod: 26

## 2024-04-04 PROCEDURE — 88364 INSITU HYBRIDIZATION (FISH): CPT | Mod: 26

## 2024-04-04 PROCEDURE — 77012 CT SCAN FOR NEEDLE BIOPSY: CPT | Mod: 26

## 2024-04-04 PROCEDURE — 20225 BONE BIOPSY TROCAR/NDL DEEP: CPT

## 2024-04-04 PROCEDURE — 88300 SURGICAL PATH GROSS: CPT | Mod: 26,59

## 2024-04-04 PROCEDURE — 88312 SPECIAL STAINS GROUP 1: CPT | Mod: 26

## 2024-04-04 PROCEDURE — 88342 IMHCHEM/IMCYTCHM 1ST ANTB: CPT | Mod: 26

## 2024-04-04 PROCEDURE — 88291 CYTO/MOLECULAR REPORT: CPT

## 2024-04-04 NOTE — ASU DISCHARGE PLAN (ADULT/PEDIATRIC) - PHYSICIAN SECTION COMPLETE
[facility] OB: Delivery Note





- Delivery


  ** A


Date of Birth: [birth year]


 Sex: Female


Gestational Age in Weeks and Days at Delivery: 39 Weeks and 4 Days


Delivery Method: Spontaneous Vaginal


Labor: Induced


Amniotic Fluid: Clear


Estimated Blood Loss: 250


Anesthesia/Analgesia: CEI for Labor





- Nursery


Level of Nursery: Regular/Bedside





- Perineum


Perineal Injury Comment: abrasion repaired


Perineal Repair: By Delivering Practioner





- Events


Delivery Events of Note: Pitocin During Labor
Yes

## 2024-04-04 NOTE — ASU PREOP CHECKLIST - TEMPERATURE IN CELSIUS (DEGREES C)
Pt  Denies new symptoms or concerns  Labs reviewed and within normal parameters for chemotherapy ordered today  36.5

## 2024-04-04 NOTE — H&P ADULT - HISTORY OF PRESENT ILLNESS
Interventional Radiology    HPI: 83y Male with PMHx HTN, HLD, DM, UGI bleed, DVT s/p R knee surgery on A/C, PVD, B/L knee replacement, B/L carpal tunnel surgery, laser prostate surgery for BPH, and 5 cardiac stents (most recent 2023) presents to IR today for bone marrow biopsy.  Per cardiology, patient held eliquis 2 days prior to surgery and plavix 5 days prior.    Review of Systems:   Constitutional: No fever  Neurological: No headaches  Respiratory: No cough, No shortness of breath  Cardiovascular: No chest pain, palpitations  Gastrointestinal: No abdominal or epigastric pain. No nausea, vomiting  Skin: No rashes  Musculoskeletal: + back pain    Allergies: No Known Allergies    Medications (Abx/Cardiac/Anticoagulation/Blood Products)      Data:  185.4  129.7  T(C): 36.5  HR: 102  BP: 103/57  RR: 16  SpO2: 99%      Physical Exam  General: No acute distress, nontoxic, A&Ox3  Chest: Non labored breathing  Abdomen: Non-distended, non-tender  Extremities: No swelling  Skin: No rashes or lesions    RADIOLOGY & ADDITIONAL TESTS:    Imaging Reviewed    H & P Note Reviewed from: __3/1/24_____    Plan: 83y Male presents for bone marrow biopsy  -Risks/Benefits/alternatives explained with the patient and/or healthcare proxy and witnessed informed consent obtained.    Interventional Radiology    HPI: 83y Male with PMHx HTN, HLD, DM, UGI bleed, DVT s/p R knee surgery on A/C, PVD, B/L knee replacement, B/L carpal tunnel surgery, laser prostate surgery for BPH, and 5 cardiac stents (most recent 2023) presents to IR today for bone marrow biopsy.  Per cardiology, patient held plavix 5 days prior to procedure and per Dr. Vides, patient may resume tomorrow.    Review of Systems:   Constitutional: No fever  Neurological: No headaches  Respiratory: No cough, No shortness of breath  Cardiovascular: No chest pain, palpitations  Gastrointestinal: No abdominal or epigastric pain. No nausea, vomiting  Skin: No rashes  Musculoskeletal: + back pain    Allergies: No Known Allergies    Medications (Abx/Cardiac/Anticoagulation/Blood Products)      Data:  185.4  129.7  T(C): 36.5  HR: 102  BP: 103/57  RR: 16  SpO2: 99%      Physical Exam  General: No acute distress, nontoxic, A&Ox3  Chest: Non labored breathing  Abdomen: Non-distended, non-tender  Extremities: No swelling  Skin: No rashes or lesions    RADIOLOGY & ADDITIONAL TESTS:    Imaging Reviewed    H & P Note Reviewed from: __3/1/24_____    Plan: 83y Male presents for bone marrow biopsy  -Risks/Benefits/alternatives explained with the patient and/or healthcare proxy and witnessed informed consent obtained.

## 2024-04-04 NOTE — ASU DISCHARGE PLAN (ADULT/PEDIATRIC) - ASU DC SPECIAL INSTRUCTIONSFT
Biopsy Discharge    Discharge Instructions  - You have had a bone marrow biopsy  - You may shower in 24 hours. No soaking or swimming until the site is completely healed.  - Keep the area covered and dry for the next 24 hours.  - Do not perform any heavy lifting for the next few days or until the site is healed.  - You may resume your normal diet.  - You may resume your normal medications however you should wait 24 hours before restarting aspirin, plavix, or blood thinners.  - It is normal to experience some pain over the site for the next few days. You may take apply ice to the area (20 minutes on, 20 minutes off) and take Tylenol for that pain. Do not take more frequently than every 6 hours and do not exceed more than 3000mg of Tylenol in a 24 hour period.    - You were given conscious sedation which may make you drowsy, therefore you need someone to stay with you until the morning following the procedure.  - Do not drive, engage in heavy lifting or strenuous activity, or drink any alcoholic beverages for the next 24 hours.   - You may resume normal activity in 24 hours.    Notify your primary physician and/or Interventional Radiology IMMEDIATELY if you experience any of the following       - Fever of 100.4F or 38C       - Chills or Rigors/ Shakes       - Swelling and/or Redness in the area around the biopsy site       - Worsening Pain       - Blood soaked bandages or worsening bleeding       - Lightheadedness and/or dizziness upon standing       - Chest Pain/ Tightness       - Shortness of Breath       - Difficulty walking    If you have a problem that you believe requires IMMEDIATE attention, please go to your NEAREST Emergency Room. If you believe your problem can safely wait until you speak to a physician, please call Interventional Radiology for any concerns.    Please feel free to contact us at [phone] if any problems arise. Biopsy Discharge    Discharge Instructions  - You have had a left iliac bone marrow biopsy  - You may shower in 24 hours. No soaking or swimming until the site is completely healed.  - Keep the area covered and dry for the next 24 hours.  - Do not perform any heavy lifting for the next few days or until the site is healed.  - You may resume your normal diet.  - You may resume your normal medications however you should wait 24 hours before restarting aspirin, plavix, or blood thinners.  - It is normal to experience some pain over the site for the next few days. You may take apply ice to the area (20 minutes on, 20 minutes off) and take Tylenol for that pain. Do not take more frequently than every 6 hours and do not exceed more than 3000mg of Tylenol in a 24 hour period.    - You were given conscious sedation which may make you drowsy, therefore you need someone to stay with you until the morning following the procedure.  - Do not drive, engage in heavy lifting or strenuous activity, or drink any alcoholic beverages for the next 24 hours.   - You may resume normal activity in 24 hours.    Notify your primary physician and/or Interventional Radiology IMMEDIATELY if you experience any of the following       - Fever of 100.4F or 38C       - Chills or Rigors/ Shakes       - Swelling and/or Redness in the area around the biopsy site       - Worsening Pain       - Blood soaked bandages or worsening bleeding       - Lightheadedness and/or dizziness upon standing       - Chest Pain/ Tightness       - Shortness of Breath       - Difficulty walking    If you have a problem that you believe requires IMMEDIATE attention, please go to your NEAREST Emergency Room. If you believe your problem can safely wait until you speak to a physician, please call Interventional Radiology for any concerns.    Please feel free to contact us at [phone] if any problems arise. Biopsy Discharge    Discharge Instructions  - You have had a left iliac bone marrow biopsy  - You may shower in 24 hours. No soaking or swimming until the site is completely healed.  - Keep the area covered and dry for the next 24 hours.  - Do not perform any heavy lifting for the next few days or until the site is healed.  - You may resume your normal diet.  - You may resume your normal medications however you should wait 24 hours before restarting plavix, or blood thinners.  - It is normal to experience some pain over the site for the next few days. You may take apply ice to the area (20 minutes on, 20 minutes off) and take Tylenol for that pain. Do not take more frequently than every 6 hours and do not exceed more than 3000mg of Tylenol in a 24 hour period.    - You were given conscious sedation which may make you drowsy, therefore you need someone to stay with you until the morning following the procedure.  - Do not drive, engage in heavy lifting or strenuous activity, or drink any alcoholic beverages for the next 24 hours.   - You may resume normal activity in 24 hours.    Notify your primary physician and/or Interventional Radiology IMMEDIATELY if you experience any of the following       - Fever of 100.4F or 38C       - Chills or Rigors/ Shakes       - Swelling and/or Redness in the area around the biopsy site       - Worsening Pain       - Blood soaked bandages or worsening bleeding       - Lightheadedness and/or dizziness upon standing       - Chest Pain/ Tightness       - Shortness of Breath       - Difficulty walking    If you have a problem that you believe requires IMMEDIATE attention, please go to your NEAREST Emergency Room. If you believe your problem can safely wait until you speak to a physician, please call Interventional Radiology for any concerns.    Please feel free to contact us at [phone] if any problems arise.

## 2024-04-29 PROCEDURE — 88360 TUMOR IMMUNOHISTOCHEM/MANUAL: CPT

## 2024-04-29 PROCEDURE — 88289 CHROMOSOME STUDY ADDITIONAL: CPT

## 2024-04-29 PROCEDURE — 88280 CHROMOSOME KARYOTYPE STUDY: CPT

## 2024-04-29 PROCEDURE — 88313 SPECIAL STAINS GROUP 2: CPT

## 2024-04-29 PROCEDURE — 77012 CT SCAN FOR NEEDLE BIOPSY: CPT

## 2024-04-29 PROCEDURE — 88305 TISSUE EXAM BY PATHOLOGIST: CPT

## 2024-04-29 PROCEDURE — 87205 SMEAR GRAM STAIN: CPT

## 2024-04-29 PROCEDURE — 88312 SPECIAL STAINS GROUP 1: CPT

## 2024-04-29 PROCEDURE — 20225 BONE BIOPSY TROCAR/NDL DEEP: CPT

## 2024-04-29 PROCEDURE — 88185 FLOWCYTOMETRY/TC ADD-ON: CPT

## 2024-04-29 PROCEDURE — 88275 CYTOGENETICS 100-300: CPT

## 2024-04-29 PROCEDURE — 88342 IMHCHEM/IMCYTCHM 1ST ANTB: CPT | Mod: XU

## 2024-04-29 PROCEDURE — 88365 INSITU HYBRIDIZATION (FISH): CPT

## 2024-04-29 PROCEDURE — 38222 DX BONE MARROW BX & ASPIR: CPT

## 2024-04-29 PROCEDURE — 88364 INSITU HYBRIDIZATION (FISH): CPT

## 2024-04-29 PROCEDURE — 82962 GLUCOSE BLOOD TEST: CPT

## 2024-04-29 PROCEDURE — 88271 CYTOGENETICS DNA PROBE: CPT | Mod: XU

## 2024-04-29 PROCEDURE — 88184 FLOWCYTOMETRY/ TC 1 MARKER: CPT

## 2024-04-29 PROCEDURE — 88237 TISSUE CULTURE BONE MARROW: CPT

## 2024-04-29 PROCEDURE — 88285 CHROMOSOME COUNT ADDITIONAL: CPT

## 2024-04-29 PROCEDURE — 88341 IMHCHEM/IMCYTCHM EA ADD ANTB: CPT

## 2024-04-29 PROCEDURE — 85097 BONE MARROW INTERPRETATION: CPT

## 2024-04-29 PROCEDURE — 88300 SURGICAL PATH GROSS: CPT

## 2024-05-05 ENCOUNTER — RX RENEWAL (OUTPATIENT)
Age: 84
End: 2024-05-05

## 2024-05-05 RX ORDER — ATORVASTATIN CALCIUM 80 MG/1
80 TABLET, FILM COATED ORAL
Qty: 90 | Refills: 1 | Status: ACTIVE | COMMUNITY
Start: 2023-08-18 | End: 1900-01-01

## 2024-05-09 ENCOUNTER — RX RENEWAL (OUTPATIENT)
Age: 84
End: 2024-05-09

## 2024-05-13 ENCOUNTER — RX RENEWAL (OUTPATIENT)
Age: 84
End: 2024-05-13

## 2024-06-24 ENCOUNTER — APPOINTMENT (OUTPATIENT)
Dept: INTERNAL MEDICINE | Facility: CLINIC | Age: 84
End: 2024-06-24
Payer: MEDICARE

## 2024-06-24 VITALS
DIASTOLIC BLOOD PRESSURE: 68 MMHG | OXYGEN SATURATION: 92 % | HEART RATE: 102 BPM | WEIGHT: 288 LBS | HEIGHT: 73 IN | TEMPERATURE: 98.3 F | SYSTOLIC BLOOD PRESSURE: 122 MMHG | RESPIRATION RATE: 16 BRPM | BODY MASS INDEX: 38.17 KG/M2

## 2024-06-24 DIAGNOSIS — E78.5 HYPERLIPIDEMIA, UNSPECIFIED: ICD-10-CM

## 2024-06-24 DIAGNOSIS — R26.81 UNSTEADINESS ON FEET: ICD-10-CM

## 2024-06-24 DIAGNOSIS — E11.9 TYPE 2 DIABETES MELLITUS W/OUT COMPLICATIONS: ICD-10-CM

## 2024-06-24 DIAGNOSIS — I25.10 ATHEROSCLEROTIC HEART DISEASE OF NATIVE CORONARY ARTERY W/OUT ANGINA PECTORIS: ICD-10-CM

## 2024-06-24 DIAGNOSIS — R60.0 LOCALIZED EDEMA: ICD-10-CM

## 2024-06-24 PROCEDURE — G2211 COMPLEX E/M VISIT ADD ON: CPT

## 2024-06-24 PROCEDURE — 99214 OFFICE O/P EST MOD 30 MIN: CPT

## 2024-06-24 RX ORDER — BENZONATATE 200 MG/1
200 CAPSULE ORAL 3 TIMES DAILY
Qty: 30 | Refills: 0 | Status: DISCONTINUED | COMMUNITY
Start: 2023-11-14 | End: 2024-06-24

## 2024-06-28 ENCOUNTER — INPATIENT (INPATIENT)
Facility: HOSPITAL | Age: 84
LOS: 7 days | Discharge: ANOTHER HOSPITAL | DRG: 195 | End: 2024-07-06
Attending: STUDENT IN AN ORGANIZED HEALTH CARE EDUCATION/TRAINING PROGRAM | Admitting: STUDENT IN AN ORGANIZED HEALTH CARE EDUCATION/TRAINING PROGRAM
Payer: MEDICARE

## 2024-06-28 VITALS
HEIGHT: 70 IN | RESPIRATION RATE: 26 BRPM | DIASTOLIC BLOOD PRESSURE: 88 MMHG | TEMPERATURE: 103 F | OXYGEN SATURATION: 93 % | SYSTOLIC BLOOD PRESSURE: 172 MMHG | HEART RATE: 134 BPM

## 2024-06-28 DIAGNOSIS — Z98.890 OTHER SPECIFIED POSTPROCEDURAL STATES: Chronic | ICD-10-CM

## 2024-06-28 DIAGNOSIS — Z95.9 PRESENCE OF CARDIAC AND VASCULAR IMPLANT AND GRAFT, UNSPECIFIED: Chronic | ICD-10-CM

## 2024-06-28 DIAGNOSIS — Z98.89 OTHER SPECIFIED POSTPROCEDURAL STATES: Chronic | ICD-10-CM

## 2024-06-28 LAB
ACANTHOCYTES BLD QL SMEAR: SLIGHT — SIGNIFICANT CHANGE UP
ALBUMIN SERPL ELPH-MCNC: 2.8 G/DL — LOW (ref 3.3–5)
ALP SERPL-CCNC: 26 U/L — LOW (ref 30–120)
ALT FLD-CCNC: 21 U/L — SIGNIFICANT CHANGE UP (ref 10–60)
ANION GAP SERPL CALC-SCNC: 10 MMOL/L — SIGNIFICANT CHANGE UP (ref 5–17)
APPEARANCE UR: ABNORMAL
APTT BLD: 22.2 SEC — LOW (ref 24.5–35.6)
AST SERPL-CCNC: 23 U/L — SIGNIFICANT CHANGE UP (ref 10–40)
BACTERIA # UR AUTO: NEGATIVE /HPF — SIGNIFICANT CHANGE UP
BASOPHILS # BLD AUTO: 0 K/UL — SIGNIFICANT CHANGE UP (ref 0–0.2)
BASOPHILS NFR BLD AUTO: 0 % — SIGNIFICANT CHANGE UP (ref 0–2)
BILIRUB SERPL-MCNC: 1.8 MG/DL — HIGH (ref 0.2–1.2)
BILIRUB UR-MCNC: NEGATIVE — SIGNIFICANT CHANGE UP
BUN SERPL-MCNC: 31 MG/DL — HIGH (ref 7–23)
BURR CELLS BLD QL SMEAR: SLIGHT — SIGNIFICANT CHANGE UP
CALCIUM SERPL-MCNC: 8.3 MG/DL — LOW (ref 8.4–10.5)
CHLORIDE SERPL-SCNC: 102 MMOL/L — SIGNIFICANT CHANGE UP (ref 96–108)
CK MB BLD-MCNC: 0.5 % — SIGNIFICANT CHANGE UP (ref 0–3.5)
CK MB CFR SERPL CALC: 0.6 NG/ML — SIGNIFICANT CHANGE UP (ref 0–3.6)
CK SERPL-CCNC: 123 U/L — SIGNIFICANT CHANGE UP (ref 39–308)
CO2 SERPL-SCNC: 27 MMOL/L — SIGNIFICANT CHANGE UP (ref 22–31)
COLOR SPEC: YELLOW — SIGNIFICANT CHANGE UP
CREAT SERPL-MCNC: 1.71 MG/DL — HIGH (ref 0.5–1.3)
DIFF PNL FLD: ABNORMAL
EGFR: 40 ML/MIN/1.73M2 — LOW
EGFR: 40 ML/MIN/1.73M2 — LOW
EOSINOPHIL # BLD AUTO: 0 K/UL — SIGNIFICANT CHANGE UP (ref 0–0.5)
EOSINOPHIL NFR BLD AUTO: 0 % — SIGNIFICANT CHANGE UP (ref 0–6)
EPI CELLS # UR: PRESENT
FLUAV AG NPH QL: SIGNIFICANT CHANGE UP
FLUBV AG NPH QL: SIGNIFICANT CHANGE UP
GLUCOSE SERPL-MCNC: 190 MG/DL — HIGH (ref 70–99)
GLUCOSE UR QL: 100 MG/DL
HCT VFR BLD CALC: 32.3 % — LOW (ref 39–50)
HGB BLD-MCNC: 9.9 G/DL — LOW (ref 13–17)
HYPOCHROMIA BLD QL: SLIGHT — SIGNIFICANT CHANGE UP
INR BLD: 1.48 RATIO — HIGH (ref 0.85–1.18)
KETONES UR-MCNC: NEGATIVE MG/DL — SIGNIFICANT CHANGE UP
LACTATE SERPL-SCNC: 4 MMOL/L — CRITICAL HIGH (ref 0.7–2)
LEUKOCYTE ESTERASE UR-ACNC: NEGATIVE — SIGNIFICANT CHANGE UP
LYMPHOCYTES # BLD AUTO: 0.32 K/UL — LOW (ref 1–3.3)
LYMPHOCYTES # BLD AUTO: 28 % — SIGNIFICANT CHANGE UP (ref 13–44)
MANUAL SMEAR VERIFICATION: SIGNIFICANT CHANGE UP
MCHC RBC-ENTMCNC: 30.7 GM/DL — LOW (ref 32–36)
MCHC RBC-ENTMCNC: 31.7 PG — SIGNIFICANT CHANGE UP (ref 27–34)
MCV RBC AUTO: 103.5 FL — HIGH (ref 80–100)
MONOCYTES # BLD AUTO: 0.07 K/UL — SIGNIFICANT CHANGE UP (ref 0–0.9)
MONOCYTES NFR BLD AUTO: 6 % — SIGNIFICANT CHANGE UP (ref 2–14)
NEUTROPHILS # BLD AUTO: 0.72 K/UL — LOW (ref 1.8–7.4)
NEUTROPHILS NFR BLD AUTO: 60 % — SIGNIFICANT CHANGE UP (ref 43–77)
NEUTS BAND # BLD: 2 % — SIGNIFICANT CHANGE UP (ref 0–8)
NEUTS BAND NFR BLD: 2 % — SIGNIFICANT CHANGE UP (ref 0–8)
NITRITE UR-MCNC: NEGATIVE — SIGNIFICANT CHANGE UP
NRBC # BLD: 0 /100 WBCS — SIGNIFICANT CHANGE UP (ref 0–0)
NRBC # BLD: SIGNIFICANT CHANGE UP /100 WBCS (ref 0–0)
NRBC BLD-RTO: 0 /100 WBCS — SIGNIFICANT CHANGE UP (ref 0–0)
NRBC BLD-RTO: SIGNIFICANT CHANGE UP /100 WBCS (ref 0–0)
NT-PROBNP SERPL-SCNC: 4321 PG/ML — HIGH (ref 0–450)
OVALOCYTES BLD QL SMEAR: SLIGHT — SIGNIFICANT CHANGE UP
PH UR: 5 — SIGNIFICANT CHANGE UP (ref 5–8)
PLAT MORPH BLD: NORMAL — SIGNIFICANT CHANGE UP
PLATELET # BLD AUTO: 159 K/UL — SIGNIFICANT CHANGE UP (ref 150–400)
POIKILOCYTOSIS BLD QL AUTO: SLIGHT — SIGNIFICANT CHANGE UP
POTASSIUM SERPL-MCNC: 3.9 MMOL/L — SIGNIFICANT CHANGE UP (ref 3.5–5.3)
POTASSIUM SERPL-SCNC: 3.9 MMOL/L — SIGNIFICANT CHANGE UP (ref 3.5–5.3)
PROT SERPL-MCNC: 7 G/DL — SIGNIFICANT CHANGE UP (ref 6–8.3)
PROT UR-MCNC: 300 MG/DL
PROTHROM AB SERPL-ACNC: 16.4 SEC — HIGH (ref 9.5–13)
RBC # BLD: 3.12 M/UL — LOW (ref 4.2–5.8)
RBC # FLD: 17.5 % — HIGH (ref 10.3–14.5)
RBC BLD AUTO: ABNORMAL
RBC CASTS # UR COMP ASSIST: 2 /HPF — SIGNIFICANT CHANGE UP (ref 0–4)
RSV RNA NPH QL NAA+NON-PROBE: SIGNIFICANT CHANGE UP
SARS-COV-2 RNA SPEC QL NAA+PROBE: SIGNIFICANT CHANGE UP
SCHISTOCYTES BLD QL AUTO: SLIGHT — SIGNIFICANT CHANGE UP
SODIUM SERPL-SCNC: 139 MMOL/L — SIGNIFICANT CHANGE UP (ref 135–145)
SP GR SPEC: 1.02 — SIGNIFICANT CHANGE UP (ref 1–1.03)
TARGETS BLD QL SMEAR: SLIGHT — SIGNIFICANT CHANGE UP
TROPONIN I, HIGH SENSITIVITY RESULT: 47.4 NG/L — SIGNIFICANT CHANGE UP
UROBILINOGEN FLD QL: 1 MG/DL — SIGNIFICANT CHANGE UP (ref 0.2–1)
VARIANT LYMPHS # BLD: 4 % — SIGNIFICANT CHANGE UP (ref 0–6)
VARIANT LYMPHS NFR BLD MANUAL: 4 % — SIGNIFICANT CHANGE UP (ref 0–6)
WBC # BLD: 1.16 K/UL — LOW (ref 3.8–10.5)
WBC # FLD AUTO: 1.16 K/UL — LOW (ref 3.8–10.5)
WBC UR QL: 0 /HPF — SIGNIFICANT CHANGE UP (ref 0–5)

## 2024-06-28 PROCEDURE — 99285 EMERGENCY DEPT VISIT HI MDM: CPT

## 2024-06-28 PROCEDURE — 71045 X-RAY EXAM CHEST 1 VIEW: CPT | Mod: 26

## 2024-06-28 PROCEDURE — 93010 ELECTROCARDIOGRAM REPORT: CPT

## 2024-06-28 RX ORDER — CEFTRIAXONE 500 MG/1
1000 INJECTION, POWDER, FOR SOLUTION INTRAMUSCULAR; INTRAVENOUS ONCE
Refills: 0 | Status: COMPLETED | OUTPATIENT
Start: 2024-06-28 | End: 2024-06-28

## 2024-06-28 RX ORDER — ACETAMINOPHEN 500 MG/5ML
1000 LIQUID (ML) ORAL ONCE
Refills: 0 | Status: COMPLETED | OUTPATIENT
Start: 2024-06-28 | End: 2024-06-28

## 2024-06-28 RX ORDER — CHOLECALCIFEROL (VITAMIN D3) 125 MCG
1 CAPSULE ORAL
Refills: 0 | DISCHARGE

## 2024-06-28 RX ORDER — INSULIN ASPART 100 [IU]/ML
12 INJECTION, SUSPENSION SUBCUTANEOUS
Qty: 0 | Refills: 0 | DISCHARGE

## 2024-06-28 RX ORDER — PREGABALIN 225 MG/1
1 CAPSULE ORAL
Refills: 0 | DISCHARGE

## 2024-06-28 RX ORDER — AZITHROMYCIN 250 MG
500 CAPSULE ORAL ONCE
Refills: 0 | Status: COMPLETED | OUTPATIENT
Start: 2024-06-28 | End: 2024-06-28

## 2024-06-28 RX ORDER — FUROSEMIDE 40 MG
1 TABLET ORAL
Qty: 0 | Refills: 0 | DISCHARGE

## 2024-06-28 RX ORDER — ASPIRIN/CALCIUM CARB/MAGNESIUM 324 MG
0 TABLET ORAL
Refills: 0 | DISCHARGE

## 2024-06-28 RX ORDER — INSULIN ASPART 100 [IU]/ML
22 INJECTION, SUSPENSION SUBCUTANEOUS
Qty: 0 | Refills: 0 | DISCHARGE

## 2024-06-28 RX ORDER — ATORVASTATIN CALCIUM 80 MG/1
1 TABLET, FILM COATED ORAL
Refills: 0 | DISCHARGE

## 2024-06-28 RX ORDER — CLOPIDOGREL BISULFATE 75 MG/1
1 TABLET, FILM COATED ORAL
Refills: 0 | DISCHARGE

## 2024-06-28 RX ORDER — FUROSEMIDE 10 MG/ML
40 INJECTION INTRAMUSCULAR; INTRAVENOUS ONCE
Refills: 0 | Status: COMPLETED | OUTPATIENT
Start: 2024-06-28 | End: 2024-06-28

## 2024-06-28 RX ADMIN — Medication 1000 MILLIGRAM(S): at 22:10

## 2024-06-28 RX ADMIN — Medication 1000 MILLILITER(S): at 22:53

## 2024-06-28 RX ADMIN — Medication 255 MILLIGRAM(S): at 22:53

## 2024-06-28 RX ADMIN — Medication 400 MILLIGRAM(S): at 21:54

## 2024-06-28 RX ADMIN — CEFTRIAXONE 100 MILLIGRAM(S): 500 INJECTION, POWDER, FOR SOLUTION INTRAMUSCULAR; INTRAVENOUS at 22:35

## 2024-06-28 RX ADMIN — FUROSEMIDE 40 MILLIGRAM(S): 10 INJECTION INTRAMUSCULAR; INTRAVENOUS at 21:53

## 2024-06-28 RX ADMIN — CEFTRIAXONE 1000 MILLIGRAM(S): 500 INJECTION, POWDER, FOR SOLUTION INTRAMUSCULAR; INTRAVENOUS at 23:00

## 2024-06-29 DIAGNOSIS — J18.9 PNEUMONIA, UNSPECIFIED ORGANISM: ICD-10-CM

## 2024-06-29 LAB
FERRITIN SERPL-MCNC: 319 NG/ML — SIGNIFICANT CHANGE UP (ref 30–400)
FOLATE SERPL-MCNC: 10.4 NG/ML — SIGNIFICANT CHANGE UP
GI PCR PANEL: ABNORMAL
GLUCOSE BLDC GLUCOMTR-MCNC: 142 MG/DL — HIGH (ref 70–99)
GLUCOSE BLDC GLUCOMTR-MCNC: 152 MG/DL — HIGH (ref 70–99)
GLUCOSE BLDC GLUCOMTR-MCNC: 164 MG/DL — HIGH (ref 70–99)
GLUCOSE BLDC GLUCOMTR-MCNC: 166 MG/DL — HIGH (ref 70–99)
IRON SATN MFR SERPL: 13 UG/DL — LOW (ref 45–165)
IRON SATN MFR SERPL: 7 % — LOW (ref 16–55)
LACTATE SERPL-SCNC: 2.9 MMOL/L — HIGH (ref 0.7–2)
LACTATE SERPL-SCNC: 3.4 MMOL/L — HIGH (ref 0.7–2)
LACTATE SERPL-SCNC: 3.5 MMOL/L — HIGH (ref 0.7–2)
LDH SERPL L TO P-CCNC: 218 U/L — SIGNIFICANT CHANGE UP (ref 50–242)
LEGIONELLA AG UR QL: NEGATIVE — SIGNIFICANT CHANGE UP
MRSA PCR RESULT.: SIGNIFICANT CHANGE UP
NOROVIRUS GI+II RNA STL QL NAA+NON-PROBE: ABNORMAL
PROCALCITONIN SERPL-MCNC: 25.7 NG/ML — HIGH (ref 0.02–0.1)
RAPID RVP RESULT: SIGNIFICANT CHANGE UP
S AUREUS DNA NOSE QL NAA+PROBE: DETECTED
S PNEUM AG UR QL: NEGATIVE — SIGNIFICANT CHANGE UP
SARS-COV-2 RNA SPEC QL NAA+PROBE: SIGNIFICANT CHANGE UP
TIBC SERPL-MCNC: 196 UG/DL — LOW (ref 220–430)
TRANSFERRIN SERPL-MCNC: 150 MG/DL — LOW (ref 200–360)
UIBC SERPL-MCNC: 183 UG/DL — SIGNIFICANT CHANGE UP (ref 110–370)
VIT B12 SERPL-MCNC: 385 PG/ML — SIGNIFICANT CHANGE UP (ref 232–1245)

## 2024-06-29 PROCEDURE — 99223 1ST HOSP IP/OBS HIGH 75: CPT | Mod: AI

## 2024-06-29 PROCEDURE — 93970 EXTREMITY STUDY: CPT | Mod: 26

## 2024-06-29 PROCEDURE — 71250 CT THORAX DX C-: CPT | Mod: 26,MC

## 2024-06-29 RX ORDER — ASPIRIN 325 MG
81 TABLET ORAL DAILY
Refills: 0 | Status: DISCONTINUED | OUTPATIENT
Start: 2024-06-29 | End: 2024-07-06

## 2024-06-29 RX ORDER — CYANOCOBALAMIN 1000 UG/ML
1000 INJECTION INTRAMUSCULAR; SUBCUTANEOUS DAILY
Refills: 0 | Status: DISCONTINUED | OUTPATIENT
Start: 2024-06-29 | End: 2024-07-06

## 2024-06-29 RX ORDER — SODIUM CHLORIDE 9 G/1000ML
500 INJECTION, SOLUTION INTRAVENOUS ONCE
Refills: 0 | Status: COMPLETED | OUTPATIENT
Start: 2024-06-29 | End: 2024-06-29

## 2024-06-29 RX ORDER — MAGNESIUM, ALUMINUM HYDROXIDE 200-200 MG
30 TABLET,CHEWABLE ORAL EVERY 4 HOURS
Refills: 0 | Status: DISCONTINUED | OUTPATIENT
Start: 2024-06-29 | End: 2024-07-06

## 2024-06-29 RX ORDER — PIPERACILLIN-TAZO-DEXTROSE,ISO 3.375G/5
3.38 IV SOLUTION, PIGGYBACK PREMIX FROZEN(ML) INTRAVENOUS EVERY 8 HOURS
Refills: 0 | Status: DISCONTINUED | OUTPATIENT
Start: 2024-06-29 | End: 2024-07-06

## 2024-06-29 RX ORDER — PIPERACILLIN-TAZO-DEXTROSE,ISO 3.375G/5
3.38 IV SOLUTION, PIGGYBACK PREMIX FROZEN(ML) INTRAVENOUS ONCE
Refills: 0 | Status: COMPLETED | OUTPATIENT
Start: 2024-06-29 | End: 2024-06-29

## 2024-06-29 RX ORDER — IBUPROFEN 200 MG
400 TABLET ORAL ONCE
Refills: 0 | Status: COMPLETED | OUTPATIENT
Start: 2024-06-29 | End: 2024-06-29

## 2024-06-29 RX ORDER — CEFTRIAXONE 500 MG/1
1000 INJECTION, POWDER, FOR SOLUTION INTRAMUSCULAR; INTRAVENOUS EVERY 24 HOURS
Refills: 0 | Status: DISCONTINUED | OUTPATIENT
Start: 2024-06-29 | End: 2024-06-29

## 2024-06-29 RX ORDER — ATORVASTATIN CALCIUM 80 MG/1
80 TABLET, FILM COATED ORAL AT BEDTIME
Refills: 0 | Status: DISCONTINUED | OUTPATIENT
Start: 2024-06-29 | End: 2024-07-06

## 2024-06-29 RX ORDER — SODIUM CHLORIDE 9 G/1000ML
1000 INJECTION, SOLUTION INTRAVENOUS
Refills: 0 | Status: DISCONTINUED | OUTPATIENT
Start: 2024-06-29 | End: 2024-07-06

## 2024-06-29 RX ORDER — DEXTROSE 50 % IN WATER 50 %
12.5 SYRINGE (ML) INTRAVENOUS ONCE
Refills: 0 | Status: DISCONTINUED | OUTPATIENT
Start: 2024-06-29 | End: 2024-07-06

## 2024-06-29 RX ORDER — DEXTROSE 50 % IN WATER 50 %
25 SYRINGE (ML) INTRAVENOUS ONCE
Refills: 0 | Status: DISCONTINUED | OUTPATIENT
Start: 2024-06-29 | End: 2024-07-06

## 2024-06-29 RX ORDER — INSULIN LISPRO 100 U/ML
INJECTION, SOLUTION INTRAVENOUS; SUBCUTANEOUS AT BEDTIME
Refills: 0 | Status: DISCONTINUED | OUTPATIENT
Start: 2024-06-29 | End: 2024-07-06

## 2024-06-29 RX ORDER — AZITHROMYCIN 250 MG
500 CAPSULE ORAL EVERY 24 HOURS
Refills: 0 | Status: DISCONTINUED | OUTPATIENT
Start: 2024-06-29 | End: 2024-06-29

## 2024-06-29 RX ORDER — ACETAMINOPHEN 500 MG/5ML
650 LIQUID (ML) ORAL EVERY 6 HOURS
Refills: 0 | Status: DISCONTINUED | OUTPATIENT
Start: 2024-06-29 | End: 2024-07-06

## 2024-06-29 RX ORDER — METOPROLOL SUCCINATE 50 MG/1
25 TABLET, EXTENDED RELEASE ORAL
Refills: 0 | Status: DISCONTINUED | OUTPATIENT
Start: 2024-06-29 | End: 2024-07-06

## 2024-06-29 RX ORDER — DEXTROSE 50 % IN WATER 50 %
15 SYRINGE (ML) INTRAVENOUS ONCE
Refills: 0 | Status: DISCONTINUED | OUTPATIENT
Start: 2024-06-29 | End: 2024-07-06

## 2024-06-29 RX ORDER — DEXTROSE MONOHYDRATE 100 G/1000ML
125 INJECTION, SOLUTION INTRAVENOUS ONCE
Refills: 0 | Status: DISCONTINUED | OUTPATIENT
Start: 2024-06-29 | End: 2024-07-06

## 2024-06-29 RX ORDER — METOPROLOL SUCCINATE 50 MG/1
25 TABLET, EXTENDED RELEASE ORAL
Refills: 0 | Status: DISCONTINUED | OUTPATIENT
Start: 2024-06-29 | End: 2024-06-29

## 2024-06-29 RX ORDER — ONDANSETRON HCL/PF 4 MG/2 ML
4 VIAL (ML) INJECTION EVERY 8 HOURS
Refills: 0 | Status: DISCONTINUED | OUTPATIENT
Start: 2024-06-29 | End: 2024-07-06

## 2024-06-29 RX ORDER — INSULIN LISPRO 100 U/ML
INJECTION, SOLUTION INTRAVENOUS; SUBCUTANEOUS
Refills: 0 | Status: DISCONTINUED | OUTPATIENT
Start: 2024-06-29 | End: 2024-07-06

## 2024-06-29 RX ORDER — GLUCAGON 3 MG/1
1 POWDER NASAL ONCE
Refills: 0 | Status: DISCONTINUED | OUTPATIENT
Start: 2024-06-29 | End: 2024-07-06

## 2024-06-29 RX ORDER — MELATONIN 5 MG
3 TABLET ORAL AT BEDTIME
Refills: 0 | Status: DISCONTINUED | OUTPATIENT
Start: 2024-06-29 | End: 2024-07-06

## 2024-06-29 RX ORDER — CLOPIDOGREL BISULFATE 75 MG/1
75 TABLET, FILM COATED ORAL DAILY
Refills: 0 | Status: DISCONTINUED | OUTPATIENT
Start: 2024-06-29 | End: 2024-07-06

## 2024-06-29 RX ORDER — SODIUM CHLORIDE 9 G/1000ML
500 INJECTION, SOLUTION INTRAVENOUS
Refills: 0 | Status: DISCONTINUED | OUTPATIENT
Start: 2024-06-29 | End: 2024-06-29

## 2024-06-29 RX ORDER — MIDODRINE HYDROCHLORIDE 5 MG/1
5 TABLET ORAL THREE TIMES A DAY
Refills: 0 | Status: DISCONTINUED | OUTPATIENT
Start: 2024-06-29 | End: 2024-06-30

## 2024-06-29 RX ADMIN — Medication 81 MILLIGRAM(S): at 11:26

## 2024-06-29 RX ADMIN — Medication 25 GRAM(S): at 13:07

## 2024-06-29 RX ADMIN — SODIUM CHLORIDE 500 MILLILITER(S): 9 INJECTION, SOLUTION INTRAVENOUS at 14:10

## 2024-06-29 RX ADMIN — Medication 25 GRAM(S): at 21:45

## 2024-06-29 RX ADMIN — Medication 400 MILLIGRAM(S): at 18:05

## 2024-06-29 RX ADMIN — SODIUM CHLORIDE 500 MILLILITER(S): 9 INJECTION, SOLUTION INTRAVENOUS at 18:30

## 2024-06-29 RX ADMIN — METOPROLOL SUCCINATE 25 MILLIGRAM(S): 50 TABLET, EXTENDED RELEASE ORAL at 13:49

## 2024-06-29 RX ADMIN — Medication 650 MILLIGRAM(S): at 09:38

## 2024-06-29 RX ADMIN — Medication 650 MILLIGRAM(S): at 18:05

## 2024-06-29 RX ADMIN — CLOPIDOGREL BISULFATE 75 MILLIGRAM(S): 75 TABLET, FILM COATED ORAL at 11:26

## 2024-06-29 RX ADMIN — INSULIN LISPRO 1: 100 INJECTION, SOLUTION INTRAVENOUS; SUBCUTANEOUS at 17:01

## 2024-06-29 RX ADMIN — MIDODRINE HYDROCHLORIDE 5 MILLIGRAM(S): 5 TABLET ORAL at 18:30

## 2024-06-29 RX ADMIN — Medication 2000 UNIT(S): at 11:26

## 2024-06-29 RX ADMIN — Medication 650 MILLIGRAM(S): at 08:19

## 2024-06-29 RX ADMIN — Medication 400 MILLIGRAM(S): at 13:49

## 2024-06-29 RX ADMIN — CYANOCOBALAMIN 1000 MICROGRAM(S): 1000 INJECTION INTRAMUSCULAR; SUBCUTANEOUS at 11:26

## 2024-06-29 RX ADMIN — ATORVASTATIN CALCIUM 80 MILLIGRAM(S): 80 TABLET, FILM COATED ORAL at 21:45

## 2024-06-29 RX ADMIN — INSULIN LISPRO 1: 100 INJECTION, SOLUTION INTRAVENOUS; SUBCUTANEOUS at 12:15

## 2024-06-29 RX ADMIN — Medication 200 GRAM(S): at 09:53

## 2024-06-29 RX ADMIN — Medication 500 MILLILITER(S): at 06:35

## 2024-06-29 RX ADMIN — Medication 650 MILLIGRAM(S): at 14:22

## 2024-06-30 PROBLEM — Z92.89 PERSONAL HISTORY OF OTHER MEDICAL TREATMENT: Chronic | Status: ACTIVE | Noted: 2024-03-14

## 2024-06-30 PROBLEM — K92.2 GASTROINTESTINAL HEMORRHAGE, UNSPECIFIED: Chronic | Status: ACTIVE | Noted: 2024-03-14

## 2024-06-30 LAB
A1C WITH ESTIMATED AVERAGE GLUCOSE RESULT: 7.9 % — HIGH (ref 4–5.6)
ALBUMIN SERPL ELPH-MCNC: 2 G/DL — LOW (ref 3.3–5)
ALP SERPL-CCNC: 15 U/L — LOW (ref 30–120)
ALT FLD-CCNC: 22 U/L — SIGNIFICANT CHANGE UP (ref 10–60)
ANION GAP SERPL CALC-SCNC: 10 MMOL/L — SIGNIFICANT CHANGE UP (ref 5–17)
AST SERPL-CCNC: 31 U/L — SIGNIFICANT CHANGE UP (ref 10–40)
BASOPHILS # BLD AUTO: 0 K/UL — SIGNIFICANT CHANGE UP (ref 0–0.2)
BASOPHILS NFR BLD AUTO: 0 % — SIGNIFICANT CHANGE UP (ref 0–2)
BILIRUB SERPL-MCNC: 1 MG/DL — SIGNIFICANT CHANGE UP (ref 0.2–1.2)
BUN SERPL-MCNC: 45 MG/DL — HIGH (ref 7–23)
CALCIUM SERPL-MCNC: 7.9 MG/DL — LOW (ref 8.4–10.5)
CHLORIDE SERPL-SCNC: 102 MMOL/L — SIGNIFICANT CHANGE UP (ref 96–108)
CHOLEST SERPL-MCNC: 83 MG/DL — SIGNIFICANT CHANGE UP
CO2 SERPL-SCNC: 26 MMOL/L — SIGNIFICANT CHANGE UP (ref 22–31)
CREAT SERPL-MCNC: 2.54 MG/DL — HIGH (ref 0.5–1.3)
EGFR: 25 ML/MIN/1.73M2 — LOW
EGFR: 25 ML/MIN/1.73M2 — LOW
EOSINOPHIL # BLD AUTO: 0.03 K/UL — SIGNIFICANT CHANGE UP (ref 0–0.5)
EOSINOPHIL NFR BLD AUTO: 1 % — SIGNIFICANT CHANGE UP (ref 0–6)
ESTIMATED AVERAGE GLUCOSE: 180 MG/DL — HIGH (ref 68–114)
GI PCR PANEL: ABNORMAL
GLUCOSE BLDC GLUCOMTR-MCNC: 135 MG/DL — HIGH (ref 70–99)
GLUCOSE BLDC GLUCOMTR-MCNC: 139 MG/DL — HIGH (ref 70–99)
GLUCOSE BLDC GLUCOMTR-MCNC: 140 MG/DL — HIGH (ref 70–99)
GLUCOSE BLDC GLUCOMTR-MCNC: 165 MG/DL — HIGH (ref 70–99)
GLUCOSE SERPL-MCNC: 136 MG/DL — HIGH (ref 70–99)
GRAM STN FLD: ABNORMAL
HCT VFR BLD CALC: 29 % — LOW (ref 39–50)
HDLC SERPL-MCNC: 43 MG/DL — SIGNIFICANT CHANGE UP
HGB BLD-MCNC: 8.9 G/DL — LOW (ref 13–17)
LDLC SERPL-MCNC: 23 MG/DL — SIGNIFICANT CHANGE UP
LIPID PNL WITH DIRECT LDL SERPL: 23 MG/DL — SIGNIFICANT CHANGE UP
LYMPHOCYTES # BLD AUTO: 0.18 K/UL — LOW (ref 1–3.3)
LYMPHOCYTES # BLD AUTO: 7 % — LOW (ref 13–44)
MCHC RBC-ENTMCNC: 30.7 GM/DL — LOW (ref 32–36)
MCHC RBC-ENTMCNC: 31.7 PG — SIGNIFICANT CHANGE UP (ref 27–34)
MCV RBC AUTO: 103.2 FL — HIGH (ref 80–100)
METHOD TYPE: SIGNIFICANT CHANGE UP
MONOCYTES # BLD AUTO: 0.18 K/UL — SIGNIFICANT CHANGE UP (ref 0–0.9)
MONOCYTES NFR BLD AUTO: 7 % — SIGNIFICANT CHANGE UP (ref 2–14)
NEUTROPHILS # BLD AUTO: 1.99 K/UL — SIGNIFICANT CHANGE UP (ref 1.8–7.4)
NEUTROPHILS NFR BLD AUTO: 71 % — SIGNIFICANT CHANGE UP (ref 43–77)
NONHDLC SERPL-MCNC: 40 MG/DL — SIGNIFICANT CHANGE UP
NOROVIRUS GI+II RNA STL QL NAA+NON-PROBE: ABNORMAL
NRBC # BLD: SIGNIFICANT CHANGE UP /100 WBCS (ref 0–0)
NRBC BLD-RTO: SIGNIFICANT CHANGE UP /100 WBCS (ref 0–0)
P AERUGINOSA DNA BLD POS NAA+NON-PROBE: SIGNIFICANT CHANGE UP
PLATELET # BLD AUTO: 121 K/UL — LOW (ref 150–400)
POTASSIUM SERPL-MCNC: 3.5 MMOL/L — SIGNIFICANT CHANGE UP (ref 3.5–5.3)
POTASSIUM SERPL-SCNC: 3.5 MMOL/L — SIGNIFICANT CHANGE UP (ref 3.5–5.3)
PROT SERPL-MCNC: 5.9 G/DL — LOW (ref 6–8.3)
RBC # BLD: 2.81 M/UL — LOW (ref 4.2–5.8)
RBC # FLD: 17.4 % — HIGH (ref 10.3–14.5)
SODIUM SERPL-SCNC: 138 MMOL/L — SIGNIFICANT CHANGE UP (ref 135–145)
SPECIMEN SOURCE: SIGNIFICANT CHANGE UP
TRIGL SERPL-MCNC: 84 MG/DL — SIGNIFICANT CHANGE UP
WBC # BLD: 2.55 K/UL — LOW (ref 3.8–10.5)
WBC # FLD AUTO: 2.55 K/UL — LOW (ref 3.8–10.5)

## 2024-06-30 PROCEDURE — 93306 TTE W/DOPPLER COMPLETE: CPT | Mod: 26

## 2024-06-30 PROCEDURE — 93356 MYOCRD STRAIN IMG SPCKL TRCK: CPT

## 2024-06-30 PROCEDURE — 99233 SBSQ HOSP IP/OBS HIGH 50: CPT

## 2024-06-30 RX ORDER — METOPROLOL SUCCINATE 50 MG/1
25 TABLET, EXTENDED RELEASE ORAL ONCE
Refills: 0 | Status: COMPLETED | OUTPATIENT
Start: 2024-06-30 | End: 2024-06-30

## 2024-06-30 RX ORDER — AMMONIUM LACTATE 12 %
1 LOTION (ML) TOPICAL
Refills: 0 | Status: DISCONTINUED | OUTPATIENT
Start: 2024-06-30 | End: 2024-07-06

## 2024-06-30 RX ORDER — SODIUM CHLORIDE 9 G/1000ML
1000 INJECTION, SOLUTION INTRAVENOUS ONCE
Refills: 0 | Status: COMPLETED | OUTPATIENT
Start: 2024-06-30 | End: 2024-06-30

## 2024-06-30 RX ORDER — MIDODRINE HYDROCHLORIDE 5 MG/1
10 TABLET ORAL EVERY 8 HOURS
Refills: 0 | Status: DISCONTINUED | OUTPATIENT
Start: 2024-06-30 | End: 2024-07-01

## 2024-06-30 RX ORDER — MIDODRINE HYDROCHLORIDE 5 MG/1
10 TABLET ORAL EVERY 8 HOURS
Refills: 0 | Status: DISCONTINUED | OUTPATIENT
Start: 2024-06-30 | End: 2024-06-30

## 2024-06-30 RX ADMIN — Medication 25 GRAM(S): at 21:30

## 2024-06-30 RX ADMIN — SODIUM CHLORIDE 1000 MILLILITER(S): 9 INJECTION, SOLUTION INTRAVENOUS at 09:24

## 2024-06-30 RX ADMIN — CLOPIDOGREL BISULFATE 75 MILLIGRAM(S): 75 TABLET, FILM COATED ORAL at 11:13

## 2024-06-30 RX ADMIN — METOPROLOL SUCCINATE 25 MILLIGRAM(S): 50 TABLET, EXTENDED RELEASE ORAL at 23:03

## 2024-06-30 RX ADMIN — Medication 650 MILLIGRAM(S): at 06:03

## 2024-06-30 RX ADMIN — CYANOCOBALAMIN 1000 MICROGRAM(S): 1000 INJECTION INTRAMUSCULAR; SUBCUTANEOUS at 11:12

## 2024-06-30 RX ADMIN — SODIUM CHLORIDE 1000 MILLILITER(S): 9 INJECTION, SOLUTION INTRAVENOUS at 10:59

## 2024-06-30 RX ADMIN — Medication 650 MILLIGRAM(S): at 16:12

## 2024-06-30 RX ADMIN — Medication 40 MILLIGRAM(S): at 05:32

## 2024-06-30 RX ADMIN — Medication 650 MILLIGRAM(S): at 05:33

## 2024-06-30 RX ADMIN — Medication 650 MILLIGRAM(S): at 18:39

## 2024-06-30 RX ADMIN — Medication 2000 UNIT(S): at 11:12

## 2024-06-30 RX ADMIN — ATORVASTATIN CALCIUM 80 MILLIGRAM(S): 80 TABLET, FILM COATED ORAL at 21:30

## 2024-06-30 RX ADMIN — INSULIN LISPRO 1: 100 INJECTION, SOLUTION INTRAVENOUS; SUBCUTANEOUS at 17:19

## 2024-06-30 RX ADMIN — MIDODRINE HYDROCHLORIDE 5 MILLIGRAM(S): 5 TABLET ORAL at 09:24

## 2024-06-30 RX ADMIN — METOPROLOL SUCCINATE 25 MILLIGRAM(S): 50 TABLET, EXTENDED RELEASE ORAL at 05:32

## 2024-06-30 RX ADMIN — Medication 81 MILLIGRAM(S): at 11:13

## 2024-06-30 RX ADMIN — Medication 1 APPLICATION(S): at 18:39

## 2024-06-30 RX ADMIN — MIDODRINE HYDROCHLORIDE 10 MILLIGRAM(S): 5 TABLET ORAL at 21:30

## 2024-06-30 RX ADMIN — Medication 25 GRAM(S): at 05:32

## 2024-06-30 RX ADMIN — Medication 25 GRAM(S): at 13:06

## 2024-07-01 ENCOUNTER — TRANSCRIPTION ENCOUNTER (OUTPATIENT)
Age: 84
End: 2024-07-01

## 2024-07-01 LAB
-  AZTREONAM: SIGNIFICANT CHANGE UP
-  CEFEPIME: SIGNIFICANT CHANGE UP
-  CEFTAZIDIME: SIGNIFICANT CHANGE UP
-  CIPROFLOXACIN: SIGNIFICANT CHANGE UP
-  IMIPENEM: SIGNIFICANT CHANGE UP
-  LEVOFLOXACIN: SIGNIFICANT CHANGE UP
-  MEROPENEM: SIGNIFICANT CHANGE UP
-  PIPERACILLIN/TAZOBACTAM: SIGNIFICANT CHANGE UP
ANION GAP SERPL CALC-SCNC: 12 MMOL/L — SIGNIFICANT CHANGE UP (ref 5–17)
B19V DNA FLD QL NAA+PROBE: SIGNIFICANT CHANGE UP IU/ML
B19V DNA FLD QL NAA+PROBE: SIGNIFICANT CHANGE UP IU/ML
BASOPHILS # BLD AUTO: 0 K/UL — SIGNIFICANT CHANGE UP (ref 0–0.2)
BASOPHILS NFR BLD AUTO: 0 % — SIGNIFICANT CHANGE UP (ref 0–2)
BUN SERPL-MCNC: 62 MG/DL — HIGH (ref 7–23)
CALCIUM SERPL-MCNC: 7.5 MG/DL — LOW (ref 8.4–10.5)
CHLORIDE SERPL-SCNC: 102 MMOL/L — SIGNIFICANT CHANGE UP (ref 96–108)
CO2 SERPL-SCNC: 24 MMOL/L — SIGNIFICANT CHANGE UP (ref 22–31)
CREAT SERPL-MCNC: 2.78 MG/DL — HIGH (ref 0.5–1.3)
CULTURE RESULTS: ABNORMAL
EGFR: 22 ML/MIN/1.73M2 — LOW
EGFR: 22 ML/MIN/1.73M2 — LOW
EOSINOPHIL # BLD AUTO: 0.28 K/UL — SIGNIFICANT CHANGE UP (ref 0–0.5)
EOSINOPHIL NFR BLD AUTO: 10 % — HIGH (ref 0–6)
GLUCOSE BLDC GLUCOMTR-MCNC: 109 MG/DL — HIGH (ref 70–99)
GLUCOSE BLDC GLUCOMTR-MCNC: 133 MG/DL — HIGH (ref 70–99)
GLUCOSE BLDC GLUCOMTR-MCNC: 154 MG/DL — HIGH (ref 70–99)
GLUCOSE BLDC GLUCOMTR-MCNC: 172 MG/DL — HIGH (ref 70–99)
GLUCOSE SERPL-MCNC: 106 MG/DL — HIGH (ref 70–99)
HCT VFR BLD CALC: 27.3 % — LOW (ref 39–50)
HGB BLD-MCNC: 8.6 G/DL — LOW (ref 13–17)
LYMPHOCYTES # BLD AUTO: 0.17 K/UL — LOW (ref 1–3.3)
LYMPHOCYTES # BLD AUTO: 6 % — LOW (ref 13–44)
MAGNESIUM SERPL-MCNC: 1 MG/DL — CRITICAL LOW (ref 1.6–2.6)
MCHC RBC-ENTMCNC: 31.5 GM/DL — LOW (ref 32–36)
MCHC RBC-ENTMCNC: 31.9 PG — SIGNIFICANT CHANGE UP (ref 27–34)
MCV RBC AUTO: 101.1 FL — HIGH (ref 80–100)
METHOD TYPE: SIGNIFICANT CHANGE UP
MONOCYTES # BLD AUTO: 0.19 K/UL — SIGNIFICANT CHANGE UP (ref 0–0.9)
MONOCYTES NFR BLD AUTO: 7 % — SIGNIFICANT CHANGE UP (ref 2–14)
NEUTROPHILS # BLD AUTO: 2.02 K/UL — SIGNIFICANT CHANGE UP (ref 1.8–7.4)
NEUTROPHILS NFR BLD AUTO: 73 % — SIGNIFICANT CHANGE UP (ref 43–77)
NRBC # BLD: SIGNIFICANT CHANGE UP /100 WBCS (ref 0–0)
NRBC BLD-RTO: SIGNIFICANT CHANGE UP /100 WBCS (ref 0–0)
ORGANISM # SPEC MICROSCOPIC CNT: ABNORMAL
PLATELET # BLD AUTO: 126 K/UL — LOW (ref 150–400)
POTASSIUM SERPL-MCNC: 3.7 MMOL/L — SIGNIFICANT CHANGE UP (ref 3.5–5.3)
POTASSIUM SERPL-SCNC: 3.7 MMOL/L — SIGNIFICANT CHANGE UP (ref 3.5–5.3)
RBC # BLD: 2.7 M/UL — LOW (ref 4.2–5.8)
RBC # FLD: 17.7 % — HIGH (ref 10.3–14.5)
SODIUM SERPL-SCNC: 138 MMOL/L — SIGNIFICANT CHANGE UP (ref 135–145)
SPECIMEN SOURCE: SIGNIFICANT CHANGE UP
WBC # BLD: 2.77 K/UL — LOW (ref 3.8–10.5)
WBC # FLD AUTO: 2.77 K/UL — LOW (ref 3.8–10.5)

## 2024-07-01 PROCEDURE — 99233 SBSQ HOSP IP/OBS HIGH 50: CPT

## 2024-07-01 PROCEDURE — 76770 US EXAM ABDO BACK WALL COMP: CPT | Mod: 26

## 2024-07-01 RX ORDER — ALBUMIN (HUMAN) 12.5 G/50ML
50 INJECTION, SOLUTION INTRAVENOUS EVERY 8 HOURS
Refills: 0 | Status: COMPLETED | OUTPATIENT
Start: 2024-07-01 | End: 2024-07-01

## 2024-07-01 RX ORDER — MIDODRINE HYDROCHLORIDE 5 MG/1
10 TABLET ORAL EVERY 8 HOURS
Refills: 0 | Status: DISCONTINUED | OUTPATIENT
Start: 2024-07-01 | End: 2024-07-06

## 2024-07-01 RX ORDER — MAGNESIUM OXIDE 400 MG
400 TABLET ORAL
Refills: 0 | Status: COMPLETED | OUTPATIENT
Start: 2024-07-01 | End: 2024-07-01

## 2024-07-01 RX ADMIN — Medication 40 MILLIGRAM(S): at 05:17

## 2024-07-01 RX ADMIN — Medication 650 MILLIGRAM(S): at 13:30

## 2024-07-01 RX ADMIN — METOPROLOL SUCCINATE 25 MILLIGRAM(S): 50 TABLET, EXTENDED RELEASE ORAL at 17:04

## 2024-07-01 RX ADMIN — Medication 400 MILLIGRAM(S): at 09:57

## 2024-07-01 RX ADMIN — Medication 650 MILLIGRAM(S): at 23:24

## 2024-07-01 RX ADMIN — MIDODRINE HYDROCHLORIDE 10 MILLIGRAM(S): 5 TABLET ORAL at 13:57

## 2024-07-01 RX ADMIN — Medication 81 MILLIGRAM(S): at 12:30

## 2024-07-01 RX ADMIN — CLOPIDOGREL BISULFATE 75 MILLIGRAM(S): 75 TABLET, FILM COATED ORAL at 12:30

## 2024-07-01 RX ADMIN — Medication 25 GRAM(S): at 22:43

## 2024-07-01 RX ADMIN — Medication 25 GRAM(S): at 05:17

## 2024-07-01 RX ADMIN — Medication 650 MILLIGRAM(S): at 12:30

## 2024-07-01 RX ADMIN — Medication 25 GRAM(S): at 13:57

## 2024-07-01 RX ADMIN — MIDODRINE HYDROCHLORIDE 10 MILLIGRAM(S): 5 TABLET ORAL at 05:17

## 2024-07-01 RX ADMIN — CYANOCOBALAMIN 1000 MICROGRAM(S): 1000 INJECTION INTRAMUSCULAR; SUBCUTANEOUS at 12:30

## 2024-07-01 RX ADMIN — Medication 3 MILLIGRAM(S): at 23:23

## 2024-07-01 RX ADMIN — METOPROLOL SUCCINATE 25 MILLIGRAM(S): 50 TABLET, EXTENDED RELEASE ORAL at 06:27

## 2024-07-01 RX ADMIN — Medication 1 APPLICATION(S): at 17:05

## 2024-07-01 RX ADMIN — ALBUMIN (HUMAN) 50 MILLILITER(S): 12.5 INJECTION, SOLUTION INTRAVENOUS at 21:48

## 2024-07-01 RX ADMIN — ATORVASTATIN CALCIUM 80 MILLIGRAM(S): 80 TABLET, FILM COATED ORAL at 22:01

## 2024-07-01 RX ADMIN — Medication 2000 UNIT(S): at 12:30

## 2024-07-01 RX ADMIN — INSULIN LISPRO 1: 100 INJECTION, SOLUTION INTRAVENOUS; SUBCUTANEOUS at 17:05

## 2024-07-01 RX ADMIN — Medication 400 MILLIGRAM(S): at 17:05

## 2024-07-01 RX ADMIN — ALBUMIN (HUMAN) 50 MILLILITER(S): 12.5 INJECTION, SOLUTION INTRAVENOUS at 15:48

## 2024-07-01 RX ADMIN — MIDODRINE HYDROCHLORIDE 10 MILLIGRAM(S): 5 TABLET ORAL at 22:01

## 2024-07-01 RX ADMIN — Medication 1 APPLICATION(S): at 06:27

## 2024-07-01 RX ADMIN — Medication 650 MILLIGRAM(S): at 23:55

## 2024-07-02 ENCOUNTER — TRANSCRIPTION ENCOUNTER (OUTPATIENT)
Age: 84
End: 2024-07-02

## 2024-07-02 LAB
ALBUMIN SERPL ELPH-MCNC: 1.8 G/DL — LOW (ref 3.3–5)
ALP SERPL-CCNC: 15 U/L — LOW (ref 30–120)
ALT FLD-CCNC: 72 U/L — HIGH (ref 10–60)
ANION GAP SERPL CALC-SCNC: 11 MMOL/L — SIGNIFICANT CHANGE UP (ref 5–17)
AST SERPL-CCNC: 82 U/L — HIGH (ref 10–40)
B19V IGG SER-ACNC: 1.92 INDEX — HIGH (ref 0–0.9)
B19V IGG SER-ACNC: 3.01 INDEX — HIGH (ref 0–0.9)
B19V IGG+IGM SER-IMP: POSITIVE
B19V IGG+IGM SER-IMP: POSITIVE
B19V IGG+IGM SER-IMP: SIGNIFICANT CHANGE UP
B19V IGG+IGM SER-IMP: SIGNIFICANT CHANGE UP
B19V IGM FLD-ACNC: 0.11 INDEX — SIGNIFICANT CHANGE UP (ref 0–0.9)
B19V IGM FLD-ACNC: 0.12 INDEX — SIGNIFICANT CHANGE UP (ref 0–0.9)
B19V IGM SER-ACNC: NEGATIVE — SIGNIFICANT CHANGE UP
B19V IGM SER-ACNC: NEGATIVE — SIGNIFICANT CHANGE UP
BILIRUB SERPL-MCNC: 0.9 MG/DL — SIGNIFICANT CHANGE UP (ref 0.2–1.2)
BUN SERPL-MCNC: 69 MG/DL — HIGH (ref 7–23)
CALCIUM SERPL-MCNC: 7.6 MG/DL — LOW (ref 8.4–10.5)
CHLORIDE SERPL-SCNC: 100 MMOL/L — SIGNIFICANT CHANGE UP (ref 96–108)
CO2 SERPL-SCNC: 25 MMOL/L — SIGNIFICANT CHANGE UP (ref 22–31)
CREAT SERPL-MCNC: 3.08 MG/DL — HIGH (ref 0.5–1.3)
EGFR: 20 ML/MIN/1.73M2 — LOW
EGFR: 20 ML/MIN/1.73M2 — LOW
FOLATE SERPL-MCNC: 6.3 NG/ML — SIGNIFICANT CHANGE UP
GLUCOSE BLDC GLUCOMTR-MCNC: 136 MG/DL — HIGH (ref 70–99)
GLUCOSE BLDC GLUCOMTR-MCNC: 145 MG/DL — HIGH (ref 70–99)
GLUCOSE BLDC GLUCOMTR-MCNC: 181 MG/DL — HIGH (ref 70–99)
GLUCOSE BLDC GLUCOMTR-MCNC: 200 MG/DL — HIGH (ref 70–99)
GLUCOSE SERPL-MCNC: 129 MG/DL — HIGH (ref 70–99)
HCT VFR BLD CALC: 25.2 % — LOW (ref 39–50)
HGB BLD-MCNC: 8.1 G/DL — LOW (ref 13–17)
IRON SATN MFR SERPL: 10 % — LOW (ref 16–55)
IRON SATN MFR SERPL: 14 UG/DL — LOW (ref 45–165)
M PROTEIN 24H UR ELPH-MRATE: SIGNIFICANT CHANGE UP
MAGNESIUM SERPL-MCNC: 1.3 MG/DL — LOW (ref 1.6–2.6)
MCHC RBC-ENTMCNC: 32.1 GM/DL — SIGNIFICANT CHANGE UP (ref 32–36)
MCHC RBC-ENTMCNC: 32.3 PG — SIGNIFICANT CHANGE UP (ref 27–34)
MCV RBC AUTO: 100.4 FL — HIGH (ref 80–100)
NRBC # BLD: 1 /100 WBCS — HIGH (ref 0–0)
NRBC BLD-RTO: 1 /100 WBCS — HIGH (ref 0–0)
PLATELET # BLD AUTO: 128 K/UL — LOW (ref 150–400)
POTASSIUM SERPL-MCNC: 3.7 MMOL/L — SIGNIFICANT CHANGE UP (ref 3.5–5.3)
POTASSIUM SERPL-SCNC: 3.7 MMOL/L — SIGNIFICANT CHANGE UP (ref 3.5–5.3)
PROCALCITONIN SERPL-MCNC: 44.4 NG/ML — HIGH (ref 0.02–0.1)
PROT ?TM UR-MCNC: 96 MG/DL — HIGH (ref 0–12)
PROT SERPL-MCNC: 6 G/DL — SIGNIFICANT CHANGE UP (ref 6–8.3)
RBC # BLD: 2.51 M/UL — LOW (ref 4.2–5.8)
RBC # FLD: 17.6 % — HIGH (ref 10.3–14.5)
SODIUM SERPL-SCNC: 136 MMOL/L — SIGNIFICANT CHANGE UP (ref 135–145)
TIBC SERPL-MCNC: 134 UG/DL — LOW (ref 220–430)
UIBC SERPL-MCNC: 121 UG/DL — SIGNIFICANT CHANGE UP (ref 110–370)
VIT B12 SERPL-MCNC: >2000 PG/ML — HIGH (ref 232–1245)
WBC # BLD: 2.88 K/UL — LOW (ref 3.8–10.5)
WBC # FLD AUTO: 2.88 K/UL — LOW (ref 3.8–10.5)

## 2024-07-02 PROCEDURE — 99233 SBSQ HOSP IP/OBS HIGH 50: CPT

## 2024-07-02 RX ORDER — FUROSEMIDE 10 MG/ML
40 INJECTION INTRAMUSCULAR; INTRAVENOUS ONCE
Refills: 0 | Status: COMPLETED | OUTPATIENT
Start: 2024-07-02 | End: 2024-07-02

## 2024-07-02 RX ORDER — FERROUS SULFATE 137(45) MG
325 TABLET, EXTENDED RELEASE ORAL DAILY
Refills: 0 | Status: DISCONTINUED | OUTPATIENT
Start: 2024-07-02 | End: 2024-07-06

## 2024-07-02 RX ORDER — MAGNESIUM SULFATE 500 MG/ML
2 SYRINGE (ML) INJECTION ONCE
Refills: 0 | Status: COMPLETED | OUTPATIENT
Start: 2024-07-02 | End: 2024-07-02

## 2024-07-02 RX ORDER — MAGNESIUM OXIDE 400 MG
400 TABLET ORAL
Refills: 0 | Status: COMPLETED | OUTPATIENT
Start: 2024-07-02 | End: 2024-07-02

## 2024-07-02 RX ADMIN — Medication 650 MILLIGRAM(S): at 23:03

## 2024-07-02 RX ADMIN — ATORVASTATIN CALCIUM 80 MILLIGRAM(S): 80 TABLET, FILM COATED ORAL at 22:25

## 2024-07-02 RX ADMIN — MIDODRINE HYDROCHLORIDE 10 MILLIGRAM(S): 5 TABLET ORAL at 13:01

## 2024-07-02 RX ADMIN — Medication 25 GRAM(S): at 13:00

## 2024-07-02 RX ADMIN — Medication 650 MILLIGRAM(S): at 13:06

## 2024-07-02 RX ADMIN — Medication 81 MILLIGRAM(S): at 11:07

## 2024-07-02 RX ADMIN — Medication 650 MILLIGRAM(S): at 13:33

## 2024-07-02 RX ADMIN — CYANOCOBALAMIN 1000 MICROGRAM(S): 1000 INJECTION INTRAMUSCULAR; SUBCUTANEOUS at 11:07

## 2024-07-02 RX ADMIN — METOPROLOL SUCCINATE 25 MILLIGRAM(S): 50 TABLET, EXTENDED RELEASE ORAL at 05:52

## 2024-07-02 RX ADMIN — Medication 25 GRAM(S): at 22:24

## 2024-07-02 RX ADMIN — Medication 400 MILLIGRAM(S): at 17:04

## 2024-07-02 RX ADMIN — Medication 25 GRAM(S): at 05:50

## 2024-07-02 RX ADMIN — MIDODRINE HYDROCHLORIDE 10 MILLIGRAM(S): 5 TABLET ORAL at 05:51

## 2024-07-02 RX ADMIN — Medication 25 GRAM(S): at 17:04

## 2024-07-02 RX ADMIN — Medication 2000 UNIT(S): at 11:06

## 2024-07-02 RX ADMIN — Medication 650 MILLIGRAM(S): at 22:25

## 2024-07-02 RX ADMIN — Medication 1 APPLICATION(S): at 17:15

## 2024-07-02 RX ADMIN — INSULIN LISPRO 1: 100 INJECTION, SOLUTION INTRAVENOUS; SUBCUTANEOUS at 17:07

## 2024-07-02 RX ADMIN — Medication 40 MILLIGRAM(S): at 05:52

## 2024-07-02 RX ADMIN — FUROSEMIDE 40 MILLIGRAM(S): 10 INJECTION INTRAMUSCULAR; INTRAVENOUS at 22:24

## 2024-07-02 RX ADMIN — Medication 1 APPLICATION(S): at 05:50

## 2024-07-02 RX ADMIN — CLOPIDOGREL BISULFATE 75 MILLIGRAM(S): 75 TABLET, FILM COATED ORAL at 11:07

## 2024-07-02 RX ADMIN — Medication 325 MILLIGRAM(S): at 17:06

## 2024-07-02 RX ADMIN — Medication 400 MILLIGRAM(S): at 13:01

## 2024-07-02 RX ADMIN — Medication 3 MILLIGRAM(S): at 22:25

## 2024-07-02 RX ADMIN — METOPROLOL SUCCINATE 25 MILLIGRAM(S): 50 TABLET, EXTENDED RELEASE ORAL at 17:05

## 2024-07-03 LAB
ANION GAP SERPL CALC-SCNC: 13 MMOL/L — SIGNIFICANT CHANGE UP (ref 5–17)
BUN SERPL-MCNC: 77 MG/DL — HIGH (ref 7–23)
CALCIUM SERPL-MCNC: 7.9 MG/DL — LOW (ref 8.4–10.5)
CHLORIDE SERPL-SCNC: 99 MMOL/L — SIGNIFICANT CHANGE UP (ref 96–108)
CO2 SERPL-SCNC: 24 MMOL/L — SIGNIFICANT CHANGE UP (ref 22–31)
CREAT SERPL-MCNC: 3.2 MG/DL — HIGH (ref 0.5–1.3)
EGFR: 19 ML/MIN/1.73M2 — LOW
EGFR: 19 ML/MIN/1.73M2 — LOW
GLUCOSE BLDC GLUCOMTR-MCNC: 168 MG/DL — HIGH (ref 70–99)
GLUCOSE BLDC GLUCOMTR-MCNC: 175 MG/DL — HIGH (ref 70–99)
GLUCOSE BLDC GLUCOMTR-MCNC: 179 MG/DL — HIGH (ref 70–99)
GLUCOSE BLDC GLUCOMTR-MCNC: 210 MG/DL — HIGH (ref 70–99)
GLUCOSE SERPL-MCNC: 166 MG/DL — HIGH (ref 70–99)
HCT VFR BLD CALC: 24.9 % — LOW (ref 39–50)
HGB BLD-MCNC: 8 G/DL — LOW (ref 13–17)
MAGNESIUM SERPL-MCNC: 1.8 MG/DL — SIGNIFICANT CHANGE UP (ref 1.6–2.6)
MCHC RBC-ENTMCNC: 32.1 GM/DL — SIGNIFICANT CHANGE UP (ref 32–36)
MCHC RBC-ENTMCNC: 32.3 PG — SIGNIFICANT CHANGE UP (ref 27–34)
MCV RBC AUTO: 100.4 FL — HIGH (ref 80–100)
NRBC # BLD: 0 /100 WBCS — SIGNIFICANT CHANGE UP (ref 0–0)
NRBC BLD-RTO: 0 /100 WBCS — SIGNIFICANT CHANGE UP (ref 0–0)
PLATELET # BLD AUTO: 150 K/UL — SIGNIFICANT CHANGE UP (ref 150–400)
POTASSIUM SERPL-MCNC: 3.8 MMOL/L — SIGNIFICANT CHANGE UP (ref 3.5–5.3)
POTASSIUM SERPL-SCNC: 3.8 MMOL/L — SIGNIFICANT CHANGE UP (ref 3.5–5.3)
RBC # BLD: 2.48 M/UL — LOW (ref 4.2–5.8)
RBC # FLD: 18 % — HIGH (ref 10.3–14.5)
SODIUM SERPL-SCNC: 136 MMOL/L — SIGNIFICANT CHANGE UP (ref 135–145)
SODIUM UR-SCNC: <20 MMOL/L — SIGNIFICANT CHANGE UP
WBC # BLD: 7.71 K/UL — SIGNIFICANT CHANGE UP (ref 3.8–10.5)
WBC # FLD AUTO: 7.71 K/UL — SIGNIFICANT CHANGE UP (ref 3.8–10.5)

## 2024-07-03 PROCEDURE — 99232 SBSQ HOSP IP/OBS MODERATE 35: CPT

## 2024-07-03 PROCEDURE — 71045 X-RAY EXAM CHEST 1 VIEW: CPT | Mod: 26

## 2024-07-03 RX ADMIN — CYANOCOBALAMIN 1000 MICROGRAM(S): 1000 INJECTION INTRAMUSCULAR; SUBCUTANEOUS at 11:15

## 2024-07-03 RX ADMIN — Medication 325 MILLIGRAM(S): at 11:16

## 2024-07-03 RX ADMIN — Medication 1 APPLICATION(S): at 05:52

## 2024-07-03 RX ADMIN — Medication 81 MILLIGRAM(S): at 11:15

## 2024-07-03 RX ADMIN — Medication 3 MILLIGRAM(S): at 22:05

## 2024-07-03 RX ADMIN — INSULIN LISPRO 1: 100 INJECTION, SOLUTION INTRAVENOUS; SUBCUTANEOUS at 08:16

## 2024-07-03 RX ADMIN — Medication 1 APPLICATION(S): at 17:15

## 2024-07-03 RX ADMIN — Medication 40 MILLIGRAM(S): at 05:51

## 2024-07-03 RX ADMIN — METOPROLOL SUCCINATE 25 MILLIGRAM(S): 50 TABLET, EXTENDED RELEASE ORAL at 17:07

## 2024-07-03 RX ADMIN — MIDODRINE HYDROCHLORIDE 10 MILLIGRAM(S): 5 TABLET ORAL at 22:05

## 2024-07-03 RX ADMIN — ATORVASTATIN CALCIUM 80 MILLIGRAM(S): 80 TABLET, FILM COATED ORAL at 22:05

## 2024-07-03 RX ADMIN — Medication 2000 UNIT(S): at 11:15

## 2024-07-03 RX ADMIN — Medication 25 GRAM(S): at 05:50

## 2024-07-03 RX ADMIN — CLOPIDOGREL BISULFATE 75 MILLIGRAM(S): 75 TABLET, FILM COATED ORAL at 11:15

## 2024-07-03 RX ADMIN — INSULIN LISPRO 1: 100 INJECTION, SOLUTION INTRAVENOUS; SUBCUTANEOUS at 17:08

## 2024-07-03 RX ADMIN — Medication 25 GRAM(S): at 22:05

## 2024-07-03 RX ADMIN — Medication 25 GRAM(S): at 14:16

## 2024-07-03 RX ADMIN — MIDODRINE HYDROCHLORIDE 10 MILLIGRAM(S): 5 TABLET ORAL at 05:52

## 2024-07-03 RX ADMIN — METOPROLOL SUCCINATE 25 MILLIGRAM(S): 50 TABLET, EXTENDED RELEASE ORAL at 05:52

## 2024-07-03 RX ADMIN — MIDODRINE HYDROCHLORIDE 10 MILLIGRAM(S): 5 TABLET ORAL at 14:17

## 2024-07-03 RX ADMIN — INSULIN LISPRO 1: 100 INJECTION, SOLUTION INTRAVENOUS; SUBCUTANEOUS at 12:30

## 2024-07-04 LAB
ANION GAP SERPL CALC-SCNC: 17 MMOL/L — SIGNIFICANT CHANGE UP (ref 5–17)
BUN SERPL-MCNC: 81 MG/DL — HIGH (ref 7–23)
CALCIUM SERPL-MCNC: 8.2 MG/DL — LOW (ref 8.4–10.5)
CHLORIDE SERPL-SCNC: 97 MMOL/L — SIGNIFICANT CHANGE UP (ref 96–108)
CO2 SERPL-SCNC: 20 MMOL/L — LOW (ref 22–31)
CREAT SERPL-MCNC: 3.46 MG/DL — HIGH (ref 0.5–1.3)
CULTURE RESULTS: SIGNIFICANT CHANGE UP
EGFR: 17 ML/MIN/1.73M2 — LOW
EGFR: 17 ML/MIN/1.73M2 — LOW
GLUCOSE BLDC GLUCOMTR-MCNC: 158 MG/DL — HIGH (ref 70–99)
GLUCOSE BLDC GLUCOMTR-MCNC: 187 MG/DL — HIGH (ref 70–99)
GLUCOSE BLDC GLUCOMTR-MCNC: 195 MG/DL — HIGH (ref 70–99)
GLUCOSE BLDC GLUCOMTR-MCNC: 200 MG/DL — HIGH (ref 70–99)
GLUCOSE SERPL-MCNC: 163 MG/DL — HIGH (ref 70–99)
HCT VFR BLD CALC: 24.6 % — LOW (ref 39–50)
HGB BLD-MCNC: 7.7 G/DL — LOW (ref 13–17)
MCHC RBC-ENTMCNC: 31.3 GM/DL — LOW (ref 32–36)
MCHC RBC-ENTMCNC: 31.7 PG — SIGNIFICANT CHANGE UP (ref 27–34)
MCV RBC AUTO: 101.2 FL — HIGH (ref 80–100)
NRBC # BLD: 0 /100 WBCS — SIGNIFICANT CHANGE UP (ref 0–0)
NRBC BLD-RTO: 0 /100 WBCS — SIGNIFICANT CHANGE UP (ref 0–0)
PLATELET # BLD AUTO: 186 K/UL — SIGNIFICANT CHANGE UP (ref 150–400)
POTASSIUM SERPL-MCNC: 4.2 MMOL/L — SIGNIFICANT CHANGE UP (ref 3.5–5.3)
POTASSIUM SERPL-SCNC: 4.2 MMOL/L — SIGNIFICANT CHANGE UP (ref 3.5–5.3)
RBC # BLD: 2.43 M/UL — LOW (ref 4.2–5.8)
RBC # FLD: 18.4 % — HIGH (ref 10.3–14.5)
SODIUM SERPL-SCNC: 134 MMOL/L — LOW (ref 135–145)
SPECIMEN SOURCE: SIGNIFICANT CHANGE UP
WBC # BLD: 11.52 K/UL — HIGH (ref 3.8–10.5)
WBC # FLD AUTO: 11.52 K/UL — HIGH (ref 3.8–10.5)

## 2024-07-04 PROCEDURE — 99233 SBSQ HOSP IP/OBS HIGH 50: CPT

## 2024-07-04 PROCEDURE — 93010 ELECTROCARDIOGRAM REPORT: CPT

## 2024-07-04 RX ORDER — SODIUM BICARBONATE 1 MEQ/ML
325 SYRINGE (ML) INTRAVENOUS THREE TIMES A DAY
Refills: 0 | Status: DISCONTINUED | OUTPATIENT
Start: 2024-07-04 | End: 2024-07-06

## 2024-07-04 RX ORDER — HEPARIN SODIUM 1000 [USP'U]/ML
5000 INJECTION INTRAVENOUS; SUBCUTANEOUS EVERY 12 HOURS
Refills: 0 | Status: DISCONTINUED | OUTPATIENT
Start: 2024-07-04 | End: 2024-07-06

## 2024-07-04 RX ORDER — FUROSEMIDE 10 MG/ML
80 INJECTION INTRAMUSCULAR; INTRAVENOUS ONCE
Refills: 0 | Status: COMPLETED | OUTPATIENT
Start: 2024-07-04 | End: 2024-07-04

## 2024-07-04 RX ADMIN — METOPROLOL SUCCINATE 25 MILLIGRAM(S): 50 TABLET, EXTENDED RELEASE ORAL at 17:40

## 2024-07-04 RX ADMIN — Medication 1 APPLICATION(S): at 18:59

## 2024-07-04 RX ADMIN — MIDODRINE HYDROCHLORIDE 10 MILLIGRAM(S): 5 TABLET ORAL at 06:02

## 2024-07-04 RX ADMIN — ATORVASTATIN CALCIUM 80 MILLIGRAM(S): 80 TABLET, FILM COATED ORAL at 22:12

## 2024-07-04 RX ADMIN — Medication 40 MILLIGRAM(S): at 06:03

## 2024-07-04 RX ADMIN — Medication 325 MILLIGRAM(S): at 22:12

## 2024-07-04 RX ADMIN — INSULIN LISPRO 1: 100 INJECTION, SOLUTION INTRAVENOUS; SUBCUTANEOUS at 12:06

## 2024-07-04 RX ADMIN — HEPARIN SODIUM 5000 UNIT(S): 1000 INJECTION INTRAVENOUS; SUBCUTANEOUS at 17:39

## 2024-07-04 RX ADMIN — CLOPIDOGREL BISULFATE 75 MILLIGRAM(S): 75 TABLET, FILM COATED ORAL at 14:02

## 2024-07-04 RX ADMIN — Medication 2000 UNIT(S): at 14:02

## 2024-07-04 RX ADMIN — Medication 325 MILLIGRAM(S): at 14:02

## 2024-07-04 RX ADMIN — Medication 81 MILLIGRAM(S): at 14:01

## 2024-07-04 RX ADMIN — MIDODRINE HYDROCHLORIDE 10 MILLIGRAM(S): 5 TABLET ORAL at 14:02

## 2024-07-04 RX ADMIN — MIDODRINE HYDROCHLORIDE 10 MILLIGRAM(S): 5 TABLET ORAL at 22:12

## 2024-07-04 RX ADMIN — Medication 1 APPLICATION(S): at 06:03

## 2024-07-04 RX ADMIN — CYANOCOBALAMIN 1000 MICROGRAM(S): 1000 INJECTION INTRAMUSCULAR; SUBCUTANEOUS at 14:02

## 2024-07-04 RX ADMIN — INSULIN LISPRO 1: 100 INJECTION, SOLUTION INTRAVENOUS; SUBCUTANEOUS at 17:40

## 2024-07-04 RX ADMIN — Medication 25 GRAM(S): at 22:12

## 2024-07-04 RX ADMIN — METOPROLOL SUCCINATE 25 MILLIGRAM(S): 50 TABLET, EXTENDED RELEASE ORAL at 06:03

## 2024-07-04 RX ADMIN — FUROSEMIDE 80 MILLIGRAM(S): 10 INJECTION INTRAMUSCULAR; INTRAVENOUS at 09:59

## 2024-07-04 RX ADMIN — Medication 25 GRAM(S): at 14:02

## 2024-07-04 RX ADMIN — INSULIN LISPRO 1: 100 INJECTION, SOLUTION INTRAVENOUS; SUBCUTANEOUS at 07:58

## 2024-07-04 RX ADMIN — Medication 325 MILLIGRAM(S): at 16:53

## 2024-07-04 RX ADMIN — Medication 25 GRAM(S): at 05:41

## 2024-07-05 ENCOUNTER — TRANSCRIPTION ENCOUNTER (OUTPATIENT)
Age: 84
End: 2024-07-05

## 2024-07-05 LAB
ANION GAP SERPL CALC-SCNC: 16 MMOL/L — SIGNIFICANT CHANGE UP (ref 5–17)
BUN SERPL-MCNC: 83 MG/DL — HIGH (ref 7–23)
CALCIUM SERPL-MCNC: 8.5 MG/DL — SIGNIFICANT CHANGE UP (ref 8.4–10.5)
CHLORIDE SERPL-SCNC: 95 MMOL/L — LOW (ref 96–108)
CO2 SERPL-SCNC: 23 MMOL/L — SIGNIFICANT CHANGE UP (ref 22–31)
CREAT SERPL-MCNC: 4 MG/DL — HIGH (ref 0.5–1.3)
EGFR: 14 ML/MIN/1.73M2 — LOW
EGFR: 14 ML/MIN/1.73M2 — LOW
GLUCOSE BLDC GLUCOMTR-MCNC: 157 MG/DL — HIGH (ref 70–99)
GLUCOSE BLDC GLUCOMTR-MCNC: 161 MG/DL — HIGH (ref 70–99)
GLUCOSE BLDC GLUCOMTR-MCNC: 161 MG/DL — HIGH (ref 70–99)
GLUCOSE BLDC GLUCOMTR-MCNC: 178 MG/DL — HIGH (ref 70–99)
GLUCOSE SERPL-MCNC: 187 MG/DL — HIGH (ref 70–99)
HCT VFR BLD CALC: 24.9 % — LOW (ref 39–50)
HGB BLD-MCNC: 7.9 G/DL — LOW (ref 13–17)
MCHC RBC-ENTMCNC: 31.7 GM/DL — LOW (ref 32–36)
MCHC RBC-ENTMCNC: 32.5 PG — SIGNIFICANT CHANGE UP (ref 27–34)
MCV RBC AUTO: 102.5 FL — HIGH (ref 80–100)
NRBC # BLD: 0 /100 WBCS — SIGNIFICANT CHANGE UP (ref 0–0)
NRBC BLD-RTO: 0 /100 WBCS — SIGNIFICANT CHANGE UP (ref 0–0)
PLATELET # BLD AUTO: 212 K/UL — SIGNIFICANT CHANGE UP (ref 150–400)
POTASSIUM SERPL-MCNC: 3.7 MMOL/L — SIGNIFICANT CHANGE UP (ref 3.5–5.3)
POTASSIUM SERPL-SCNC: 3.7 MMOL/L — SIGNIFICANT CHANGE UP (ref 3.5–5.3)
RBC # BLD: 2.43 M/UL — LOW (ref 4.2–5.8)
RBC # FLD: 18.7 % — HIGH (ref 10.3–14.5)
SODIUM SERPL-SCNC: 134 MMOL/L — LOW (ref 135–145)
WBC # BLD: 12.17 K/UL — HIGH (ref 3.8–10.5)
WBC # FLD AUTO: 12.17 K/UL — HIGH (ref 3.8–10.5)

## 2024-07-05 PROCEDURE — 99233 SBSQ HOSP IP/OBS HIGH 50: CPT

## 2024-07-05 RX ORDER — FERROUS SULFATE 137(45) MG
1 TABLET, EXTENDED RELEASE ORAL
Qty: 0 | Refills: 0 | DISCHARGE
Start: 2024-07-05

## 2024-07-05 RX ORDER — AMMONIUM LACTATE 12 %
1 LOTION (ML) TOPICAL
Qty: 0 | Refills: 0 | DISCHARGE
Start: 2024-07-05

## 2024-07-05 RX ORDER — MIDODRINE HYDROCHLORIDE 5 MG/1
1 TABLET ORAL
Qty: 0 | Refills: 0 | DISCHARGE
Start: 2024-07-05

## 2024-07-05 RX ORDER — NITROGLYCERIN 20 MG/G
1 OINTMENT TOPICAL ONCE
Refills: 0 | Status: COMPLETED | OUTPATIENT
Start: 2024-07-05 | End: 2024-07-05

## 2024-07-05 RX ORDER — SODIUM BICARBONATE 1 MEQ/ML
1 SYRINGE (ML) INTRAVENOUS
Qty: 0 | Refills: 0 | DISCHARGE
Start: 2024-07-05

## 2024-07-05 RX ORDER — LACTOBACILLUS ACIDOPHILUS/PECT 75 MM-100
1 CAPSULE ORAL
Qty: 0 | Refills: 0 | DISCHARGE
Start: 2024-07-05

## 2024-07-05 RX ORDER — SODIUM CHLORIDE 9 G/1000ML
1000 INJECTION, SOLUTION INTRAVENOUS
Refills: 0 | Status: DISCONTINUED | OUTPATIENT
Start: 2024-07-05 | End: 2024-07-05

## 2024-07-05 RX ORDER — LACTOBACILLUS ACIDOPHILUS/PECT 75 MM-100
1 CAPSULE ORAL
Refills: 0 | Status: DISCONTINUED | OUTPATIENT
Start: 2024-07-05 | End: 2024-07-06

## 2024-07-05 RX ORDER — PIPERACILLIN-TAZO-DEXTROSE,ISO 3.375G/5
3.38 IV SOLUTION, PIGGYBACK PREMIX FROZEN(ML) INTRAVENOUS
Qty: 0 | Refills: 0 | DISCHARGE
Start: 2024-07-05

## 2024-07-05 RX ORDER — HEPARIN SODIUM 1000 [USP'U]/ML
5000 INJECTION INTRAVENOUS; SUBCUTANEOUS
Qty: 0 | Refills: 0 | DISCHARGE
Start: 2024-07-05

## 2024-07-05 RX ORDER — MELATONIN 5 MG
1 TABLET ORAL
Qty: 0 | Refills: 0 | DISCHARGE
Start: 2024-07-05

## 2024-07-05 RX ORDER — ACETAMINOPHEN 500 MG/5ML
2 LIQUID (ML) ORAL
Qty: 0 | Refills: 0 | DISCHARGE
Start: 2024-07-05

## 2024-07-05 RX ADMIN — NITROGLYCERIN 1 INCH(S): 20 OINTMENT TOPICAL at 01:08

## 2024-07-05 RX ADMIN — INSULIN LISPRO 1: 100 INJECTION, SOLUTION INTRAVENOUS; SUBCUTANEOUS at 17:16

## 2024-07-05 RX ADMIN — Medication 325 MILLIGRAM(S): at 13:03

## 2024-07-05 RX ADMIN — Medication 81 MILLIGRAM(S): at 13:03

## 2024-07-05 RX ADMIN — Medication 1 APPLICATION(S): at 18:02

## 2024-07-05 RX ADMIN — METOPROLOL SUCCINATE 25 MILLIGRAM(S): 50 TABLET, EXTENDED RELEASE ORAL at 05:40

## 2024-07-05 RX ADMIN — Medication 650 MILLIGRAM(S): at 07:06

## 2024-07-05 RX ADMIN — MIDODRINE HYDROCHLORIDE 10 MILLIGRAM(S): 5 TABLET ORAL at 13:05

## 2024-07-05 RX ADMIN — INSULIN LISPRO 1: 100 INJECTION, SOLUTION INTRAVENOUS; SUBCUTANEOUS at 12:49

## 2024-07-05 RX ADMIN — Medication 325 MILLIGRAM(S): at 13:04

## 2024-07-05 RX ADMIN — ATORVASTATIN CALCIUM 80 MILLIGRAM(S): 80 TABLET, FILM COATED ORAL at 21:52

## 2024-07-05 RX ADMIN — Medication 2000 UNIT(S): at 13:11

## 2024-07-05 RX ADMIN — Medication 325 MILLIGRAM(S): at 21:52

## 2024-07-05 RX ADMIN — Medication 25 GRAM(S): at 21:53

## 2024-07-05 RX ADMIN — CLOPIDOGREL BISULFATE 75 MILLIGRAM(S): 75 TABLET, FILM COATED ORAL at 13:03

## 2024-07-05 RX ADMIN — METOPROLOL SUCCINATE 25 MILLIGRAM(S): 50 TABLET, EXTENDED RELEASE ORAL at 17:32

## 2024-07-05 RX ADMIN — Medication 1 TABLET(S): at 17:32

## 2024-07-05 RX ADMIN — Medication 1 APPLICATION(S): at 05:40

## 2024-07-05 RX ADMIN — INSULIN LISPRO 1: 100 INJECTION, SOLUTION INTRAVENOUS; SUBCUTANEOUS at 08:34

## 2024-07-05 RX ADMIN — MIDODRINE HYDROCHLORIDE 10 MILLIGRAM(S): 5 TABLET ORAL at 05:40

## 2024-07-05 RX ADMIN — CYANOCOBALAMIN 1000 MICROGRAM(S): 1000 INJECTION INTRAMUSCULAR; SUBCUTANEOUS at 13:04

## 2024-07-05 RX ADMIN — Medication 650 MILLIGRAM(S): at 06:26

## 2024-07-05 RX ADMIN — HEPARIN SODIUM 5000 UNIT(S): 1000 INJECTION INTRAVENOUS; SUBCUTANEOUS at 05:40

## 2024-07-05 RX ADMIN — Medication 40 MILLIGRAM(S): at 05:40

## 2024-07-05 RX ADMIN — HEPARIN SODIUM 5000 UNIT(S): 1000 INJECTION INTRAVENOUS; SUBCUTANEOUS at 17:32

## 2024-07-05 RX ADMIN — Medication 25 GRAM(S): at 05:39

## 2024-07-05 RX ADMIN — SODIUM CHLORIDE 75 MILLILITER(S): 9 INJECTION, SOLUTION INTRAVENOUS at 13:46

## 2024-07-05 RX ADMIN — Medication 75 MILLILITER(S): at 15:17

## 2024-07-05 RX ADMIN — NITROGLYCERIN 1 INCH(S): 20 OINTMENT TOPICAL at 10:00

## 2024-07-05 RX ADMIN — Medication 325 MILLIGRAM(S): at 05:40

## 2024-07-05 RX ADMIN — Medication 25 GRAM(S): at 13:05

## 2024-07-05 RX ADMIN — MIDODRINE HYDROCHLORIDE 10 MILLIGRAM(S): 5 TABLET ORAL at 21:52

## 2024-07-06 ENCOUNTER — INPATIENT (INPATIENT)
Facility: HOSPITAL | Age: 84
LOS: 9 days | Discharge: NURSING, REHAB OR LONG-TERM CARE | End: 2024-07-16
Attending: INTERNAL MEDICINE | Admitting: INTERNAL MEDICINE
Payer: MEDICARE

## 2024-07-06 VITALS
HEART RATE: 87 BPM | DIASTOLIC BLOOD PRESSURE: 68 MMHG | RESPIRATION RATE: 18 BRPM | OXYGEN SATURATION: 94 % | TEMPERATURE: 98 F | SYSTOLIC BLOOD PRESSURE: 124 MMHG

## 2024-07-06 VITALS
WEIGHT: 304.24 LBS | TEMPERATURE: 97 F | DIASTOLIC BLOOD PRESSURE: 50 MMHG | RESPIRATION RATE: 18 BRPM | OXYGEN SATURATION: 98 % | HEIGHT: 73 IN | HEART RATE: 98 BPM | SYSTOLIC BLOOD PRESSURE: 104 MMHG

## 2024-07-06 DIAGNOSIS — Z98.89 OTHER SPECIFIED POSTPROCEDURAL STATES: Chronic | ICD-10-CM

## 2024-07-06 DIAGNOSIS — Z95.9 PRESENCE OF CARDIAC AND VASCULAR IMPLANT AND GRAFT, UNSPECIFIED: Chronic | ICD-10-CM

## 2024-07-06 DIAGNOSIS — Z55.0 ILLITERACY AND LOW-LEVEL LITERACY: ICD-10-CM

## 2024-07-06 DIAGNOSIS — Z98.890 OTHER SPECIFIED POSTPROCEDURAL STATES: Chronic | ICD-10-CM

## 2024-07-06 LAB
ALBUMIN SERPL ELPH-MCNC: 2.5 G/DL — LOW (ref 3.3–5)
ALP SERPL-CCNC: 56 U/L — SIGNIFICANT CHANGE UP (ref 40–120)
ALT FLD-CCNC: 53 U/L — HIGH (ref 4–41)
ANION GAP SERPL CALC-SCNC: 15 MMOL/L — SIGNIFICANT CHANGE UP (ref 5–17)
ANION GAP SERPL CALC-SCNC: 19 MMOL/L — HIGH (ref 7–14)
ANISOCYTOSIS BLD QL: SLIGHT — SIGNIFICANT CHANGE UP
ASO AB SER QL: 96 IU/ML — SIGNIFICANT CHANGE UP (ref 0–199)
AST SERPL-CCNC: 35 U/L — SIGNIFICANT CHANGE UP (ref 4–40)
BASOPHILS # BLD AUTO: 0.14 K/UL — SIGNIFICANT CHANGE UP (ref 0–0.2)
BASOPHILS NFR BLD AUTO: 1.8 % — SIGNIFICANT CHANGE UP (ref 0–2)
BILIRUB SERPL-MCNC: 0.5 MG/DL — SIGNIFICANT CHANGE UP (ref 0.2–1.2)
BUN SERPL-MCNC: 90 MG/DL — HIGH (ref 7–23)
BUN SERPL-MCNC: 94 MG/DL — HIGH (ref 7–23)
BURR CELLS BLD QL SMEAR: PRESENT — SIGNIFICANT CHANGE UP
CALCIUM SERPL-MCNC: 8.3 MG/DL — LOW (ref 8.4–10.5)
CALCIUM SERPL-MCNC: 8.7 MG/DL — SIGNIFICANT CHANGE UP (ref 8.4–10.5)
CHLORIDE SERPL-SCNC: 96 MMOL/L — LOW (ref 98–107)
CHLORIDE SERPL-SCNC: 96 MMOL/L — SIGNIFICANT CHANGE UP (ref 96–108)
CO2 SERPL-SCNC: 19 MMOL/L — LOW (ref 22–31)
CO2 SERPL-SCNC: 24 MMOL/L — SIGNIFICANT CHANGE UP (ref 22–31)
CREAT SERPL-MCNC: 4.45 MG/DL — HIGH (ref 0.5–1.3)
CREAT SERPL-MCNC: 4.59 MG/DL — HIGH (ref 0.5–1.3)
DACRYOCYTES BLD QL SMEAR: SLIGHT — SIGNIFICANT CHANGE UP
EGFR: 12 ML/MIN/1.73M2 — LOW
EGFR: 12 ML/MIN/1.73M2 — LOW
EGFR: 13 ML/MIN/1.73M2 — LOW
ELLIPTOCYTES BLD QL SMEAR: SLIGHT — SIGNIFICANT CHANGE UP
EOSINOPHIL # BLD AUTO: 0 K/UL — SIGNIFICANT CHANGE UP (ref 0–0.5)
EOSINOPHIL NFR BLD AUTO: 0 % — SIGNIFICANT CHANGE UP (ref 0–6)
GIANT PLATELETS BLD QL SMEAR: PRESENT — SIGNIFICANT CHANGE UP
GLUCOSE BLDC GLUCOMTR-MCNC: 167 MG/DL — HIGH (ref 70–99)
GLUCOSE BLDC GLUCOMTR-MCNC: 236 MG/DL — HIGH (ref 70–99)
GLUCOSE BLDC GLUCOMTR-MCNC: 249 MG/DL — HIGH (ref 70–99)
GLUCOSE SERPL-MCNC: 163 MG/DL — HIGH (ref 70–99)
GLUCOSE SERPL-MCNC: 196 MG/DL — HIGH (ref 70–99)
HCT VFR BLD CALC: 24.8 % — LOW (ref 39–50)
HCT VFR BLD CALC: 25.5 % — LOW (ref 39–50)
HGB BLD-MCNC: 7.8 G/DL — LOW (ref 13–17)
HGB BLD-MCNC: 8 G/DL — LOW (ref 13–17)
HYPOCHROMIA BLD QL: SIGNIFICANT CHANGE UP
IANC: 7.01 K/UL — SIGNIFICANT CHANGE UP (ref 1.8–7.4)
LYMPHOCYTES # BLD AUTO: 0.13 K/UL — LOW (ref 1–3.3)
LYMPHOCYTES # BLD AUTO: 1.7 % — LOW (ref 13–44)
MACROCYTES BLD QL: SLIGHT — SIGNIFICANT CHANGE UP
MAGNESIUM SERPL-MCNC: 2.3 MG/DL — SIGNIFICANT CHANGE UP (ref 1.6–2.6)
MCHC RBC-ENTMCNC: 31.4 GM/DL — LOW (ref 32–36)
MCHC RBC-ENTMCNC: 31.5 GM/DL — LOW (ref 32–36)
MCHC RBC-ENTMCNC: 31.5 PG — SIGNIFICANT CHANGE UP (ref 27–34)
MCHC RBC-ENTMCNC: 32.4 PG — SIGNIFICANT CHANGE UP (ref 27–34)
MCV RBC AUTO: 100 FL — SIGNIFICANT CHANGE UP (ref 80–100)
MCV RBC AUTO: 103.2 FL — HIGH (ref 80–100)
MICROCYTES BLD QL: SLIGHT — SIGNIFICANT CHANGE UP
MONOCYTES # BLD AUTO: 0.13 K/UL — SIGNIFICANT CHANGE UP (ref 0–0.9)
MONOCYTES NFR BLD AUTO: 1.7 % — LOW (ref 2–14)
MYELOPEROXIDASE AB SER-ACNC: <5 UNITS — SIGNIFICANT CHANGE UP
MYELOPEROXIDASE CELLS FLD QL: NEGATIVE — SIGNIFICANT CHANGE UP
NEUTROPHILS # BLD AUTO: 7.17 K/UL — SIGNIFICANT CHANGE UP (ref 1.8–7.4)
NEUTROPHILS NFR BLD AUTO: 94.8 % — HIGH (ref 43–77)
NRBC # BLD: 0 /100 WBCS — SIGNIFICANT CHANGE UP (ref 0–0)
NRBC BLD-RTO: 0 /100 WBCS — SIGNIFICANT CHANGE UP (ref 0–0)
PHOSPHATE SERPL-MCNC: 9.3 MG/DL — HIGH (ref 2.5–4.5)
PLAT MORPH BLD: NORMAL — SIGNIFICANT CHANGE UP
PLATELET # BLD AUTO: 181 K/UL — SIGNIFICANT CHANGE UP (ref 150–400)
PLATELET # BLD AUTO: 197 K/UL — SIGNIFICANT CHANGE UP (ref 150–400)
PLATELET COUNT - ESTIMATE: NORMAL — SIGNIFICANT CHANGE UP
POIKILOCYTOSIS BLD QL AUTO: SIGNIFICANT CHANGE UP
POLYCHROMASIA BLD QL SMEAR: SIGNIFICANT CHANGE UP
POTASSIUM SERPL-MCNC: 3.9 MMOL/L — SIGNIFICANT CHANGE UP (ref 3.5–5.3)
POTASSIUM SERPL-MCNC: 3.9 MMOL/L — SIGNIFICANT CHANGE UP (ref 3.5–5.3)
POTASSIUM SERPL-SCNC: 3.9 MMOL/L — SIGNIFICANT CHANGE UP (ref 3.5–5.3)
POTASSIUM SERPL-SCNC: 3.9 MMOL/L — SIGNIFICANT CHANGE UP (ref 3.5–5.3)
PROT SERPL-MCNC: 6.1 G/DL — SIGNIFICANT CHANGE UP (ref 6–8.3)
PROTEINASE3 AB FLD-ACNC: <5 UNITS — SIGNIFICANT CHANGE UP
PROTEINASE3 AB SER-ACNC: NEGATIVE — SIGNIFICANT CHANGE UP
RBC # BLD: 2.47 M/UL — LOW (ref 4.2–5.8)
RBC # BLD: 2.48 M/UL — LOW (ref 4.2–5.8)
RBC # FLD: 18.7 % — HIGH (ref 10.3–14.5)
RBC # FLD: 18.8 % — HIGH (ref 10.3–14.5)
RBC BLD AUTO: SIGNIFICANT CHANGE UP
SCHISTOCYTES BLD QL AUTO: SIGNIFICANT CHANGE UP
SODIUM SERPL-SCNC: 134 MMOL/L — LOW (ref 135–145)
SODIUM SERPL-SCNC: 135 MMOL/L — SIGNIFICANT CHANGE UP (ref 135–145)
TARGETS BLD QL SMEAR: SIGNIFICANT CHANGE UP
WBC # BLD: 7.56 K/UL — SIGNIFICANT CHANGE UP (ref 3.8–10.5)
WBC # BLD: 8.9 K/UL — SIGNIFICANT CHANGE UP (ref 3.8–10.5)
WBC # FLD AUTO: 7.56 K/UL — SIGNIFICANT CHANGE UP (ref 3.8–10.5)
WBC # FLD AUTO: 8.9 K/UL — SIGNIFICANT CHANGE UP (ref 3.8–10.5)

## 2024-07-06 PROCEDURE — 93356 MYOCRD STRAIN IMG SPCKL TRCK: CPT

## 2024-07-06 PROCEDURE — 99239 HOSP IP/OBS DSCHRG MGMT >30: CPT

## 2024-07-06 PROCEDURE — 85730 THROMBOPLASTIN TIME PARTIAL: CPT

## 2024-07-06 PROCEDURE — 84466 ASSAY OF TRANSFERRIN: CPT

## 2024-07-06 PROCEDURE — 82728 ASSAY OF FERRITIN: CPT

## 2024-07-06 PROCEDURE — 82962 GLUCOSE BLOOD TEST: CPT

## 2024-07-06 PROCEDURE — 83605 ASSAY OF LACTIC ACID: CPT

## 2024-07-06 PROCEDURE — 71045 X-RAY EXAM CHEST 1 VIEW: CPT

## 2024-07-06 PROCEDURE — 87150 DNA/RNA AMPLIFIED PROBE: CPT

## 2024-07-06 PROCEDURE — 83036 HEMOGLOBIN GLYCOSYLATED A1C: CPT

## 2024-07-06 PROCEDURE — 84145 PROCALCITONIN (PCT): CPT

## 2024-07-06 PROCEDURE — 86325 OTHER IMMUNOELECTROPHORESIS: CPT

## 2024-07-06 PROCEDURE — 81001 URINALYSIS AUTO W/SCOPE: CPT

## 2024-07-06 PROCEDURE — 83540 ASSAY OF IRON: CPT

## 2024-07-06 PROCEDURE — 87641 MR-STAPH DNA AMP PROBE: CPT

## 2024-07-06 PROCEDURE — 97116 GAIT TRAINING THERAPY: CPT

## 2024-07-06 PROCEDURE — 82607 VITAMIN B-12: CPT

## 2024-07-06 PROCEDURE — 80053 COMPREHEN METABOLIC PANEL: CPT

## 2024-07-06 PROCEDURE — 96375 TX/PRO/DX INJ NEW DRUG ADDON: CPT

## 2024-07-06 PROCEDURE — 84300 ASSAY OF URINE SODIUM: CPT

## 2024-07-06 PROCEDURE — 87899 AGENT NOS ASSAY W/OPTIC: CPT

## 2024-07-06 PROCEDURE — 83550 IRON BINDING TEST: CPT

## 2024-07-06 PROCEDURE — 87507 IADNA-DNA/RNA PROBE TQ 12-25: CPT

## 2024-07-06 PROCEDURE — 93306 TTE W/DOPPLER COMPLETE: CPT

## 2024-07-06 PROCEDURE — 86160 COMPLEMENT ANTIGEN: CPT

## 2024-07-06 PROCEDURE — 93005 ELECTROCARDIOGRAM TRACING: CPT

## 2024-07-06 PROCEDURE — 82550 ASSAY OF CK (CPK): CPT

## 2024-07-06 PROCEDURE — 80048 BASIC METABOLIC PNL TOTAL CA: CPT

## 2024-07-06 PROCEDURE — 86747 PARVOVIRUS ANTIBODY: CPT

## 2024-07-06 PROCEDURE — 83880 ASSAY OF NATRIURETIC PEPTIDE: CPT

## 2024-07-06 PROCEDURE — 87449 NOS EACH ORGANISM AG IA: CPT

## 2024-07-06 PROCEDURE — 0225U NFCT DS DNA&RNA 21 SARSCOV2: CPT

## 2024-07-06 PROCEDURE — 97110 THERAPEUTIC EXERCISES: CPT

## 2024-07-06 PROCEDURE — 84156 ASSAY OF PROTEIN URINE: CPT

## 2024-07-06 PROCEDURE — 86060 ANTISTREPTOLYSIN O TITER: CPT

## 2024-07-06 PROCEDURE — 82553 CREATINE MB FRACTION: CPT

## 2024-07-06 PROCEDURE — 83735 ASSAY OF MAGNESIUM: CPT

## 2024-07-06 PROCEDURE — 76770 US EXAM ABDO BACK WALL COMP: CPT

## 2024-07-06 PROCEDURE — 87040 BLOOD CULTURE FOR BACTERIA: CPT

## 2024-07-06 PROCEDURE — P9047: CPT

## 2024-07-06 PROCEDURE — 71250 CT THORAX DX C-: CPT | Mod: MC

## 2024-07-06 PROCEDURE — 84484 ASSAY OF TROPONIN QUANT: CPT

## 2024-07-06 PROCEDURE — 87637 SARSCOV2&INF A&B&RSV AMP PRB: CPT

## 2024-07-06 PROCEDURE — 93970 EXTREMITY STUDY: CPT

## 2024-07-06 PROCEDURE — 83615 LACTATE (LD) (LDH) ENZYME: CPT

## 2024-07-06 PROCEDURE — 83516 IMMUNOASSAY NONANTIBODY: CPT

## 2024-07-06 PROCEDURE — 87640 STAPH A DNA AMP PROBE: CPT

## 2024-07-06 PROCEDURE — 99285 EMERGENCY DEPT VISIT HI MDM: CPT

## 2024-07-06 PROCEDURE — 85610 PROTHROMBIN TIME: CPT

## 2024-07-06 PROCEDURE — 85025 COMPLETE CBC W/AUTO DIFF WBC: CPT

## 2024-07-06 PROCEDURE — 85027 COMPLETE CBC AUTOMATED: CPT

## 2024-07-06 PROCEDURE — 87186 SC STD MICRODIL/AGAR DIL: CPT

## 2024-07-06 PROCEDURE — 87799 DETECT AGENT NOS DNA QUANT: CPT

## 2024-07-06 PROCEDURE — 36415 COLL VENOUS BLD VENIPUNCTURE: CPT

## 2024-07-06 PROCEDURE — 80061 LIPID PANEL: CPT

## 2024-07-06 PROCEDURE — 96374 THER/PROPH/DIAG INJ IV PUSH: CPT

## 2024-07-06 PROCEDURE — 97161 PT EVAL LOW COMPLEX 20 MIN: CPT

## 2024-07-06 PROCEDURE — 82746 ASSAY OF FOLIC ACID SERUM: CPT

## 2024-07-06 RX ORDER — LIDOCAINE HYDROCHLORIDE 20 MG/ML
2 JELLY TOPICAL DAILY
Refills: 0 | Status: DISCONTINUED | OUTPATIENT
Start: 2024-07-06 | End: 2024-07-06

## 2024-07-06 RX ORDER — LIDOCAINE HYDROCHLORIDE 20 MG/ML
2 JELLY TOPICAL
Qty: 0 | Refills: 0 | DISCHARGE
Start: 2024-07-06

## 2024-07-06 RX ORDER — ACETAMINOPHEN 325 MG
650 TABLET ORAL EVERY 6 HOURS
Refills: 0 | Status: DISCONTINUED | OUTPATIENT
Start: 2024-07-06 | End: 2024-07-16

## 2024-07-06 RX ORDER — ONDANSETRON HYDROCHLORIDE 2 MG/ML
4 INJECTION INTRAMUSCULAR; INTRAVENOUS EVERY 8 HOURS
Refills: 0 | Status: DISCONTINUED | OUTPATIENT
Start: 2024-07-06 | End: 2024-07-16

## 2024-07-06 RX ORDER — MIDODRINE HYDROCHLORIDE 5 MG/1
5 TABLET ORAL EVERY 8 HOURS
Refills: 0 | Status: DISCONTINUED | OUTPATIENT
Start: 2024-07-06 | End: 2024-07-06

## 2024-07-06 RX ORDER — PIPERACILLIN SODIUM AND TAZOBACTAM SODIUM 3; .375 G/15ML; G/15ML
3.38 INJECTION, POWDER, LYOPHILIZED, FOR SOLUTION INTRAVENOUS EVERY 12 HOURS
Refills: 0 | Status: DISCONTINUED | OUTPATIENT
Start: 2024-07-06 | End: 2024-07-07

## 2024-07-06 RX ORDER — MAGNESIUM, ALUMINUM HYDROXIDE 400-400
30 TABLET,CHEWABLE ORAL EVERY 4 HOURS
Refills: 0 | Status: DISCONTINUED | OUTPATIENT
Start: 2024-07-06 | End: 2024-07-16

## 2024-07-06 RX ORDER — MIDODRINE HYDROCHLORIDE 5 MG/1
1 TABLET ORAL
Qty: 0 | Refills: 0 | DISCHARGE
Start: 2024-07-06

## 2024-07-06 RX ADMIN — Medication 25 GRAM(S): at 13:54

## 2024-07-06 RX ADMIN — METOPROLOL SUCCINATE 25 MILLIGRAM(S): 50 TABLET, EXTENDED RELEASE ORAL at 05:59

## 2024-07-06 RX ADMIN — Medication 325 MILLIGRAM(S): at 05:59

## 2024-07-06 RX ADMIN — Medication 2000 UNIT(S): at 11:46

## 2024-07-06 RX ADMIN — Medication 1 TABLET(S): at 11:46

## 2024-07-06 RX ADMIN — INSULIN LISPRO 2: 100 INJECTION, SOLUTION INTRAVENOUS; SUBCUTANEOUS at 17:06

## 2024-07-06 RX ADMIN — Medication 1 APPLICATION(S): at 17:07

## 2024-07-06 RX ADMIN — Medication 650 MILLIGRAM(S): at 07:43

## 2024-07-06 RX ADMIN — Medication 81 MILLIGRAM(S): at 11:46

## 2024-07-06 RX ADMIN — INSULIN LISPRO 1: 100 INJECTION, SOLUTION INTRAVENOUS; SUBCUTANEOUS at 08:28

## 2024-07-06 RX ADMIN — CLOPIDOGREL BISULFATE 75 MILLIGRAM(S): 75 TABLET, FILM COATED ORAL at 11:46

## 2024-07-06 RX ADMIN — METOPROLOL SUCCINATE 25 MILLIGRAM(S): 50 TABLET, EXTENDED RELEASE ORAL at 17:06

## 2024-07-06 RX ADMIN — Medication 40 MILLIGRAM(S): at 05:59

## 2024-07-06 RX ADMIN — Medication 25 GRAM(S): at 05:58

## 2024-07-06 RX ADMIN — INSULIN LISPRO 2: 100 INJECTION, SOLUTION INTRAVENOUS; SUBCUTANEOUS at 12:55

## 2024-07-06 RX ADMIN — Medication 650 MILLIGRAM(S): at 08:13

## 2024-07-06 RX ADMIN — LIDOCAINE HYDROCHLORIDE 2 PATCH: 20 JELLY TOPICAL at 11:46

## 2024-07-06 RX ADMIN — HEPARIN SODIUM 5000 UNIT(S): 1000 INJECTION INTRAVENOUS; SUBCUTANEOUS at 17:06

## 2024-07-06 RX ADMIN — CYANOCOBALAMIN 1000 MICROGRAM(S): 1000 INJECTION INTRAMUSCULAR; SUBCUTANEOUS at 11:46

## 2024-07-06 RX ADMIN — HEPARIN SODIUM 5000 UNIT(S): 1000 INJECTION INTRAVENOUS; SUBCUTANEOUS at 05:59

## 2024-07-06 RX ADMIN — Medication 325 MILLIGRAM(S): at 13:54

## 2024-07-06 RX ADMIN — MIDODRINE HYDROCHLORIDE 10 MILLIGRAM(S): 5 TABLET ORAL at 05:59

## 2024-07-06 RX ADMIN — Medication 1 TABLET(S): at 17:06

## 2024-07-06 RX ADMIN — Medication 325 MILLIGRAM(S): at 11:46

## 2024-07-06 RX ADMIN — Medication 1 TABLET(S): at 07:43

## 2024-07-06 SDOH — EDUCATIONAL SECURITY - EDUCATION ATTAINMENT: ILITERACY AND LOW LEVEL LITERACY: Z55.0

## 2024-07-07 DIAGNOSIS — Z79.899 OTHER LONG TERM (CURRENT) DRUG THERAPY: ICD-10-CM

## 2024-07-07 DIAGNOSIS — I10 ESSENTIAL (PRIMARY) HYPERTENSION: ICD-10-CM

## 2024-07-07 DIAGNOSIS — C90.00 MULTIPLE MYELOMA NOT HAVING ACHIEVED REMISSION: ICD-10-CM

## 2024-07-07 DIAGNOSIS — R78.81 BACTEREMIA: ICD-10-CM

## 2024-07-07 DIAGNOSIS — Z29.9 ENCOUNTER FOR PROPHYLACTIC MEASURES, UNSPECIFIED: ICD-10-CM

## 2024-07-07 DIAGNOSIS — N17.0 ACUTE KIDNEY FAILURE WITH TUBULAR NECROSIS: ICD-10-CM

## 2024-07-07 DIAGNOSIS — E11.9 TYPE 2 DIABETES MELLITUS WITHOUT COMPLICATIONS: ICD-10-CM

## 2024-07-07 DIAGNOSIS — Z71.89 OTHER SPECIFIED COUNSELING: ICD-10-CM

## 2024-07-07 DIAGNOSIS — N17.9 ACUTE KIDNEY FAILURE, UNSPECIFIED: ICD-10-CM

## 2024-07-07 DIAGNOSIS — I89.0 LYMPHEDEMA, NOT ELSEWHERE CLASSIFIED: ICD-10-CM

## 2024-07-07 LAB
A1C WITH ESTIMATED AVERAGE GLUCOSE RESULT: 8 % — HIGH (ref 4–5.6)
ALBUMIN SERPL ELPH-MCNC: 2.4 G/DL — LOW (ref 3.3–5)
ALP SERPL-CCNC: 55 U/L — SIGNIFICANT CHANGE UP (ref 40–120)
ALT FLD-CCNC: 43 U/L — HIGH (ref 4–41)
ANION GAP SERPL CALC-SCNC: 21 MMOL/L — HIGH (ref 7–14)
AST SERPL-CCNC: 23 U/L — SIGNIFICANT CHANGE UP (ref 4–40)
BASOPHILS # BLD AUTO: 0.05 K/UL — SIGNIFICANT CHANGE UP (ref 0–0.2)
BASOPHILS NFR BLD AUTO: 0.7 % — SIGNIFICANT CHANGE UP (ref 0–2)
BILIRUB SERPL-MCNC: 0.5 MG/DL — SIGNIFICANT CHANGE UP (ref 0.2–1.2)
BUN SERPL-MCNC: 97 MG/DL — HIGH (ref 7–23)
CALCIUM SERPL-MCNC: 8.2 MG/DL — LOW (ref 8.4–10.5)
CHLORIDE SERPL-SCNC: 94 MMOL/L — LOW (ref 98–107)
CO2 SERPL-SCNC: 18 MMOL/L — LOW (ref 22–31)
CREAT SERPL-MCNC: 4.77 MG/DL — HIGH (ref 0.5–1.3)
EGFR: 12 ML/MIN/1.73M2 — LOW
EOSINOPHIL # BLD AUTO: 0.07 K/UL — SIGNIFICANT CHANGE UP (ref 0–0.5)
EOSINOPHIL NFR BLD AUTO: 1 % — SIGNIFICANT CHANGE UP (ref 0–6)
ESTIMATED AVERAGE GLUCOSE: 183 — SIGNIFICANT CHANGE UP
GLUCOSE BLDC GLUCOMTR-MCNC: 190 MG/DL — HIGH (ref 70–99)
GLUCOSE BLDC GLUCOMTR-MCNC: 219 MG/DL — HIGH (ref 70–99)
GLUCOSE BLDC GLUCOMTR-MCNC: 225 MG/DL — HIGH (ref 70–99)
GLUCOSE BLDC GLUCOMTR-MCNC: 234 MG/DL — HIGH (ref 70–99)
GLUCOSE SERPL-MCNC: 218 MG/DL — HIGH (ref 70–99)
HCT VFR BLD CALC: 23.6 % — LOW (ref 39–50)
HGB BLD-MCNC: 7.7 G/DL — LOW (ref 13–17)
IANC: 6.42 K/UL — SIGNIFICANT CHANGE UP (ref 1.8–7.4)
IMM GRANULOCYTES NFR BLD AUTO: 0.7 % — SIGNIFICANT CHANGE UP (ref 0–0.9)
LYMPHOCYTES # BLD AUTO: 0.18 K/UL — LOW (ref 1–3.3)
LYMPHOCYTES # BLD AUTO: 2.6 % — LOW (ref 13–44)
MAGNESIUM SERPL-MCNC: 2.2 MG/DL — SIGNIFICANT CHANGE UP (ref 1.6–2.6)
MCHC RBC-ENTMCNC: 32.4 PG — SIGNIFICANT CHANGE UP (ref 27–34)
MCHC RBC-ENTMCNC: 32.6 GM/DL — SIGNIFICANT CHANGE UP (ref 32–36)
MCV RBC AUTO: 99.2 FL — SIGNIFICANT CHANGE UP (ref 80–100)
MONOCYTES # BLD AUTO: 0.1 K/UL — SIGNIFICANT CHANGE UP (ref 0–0.9)
MONOCYTES NFR BLD AUTO: 1.5 % — LOW (ref 2–14)
NEUTROPHILS # BLD AUTO: 6.42 K/UL — SIGNIFICANT CHANGE UP (ref 1.8–7.4)
NEUTROPHILS NFR BLD AUTO: 93.5 % — HIGH (ref 43–77)
NRBC # BLD: 0 /100 WBCS — SIGNIFICANT CHANGE UP (ref 0–0)
NRBC # FLD: 0 K/UL — SIGNIFICANT CHANGE UP (ref 0–0)
PHOSPHATE SERPL-MCNC: 9 MG/DL — HIGH (ref 2.5–4.5)
PLATELET # BLD AUTO: 172 K/UL — SIGNIFICANT CHANGE UP (ref 150–400)
POTASSIUM SERPL-MCNC: 3.8 MMOL/L — SIGNIFICANT CHANGE UP (ref 3.5–5.3)
POTASSIUM SERPL-SCNC: 3.8 MMOL/L — SIGNIFICANT CHANGE UP (ref 3.5–5.3)
PROT SERPL-MCNC: 6 G/DL — SIGNIFICANT CHANGE UP (ref 6–8.3)
RBC # BLD: 2.38 M/UL — LOW (ref 4.2–5.8)
RBC # FLD: 18.7 % — HIGH (ref 10.3–14.5)
SODIUM SERPL-SCNC: 133 MMOL/L — LOW (ref 135–145)
WBC # BLD: 6.87 K/UL — SIGNIFICANT CHANGE UP (ref 3.8–10.5)
WBC # FLD AUTO: 6.87 K/UL — SIGNIFICANT CHANGE UP (ref 3.8–10.5)

## 2024-07-07 PROCEDURE — 99223 1ST HOSP IP/OBS HIGH 75: CPT

## 2024-07-07 PROCEDURE — G0316 PROLONG INPT EVAL ADD15 M: CPT

## 2024-07-07 PROCEDURE — 99223 1ST HOSP IP/OBS HIGH 75: CPT | Mod: FS,GC

## 2024-07-07 PROCEDURE — 99497 ADVNCD CARE PLAN 30 MIN: CPT | Mod: 25

## 2024-07-07 PROCEDURE — 99222 1ST HOSP IP/OBS MODERATE 55: CPT | Mod: GC

## 2024-07-07 PROCEDURE — 76770 US EXAM ABDO BACK WALL COMP: CPT | Mod: 26

## 2024-07-07 RX ORDER — DEXTROSE 30 % IN WATER 30 %
25 VIAL (ML) INTRAVENOUS ONCE
Refills: 0 | Status: DISCONTINUED | OUTPATIENT
Start: 2024-07-07 | End: 2024-07-16

## 2024-07-07 RX ORDER — INSULIN GLARGINE 100 [IU]/ML
10 INJECTION, SOLUTION SUBCUTANEOUS AT BEDTIME
Refills: 0 | Status: DISCONTINUED | OUTPATIENT
Start: 2024-07-07 | End: 2024-07-08

## 2024-07-07 RX ORDER — CEFEPIME 1 G/1
1000 INJECTION, POWDER, FOR SOLUTION INTRAMUSCULAR; INTRAVENOUS EVERY 12 HOURS
Refills: 0 | Status: DISCONTINUED | OUTPATIENT
Start: 2024-07-07 | End: 2024-07-12

## 2024-07-07 RX ORDER — SODIUM BICARBONATE 650 MG/1
650 TABLET ORAL THREE TIMES A DAY
Refills: 0 | Status: DISCONTINUED | OUTPATIENT
Start: 2024-07-07 | End: 2024-07-16

## 2024-07-07 RX ORDER — DEXTROSE MONOHYDRATE 100 MG/ML
125 INJECTION, SOLUTION INTRAVENOUS ONCE
Refills: 0 | Status: DISCONTINUED | OUTPATIENT
Start: 2024-07-07 | End: 2024-07-16

## 2024-07-07 RX ORDER — DEXTROSE 30 % IN WATER 30 %
12.5 VIAL (ML) INTRAVENOUS ONCE
Refills: 0 | Status: DISCONTINUED | OUTPATIENT
Start: 2024-07-07 | End: 2024-07-16

## 2024-07-07 RX ORDER — INSULIN LISPRO 100 [IU]/ML
INJECTION, SOLUTION SUBCUTANEOUS
Refills: 0 | Status: DISCONTINUED | OUTPATIENT
Start: 2024-07-07 | End: 2024-07-16

## 2024-07-07 RX ORDER — SODIUM CHLORIDE 0.9 % (FLUSH) 0.9 %
1000 SYRINGE (ML) INJECTION
Refills: 0 | Status: DISCONTINUED | OUTPATIENT
Start: 2024-07-07 | End: 2024-07-08

## 2024-07-07 RX ORDER — MIDODRINE HYDROCHLORIDE 10 MG/1
5 TABLET ORAL THREE TIMES A DAY
Refills: 0 | Status: DISCONTINUED | OUTPATIENT
Start: 2024-07-07 | End: 2024-07-12

## 2024-07-07 RX ORDER — DEXTROSE MONOHYDRATE AND SODIUM CHLORIDE 5; .3 G/100ML; G/100ML
1000 INJECTION, SOLUTION INTRAVENOUS
Refills: 0 | Status: DISCONTINUED | OUTPATIENT
Start: 2024-07-07 | End: 2024-07-16

## 2024-07-07 RX ORDER — GLUCAGON HYDROCHLORIDE 1 MG/ML
1 INJECTION, POWDER, FOR SOLUTION INTRAMUSCULAR; INTRAVENOUS; SUBCUTANEOUS ONCE
Refills: 0 | Status: DISCONTINUED | OUTPATIENT
Start: 2024-07-07 | End: 2024-07-16

## 2024-07-07 RX ORDER — BUMETANIDE 0.25 MG/ML
2 INJECTION INTRAMUSCULAR; INTRAVENOUS DAILY
Refills: 0 | Status: DISCONTINUED | OUTPATIENT
Start: 2024-07-07 | End: 2024-07-08

## 2024-07-07 RX ORDER — INSULIN LISPRO 100 [IU]/ML
INJECTION, SOLUTION SUBCUTANEOUS AT BEDTIME
Refills: 0 | Status: DISCONTINUED | OUTPATIENT
Start: 2024-07-07 | End: 2024-07-16

## 2024-07-07 RX ORDER — CYANOCOBALAMIN (VITAMIN B-12) 1000 MCG
1000 TABLET, EXTENDED RELEASE ORAL DAILY
Refills: 0 | Status: DISCONTINUED | OUTPATIENT
Start: 2024-07-07 | End: 2024-07-16

## 2024-07-07 RX ORDER — DEXTROSE 30 % IN WATER 30 %
15 VIAL (ML) INTRAVENOUS ONCE
Refills: 0 | Status: DISCONTINUED | OUTPATIENT
Start: 2024-07-07 | End: 2024-07-16

## 2024-07-07 RX ORDER — ATORVASTATIN CALCIUM 20 MG/1
80 TABLET, FILM COATED ORAL AT BEDTIME
Refills: 0 | Status: DISCONTINUED | OUTPATIENT
Start: 2024-07-07 | End: 2024-07-16

## 2024-07-07 RX ADMIN — Medication 650 MILLIGRAM(S): at 08:28

## 2024-07-07 RX ADMIN — SODIUM BICARBONATE 650 MILLIGRAM(S): 650 TABLET ORAL at 07:41

## 2024-07-07 RX ADMIN — Medication 6 MILLIGRAM(S): at 22:31

## 2024-07-07 RX ADMIN — MIDODRINE HYDROCHLORIDE 5 MILLIGRAM(S): 10 TABLET ORAL at 12:01

## 2024-07-07 RX ADMIN — INSULIN GLARGINE 10 UNIT(S): 100 INJECTION, SOLUTION SUBCUTANEOUS at 22:36

## 2024-07-07 RX ADMIN — INSULIN LISPRO 2: 100 INJECTION, SOLUTION SUBCUTANEOUS at 13:09

## 2024-07-07 RX ADMIN — INSULIN LISPRO 1: 100 INJECTION, SOLUTION SUBCUTANEOUS at 09:30

## 2024-07-07 RX ADMIN — MIDODRINE HYDROCHLORIDE 5 MILLIGRAM(S): 10 TABLET ORAL at 07:41

## 2024-07-07 RX ADMIN — BUMETANIDE 2 MILLIGRAM(S): 0.25 INJECTION INTRAMUSCULAR; INTRAVENOUS at 19:26

## 2024-07-07 RX ADMIN — ATORVASTATIN CALCIUM 80 MILLIGRAM(S): 20 TABLET, FILM COATED ORAL at 22:36

## 2024-07-07 RX ADMIN — Medication 1000 MICROGRAM(S): at 12:01

## 2024-07-07 RX ADMIN — CEFEPIME 100 MILLIGRAM(S): 1 INJECTION, POWDER, FOR SOLUTION INTRAMUSCULAR; INTRAVENOUS at 18:44

## 2024-07-07 RX ADMIN — INSULIN LISPRO 2: 100 INJECTION, SOLUTION SUBCUTANEOUS at 17:48

## 2024-07-07 RX ADMIN — SODIUM BICARBONATE 650 MILLIGRAM(S): 650 TABLET ORAL at 13:17

## 2024-07-07 RX ADMIN — SODIUM BICARBONATE 650 MILLIGRAM(S): 650 TABLET ORAL at 22:31

## 2024-07-07 RX ADMIN — PIPERACILLIN SODIUM AND TAZOBACTAM SODIUM 25 GRAM(S): 3; .375 INJECTION, POWDER, LYOPHILIZED, FOR SOLUTION INTRAVENOUS at 07:40

## 2024-07-07 RX ADMIN — MIDODRINE HYDROCHLORIDE 5 MILLIGRAM(S): 10 TABLET ORAL at 19:25

## 2024-07-07 RX ADMIN — Medication 75 MILLILITER(S): at 02:25

## 2024-07-07 NOTE — CONSULT NOTE ADULT - ASSESSMENT
84 yo M w anemia DM2, COPD, SANTO, BPH, HLD, CAD with stents, HTN, recent diagnosis of multiple myeloma (2 weeks on with daily dose of lenalidomide with one week off), who presented to [facility] on 6/28 for generalized weakness and lethargy.    Patient presented with fever, tachycardia, and acute hypoxic respiratory failure with volume overload. Blood cultures on 6/28 grew Pseudomonas in 1/4 bottles, sensitive to cipro. Patient was started on Zosyn 6/29 and was continued throughout that stay. Blood cultures cleared on 7/2. Seen by ID there - etiology of bacteremia thought to be most likely GI vs. SSTI planned for treatment through 7/13. Patient had progressive renal failure and was transferred to [facility] to initiate HD.    Last fever on 6/30  Started treatement for MM about 7 weeks ago at this point (2 weeks on, 1 week off with lenalidomide)  Was pancyotpenic on admission to OSH which has since improved.    CT chest on 6/29: Areas of linear densities involving the bilateral lower lobes likely atelectasis  UA had no pyuria or bacteria  US b/l LE 6/29: no DVT  US KUB 7/1: No hydronephrosis or urolithiasis  Patient tested inderminate for norovirus on GI PCR on 6/29 and 6/30  Legionella Ag negative  Full RVP negative 6/28    #Pseudomonas bacteremia  #Acute renal failure 84 yo M w anemia DM2, COPD, SANTO, BPH, HLD, CAD with stents, HTN, recent diagnosis of multiple myeloma (on lenalidomide and Remlivid) who presented to [facility] on 6/28 for generalized weakness and lethargy.    Patient presented with fever, tachycardia, and acute hypoxic respiratory failure with volume overload. Blood cultures on 6/28 grew Pseudomonas in 1/4 bottles, sensitive to cipro. Patient was started on Zosyn 6/29 and was continued throughout that stay. Blood cultures cleared on 7/2. Seen by ID there - etiology of bacteremia thought to be most likely GI vs. SSTI planned for treatment through 7/13. Patient had progressive renal failure and was transferred to [facility] to initiate HD.    Last fever on 6/30  Started treatement for MM about 7 weeks ago at this point - Remlivid weekly started 5/20, last dose 6/24. lenalidomide last dose 6/23  Was pancyotpenic on admission to OSH which has since improved.    CT chest on 6/29: Areas of linear densities involving the bilateral lower lobes likely atelectasis  UA had no pyuria or bacteria  US b/l LE 6/29: no DVT  US KUB 7/1: No hydronephrosis or urolithiasis  Patient tested indeterminate for norovirus on GI PCR on 6/29 and 6/30 - diarrhea has since improved  Legionella Ag negative  Full RVP negative 6/28    #Pseudomonas bacteremia  #Acute renal failure  #Lymphedema    Recommendations:  - would dc zosyn and start cefepime 1g q12h (lower salt load than zosyn and equally effective for Pseudomonas in this case)  - plan for antibiotics through 7/13, will plan for PO transition if still requires antibiotics on discharge    d/w attending.    Robert Templeton, PGY5  ID Fellow  [employer] Teams Preferred  After 5pm/weekends call [phone]

## 2024-07-07 NOTE — PROGRESS NOTE ADULT - SUBJECTIVE AND OBJECTIVE BOX
[facility] Division of Hospital Medicine  Tosha Cruz MD  Pager [phone]    Patient is a 83y old  Male who presents with a chief complaint of acute kidney failure      SUBJECTIVE / OVERNIGHT EVENTS: daughter at bedside; reports pt is a little confused but she thinks this is due to location change and poor sleep last night due to transfer; pt/daughter aware of poss HD, but no acute plans for HD at the moment      MEDICATIONS  (STANDING):  atorvastatin 80 milliGRAM(s) Oral at bedtime  cyanocobalamin 1000 MICROGram(s) Oral daily  dextrose 10% Bolus 125 milliLiter(s) IV Bolus once  dextrose 5%. 1000 milliLiter(s) (50 mL/Hr) IV Continuous <Continuous>  dextrose 5%. 1000 milliLiter(s) (100 mL/Hr) IV Continuous <Continuous>  dextrose 50% Injectable 12.5 Gram(s) IV Push once  dextrose 50% Injectable 25 Gram(s) IV Push once  glucagon  Injectable 1 milliGRAM(s) IntraMuscular once  insulin glargine Injectable (LANTUS) 10 Unit(s) SubCutaneous at bedtime  insulin lispro (ADMELOG) corrective regimen sliding scale   SubCutaneous three times a day before meals  insulin lispro (ADMELOG) corrective regimen sliding scale   SubCutaneous at bedtime  midodrine. 5 milliGRAM(s) Oral three times a day  piperacillin/tazobactam IVPB.. 3.375 Gram(s) IV Intermittent every 12 hours  sodium bicarbonate 650 milliGRAM(s) Oral three times a day  sodium chloride 0.9%. 1000 milliLiter(s) (75 mL/Hr) IV Continuous <Continuous>    MEDICATIONS  (PRN):  acetaminophen     Tablet .. 650 milliGRAM(s) Oral every 6 hours PRN Temp greater or equal to 38C (100.4F), Mild Pain (1 - 3)  aluminum hydroxide/magnesium hydroxide/simethicone Suspension 30 milliLiter(s) Oral every 4 hours PRN Dyspepsia  dextrose Oral Gel 15 Gram(s) Oral once PRN Blood Glucose LESS THAN 70 milliGRAM(s)/deciliter  melatonin 3 milliGRAM(s) Oral at bedtime PRN Insomnia  ondansetron Injectable 4 milliGRAM(s) IV Push every 8 hours PRN Nausea and/or Vomiting      CAPILLARY BLOOD GLUCOSE  POCT Blood Glucose.: 190 mg/dL (07 Jul 2024 09:29)      PHYSICAL EXAM:  Vital Signs Last 24 Hrs  T(F): 97.3 (07 Jul 2024 11:54), Max: 97.5 (07 Jul 2024 05:12)  HR: 87 (07 Jul 2024 11:54) (84 - 98)  BP: 119/86 (07 Jul 2024 11:54) (104/50 - 119/86)  RR: 18 (07 Jul 2024 11:54) (17 - 18)  SpO2: 96% (07 Jul 2024 11:54) (95% - 98%)    Parameters below as of 07 Jul 2024 11:54  Patient On (Oxygen Delivery Method): nasal cannula  O2 Flow (L/min): 4      CONSTITUTIONAL: NAD, appears comfortable  EYES: PERRLA; conjunctiva and sclera clear  ENMT: Moist oral mucosa; normal dentition  RESPIRATORY: Normal respiratory effort; grossly b/l AE  CARDIOVASCULAR: Regular rate and rhythm; ++ lower extremity edema, chronic lymphedema  ABDOMEN: Nontender to palpation, normoactive bowel sounds  MUSCULOSKELETAL:  No clubbing or cyanosis of digits; no joint swelling or tenderness to palpation  PSYCH: calm, coop; affect appropriate  NEUROLOGY: CN 2-12 are intact and symmetric; no gross sensory deficits   SKIN: No rashes; no palpable lesions    LABS:                        7.8    7.56  )-----------( 181      ( 06 Jul 2024 22:42 )             24.8     07-06    134<L>  |  96<L>  |  90<H>  ----------------------------<  196<H>  3.9   |  19<L>  |  4.45<H>    Ca    8.3<L>      06 Jul 2024 22:42  Phos  9.3     07-06  Mg     2.30     07-06    TPro  6.1  /  Alb  2.5<L>  /  TBili  0.5  /  DBili  x   /  AST  35  /  ALT  53<H>  /  AlkPhos  56  07-06

## 2024-07-07 NOTE — PROGRESS NOTE ADULT - ASSESSMENT
82 y/o M with pmhx of DM2, COPD, BPH, HLD, CAD, MM on chemo presented to the ED for new onset sepsis and ATN.  Patient sent to [facility] for nephrology and dialysis evaluation.

## 2024-07-07 NOTE — CHART NOTE - NSCHARTNOTEFT_GEN_A_CORE
7/7 Night Coverage Med ACP     Notified by RN pt refusing Felix catheter.  Pt seen and assessed at bedside A & Ox4 states he would like to speak to the nephrologist before having the felix inserted.  Pt educated and insists on waiting, Dr Bimal barnett will endorse to day provider    Starla RINCON

## 2024-07-07 NOTE — H&P ADULT - NSHPADDITIONALINFOADULT_GEN_ALL_CORE
- An extra 15 minutes of time was spent to explain the diagnosis, imaging results and plan above  to the family member at bedside, expressed understanding and is in agreement with the plan. Also explained that the day team will be a new physician and will be following along during his admission. Also [place] records reviewed.

## 2024-07-07 NOTE — H&P ADULT - NSHPREVIEWOFSYSTEMS_GEN_ALL_CORE
REVIEW OF SYSTEMS:    CONSTITUTIONAL: No weakness, fevers or chills  EYES/ENT: No visual changes;  No dysphagia; No sore throat; No rhinorrhea; No sinus pain/pressure  NECK: No pain or stiffness  RESPIRATORY: No cough, wheezing, hemoptysis; No shortness of breath  CARDIOVASCULAR: No chest pain or palpitations; + chronic lymphedema  GASTROINTESTINAL: No abdominal or epigastric pain. No nausea, vomiting, or hematemesis; No diarrhea or constipation. No melena or hematochezia.  GENITOURINARY: No dysuria, frequency or hematuria  NEUROLOGICAL: No numbness or weakness  MSK: denies muscle pains  SKIN: No itching, burning, rashes, or lesions   All other review of systems is negative unless indicated above.

## 2024-07-07 NOTE — H&P ADULT - HISTORY OF PRESENT ILLNESS
This is a 82 y/o M with pmhx of DM2, COPD, BPH, HLD, CAD, MM on chemo presented to the ED for new onset sepsis and ATN. The patient was at [facility] for new onset weakness, was found to have sepsis from pseudomonas bacteremia, unclear source, however was thought to be from cellulitis vs. GI cause. The patient developed BRITT with anuria. Cr was initially normal but now at 4. The patient is concerning for needing dialysis however did not have dialysis services there so the patient was transferred to [facility] for further care. The patient currently denies shortness of breath, chest pain or abdominal pain. Denies fevers, n/v, dysuria. The patient is chronic lymphedema for many years. Denies hx of kidney disease. ROS otherwise negative.

## 2024-07-07 NOTE — CONSULT NOTE ADULT - PROBLEM SELECTOR RECOMMENDATION 9
Pt with oliguirc BRITT iso severe sepsis with hypotension, and possible MM associated BRITT. [facility] reviewed. Baseline Scr 1.08-1.3. Last Scr WNL 1.2 on OP done 6/10/24. Serum immunofixation with IgA Kappa band (6/10/24). K/L 1.75 (6/10/24). Pt admitted to [facility] on 6/28 with admission Scr elevated at 1.71 (6/28). Scr progressively increased to 4.59 (7/6). Proteinuria and microscopic hematuria on UA done 6/28/24. Proteinase 3Ab and MPO Ab neg (7/5/24). Transferred to [facility] for nephrology workup. On arrival to [facility], Scr elevated/stable at 4.45 (7/6). Pending labs today. No hydronephrosis/calculi, R kidney 12cm and L kidney 11.5cm on Renal US done 7/1/24. HD consent obtained and placed in chart. No urgent indication for. Pt with significant LE edema, recommend bumex 2mg IVP qd. Check rpt UA. Check UPCR. Recommend felix for accurate I/O. Monitor VS, labs and I/O. Monitor labs and urine output. Avoid nephrotoxins. Dose medications for eGFR. Pt with oliguirc BRITT iso severe sepsis with hypotension, and possible MM associated BRITT. [facility] reviewed. Baseline Scr 1.08-1.3. Last Scr WNL 1.2 on OP done 6/10/24. Serum immunofixation with IgA Kappa band (6/10/24). K/L 1.75 (6/10/24). Pt admitted to [facility] on 6/28 with admission Scr elevated at 1.71 (6/28). Scr progressively increased to 4.59 (7/6). Proteinuria and microscopic hematuria on UA done 6/28/24. Proteinase 3Ab and MPO Ab neg (7/5/24). Transferred to [facility] for nephrology workup. On arrival to [facility], Scr elevated/stable at 4.45 (7/6). Pending labs today. No hydronephrosis/calculi, R kidney 12cm and L kidney 11.5cm on Renal US done 7/1/24. HD consent obtained and placed in chart. No urgent indication for. Pt with significant LE edema, recommend bumex 2mg IVP qd. Check rpt UA. Check UPCR. Recommend felix for accurate I/O. Monitor VS, labs and I/O. Monitor labs and urine output. Avoid nephrotoxins. Dose medications for eGFR. Ensure low phos/low potasium/renal diet.     If you have any questions, please feel free to contact me  Mauricio Youssef  Nephrology Fellow  [employer]/Page [phone]  (After 4pm or on weekends please page the on-call fellow)

## 2024-07-07 NOTE — H&P ADULT - PROBLEM SELECTOR PLAN 4
- patient taking novolog 70/30 12 U in the AM and 22 U in the evening  - will c/w 10 unit lantus QHS with low dose sliding scale

## 2024-07-07 NOTE — CONSULT NOTE ADULT - SUBJECTIVE AND OBJECTIVE BOX
Patient is a 83y old  Male who presents with a chief complaint of acute kidney failure (07 Jul 2024 09:54)    HPI:  83M with DM2, COPD, BPH, HLD, CAD, MM on Revlimid (started 5/20, last dose 5/24), weekly steroids presented to [facility] 6/28/2024 with lethargy, increased LE edema, and diarrhea. Fever at home.  Tm 101, hypoxia, tachycardia.  BRITT.  BC sent (+) pseudomonas aeruginosa.   Treated with zosyn.  RVP (-).   CT chest with atelectasis.  GI PCR indeterminant for norovirus, diarrhea improveds.  Worsening renal failure.  Transferred to [facility] due to need for dialysis.  ID asked to help management.    prior hospital charts reviewed [  ]  primary team notes reviewed [  ]  other consultant notes reviewed [  ]    PAST MEDICAL & SURGICAL HISTORY:  DM2 (diabetes mellitus, type 2)  COPD, mild  Lymphedema  Multiple myeloma  Bilateral TKR  Hx GI bleed    Allergies  No Known Allergies    ANTIMICROBIALS:  piperacillin/tazobactam IVPB.. 3.375 every 12 hours (6/29-)    MEDICATIONS  (STANDING):  atorvastatin 80 at bedtime  insulin glargine Injectable (LANTUS) 10 at bedtime  insulin lispro (ADMELOG) corrective regimen sliding scale  at bedtime  insulin lispro (ADMELOG) corrective regimen sliding scale  three times a day before meals  midodrine. 5 three times a day    SOCIAL HISTORY:       FAMILY HISTORY:    REVIEW OF SYSTEMS  [  ] ROS unobtainable because:    [  ] All other systems negative except as noted below:	    Constitutional:  [ ] fever [ ] chills  [ ] weight loss  [ ] weakness  Skin:  [ ] rash [ ] phlebitis	  Eyes: [ ] icterus [ ] pain  [ ] discharge	  ENMT: [ ] sore throat  [ ] thrush [ ] ulcers [ ] exudates  Respiratory: [ ] dyspnea [ ] hemoptysis [ ] cough [ ] sputum	  Cardiovascular:  [ ] chest pain [ ] palpitations [ ] edema	  Gastrointestinal:  [ ] nausea [ ] vomiting [ ] diarrhea [ ] constipation [ ] pain	  Genitourinary:  [ ] dysuria [ ] frequency [ ] hematuria [ ] discharge [ ] flank pain  [ ] incontinence  Musculoskeletal:  [ ] myalgias [ ] arthralgias [ ] arthritis  [ ] back pain  Neurological:  [ ] headache [ ] seizures  [ ] confusion/altered mental status  Psychiatric:  [ ] anxiety [ ] depression	  Hematology/Lymphatics:  [ ] lymphadenopathy  Endocrine:  [ ] adrenal [ ] thyroid  Allergic/Immunologic:	 [ ] transplant [ ] seasonal    Vital Signs Last 24 Hrs  T(F): 97.5 (07-07-24 @ 05:12), Max: 97.5 (07-07-24 @ 05:12)  Vital Signs Last 24 Hrs  HR: 84 (07-07-24 @ 05:12) (84 - 98)  BP: 107/94 (07-07-24 @ 05:12) (104/50 - 107/94)  RR: 17 (07-07-24 @ 05:12)  SpO2: 95% (07-07-24 @ 05:12) (95% - 98%)  Wt(kg): --    PHYSICAL EXAM:                              7.8    7.56  )-----------( 181      ( 06 Jul 2024 22:42 )             24.8     134<L>  |  96<L>  |  90<H>  ----------------------------<  196<H>  3.9   |  19<L>  |  4.45<H>    Ca    8.3<L>      06 Jul 2024 22:42  Phos  9.3     07-06  Mg     2.30     07-06    TPro  6.1  /  Alb  2.5<L>  /  TBili  0.5  /  DBili  x   /  AST  35  /  ALT  53<H>  /  AlkPhos  56  07-06    MICROBIOLOGY:  7/2 BC (-)  6/28 BC (+) Pseudomonas aeruginosa (pan-S)    RADIOLOGY:  imaging below personally reviewed and agree with findings         Patient is a 83y old  Male who presents with a chief complaint of acute kidney failure (07 Jul 2024 09:54)    HPI:  83M with DM2, COPD, BPH, HLD, CAD, MM on Revlimid (started 5/20, last dose 5/24), weekly steroids presented to [facility] 6/28/2024 with lethargy, increased LE edema, and diarrhea. Fever at home.  Tm 101, hypoxia, tachycardia.  BRITT.  BC sent (+) pseudomonas aeruginosa.   Treated with zosyn.  RVP (-).   CT chest with atelectasis.  GI PCR indeterminant for norovirus, diarrhea improveds.  Worsening renal failure.  Transferred to [facility] due to need for dialysis.  ID asked to help management.    prior hospital charts reviewed [  ]  primary team notes reviewed [  ]  other consultant notes reviewed [  ]    PAST MEDICAL & SURGICAL HISTORY:  DM2 (diabetes mellitus, type 2)  COPD, mild  Lymphedema  Multiple myeloma  Bilateral TKR  Hx GI bleed    Allergies  No Known Allergies    ANTIMICROBIALS:  piperacillin/tazobactam IVPB.. 3.375 every 12 hours (6/29-)    MEDICATIONS  (STANDING):  atorvastatin 80 at bedtime  insulin glargine Injectable (LANTUS) 10 at bedtime  insulin lispro (ADMELOG) corrective regimen sliding scale  at bedtime  insulin lispro (ADMELOG) corrective regimen sliding scale  three times a day before meals  midodrine. 5 three times a day    SOCIAL HISTORY:       FAMILY HISTORY:    REVIEW OF SYSTEMS  [  ] ROS unobtainable because:    [  ] All other systems negative except as noted below:	    Constitutional:  [ ] fever [ ] chills  [ ] weight loss  [ ] weakness  Skin:  [ ] rash [ ] phlebitis	  Eyes: [ ] icterus [ ] pain  [ ] discharge	  ENMT: [ ] sore throat  [ ] thrush [ ] ulcers [ ] exudates  Respiratory: [ ] dyspnea [ ] hemoptysis [ ] cough [ ] sputum	  Cardiovascular:  [ ] chest pain [ ] palpitations [ ] edema	  Gastrointestinal:  [ ] nausea [ ] vomiting [ ] diarrhea [ ] constipation [ ] pain	  Genitourinary:  [ ] dysuria [ ] frequency [ ] hematuria [ ] discharge [ ] flank pain  [ ] incontinence  Musculoskeletal:  [ ] myalgias [ ] arthralgias [ ] arthritis  [ ] back pain  Neurological:  [ ] headache [ ] seizures  [ ] confusion/altered mental status  Psychiatric:  [ ] anxiety [ ] depression	  Hematology/Lymphatics:  [ ] lymphadenopathy  Endocrine:  [ ] adrenal [ ] thyroid  Allergic/Immunologic:	 [ ] transplant [ ] seasonal    Vital Signs Last 24 Hrs  T(F): 97.5 (07-07-24 @ 05:12), Max: 97.5 (07-07-24 @ 05:12)  Vital Signs Last 24 Hrs  HR: 84 (07-07-24 @ 05:12) (84 - 98)  BP: 107/94 (07-07-24 @ 05:12) (104/50 - 107/94)  RR: 17 (07-07-24 @ 05:12)  SpO2: 95% (07-07-24 @ 05:12) (95% - 98%)  Wt(kg): --    PHYSICAL EXAM:                              7.8    7.56  )-----------( 181      ( 06 Jul 2024 22:42 )             24.8     134<L>  |  96<L>  |  90<H>  ----------------------------<  196<H>  3.9   |  19<L>  |  4.45<H>    Ca    8.3<L>      06 Jul 2024 22:42  Phos  9.3     07-06  Mg     2.30     07-06    TPro  6.1  /  Alb  2.5<L>  /  TBili  0.5  /  DBili  x   /  AST  35  /  ALT  53<H>  /  AlkPhos  56  07-06    MICROBIOLOGY:  MRN [id number]  Culture - Blood (collected 07-02-24 @ 16:58)  Source: .Blood Blood-Venous  Preliminary Report (07-07-24 @ 02:00):    No growth at 4 days    Culture - Blood (collected 07-02-24 @ 16:58)  Source: .Blood Blood-Peripheral  Preliminary Report (07-07-24 @ 02:00):    No growth at 4 days    Culture - Blood (collected 06-28-24 @ 21:40)  Source: .Blood Blood-Peripheral  Gram Stain (06-30-24 @ 01:19):    Growth in aerobic bottle: Gram Negative Rods  Final Report (07-01-24 @ 16:03):    Growth in aerobic bottle: Pseudomonas aeruginosa    Direct identification is available within approximately 3-5    hours either by Blood Panel Multiplexed PCR or Direct    MALDI-TOF. Details: [email or web address]  Organism: Blood Culture PCR  Pseudomonas aeruginosa (07-01-24 @ 16:03)  Organism: Pseudomonas aeruginosa (07-01-24 @ 16:03)      Method Type: VICKY      -  Aztreonam: S <=4      -  Cefepime: S <=2      -  Ceftazidime: S 4      -  Ciprofloxacin: S <=0.25      -  Imipenem: S <=1      -  Levofloxacin: S <=0.5      -  Meropenem: S <=1      -  Piperacillin/Tazobactam: S <=8  Organism: Blood Culture PCR (07-01-24 @ 16:03)      Method Type: PCR      -  Pseudomonas aeruginosa: Detec    Urinalysis with Rflx Culture (collected 06-28-24 @ 21:40)    Culture - Blood (collected 06-28-24 @ 21:40)  Source: .Blood Blood-Peripheral  Final Report (07-04-24 @ 13:00):    No growth at 5 days    RADIOLOGY:  Xray Chest 1 View- PORTABLE-Routine (Xray Chest 1 View- PORTABLE-Routine .) (07.03.24 @ 09:12) >  IMPRESSION:  HEART:  Enlarged.  LUNGS: Elevated left hemidiaphragm with left effusion/infiltrate..  BONES: degenerative changes    US Kidney and Bladder (07.01.24 @ 16:28) >  IMPRESSION:  No hydronephrosis or urolithiasis.  Patient was unable to void for this study. Bladder volume was 90 cc    US Duplex Venous Lower Ext Complete, Bilateral (06.29.24 @ 13:03) >  IMPRESSION:  No evidence of DVT bilateral common femoral, femoral or popliteal venous segments. The calf veins bilaterally are poorly visualized precluding assessment. Subcutaneous edema.    CT Chest No Cont (06.29.24 @ 00:58) >  IMPRESSION:  Linear densities of the bilateral lower lobes likely atelectasis, superimposed infection not excluded.   Patient is a 83y old  Male who presents with a chief complaint of acute kidney failure (07 Jul 2024 09:54)    HPI:  83M with DM2, COPD, BPH, HLD, CAD, MM on Revlimid (started 5/20, last dose 5/24), weekly steroids presented to [facility] 6/28/2024 with lethargy, increased LE edema, and diarrhea. Fever at home.  Tm 101, hypoxia, tachycardia.  BRITT.  BC sent (+) pseudomonas aeruginosa.   Treated with zosyn.  RVP (-).   CT chest with atelectasis.  GI PCR indeterminant for norovirus, diarrhea improveds.  Worsening renal failure.  Transferred to [facility] due to need for dialysis.  ID asked to help management.    prior hospital charts reviewed [  ]  primary team notes reviewed [  ]  other consultant notes reviewed [  ]    PAST MEDICAL & SURGICAL HISTORY:  DM2 (diabetes mellitus, type 2)  COPD, mild  Lymphedema  Multiple myeloma  Bilateral TKR  Hx GI bleed    Allergies  No Known Allergies    ANTIMICROBIALS:  piperacillin/tazobactam IVPB.. 3.375 every 12 hours (6/29-)    MEDICATIONS  (STANDING):  atorvastatin 80 at bedtime  insulin glargine Injectable (LANTUS) 10 at bedtime  insulin lispro (ADMELOG) corrective regimen sliding scale  at bedtime  insulin lispro (ADMELOG) corrective regimen sliding scale  three times a day before meals  midodrine. 5 three times a day    SOCIAL HISTORY:  - 3ppd tobacco use for 10 years, quit many years ago    FAMILY HISTORY:    REVIEW OF SYSTEMS  [  ] ROS unobtainable because:    [ x ] All other systems negative except as noted below:	    Constitutional:  [ ] fever [ ] chills  [ ] weight loss  [x ] weakness  Skin:  [ ] rash [ ] phlebitis	  Eyes: [ ] icterus [ ] pain  [ ] discharge	  ENMT: [ ] sore throat  [ ] thrush [ ] ulcers [ ] exudates  Respiratory: [ ] dyspnea [ ] hemoptysis [ ] cough [ ] sputum	  Cardiovascular:  [ ] chest pain [ ] palpitations [ ] edema	  Gastrointestinal:  [ ] nausea [ ] vomiting [ ] diarrhea [ ] constipation [ ] pain	  Genitourinary:  [ ] dysuria [ ] frequency [ ] hematuria [ ] discharge [ ] flank pain  [ ] incontinence  Musculoskeletal:  [ ] myalgias [ ] arthralgias [ ] arthritis  [ ] back pain  Neurological:  [ ] headache [ ] seizures  [ ] confusion/altered mental status  Psychiatric:  [ ] anxiety [ ] depression	  Hematology/Lymphatics:  [ ] lymphadenopathy  Endocrine:  [ ] adrenal [ ] thyroid  Allergic/Immunologic:	 [ ] transplant [ ] seasonal    Vital Signs Last 24 Hrs  T(F): 97.5 (07-07-24 @ 05:12), Max: 97.5 (07-07-24 @ 05:12)  Vital Signs Last 24 Hrs  HR: 84 (07-07-24 @ 05:12) (84 - 98)  BP: 107/94 (07-07-24 @ 05:12) (104/50 - 107/94)  RR: 17 (07-07-24 @ 05:12)  SpO2: 95% (07-07-24 @ 05:12) (95% - 98%)  Wt(kg): --    PHYSICAL EXAM:    GENERAL: NAD, lying in bed comfortably  HEAD:  Atraumatic, normocephalic  EYES: EOMI, PERRLA, conjunctiva and sclera clear  ENT: Moist mucous membranes  NECK: Supple  HEART: Regular rate and rhythm, no murmurs, rubs, or gallops  LUNGS: Unlabored respirations.  Clear to auscultation bilaterally, no crackles, wheezing, or rhonchi  ABDOMEN: Soft, nontender, nondistended, +BS  EXTREMITIES: 2+ pitting edema to b/l thighs, lymphedema. B/l knees with replacements, not warm or tender  NERVOUS SYSTEM:  A&Ox3, no focal deficits   SKIN: diffuse bruises on b/l UE                          7.8    7.56  )-----------( 181      ( 06 Jul 2024 22:42 )             24.8     134<L>  |  96<L>  |  90<H>  ----------------------------<  196<H>  3.9   |  19<L>  |  4.45<H>    Ca    8.3<L>      06 Jul 2024 22:42  Phos  9.3     07-06  Mg     2.30     07-06    TPro  6.1  /  Alb  2.5<L>  /  TBili  0.5  /  DBili  x   /  AST  35  /  ALT  53<H>  /  AlkPhos  56  07-06    MICROBIOLOGY:  MRN [id number]  Culture - Blood (collected 07-02-24 @ 16:58)  Source: .Blood Blood-Venous  Preliminary Report (07-07-24 @ 02:00):    No growth at 4 days    Culture - Blood (collected 07-02-24 @ 16:58)  Source: .Blood Blood-Peripheral  Preliminary Report (07-07-24 @ 02:00):    No growth at 4 days    Culture - Blood (collected 06-28-24 @ 21:40)  Source: .Blood Blood-Peripheral  Gram Stain (06-30-24 @ 01:19):    Growth in aerobic bottle: Gram Negative Rods  Final Report (07-01-24 @ 16:03):    Growth in aerobic bottle: Pseudomonas aeruginosa    Direct identification is available within approximately 3-5    hours either by Blood Panel Multiplexed PCR or Direct    MALDI-TOF. Details: [email or web address]  Organism: Blood Culture PCR  Pseudomonas aeruginosa (07-01-24 @ 16:03)  Organism: Pseudomonas aeruginosa (07-01-24 @ 16:03)      Method Type: VICKY      -  Aztreonam: S <=4      -  Cefepime: S <=2      -  Ceftazidime: S 4      -  Ciprofloxacin: S <=0.25      -  Imipenem: S <=1      -  Levofloxacin: S <=0.5      -  Meropenem: S <=1      -  Piperacillin/Tazobactam: S <=8  Organism: Blood Culture PCR (07-01-24 @ 16:03)      Method Type: PCR      -  Pseudomonas aeruginosa: Detec    Urinalysis with Rflx Culture (collected 06-28-24 @ 21:40)    Culture - Blood (collected 06-28-24 @ 21:40)  Source: .Blood Blood-Peripheral  Final Report (07-04-24 @ 13:00):    No growth at 5 days    RADIOLOGY:  Xray Chest 1 View- PORTABLE-Routine (Xray Chest 1 View- PORTABLE-Routine .) (07.03.24 @ 09:12) >  IMPRESSION:  HEART:  Enlarged.  LUNGS: Elevated left hemidiaphragm with left effusion/infiltrate..  BONES: degenerative changes    US Kidney and Bladder (07.01.24 @ 16:28) >  IMPRESSION:  No hydronephrosis or urolithiasis.  Patient was unable to void for this study. Bladder volume was 90 cc    US Duplex Venous Lower Ext Complete, Bilateral (06.29.24 @ 13:03) >  IMPRESSION:  No evidence of DVT bilateral common femoral, femoral or popliteal venous segments. The calf veins bilaterally are poorly visualized precluding assessment. Subcutaneous edema.    CT Chest No Cont (06.29.24 @ 00:58) >  IMPRESSION:  Linear densities of the bilateral lower lobes likely atelectasis, superimposed infection not excluded.

## 2024-07-07 NOTE — H&P ADULT - NSICDXPASTMEDICALHX_GEN_ALL_CORE_FT
PAST MEDICAL HISTORY:  COPD, mild     DM2 (diabetes mellitus, type 2)     Lymphedema     Multiple myeloma

## 2024-07-07 NOTE — H&P ADULT - NSHPLABSRESULTS_GEN_ALL_CORE
7.8    7.56  )-----------( 181      ( 06 Jul 2024 22:42 )             24.8       07-06    134<L>  |  96<L>  |  90<H>  ----------------------------<  196<H>  3.9   |  19<L>  |  4.45<H>    Ca    8.3<L>      06 Jul 2024 22:42  Phos  9.3     07-06  Mg     2.30     07-06    TPro  6.1  /  Alb  2.5<L>  /  TBili  0.5  /  DBili  x   /  AST  35  /  ALT  53<H>  /  AlkPhos  56  07-06                    Urinalysis Basic - ( 06 Jul 2024 22:42 )    Color: x / Appearance: x / SG: x / pH: x  Gluc: 196 mg/dL / Ketone: x  / Bili: x / Urobili: x   Blood: x / Protein: x / Nitrite: x   Leuk Esterase: x / RBC: x / WBC x   Sq Epi: x / Non Sq Epi: x / Bacteria: x            CXR: As per my read - + L pleural effusion from outside hospital, Will wait for prelim/official read    EKG: As per my read - pending    CT:    pending    From prior hospital:  CT chest  IMPRESSION:  Linear densities of the bilateral lower lobes likely atelectasis, superimposed infection not excluded.

## 2024-07-07 NOTE — H&P ADULT - PROBLEM SELECTOR PLAN 3
- patient found to have pseudomonas bacteremia from unclear etiology  - pending CT chest for evaluation of effusion on CXR  - Bcx had been negative from [facility]  - c/w zosyn until 7/13 as per ID  - ID consult in the AM - day team to follow  - monitor for sepsis - patient found to have pseudomonas bacteremia from unclear etiology  - pending CT chest for evaluation of effusion on CXR  - Bcx had been negative from [facility]  - c/w zosyn until 7/13 as per ID from [place]  - ID consult in the AM - day team to follow  - monitor for sepsis - patient found to have pseudomonas bacteremia from unclear etiology  - pending CT chest for evaluation of effusion on CXR  - Bcx had been negative from [facility]  - c/w zosyn until 7/13 as per ID from [place]  - ID consult in the AM - day team to follow  - pulm consult in the AM if there are significant findings on CT chest  - monitor for sepsis

## 2024-07-07 NOTE — CONSULT NOTE ADULT - ATTENDING COMMENTS
83M with DM2, COPD, BPH, HLD, CAD, MM on Revlimid (started 5/20, last dose 5/24), weekly steroids presented to [facility] 6/28/2024 with lethargy, increased LE edema, and diarrhea. Fever at home.  Tm 101, hypoxia, tachycardia.  BRITT.  BC sent (+) pseudomonas aeruginosa.   Treated with zosyn.  RVP (-).   CT chest with atelectasis.  GI PCR indeterminant for norovirus, diarrhea improved.  Worsening renal failure.  Transferred to [facility] due to need for dialysis.  ID asked to help management.    Pseudomonas bacteremia
BRITT in the setting of sepsis and MM  Volume overload    Pt transferred for possible HD needs  No urgent indications for RRT  Would give bumex IV as above and monitor labs and Is/Os  Maintain MAP>60

## 2024-07-07 NOTE — H&P ADULT - PROBLEM SELECTOR PLAN 1
- patient presented for new onset BRITT likely secondary to sepsis leading to ATN  - nephrology called overnight - no urgency for dialysis at this time  - felix to be placed for monitoring I/O in the setting of BRITT and anuria  - urine lytes pending  - kidney u/s repeat pending  - hold aspirin and plavix for now pending port placement for dialysis if required pending nephro eval

## 2024-07-07 NOTE — H&P ADULT - CONVERSATION DETAILS
The patient at bedside requested daughter at bedside to make decisions for him on his behalf.    The daughter, pallavi was at bedside, stated that she had signed paperwork for DNR/DNI.    MOLST was completed from [facility] found in chart.    I reviewed the MOLST with the patient and daughter at bedside, stated that nothing has changed and is still requesting DNR/DNI.     Also provided education, explained and clarified questions the daughter had in regards to intubations.    3rd page completed indicating that I reviewed the MOLST form    DNR/DNI order placed

## 2024-07-07 NOTE — H&P ADULT - ASSESSMENT
82 y/o M with pmhx of DM2, COPD, BPH, HLD, CAD, MM on chemo presented to the ED for new onset sepsis and ATN.  Patient sent to [facility] for nephrology and dialysis evaluation. Admit for ATN and sepsis.

## 2024-07-07 NOTE — H&P ADULT - NSHPPHYSICALEXAM_GEN_ALL_CORE
PHYSICAL EXAM:  VITALS: Vital Signs Last 24 Hrs  T(C): 36.3 (06 Jul 2024 19:16), Max: 36.3 (06 Jul 2024 19:16)  T(F): 97.4 (06 Jul 2024 19:16), Max: 97.4 (06 Jul 2024 19:16)  HR: 98 (06 Jul 2024 19:16) (98 - 98)  BP: 104/50 (06 Jul 2024 19:16) (104/50 - 104/50)  BP(mean): --  RR: 18 (06 Jul 2024 19:16) (18 - 18)  SpO2: 98% (06 Jul 2024 19:16) (98% - 98%)    Parameters below as of 06 Jul 2024 19:16  Patient On (Oxygen Delivery Method): room air      GENERAL: NAD, comfortable at bedside  HEAD:  Atraumatic, Normocephalic  EYES: EOMI, PERRL, conjunctiva and sclera clear  ENT: Moist Mucus Membranes present, no ulcers appreciated  NECK: Supple, No JVD  CHEST/LUNG: Clear to auscultation bilaterally; No wheezes, rales or rhonchi, no accessory muscle use  HEART: Regular rate and rhythm; No murmurs, rubs, or gallops, (+)S1, S2  ABDOMEN: Soft, Nontender, Nondistended; Normal Bowel sounds   EXTREMITIES: No clubbing, cyanosis, + lymphedema b/l LE  PSYCH: normal mood and affect  NEUROLOGY: AAOx3, non-focal  SKIN: No rashes or lesions

## 2024-07-08 DIAGNOSIS — R91.8 OTHER NONSPECIFIC ABNORMAL FINDING OF LUNG FIELD: ICD-10-CM

## 2024-07-08 LAB
ALBUMIN SERPL ELPH-MCNC: 2.3 G/DL — LOW (ref 3.3–5)
ALP SERPL-CCNC: 55 U/L — SIGNIFICANT CHANGE UP (ref 40–120)
ALT FLD-CCNC: 39 U/L — SIGNIFICANT CHANGE UP (ref 4–41)
ANION GAP SERPL CALC-SCNC: 23 MMOL/L — HIGH (ref 7–14)
AST SERPL-CCNC: 21 U/L — SIGNIFICANT CHANGE UP (ref 4–40)
BASOPHILS # BLD AUTO: 0.04 K/UL — SIGNIFICANT CHANGE UP (ref 0–0.2)
BASOPHILS NFR BLD AUTO: 0.6 % — SIGNIFICANT CHANGE UP (ref 0–2)
BILIRUB SERPL-MCNC: 0.6 MG/DL — SIGNIFICANT CHANGE UP (ref 0.2–1.2)
BUN SERPL-MCNC: 102 MG/DL — HIGH (ref 7–23)
C3 SERPL-MCNC: 191 MG/DL — HIGH (ref 81–157)
C4 SERPL-MCNC: 34 MG/DL — SIGNIFICANT CHANGE UP (ref 13–39)
CALCIUM SERPL-MCNC: 8.4 MG/DL — SIGNIFICANT CHANGE UP (ref 8.4–10.5)
CHLORIDE SERPL-SCNC: 94 MMOL/L — LOW (ref 98–107)
CO2 SERPL-SCNC: 17 MMOL/L — LOW (ref 22–31)
CREAT SERPL-MCNC: 4.93 MG/DL — HIGH (ref 0.5–1.3)
CULTURE RESULTS: SIGNIFICANT CHANGE UP
CULTURE RESULTS: SIGNIFICANT CHANGE UP
EGFR: 11 ML/MIN/1.73M2 — LOW
EOSINOPHIL # BLD AUTO: 0.06 K/UL — SIGNIFICANT CHANGE UP (ref 0–0.5)
EOSINOPHIL NFR BLD AUTO: 0.9 % — SIGNIFICANT CHANGE UP (ref 0–6)
GBM IGG SER-ACNC: <0.2 — SIGNIFICANT CHANGE UP (ref 0–0.9)
GLUCOSE BLDC GLUCOMTR-MCNC: 194 MG/DL — HIGH (ref 70–99)
GLUCOSE BLDC GLUCOMTR-MCNC: 205 MG/DL — HIGH (ref 70–99)
GLUCOSE BLDC GLUCOMTR-MCNC: 209 MG/DL — HIGH (ref 70–99)
GLUCOSE BLDC GLUCOMTR-MCNC: 242 MG/DL — HIGH (ref 70–99)
GLUCOSE SERPL-MCNC: 180 MG/DL — HIGH (ref 70–99)
HCT VFR BLD CALC: 23.4 % — LOW (ref 39–50)
HGB BLD-MCNC: 7.6 G/DL — LOW (ref 13–17)
IANC: 6.36 K/UL — SIGNIFICANT CHANGE UP (ref 1.8–7.4)
IMM GRANULOCYTES NFR BLD AUTO: 1.4 % — HIGH (ref 0–0.9)
LYMPHOCYTES # BLD AUTO: 0.24 K/UL — LOW (ref 1–3.3)
LYMPHOCYTES # BLD AUTO: 3.5 % — LOW (ref 13–44)
MAGNESIUM SERPL-MCNC: 2.2 MG/DL — SIGNIFICANT CHANGE UP (ref 1.6–2.6)
MCHC RBC-ENTMCNC: 31.7 PG — SIGNIFICANT CHANGE UP (ref 27–34)
MCHC RBC-ENTMCNC: 32.5 GM/DL — SIGNIFICANT CHANGE UP (ref 32–36)
MCV RBC AUTO: 97.5 FL — SIGNIFICANT CHANGE UP (ref 80–100)
MONOCYTES # BLD AUTO: 0.11 K/UL — SIGNIFICANT CHANGE UP (ref 0–0.9)
MONOCYTES NFR BLD AUTO: 1.6 % — LOW (ref 2–14)
NEUTROPHILS # BLD AUTO: 6.36 K/UL — SIGNIFICANT CHANGE UP (ref 1.8–7.4)
NEUTROPHILS NFR BLD AUTO: 92 % — HIGH (ref 43–77)
NRBC # BLD: 0 /100 WBCS — SIGNIFICANT CHANGE UP (ref 0–0)
NRBC # FLD: 0 K/UL — SIGNIFICANT CHANGE UP (ref 0–0)
PHOSPHATE SERPL-MCNC: 9 MG/DL — HIGH (ref 2.5–4.5)
PLATELET # BLD AUTO: 164 K/UL — SIGNIFICANT CHANGE UP (ref 150–400)
POTASSIUM SERPL-MCNC: 3.6 MMOL/L — SIGNIFICANT CHANGE UP (ref 3.5–5.3)
POTASSIUM SERPL-SCNC: 3.6 MMOL/L — SIGNIFICANT CHANGE UP (ref 3.5–5.3)
PROT SERPL-MCNC: 6.1 G/DL — SIGNIFICANT CHANGE UP (ref 6–8.3)
RBC # BLD: 2.4 M/UL — LOW (ref 4.2–5.8)
RBC # FLD: 18.5 % — HIGH (ref 10.3–14.5)
SODIUM SERPL-SCNC: 134 MMOL/L — LOW (ref 135–145)
SPECIMEN SOURCE: SIGNIFICANT CHANGE UP
SPECIMEN SOURCE: SIGNIFICANT CHANGE UP
WBC # BLD: 6.91 K/UL — SIGNIFICANT CHANGE UP (ref 3.8–10.5)
WBC # FLD AUTO: 6.91 K/UL — SIGNIFICANT CHANGE UP (ref 3.8–10.5)

## 2024-07-08 PROCEDURE — 99233 SBSQ HOSP IP/OBS HIGH 50: CPT | Mod: GC

## 2024-07-08 PROCEDURE — 71250 CT THORAX DX C-: CPT | Mod: 26

## 2024-07-08 PROCEDURE — 99233 SBSQ HOSP IP/OBS HIGH 50: CPT

## 2024-07-08 PROCEDURE — 99222 1ST HOSP IP/OBS MODERATE 55: CPT

## 2024-07-08 PROCEDURE — 99232 SBSQ HOSP IP/OBS MODERATE 35: CPT

## 2024-07-08 RX ORDER — HEPARIN SODIUM 50 [USP'U]/ML
5000 INJECTION, SOLUTION INTRAVENOUS EVERY 12 HOURS
Refills: 0 | Status: DISCONTINUED | OUTPATIENT
Start: 2024-07-08 | End: 2024-07-16

## 2024-07-08 RX ORDER — INSULIN GLARGINE 100 [IU]/ML
12 INJECTION, SOLUTION SUBCUTANEOUS AT BEDTIME
Refills: 0 | Status: DISCONTINUED | OUTPATIENT
Start: 2024-07-08 | End: 2024-07-12

## 2024-07-08 RX ORDER — BUMETANIDE 0.25 MG/ML
1 INJECTION INTRAMUSCULAR; INTRAVENOUS
Qty: 20 | Refills: 0 | Status: DISCONTINUED | OUTPATIENT
Start: 2024-07-08 | End: 2024-07-09

## 2024-07-08 RX ADMIN — SODIUM BICARBONATE 650 MILLIGRAM(S): 650 TABLET ORAL at 21:36

## 2024-07-08 RX ADMIN — Medication 6 MILLIGRAM(S): at 21:36

## 2024-07-08 RX ADMIN — CEFEPIME 100 MILLIGRAM(S): 1 INJECTION, POWDER, FOR SOLUTION INTRAMUSCULAR; INTRAVENOUS at 05:48

## 2024-07-08 RX ADMIN — MIDODRINE HYDROCHLORIDE 5 MILLIGRAM(S): 10 TABLET ORAL at 18:05

## 2024-07-08 RX ADMIN — MIDODRINE HYDROCHLORIDE 5 MILLIGRAM(S): 10 TABLET ORAL at 05:47

## 2024-07-08 RX ADMIN — INSULIN LISPRO 1: 100 INJECTION, SOLUTION SUBCUTANEOUS at 12:28

## 2024-07-08 RX ADMIN — SODIUM BICARBONATE 650 MILLIGRAM(S): 650 TABLET ORAL at 05:47

## 2024-07-08 RX ADMIN — Medication 650 MILLIGRAM(S): at 21:36

## 2024-07-08 RX ADMIN — Medication 1000 MICROGRAM(S): at 12:13

## 2024-07-08 RX ADMIN — ATORVASTATIN CALCIUM 80 MILLIGRAM(S): 20 TABLET, FILM COATED ORAL at 21:36

## 2024-07-08 RX ADMIN — HEPARIN SODIUM 5000 UNIT(S): 50 INJECTION, SOLUTION INTRAVENOUS at 17:24

## 2024-07-08 RX ADMIN — BUMETANIDE 2 MILLIGRAM(S): 0.25 INJECTION INTRAMUSCULAR; INTRAVENOUS at 05:46

## 2024-07-08 RX ADMIN — SODIUM BICARBONATE 650 MILLIGRAM(S): 650 TABLET ORAL at 17:24

## 2024-07-08 RX ADMIN — BUMETANIDE 5 MG/HR: 0.25 INJECTION INTRAMUSCULAR; INTRAVENOUS at 12:12

## 2024-07-08 RX ADMIN — Medication 75 MILLILITER(S): at 03:32

## 2024-07-08 RX ADMIN — INSULIN GLARGINE 12 UNIT(S): 100 INJECTION, SOLUTION SUBCUTANEOUS at 22:39

## 2024-07-08 RX ADMIN — INSULIN LISPRO 2: 100 INJECTION, SOLUTION SUBCUTANEOUS at 08:26

## 2024-07-08 RX ADMIN — Medication 650 MILLIGRAM(S): at 22:36

## 2024-07-08 RX ADMIN — INSULIN LISPRO 2: 100 INJECTION, SOLUTION SUBCUTANEOUS at 18:04

## 2024-07-08 RX ADMIN — CEFEPIME 100 MILLIGRAM(S): 1 INJECTION, POWDER, FOR SOLUTION INTRAMUSCULAR; INTRAVENOUS at 17:24

## 2024-07-08 NOTE — CONSULT NOTE ADULT - SUBJECTIVE AND OBJECTIVE BOX
Incomplete full note to follow    Wound SURGERY CONSULT NOTE    HPI:  This is a 84 y/o M with pmhx of DM2, COPD, BPH, HLD, CAD, MM on chemo presented to the ED for new onset sepsis and ATN. The patient was at [facility] for new onset weakness, was found to have sepsis from pseudomonas bacteremia, unclear source, however was thought to be from cellulitis vs. GI cause. The patient developed BRITT with anuria. Cr was initially normal but now at 4. The patient is concerning for needing dialysis however did not have dialysis services there so the patient was transferred to [facility] for further care. The patient currently denies shortness of breath, chest pain or abdominal pain. Denies fevers, n/v, dysuria. The patient is chronic lymphedema for many years. Denies hx of kidney disease. ROS otherwise negative. (07 Jul 2024 02:17)        N/V/D,  BM/ Flatus,   NGT,     palp/ sob/dyspnea/ cp,       Fever/Chills/Sweats    Wound consult requested to assist w/ management of      wound/ pressure injury.    c/o pain, drainage, odor, color change,  worsening swelling . Increasingly sedentary.  Incontinent of urine & stool.  H/o falls, trauma. Aquacell/ Allevyn/ medihoney/ dakins/ Adaptic/ DSD used at home/ while awaiting consult.  Appetite good/ decreased.  weight loss.   All questions asked and answered to pt's and family's satisfaction.    Current Diet: Diet, Renal Restrictions:   For patients receiving Renal Replacement - No Protein Restr, No Conc K, No Conc Phos, Low Sodium  Soft and Bite Sized (SOFTBTSZ) (07-06-24 @ 23:50)      PAST MEDICAL & SURGICAL HISTORY:  DM2 (diabetes mellitus, type 2)      COPD, mild      Lymphedema      Multiple myeloma      No significant past surgical history          REVIEW OF SYSTEMS: Pt unable to offer  General/ Breast/ Skin/ Neuro/ MSK: see HPI  All other systems negative    MEDICATIONS  (STANDING):  atorvastatin 80 milliGRAM(s) Oral at bedtime  buMETAnide Infusion 1 mG/Hr (5 mL/Hr) IV Continuous <Continuous>  cefepime   IVPB 1000 milliGRAM(s) IV Intermittent every 12 hours  cyanocobalamin 1000 MICROGram(s) Oral daily  dextrose 10% Bolus 125 milliLiter(s) IV Bolus once  dextrose 5%. 1000 milliLiter(s) (50 mL/Hr) IV Continuous <Continuous>  dextrose 5%. 1000 milliLiter(s) (100 mL/Hr) IV Continuous <Continuous>  dextrose 50% Injectable 25 Gram(s) IV Push once  dextrose 50% Injectable 12.5 Gram(s) IV Push once  glucagon  Injectable 1 milliGRAM(s) IntraMuscular once  heparin   Injectable 5000 Unit(s) SubCutaneous every 12 hours  insulin glargine Injectable (LANTUS) 12 Unit(s) SubCutaneous at bedtime  insulin lispro (ADMELOG) corrective regimen sliding scale   SubCutaneous three times a day before meals  insulin lispro (ADMELOG) corrective regimen sliding scale   SubCutaneous at bedtime  melatonin 6 milliGRAM(s) Oral at bedtime  midodrine. 5 milliGRAM(s) Oral three times a day  sodium bicarbonate 650 milliGRAM(s) Oral three times a day    MEDICATIONS  (PRN):  acetaminophen     Tablet .. 650 milliGRAM(s) Oral every 6 hours PRN Temp greater or equal to 38C (100.4F), Mild Pain (1 - 3)  aluminum hydroxide/magnesium hydroxide/simethicone Suspension 30 milliLiter(s) Oral every 4 hours PRN Dyspepsia  dextrose Oral Gel 15 Gram(s) Oral once PRN Blood Glucose LESS THAN 70 milliGRAM(s)/deciliter  ondansetron Injectable 4 milliGRAM(s) IV Push every 8 hours PRN Nausea and/or Vomiting      Allergies    No Known Allergies    Intolerances        SOCIAL HISTORY:  / /single/ ; (+)HHA/ lives in SNF; Former smoker, Denies smoking, ETOH, drugs    FAMILY HISTORY:  No pertinent family history in first degree relatives        PHYSICAL EXAM:  Vital Signs Last 24 Hrs  T(C): 36.3 (08 Jul 2024 15:29), Max: 36.7 (07 Jul 2024 22:00)  T(F): 97.4 (08 Jul 2024 15:29), Max: 98 (07 Jul 2024 22:00)  HR: 90 (08 Jul 2024 15:29) (66 - 98)  BP: 148/78 (08 Jul 2024 15:29) (114/62 - 148/78)  BP(mean): --  RR: 20 (08 Jul 2024 15:29) (17 - 20)  SpO2: 98% (08 Jul 2024 15:29) (92% - 98%)    Parameters below as of 08 Jul 2024 15:29  Patient On (Oxygen Delivery Method): nasal cannula  O2 Flow (L/min): 2      Constitutional: NAD / gaurded but stable,  A&Ox3/ Alert/ Confused  cachectic/ MO/ Obese/ frail  WD/ WN/ WG/ Disheveled  (+) low airloss support surface, (+) fluidized postioining devices, (+) complete cair boots     HEENT:  NC/AT, PERRL, EOMI, mucosa moist, throat clear, trachea midline, neck supple    Cardiovascular: RRR   Respiratory: CTA    Gastrointestinal soft NT/ND (+)BS  (+)PEG (+)ostomy    Neurology  weakened strength & sensation grossly intact/ paraesthesia  nonverbal, no follow commands/ paraplegic    Musculoskeletal:  limited/ FROM, no deformities/ contractures    Vascular: BLE equally warm/ cool,  no cyanosis, clubbing, edema  >LE //BLE edema equal  DP/PT pulses palpable  no acute ischemia noted  hemosiderin staining    Skin:  moist w/ good turgor  frail,  ecchymosis w/o hematoma  serosanguinous drainage  No odor, erythema, increased warmth, tenderness, induration, fluctuance    LABS/ CULTURES/ RADIOLOGY:                        7.6    6.91  )-----------( 164      ( 08 Jul 2024 06:18 )             23.4       134  |  94  |  102  ----------------------------<  180      [07-08-24 @ 06:18]  3.6   |  17  |  4.93        Ca     8.4     [07-08-24 @ 06:18]      Mg     2.20     [07-08-24 @ 06:18]      Phos  9.0     [07-08-24 @ 06:18]    TPro  6.1  /  Alb  2.3  /  TBili  0.6  /  DBili  x   /  AST  21  /  ALT  39  /  AlkPhos  55  [07-08-24 @ 06:18]                          Add radiology results when applicable/address in plan    Assessment/Plan:    Wound Consult requested to assist w/ management of    Compression BLE  Consider ALYCE/PVR, XRay, Duplex, MRI, CT  BLE elevation  Abx per Medicine/ ID  Moisturize intact skin w/ SWEEN cream BID  Nutrition Consult for optimization as tolerated in pt w/ Protein Calorie Malnutirion        Inadequate PO intake        consider MVI & Vit C to promote wound healing        encourage high quality protein when appropriate  Hyperglycemia - consider HgA1c        FS w/ ISS q6h, consider Endo Consult  Anemia- transfusions, Fe studies  Continue turning and positioning w/ offloading assistive devices as per protocol  Continue w/ Pericare as per protocol  Waffle Cushion to chair when oob to chair  Continue w/ low air loss fluidized bed surface     Care as per medicine, will follow w/ you    Upon discharge f/u as outpatient at [facility] [address] [place], [place] [phone]  Seen w/ attng and D/w team    Thank you for this consult  OBIE Cheyr, CWN (pager #[phone])    If after 4PM or before 7:30AM on Mon-Friday or weekend/holiday please contact general surgery for urgent matters.   Team A- [id number]   Team B- [id number]  For non-urgent matters e-mail [email or web address]    I/We spent (50min) minutes face to face with this patient of which more than 50% of the time was spent counseling & coordinating care of this pt     Wound SURGERY CONSULT NOTE    HPI:  This is a 84 y/o M with pmhx of DM2, COPD, BPH, HLD, CAD, MM on chemo presented to the ED for new onset sepsis and ATN. The patient was at [facility] for new onset weakness, was found to have sepsis from pseudomonas bacteremia, unclear source, however was thought to be from cellulitis vs. GI cause. The patient developed BRITT with anuria. Cr was initially normal but now at 4. The patient is concerning for needing dialysis however did not have dialysis services there so the patient was [facility] care. The patient currently denies shortness of breath, chest pain or abdominal pain. Denies fevers, n/v, dysuria. The patient has had chronic lymphedema for many years. Denies hx of kidney disease. ROS otherwise negative. (07 Jul 2024 02:17)    Wound consult requested to assist w/ management of bilateral lower extremity lymphorrhea. Daughter at bedside during time of encounter. Per patient he follows with Dr. Rikki Salcedo for chronic lower extremity lymphedema. Reports that he has a lymphedema pump uses every other day and is supposed to be wearing compression. No wounds he is aware of, although legs have been edematous with clear drainage. Per patient he ambulates with a walker, lives with his wife, has no additional help at home. Per patient he feels overwhelmed today hearing from numerous health care providers. Otherwise he denies SOB, CP, abdominal pain, n/v, fever, chills.     Current Diet: Diet, Renal Restrictions:   For patients receiving Renal Replacement - No Protein Restr, No Conc K, No Conc Phos, Low Sodium  Soft and Bite Sized (SOFTBTSZ) (07-06-24 @ 23:50)      PAST MEDICAL & SURGICAL HISTORY:  DM2 (diabetes mellitus, type 2)    COPD, mild    Lymphedema    Multiple myeloma    No significant past surgical history      REVIEW OF SYSTEMS: General, skin see above.  All other systems negative.    MEDICATIONS  (STANDING):  atorvastatin 80 milliGRAM(s) Oral at bedtime  buMETAnide Infusion 1 mG/Hr (5 mL/Hr) IV Continuous <Continuous>  cefepime   IVPB 1000 milliGRAM(s) IV Intermittent every 12 hours  cyanocobalamin 1000 MICROGram(s) Oral daily  dextrose 10% Bolus 125 milliLiter(s) IV Bolus once  dextrose 5%. 1000 milliLiter(s) (50 mL/Hr) IV Continuous <Continuous>  dextrose 5%. 1000 milliLiter(s) (100 mL/Hr) IV Continuous <Continuous>  dextrose 50% Injectable 25 Gram(s) IV Push once  dextrose 50% Injectable 12.5 Gram(s) IV Push once  glucagon  Injectable 1 milliGRAM(s) IntraMuscular once  heparin   Injectable 5000 Unit(s) SubCutaneous every 12 hours  insulin glargine Injectable (LANTUS) 12 Unit(s) SubCutaneous at bedtime  insulin lispro (ADMELOG) corrective regimen sliding scale   SubCutaneous three times a day before meals  insulin lispro (ADMELOG) corrective regimen sliding scale   SubCutaneous at bedtime  melatonin 6 milliGRAM(s) Oral at bedtime  midodrine. 5 milliGRAM(s) Oral three times a day  sodium bicarbonate 650 milliGRAM(s) Oral three times a day    MEDICATIONS  (PRN):  acetaminophen     Tablet .. 650 milliGRAM(s) Oral every 6 hours PRN Temp greater or equal to 38C (100.4F), Mild Pain (1 - 3)  aluminum hydroxide/magnesium hydroxide/simethicone Suspension 30 milliLiter(s) Oral every 4 hours PRN Dyspepsia  dextrose Oral Gel 15 Gram(s) Oral once PRN Blood Glucose LESS THAN 70 milliGRAM(s)/deciliter  ondansetron Injectable 4 milliGRAM(s) IV Push every 8 hours PRN Nausea and/or Vomiting      Allergies    No Known Allergies    Intolerances        SOCIAL HISTORY:  , lives with wife. Ambulates with walker. Denies ETOH use, Denies tobacco use, Denies illicit   (+) DNR/DNI.      FAMILY HISTORY:  No pertinent family history in first degree relatives        PHYSICAL EXAM:  Vital Signs Last 24 Hrs  T(C): 36.3 (08 Jul 2024 15:29), Max: 36.7 (07 Jul 2024 22:00)  T(F): 97.4 (08 Jul 2024 15:29), Max: 98 (07 Jul 2024 22:00)  HR: 90 (08 Jul 2024 15:29) (66 - 98)  BP: 148/78 (08 Jul 2024 15:29) (114/62 - 148/78)  BP(mean): --  RR: 20 (08 Jul 2024 15:29) (17 - 20)  SpO2: 98% (08 Jul 2024 15:29) (92% - 98%)    Parameters below as of 08 Jul 2024 15:29  Patient On (Oxygen Delivery Method): nasal cannula  O2 Flow (L/min): 2    Wt: 138 kg (07-)  BMI: 40.2 kg/m2    Constitutional: A&Ox3, lethargic, NAD  (+) low airloss support surface, (+) fluidized positioning devices, heels offloaded  HEENT:  NC/AT, nonicteric, mucosa moist  Cardiovascular: rate regular  Respiratory: supplemental oxygen via nasal cannula, nonlabored, equal chest expansion  Gastrointestinal: soft NT/ND, incontinent pasty stool, perineal care provided  : condom catheter in place  Vascular: BLE (+)lymphedema, +4 pitting edema, equally warm, +1 dp pulses, capillary refill < 3 seconds  Right lateral and medial lower leg deroofed blisters; largest to medial lower leg- 4cmx1.5cmx0.1cm, pink-moist dermis, moderate serofibrinous drainage. Periwound skin intact, no overt s/s of acute soft tissue infection  LLe skin intact.  Skin:  moist w/ good turgor, bilateral knees healed linear surgical scars  Increased moisture to bilateral medial and posterior thighs, sacral gluteal cleft and bilateral gluteal folds- scattered blanching erythema, skin intact.     LABS/ CULTURES/ RADIOLOGY:                        7.6    6.91  )-----------( 164      ( 08 Jul 2024 06:18 )             23.4       134  |  94  |  102  ----------------------------<  180      [07-08-24 @ 06:18]  3.6   |  17  |  4.93        Ca     8.4     [07-08-24 @ 06:18]      Mg     2.20     [07-08-24 @ 06:18]      Phos  9.0     [07-08-24 @ 06:18]    TPro  6.1  /  Alb  2.3  /  TBili  0.6  /  DBili  x   /  AST  21  /  ALT  39  /  AlkPhos  55  [07-08-24 @ 06:18]        A1C with Estimated Average Glucose (07.07.24 @ 06:31)   A1C with Estimated Average Glucose Result: 8.0

## 2024-07-08 NOTE — PROGRESS NOTE ADULT - PROBLEM SELECTOR PLAN 1
- patient presented for new onset BRITT likely secondary to sepsis leading to ATN  - nephrology called overnight - no urgency for dialysis at this time  - felix to be placed for monitoring I/O in the setting of BRITT and anuria  - urine lytes pending  - kidney u/s repeat pending  - hold aspirin and plavix for now pending port placement for dialysis if required pending nephro eval    Nephrology recommending IV diuresis with bumetanide infusion. Monitor UOP and Cr.

## 2024-07-08 NOTE — PROGRESS NOTE ADULT - PROBLEM SELECTOR PLAN 1
Pt with oliguirc BRITT iso severe sepsis with hypotension, and possible MM associated BRITT. [facility]T/[facility] reviewed. Baseline Scr 1.08-1.3. Last Scr WNL 1.2 on OP done 6/10/24. Serum immunofixation with IgA Kappa band (6/10/24). K/L 1.75 (6/10/24). Pt admitted to [facility] on 6/28 with admission Scr elevated at 1.71 (6/28). Scr progressively increased to 4.59 (7/6). Proteinuria and microscopic hematuria noted on UA done 6/28/24. Proteinase 3Ab and MPO Ab neg (7/5/24). Transferred to [facility] for possible dialysis needs. On arrival to [facility], Scr elevated/stable at 4.45 (7/6). Today Scr increased to 4.93 (7/8). No hydronephrosis/calculi, R kidney 12cm and L kidney 11.5cm on Renal US done at [facility] on 7/1/24. No urgent indication for dialysis. Pt with significant LE edema and oliguria, recommend bumex 2mg IVP x1 followed by bumex infusion at 1mg/hr for 6hrs. Check UA and UPCR. As pt refusing felix catheter, recommend monitoring bladder scans and external catheter for accurate I/O monitoring. Monitor VS and labs. Avoid nephrotoxins. Dose medications for eGFR. Ensure low phos/low potasium/renal diet.     If you have any questions, please feel free to contact me  Mauricio Youssef  Nephrology Fellow  [employer]/Page [phone]  (After 4pm or on weekends please page the on-call fellow). Pt with oliguric BRITT iso severe sepsis with hypotension, and possible MM associated BRITT. [facility] reviewed. Baseline Scr 1.08-1.3. Last Scr WNL 1.2 on OP labs done 6/10/24. Pt admitted to [facility] on 6/28 with admission Scr elevated at 1.71 (6/28). Scr progressively increased to 4.59 (7/6). Transferred to [facility] (7/6) for possible dialysis needs. On arrival Scr elevated/stable at 4.45 (7/6). Today Scr increased to 4.93 (7/8). Proteinuria and microscopic hematuria noted on UA done 6/28/24. Proteinase 3Ab and MPO Ab neg (7/5/24). No hydronephrosis/calculi, R kidney 12cm and L kidney 11.5cm on Renal US done at [facility] on 7/1/24. Rpt Renal US done on 7/7/24 w/ no right renal hydronephrosis, R kidney 12.3cm and L kidney 8.3cm however visualization of L kidney limited due to bowel gas. Pt with significant LE edema, worsening renal function and oliguria. No indication for dialysis. Recommend bumex 2mg IVP x1 followed by bumex infusion at 1mg/hr for 6hrs. Check UA and UPCR. As pt refusing felix catheter, recommend monitoring bladder scans and external catheter for I/O monitoring. Monitor VS and labs. Avoid nephrotoxins. Dose medications for eGFR. Ensure renal diet.     If you have any questions, please feel free to contact me  Mauricio Youssef  Nephrology Fellow  [employer]/Page [phone]  (After 4pm or on weekends please page the on-call fellow). Pt with oliguric BRITT iso severe sepsis with hypotension, and possible MM associated BRITT. [facility] reviewed. Baseline Scr 1.08-1.3. Last Scr WNL 1.2 on OP labs done 6/10/24. Pt admitted to [facility] on 6/28 with admission Scr elevated at 1.71 (6/28). Scr progressively increased to 4.59 (7/6). Transferred to [facility] (7/6) for possible dialysis needs. On arrival Scr elevated/stable at 4.45 (7/6). Today Scr increased to 4.93 (7/8). Proteinuria and microscopic hematuria noted on UA done 6/28/24. Proteinase 3Ab and MPO Ab neg (7/5/24). No hydronephrosis/calculi, R kidney 12cm and L kidney 11.5cm on Renal US done at [facility] on 7/1/24. Rpt Renal US done on 7/7/24 w/ no right renal hydronephrosis, R kidney 12.3cm and L kidney 8.3cm however visualization of L kidney limited due to bowel gas. Pt with significant LE edema, worsening renal function and oliguria. No indication for dialysis. Recommend bumex 2mg IVP x1 followed by bumex infusion at 1mg/hr for 6hrs. Check UA and UPCR. Rpt renal US to better visualize L kidney. As pt refusing felix catheter, recommend monitoring bladder scans and external catheter for I/O monitoring. Monitor VS and labs. Avoid nephrotoxins. Dose medications for eGFR. Ensure renal diet.     If you have any questions, please feel free to contact me  Mauricio Youssef  Nephrology Fellow  [employer]/Page [phone]  (After 4pm or on weekends please page the on-call fellow). Pt with oliguric BRITT iso severe sepsis with hypotension, and possible MM associated BRITT. [facility] reviewed. Baseline Scr 1.08-1.3. Last Scr WNL 1.2 on OP labs done 6/10/24. Pt admitted to [facility] on 6/28 with admission Scr elevated at 1.71 (6/28). Scr progressively increased to 4.59 (7/6). Transferred to [facility] (7/6) for possible dialysis needs. On arrival Scr elevated/stable at 4.45 (7/6). Today Scr increased to 4.93 (7/8). Proteinuria and microscopic hematuria noted on UA done 6/28/24. Proteinase 3Ab and MPO Ab neg (7/5/24). No hydronephrosis/calculi, R kidney 12cm and L kidney 11.5cm on Renal US done at [facility] on 7/1/24. Rpt Renal US done on 7/7/24 w/ no right renal hydronephrosis, R kidney 12.3cm and L kidney 8.3cm however visualization of L kidney limited due to bowel gas. Pt with significant LE edema, worsening renal function and oliguria. No indication for dialysis. Recommend bumex 2mg IVP x1 followed by bumex infusion at 1mg/hr for 10hrs. Check UA and UPCR. Rpt renal US to better visualize L kidney. As pt refusing felix catheter, recommend monitoring bladder scans and external catheter for I/O monitoring. Monitor VS and labs. Avoid nephrotoxins. Dose medications for eGFR. Ensure renal diet.     If you have any questions, please feel free to contact me  Mauricio Youssef  Nephrology Fellow  [employer]/Page [phone]  (After 4pm or on weekends please page the on-call fellow). Pt. with oliguric BRITT in setting of sepsis, hypotension, and MM. Pt. with likely ATN. Scr was WNL 1.2 on OP labs done on 6/10/24. Scr was elevated at 1.71 on admission to [facility] on 6/28/24. Scr progressively increased to 4.59 on 7/6/24. Pt. transferred to [facility] on 7/6/24 for worsening BRITT and possible need for HD. Scr was elevated at 4.77 on 7/7/24, increased to 4.93 today (7/8/24). Pt. noted to have proteinuria and microscopic hematuria on UA done on 6/28/24. PR3 and MPO antibodies were negative on 7/5/24. No hydronephrosis seen on renal US done at  [facility] on 7/1/24. No right hydronephrosis seen on renal US done at [facility] on 7/7/24. L kidney not well visualized due to bowel gas. Pt. with worsening/oliguric BRITT and significant B/L LE edema, recommend IV Bumex infusion at 1 mg/hr for 10 hrs. Will need to consider HD, if BRITT continues to worsen or pt. remains oliguric despite IV Bumex infusion. Obtain renal US to better visualize left kidney. Recommend serial bladder scans. Monitor labs and UOP. Avoid nephrotoxins. Dose medications for eGFR.      If you have any questions, please feel free to contact me  Mauricio Youssef  Nephrology Fellow  [employer]/Page [phone]  (After 4pm or on weekends please page the on-call fellow).

## 2024-07-08 NOTE — PROGRESS NOTE ADULT - SUBJECTIVE AND OBJECTIVE BOX
[facility] Division of Kidney Diseases & Hypertension  FOLLOW UP NOTE  [phone]--------------------------------------------------------------------------------  Chief Complaint: BRITT     24 hour events/subjective: Pt seen and examined earlier this morning with primary team and pts daughter present. Pt reports fatigue, decreased UOP and persistent LE edema. Pt also frustrated with prolonged hospitalization. Otherwise denies N/V, diarrhea, hematuria, HA, abd pain, CP and SOB.    PAST HISTORY  --------------------------------------------------------------------------------  No significant changes to PMH, PSH, FHx, SHx, unless otherwise noted    ALLERGIES & MEDICATIONS  --------------------------------------------------------------------------------  Allergies    No Known Allergies    Intolerances    Standing Inpatient Medications  atorvastatin 80 milliGRAM(s) Oral at bedtime  buMETAnide Infusion 1 mG/Hr IV Continuous <Continuous>  cefepime   IVPB 1000 milliGRAM(s) IV Intermittent every 12 hours  cyanocobalamin 1000 MICROGram(s) Oral daily  dextrose 10% Bolus 125 milliLiter(s) IV Bolus once  dextrose 5%. 1000 milliLiter(s) IV Continuous <Continuous>  dextrose 5%. 1000 milliLiter(s) IV Continuous <Continuous>  dextrose 50% Injectable 25 Gram(s) IV Push once  dextrose 50% Injectable 12.5 Gram(s) IV Push once  glucagon  Injectable 1 milliGRAM(s) IntraMuscular once  heparin   Injectable 5000 Unit(s) SubCutaneous every 12 hours  insulin glargine Injectable (LANTUS) 10 Unit(s) SubCutaneous at bedtime  insulin lispro (ADMELOG) corrective regimen sliding scale   SubCutaneous three times a day before meals  insulin lispro (ADMELOG) corrective regimen sliding scale   SubCutaneous at bedtime  midodrine. 5 milliGRAM(s) Oral three times a day  sodium bicarbonate 650 milliGRAM(s) Oral three times a day    PRN Inpatient Medications  acetaminophen     Tablet .. 650 milliGRAM(s) Oral every 6 hours PRN  aluminum hydroxide/magnesium hydroxide/simethicone Suspension 30 milliLiter(s) Oral every 4 hours PRN  dextrose Oral Gel 15 Gram(s) Oral once PRN  melatonin 6 milliGRAM(s) Oral at bedtime PRN  ondansetron Injectable 4 milliGRAM(s) IV Push every 8 hours PRN    REVIEW OF SYSTEMS  --------------------------------------------------------------------------------  Gen: +fatigue, No fevers/chills  Skin: No rashes  Head/Eyes/Ears: No HA  Respiratory: No SOB  CV: No CP  GI: No abdominal pain, diarrhea, N/V  : +decreased UOP, no hematuria   MSK: +LE edema  Heme: No easy bruising or bleeding  Psych: No significant depression    All other systems were reviewed and are negative, except as noted.    VITALS/PHYSICAL EXAM  --------------------------------------------------------------------------------  T(C): 36.2 (07-08-24 @ 12:10), Max: 36.7 (07-07-24 @ 22:00)  HR: 87 (07-08-24 @ 12:10) (66 - 98)  BP: 138/46 (07-08-24 @ 12:10) (114/62 - 138/46)  RR: 20 (07-08-24 @ 12:10) (17 - 20)  SpO2: 92% (07-08-24 @ 12:10) (92% - 94%)  Wt(kg): --  Height (cm): 185.4 (07-06-24 @ 19:16)  Weight (kg): 138 (07-06-24 @ 19:16)  BMI (kg/m2): 40.1 (07-06-24 @ 19:16)  BSA (m2): 2.57 (07-06-24 @ 19:16)    Physical Exam:  	Gen: NAD, ill appearing   	HEENT: MMM  	Pulm: CTA B/L, +NC 2L   	CV: S1S2  	Abd: Soft, +BS   	Ext: +significant LE edema B/L  	Neuro: Awake  	Skin: Warm and dry  	Vascular access: non for dialysis     LABS/STUDIES  --------------------------------------------------------------------------------              7.6    6.91  >-----------<  164      [07-08-24 @ 06:18]              23.4     134  |  94  |  102  ----------------------------<  180      [07-08-24 @ 06:18]  3.6   |  17  |  4.93        Ca     8.4     [07-08-24 @ 06:18]      Mg     2.20     [07-08-24 @ 06:18]      Phos  9.0     [07-08-24 @ 06:18]    TPro  6.1  /  Alb  2.3  /  TBili  0.6  /  DBili  x   /  AST  21  /  ALT  39  /  AlkPhos  55  [07-08-24 @ 06:18]    Creatinine Trend:  SCr 4.93 [07-08 @ 06:18]  SCr 4.77 [07-07 @ 18:45]  SCr 4.45 [07-06 @ 22:42] [facility] Division of Kidney Diseases & Hypertension  FOLLOW UP NOTE  [phone]--------------------------------------------------------------------------------  Chief Complaint: BRITT     24 hour events/subjective: Pt seen and examined earlier this morning with primary team and pts daughter present. Pt reports fatigue, diarrhea overnight, decreased UOP and persistent LE edema. Pt also frustrated with prolonged hospitalization. Otherwise denies N/V, diarrhea, hematuria, HA, abd pain, CP and SOB.    PAST HISTORY  --------------------------------------------------------------------------------  No significant changes to PMH, PSH, FHx, SHx, unless otherwise noted    ALLERGIES & MEDICATIONS  --------------------------------------------------------------------------------  Allergies    No Known Allergies    Intolerances    Standing Inpatient Medications  atorvastatin 80 milliGRAM(s) Oral at bedtime  buMETAnide Infusion 1 mG/Hr IV Continuous <Continuous>  cefepime   IVPB 1000 milliGRAM(s) IV Intermittent every 12 hours  cyanocobalamin 1000 MICROGram(s) Oral daily  dextrose 10% Bolus 125 milliLiter(s) IV Bolus once  dextrose 5%. 1000 milliLiter(s) IV Continuous <Continuous>  dextrose 5%. 1000 milliLiter(s) IV Continuous <Continuous>  dextrose 50% Injectable 25 Gram(s) IV Push once  dextrose 50% Injectable 12.5 Gram(s) IV Push once  glucagon  Injectable 1 milliGRAM(s) IntraMuscular once  heparin   Injectable 5000 Unit(s) SubCutaneous every 12 hours  insulin glargine Injectable (LANTUS) 10 Unit(s) SubCutaneous at bedtime  insulin lispro (ADMELOG) corrective regimen sliding scale   SubCutaneous three times a day before meals  insulin lispro (ADMELOG) corrective regimen sliding scale   SubCutaneous at bedtime  midodrine. 5 milliGRAM(s) Oral three times a day  sodium bicarbonate 650 milliGRAM(s) Oral three times a day    PRN Inpatient Medications  acetaminophen     Tablet .. 650 milliGRAM(s) Oral every 6 hours PRN  aluminum hydroxide/magnesium hydroxide/simethicone Suspension 30 milliLiter(s) Oral every 4 hours PRN  dextrose Oral Gel 15 Gram(s) Oral once PRN  melatonin 6 milliGRAM(s) Oral at bedtime PRN  ondansetron Injectable 4 milliGRAM(s) IV Push every 8 hours PRN    REVIEW OF SYSTEMS  --------------------------------------------------------------------------------  Gen: +fatigue, No fevers/chills  Skin: No rashes  Head/Eyes/Ears: No HA  Respiratory: No SOB  CV: No CP  GI: +diarrhea, No abdominal pain, no N/V  : +decreased UOP, no hematuria   MSK: +LE edema  Heme: No easy bruising or bleeding  Psych: No significant depression    All other systems were reviewed and are negative, except as noted.    VITALS/PHYSICAL EXAM  --------------------------------------------------------------------------------  T(C): 36.2 (07-08-24 @ 12:10), Max: 36.7 (07-07-24 @ 22:00)  HR: 87 (07-08-24 @ 12:10) (66 - 98)  BP: 138/46 (07-08-24 @ 12:10) (114/62 - 138/46)  RR: 20 (07-08-24 @ 12:10) (17 - 20)  SpO2: 92% (07-08-24 @ 12:10) (92% - 94%)  Wt(kg): --  Height (cm): 185.4 (07-06-24 @ 19:16)  Weight (kg): 138 (07-06-24 @ 19:16)  BMI (kg/m2): 40.1 (07-06-24 @ 19:16)  BSA (m2): 2.57 (07-06-24 @ 19:16)    Physical Exam:  	Gen: NAD, ill appearing   	HEENT: MMM  	Pulm: CTA B/L, +NC 2L   	CV: S1S2  	Abd: Soft, +BS   	Ext: +significant LE edema B/L  	Neuro: Awake  	Skin: Warm and dry  	Vascular access: non for dialysis     LABS/STUDIES  --------------------------------------------------------------------------------              7.6    6.91  >-----------<  164      [07-08-24 @ 06:18]              23.4     134  |  94  |  102  ----------------------------<  180      [07-08-24 @ 06:18]  3.6   |  17  |  4.93        Ca     8.4     [07-08-24 @ 06:18]      Mg     2.20     [07-08-24 @ 06:18]      Phos  9.0     [07-08-24 @ 06:18]    TPro  6.1  /  Alb  2.3  /  TBili  0.6  /  DBili  x   /  AST  21  /  ALT  39  /  AlkPhos  55  [07-08-24 @ 06:18]    Creatinine Trend:  SCr 4.93 [07-08 @ 06:18]  SCr 4.77 [07-07 @ 18:45]  SCr 4.45 [07-06 @ 22:42] [facility] Division of Kidney Diseases & Hypertension  FOLLOW UP NOTE  [phone]--------------------------------------------------------------------------------    Chief Complaint: BRITT     24 hour events/subjective: Pt. seen and examined earlier this morning with primary team and daughter present at bedside. Pt. gives history of fatigue, diarrhea, decreased UOP and chronic B/L LE edema. No fever, CP or SOB during rounds.     PAST HISTORY  --------------------------------------------------------------------------------  No significant changes to PMH, PSH, FHx, SHx, unless otherwise noted    ALLERGIES & MEDICATIONS  --------------------------------------------------------------------------------  Allergies    No Known Allergies    Intolerances    Standing Inpatient Medications  atorvastatin 80 milliGRAM(s) Oral at bedtime  buMETAnide Infusion 1 mG/Hr IV Continuous <Continuous>  cefepime   IVPB 1000 milliGRAM(s) IV Intermittent every 12 hours  cyanocobalamin 1000 MICROGram(s) Oral daily  dextrose 10% Bolus 125 milliLiter(s) IV Bolus once  dextrose 5%. 1000 milliLiter(s) IV Continuous <Continuous>  dextrose 5%. 1000 milliLiter(s) IV Continuous <Continuous>  dextrose 50% Injectable 25 Gram(s) IV Push once  dextrose 50% Injectable 12.5 Gram(s) IV Push once  glucagon  Injectable 1 milliGRAM(s) IntraMuscular once  heparin   Injectable 5000 Unit(s) SubCutaneous every 12 hours  insulin glargine Injectable (LANTUS) 10 Unit(s) SubCutaneous at bedtime  insulin lispro (ADMELOG) corrective regimen sliding scale   SubCutaneous three times a day before meals  insulin lispro (ADMELOG) corrective regimen sliding scale   SubCutaneous at bedtime  midodrine. 5 milliGRAM(s) Oral three times a day  sodium bicarbonate 650 milliGRAM(s) Oral three times a day    PRN Inpatient Medications  acetaminophen     Tablet .. 650 milliGRAM(s) Oral every 6 hours PRN  aluminum hydroxide/magnesium hydroxide/simethicone Suspension 30 milliLiter(s) Oral every 4 hours PRN  dextrose Oral Gel 15 Gram(s) Oral once PRN  melatonin 6 milliGRAM(s) Oral at bedtime PRN  ondansetron Injectable 4 milliGRAM(s) IV Push every 8 hours PRN    REVIEW OF SYSTEMS  --------------------------------------------------------------------------------  Gen: +Fatigue, no fever  Skin: +Chronic B/L LE edema  Head/Eyes/Ears: No HA  Respiratory: No SOB  CV: No CP  GI: +Diarrhea yesterday, no abdominal pain/nausea or vomiting  : +Decreased UOP   MSK: +Chronic B/L LE edema  Heme: No easy bruising    All other systems were reviewed and are negative, except as noted.    VITALS/PHYSICAL EXAM  --------------------------------------------------------------------------------  T(C): 36.2 (07-08-24 @ 12:10), Max: 36.7 (07-07-24 @ 22:00)  HR: 87 (07-08-24 @ 12:10) (66 - 98)  BP: 138/46 (07-08-24 @ 12:10) (114/62 - 138/46)  RR: 20 (07-08-24 @ 12:10) (17 - 20)  SpO2: 92% (07-08-24 @ 12:10) (92% - 94%)  Wt(kg): --  Height (cm): 185.4 (07-06-24 @ 19:16)  Weight (kg): 138 (07-06-24 @ 19:16)  BMI (kg/m2): 40.1 (07-06-24 @ 19:16)  BSA (m2): 2.57 (07-06-24 @ 19:16)    Physical Exam:  	Gen: resting, ill appearing   	HEENT: No JVD, on O@ via NC  	Pulm: CTA B/L   	CV: S1S2+  	Abd: Soft, +BS   	Ext: +B/L LE pitting edema  	Neuro: Awake, alert  	Skin: Chronic skin changes/hyperpigmented skin in B/L LE  	Vascular access: Peripheral IV line    LABS/STUDIES  --------------------------------------------------------------------------------              7.6    6.91  >-----------<  164      [07-08-24 @ 06:18]              23.4     134  |  94  |  102  ----------------------------<  180      [07-08-24 @ 06:18]  3.6   |  17  |  4.93        Ca     8.4     [07-08-24 @ 06:18]      Mg     2.20     [07-08-24 @ 06:18]      Phos  9.0     [07-08-24 @ 06:18]    TPro  6.1  /  Alb  2.3  /  TBili  0.6  /  DBili  x   /  AST  21  /  ALT  39  /  AlkPhos  55  [07-08-24 @ 06:18]    Creatinine Trend:  SCr 4.93 [07-08 @ 06:18]  SCr 4.77 [07-07 @ 18:45]  SCr 4.45 [07-06 @ 22:42]

## 2024-07-08 NOTE — PROGRESS NOTE ADULT - SUBJECTIVE AND OBJECTIVE BOX
[facility] Division of Hospital Medicine  Christophe KaileyDO  Available via MS Teams  In house pager [phone]    SUBJECTIVE / OVERNIGHT EVENTS:  No acute events overnight. Patient seen and examined at bedside this morning.  States he is very frustrated and wants to go home. Daughter Rhonda arrived at bedside. Nephrology team also at bedside.  Discussed labs, urinary output (patient reports little output), and overall plan.  Daughter and patient agree with further diuresis.    ADDITIONAL REVIEW OF SYSTEMS:    MEDICATIONS  (STANDING):  atorvastatin 80 milliGRAM(s) Oral at bedtime  buMETAnide Infusion 1 mG/Hr (5 mL/Hr) IV Continuous <Continuous>  cefepime   IVPB 1000 milliGRAM(s) IV Intermittent every 12 hours  cyanocobalamin 1000 MICROGram(s) Oral daily  dextrose 10% Bolus 125 milliLiter(s) IV Bolus once  dextrose 5%. 1000 milliLiter(s) (50 mL/Hr) IV Continuous <Continuous>  dextrose 5%. 1000 milliLiter(s) (100 mL/Hr) IV Continuous <Continuous>  dextrose 50% Injectable 25 Gram(s) IV Push once  dextrose 50% Injectable 12.5 Gram(s) IV Push once  glucagon  Injectable 1 milliGRAM(s) IntraMuscular once  heparin   Injectable 5000 Unit(s) SubCutaneous every 12 hours  insulin glargine Injectable (LANTUS) 12 Unit(s) SubCutaneous at bedtime  insulin lispro (ADMELOG) corrective regimen sliding scale   SubCutaneous three times a day before meals  insulin lispro (ADMELOG) corrective regimen sliding scale   SubCutaneous at bedtime  midodrine. 5 milliGRAM(s) Oral three times a day  sodium bicarbonate 650 milliGRAM(s) Oral three times a day    MEDICATIONS  (PRN):  acetaminophen     Tablet .. 650 milliGRAM(s) Oral every 6 hours PRN Temp greater or equal to 38C (100.4F), Mild Pain (1 - 3)  aluminum hydroxide/magnesium hydroxide/simethicone Suspension 30 milliLiter(s) Oral every 4 hours PRN Dyspepsia  dextrose Oral Gel 15 Gram(s) Oral once PRN Blood Glucose LESS THAN 70 milliGRAM(s)/deciliter  melatonin 6 milliGRAM(s) Oral at bedtime PRN Insomnia  ondansetron Injectable 4 milliGRAM(s) IV Push every 8 hours PRN Nausea and/or Vomiting      I&O's Summary      PHYSICAL EXAM:  Vital Signs Last 24 Hrs  T(C): 36.2 (08 Jul 2024 12:10), Max: 36.7 (07 Jul 2024 22:00)  T(F): 97.2 (08 Jul 2024 12:10), Max: 98 (07 Jul 2024 22:00)  HR: 87 (08 Jul 2024 12:10) (66 - 98)  BP: 138/46 (08 Jul 2024 12:10) (114/62 - 138/46)  BP(mean): --  RR: 20 (08 Jul 2024 12:10) (17 - 20)  SpO2: 92% (08 Jul 2024 12:10) (92% - 94%)    Parameters below as of 08 Jul 2024 12:10  Patient On (Oxygen Delivery Method): nasal cannula  O2 Flow (L/min): 2    CONSTITUTIONAL: NAD, well-groomed  EYES: PERRLA; conjunctiva and sclera clear  ENMT: Moist oral mucosa, no pharyngeal injection or exudates; normal dentition  NECK: Supple, no palpable masses; no thyromegaly  RESPIRATORY: Normal respiratory effort; lungs are clear to auscultation bilaterally  CARDIOVASCULAR: Regular rate and rhythm, normal S1 and S2, no murmur/rub/gallop; b/l 2+ pitting lower extremity edema; Peripheral pulses are 2+ bilaterally  ABDOMEN: Nontender to palpation, normoactive bowel sounds, no rebound/guarding; No hepatosplenomegaly  MUSCULOSKELETAL:  no clubbing or cyanosis of digits; no joint swelling or tenderness to palpation  PSYCH: A+O to person, place, and time; affect appropriate  NEUROLOGY: CN 2-12 are intact and symmetric; no gross sensory deficits   SKIN: No rashes; no palpable lesions    LABS:                        7.6    6.91  )-----------( 164      ( 08 Jul 2024 06:18 )             23.4     07-08    134<L>  |  94<L>  |  102<H>  ----------------------------<  180<H>  3.6   |  17<L>  |  4.93<H>    Ca    8.4      08 Jul 2024 06:18  Phos  9.0     07-08  Mg     2.20     07-08    TPro  6.1  /  Alb  2.3<L>  /  TBili  0.6  /  DBili  x   /  AST  21  /  ALT  39  /  AlkPhos  55  07-08          Urinalysis Basic - ( 08 Jul 2024 06:18 )    Color: x / Appearance: x / SG: x / pH: x  Gluc: 180 mg/dL / Ketone: x  / Bili: x / Urobili: x   Blood: x / Protein: x / Nitrite: x   Leuk Esterase: x / RBC: x / WBC x   Sq Epi: x / Non Sq Epi: x / Bacteria: x

## 2024-07-08 NOTE — PROGRESS NOTE ADULT - ASSESSMENT
84 y/o M with pmhx of DM2, COPD, BPH, HLD, CAD, MM on chemo presented to the ED for new onset sepsis and ATN.  Patient sent to [facility] for nephrology and dialysis evaluation.

## 2024-07-08 NOTE — PROGRESS NOTE ADULT - SUBJECTIVE AND OBJECTIVE BOX
Patient is a 83y old  Male who presents with a chief complaint of acute kidney failure (07 Jul 2024 09:54)    f/u pseudomonas bacteremia    Interval History/ROS:  no fever.  continued edema.  wound care at bedside.  daughter at bedside.    PAST MEDICAL & SURGICAL HISTORY:  DM2 (diabetes mellitus, type 2)  COPD, mild  Lymphedema  Multiple myeloma  Bilateral TKR  Hx GI bleed    Allergies  No Known Allergies    ANTIMICROBIALS:  piperacillin/tazobactam IVPB.. 3.375 every 12 hours (6/29-7/7)  active:  cefepime   IVPB 1000 every 12 hours (7/7-)    MEDICATIONS  (STANDING):  atorvastatin 80 at bedtime  buMETAnide Infusion 1 <Continuous>  insulin glargine Injectable (LANTUS) 10 at bedtime  insulin lispro (ADMELOG) corrective regimen sliding scale  three times a day before meals  insulin lispro (ADMELOG) corrective regimen sliding scale  at bedtime  midodrine. 5 three times a day    Vital Signs Last 24 Hrs  T(F): 97.8 (07-08-24 @ 05:00), Max: 98 (07-07-24 @ 22:00)  HR: 66 (07-08-24 @ 05:00)  BP: 122/55 (07-08-24 @ 05:00)  RR: 17 (07-08-24 @ 05:00)  SpO2: 94% (07-08-24 @ 05:00) (93% - 96%)    PHYSICAL EXAM:  Constitutional: non-toxic  HEAD/EYES: anicteric  ENT:  supple  Cardiovascular:  not tachycardic, marked edema  Respiratory:  not tachypneic  GI:  soft  :  no felix  Musculoskeletal:  no synovitis  Neurologic: limited exam  Skin:  no rash   Psychiatric:  awake, difficult to assess                               7.6    6.91  )-----------( 164      ( 08 Jul 2024 06:18 )             23.4 07-08    134  |  94  |  102  ----------------------------<  180  3.6   |  17  |  4.93  Ca    8.4      08 Jul 2024 06:18Phos  9.0     07-08Mg     2.20     07-08  TPro  6.1  /  Alb  2.3  /  TBili  0.6  /  DBili  x   /  AST  21  /  ALT  39  /  AlkPhos  55  07-08    MICROBIOLOGY:  MRN [id number]  7/2 BC (-)    Culture - Blood (collected 06-28-24 @ 21:40)  Source: .Blood Blood-Peripheral  Gram Stain (06-30-24 @ 01:19):    Growth in aerobic bottle: Gram Negative Rods  Final Report (07-01-24 @ 16:03):    Growth in aerobic bottle: Pseudomonas aeruginosa    Direct identification is available within approximately 3-5    hours either by Blood Panel Multiplexed PCR or Direct    MALDI-TOF. Details: [email or web address]  Organism: Blood Culture PCR  Pseudomonas aeruginosa (07-01-24 @ 16:03)  Organism: Pseudomonas aeruginosa (07-01-24 @ 16:03)      Method Type: VICKY      -  Aztreonam: S <=4      -  Cefepime: S <=2      -  Ceftazidime: S 4      -  Ciprofloxacin: S <=0.25      -  Imipenem: S <=1      -  Levofloxacin: S <=0.5      -  Meropenem: S <=1      -  Piperacillin/Tazobactam: S <=8  Organism: Blood Culture PCR (07-01-24 @ 16:03)      Method Type: PCR      -  Pseudomonas aeruginosa: Detec    Urinalysis with Rflx Culture (collected 06-28-24 @ 21:40)    Culture - Blood (collected 06-28-24 @ 21:40)  Source: .Blood Blood-Peripheral  Final Report (07-04-24 @ 13:00):    No growth at 5 days    RADIOLOGY:  CT Chest No Cont (07.08.24 @ 09:04) >  IMPRESSION:  Small bilateral pleural effusions with associated compressive atelectasis.  5 mm right apical nodule. One-year follow-up chest CT recommended if there are risk factors for malignancy.    US Kidney and Bladder (07.07.24 @ 09:11) >  IMPRESSION:  No right renal hydronephrosis. Limited evaluation of the left kidney secondary to overlying bowel gas.    Xray Chest 1 View- PORTABLE-Routine (Xray Chest 1 View- PORTABLE-Routine .) (07.03.24 @ 09:12) >  IMPRESSION:  HEART:  Enlarged.  LUNGS: Elevated left hemidiaphragm with left effusion/infiltrate..  BONES: degenerative changes    US Kidney and Bladder (07.01.24 @ 16:28) >  IMPRESSION:  No hydronephrosis or urolithiasis.  Patient was unable to void for this study. Bladder volume was 90 cc    US Duplex Venous Lower Ext Complete, Bilateral (06.29.24 @ 13:03) >  IMPRESSION:  No evidence of DVT bilateral common femoral, femoral or popliteal venous segments. The calf veins bilaterally are poorly visualized precluding assessment. Subcutaneous edema.    CT Chest No Cont (06.29.24 @ 00:58) >  IMPRESSION:  Linear densities of the bilateral lower lobes likely atelectasis, superimposed infection not excluded.

## 2024-07-08 NOTE — CONSULT NOTE ADULT - NS ATTEND AMEND GEN_ALL_CORE FT
Pt seen and examined with ACP.  Assessment and plan reviewed and discussed.  Agree with above.    Status of wounds and treatment recommendations d/w  pt and pt's daughter at bedside  All questions answered.   Pt and daughter expressed understanding.    I spent  55 minutes face to face w/ this pt of which more than 50% of the time was spent counseling & coordinating care of this pt.

## 2024-07-08 NOTE — PROGRESS NOTE ADULT - ASSESSMENT
83M with DM2, COPD, BPH, HLD, CAD, MM on Revlimid (started 5/20, last dose 5/24), weekly steroids presented to [facility] 6/28/2024 with lethargy, increased LE edema, and diarrhea. Fever at home.  Tm 101, hypoxia, tachycardia.  BRITT.  BC sent (+) pseudomonas aeruginosa.   Treated with zosyn.  RVP (-).   CT chest with atelectasis.  GI PCR indeterminant for norovirus, diarrhea improved.  Worsening renal failure.  Transferred to [facility] due to need for dialysis.  ID asked to help management.    Pseudomonas bacteremia  - source not clear, however, repeat BC (-)  - to continue cefepime until 7/13    BRITT on CKD  - transferred here to start possible HD  - per renal  - if he does start HD, would change cefepime to 1g daily or 2g post-HD    Please call Infectious Diseases if there is a change in status.  Thank you.  [phone].       83M with DM2, COPD, BPH, HLD, CAD, MM on Revlimid (started 5/20, last dose 5/24), weekly steroids presented to [facility] 6/28/2024 with lethargy, increased LE edema, and diarrhea. Fever at home.  Tm 101, hypoxia, tachycardia.  BRITT.  BC sent (+) pseudomonas aeruginosa.   Treated with zosyn.  RVP (-).   CT chest with atelectasis.  GI PCR indeterminant for norovirus, diarrhea improved.  Worsening renal failure.  Transferred to [facility] due to need for dialysis.  ID asked to help management.    Pseudomonas bacteremia  - source not clear, however, repeat BC (-)  - to continue cefepime until 7/13    BRITT on CKD  - transferred here to start possible HD  - per renal  - if he does start HD, would change cefepime to 1g daily or 2g post-HD    I have discussed plan of care as detailed above with consulting team  Please call Infectious Diseases if there is a change in status.  Thank you.  [phone].

## 2024-07-08 NOTE — CONSULT NOTE ADULT - ASSESSMENT
Assessment/Plan: 84 yo M w anemia DM2, COPD, SANTO, BPH, HLD, CAD with stents, HTN, recent diagnosis of multiple myeloma (on lenalidomide and Remlivid) who presented to [facility] on 6/28 for generalized weakness and lethargy.    Patient presented with fever, tachycardia, and acute hypoxic respiratory failure with volume overload. Blood cultures on 6/28 grew Pseudomonas in 1/4 bottles, sensitive to cipro. Patient was started on Zosyn 6/29 and was continued throughout that stay. Blood cultures cleared on 7/2. Seen by ID there - etiology of bacteremia thought to be most likely GI vs. SSTI planned for treatment through 7/13. Patient had progressive renal failure and was transferred to [facility] to initiate HD.    Wound Consult requested to assist w/ management of bilateral lower leg lymphorrhea with concern for cellulitis  -B/L LE chronic lymphedema.  -RLE with deroofed blisters to lateral and medial lower leg.  -No overt s/s of acute soft tissue infection  -Abx management per ID; Cefepime initiated 7/7 with plan to complete 7/13  -Per ID notes records indicated venous duplex completed on 6/29/24 "No evidence of DVT bilateral common femoral, femoral or popliteal venous segments. The calf veins bilaterally are poorly visualized precluding assessment. Subcutaneous edema." Unable to obtain report myself.  -Recommend repeat venous duplex r/o DVT  -Topical recommendations; cleanse RLE deroofed blisters with NS, Apply Liquid barrier film to periwound skin. Apply Aquacel hydrofiber to wound base, cover with abdominal pads. Secure with kerlix wrap. Change daily. Upon discharge may change every other day.  -Apply ACE wrap to bilateral lower extremities. Change daily. Upon discharge may change every other day.  -B/L LE elevation  -PT educated on proper use of lymphedema pump; should be using Daily for 1 hour twice a day  -Pt educated on compression, should be wearing compression daily.  -Nutrition recommendations appreciated for optimization  -Diabetes management per primary team    Moisture and incontinence associated dermatitis  -Bilateral medial and posterior thighs, sacral gluteal cleft, bilateral gluteal folds- cleanse with luke-warm soap and water or perineal spray, dry well. Apply zaira moisture barrier paste every shift and prn with episodes of incontinence.  -Continue to offload pressure; bariatric low airloss support surface, t&P per protocol with use of fluidized positioning devices, continue incontinence care per protocol and single breathable incontinence pad.   Care as per medicine, will follow w/ you    Upon discharge continue to follow with Dr. Salcedo for b/l le lymphedema as instructed.  Patient seen with Dr. Dugan today.   Findings and plan discussed in detail with patient, patient's daughter at bedside, and primary team. All questions and concerns addressed to meet patient's satisfaction.  Remainder of care per primary team.  Thank you for this consult. Wound surgery service line signing off at this time. Please reconsult as needed.     OBIE Chery, CWN (pager #[phone])    If after 4PM or before 7:30AM on Mon-Friday or weekend/holiday please contact general surgery for urgent matters.   Team A- [id number]   Team B- [id number]  For non-urgent matters e-mail [email or web address]    We spent 55 minutes face to face with this patient of which more than 50% of the time was spent counseling & coordinating care of this pt Assessment/Plan: 82 yo M w anemia DM2, COPD, SANTO, BPH, HLD, CAD with stents, HTN, recent diagnosis of multiple myeloma (on lenalidomide and Remlivid) who presented to [facility] on 6/28 for generalized weakness and lethargy.    Patient presented with fever, tachycardia, and acute hypoxic respiratory failure with volume overload. Blood cultures on 6/28 grew Pseudomonas in 1/4 bottles, sensitive to cipro. Patient was started on Zosyn 6/29 and was continued throughout that stay. Blood cultures cleared on 7/2. Seen by ID there - etiology of bacteremia thought to be most likely GI vs. SSTI planned for treatment through 7/13. Patient had progressive renal failure and was transferred to [facility] to initiate HD.    Wound Consult requested to assist w/ management of bilateral lower leg lymphorrhea with concern for cellulitis  -B/L LE chronic lymphedema.  -RLE with deroofed blisters to lateral and medial lower leg.  -No overt s/s of acute soft tissue infection  -Abx management per ID; Cefepime initiated 7/7 with plan to complete 7/13  -Per ID notes records indicated venous duplex completed on 6/29/24 "No evidence of DVT bilateral common femoral, femoral or popliteal venous segments. The calf veins bilaterally are poorly visualized precluding assessment. Subcutaneous edema." Unable to obtain report myself.  -Recommend repeat venous duplex r/o DVT  -Topical recommendations; cleanse RLE deroofed blisters with NS, Apply Liquid barrier film to periwound skin. Apply Aquacel hydrofiber to wound base, cover with abdominal pads. Secure with kerlix wrap. Change daily. Upon discharge may change every other day.  -Apply ACE wrap to bilateral lower extremities. Change daily. Upon discharge may change every other day.  -B/L LE elevation  -PT educated on proper use of lymphedema pump; should be using Daily for 1 hour twice a day  -Pt educated on compression, should be wearing compression daily.  -Nutrition recommendations appreciated for optimization  -Diabetes management per primary team    Moisture and incontinence associated dermatitis  -Bilateral medial and posterior thighs, sacral gluteal cleft, bilateral gluteal folds- cleanse with luke-warm soap and water or perineal spray, dry well. Apply zaira moisture barrier paste every shift and prn with episodes of incontinence.  -Continue to offload pressure; bariatric low airloss support surface, t&P per protocol with use of fluidized positioning devices, continue incontinence care per protocol and single breathable incontinence pad.   Care as per medicine, will follow w/ you    Upon discharge continue to follow with Dr. Salcedo for b/l le lymphedema as instructed.  Patient seen with Dr. Dugan today.   Findings and plan discussed in detail with patient, patient's daughter at bedside, and primary team. All questions and concerns addressed to meet patient's satisfaction.  Remainder of care per primary team.  Thank you for this consult. Wound surgery service line signing off at this time. Please reconsult as needed.     OBIE Chery, GIANNIN (pager #[phone])    If after 4PM or before 7:30AM on Mon-Friday or weekend/holiday please contact general surgery for urgent matters.   Team A- [id number]   Team B- [id number]  For non-urgent matters e-mail [email or web address]

## 2024-07-09 ENCOUNTER — TRANSCRIPTION ENCOUNTER (OUTPATIENT)
Age: 84
End: 2024-07-09

## 2024-07-09 DIAGNOSIS — Z51.89 ENCOUNTER FOR OTHER SPECIFIED AFTERCARE: ICD-10-CM

## 2024-07-09 LAB
ANION GAP SERPL CALC-SCNC: 22 MMOL/L — HIGH (ref 7–14)
BASOPHILS # BLD AUTO: 0.05 K/UL — SIGNIFICANT CHANGE UP (ref 0–0.2)
BASOPHILS NFR BLD AUTO: 0.6 % — SIGNIFICANT CHANGE UP (ref 0–2)
BLD GP AB SCN SERPL QL: NEGATIVE — SIGNIFICANT CHANGE UP
BUN SERPL-MCNC: 107 MG/DL — HIGH (ref 7–23)
CALCIUM SERPL-MCNC: 8.5 MG/DL — SIGNIFICANT CHANGE UP (ref 8.4–10.5)
CHLORIDE SERPL-SCNC: 93 MMOL/L — LOW (ref 98–107)
CO2 SERPL-SCNC: 18 MMOL/L — LOW (ref 22–31)
CREAT SERPL-MCNC: 5.03 MG/DL — HIGH (ref 0.5–1.3)
EGFR: 11 ML/MIN/1.73M2 — LOW
EOSINOPHIL # BLD AUTO: 0.08 K/UL — SIGNIFICANT CHANGE UP (ref 0–0.5)
EOSINOPHIL NFR BLD AUTO: 1 % — SIGNIFICANT CHANGE UP (ref 0–6)
GLUCOSE BLDC GLUCOMTR-MCNC: 218 MG/DL — HIGH (ref 70–99)
GLUCOSE BLDC GLUCOMTR-MCNC: 238 MG/DL — HIGH (ref 70–99)
GLUCOSE SERPL-MCNC: 177 MG/DL — HIGH (ref 70–99)
HCT VFR BLD CALC: 24.4 % — LOW (ref 39–50)
HGB BLD-MCNC: 7.9 G/DL — LOW (ref 13–17)
IANC: 7.41 K/UL — HIGH (ref 1.8–7.4)
IMM GRANULOCYTES NFR BLD AUTO: 1 % — HIGH (ref 0–0.9)
LYMPHOCYTES # BLD AUTO: 0.29 K/UL — LOW (ref 1–3.3)
LYMPHOCYTES # BLD AUTO: 3.6 % — LOW (ref 13–44)
MAGNESIUM SERPL-MCNC: 2.2 MG/DL — SIGNIFICANT CHANGE UP (ref 1.6–2.6)
MCHC RBC-ENTMCNC: 31.9 PG — SIGNIFICANT CHANGE UP (ref 27–34)
MCHC RBC-ENTMCNC: 32.4 GM/DL — SIGNIFICANT CHANGE UP (ref 32–36)
MCV RBC AUTO: 98.4 FL — SIGNIFICANT CHANGE UP (ref 80–100)
MONOCYTES # BLD AUTO: 0.1 K/UL — SIGNIFICANT CHANGE UP (ref 0–0.9)
MONOCYTES NFR BLD AUTO: 1.2 % — LOW (ref 2–14)
NEUTROPHILS # BLD AUTO: 7.41 K/UL — HIGH (ref 1.8–7.4)
NEUTROPHILS NFR BLD AUTO: 92.6 % — HIGH (ref 43–77)
NRBC # BLD: 0 /100 WBCS — SIGNIFICANT CHANGE UP (ref 0–0)
NRBC # FLD: 0 K/UL — SIGNIFICANT CHANGE UP (ref 0–0)
PHOSPHATE SERPL-MCNC: 9.6 MG/DL — HIGH (ref 2.5–4.5)
PLATELET # BLD AUTO: 161 K/UL — SIGNIFICANT CHANGE UP (ref 150–400)
POTASSIUM SERPL-MCNC: 3.7 MMOL/L — SIGNIFICANT CHANGE UP (ref 3.5–5.3)
POTASSIUM SERPL-SCNC: 3.7 MMOL/L — SIGNIFICANT CHANGE UP (ref 3.5–5.3)
RBC # BLD: 2.48 M/UL — LOW (ref 4.2–5.8)
RBC # FLD: 18.6 % — HIGH (ref 10.3–14.5)
RH IG SCN BLD-IMP: POSITIVE — SIGNIFICANT CHANGE UP
SODIUM SERPL-SCNC: 133 MMOL/L — LOW (ref 135–145)
WBC # BLD: 8.01 K/UL — SIGNIFICANT CHANGE UP (ref 3.8–10.5)
WBC # FLD AUTO: 8.01 K/UL — SIGNIFICANT CHANGE UP (ref 3.8–10.5)

## 2024-07-09 PROCEDURE — 99233 SBSQ HOSP IP/OBS HIGH 50: CPT | Mod: GC

## 2024-07-09 PROCEDURE — 99233 SBSQ HOSP IP/OBS HIGH 50: CPT

## 2024-07-09 RX ORDER — BUMETANIDE 0.25 MG/ML
1 INJECTION INTRAMUSCULAR; INTRAVENOUS
Qty: 20 | Refills: 0 | Status: DISCONTINUED | OUTPATIENT
Start: 2024-07-09 | End: 2024-07-10

## 2024-07-09 RX ADMIN — INSULIN LISPRO 3: 100 INJECTION, SOLUTION SUBCUTANEOUS at 17:45

## 2024-07-09 RX ADMIN — SODIUM BICARBONATE 650 MILLIGRAM(S): 650 TABLET ORAL at 06:03

## 2024-07-09 RX ADMIN — CEFEPIME 100 MILLIGRAM(S): 1 INJECTION, POWDER, FOR SOLUTION INTRAMUSCULAR; INTRAVENOUS at 17:12

## 2024-07-09 RX ADMIN — MIDODRINE HYDROCHLORIDE 5 MILLIGRAM(S): 10 TABLET ORAL at 17:11

## 2024-07-09 RX ADMIN — ATORVASTATIN CALCIUM 80 MILLIGRAM(S): 20 TABLET, FILM COATED ORAL at 22:12

## 2024-07-09 RX ADMIN — Medication 650 MILLIGRAM(S): at 06:56

## 2024-07-09 RX ADMIN — CEFEPIME 100 MILLIGRAM(S): 1 INJECTION, POWDER, FOR SOLUTION INTRAMUSCULAR; INTRAVENOUS at 06:03

## 2024-07-09 RX ADMIN — SODIUM BICARBONATE 650 MILLIGRAM(S): 650 TABLET ORAL at 22:12

## 2024-07-09 RX ADMIN — BUMETANIDE 5 MG/HR: 0.25 INJECTION INTRAMUSCULAR; INTRAVENOUS at 13:49

## 2024-07-09 RX ADMIN — HEPARIN SODIUM 5000 UNIT(S): 50 INJECTION, SOLUTION INTRAVENOUS at 17:11

## 2024-07-09 RX ADMIN — Medication 6 MILLIGRAM(S): at 22:11

## 2024-07-09 RX ADMIN — INSULIN GLARGINE 12 UNIT(S): 100 INJECTION, SOLUTION SUBCUTANEOUS at 22:15

## 2024-07-09 RX ADMIN — MIDODRINE HYDROCHLORIDE 5 MILLIGRAM(S): 10 TABLET ORAL at 06:03

## 2024-07-09 RX ADMIN — SODIUM BICARBONATE 650 MILLIGRAM(S): 650 TABLET ORAL at 12:33

## 2024-07-09 RX ADMIN — INSULIN LISPRO 2: 100 INJECTION, SOLUTION SUBCUTANEOUS at 12:24

## 2024-07-09 RX ADMIN — Medication 650 MILLIGRAM(S): at 19:02

## 2024-07-09 RX ADMIN — MIDODRINE HYDROCHLORIDE 5 MILLIGRAM(S): 10 TABLET ORAL at 12:29

## 2024-07-09 RX ADMIN — Medication 1000 MICROGRAM(S): at 12:33

## 2024-07-09 RX ADMIN — HEPARIN SODIUM 5000 UNIT(S): 50 INJECTION, SOLUTION INTRAVENOUS at 06:03

## 2024-07-09 RX ADMIN — INSULIN LISPRO 2: 100 INJECTION, SOLUTION SUBCUTANEOUS at 08:49

## 2024-07-09 NOTE — PROGRESS NOTE ADULT - SUBJECTIVE AND OBJECTIVE BOX
[facility] Division of Hospital Medicine  Christophe Bonner DO  Available via MS Teams  In house pager [phone]    SUBJECTIVE / OVERNIGHT EVENTS:  No acute events overnight. Patient seen and examined at bedside this morning.  Daughter hRonda at bedside.  Patient's main complaint today is lack of sleep. Melatonin given last night.  Patient does report UOP overnight.    ADDITIONAL REVIEW OF SYSTEMS:    MEDICATIONS  (STANDING):  atorvastatin 80 milliGRAM(s) Oral at bedtime  buMETAnide Infusion 1 mG/Hr (5 mL/Hr) IV Continuous <Continuous>  cefepime   IVPB 1000 milliGRAM(s) IV Intermittent every 12 hours  cyanocobalamin 1000 MICROGram(s) Oral daily  dextrose 10% Bolus 125 milliLiter(s) IV Bolus once  dextrose 5%. 1000 milliLiter(s) (50 mL/Hr) IV Continuous <Continuous>  dextrose 5%. 1000 milliLiter(s) (100 mL/Hr) IV Continuous <Continuous>  dextrose 50% Injectable 25 Gram(s) IV Push once  dextrose 50% Injectable 12.5 Gram(s) IV Push once  glucagon  Injectable 1 milliGRAM(s) IntraMuscular once  heparin   Injectable 5000 Unit(s) SubCutaneous every 12 hours  insulin glargine Injectable (LANTUS) 12 Unit(s) SubCutaneous at bedtime  insulin lispro (ADMELOG) corrective regimen sliding scale   SubCutaneous at bedtime  insulin lispro (ADMELOG) corrective regimen sliding scale   SubCutaneous three times a day before meals  melatonin 6 milliGRAM(s) Oral at bedtime  midodrine. 5 milliGRAM(s) Oral three times a day  sodium bicarbonate 650 milliGRAM(s) Oral three times a day    MEDICATIONS  (PRN):  acetaminophen     Tablet .. 650 milliGRAM(s) Oral every 6 hours PRN Temp greater or equal to 38C (100.4F), Mild Pain (1 - 3)  aluminum hydroxide/magnesium hydroxide/simethicone Suspension 30 milliLiter(s) Oral every 4 hours PRN Dyspepsia  dextrose Oral Gel 15 Gram(s) Oral once PRN Blood Glucose LESS THAN 70 milliGRAM(s)/deciliter  ondansetron Injectable 4 milliGRAM(s) IV Push every 8 hours PRN Nausea and/or Vomiting      I&O's Summary    08 Jul 2024 07:01  -  09 Jul 2024 07:00  --------------------------------------------------------  IN: 0 mL / OUT: 745 mL / NET: -745 mL        PHYSICAL EXAM:  Vital Signs Last 24 Hrs  T(C): 36.7 (09 Jul 2024 12:00), Max: 36.7 (09 Jul 2024 12:00)  T(F): 98.1 (09 Jul 2024 12:00), Max: 98.1 (09 Jul 2024 12:00)  HR: 87 (09 Jul 2024 12:00) (84 - 96)  BP: 130/60 (09 Jul 2024 12:00) (120/60 - 148/78)  BP(mean): --  RR: 18 (09 Jul 2024 12:00) (18 - 20)  SpO2: 96% (09 Jul 2024 12:00) (94% - 98%)    Parameters below as of 09 Jul 2024 12:00  Patient On (Oxygen Delivery Method): nasal cannula  O2 Flow (L/min): 2    CONSTITUTIONAL: NAD, well-groomed  EYES: PERRLA; conjunctiva and sclera clear  ENMT: Moist oral mucosa, no pharyngeal injection or exudates; normal dentition  NECK: Supple, no palpable masses; no thyromegaly  RESPIRATORY: Normal respiratory effort; lungs are clear to auscultation bilaterally  CARDIOVASCULAR: Regular rate and rhythm, normal S1 and S2, no murmur/rub/gallop; 2+ pitting b/l lower extremity edema; Peripheral pulses are 2+ bilaterally  ABDOMEN: Nontender to palpation, normoactive bowel sounds, no rebound/guarding; No hepatosplenomegaly  MUSCULOSKELETAL:  no clubbing or cyanosis of digits; no joint swelling or tenderness to palpation  PSYCH: A+O to person, place, and time; affect appropriate  NEUROLOGY: CN 2-12 are intact and symmetric; no gross sensory deficits   SKIN: No rashes; no palpable lesions    LABS:                        7.9    8.01  )-----------( 161      ( 09 Jul 2024 06:00 )             24.4     07-09    133<L>  |  93<L>  |  107<H>  ----------------------------<  177<H>  3.7   |  18<L>  |  5.03<H>    Ca    8.5      09 Jul 2024 06:00  Phos  9.6     07-09  Mg     2.20     07-09    TPro  6.1  /  Alb  2.3<L>  /  TBili  0.6  /  DBili  x   /  AST  21  /  ALT  39  /  AlkPhos  55  07-08          Urinalysis Basic - ( 09 Jul 2024 06:00 )    Color: x / Appearance: x / SG: x / pH: x  Gluc: 177 mg/dL / Ketone: x  / Bili: x / Urobili: x   Blood: x / Protein: x / Nitrite: x   Leuk Esterase: x / RBC: x / WBC x   Sq Epi: x / Non Sq Epi: x / Bacteria: x        SARS-CoV-2: NotDetec (28 Jun 2024 22:26)

## 2024-07-09 NOTE — PROGRESS NOTE ADULT - ASSESSMENT
84 y/o M with pmhx of DM2, COPD, BPH, HLD, CAD, MM on chemo presented to the ED for new onset sepsis and ATN.  Patient sent to [facility] for nephrology and dialysis evaluation.

## 2024-07-09 NOTE — PROGRESS NOTE ADULT - PROBLEM SELECTOR PLAN 1
- patient presented for new onset BRITT likely secondary to sepsis leading to ATN  - nephrology called overnight - no urgency for dialysis at this time  - felix to be placed for monitoring I/O in the setting of BRITT and anuria  - urine lytes pending  - kidney u/s repeat pending  - hold aspirin and plavix for now pending port placement for dialysis if required pending nephro eval    Nephrology recommending continuing IV diuresis with bumetanide infusion. Monitor UOP and Cr.

## 2024-07-09 NOTE — PHYSICAL THERAPY INITIAL EVALUATION ADULT - PERTINENT HX OF CURRENT PROBLEM, REHAB EVAL
This is a 84 y/o M with pmhx of DM2, COPD, BPH, HLD, CAD, MM on chemo presented to the ED for new onset sepsis and ATN. The patient was at [facility] for new onset weakness, was found to have sepsis from pseudomonas bacteremia, unclear source, however was thought to be from cellulitis vs. GI cause. The patient developed BRITT with anuria. Cr was initially normal but now at 4. The patient is concerning for needing dialysis however did not have dialysis services there so the patient was transferred to [facility] for further care. The patient currently denies shortness of breath, chest pain or abdominal pain. Denies fevers, n/v, dysuria. The patient is chronic lymphedema for many years. Denies hx of kidney disease. ROS otherwise negative.

## 2024-07-09 NOTE — DISCHARGE NOTE PROVIDER - DETAILS OF MALNUTRITION DIAGNOSIS/DIAGNOSES
This patient has been assessed with a concern for Malnutrition and was treated during this hospitalization for the following Nutrition diagnosis/diagnoses:     -  07/13/2024: Morbid obesity (BMI > 40)

## 2024-07-09 NOTE — DISCHARGE NOTE PROVIDER - NSDCCPTREATMENT_GEN_ALL_CORE_FT
PRINCIPAL PROCEDURE  Procedure: CT chest wo con  Findings and Treatment: Lungs/Airways/Pleura: Small bilateral pleural effusions with associated   compressive atelectasis. Secretions are noted within the trachea.   Additional areas of linear atelectasis in lower lungs. 5 mm right apical   nodule (2, 33). 3 mm nodule in the right upper lobe (2:58) may be an   intrapulmonary lymph node.  Mediastinum/Lymph nodes: No thoracic adenopathy.  Heart and Vessels: Normal heart size. No pericardial effusion. Normal   course and caliber of the aorta. Calcifications of the aorta and coronary   arteries.  Upper Abdomen: Cholelithiasis.  Osseous structures and Soft Tissues: Bilateral flank edema. No Aggressive   bone lesions.  IMPRESSION:  Small bilateral pleural effusions with associated compressive atelectasis.  5 mm right apical nodule. One-year follow-up chest CT recommended if   there are risk factors for malignancy.

## 2024-07-09 NOTE — DISCHARGE NOTE PROVIDER - DISCHARGE DIET
Low Sodium Diet/Soft and Bite-Sized Diet/Consistent Carbohydrate Diabetic Diets/Renal Diets (for dialysis)/Other Diet Instructions

## 2024-07-09 NOTE — DISCHARGE NOTE PROVIDER - HOSPITAL COURSE
Patient is an 84yo M with PMH of T2DM, COPD, HLD, BPH, CAD, and MM on chemotherapy admitted to [facility] with Pseudomonas bacteremia of unclear origin, being treated with cefepime until 7/13. He developed BRITT/ATN due to sepsis with anuria, and was transferred to [facility] for possible initiation of hemodialysis. He was seen by nephrology and given aggressive diuresis with a bumetanide infusion. Patient is an 84yo M with PMH of T2DM, COPD, HLD, BPH, CAD, and MM on chemotherapy admitted to [facility] with Pseudomonas bacteremia of unclear origin, being treated with cefepime until 7/13. He developed BRITT/ATN due to sepsis with anuria, and was transferred to [facility] for possible initiation of hemodialysis. He was seen by nephrology and given aggressive diuresis with a bumetanide infusion.    # ATN (acute tubular necrosis)/BRITT  ·  Plan: Presented for new onset BRITT likely secondary to sepsis leading to ATN, baseline Cr 1.2 on 6/2024 CKD Stage 3  - Nephrology following, no current plans for HD  - S/p bumex infusion 7/8-7/11, Cr overall much improved peaked at 5.03 on 7/9  - Most recent Cr lingering close to 2.4  - Repeat Kidney US 7/15 w/ No hydronephrosis. A right renal complex cyst measuring 3.5 cm  - Monitor UOP/BMP closely  - C/w DAPT now that kidney function is improved and there is no current plan for HD  - Ongoing TOV, c/w flomax.    # Bacteremia due to Pseudomonas  ·  Plan: Patient found to have pseudomonas bacteremia from unclear etiology  - CT chest: trace b/l pleural effusions  - repeat Bcx had been negative from [facility]  - ID consult appreciated  - Completed cefepime on 7/13 as per ID recs.    # Epigastric pain  ·  Plan: ECG NSR, without ischemic ST-T changes  - troponin elevated to 132, though in setting of BRITT/ATN may be simply due to clearance  - troponin peaked 143  - limited TTE with hyperdynamic LV, LVEF 87% suggestive of intravascular depletion  - Possible indigestion, c/w famotidine daily  - Symptoms seem to have improved  - Telemetry with APCs and PVCs, EKG 7/12 SR , LAD, LVH, occasional PVC's, no acute ST/T changes.    # DM2 (diabetes mellitus, type 2)  ·  Plan: Patient taking Novolog 70/30 12U in the AM and 22U in the evening  - A1C of 8.0 which is probably adequate for patient's age, although possibly falsely low in setting of recently worsened anemia  - Hyperglycemic recently, likely reflects recent renal recovery which can cause better insulin clearance  - Increasing lantus to 25U qhs and c/w lispro 8U TID  - C/w CARLOS and carb consistent diet  - Monitor FS's closely.    # Benign essential HTN  ·  Plan: - Hold BP meds for now (lasix 20mg/metoprolol), midodrine has been d/c  - BP/HR seems to be on the rise  - Resuming metoprolol 25mg BID at this time (home med)  - Monitor BP closely.    # Multiple myeloma  ·  Plan: Last chemo approx 2-3 weeks ago, he takes a pill for 14 days and has 7 days off  - No plans for chemo until after Rehab per pt's Oncologist Dr. Rhonda Farris  - Outpatient oncology f/up after discharge from Rehab    # Anemia  - Mixed LETHA and AOCD, could also be chemo related  - Hg dropped to 6.7 and on repeat one on 7/15 was 5.8  - Giving 1U PRBC, adequate response on repeat CBC  - Keep Hg above 7.    # Lymphedema  ·  Plan: Chronic  - Wound care recs appreciated  - Keep LE's elevated  - No e/o infection at present time.    # Lung nodules  ·  Plan: CT Chest: 5 mm right apical nodule  - One-year follow-up chest CT rec if there are risk factors for malignancy    PT eval --> JAGJIT  Medically stable for discharge to [facility].

## 2024-07-09 NOTE — DISCHARGE NOTE PROVIDER - NSDCMRMEDTOKEN_GEN_ALL_CORE_FT
acetaminophen 325 mg oral tablet: 2 tab(s) orally every 6 hours As needed Temp greater or equal to 38C (100.4F), Mild Pain (1 - 3)  ammonium lactate 12% topical lotion: 1 Apply topically to affected area 2 times a day  aspirin 81 mg oral delayed release tablet: 1 tab(s) orally once a day  aspirin 81 mg oral tablet: orally once a day  atorvastatin 80 mg oral tablet: 1 tab(s) orally once a day (at bedtime)  atorvastatin 80 mg oral tablet: 1 tab(s) orally once a day  B-12 1000 mcg oral tablet: 1 tab(s) orally once a day  cyanocobalamin 1000 mcg oral tablet: 1 tab(s) orally once a day  dexAMETHasone 4 mg oral tablet: 10 tab(s) orally once a week for chemo  ferrous sulfate 325 mg (65 mg elemental iron) oral tablet: 1 tab(s) orally once a day  heparin: 5,000 unit(s) subcutaneous every 12 hours  lactobacillus acidophilus oral capsule: 1 cap(s) orally 3 times a day (with meals)  Lasix 20 mg oral tablet: 1 tab(s) orally once a day  lenalidomide 25 mg oral capsule: 1 cap(s) orally once a day  lidocaine 4% topical film: Apply topically to affected area once a day  melatonin 3 mg oral tablet: 1 tab(s) orally once a day (at bedtime) As needed Insomnia  metFORMIN 1000 mg oral tablet: 1 tab(s) orally 2 times a day  metoprolol tartrate 25 mg oral tablet: 1 tab(s) orally 2 times a day  metoprolol tartrate 25 mg oral tablet: 1 tab(s) orally 2 times a day  midodrine 5 mg oral tablet: 1 tab(s) orally every 8 hours  NovoLOG Mix 70/30 FlexPen subcutaneous suspension: 12 unit(s) subcutaneous once a day (at bedtime)  NovoLOG Mix 70/30 FlexPen subcutaneous suspension: 22 unit(s) subcutaneous once a day  NovoLOG Mix 70/30 subcutaneous suspension: 22 unit(s) subcutaneous once a day  NovoLOG Mix 70/30 subcutaneous suspension: 12 unit(s) subcutaneous once a day (at bedtime)  pantoprazole 40 mg oral delayed release tablet: 1 tab(s) orally once a day (before a meal)  piperacillin-tazobactam: 3.375 gram(s) intravenous every 12 hours  Plavix 75 mg oral tablet: 1 tab(s) orally once a day  Plavix 75 mg oral tablet: 1 tab(s) orally once a day  sodium bicarbonate 325 mg oral tablet: 1 tab(s) orally 3 times a day  Vitamin D3 125 mcg (5000 intl units) oral tablet: 1 tab(s) orally once a day  Vitamin D3 125 mcg (5000 intl units) oral tablet, disintegratin tab(s) orally once a day   acetaminophen 325 mg oral tablet: 2 tab(s) orally every 6 hours As needed Temp greater or equal to 38C (100.4F), Mild Pain (1 - 3)  aspirin 81 mg oral tablet, chewable: 1 tab(s) orally once a day  atorvastatin 80 mg oral tablet: 1 tab(s) orally once a day (at bedtime)  clopidogrel 75 mg oral tablet: 1 tab(s) orally once a day  cyanocobalamin 1000 mcg oral tablet: 1 tab(s) orally once a day  famotidine 20 mg oral tablet: 1 tab(s) orally once a day  heparin: 5,000 unit(s) subcutaneous every 12 hours  insulin glargine 100 units/mL subcutaneous solution: 25 unit(s) subcutaneous once a day (at bedtime)  insulin lispro 100 units/mL injectable solution: 8 international unit(s) injectable 3 times a day (before meals)  insulin lispro 100 units/mL injectable solution: 1 unit(s) injectable once a day (at bedtime) 0 Unit(s) if Glucose 0 - 250  1 Unit(s) if Glucose 251 - 300  2 Unit(s) if Glucose 301 - 350  3 Unit(s) if Glucose 351 - 400  4 Unit(s) if Glucose 400  insulin lispro 100 units/mL injectable solution: 1 unit(s) injectable 3 times a day (before meals) 1 Unit(s) if Glucose 151 - 200  2 Unit(s) if Glucose 201 - 250  3 Unit(s) if Glucose 251 - 300  4 Unit(s) if Glucose 301 - 350  5 Unit(s) if Glucose 351 - 400  6 Unit(s) if Glucose Greater Than 400  melatonin 3 mg oral tablet: 3 tab(s) orally once a day (at bedtime)  metoprolol tartrate 25 mg oral tablet: 1 tab(s) orally 2 times a day  sodium bicarbonate 650 mg oral tablet: 1 tab(s) orally 3 times a day  tamsulosin 0.4 mg oral capsule: 1 cap(s) orally once a day (at bedtime)

## 2024-07-09 NOTE — DISCHARGE NOTE PROVIDER - ATTENDING DISCHARGE PHYSICAL EXAMINATION:
VSS, NAD, obese, doing well, eager to being discharged today, ongoing TOV  HEENT: Nonicteric, PERRLA  CV:  RRR, no murmur, no JVD  Lungs:  CTA B/L, no wheezes, rales, rhonchi  Abdomen:  Soft, non-tender, no distended, positive BS, no hepatosplenomegaly  Extremities:  2+ pulses, no c/c, +LE edema/lymphedema, chronic skin changes  Skin:  Warm and dry, no rashes  :  No felix  Neuro:  AAOx3, non-focal, CN II-XII grossly intact

## 2024-07-09 NOTE — PROGRESS NOTE ADULT - PROBLEM SELECTOR PLAN 1
Pt. with BRITT in setting of sepsis, hypotension, and MM. Pt. with likely ATN. Scr was WNL 1.2 on OP labs done on 6/10/24. Scr was elevated at 1.71 on admission to [facility] on 6/28/24. Scr progressively increased to 4.59 on 7/6/24. Pt. transferred to [facility] on 7/6/24 for worsening BRITT and possible need for HD. Scr was elevated at 4.93 on 7/8/24 and increased to 5.03 today. Pt. noted to have proteinuria and microscopic hematuria on UA done on 6/28/24. PR3 and MPO antibodies were negative on 7/5/24. No hydronephrosis seen on renal US done at  [facility] on 7/1/24. No right hydronephrosis seen on renal US done at [facility] on 7/7/24. L kidney not well visualized due to bowel gas. Pt. with worsening BRITT and significant B/L LE edema, however UOP improving. Received IV Bumex infusion at 1 mg/hr for 10 hrs yesterday (7/8), and made ~745cc in the past 24hrs. Recommend IV Bumex infusion at 1 mg/hr for 10 hrs today. Will need to consider HD, if BRITT continues to worsen. Obtain renal US to better visualize left kidney. Recommend serial bladder scans. Monitor labs and UOP. Avoid nephrotoxins. Dose medications for eGFR.      If you have any questions, please feel free to contact me  Mauricio Youssef  Nephrology Fellow  [employer]/Page [phone]  (After 4pm or on weekends please page the on-call fellow). Pt. with oliguric BRITT in setting of sepsis, hypotension, and MM. Pt. with likely ATN. Scr was WNL 1.2 on OP labs done on 6/10/24. Scr was elevated at 1.71 on admission to [facility] on 6/28/24. Scr progressively increased to 4.59 on 7/6/24. Pt. transferred to [facility] on 7/6/24 for worsening BRITT and possible need for HD. Scr was elevated at 4.93 on 7/8/24 and increased to 5.03 today. Pt. noted to have proteinuria and microscopic hematuria on UA done on 6/28/24. K/L 1.75, paraprotenemia on SPEP, and IgA Kappa bands on serum immunofixation on labs done 6/10/24. Anti-GBM ab negative, PR3 and MPO ab negative, elevated C3 (119), normal C4 on 7/5/24. No hydronephrosis seen on renal US done at [facility] on 7/1/24. No right hydronephrosis seen on renal US done at [facility] on 7/7/24. L kidney not well visualized due to bowel gas. Pt. with worsening BRITT and significant B/L LE edema, however UOP improving. Received IV Bumex infusion at 1 mg/hr for 10 hrs yesterday (7/8), and UOP ~745cc in the past 24hrs. Recommend IV Bumex infusion at 1 mg/hr for 10 hrs today (7/9). Will need to consider HD, if BRITT continues to worsen. Obtain renal US to better visualize left kidney. Recommend serial bladder scans. Monitor labs and UOP. Avoid nephrotoxins. Dose medications for eGFR.      If you have any questions, please feel free to contact me  Mauricio Youssef  Nephrology Fellow  [employer]/Page [phone]  (After 4pm or on weekends please page the on-call fellow). Pt. with oliguric BRITT in setting of sepsis, hypotension, and MM. Pt. with likely ATN. Scr was WNL 1.2 on OP labs done on 6/10/24. Scr was elevated at 1.71 on admission to [facility] on 6/28/24. Scr progressively increased to 4.59 on 7/6/24. Pt. transferred to [facility] on 7/6/24 for worsening BRITT and possible need for HD. Scr was elevated at 4.93 on 7/8/24 and increased to 5.03 today. Pt. noted to have proteinuria and microscopic hematuria on UA done on 6/28/24. Pt. with IgA kappa bands on serum immunofixation on labs done 6/10/24. Anti-GBM ab negative, PR3 and MPO ab were negative on 7/5/24. Serum C3 and C$ were not low on 7/5/24. No hydronephrosis seen on renal US done at [facility] on 7/1/24. No right hydronephrosis seen on renal US done at [facility] on 7/7/24. L kidney not well visualized due to bowel gas. Pt. with oliguria and significant B/L LE edema, received IV diuretics (Bumex infusion at 1 mg/hr for 10 hrs) yesterday (7/8/24). Pt. currently non-oliguric with UOP of 745 cc in the past 24hrs. Recommend IV Bumex infusion at 1 mg/hr for 10 hrs today (7/9). Will need to consider HD, if BRITT continues to worsen. Obtain renal US to better visualize left kidney. Recommend serial bladder scans. Monitor labs and UOP. Avoid nephrotoxins. Dose medications for eGFR.      If you have any questions, please feel free to contact me  Mauricio Youssef  Nephrology Fellow  [employer]/Page [phone]  (After 4pm or on weekends please page the on-call fellow). Pt. with oliguric BRITT in setting of sepsis, hypotension, and MM. Pt. with likely ATN. Scr was WNL 1.2 on OP labs done on 6/10/24. Scr was elevated at 1.71 on admission to [facility] on 6/28/24. Scr progressively increased to 4.59 on 7/6/24. Pt. transferred to [facility] on 7/6/24 for worsening BRITT and possible need for HD. Scr was elevated at 4.93 on 7/8/24 and increased to 5.03 today. Pt. noted to have proteinuria and microscopic hematuria on UA done on 6/28/24. Pt. with IgA kappa bands on serum immunofixation on labs done 6/10/24. Anti-GBM ab negative, PR3 and MPO ab were negative on 7/5/24. Serum C3 and C4 were not low on 7/5/24. No hydronephrosis seen on renal US done at [facility] on 7/1/24. No right hydronephrosis seen on renal US done at [facility] on 7/7/24. L kidney not well visualized due to bowel gas. Pt. with oliguria and significant B/L LE edema, received IV diuretics (Bumex infusion at 1 mg/hr for 10 hrs) yesterday (7/8/24). Pt. currently non-oliguric with UOP of 745 cc in the past 24hrs. Recommend IV Bumex infusion at 1 mg/hr for 10 hrs today (7/9). Will need to consider HD, if BRITT continues to worsen. Obtain renal US to better visualize left kidney. Recommend serial bladder scans. Monitor labs and UOP. Avoid nephrotoxins. Dose medications for eGFR.      If you have any questions, please feel free to contact me  Mauricio Youssef  Nephrology Fellow  [employer]/Page [phone]  (After 4pm or on weekends please page the on-call fellow).

## 2024-07-09 NOTE — DISCHARGE NOTE PROVIDER - NSDCCPCAREPLAN_GEN_ALL_CORE_FT
PRINCIPAL DISCHARGE DIAGNOSIS  Diagnosis: BRITT (acute kidney injury)  Assessment and Plan of Treatment:       SECONDARY DISCHARGE DIAGNOSES  Diagnosis: Encounter for wound care  Assessment and Plan of Treatment: -Topical recommendations; cleanse RLE deroofed blisters with NS, Apply Liquid barrier film to periwound skin. Apply Aquacel hydrofiber to wound base, cover with abdominal pads. Secure with kerlix wrap. Change daily. Upon discharge may change every other day.  -Apply ACE wrap to bilateral lower extremities. Change daily. Upon discharge may change every other day.      Diagnosis: ATN (acute tubular necrosis)  Assessment and Plan of Treatment: has been evaluated by renal     PRINCIPAL DISCHARGE DIAGNOSIS  Diagnosis: BRITT (acute kidney injury)  Assessment and Plan of Treatment: You were seen by the nephrologists for kidney injury related to sepsis. You received diuretic medications via IV to help stimulate urine production.      SECONDARY DISCHARGE DIAGNOSES  Diagnosis: ATN (acute tubular necrosis)  Assessment and Plan of Treatment: has been evaluated by renal    Diagnosis: Encounter for wound care  Assessment and Plan of Treatment: -Topical recommendations; cleanse RLE deroofed blisters with NS, Apply Liquid barrier film to periwound skin. Apply Aquacel hydrofiber to wound base, cover with abdominal pads. Secure with kerlix wrap. Change daily. Upon discharge may change every other day.  -Apply ACE wrap to bilateral lower extremities. Change daily. Upon discharge may change every other day.      Diagnosis: Nodule of right lung  Assessment and Plan of Treatment: We noted a nodule in the apex of your right lung. It is recommended that you have a repeat CT scan of your chest after one year.     PRINCIPAL DISCHARGE DIAGNOSIS  Diagnosis: BRITT (acute kidney injury)  Assessment and Plan of Treatment: You were seen by the nephrologists for kidney injury related to sepsis. You received diuretic medications via IV to help stimulate urine production with improvement of your kidney fuction. Your Creatinine (marker of kidney function on the blood) peaked at 5.03 and is 2.36 on discharge, to be continued to monitor while at Rehab.  - Once Cr is back to normal sodium bicarb tbs can be discontinued  - A Kramer cath had to be reinserted and it was removed on 7/15 at night, currently undergoing TOV, needs a final repeat bladder scan at 8pm (if <300 no need to continue to do bladder scans as pt has been voiding well/incontinent at times), flomax is to be continued  - Please follow up with PMD after discharge from Rehab        SECONDARY DISCHARGE DIAGNOSES  Diagnosis: Encounter for wound care  Assessment and Plan of Treatment: -Topical recommendations; cleanse RLE deroofed blisters with NS, Apply Liquid barrier film to periwound skin. Apply Aquacel hydrofiber to wound base, cover with abdominal pads. Secure with kerlix wrap. Change daily. Upon discharge may change every other day.  -Apply ACE wrap to bilateral lower extremities. Change daily. Upon discharge may change every other day.      Diagnosis: DM2 (diabetes mellitus, type 2)  Assessment and Plan of Treatment: As outpatient pt is on novolog 70/30  At [facility] pt has been on lantus/lispro, both of them were adjusted recently as FS's have been on the rise, likely reflecting recent renal recovery which can cause better insulin clearance  Monitor FS's and adjust insulin as needed  - Please follow up with PMD after discharge from Rehab      Diagnosis: Bacteremia due to Pseudomonas  Assessment and Plan of Treatment: Found to have pseudomonas bacteremia from unclear etiology  - ID consult appreciated  - Completed cefepime on 7/13    Diagnosis: Multiple myeloma  Assessment and Plan of Treatment: Received 1U of blood on 7/15 with adequate response  No plans for chemo until after Rehab per pt's Oncologist Dr. Rhonda Farris  - Outpatient oncology f/up after discharge from Rehab      Diagnosis: Benign essential HTN  Assessment and Plan of Treatment: Blood pressure was low initially and pt had to be on midodrine which has now been discontinued  Metoprolol has been resumed on day of discharge  - Home dose of lasix is on hold for now until Cr has fully recovered  - Please follow up with PMD after discharge from Rehab    Diagnosis: Nodule of right lung  Assessment and Plan of Treatment: We noted a nodule in the apex of your right lung. It is recommended that you have a repeat CT scan of your chest after one year.

## 2024-07-09 NOTE — PHYSICAL THERAPY INITIAL EVALUATION ADULT - ADDITIONAL COMMENTS
Pt comes from [facility], reporting he hasn't ambulated in many weeks.   Patients Current SpO2: 99%

## 2024-07-09 NOTE — PROGRESS NOTE ADULT - SUBJECTIVE AND OBJECTIVE BOX
[facility] Division of Kidney Diseases & Hypertension  FOLLOW UP NOTE  [phone]--------------------------------------------------------------------------------    Chief Complaint: BRITT     24 hour events/subjective: Pt. seen and examined earlier this morning with daughter present. Pt. gives history of difficulty breathing, b/l knee pain and poor appetite. Also endorses chronic B/L LE edema. Reports increased UOP since receiving the diuretics. No fever, CP or abd pain during rounds.     PAST HISTORY  --------------------------------------------------------------------------------  No significant changes to PMH, PSH, FHx, SHx, unless otherwise noted    ALLERGIES & MEDICATIONS  --------------------------------------------------------------------------------  Allergies    No Known Allergies    Intolerances    Standing Inpatient Medications  atorvastatin 80 milliGRAM(s) Oral at bedtime  buMETAnide Infusion 1 mG/Hr IV Continuous <Continuous>  cefepime   IVPB 1000 milliGRAM(s) IV Intermittent every 12 hours  cyanocobalamin 1000 MICROGram(s) Oral daily  dextrose 10% Bolus 125 milliLiter(s) IV Bolus once  dextrose 5%. 1000 milliLiter(s) IV Continuous <Continuous>  dextrose 5%. 1000 milliLiter(s) IV Continuous <Continuous>  dextrose 50% Injectable 25 Gram(s) IV Push once  dextrose 50% Injectable 12.5 Gram(s) IV Push once  glucagon  Injectable 1 milliGRAM(s) IntraMuscular once  heparin   Injectable 5000 Unit(s) SubCutaneous every 12 hours  insulin glargine Injectable (LANTUS) 12 Unit(s) SubCutaneous at bedtime  insulin lispro (ADMELOG) corrective regimen sliding scale   SubCutaneous three times a day before meals  insulin lispro (ADMELOG) corrective regimen sliding scale   SubCutaneous at bedtime  melatonin 6 milliGRAM(s) Oral at bedtime  midodrine. 5 milliGRAM(s) Oral three times a day  sodium bicarbonate 650 milliGRAM(s) Oral three times a day    PRN Inpatient Medications  acetaminophen     Tablet .. 650 milliGRAM(s) Oral every 6 hours PRN  aluminum hydroxide/magnesium hydroxide/simethicone Suspension 30 milliLiter(s) Oral every 4 hours PRN  dextrose Oral Gel 15 Gram(s) Oral once PRN  ondansetron Injectable 4 milliGRAM(s) IV Push every 8 hours PRN    REVIEW OF SYSTEMS  --------------------------------------------------------------------------------  Gen: +Fatigue, no fever  Skin: +Chronic B/L LE edema  Head/Eyes/Ears: No HA  Respiratory: See HPI.   CV: No CP  GI: No abdominal pain/nausea or vomiting, diarrhea   : +Improving UOP   MSK: +Chronic B/L LE edema, b/l knee pain   Heme: No easy bruising    All other systems were reviewed and are negative, except as noted.    VITALS/PHYSICAL EXAM  --------------------------------------------------------------------------------  T(C): 36.5 (07-09-24 @ 05:53), Max: 36.5 (07-09-24 @ 05:53)  HR: 84 (07-09-24 @ 05:53) (84 - 96)  BP: 120/60 (07-09-24 @ 05:53) (120/60 - 148/78)  RR: 18 (07-09-24 @ 05:53) (18 - 20)  SpO2: 98% (07-09-24 @ 05:53) (94% - 98%)  Wt(kg): --    07-08-24 @ 07:01  -  07-09-24 @ 07:00  --------------------------------------------------------  IN: 0 mL / OUT: 745 mL / NET: -745 mL    Physical Exam:  	Gen: resting, ill appearing   	HEENT: No JVD, on supplemental oxygen via NC  	Pulm: CTA B/L   	CV: S1S2+  	Abd: Soft, +BS   	Ext: +B/L LE pitting edema  	Neuro: Awake, alert  	Skin: Chronic skin changes/hyperpigmented skin in B/L LE  	Vascular access: Peripheral IV line    LABS/STUDIES  --------------------------------------------------------------------------------              7.9    8.01  >-----------<  161      [07-09-24 @ 06:00]              24.4     133  |  93  |  107  ----------------------------<  177      [07-09-24 @ 06:00]  3.7   |  18  |  5.03        Ca     8.5     [07-09-24 @ 06:00]      Mg     2.20     [07-09-24 @ 06:00]      Phos  9.6     [07-09-24 @ 06:00]    TPro  6.1  /  Alb  2.3  /  TBili  0.6  /  DBili  x   /  AST  21  /  ALT  39  /  AlkPhos  55  [07-08-24 @ 06:18]    Creatinine Trend:  SCr 5.03 [07-09 @ 06:00]  SCr 4.93 [07-08 @ 06:18]  SCr 4.77 [07-07 @ 18:45]  SCr 4.45 [07-06 @ 22:42]  SCr 4.59 [07-06 @ 06:00]    C3 Complement 191      [07-05-24 @ 16:42]  C4 Complement 34      [07-05-24 @ 16:42]  MPO-ANCA <5.0, interpretation: Negative      [07-05-24 @ 16:42]  PR3-ANCA <5.0, interpretation: Negative      [07-05-24 @ 16:42]  anti-GBM <0.2      [07-05-24 @ 16:42]  ASLO 96      [07-05-24 @ 16:42] [facility] Division of Kidney Diseases & Hypertension  FOLLOW UP NOTE  [phone]--------------------------------------------------------------------------------    Chief Complaint: BRITT     24 hour events/subjective: Pt. seen and examined earlier this morning, daughter present at bedside. Pt. reports increased UOP after receiving IV diuretics. Pt. gives history of decreased appetite and chronic B/L LE edema. No fever, CP, SOB or abd pain during rounds today. Pt. says he wants to transfer to another hospital.     PAST HISTORY  --------------------------------------------------------------------------------  No significant changes to PMH, PSH, FHx, SHx, unless otherwise noted    ALLERGIES & MEDICATIONS  --------------------------------------------------------------------------------  Allergies    No Known Allergies    Intolerances    Standing Inpatient Medications  atorvastatin 80 milliGRAM(s) Oral at bedtime  buMETAnide Infusion 1 mG/Hr IV Continuous <Continuous>  cefepime   IVPB 1000 milliGRAM(s) IV Intermittent every 12 hours  cyanocobalamin 1000 MICROGram(s) Oral daily  dextrose 10% Bolus 125 milliLiter(s) IV Bolus once  dextrose 5%. 1000 milliLiter(s) IV Continuous <Continuous>  dextrose 5%. 1000 milliLiter(s) IV Continuous <Continuous>  dextrose 50% Injectable 25 Gram(s) IV Push once  dextrose 50% Injectable 12.5 Gram(s) IV Push once  glucagon  Injectable 1 milliGRAM(s) IntraMuscular once  heparin   Injectable 5000 Unit(s) SubCutaneous every 12 hours  insulin glargine Injectable (LANTUS) 12 Unit(s) SubCutaneous at bedtime  insulin lispro (ADMELOG) corrective regimen sliding scale   SubCutaneous three times a day before meals  insulin lispro (ADMELOG) corrective regimen sliding scale   SubCutaneous at bedtime  melatonin 6 milliGRAM(s) Oral at bedtime  midodrine. 5 milliGRAM(s) Oral three times a day  sodium bicarbonate 650 milliGRAM(s) Oral three times a day    PRN Inpatient Medications  acetaminophen     Tablet .. 650 milliGRAM(s) Oral every 6 hours PRN  aluminum hydroxide/magnesium hydroxide/simethicone Suspension 30 milliLiter(s) Oral every 4 hours PRN  dextrose Oral Gel 15 Gram(s) Oral once PRN  ondansetron Injectable 4 milliGRAM(s) IV Push every 8 hours PRN    REVIEW OF SYSTEMS  --------------------------------------------------------------------------------  Gen: +Fatigue, no fever  Skin: +Chronic B/L LE edema  Head/Eyes/Ears: No HA  Respiratory: No SOB   CV: No CP  GI: No abdominal pain/nausea or vomiting, diarrhea   : See HPI, +increased UOP   MSK: +Chronic B/L LE edema, b/l knee pain   Heme: No easy bruising    All other systems were reviewed and are negative, except as noted.    VITALS/PHYSICAL EXAM  --------------------------------------------------------------------------------  T(C): 36.5 (07-09-24 @ 05:53), Max: 36.5 (07-09-24 @ 05:53)  HR: 84 (07-09-24 @ 05:53) (84 - 96)  BP: 120/60 (07-09-24 @ 05:53) (120/60 - 148/78)  RR: 18 (07-09-24 @ 05:53) (18 - 20)  SpO2: 98% (07-09-24 @ 05:53) (94% - 98%)  Wt(kg): --    07-08-24 @ 07:01  -  07-09-24 @ 07:00  --------------------------------------------------------  IN: 0 mL / OUT: 745 mL / NET: -745 mL    Physical Exam:  	Gen: resting, ill appearing   	HEENT: No JVD, on supplemental oxygen via NC  	Pulm: CTA B/L   	CV: S1S2+  	Abd: Soft, +BS   	Ext: +B/L LE pitting edema  	Neuro: Awake, alert  	Skin: Chronic skin changes/hyperpigmented skin in B/L LE  	Vascular access: Peripheral IV line    LABS/STUDIES  --------------------------------------------------------------------------------              7.9    8.01  >-----------<  161      [07-09-24 @ 06:00]              24.4     133  |  93  |  107  ----------------------------<  177      [07-09-24 @ 06:00]  3.7   |  18  |  5.03        Ca     8.5     [07-09-24 @ 06:00]      Mg     2.20     [07-09-24 @ 06:00]      Phos  9.6     [07-09-24 @ 06:00]    TPro  6.1  /  Alb  2.3  /  TBili  0.6  /  DBili  x   /  AST  21  /  ALT  39  /  AlkPhos  55  [07-08-24 @ 06:18]    Creatinine Trend:  SCr 5.03 [07-09 @ 06:00]  SCr 4.93 [07-08 @ 06:18]  SCr 4.77 [07-07 @ 18:45]  SCr 4.45 [07-06 @ 22:42]  SCr 4.59 [07-06 @ 06:00]    C3 Complement 191      [07-05-24 @ 16:42]  C4 Complement 34      [07-05-24 @ 16:42]  MPO-ANCA <5.0, interpretation: Negative      [07-05-24 @ 16:42]  PR3-ANCA <5.0, interpretation: Negative      [07-05-24 @ 16:42]  anti-GBM <0.2      [07-05-24 @ 16:42]  ASLO 96      [07-05-24 @ 16:42]

## 2024-07-09 NOTE — DISCHARGE NOTE PROVIDER - NSFOLLOWUPCLINICS_GEN_ALL_ED_FT
[facility] General Internal Medicine  General Internal Medicine  [address] [place] [address]  Phone: [phone]  Fax:     [facility]  Nephrology  [address] [place] [address], [address] [place] [address]  Phone: [phone]  Fax:

## 2024-07-10 LAB
ANION GAP SERPL CALC-SCNC: 18 MMOL/L — HIGH (ref 7–14)
ANISOCYTOSIS BLD QL: SLIGHT — SIGNIFICANT CHANGE UP
BASOPHILS # BLD AUTO: 0 K/UL — SIGNIFICANT CHANGE UP (ref 0–0.2)
BASOPHILS NFR BLD AUTO: 0 % — SIGNIFICANT CHANGE UP (ref 0–2)
BUN SERPL-MCNC: 104 MG/DL — HIGH (ref 7–23)
CALCIUM SERPL-MCNC: 8.3 MG/DL — LOW (ref 8.4–10.5)
CHLORIDE SERPL-SCNC: 97 MMOL/L — LOW (ref 98–107)
CO2 SERPL-SCNC: 20 MMOL/L — LOW (ref 22–31)
CREAT SERPL-MCNC: 4.81 MG/DL — HIGH (ref 0.5–1.3)
DACRYOCYTES BLD QL SMEAR: SLIGHT — SIGNIFICANT CHANGE UP
EGFR: 11 ML/MIN/1.73M2 — LOW
EOSINOPHIL # BLD AUTO: 0 K/UL — SIGNIFICANT CHANGE UP (ref 0–0.5)
EOSINOPHIL NFR BLD AUTO: 0 % — SIGNIFICANT CHANGE UP (ref 0–6)
GLUCOSE BLDC GLUCOMTR-MCNC: 207 MG/DL — HIGH (ref 70–99)
GLUCOSE BLDC GLUCOMTR-MCNC: 226 MG/DL — HIGH (ref 70–99)
GLUCOSE BLDC GLUCOMTR-MCNC: 226 MG/DL — HIGH (ref 70–99)
GLUCOSE BLDC GLUCOMTR-MCNC: 281 MG/DL — HIGH (ref 70–99)
GLUCOSE SERPL-MCNC: 160 MG/DL — HIGH (ref 70–99)
HCT VFR BLD CALC: 25.6 % — LOW (ref 39–50)
HGB BLD-MCNC: 8.1 G/DL — LOW (ref 13–17)
HYPOCHROMIA BLD QL: SLIGHT — SIGNIFICANT CHANGE UP
IANC: 6.86 K/UL — SIGNIFICANT CHANGE UP (ref 1.8–7.4)
LYMPHOCYTES # BLD AUTO: 0.19 K/UL — LOW (ref 1–3.3)
LYMPHOCYTES # BLD AUTO: 2.6 % — LOW (ref 13–44)
MACROCYTES BLD QL: SLIGHT — SIGNIFICANT CHANGE UP
MAGNESIUM SERPL-MCNC: 2.2 MG/DL — SIGNIFICANT CHANGE UP (ref 1.6–2.6)
MANUAL SMEAR VERIFICATION: SIGNIFICANT CHANGE UP
MCHC RBC-ENTMCNC: 31.4 PG — SIGNIFICANT CHANGE UP (ref 27–34)
MCHC RBC-ENTMCNC: 31.6 GM/DL — LOW (ref 32–36)
MCV RBC AUTO: 99.2 FL — SIGNIFICANT CHANGE UP (ref 80–100)
MONOCYTES # BLD AUTO: 0 K/UL — SIGNIFICANT CHANGE UP (ref 0–0.9)
MONOCYTES NFR BLD AUTO: 0 % — LOW (ref 2–14)
NEUTROPHILS # BLD AUTO: 7.25 K/UL — SIGNIFICANT CHANGE UP (ref 1.8–7.4)
NEUTROPHILS NFR BLD AUTO: 96.5 % — HIGH (ref 43–77)
NEUTS BAND # BLD: 0.9 % — SIGNIFICANT CHANGE UP (ref 0–6)
OVALOCYTES BLD QL SMEAR: SLIGHT — SIGNIFICANT CHANGE UP
PHOSPHATE SERPL-MCNC: 9.5 MG/DL — HIGH (ref 2.5–4.5)
PLAT MORPH BLD: NORMAL — SIGNIFICANT CHANGE UP
PLATELET # BLD AUTO: 137 K/UL — LOW (ref 150–400)
PLATELET COUNT - ESTIMATE: ABNORMAL
POIKILOCYTOSIS BLD QL AUTO: SLIGHT — SIGNIFICANT CHANGE UP
POLYCHROMASIA BLD QL SMEAR: SLIGHT — SIGNIFICANT CHANGE UP
POTASSIUM SERPL-MCNC: 4 MMOL/L — SIGNIFICANT CHANGE UP (ref 3.5–5.3)
POTASSIUM SERPL-SCNC: 4 MMOL/L — SIGNIFICANT CHANGE UP (ref 3.5–5.3)
RBC # BLD: 2.58 M/UL — LOW (ref 4.2–5.8)
RBC # FLD: 18.2 % — HIGH (ref 10.3–14.5)
RBC BLD AUTO: ABNORMAL
ROULEAUX BLD QL SMEAR: PRESENT
SODIUM SERPL-SCNC: 135 MMOL/L — SIGNIFICANT CHANGE UP (ref 135–145)
WBC # BLD: 7.44 K/UL — SIGNIFICANT CHANGE UP (ref 3.8–10.5)
WBC # FLD AUTO: 7.44 K/UL — SIGNIFICANT CHANGE UP (ref 3.8–10.5)

## 2024-07-10 PROCEDURE — 99233 SBSQ HOSP IP/OBS HIGH 50: CPT

## 2024-07-10 PROCEDURE — 99232 SBSQ HOSP IP/OBS MODERATE 35: CPT | Mod: GC

## 2024-07-10 RX ORDER — BUMETANIDE 0.25 MG/ML
1 INJECTION INTRAMUSCULAR; INTRAVENOUS
Qty: 20 | Refills: 0 | Status: DISCONTINUED | OUTPATIENT
Start: 2024-07-10 | End: 2024-07-11

## 2024-07-10 RX ADMIN — INSULIN LISPRO 2: 100 INJECTION, SOLUTION SUBCUTANEOUS at 13:00

## 2024-07-10 RX ADMIN — INSULIN LISPRO 2: 100 INJECTION, SOLUTION SUBCUTANEOUS at 09:41

## 2024-07-10 RX ADMIN — ATORVASTATIN CALCIUM 80 MILLIGRAM(S): 20 TABLET, FILM COATED ORAL at 22:39

## 2024-07-10 RX ADMIN — SODIUM BICARBONATE 650 MILLIGRAM(S): 650 TABLET ORAL at 13:05

## 2024-07-10 RX ADMIN — INSULIN LISPRO 1: 100 INJECTION, SOLUTION SUBCUTANEOUS at 23:11

## 2024-07-10 RX ADMIN — Medication 6 MILLIGRAM(S): at 22:39

## 2024-07-10 RX ADMIN — CEFEPIME 100 MILLIGRAM(S): 1 INJECTION, POWDER, FOR SOLUTION INTRAMUSCULAR; INTRAVENOUS at 06:26

## 2024-07-10 RX ADMIN — SODIUM BICARBONATE 650 MILLIGRAM(S): 650 TABLET ORAL at 22:39

## 2024-07-10 RX ADMIN — Medication 1000 MICROGRAM(S): at 13:05

## 2024-07-10 RX ADMIN — BUMETANIDE 5 MG/HR: 0.25 INJECTION INTRAMUSCULAR; INTRAVENOUS at 18:45

## 2024-07-10 RX ADMIN — MIDODRINE HYDROCHLORIDE 5 MILLIGRAM(S): 10 TABLET ORAL at 18:45

## 2024-07-10 RX ADMIN — INSULIN GLARGINE 12 UNIT(S): 100 INJECTION, SOLUTION SUBCUTANEOUS at 23:05

## 2024-07-10 RX ADMIN — INSULIN LISPRO 2: 100 INJECTION, SOLUTION SUBCUTANEOUS at 18:45

## 2024-07-10 RX ADMIN — HEPARIN SODIUM 5000 UNIT(S): 50 INJECTION, SOLUTION INTRAVENOUS at 06:26

## 2024-07-10 RX ADMIN — MIDODRINE HYDROCHLORIDE 5 MILLIGRAM(S): 10 TABLET ORAL at 06:26

## 2024-07-10 RX ADMIN — MIDODRINE HYDROCHLORIDE 5 MILLIGRAM(S): 10 TABLET ORAL at 13:06

## 2024-07-10 RX ADMIN — SODIUM BICARBONATE 650 MILLIGRAM(S): 650 TABLET ORAL at 06:26

## 2024-07-10 RX ADMIN — HEPARIN SODIUM 5000 UNIT(S): 50 INJECTION, SOLUTION INTRAVENOUS at 18:46

## 2024-07-10 RX ADMIN — CEFEPIME 100 MILLIGRAM(S): 1 INJECTION, POWDER, FOR SOLUTION INTRAMUSCULAR; INTRAVENOUS at 18:53

## 2024-07-10 NOTE — PROGRESS NOTE ADULT - PROBLEM SELECTOR PLAN 1
Pt. with BRITT in the setting of sepsis, hypotension, and MM. Pt. with likely ATN. Scr was WNL 1.2 on OP labs done on 6/10/24. Scr was elevated at 1.71 on admission to [facility] on 6/28/24. Scr progressively increased to 4.59 on 7/6/24. Pt. transferred to [facility] on 7/6/24 for worsening BRITT and possible need for HD. Scr was elevated at 4.93 on 7/8/24 and increased to 5.03 (7/9/10.). Today Scr elevated but improved to 4.81 (7/10/24). Pt. noted to have proteinuria and microscopic hematuria on UA done on 6/28/24. Pt. with IgA kappa bands on serum immunofixation on labs done 6/10/24. Anti-GBM ab negative, PR3 and MPO ab were negative on 7/5/24. Serum C3 and C4 were not low on 7/5/24. No hydronephrosis seen on renal US done at [facility] on 7/1/24. No right hydronephrosis seen on renal US done at [facility] on 7/7/24. L kidney not well visualized due to bowel gas. Pt. received bumex infusion on 7/8 and 7/9. Pt with subsequently improving renal function, and now non oliguric with UOP of ~1.8 L in the past 24hrs. Significant B/L LE edema noted, but improving. Recommend IV Bumex infusion at 1 mg/hr for 10 hrs today (7/10). Obtain renal US to better visualize left kidney. Recommend serial bladder scans. Monitor labs and UOP. Avoid nephrotoxins. Dose medications for eGFR.      If you have any questions, please feel free to contact me  Mauricio Youssef  Nephrology Fellow  [employer]/Page [phone]  (After 4pm or on weekends please page the on-call fellow). Pt. with BRITT in the setting of sepsis, hypotension, and MM. Pt. with likely ATN. Scr was WNL 1.2 on OP labs done on 6/10/24. Scr was elevated at 1.71 on admission to [facility] on 6/28/24. Scr progressively increased to 4.59 on 7/6/24. Pt. transferred to [facility] on 7/6/24 for worsening BRITT and possible need for HD. Scr was elevated at 4.93 on 7/8/24 and increased to 5.03 (7/9/10). Scr decreased to 4.81 today. (7/10/24). Pt. noted to have proteinuria and microscopic hematuria on UA done on 6/28/24. Pt. with IgA kappa bands on serum immunofixation on labs done 6/10/24. Anti-GBM ab, PR3 and MPO antibodies were negative on 7/5/24. Serum C3 and C4 were not low on 7/5/24. No hydronephrosis seen on renal US done at [facility] on 7/1/24. No right hydronephrosis seen on renal US done at [facility] on 7/7/24. L kidney not well visualized due to bowel gas. Pt. was oliguric, received Bumex infusion on 7/8 and 7/9. Pt. now non-oliguric with ~1.8 L of UOP in the past 24hrs. Pt. with significant B/L edema, recommend IV Bumex infusion at 1 mg/hr for 10 hrs today (7/10). Obtain renal US to better visualize left kidney. Recommend serial bladder scans. Monitor labs and UOP. Avoid nephrotoxins. Dose medications for eGFR.      If you have any questions, please feel free to contact me  Mauricio Youssef  Nephrology Fellow  [employer]/Page [phone]  (After 4pm or on weekends please page the on-call fellow).

## 2024-07-10 NOTE — PROGRESS NOTE ADULT - SUBJECTIVE AND OBJECTIVE BOX
[facility] Division of Hospital Medicine  Christophe Bonner DO  Available via MS Teams  In house pager [phone]    SUBJECTIVE / OVERNIGHT EVENTS:  No acute events overnight. Patient seen and examined at bedside this morning.  Seen at bedside with daughter Rhonda at bedside. Nursing staff and medicine PA Walker also at bedside during encounter.   We discussed at length the reasons to stay in the hospital (ongoing renal recovery, IV antibiotics, continued use of supplemental oxygen).  He is unhappy and would like to go home but at this time is willing to comply with treatment and hospitalization.  His daughter will work to see if he can be transferred to another hospital.    ADDITIONAL REVIEW OF SYSTEMS:    MEDICATIONS  (STANDING):  atorvastatin 80 milliGRAM(s) Oral at bedtime  buMETAnide Infusion 1 mG/Hr (5 mL/Hr) IV Continuous <Continuous>  cefepime   IVPB 1000 milliGRAM(s) IV Intermittent every 12 hours  cyanocobalamin 1000 MICROGram(s) Oral daily  dextrose 10% Bolus 125 milliLiter(s) IV Bolus once  dextrose 5%. 1000 milliLiter(s) (50 mL/Hr) IV Continuous <Continuous>  dextrose 5%. 1000 milliLiter(s) (100 mL/Hr) IV Continuous <Continuous>  dextrose 50% Injectable 12.5 Gram(s) IV Push once  dextrose 50% Injectable 25 Gram(s) IV Push once  glucagon  Injectable 1 milliGRAM(s) IntraMuscular once  heparin   Injectable 5000 Unit(s) SubCutaneous every 12 hours  insulin glargine Injectable (LANTUS) 12 Unit(s) SubCutaneous at bedtime  insulin lispro (ADMELOG) corrective regimen sliding scale   SubCutaneous at bedtime  insulin lispro (ADMELOG) corrective regimen sliding scale   SubCutaneous three times a day before meals  melatonin 6 milliGRAM(s) Oral at bedtime  midodrine. 5 milliGRAM(s) Oral three times a day  sodium bicarbonate 650 milliGRAM(s) Oral three times a day    MEDICATIONS  (PRN):  acetaminophen     Tablet .. 650 milliGRAM(s) Oral every 6 hours PRN Temp greater or equal to 38C (100.4F), Mild Pain (1 - 3)  aluminum hydroxide/magnesium hydroxide/simethicone Suspension 30 milliLiter(s) Oral every 4 hours PRN Dyspepsia  dextrose Oral Gel 15 Gram(s) Oral once PRN Blood Glucose LESS THAN 70 milliGRAM(s)/deciliter  ondansetron Injectable 4 milliGRAM(s) IV Push every 8 hours PRN Nausea and/or Vomiting      I&O's Summary    09 Jul 2024 07:01  -  10 Jul 2024 07:00  --------------------------------------------------------  IN: 535 mL / OUT: 1850 mL / NET: -1315 mL        PHYSICAL EXAM:  Vital Signs Last 24 Hrs  T(C): 36.4 (10 Jul 2024 10:43), Max: 36.6 (09 Jul 2024 16:00)  T(F): 97.6 (10 Jul 2024 10:43), Max: 97.9 (09 Jul 2024 16:00)  HR: 82 (10 Jul 2024 10:43) (81 - 92)  BP: 128/83 (10 Jul 2024 10:43) (123/60 - 136/74)  BP(mean): --  RR: 18 (10 Jul 2024 10:43) (17 - 18)  SpO2: 97% (10 Jul 2024 10:43) (95% - 97%)    Parameters below as of 10 Jul 2024 10:43  Patient On (Oxygen Delivery Method): nasal cannula  O2 Flow (L/min): 2    CONSTITUTIONAL: NAD, well-groomed  EYES: PERRLA; conjunctiva and sclera clear  ENMT: Moist oral mucosa, no pharyngeal injection or exudates; normal dentition  NECK: Supple, no palpable masses; no thyromegaly  RESPIRATORY: Normal respiratory effort; lungs are clear to auscultation bilaterally  CARDIOVASCULAR: Regular rate and rhythm, normal S1 and S2, no murmur/rub/gallop; 2+ pitting b/l lower extremity edema; Peripheral pulses are 2+ bilaterally  ABDOMEN: Nontender to palpation, normoactive bowel sounds, no rebound/guarding; No hepatosplenomegaly  MUSCULOSKELETAL:  no clubbing or cyanosis of digits; no joint swelling or tenderness to palpation  PSYCH: A+O to person, place, and time; affect appropriate  NEUROLOGY: CN 2-12 are intact and symmetric; no gross sensory deficits   SKIN: No rashes; no palpable lesions      LABS:                        8.1    7.44  )-----------( 137      ( 10 Jul 2024 07:10 )             25.6     07-10    135  |  97<L>  |  104<H>  ----------------------------<  160<H>  4.0   |  20<L>  |  4.81<H>    Ca    8.3<L>      10 Jul 2024 07:10  Phos  9.5     07-10  Mg     2.20     07-10            Urinalysis Basic - ( 10 Jul 2024 07:10 )    Color: x / Appearance: x / SG: x / pH: x  Gluc: 160 mg/dL / Ketone: x  / Bili: x / Urobili: x   Blood: x / Protein: x / Nitrite: x   Leuk Esterase: x / RBC: x / WBC x   Sq Epi: x / Non Sq Epi: x / Bacteria: x        SARS-CoV-2: NotDetec (28 Jun 2024 22:26)

## 2024-07-10 NOTE — PROGRESS NOTE ADULT - SUBJECTIVE AND OBJECTIVE BOX
[facility] Division of Kidney Diseases & Hypertension  FOLLOW UP NOTE  [phone]--------------------------------------------------------------------------------    Chief Complaint: BRITT     24 hour events/subjective: Pt. seen and examined earlier this morning. Pt. reports increased UOP after receiving IV diuretics. Pt. gives history of chronic B/L LE edema. Today appetite is better. No fever, CP, SOB or abd pain during rounds today. Pt. says he wants to go home today.     PAST HISTORY  --------------------------------------------------------------------------------  No significant changes to PMH, PSH, FHx, SHx, unless otherwise noted    ALLERGIES & MEDICATIONS  --------------------------------------------------------------------------------  Allergies    No Known Allergies    Intolerances    Standing Inpatient Medications  atorvastatin 80 milliGRAM(s) Oral at bedtime  buMETAnide Infusion 1 mG/Hr IV Continuous <Continuous>  cefepime   IVPB 1000 milliGRAM(s) IV Intermittent every 12 hours  cyanocobalamin 1000 MICROGram(s) Oral daily  dextrose 10% Bolus 125 milliLiter(s) IV Bolus once  dextrose 5%. 1000 milliLiter(s) IV Continuous <Continuous>  dextrose 5%. 1000 milliLiter(s) IV Continuous <Continuous>  dextrose 50% Injectable 12.5 Gram(s) IV Push once  dextrose 50% Injectable 25 Gram(s) IV Push once  glucagon  Injectable 1 milliGRAM(s) IntraMuscular once  heparin   Injectable 5000 Unit(s) SubCutaneous every 12 hours  insulin glargine Injectable (LANTUS) 12 Unit(s) SubCutaneous at bedtime  insulin lispro (ADMELOG) corrective regimen sliding scale   SubCutaneous three times a day before meals  insulin lispro (ADMELOG) corrective regimen sliding scale   SubCutaneous at bedtime  melatonin 6 milliGRAM(s) Oral at bedtime  midodrine. 5 milliGRAM(s) Oral three times a day  sodium bicarbonate 650 milliGRAM(s) Oral three times a day    PRN Inpatient Medications  acetaminophen     Tablet .. 650 milliGRAM(s) Oral every 6 hours PRN  aluminum hydroxide/magnesium hydroxide/simethicone Suspension 30 milliLiter(s) Oral every 4 hours PRN  dextrose Oral Gel 15 Gram(s) Oral once PRN  ondansetron Injectable 4 milliGRAM(s) IV Push every 8 hours PRN    REVIEW OF SYSTEMS  --------------------------------------------------------------------------------  Gen: See HPI, no fever  Skin: +Chronic B/L LE edema  Head/Eyes/Ears: No HA  Respiratory: No SOB   CV: No CP  GI: No abdominal pain/nausea or vomiting, diarrhea   : See HPI, +increased UOP   MSK: +Chronic B/L LE edema  Heme: No easy bruising    All other systems were reviewed and are negative, except as noted.    VITALS/PHYSICAL EXAM  --------------------------------------------------------------------------------  T(C): 36.4 (07-10-24 @ 10:43), Max: 36.6 (07-09-24 @ 16:00)  HR: 82 (07-10-24 @ 10:43) (81 - 92)  BP: 128/83 (07-10-24 @ 10:43) (123/60 - 136/74)  RR: 18 (07-10-24 @ 10:43) (17 - 18)  SpO2: 97% (07-10-24 @ 10:43) (95% - 97%)  Wt(kg): --    07-09-24 @ 07:01  -  07-10-24 @ 07:00  --------------------------------------------------------  IN: 535 mL / OUT: 1850 mL / NET: -1315 mL    Physical Exam:  	Gen: resting, ill appearing   	HEENT: No JVD, on supplemental oxygen via NC  	Pulm: CTA B/L   	CV: S1S2+  	Abd: Soft, +BS   	Ext: +B/L LE pitting edema (improving)  	Neuro: Awake, alert  	Skin: Chronic skin changes/hyperpigmented skin in B/L LE  	Vascular access: Peripheral IV line    LABS/STUDIES  --------------------------------------------------------------------------------              8.1    7.44  >-----------<  137      [07-10-24 @ 07:10]              25.6     135  |  97  |  104  ----------------------------<  160      [07-10-24 @ 07:10]  4.0   |  20  |  4.81        Ca     8.3     [07-10-24 @ 07:10]      Mg     2.20     [07-10-24 @ 07:10]      Phos  9.5     [07-10-24 @ 07:10]    Creatinine Trend:  SCr 4.81 [07-10 @ 07:10]  SCr 5.03 [07-09 @ 06:00]  SCr 4.93 [07-08 @ 06:18]  SCr 4.77 [07-07 @ 18:45]  SCr 4.45 [07-06 @ 22:42] [facility] Division of Kidney Diseases & Hypertension  FOLLOW UP NOTE  [phone]--------------------------------------------------------------------------------    Chief Complaint: BRITT     24 hour events/subjective: Pt. seen and examined earlier this morning. Pt. reports increased UOP after receiving IV diuretics. Pt. gives history of chronic B/L LE edema. No fever, CP, SOB or abd pain during rounds today. Pt. says he wants to go home today.     PAST HISTORY  --------------------------------------------------------------------------------  No significant changes to PMH, PSH, FHx, SHx, unless otherwise noted    ALLERGIES & MEDICATIONS  --------------------------------------------------------------------------------  Allergies    No Known Allergies    Intolerances    Standing Inpatient Medications  atorvastatin 80 milliGRAM(s) Oral at bedtime  buMETAnide Infusion 1 mG/Hr IV Continuous <Continuous>  cefepime   IVPB 1000 milliGRAM(s) IV Intermittent every 12 hours  cyanocobalamin 1000 MICROGram(s) Oral daily  dextrose 10% Bolus 125 milliLiter(s) IV Bolus once  dextrose 5%. 1000 milliLiter(s) IV Continuous <Continuous>  dextrose 5%. 1000 milliLiter(s) IV Continuous <Continuous>  dextrose 50% Injectable 12.5 Gram(s) IV Push once  dextrose 50% Injectable 25 Gram(s) IV Push once  glucagon  Injectable 1 milliGRAM(s) IntraMuscular once  heparin   Injectable 5000 Unit(s) SubCutaneous every 12 hours  insulin glargine Injectable (LANTUS) 12 Unit(s) SubCutaneous at bedtime  insulin lispro (ADMELOG) corrective regimen sliding scale   SubCutaneous three times a day before meals  insulin lispro (ADMELOG) corrective regimen sliding scale   SubCutaneous at bedtime  melatonin 6 milliGRAM(s) Oral at bedtime  midodrine. 5 milliGRAM(s) Oral three times a day  sodium bicarbonate 650 milliGRAM(s) Oral three times a day    PRN Inpatient Medications  acetaminophen     Tablet .. 650 milliGRAM(s) Oral every 6 hours PRN  aluminum hydroxide/magnesium hydroxide/simethicone Suspension 30 milliLiter(s) Oral every 4 hours PRN  dextrose Oral Gel 15 Gram(s) Oral once PRN  ondansetron Injectable 4 milliGRAM(s) IV Push every 8 hours PRN    REVIEW OF SYSTEMS  --------------------------------------------------------------------------------  Gen: See HPI, no fever  Skin: +Chronic B/L LE edema  Head/Eyes/Ears: No HA  Respiratory: No SOB   CV: No CP  GI: No abdominal pain/nausea or vomiting, diarrhea   : See HPI, +increased UOP   MSK: +Chronic B/L LE edema  Heme: No easy bruising    All other systems were reviewed and are negative, except as noted.    VITALS/PHYSICAL EXAM  --------------------------------------------------------------------------------  T(C): 36.4 (07-10-24 @ 10:43), Max: 36.6 (07-09-24 @ 16:00)  HR: 82 (07-10-24 @ 10:43) (81 - 92)  BP: 128/83 (07-10-24 @ 10:43) (123/60 - 136/74)  RR: 18 (07-10-24 @ 10:43) (17 - 18)  SpO2: 97% (07-10-24 @ 10:43) (95% - 97%)  Wt(kg): --    07-09-24 @ 07:01  -  07-10-24 @ 07:00  --------------------------------------------------------  IN: 535 mL / OUT: 1850 mL / NET: -1315 mL    Physical Exam:  	Gen: resting, ill appearing   	HEENT: No JVD, on supplemental oxygen via NC  	Pulm: CTA B/L   	CV: S1S2+  	Abd: Soft, +BS   	Ext: +B/L LE pitting edema  	Neuro: Awake, alert  	Skin: Chronic skin changes/hyperpigmented skin in B/L LE  	Vascular access: Peripheral IV line    LABS/STUDIES  --------------------------------------------------------------------------------              8.1    7.44  >-----------<  137      [07-10-24 @ 07:10]              25.6     135  |  97  |  104  ----------------------------<  160      [07-10-24 @ 07:10]  4.0   |  20  |  4.81        Ca     8.3     [07-10-24 @ 07:10]      Mg     2.20     [07-10-24 @ 07:10]      Phos  9.5     [07-10-24 @ 07:10]    Creatinine Trend:  SCr 4.81 [07-10 @ 07:10]  SCr 5.03 [07-09 @ 06:00]  SCr 4.93 [07-08 @ 06:18]  SCr 4.77 [07-07 @ 18:45]  SCr 4.45 [07-06 @ 22:42]

## 2024-07-11 DIAGNOSIS — R10.13 EPIGASTRIC PAIN: ICD-10-CM

## 2024-07-11 LAB
ANION GAP SERPL CALC-SCNC: 15 MMOL/L — HIGH (ref 7–14)
BASOPHILS # BLD AUTO: 0.03 K/UL — SIGNIFICANT CHANGE UP (ref 0–0.2)
BASOPHILS NFR BLD AUTO: 0.4 % — SIGNIFICANT CHANGE UP (ref 0–2)
BUN SERPL-MCNC: 100 MG/DL — HIGH (ref 7–23)
CALCIUM SERPL-MCNC: 8.4 MG/DL — SIGNIFICANT CHANGE UP (ref 8.4–10.5)
CHLORIDE SERPL-SCNC: 96 MMOL/L — LOW (ref 98–107)
CK MB BLD-MCNC: 21.7 % — HIGH (ref 0–2.5)
CK MB CFR SERPL CALC: 2.6 NG/ML — SIGNIFICANT CHANGE UP
CK SERPL-CCNC: 12 U/L — LOW (ref 30–200)
CO2 SERPL-SCNC: 23 MMOL/L — SIGNIFICANT CHANGE UP (ref 22–31)
CREAT SERPL-MCNC: 4.21 MG/DL — HIGH (ref 0.5–1.3)
EGFR: 13 ML/MIN/1.73M2 — LOW
EOSINOPHIL # BLD AUTO: 0.1 K/UL — SIGNIFICANT CHANGE UP (ref 0–0.5)
EOSINOPHIL NFR BLD AUTO: 1.5 % — SIGNIFICANT CHANGE UP (ref 0–6)
GLUCOSE BLDC GLUCOMTR-MCNC: 262 MG/DL — HIGH (ref 70–99)
GLUCOSE BLDC GLUCOMTR-MCNC: 302 MG/DL — HIGH (ref 70–99)
GLUCOSE SERPL-MCNC: 214 MG/DL — HIGH (ref 70–99)
HCT VFR BLD CALC: 23.8 % — LOW (ref 39–50)
HGB BLD-MCNC: 7.8 G/DL — LOW (ref 13–17)
IANC: 6.26 K/UL — SIGNIFICANT CHANGE UP (ref 1.8–7.4)
IMM GRANULOCYTES NFR BLD AUTO: 0.6 % — SIGNIFICANT CHANGE UP (ref 0–0.9)
LYMPHOCYTES # BLD AUTO: 0.24 K/UL — LOW (ref 1–3.3)
LYMPHOCYTES # BLD AUTO: 3.5 % — LOW (ref 13–44)
MAGNESIUM SERPL-MCNC: 1.9 MG/DL — SIGNIFICANT CHANGE UP (ref 1.6–2.6)
MCHC RBC-ENTMCNC: 32.2 PG — SIGNIFICANT CHANGE UP (ref 27–34)
MCHC RBC-ENTMCNC: 32.8 GM/DL — SIGNIFICANT CHANGE UP (ref 32–36)
MCV RBC AUTO: 98.3 FL — SIGNIFICANT CHANGE UP (ref 80–100)
MONOCYTES # BLD AUTO: 0.13 K/UL — SIGNIFICANT CHANGE UP (ref 0–0.9)
MONOCYTES NFR BLD AUTO: 1.9 % — LOW (ref 2–14)
NEUTROPHILS # BLD AUTO: 6.26 K/UL — SIGNIFICANT CHANGE UP (ref 1.8–7.4)
NEUTROPHILS NFR BLD AUTO: 92.1 % — HIGH (ref 43–77)
NRBC # BLD: 0 /100 WBCS — SIGNIFICANT CHANGE UP (ref 0–0)
NRBC # FLD: 0 K/UL — SIGNIFICANT CHANGE UP (ref 0–0)
PHOSPHATE SERPL-MCNC: 8.7 MG/DL — HIGH (ref 2.5–4.5)
PLATELET # BLD AUTO: 142 K/UL — LOW (ref 150–400)
POTASSIUM SERPL-MCNC: 3.4 MMOL/L — LOW (ref 3.5–5.3)
POTASSIUM SERPL-SCNC: 3.4 MMOL/L — LOW (ref 3.5–5.3)
RBC # BLD: 2.42 M/UL — LOW (ref 4.2–5.8)
RBC # FLD: 18.2 % — HIGH (ref 10.3–14.5)
SODIUM SERPL-SCNC: 134 MMOL/L — LOW (ref 135–145)
TROPONIN T, HIGH SENSITIVITY RESULT: 128 NG/L — CRITICAL HIGH
TROPONIN T, HIGH SENSITIVITY RESULT: 132 NG/L — CRITICAL HIGH
TROPONIN T, HIGH SENSITIVITY RESULT: 133 NG/L — CRITICAL HIGH
WBC # BLD: 6.8 K/UL — SIGNIFICANT CHANGE UP (ref 3.8–10.5)
WBC # FLD AUTO: 6.8 K/UL — SIGNIFICANT CHANGE UP (ref 3.8–10.5)

## 2024-07-11 PROCEDURE — 93010 ELECTROCARDIOGRAM REPORT: CPT

## 2024-07-11 PROCEDURE — 99232 SBSQ HOSP IP/OBS MODERATE 35: CPT | Mod: GC

## 2024-07-11 PROCEDURE — 99233 SBSQ HOSP IP/OBS HIGH 50: CPT

## 2024-07-11 RX ORDER — POTASSIUM CHLORIDE 600 MG/1
40 TABLET, FILM COATED, EXTENDED RELEASE ORAL ONCE
Refills: 0 | Status: COMPLETED | OUTPATIENT
Start: 2024-07-11 | End: 2024-07-11

## 2024-07-11 RX ORDER — FAMOTIDINE 40 MG
20 TABLET ORAL ONCE
Refills: 0 | Status: COMPLETED | OUTPATIENT
Start: 2024-07-11 | End: 2024-07-11

## 2024-07-11 RX ORDER — BUMETANIDE 0.25 MG/ML
1 INJECTION INTRAMUSCULAR; INTRAVENOUS
Qty: 20 | Refills: 0 | Status: DISCONTINUED | OUTPATIENT
Start: 2024-07-11 | End: 2024-07-12

## 2024-07-11 RX ADMIN — Medication 30 MILLILITER(S): at 10:34

## 2024-07-11 RX ADMIN — POTASSIUM CHLORIDE 40 MILLIEQUIVALENT(S): 600 TABLET, FILM COATED, EXTENDED RELEASE ORAL at 10:34

## 2024-07-11 RX ADMIN — ATORVASTATIN CALCIUM 80 MILLIGRAM(S): 20 TABLET, FILM COATED ORAL at 22:08

## 2024-07-11 RX ADMIN — Medication 6 MILLIGRAM(S): at 22:08

## 2024-07-11 RX ADMIN — INSULIN LISPRO 3: 100 INJECTION, SOLUTION SUBCUTANEOUS at 18:45

## 2024-07-11 RX ADMIN — BUMETANIDE 5 MG/HR: 0.25 INJECTION INTRAMUSCULAR; INTRAVENOUS at 13:06

## 2024-07-11 RX ADMIN — CEFEPIME 100 MILLIGRAM(S): 1 INJECTION, POWDER, FOR SOLUTION INTRAMUSCULAR; INTRAVENOUS at 18:43

## 2024-07-11 RX ADMIN — SODIUM BICARBONATE 650 MILLIGRAM(S): 650 TABLET ORAL at 05:53

## 2024-07-11 RX ADMIN — HEPARIN SODIUM 5000 UNIT(S): 50 INJECTION, SOLUTION INTRAVENOUS at 05:53

## 2024-07-11 RX ADMIN — SODIUM BICARBONATE 650 MILLIGRAM(S): 650 TABLET ORAL at 22:07

## 2024-07-11 RX ADMIN — INSULIN LISPRO 2: 100 INJECTION, SOLUTION SUBCUTANEOUS at 22:07

## 2024-07-11 RX ADMIN — MIDODRINE HYDROCHLORIDE 5 MILLIGRAM(S): 10 TABLET ORAL at 18:44

## 2024-07-11 RX ADMIN — Medication 30 MILLILITER(S): at 15:44

## 2024-07-11 RX ADMIN — Medication 20 MILLIGRAM(S): at 14:14

## 2024-07-11 RX ADMIN — SODIUM BICARBONATE 650 MILLIGRAM(S): 650 TABLET ORAL at 13:09

## 2024-07-11 RX ADMIN — CEFEPIME 100 MILLIGRAM(S): 1 INJECTION, POWDER, FOR SOLUTION INTRAMUSCULAR; INTRAVENOUS at 05:52

## 2024-07-11 RX ADMIN — INSULIN LISPRO 3: 100 INJECTION, SOLUTION SUBCUTANEOUS at 13:06

## 2024-07-11 RX ADMIN — INSULIN GLARGINE 12 UNIT(S): 100 INJECTION, SOLUTION SUBCUTANEOUS at 22:06

## 2024-07-11 RX ADMIN — POTASSIUM CHLORIDE 40 MILLIEQUIVALENT(S): 600 TABLET, FILM COATED, EXTENDED RELEASE ORAL at 05:53

## 2024-07-11 RX ADMIN — HEPARIN SODIUM 5000 UNIT(S): 50 INJECTION, SOLUTION INTRAVENOUS at 18:44

## 2024-07-11 RX ADMIN — INSULIN LISPRO 2: 100 INJECTION, SOLUTION SUBCUTANEOUS at 08:43

## 2024-07-11 RX ADMIN — MIDODRINE HYDROCHLORIDE 5 MILLIGRAM(S): 10 TABLET ORAL at 05:53

## 2024-07-11 NOTE — PROGRESS NOTE ADULT - SUBJECTIVE AND OBJECTIVE BOX
[facility] Division of Kidney Diseases & Hypertension  FOLLOW UP NOTE  [phone]--------------------------------------------------------------------------------    Chief Complaint: BRITT     24 hour events/subjective: Pt. seen and examined earlier this morning, with pts son present. Pt. reports SOB and CP overnight, which has now resolved. Also endorses urinary retention however refusing felix insertion. Pt. gives history of chronic B/L LE edema. No fever, CP, SOB or abd pain during rounds today.     PAST HISTORY  --------------------------------------------------------------------------------  No significant changes to PMH, PSH, FHx, SHx, unless otherwise noted    ALLERGIES & MEDICATIONS  --------------------------------------------------------------------------------  Allergies    No Known Allergies    Intolerances    Standing Inpatient Medications  atorvastatin 80 milliGRAM(s) Oral at bedtime  buMETAnide Infusion 1 mG/Hr IV Continuous <Continuous>  cefepime   IVPB 1000 milliGRAM(s) IV Intermittent every 12 hours  cyanocobalamin 1000 MICROGram(s) Oral daily  dextrose 10% Bolus 125 milliLiter(s) IV Bolus once  dextrose 5%. 1000 milliLiter(s) IV Continuous <Continuous>  dextrose 5%. 1000 milliLiter(s) IV Continuous <Continuous>  dextrose 50% Injectable 25 Gram(s) IV Push once  dextrose 50% Injectable 12.5 Gram(s) IV Push once  glucagon  Injectable 1 milliGRAM(s) IntraMuscular once  heparin   Injectable 5000 Unit(s) SubCutaneous every 12 hours  insulin glargine Injectable (LANTUS) 12 Unit(s) SubCutaneous at bedtime  insulin lispro (ADMELOG) corrective regimen sliding scale   SubCutaneous at bedtime  insulin lispro (ADMELOG) corrective regimen sliding scale   SubCutaneous three times a day before meals  melatonin 6 milliGRAM(s) Oral at bedtime  midodrine. 5 milliGRAM(s) Oral three times a day  potassium chloride   Powder 40 milliEquivalent(s) Oral once  sodium bicarbonate 650 milliGRAM(s) Oral three times a day    PRN Inpatient Medications  acetaminophen     Tablet .. 650 milliGRAM(s) Oral every 6 hours PRN  aluminum hydroxide/magnesium hydroxide/simethicone Suspension 30 milliLiter(s) Oral every 4 hours PRN  dextrose Oral Gel 15 Gram(s) Oral once PRN  ondansetron Injectable 4 milliGRAM(s) IV Push every 8 hours PRN    REVIEW OF SYSTEMS  --------------------------------------------------------------------------------  Gen: See HPI, no fever  Skin: +Chronic B/L LE edema  Head/Eyes/Ears: No HA  Respiratory: See HPI, No SOB   CV: See HPI, No CP  GI: No abdominal pain/nausea or vomiting, diarrhea   : See HPI, +increased UOP   MSK: +Chronic B/L LE edema  Heme: No easy bruising    All other systems were reviewed and are negative, except as noted.    VITALS/PHYSICAL EXAM  --------------------------------------------------------------------------------  T(C): 36.2 (07-11-24 @ 06:04), Max: 36.8 (07-10-24 @ 22:45)  HR: 92 (07-11-24 @ 06:04) (79 - 92)  BP: 121/72 (07-11-24 @ 06:04) (118/60 - 131/71)  RR: 17 (07-11-24 @ 06:04) (17 - 18)  SpO2: 96% (07-11-24 @ 06:04) (95% - 98%)  Wt(kg): --    07-10-24 @ 07:01  -  07-11-24 @ 07:00  --------------------------------------------------------  IN: 260 mL / OUT: 430 mL / NET: -170 mL    Physical Exam:  	Gen: resting, ill appearing   	HEENT: No JVD, on supplemental oxygen via NC  	Pulm: CTA B/L   	CV: S1S2+  	Abd: Soft, +BS               : +Condom catheter with urine in bag   	Ext: +B/L LE pitting edema  	Neuro: Awake, alert  	Skin: Chronic skin changes/hyperpigmented skin in B/L LE  	Vascular access: Peripheral IV line    LABS/STUDIES  --------------------------------------------------------------------------------              7.8    6.80  >-----------<  142      [07-11-24 @ 03:16]              23.8     134  |  96  |  100  ----------------------------<  214      [07-11-24 @ 03:16]  3.4   |  23  |  4.21        Ca     8.4     [07-11-24 @ 03:16]      Mg     1.90     [07-11-24 @ 03:16]      Phos  8.7     [07-11-24 @ 03:16]    CK 12      [07-11-24 @ 03:16]    Creatinine Trend:  SCr 4.21 [07-11 @ 03:16]  SCr 4.81 [07-10 @ 07:10]  SCr 5.03 [07-09 @ 06:00]  SCr 4.93 [07-08 @ 06:18]  SCr 4.77 [07-07 @ 18:45]    C3 Complement 191      [07-05-24 @ 16:42]  C4 Complement 34      [07-05-24 @ 16:42]  MPO-ANCA <5.0, interpretation: Negative      [07-05-24 @ 16:42]  PR3-ANCA <5.0, interpretation: Negative      [07-05-24 @ 16:42]  anti-GBM <0.2      [07-05-24 @ 16:42]  ASLO 96      [07-05-24 @ 16:42] [facility] Division of Kidney Diseases & Hypertension  FOLLOW UP NOTE  [phone]--------------------------------------------------------------------------------    Chief Complaint: BRITT     24 hour events/subjective: Pt. seen and examined earlier this morning, with pts son present. Pt. reports SOB and CP overnight, which has now resolved. Also endorses urinary retention however refusing felix insertion. Pt. gives history of chronic B/L LE edema. Otherwise feeling better today. No fever, CP, SOB or abd pain during rounds today.     PAST HISTORY  --------------------------------------------------------------------------------  No significant changes to PMH, PSH, FHx, SHx, unless otherwise noted    ALLERGIES & MEDICATIONS  --------------------------------------------------------------------------------  Allergies    No Known Allergies    Intolerances    Standing Inpatient Medications  atorvastatin 80 milliGRAM(s) Oral at bedtime  buMETAnide Infusion 1 mG/Hr IV Continuous <Continuous>  cefepime   IVPB 1000 milliGRAM(s) IV Intermittent every 12 hours  cyanocobalamin 1000 MICROGram(s) Oral daily  dextrose 10% Bolus 125 milliLiter(s) IV Bolus once  dextrose 5%. 1000 milliLiter(s) IV Continuous <Continuous>  dextrose 5%. 1000 milliLiter(s) IV Continuous <Continuous>  dextrose 50% Injectable 25 Gram(s) IV Push once  dextrose 50% Injectable 12.5 Gram(s) IV Push once  glucagon  Injectable 1 milliGRAM(s) IntraMuscular once  heparin   Injectable 5000 Unit(s) SubCutaneous every 12 hours  insulin glargine Injectable (LANTUS) 12 Unit(s) SubCutaneous at bedtime  insulin lispro (ADMELOG) corrective regimen sliding scale   SubCutaneous at bedtime  insulin lispro (ADMELOG) corrective regimen sliding scale   SubCutaneous three times a day before meals  melatonin 6 milliGRAM(s) Oral at bedtime  midodrine. 5 milliGRAM(s) Oral three times a day  potassium chloride   Powder 40 milliEquivalent(s) Oral once  sodium bicarbonate 650 milliGRAM(s) Oral three times a day    PRN Inpatient Medications  acetaminophen     Tablet .. 650 milliGRAM(s) Oral every 6 hours PRN  aluminum hydroxide/magnesium hydroxide/simethicone Suspension 30 milliLiter(s) Oral every 4 hours PRN  dextrose Oral Gel 15 Gram(s) Oral once PRN  ondansetron Injectable 4 milliGRAM(s) IV Push every 8 hours PRN    REVIEW OF SYSTEMS  --------------------------------------------------------------------------------  Gen: See HPI, no fever  Skin: +Chronic B/L LE edema  Head/Eyes/Ears: No HA  Respiratory: See HPI, No SOB   CV: See HPI, No CP  GI: No abdominal pain/nausea or vomiting, diarrhea   : See HPI, +increased UOP   MSK: +Chronic B/L LE edema  Heme: No easy bruising    All other systems were reviewed and are negative, except as noted.    VITALS/PHYSICAL EXAM  --------------------------------------------------------------------------------  T(C): 36.2 (07-11-24 @ 06:04), Max: 36.8 (07-10-24 @ 22:45)  HR: 92 (07-11-24 @ 06:04) (79 - 92)  BP: 121/72 (07-11-24 @ 06:04) (118/60 - 131/71)  RR: 17 (07-11-24 @ 06:04) (17 - 18)  SpO2: 96% (07-11-24 @ 06:04) (95% - 98%)  Wt(kg): --    07-10-24 @ 07:01  -  07-11-24 @ 07:00  --------------------------------------------------------  IN: 260 mL / OUT: 430 mL / NET: -170 mL    Physical Exam:  	Gen: resting, ill appearing   	HEENT: No JVD, on supplemental oxygen via NC  	Pulm: CTA B/L   	CV: S1S2+  	Abd: Soft, +BS               : +Condom catheter with urine in bag   	Ext: +B/L LE pitting edema  	Neuro: Awake, alert  	Skin: Chronic skin changes/hyperpigmented skin in B/L LE  	Vascular access: Peripheral IV line    LABS/STUDIES  --------------------------------------------------------------------------------              7.8    6.80  >-----------<  142      [07-11-24 @ 03:16]              23.8     134  |  96  |  100  ----------------------------<  214      [07-11-24 @ 03:16]  3.4   |  23  |  4.21        Ca     8.4     [07-11-24 @ 03:16]      Mg     1.90     [07-11-24 @ 03:16]      Phos  8.7     [07-11-24 @ 03:16]    CK 12      [07-11-24 @ 03:16]    Creatinine Trend:  SCr 4.21 [07-11 @ 03:16]  SCr 4.81 [07-10 @ 07:10]  SCr 5.03 [07-09 @ 06:00]  SCr 4.93 [07-08 @ 06:18]  SCr 4.77 [07-07 @ 18:45]    C3 Complement 191      [07-05-24 @ 16:42]  C4 Complement 34      [07-05-24 @ 16:42]  MPO-ANCA <5.0, interpretation: Negative      [07-05-24 @ 16:42]  PR3-ANCA <5.0, interpretation: Negative      [07-05-24 @ 16:42]  anti-GBM <0.2      [07-05-24 @ 16:42]  ASLO 96      [07-05-24 @ 16:42] [facility] Division of Kidney Diseases & Hypertension  FOLLOW UP NOTE  [phone]--------------------------------------------------------------------------------    Chief Complaint: BRITT     24 hour events/subjective: Pt. seen and examined earlier this morning, son present at [facility]. Pt. says he had SOB and CP overnight, which has now resolved. Pt. with urinary retention however refusing Kramer catheter insertion. Pt. gives history of chronic B/L LE edema. No fever or abd pain during rounds today.     PAST HISTORY  --------------------------------------------------------------------------------  No significant changes to PMH, PSH, FHx, SHx, unless otherwise noted    ALLERGIES & MEDICATIONS  --------------------------------------------------------------------------------  Allergies    No Known Allergies    Intolerances    Standing Inpatient Medications  atorvastatin 80 milliGRAM(s) Oral at bedtime  buMETAnide Infusion 1 mG/Hr IV Continuous <Continuous>  cefepime   IVPB 1000 milliGRAM(s) IV Intermittent every 12 hours  cyanocobalamin 1000 MICROGram(s) Oral daily  dextrose 10% Bolus 125 milliLiter(s) IV Bolus once  dextrose 5%. 1000 milliLiter(s) IV Continuous <Continuous>  dextrose 5%. 1000 milliLiter(s) IV Continuous <Continuous>  dextrose 50% Injectable 25 Gram(s) IV Push once  dextrose 50% Injectable 12.5 Gram(s) IV Push once  glucagon  Injectable 1 milliGRAM(s) IntraMuscular once  heparin   Injectable 5000 Unit(s) SubCutaneous every 12 hours  insulin glargine Injectable (LANTUS) 12 Unit(s) SubCutaneous at bedtime  insulin lispro (ADMELOG) corrective regimen sliding scale   SubCutaneous at bedtime  insulin lispro (ADMELOG) corrective regimen sliding scale   SubCutaneous three times a day before meals  melatonin 6 milliGRAM(s) Oral at bedtime  midodrine. 5 milliGRAM(s) Oral three times a day  potassium chloride   Powder 40 milliEquivalent(s) Oral once  sodium bicarbonate 650 milliGRAM(s) Oral three times a day    PRN Inpatient Medications  acetaminophen     Tablet .. 650 milliGRAM(s) Oral every 6 hours PRN  aluminum hydroxide/magnesium hydroxide/simethicone Suspension 30 milliLiter(s) Oral every 4 hours PRN  dextrose Oral Gel 15 Gram(s) Oral once PRN  ondansetron Injectable 4 milliGRAM(s) IV Push every 8 hours PRN    REVIEW OF SYSTEMS  --------------------------------------------------------------------------------  Gen: See HPI, no fever  Skin: +Chronic B/L LE edema  Head/Eyes/Ears: No HA  Respiratory: See HPI, No SOB at time of evaluation  CV: See HPI, No CP at time of evaluation  GI: No abdominal pain/nausea or vomiting, diarrhea   : See HPI, +increased UOP   MSK: +Chronic B/L LE edema  Heme: No easy bruising    All other systems were reviewed and are negative, except as noted.    VITALS/PHYSICAL EXAM  --------------------------------------------------------------------------------  T(C): 36.2 (07-11-24 @ 06:04), Max: 36.8 (07-10-24 @ 22:45)  HR: 92 (07-11-24 @ 06:04) (79 - 92)  BP: 121/72 (07-11-24 @ 06:04) (118/60 - 131/71)  RR: 17 (07-11-24 @ 06:04) (17 - 18)  SpO2: 96% (07-11-24 @ 06:04) (95% - 98%)  Wt(kg): --    07-10-24 @ 07:01  -  07-11-24 @ 07:00  --------------------------------------------------------  IN: 260 mL / OUT: 430 mL / NET: -170 mL    Physical Exam:  	Gen: resting, ill appearing   	HEENT: No JVD, on supplemental oxygen via NC  	Pulm: CTA B/L   	CV: S1S2+  	Abd: Soft, +BS               : +Condom catheter with urine in bag   	Ext: +B/L LE pitting edema  	Neuro: Awake, alert  	Skin: Chronic skin changes/hyperpigmented skin in B/L LE  	Vascular access: Peripheral IV line    LABS/STUDIES  --------------------------------------------------------------------------------              7.8    6.80  >-----------<  142      [07-11-24 @ 03:16]              23.8     134  |  96  |  100  ----------------------------<  214      [07-11-24 @ 03:16]  3.4   |  23  |  4.21        Ca     8.4     [07-11-24 @ 03:16]      Mg     1.90     [07-11-24 @ 03:16]      Phos  8.7     [07-11-24 @ 03:16]    CK 12      [07-11-24 @ 03:16]    Creatinine Trend:  SCr 4.21 [07-11 @ 03:16]  SCr 4.81 [07-10 @ 07:10]  SCr 5.03 [07-09 @ 06:00]  SCr 4.93 [07-08 @ 06:18]  SCr 4.77 [07-07 @ 18:45]    C3 Complement 191      [07-05-24 @ 16:42]  C4 Complement 34      [07-05-24 @ 16:42]  MPO-ANCA <5.0, interpretation: Negative      [07-05-24 @ 16:42]  PR3-ANCA <5.0, interpretation: Negative      [07-05-24 @ 16:42]  anti-GBM <0.2      [07-05-24 @ 16:42]

## 2024-07-11 NOTE — CHART NOTE - NSCHARTNOTEFT_GEN_A_CORE
ACP MEDICINE NIGHT COVERAGE - Medicine Subsequent Hospital Care Note      Informed by RN that     ROS:  Denies fever, chills, diaphoresis, malaise, night sweats, generalized weakness, headache, changes in vision, dizziness, cough, sputum production, wheezing, hemoptysis, chest pain, palpitations, shortness of breath, dyspnea on exertion, PND, dysphagia, new abdominal pain, nausea, vomiting, diarrhea, constipation, melena, hematochezia, dysuria, increased urinary frequency/urgency, hematuria, nocturia, numbness/weakness/tingling, any other complaints.    [  ] I spoke with the attending, nurse, and family to obtain the history.  [  ] Unable to obtain history due to ___________.    Vital Signs Last 24 Hrs  T(C): 36.8 (07-10-24 @ 22:45), Max: 36.8 (07-10-24 @ 22:45)  T(F): 98.2 (07-10-24 @ 22:45), Max: 98.2 (07-10-24 @ 22:45)  HR: 84 (07-11-24 @ 02:59) (79 - 90)  BP: 118/60 (07-11-24 @ 02:59) (118/60 - 131/71)  RR: 18 (07-11-24 @ 02:59) (17 - 18)  SpO2: 95% (07-11-24 @ 02:59) (95% - 98%) on (O2)    PHYSICAL EXAM:  Constitutional: NAD, well-developed, well-nourished  Ears, nose, Mouth, and Throat: normal external ears and nose, normal hearing, moist oral mucosa  Eyes: normal conjunctiva, EOMI, PEERL  Neck: supple, no JVD  Respiratory: Clear to auscultation bilaterally. No wheezes, rales or rhonchi. Normal respiratory effort  Cardiovascular: regular rate and rhythm, S1 and S2, no murmurs, rubs or gallops, no edema, 2+ peripheral pulses  Gastrointestinal: soft, nontender, nondistended, +bowel sounds, no hernia  Skin: warm, dry, no rash  Neurologic: sensation grossly intact, CN grossly intact, non-focal exam  Musculoskeletal: no clubbing, no cyanosis, no joint swelling  Psychiatric: A&Ox3, appropriate mood, affect    LABS:                        7.8    6.80  )-----------( 142      ( 11 Jul 2024 03:16 )             23.8     07-11    134<L>  |  96<L>  |  100<H>  ----------------------------<  214<H>  3.4<L>   |  23  |  4.21<H>    Ca    8.4      11 Jul 2024 03:16  Phos  8.7     07-11  Mg     1.90     07-11      PT/INR: PT: 16.4 sec | INR: 1.48 ratio (06-28-24 @ 21:40)  PTT: 22.2 sec (06-28-24 @ 21:40)    CARDIAC ENZYMES    Creatine Kinase, Serum: 12 U/L (07-11-24 @ 03:16)  Creatine Kinase, Serum: 123 U/L (06-28-24 @ 21:40)          Serum Pro-Brain Natriuretic Peptide:   D-Dimer Assay:     Urinanalysis Basic (07-11-24 @ 04:08):  Color: -- / Appearance: -- / SG: -- / pH: --  Gluc: 214 / Ketone: -- / Bili: -- / Urobili: --  Blood: -- / Protein: -- / Nitrite: --  Leuk Esterase: -- / RBC: -- / WBC: --  Sq Epi: -- / Non Sq Epi: -- / Bacteria: --      Blood Gas Venous (07-11-24 @ 04:08):      Blood Gas Arterial (07-11-24 @ 04:08):      CAPILLARY BLOOD GLUCOSE:  POCT Blood Glucose: 281 mg/dL (07-10-24 @ 22:51)  POCT Blood Glucose: 226 mg/dL (07-10-24 @ 18:42)  POCT Blood Glucose: 207 mg/dL (07-10-24 @ 12:23)      COVID PCR:      RADIOLOGY:    MEDICATIONS  (STANDING):  atorvastatin 80 milliGRAM(s) Oral at bedtime  buMETAnide Infusion 1 mG/Hr (5 mL/Hr) IV Continuous <Continuous>  cefepime   IVPB 1000 milliGRAM(s) IV Intermittent every 12 hours  cyanocobalamin 1000 MICROGram(s) Oral daily  dextrose 10% Bolus 125 milliLiter(s) IV Bolus once  dextrose 5%. 1000 milliLiter(s) (50 mL/Hr) IV Continuous <Continuous>  dextrose 5%. 1000 milliLiter(s) (100 mL/Hr) IV Continuous <Continuous>  dextrose 50% Injectable 25 Gram(s) IV Push once  dextrose 50% Injectable 12.5 Gram(s) IV Push once  glucagon  Injectable 1 milliGRAM(s) IntraMuscular once  heparin   Injectable 5000 Unit(s) SubCutaneous every 12 hours  insulin glargine Injectable (LANTUS) 12 Unit(s) SubCutaneous at bedtime  insulin lispro (ADMELOG) corrective regimen sliding scale   SubCutaneous at bedtime  insulin lispro (ADMELOG) corrective regimen sliding scale   SubCutaneous three times a day before meals  melatonin 6 milliGRAM(s) Oral at bedtime  midodrine. 5 milliGRAM(s) Oral three times a day  sodium bicarbonate 650 milliGRAM(s) Oral three times a day    MEDICATIONS  (PRN):  acetaminophen     Tablet .. 650 milliGRAM(s) Oral every 6 hours PRN Temp greater or equal to 38C (100.4F), Mild Pain (1 - 3)  aluminum hydroxide/magnesium hydroxide/simethicone Suspension 30 milliLiter(s) Oral every 4 hours PRN Dyspepsia  dextrose Oral Gel 15 Gram(s) Oral once PRN Blood Glucose LESS THAN 70 milliGRAM(s)/deciliter  ondansetron Injectable 4 milliGRAM(s) IV Push every 8 hours PRN Nausea and/or Vomiting    I&O's Summary    09 Jul 2024 07:01  -  10 Jul 2024 07:00  --------------------------------------------------------  IN: 535 mL / OUT: 1850 mL / NET: -1315 mL    10 Jul 2024 07:01  -  11 Jul 2024 04:08  --------------------------------------------------------  IN: 260 mL / OUT: 430 mL / NET: -170 mL      I reviewed the above labs, radiology, medications, tests, telemetry, and EKG interpretation.    ASSESSMENT/PLAN:    - Clinical findings, labs, tests, telemetry, and ekg reviewed with attending. Will monitor patient closely.     Aleena Douglas PA-C  [facility] [id number] [facility] MEDICINE NIGHT COVERAGE - Medicine Subsequent Hospital Care Note    84 y/o M with pmhx of DM2, COPD, BPH, HLD, CAD, MM on chemo presented to the ED for new onset sepsis and ATN.  Informed by RN that patient complaining of chest pressure and shortness of breath. Patient seen and assessed at bedside.     Patient comfortably sleeping. Daughter Rhonda at bedside. Patient states his pain is non-radiating upper abdominal pressure and shortness of breath. Patient states that the pain has started at 3am however the abdominal pain and shortness of breath has subsided. Patient states that he has felt this pain before earlier in the day. Daughter believes that this is due to being in the hospital a while.  Patient was a direct transfer from [facility].     ROS: Denies chest pain, palpitations, shortness of breath, dyspnea on exertion, night sweats, generalized weakness, cough, sputum production, wheezing, hemoptysis, abdominal pain, nausea, vomiting, diarrhea.    Vital Signs Last 24 Hrs  T(C): 36.8 (07-10-24 @ 22:45), Max: 36.8 (07-10-24 @ 22:45)  T(F): 98.2 (07-10-24 @ 22:45), Max: 98.2 (07-10-24 @ 22:45)  HR: 84 (07-11-24 @ 02:59) (79 - 90)  BP: 118/60 (07-11-24 @ 02:59) (118/60 - 131/71)  RR: 18 (07-11-24 @ 02:59) (17 - 18)  SpO2: 95% (07-11-24 @ 02:59) (95% - 98%) on (O2)    PHYSICAL EXAM:  Constitutional: NAD, well-developed, well-nourished  Ears, nose, Mouth, and Throat: normal external ears and nose, normal hearing, moist oral mucosa  Respiratory: Clear to auscultation bilaterally. No wheezes, rales or rhonchi. Normal respiratory effort  Cardiovascular: regular rate and rhythm, S1 and S2   Gastrointestinal: soft, nontender, nondistended, +bowel sounds, no hernia  Psychiatric: A&Ox3, appropriate mood, affect    LABS:                        7.8    6.80  )-----------( 142      ( 11 Jul 2024 03:16 )             23.8     07-11    134<L>  |  96<L>  |  100<H>  ----------------------------<  214<H>  3.4<L>   |  23  |  4.21<H>    Ca    8.4      11 Jul 2024 03:16  Phos  8.7     07-11  Mg     1.90     07-11      PT/INR: PT: 16.4 sec | INR: 1.48 ratio (06-28-24 @ 21:40)  PTT: 22.2 sec (06-28-24 @ 21:40)    CARDIAC ENZYMES    Creatine Kinase, Serum: 12 U/L (07-11-24 @ 03:16)  Creatine Kinase, Serum: 123 U/L (06-28-24 @ 21:40)          Serum Pro-Brain Natriuretic Peptide:   D-Dimer Assay:     Urinanalysis Basic (07-11-24 @ 04:08):  Color: -- / Appearance: -- / SG: -- / pH: --  Gluc: 214 / Ketone: -- / Bili: -- / Urobili: --  Blood: -- / Protein: -- / Nitrite: --  Leuk Esterase: -- / RBC: -- / WBC: --  Sq Epi: -- / Non Sq Epi: -- / Bacteria: --      CAPILLARY BLOOD GLUCOSE:  POCT Blood Glucose: 281 mg/dL (07-10-24 @ 22:51)  POCT Blood Glucose: 226 mg/dL (07-10-24 @ 18:42)  POCT Blood Glucose: 207 mg/dL (07-10-24 @ 12:23)      COVID PCR:      RADIOLOGY:    MEDICATIONS  (STANDING):  atorvastatin 80 milliGRAM(s) Oral at bedtime  buMETAnide Infusion 1 mG/Hr (5 mL/Hr) IV Continuous <Continuous>  cefepime   IVPB 1000 milliGRAM(s) IV Intermittent every 12 hours  cyanocobalamin 1000 MICROGram(s) Oral daily  dextrose 10% Bolus 125 milliLiter(s) IV Bolus once  dextrose 5%. 1000 milliLiter(s) (50 mL/Hr) IV Continuous <Continuous>  dextrose 5%. 1000 milliLiter(s) (100 mL/Hr) IV Continuous <Continuous>  dextrose 50% Injectable 25 Gram(s) IV Push once  dextrose 50% Injectable 12.5 Gram(s) IV Push once  glucagon  Injectable 1 milliGRAM(s) IntraMuscular once  heparin   Injectable 5000 Unit(s) SubCutaneous every 12 hours  insulin glargine Injectable (LANTUS) 12 Unit(s) SubCutaneous at bedtime  insulin lispro (ADMELOG) corrective regimen sliding scale   SubCutaneous at bedtime  insulin lispro (ADMELOG) corrective regimen sliding scale   SubCutaneous three times a day before meals  melatonin 6 milliGRAM(s) Oral at bedtime  midodrine. 5 milliGRAM(s) Oral three times a day  sodium bicarbonate 650 milliGRAM(s) Oral three times a day    MEDICATIONS  (PRN):  acetaminophen     Tablet .. 650 milliGRAM(s) Oral every 6 hours PRN Temp greater or equal to 38C (100.4F), Mild Pain (1 - 3)  aluminum hydroxide/magnesium hydroxide/simethicone Suspension 30 milliLiter(s) Oral every 4 hours PRN Dyspepsia  dextrose Oral Gel 15 Gram(s) Oral once PRN Blood Glucose LESS THAN 70 milliGRAM(s)/deciliter  ondansetron Injectable 4 milliGRAM(s) IV Push every 8 hours PRN Nausea and/or Vomiting    I&O's Summary    09 Jul 2024 07:01  -  10 Jul 2024 07:00  --------------------------------------------------------  IN: 535 mL / OUT: 1850 mL / NET: -1315 mL    10 Jul 2024 07:01  -  11 Jul 2024 04:08  --------------------------------------------------------  IN: 260 mL / OUT: 430 mL / NET: -170 mL        ASSESSMENT/PLAN:    84 y/o M with pmhx of DM2, COPD, BPH, HLD, CAD, MM on chemo presented to the ED for new onset sepsis and ATN.  Informed by RN that patient complaining of chest pressure and shortness of breath. Patient states the pain was upper abdominal pain however patient's pain has now subsided.     - EKG ordered  - Cardiac enzymes sent   - Telemetry ordered   - Will monitor patient closely       Aleena Douglas PA-C  Medicine [id number] [facility] MEDICINE NIGHT COVERAGE - Medicine Subsequent Hospital Care Note    84 y/o M with pmhx of DM2, COPD, BPH, HLD, CAD, MM on chemo presented to the ED for new onset sepsis and ATN.  Informed by RN that patient complaining of chest pressure and shortness of breath. Patient seen and assessed at bedside.     Patient comfortably sleeping. Daughter Rhonda at bedside. Patient states his pain is non-radiating upper abdominal pressure and shortness of breath. Patient states that the pain has started at 3am however the abdominal pain and shortness of breath has subsided. Patient states that he has felt this pain before earlier in the day. Daughter believes that this is due to being in the hospital a while.  Patient was a direct transfer from [facility].     ROS: Denies chest pain, palpitations, shortness of breath, dyspnea on exertion, night sweats, generalized weakness, cough, sputum production, wheezing, hemoptysis, abdominal pain, nausea, vomiting, diarrhea.    Vital Signs Last 24 Hrs  T(C): 36.8 (07-10-24 @ 22:45), Max: 36.8 (07-10-24 @ 22:45)  T(F): 98.2 (07-10-24 @ 22:45), Max: 98.2 (07-10-24 @ 22:45)  HR: 84 (07-11-24 @ 02:59) (79 - 90)  BP: 118/60 (07-11-24 @ 02:59) (118/60 - 131/71)  RR: 18 (07-11-24 @ 02:59) (17 - 18)  SpO2: 95% (07-11-24 @ 02:59) (95% - 98%) on (O2)    PHYSICAL EXAM:  Constitutional: NAD, well-developed, well-nourished  Ears, nose, Mouth, and Throat: normal external ears and nose, normal hearing, moist oral mucosa  Respiratory: Clear to auscultation bilaterally. No wheezes, rales or rhonchi. Normal respiratory effort  Cardiovascular: regular rate and rhythm, S1 and S2   Gastrointestinal: soft, nontender, nondistended, +bowel sounds, no hernia  Psychiatric: A&Ox3, appropriate mood, affect    LABS:                        7.8    6.80  )-----------( 142      ( 11 Jul 2024 03:16 )             23.8     07-11    134<L>  |  96<L>  |  100<H>  ----------------------------<  214<H>  3.4<L>   |  23  |  4.21<H>    Ca    8.4      11 Jul 2024 03:16  Phos  8.7     07-11  Mg     1.90     07-11      PT/INR: PT: 16.4 sec | INR: 1.48 ratio (06-28-24 @ 21:40)  PTT: 22.2 sec (06-28-24 @ 21:40)    CARDIAC ENZYMES    Creatine Kinase, Serum: 12 U/L (07-11-24 @ 03:16)  Creatine Kinase, Serum: 123 U/L (06-28-24 @ 21:40)          Serum Pro-Brain Natriuretic Peptide:   D-Dimer Assay:     Urinanalysis Basic (07-11-24 @ 04:08):  Color: -- / Appearance: -- / SG: -- / pH: --  Gluc: 214 / Ketone: -- / Bili: -- / Urobili: --  Blood: -- / Protein: -- / Nitrite: --  Leuk Esterase: -- / RBC: -- / WBC: --  Sq Epi: -- / Non Sq Epi: -- / Bacteria: --      CAPILLARY BLOOD GLUCOSE:  POCT Blood Glucose: 281 mg/dL (07-10-24 @ 22:51)  POCT Blood Glucose: 226 mg/dL (07-10-24 @ 18:42)  POCT Blood Glucose: 207 mg/dL (07-10-24 @ 12:23)      COVID PCR:      RADIOLOGY:    MEDICATIONS  (STANDING):  atorvastatin 80 milliGRAM(s) Oral at bedtime  buMETAnide Infusion 1 mG/Hr (5 mL/Hr) IV Continuous <Continuous>  cefepime   IVPB 1000 milliGRAM(s) IV Intermittent every 12 hours  cyanocobalamin 1000 MICROGram(s) Oral daily  dextrose 10% Bolus 125 milliLiter(s) IV Bolus once  dextrose 5%. 1000 milliLiter(s) (50 mL/Hr) IV Continuous <Continuous>  dextrose 5%. 1000 milliLiter(s) (100 mL/Hr) IV Continuous <Continuous>  dextrose 50% Injectable 25 Gram(s) IV Push once  dextrose 50% Injectable 12.5 Gram(s) IV Push once  glucagon  Injectable 1 milliGRAM(s) IntraMuscular once  heparin   Injectable 5000 Unit(s) SubCutaneous every 12 hours  insulin glargine Injectable (LANTUS) 12 Unit(s) SubCutaneous at bedtime  insulin lispro (ADMELOG) corrective regimen sliding scale   SubCutaneous at bedtime  insulin lispro (ADMELOG) corrective regimen sliding scale   SubCutaneous three times a day before meals  melatonin 6 milliGRAM(s) Oral at bedtime  midodrine. 5 milliGRAM(s) Oral three times a day  sodium bicarbonate 650 milliGRAM(s) Oral three times a day    MEDICATIONS  (PRN):  acetaminophen     Tablet .. 650 milliGRAM(s) Oral every 6 hours PRN Temp greater or equal to 38C (100.4F), Mild Pain (1 - 3)  aluminum hydroxide/magnesium hydroxide/simethicone Suspension 30 milliLiter(s) Oral every 4 hours PRN Dyspepsia  dextrose Oral Gel 15 Gram(s) Oral once PRN Blood Glucose LESS THAN 70 milliGRAM(s)/deciliter  ondansetron Injectable 4 milliGRAM(s) IV Push every 8 hours PRN Nausea and/or Vomiting    I&O's Summary    09 Jul 2024 07:01  -  10 Jul 2024 07:00  --------------------------------------------------------  IN: 535 mL / OUT: 1850 mL / NET: -1315 mL    10 Jul 2024 07:01  -  11 Jul 2024 04:08  --------------------------------------------------------  IN: 260 mL / OUT: 430 mL / NET: -170 mL        ASSESSMENT/PLAN:    84 y/o M with pmhx of DM2, COPD, BPH, HLD, CAD, MM on chemo presented to the ED for new onset sepsis and ATN.  Informed by RN that patient complaining of chest pressure and shortness of breath. Patient states the pain was upper abdominal pain however patient's pain has now subsided.     - EKG ordered  - Cardiac enzymes sent   - Potassium repleated   - Telemetry ordered   - Will monitor patient closely       Aleena Douglas PA-C  Medicine [id number] [facility] MEDICINE NIGHT COVERAGE - Medicine Subsequent Hospital Care Note    84 y/o M with pmhx of DM2, COPD, BPH, HLD, CAD, MM on chemo presented to the ED for new onset sepsis and ATN.  Informed by RN that patient complaining of chest pressure and shortness of breath. Patient seen and assessed at bedside.     Patient comfortably sleeping. Daughter Rhonda at bedside. Patient states his pain is non-radiating upper abdominal pressure and shortness of breath. Patient states that the pain has started at 3am however the abdominal pain and shortness of breath has subsided. Patient states that he has felt this pain before earlier in the day. Daughter believes that this is due to being in the hospital a while.  Patient was a direct transfer from [facility].     ROS: Denies chest pain, palpitations, shortness of breath, dyspnea on exertion, night sweats, generalized weakness, cough, sputum production, wheezing, hemoptysis, abdominal pain, nausea, vomiting, diarrhea.    Vital Signs Last 24 Hrs  T(C): 36.8 (07-10-24 @ 22:45), Max: 36.8 (07-10-24 @ 22:45)  T(F): 98.2 (07-10-24 @ 22:45), Max: 98.2 (07-10-24 @ 22:45)  HR: 84 (07-11-24 @ 02:59) (79 - 90)  BP: 118/60 (07-11-24 @ 02:59) (118/60 - 131/71)  RR: 18 (07-11-24 @ 02:59) (17 - 18)  SpO2: 95% (07-11-24 @ 02:59) (95% - 98%) on (O2)    PHYSICAL EXAM:  Constitutional: NAD, well-developed, well-nourished  Ears, nose, Mouth, and Throat: normal external ears and nose, normal hearing, moist oral mucosa  Respiratory: Clear to auscultation bilaterally. No wheezes, rales or rhonchi. Normal respiratory effort  Cardiovascular: regular rate and rhythm, S1 and S2   Gastrointestinal: soft, nontender, nondistended, +bowel sounds, no hernia  Psychiatric: A&Ox3, appropriate mood, affect    LABS:                        7.8    6.80  )-----------( 142      ( 11 Jul 2024 03:16 )             23.8     07-11    134<L>  |  96<L>  |  100<H>  ----------------------------<  214<H>  3.4<L>   |  23  |  4.21<H>    Ca    8.4      11 Jul 2024 03:16  Phos  8.7     07-11  Mg     1.90     07-11      PT/INR: PT: 16.4 sec | INR: 1.48 ratio (06-28-24 @ 21:40)  PTT: 22.2 sec (06-28-24 @ 21:40)    CARDIAC ENZYMES    Creatine Kinase, Serum: 12 U/L (07-11-24 @ 03:16)  Creatine Kinase, Serum: 123 U/L (06-28-24 @ 21:40)          Serum Pro-Brain Natriuretic Peptide:   D-Dimer Assay:     Urinanalysis Basic (07-11-24 @ 04:08):  Color: -- / Appearance: -- / SG: -- / pH: --  Gluc: 214 / Ketone: -- / Bili: -- / Urobili: --  Blood: -- / Protein: -- / Nitrite: --  Leuk Esterase: -- / RBC: -- / WBC: --  Sq Epi: -- / Non Sq Epi: -- / Bacteria: --      CAPILLARY BLOOD GLUCOSE:  POCT Blood Glucose: 281 mg/dL (07-10-24 @ 22:51)  POCT Blood Glucose: 226 mg/dL (07-10-24 @ 18:42)  POCT Blood Glucose: 207 mg/dL (07-10-24 @ 12:23)      COVID PCR:      RADIOLOGY:    MEDICATIONS  (STANDING):  atorvastatin 80 milliGRAM(s) Oral at bedtime  buMETAnide Infusion 1 mG/Hr (5 mL/Hr) IV Continuous <Continuous>  cefepime   IVPB 1000 milliGRAM(s) IV Intermittent every 12 hours  cyanocobalamin 1000 MICROGram(s) Oral daily  dextrose 10% Bolus 125 milliLiter(s) IV Bolus once  dextrose 5%. 1000 milliLiter(s) (50 mL/Hr) IV Continuous <Continuous>  dextrose 5%. 1000 milliLiter(s) (100 mL/Hr) IV Continuous <Continuous>  dextrose 50% Injectable 25 Gram(s) IV Push once  dextrose 50% Injectable 12.5 Gram(s) IV Push once  glucagon  Injectable 1 milliGRAM(s) IntraMuscular once  heparin   Injectable 5000 Unit(s) SubCutaneous every 12 hours  insulin glargine Injectable (LANTUS) 12 Unit(s) SubCutaneous at bedtime  insulin lispro (ADMELOG) corrective regimen sliding scale   SubCutaneous at bedtime  insulin lispro (ADMELOG) corrective regimen sliding scale   SubCutaneous three times a day before meals  melatonin 6 milliGRAM(s) Oral at bedtime  midodrine. 5 milliGRAM(s) Oral three times a day  sodium bicarbonate 650 milliGRAM(s) Oral three times a day    MEDICATIONS  (PRN):  acetaminophen     Tablet .. 650 milliGRAM(s) Oral every 6 hours PRN Temp greater or equal to 38C (100.4F), Mild Pain (1 - 3)  aluminum hydroxide/magnesium hydroxide/simethicone Suspension 30 milliLiter(s) Oral every 4 hours PRN Dyspepsia  dextrose Oral Gel 15 Gram(s) Oral once PRN Blood Glucose LESS THAN 70 milliGRAM(s)/deciliter  ondansetron Injectable 4 milliGRAM(s) IV Push every 8 hours PRN Nausea and/or Vomiting    I&O's Summary    09 Jul 2024 07:01  -  10 Jul 2024 07:00  --------------------------------------------------------  IN: 535 mL / OUT: 1850 mL / NET: -1315 mL    10 Jul 2024 07:01  -  11 Jul 2024 04:08  --------------------------------------------------------  IN: 260 mL / OUT: 430 mL / NET: -170 mL        ASSESSMENT/PLAN:    84 y/o M with pmhx of DM2, COPD, BPH, HLD, CAD, MM on chemo presented to the ED for new onset sepsis and ATN.  Informed by RN that patient complaining of chest pressure and shortness of breath. Patient states the pain was upper abdominal pain however patient's pain has now subsided.     - EKG ordered: NSR with PACs   - Cardiac enzymes sent   - Potassium repleated   - Telemetry ordered   - Vitals q4  - Will monitor patient closely       Aleena Douglas PA-C  Medicine [id number] [facility] MEDICINE NIGHT COVERAGE - Medicine Subsequent Hospital Care Note    82 y/o M with pmhx of DM2, COPD, BPH, HLD, CAD, MM on chemo presented to the ED for new onset sepsis and ATN.  Informed by RN that patient complaining of chest pressure and shortness of breath. Patient seen and assessed at bedside.     Patient comfortably sleeping. Daughter Rhonda at bedside. Patient states his pain is non-radiating upper abdominal pressure and shortness of breath. Patient states that the pain has started at 3am however the abdominal pain and shortness of breath has subsided. Patient states that he has felt this pain before earlier in the day. Daughter believes that this is due to being in the hospital a while.  Patient was a direct transfer from [facility].     ROS: Denies chest pain, palpitations, shortness of breath, dyspnea on exertion, night sweats, generalized weakness, cough, sputum production, wheezing, hemoptysis, abdominal pain, nausea, vomiting, diarrhea.    Vital Signs Last 24 Hrs  T(C): 36.8 (07-10-24 @ 22:45), Max: 36.8 (07-10-24 @ 22:45)  T(F): 98.2 (07-10-24 @ 22:45), Max: 98.2 (07-10-24 @ 22:45)  HR: 84 (07-11-24 @ 02:59) (79 - 90)  BP: 118/60 (07-11-24 @ 02:59) (118/60 - 131/71)  RR: 18 (07-11-24 @ 02:59) (17 - 18)  SpO2: 95% (07-11-24 @ 02:59) (95% - 98%) on (O2)    PHYSICAL EXAM:  Constitutional: NAD, well-developed, well-nourished  Ears, nose, Mouth, and Throat: normal external ears and nose, normal hearing, moist oral mucosa  Respiratory: Clear to auscultation bilaterally. No wheezes, rales or rhonchi. Normal respiratory effort  Cardiovascular: regular rate and rhythm, S1 and S2   Gastrointestinal: soft, nontender, nondistended, +bowel sounds, no hernia  Psychiatric: A&Ox3, appropriate mood, affect    LABS:                        7.8    6.80  )-----------( 142      ( 11 Jul 2024 03:16 )             23.8     07-11    134<L>  |  96<L>  |  100<H>  ----------------------------<  214<H>  3.4<L>   |  23  |  4.21<H>    Ca    8.4      11 Jul 2024 03:16  Phos  8.7     07-11  Mg     1.90     07-11      PT/INR: PT: 16.4 sec | INR: 1.48 ratio (06-28-24 @ 21:40)  PTT: 22.2 sec (06-28-24 @ 21:40)    CARDIAC ENZYMES    Creatine Kinase, Serum: 12 U/L (07-11-24 @ 03:16)  Creatine Kinase, Serum: 123 U/L (06-28-24 @ 21:40)          Serum Pro-Brain Natriuretic Peptide:   D-Dimer Assay:     Urinanalysis Basic (07-11-24 @ 04:08):  Color: -- / Appearance: -- / SG: -- / pH: --  Gluc: 214 / Ketone: -- / Bili: -- / Urobili: --  Blood: -- / Protein: -- / Nitrite: --  Leuk Esterase: -- / RBC: -- / WBC: --  Sq Epi: -- / Non Sq Epi: -- / Bacteria: --      CAPILLARY BLOOD GLUCOSE:  POCT Blood Glucose: 281 mg/dL (07-10-24 @ 22:51)  POCT Blood Glucose: 226 mg/dL (07-10-24 @ 18:42)  POCT Blood Glucose: 207 mg/dL (07-10-24 @ 12:23)      COVID PCR:      RADIOLOGY:    MEDICATIONS  (STANDING):  atorvastatin 80 milliGRAM(s) Oral at bedtime  buMETAnide Infusion 1 mG/Hr (5 mL/Hr) IV Continuous <Continuous>  cefepime   IVPB 1000 milliGRAM(s) IV Intermittent every 12 hours  cyanocobalamin 1000 MICROGram(s) Oral daily  dextrose 10% Bolus 125 milliLiter(s) IV Bolus once  dextrose 5%. 1000 milliLiter(s) (50 mL/Hr) IV Continuous <Continuous>  dextrose 5%. 1000 milliLiter(s) (100 mL/Hr) IV Continuous <Continuous>  dextrose 50% Injectable 25 Gram(s) IV Push once  dextrose 50% Injectable 12.5 Gram(s) IV Push once  glucagon  Injectable 1 milliGRAM(s) IntraMuscular once  heparin   Injectable 5000 Unit(s) SubCutaneous every 12 hours  insulin glargine Injectable (LANTUS) 12 Unit(s) SubCutaneous at bedtime  insulin lispro (ADMELOG) corrective regimen sliding scale   SubCutaneous at bedtime  insulin lispro (ADMELOG) corrective regimen sliding scale   SubCutaneous three times a day before meals  melatonin 6 milliGRAM(s) Oral at bedtime  midodrine. 5 milliGRAM(s) Oral three times a day  sodium bicarbonate 650 milliGRAM(s) Oral three times a day    MEDICATIONS  (PRN):  acetaminophen     Tablet .. 650 milliGRAM(s) Oral every 6 hours PRN Temp greater or equal to 38C (100.4F), Mild Pain (1 - 3)  aluminum hydroxide/magnesium hydroxide/simethicone Suspension 30 milliLiter(s) Oral every 4 hours PRN Dyspepsia  dextrose Oral Gel 15 Gram(s) Oral once PRN Blood Glucose LESS THAN 70 milliGRAM(s)/deciliter  ondansetron Injectable 4 milliGRAM(s) IV Push every 8 hours PRN Nausea and/or Vomiting    I&O's Summary    09 Jul 2024 07:01  -  10 Jul 2024 07:00  --------------------------------------------------------  IN: 535 mL / OUT: 1850 mL / NET: -1315 mL    10 Jul 2024 07:01  -  11 Jul 2024 04:08  --------------------------------------------------------  IN: 260 mL / OUT: 430 mL / NET: -170 mL        ASSESSMENT/PLAN:    82 y/o M with pmhx of DM2, COPD, BPH, HLD, CAD, MM on chemo presented to the ED for new onset sepsis and ATN.  Informed by RN that patient complaining of chest pressure and shortness of breath. Patient states the pain was upper abdominal pain however patient's pain has now subsided.     - EKG ordered: NSR with PACs   - Cardiac enzymes sent   - Potassium repleated   - Telemetry ordered   - Vitals every 4hrs   - Will monitor patient closely       Aleena Douglas PA-C  Medicine [id number]

## 2024-07-11 NOTE — PROVIDER CONTACT NOTE (OTHER) - ASSESSMENT
Pt AxOx3. forgetful at times. pt complained of chest discomfort and difficulty breathing. Pt states that there is something sitting on his chest. Pt's /60. o2 95 on 1L NC. Pt rates it a 5/10.

## 2024-07-11 NOTE — PROGRESS NOTE ADULT - ASSESSMENT
82 y/o M with pmhx of DM2, COPD, BPH, HLD, CAD, MM on chemo presented to the ED for new onset sepsis and ATN.  Patient sent to [facility] for nephrology and dialysis evaluation.

## 2024-07-11 NOTE — PATIENT PROFILE ADULT - NS PRO AD PATIENT TYPE
Final Anesthesia Post-op Assessment    Patient: Efren Lake  Procedure(s) Performed: RIGHT PHACOEMULSIFICATION OF CATARACT WITH IOL INSERTION - RIGHT  Anesthesia type: MAC    Vitals Value Taken Time   Temp 36.1 °C (97 °F) 04/21/21 1650   Pulse 86 04/21/21 1705   Resp 14 04/21/21 1705   SpO2 98 % 04/21/21 1705   /77 04/21/21 1705         Patient Location: PACU Phase 1  Post-op Vital Signs:stable  Level of Consciousness: awake and alert  Respiratory Status: spontaneous ventilation  Cardiovascular stable  Hydration: euvolemic  Pain Management: adequately controlled  Handoff: Handoff to receiving nurse was performed and questions were answered  Vomiting: none   Nausea: None  Airway Patency:patent  Post-op Assessment: no complications and patient tolerated procedure well with no complications      No complications documented.    Do Not Resuscitate (DNR)

## 2024-07-11 NOTE — PATIENT PROFILE ADULT - FUNCTIONAL ASSESSMENT - DAILY ACTIVITY 2.
Caller: Claudia Stokes    Relationship: Self    Best call back number: [phone]    Requested Prescriptions:    cyclobenzaprine (FLEXERIL) 5 MG tablet    Pharmacy where request should be sent:  [facility] [id number] [facility] [address] AT [facility] [phone]  - [phone] FX      Last office visit with prescribing clinician: 6/12/2023   Last telemedicine visit with prescribing clinician: Visit date not found   Next office visit with prescribing clinician: 6/19/2023     Additional details provided by patient: WAS CHECKING ON MEDICATION REFILL. IF THERE IS A PROBLEM PATIENT WOULD LIKE TO HAVE A CALL. SHE HAS CALLED A COUPLE OF TIMES    Does the patient have less than a 3 day supply:  [x] Yes  [] No    Would you like a call back once the refill request has been completed: [x] Yes [] No    If the office needs to give you a call back, can they leave a voicemail: [x] Yes [] No    Lila Red MA   06/20/23 15:40 EDT             1 = Total assistance

## 2024-07-11 NOTE — PROVIDER CONTACT NOTE (OTHER) - RECOMMENDATIONS
Report received from Velma STANTON. Assumed pt care. Patient confused, but pleasant. AOx1, only oriented to self. Denies any pain at this time.  Denies any other distress.  Discussed POC.  Pt verbalized understanding.  Hourly rounding in place. Fall precautions in place and call lights w/in reach.       order for bloodwork

## 2024-07-11 NOTE — PATIENT PROFILE ADULT - FALL HARM RISK - HARM RISK INTERVENTIONS

## 2024-07-11 NOTE — PROGRESS NOTE ADULT - PROBLEM SELECTOR PLAN 1
Pt. with BRITT in the setting of sepsis, hypotension, and MM. Pt. with likely ATN. Scr was WNL 1.2 on OP labs done on 6/10/24. Scr was elevated at 1.71 on admission to [facility] on 6/28/24. Scr progressively increased to 4.59 on 7/6/24. Pt. transferred to [facility] on 7/6/24 for worsening BRITT and possible need for HD. Scr was elevated at 4.93 on 7/8/24 and increased to 5.03 (7/9/10). Scr decreased to 4.21 today (7/11/24). Pt. noted to have proteinuria and microscopic hematuria on UA done on 6/28/24. Pt. with IgA kappa bands on serum immunofixation on labs done 6/10/24. Anti-GBM ab, PR3 and MPO antibodies were negative on 7/5/24. Serum C3 and C4 were not low on 7/5/24. No hydronephrosis seen on renal US done at [facility] on 7/1/24. No right hydronephrosis seen on renal US done at [facility] on 7/7/24. L kidney not well visualized due to bowel gas. Pt. was oliguric, received Bumex infusion on 7/8, 7/9, and 7/10. However overnight pt with urinary retention, with PVR ~700cc. Pt adamantly refusing felix insertion despite knowing the risks. 24hr UOP ~430cc in the past 24hrs. Pt. with significant B/L edema, recommend felix insertion and IV Bumex infusion at 1 mg/hr for 10 hrs today (7/11). Obtain renal US to better visualize left kidney. Recommend serial bladder scans. Monitor labs and UOP. Avoid nephrotoxins. Dose medications for eGFR.      If you have any questions, please feel free to contact me  Mauricio Youssef  Nephrology Fellow  [email or web address] [phone]  (After 4pm or on weekends please page the on-call fellow). Pt. with BRITT in the setting of sepsis, hypotension, and MM. Pt. with likely ATN. Scr was WNL 1.2 on OP labs done on 6/10/24. Scr was elevated at 1.71 on admission to [facility] on 6/28/24. Scr progressively increased to 4.59 on 7/6/24. Pt. transferred to [facility] on 7/6/24 for worsening BRITT and possible need for HD. Scr was elevated at 4.93 on 7/8/24 and increased to 5.03 (7/9/10). Scr decreased to 4.21 today (7/11/24). Pt. noted to have proteinuria and microscopic hematuria on UA done on 6/28/24. Pt. with IgA kappa bands on serum immunofixation on labs done 6/10/24. Anti-GBM ab, PR3 and MPO antibodies were negative on 7/5/24. Serum C3 and C4 were not low on 7/5/24. No hydronephrosis seen on renal US done at [facility] on 7/1/24. No right hydronephrosis seen on renal US done at [facility] on 7/7/24. L kidney not well visualized due to bowel gas. Pt. was oliguric, received Bumex infusion on 7/8, 7/9, and 7/10. Pt. with urinary retention overnight, with PVR ~700cc. Pt. refused Kramer insertion. 24hr UOP ~430cc in the past 24hrs. Pt. with significant B/L edema, recommend IV Bumex infusion at 1 mg/hr for 10 hrs today (7/11). Obtain renal US to better visualize left kidney. Recommend serial bladder scans. Monitor labs and UOP. Avoid nephrotoxins. Dose medications for eGFR.     If you have any questions, please feel free to contact me  Mauricio Youssef  Nephrology Fellow  [employer]/Page [phone]  (After 4pm or on weekends please page the on-call fellow).

## 2024-07-11 NOTE — PROGRESS NOTE ADULT - SUBJECTIVE AND OBJECTIVE BOX
[facility] Division of Hospital Medicine  Christophe Bonner DO  Available via MS Teams  In house pager [phone]    SUBJECTIVE / OVERNIGHT EVENTS:  Overnight reported epigastric pain. ECG was performed without ischemic ST-T changes.  Continues to report some indigestion symptoms.    Spoke to son Alfredito at bedside and updated, including plans from nephrology perspective regarding diuresis.    ADDITIONAL REVIEW OF SYSTEMS:    MEDICATIONS  (STANDING):  atorvastatin 80 milliGRAM(s) Oral at bedtime  buMETAnide Infusion 1 mG/Hr (5 mL/Hr) IV Continuous <Continuous>  cefepime   IVPB 1000 milliGRAM(s) IV Intermittent every 12 hours  cyanocobalamin 1000 MICROGram(s) Oral daily  dextrose 10% Bolus 125 milliLiter(s) IV Bolus once  dextrose 5%. 1000 milliLiter(s) (100 mL/Hr) IV Continuous <Continuous>  dextrose 5%. 1000 milliLiter(s) (50 mL/Hr) IV Continuous <Continuous>  dextrose 50% Injectable 12.5 Gram(s) IV Push once  dextrose 50% Injectable 25 Gram(s) IV Push once  glucagon  Injectable 1 milliGRAM(s) IntraMuscular once  heparin   Injectable 5000 Unit(s) SubCutaneous every 12 hours  insulin glargine Injectable (LANTUS) 12 Unit(s) SubCutaneous at bedtime  insulin lispro (ADMELOG) corrective regimen sliding scale   SubCutaneous at bedtime  insulin lispro (ADMELOG) corrective regimen sliding scale   SubCutaneous three times a day before meals  melatonin 6 milliGRAM(s) Oral at bedtime  midodrine. 5 milliGRAM(s) Oral three times a day  sodium bicarbonate 650 milliGRAM(s) Oral three times a day    MEDICATIONS  (PRN):  acetaminophen     Tablet .. 650 milliGRAM(s) Oral every 6 hours PRN Temp greater or equal to 38C (100.4F), Mild Pain (1 - 3)  aluminum hydroxide/magnesium hydroxide/simethicone Suspension 30 milliLiter(s) Oral every 4 hours PRN Dyspepsia  dextrose Oral Gel 15 Gram(s) Oral once PRN Blood Glucose LESS THAN 70 milliGRAM(s)/deciliter  ondansetron Injectable 4 milliGRAM(s) IV Push every 8 hours PRN Nausea and/or Vomiting      I&O's Summary    10 Jul 2024 07:01  -  11 Jul 2024 07:00  --------------------------------------------------------  IN: 260 mL / OUT: 430 mL / NET: -170 mL        PHYSICAL EXAM:  Vital Signs Last 24 Hrs  T(C): 36.6 (11 Jul 2024 12:02), Max: 36.8 (10 Jul 2024 22:45)  T(F): 97.8 (11 Jul 2024 12:02), Max: 98.2 (10 Jul 2024 22:45)  HR: 91 (11 Jul 2024 12:02) (79 - 92)  BP: 152/56 (11 Jul 2024 12:02) (118/60 - 152/56)  BP(mean): --  RR: 18 (11 Jul 2024 12:02) (17 - 18)  SpO2: 100% (11 Jul 2024 12:02) (95% - 100%)    Parameters below as of 11 Jul 2024 12:02  Patient On (Oxygen Delivery Method): nasal cannula  O2 Flow (L/min): 1    CONSTITUTIONAL: NAD, well-groomed  EYES: PERRLA; conjunctiva and sclera clear  ENMT: Moist oral mucosa, no pharyngeal injection or exudates; normal dentition  NECK: Supple, no palpable masses; no thyromegaly  RESPIRATORY: Normal respiratory effort; lungs are clear to auscultation bilaterally  CARDIOVASCULAR: Regular rate and rhythm, normal S1 and S2, no murmur/rub/gallop; 1+ pitting b/l lower extremity edema; Peripheral pulses are 2+ bilaterally  ABDOMEN: Nontender to palpation, normoactive bowel sounds, no rebound/guarding; No hepatosplenomegaly  MUSCULOSKELETAL:  no clubbing or cyanosis of digits; no joint swelling or tenderness to palpation  PSYCH: A+O to person, place, and time; affect appropriate  NEUROLOGY: CN 2-12 are intact and symmetric; no gross sensory deficits   SKIN: No rashes; no palpable lesions    LABS:                        7.8    6.80  )-----------( 142      ( 11 Jul 2024 03:16 )             23.8     07-11    134<L>  |  96<L>  |  100<H>  ----------------------------<  214<H>  3.4<L>   |  23  |  4.21<H>    Ca    8.4      11 Jul 2024 03:16  Phos  8.7     07-11  Mg     1.90     07-11        CARDIAC MARKERS ( 11 Jul 2024 03:16 )  x     / x     / 12 U/L / x     / 2.6 ng/mL      Urinalysis Basic - ( 11 Jul 2024 03:16 )    Color: x / Appearance: x / SG: x / pH: x  Gluc: 214 mg/dL / Ketone: x  / Bili: x / Urobili: x   Blood: x / Protein: x / Nitrite: x   Leuk Esterase: x / RBC: x / WBC x   Sq Epi: x / Non Sq Epi: x / Bacteria: x        SARS-CoV-2: NotDetec (28 Jun 2024 22:26)

## 2024-07-12 ENCOUNTER — RESULT REVIEW (OUTPATIENT)
Age: 84
End: 2024-07-12

## 2024-07-12 LAB
ANION GAP SERPL CALC-SCNC: 15 MMOL/L — HIGH (ref 7–14)
APPEARANCE UR: ABNORMAL
BACTERIA # UR AUTO: NEGATIVE /HPF — SIGNIFICANT CHANGE UP
BASOPHILS # BLD AUTO: 0.04 K/UL — SIGNIFICANT CHANGE UP (ref 0–0.2)
BASOPHILS NFR BLD AUTO: 0.8 % — SIGNIFICANT CHANGE UP (ref 0–2)
BILIRUB UR-MCNC: NEGATIVE — SIGNIFICANT CHANGE UP
BUN SERPL-MCNC: 95 MG/DL — HIGH (ref 7–23)
CALCIUM SERPL-MCNC: 8.9 MG/DL — SIGNIFICANT CHANGE UP (ref 8.4–10.5)
CAST: 5 /LPF — SIGNIFICANT CHANGE UP (ref 0–4)
CHLORIDE SERPL-SCNC: 97 MMOL/L — LOW (ref 98–107)
CK MB BLD-MCNC: 21 % — HIGH (ref 0–2.5)
CK MB CFR SERPL CALC: 2.1 NG/ML — SIGNIFICANT CHANGE UP
CK SERPL-CCNC: 10 U/L — LOW (ref 30–200)
CO2 SERPL-SCNC: 26 MMOL/L — SIGNIFICANT CHANGE UP (ref 22–31)
COLOR SPEC: YELLOW — SIGNIFICANT CHANGE UP
CREAT SERPL-MCNC: 3.34 MG/DL — HIGH (ref 0.5–1.3)
DIFF PNL FLD: ABNORMAL
EGFR: 18 ML/MIN/1.73M2 — LOW
EOSINOPHIL # BLD AUTO: 0.11 K/UL — SIGNIFICANT CHANGE UP (ref 0–0.5)
EOSINOPHIL NFR BLD AUTO: 2.1 % — SIGNIFICANT CHANGE UP (ref 0–6)
GLUCOSE BLDC GLUCOMTR-MCNC: 205 MG/DL — HIGH (ref 70–99)
GLUCOSE BLDC GLUCOMTR-MCNC: 314 MG/DL — HIGH (ref 70–99)
GLUCOSE BLDC GLUCOMTR-MCNC: 369 MG/DL — HIGH (ref 70–99)
GLUCOSE BLDC GLUCOMTR-MCNC: 372 MG/DL — HIGH (ref 70–99)
GLUCOSE SERPL-MCNC: 206 MG/DL — HIGH (ref 70–99)
GLUCOSE UR QL: 250 MG/DL
HCT VFR BLD CALC: 23.6 % — LOW (ref 39–50)
HGB BLD-MCNC: 7.9 G/DL — LOW (ref 13–17)
IANC: 4.69 K/UL — SIGNIFICANT CHANGE UP (ref 1.8–7.4)
IMM GRANULOCYTES NFR BLD AUTO: 0.8 % — SIGNIFICANT CHANGE UP (ref 0–0.9)
KETONES UR-MCNC: NEGATIVE MG/DL — SIGNIFICANT CHANGE UP
LEUKOCYTE ESTERASE UR-ACNC: NEGATIVE — SIGNIFICANT CHANGE UP
LYMPHOCYTES # BLD AUTO: 0.28 K/UL — LOW (ref 1–3.3)
LYMPHOCYTES # BLD AUTO: 5.3 % — LOW (ref 13–44)
MAGNESIUM SERPL-MCNC: 1.8 MG/DL — SIGNIFICANT CHANGE UP (ref 1.6–2.6)
MCHC RBC-ENTMCNC: 32.8 PG — SIGNIFICANT CHANGE UP (ref 27–34)
MCHC RBC-ENTMCNC: 33.5 GM/DL — SIGNIFICANT CHANGE UP (ref 32–36)
MCV RBC AUTO: 97.9 FL — SIGNIFICANT CHANGE UP (ref 80–100)
MONOCYTES # BLD AUTO: 0.11 K/UL — SIGNIFICANT CHANGE UP (ref 0–0.9)
MONOCYTES NFR BLD AUTO: 2.1 % — SIGNIFICANT CHANGE UP (ref 2–14)
NEUTROPHILS # BLD AUTO: 4.69 K/UL — SIGNIFICANT CHANGE UP (ref 1.8–7.4)
NEUTROPHILS NFR BLD AUTO: 88.9 % — HIGH (ref 43–77)
NITRITE UR-MCNC: NEGATIVE — SIGNIFICANT CHANGE UP
NRBC # BLD: 0 /100 WBCS — SIGNIFICANT CHANGE UP (ref 0–0)
NRBC # FLD: 0 K/UL — SIGNIFICANT CHANGE UP (ref 0–0)
PH UR: 5.5 — SIGNIFICANT CHANGE UP (ref 5–8)
PHOSPHATE SERPL-MCNC: 7.3 MG/DL — HIGH (ref 2.5–4.5)
PLATELET # BLD AUTO: 146 K/UL — LOW (ref 150–400)
POTASSIUM SERPL-MCNC: 3.7 MMOL/L — SIGNIFICANT CHANGE UP (ref 3.5–5.3)
POTASSIUM SERPL-SCNC: 3.7 MMOL/L — SIGNIFICANT CHANGE UP (ref 3.5–5.3)
PROT UR-MCNC: 100 MG/DL
RBC # BLD: 2.41 M/UL — LOW (ref 4.2–5.8)
RBC # FLD: 18.5 % — HIGH (ref 10.3–14.5)
RBC CASTS # UR COMP ASSIST: 55 /HPF — HIGH (ref 0–4)
REVIEW: SIGNIFICANT CHANGE UP
SODIUM SERPL-SCNC: 138 MMOL/L — SIGNIFICANT CHANGE UP (ref 135–145)
SP GR SPEC: 1.01 — SIGNIFICANT CHANGE UP (ref 1–1.03)
SQUAMOUS # UR AUTO: 5 /HPF — SIGNIFICANT CHANGE UP (ref 0–5)
TROPONIN T, HIGH SENSITIVITY RESULT: 142 NG/L — CRITICAL HIGH
TROPONIN T, HIGH SENSITIVITY RESULT: 143 NG/L — CRITICAL HIGH
UROBILINOGEN FLD QL: 0.2 MG/DL — SIGNIFICANT CHANGE UP (ref 0.2–1)
WBC # BLD: 5.27 K/UL — SIGNIFICANT CHANGE UP (ref 3.8–10.5)
WBC # FLD AUTO: 5.27 K/UL — SIGNIFICANT CHANGE UP (ref 3.8–10.5)
WBC UR QL: 6 /HPF — HIGH (ref 0–5)

## 2024-07-12 PROCEDURE — 93321 DOPPLER ECHO F-UP/LMTD STD: CPT | Mod: 26

## 2024-07-12 PROCEDURE — 99232 SBSQ HOSP IP/OBS MODERATE 35: CPT

## 2024-07-12 PROCEDURE — 93308 TTE F-UP OR LMTD: CPT | Mod: 26

## 2024-07-12 PROCEDURE — 99232 SBSQ HOSP IP/OBS MODERATE 35: CPT | Mod: GC

## 2024-07-12 PROCEDURE — 93010 ELECTROCARDIOGRAM REPORT: CPT

## 2024-07-12 RX ORDER — INSULIN GLARGINE 100 [IU]/ML
15 INJECTION, SOLUTION SUBCUTANEOUS AT BEDTIME
Refills: 0 | Status: DISCONTINUED | OUTPATIENT
Start: 2024-07-12 | End: 2024-07-13

## 2024-07-12 RX ORDER — CEFEPIME 1 G/1
1000 INJECTION, POWDER, FOR SOLUTION INTRAMUSCULAR; INTRAVENOUS EVERY 12 HOURS
Refills: 0 | Status: COMPLETED | OUTPATIENT
Start: 2024-07-12 | End: 2024-07-14

## 2024-07-12 RX ORDER — FAMOTIDINE 40 MG
20 TABLET ORAL DAILY
Refills: 0 | Status: DISCONTINUED | OUTPATIENT
Start: 2024-07-12 | End: 2024-07-16

## 2024-07-12 RX ORDER — INSULIN LISPRO 100 [IU]/ML
3 INJECTION, SOLUTION SUBCUTANEOUS
Refills: 0 | Status: DISCONTINUED | OUTPATIENT
Start: 2024-07-12 | End: 2024-07-13

## 2024-07-12 RX ADMIN — Medication 30 MILLILITER(S): at 15:42

## 2024-07-12 RX ADMIN — CEFEPIME 100 MILLIGRAM(S): 1 INJECTION, POWDER, FOR SOLUTION INTRAMUSCULAR; INTRAVENOUS at 18:33

## 2024-07-12 RX ADMIN — INSULIN LISPRO 4: 100 INJECTION, SOLUTION SUBCUTANEOUS at 13:28

## 2024-07-12 RX ADMIN — INSULIN LISPRO 3 UNIT(S): 100 INJECTION, SOLUTION SUBCUTANEOUS at 18:35

## 2024-07-12 RX ADMIN — Medication 1000 MICROGRAM(S): at 13:29

## 2024-07-12 RX ADMIN — HEPARIN SODIUM 5000 UNIT(S): 50 INJECTION, SOLUTION INTRAVENOUS at 18:33

## 2024-07-12 RX ADMIN — SODIUM BICARBONATE 650 MILLIGRAM(S): 650 TABLET ORAL at 05:33

## 2024-07-12 RX ADMIN — CEFEPIME 100 MILLIGRAM(S): 1 INJECTION, POWDER, FOR SOLUTION INTRAMUSCULAR; INTRAVENOUS at 05:34

## 2024-07-12 RX ADMIN — MIDODRINE HYDROCHLORIDE 5 MILLIGRAM(S): 10 TABLET ORAL at 05:34

## 2024-07-12 RX ADMIN — INSULIN LISPRO 3: 100 INJECTION, SOLUTION SUBCUTANEOUS at 22:57

## 2024-07-12 RX ADMIN — SODIUM BICARBONATE 650 MILLIGRAM(S): 650 TABLET ORAL at 22:23

## 2024-07-12 RX ADMIN — Medication 20 MILLIGRAM(S): at 18:33

## 2024-07-12 RX ADMIN — SODIUM BICARBONATE 650 MILLIGRAM(S): 650 TABLET ORAL at 13:30

## 2024-07-12 RX ADMIN — INSULIN LISPRO 2: 100 INJECTION, SOLUTION SUBCUTANEOUS at 09:26

## 2024-07-12 RX ADMIN — INSULIN LISPRO 5: 100 INJECTION, SOLUTION SUBCUTANEOUS at 18:35

## 2024-07-12 RX ADMIN — HEPARIN SODIUM 5000 UNIT(S): 50 INJECTION, SOLUTION INTRAVENOUS at 05:34

## 2024-07-12 RX ADMIN — INSULIN GLARGINE 15 UNIT(S): 100 INJECTION, SOLUTION SUBCUTANEOUS at 22:24

## 2024-07-12 RX ADMIN — Medication 6 MILLIGRAM(S): at 22:23

## 2024-07-12 RX ADMIN — ATORVASTATIN CALCIUM 80 MILLIGRAM(S): 20 TABLET, FILM COATED ORAL at 22:22

## 2024-07-12 NOTE — PROGRESS NOTE ADULT - ASSESSMENT
84 y/o M with pmhx of DM2, COPD, BPH, HLD, CAD, MM on chemo presented to the ED for new onset sepsis and ATN.  Patient sent to [facility] for nephrology and dialysis evaluation.

## 2024-07-12 NOTE — PROGRESS NOTE ADULT - PROBLEM SELECTOR PLAN 1
- patient presented for new onset BRITT likely secondary to sepsis leading to ATN  - nephrology following, no current plans for HD  - hold aspirin and plavix for now pending port placement for dialysis if required pending nephro eval    Nephrology recommending diuresis, has been on bumetanide infusion. Monitor UOP and Cr. Improving.

## 2024-07-12 NOTE — PROGRESS NOTE ADULT - SUBJECTIVE AND OBJECTIVE BOX
[facility] Division of Kidney Diseases & Hypertension  FOLLOW UP NOTE  [phone]--------------------------------------------------------------------------------    Chief Complaint: BRITT     24 hour events/subjective: Pt. seen and examined earlier this morning, daughter present at bedside. Pt. says he feels well and no acute complaints. Pt and pts daughter are both aware that he is having urinary retention, however pt refusing Kramer catheter insertion. He has no known history of BPH or urinary retention. Pt. gives history of chronic B/L LE edema which is improving and increased UOP with diuretic infusion. No fever, SOB, N/V or abd pain during rounds today.     PAST HISTORY  --------------------------------------------------------------------------------  No significant changes to PMH, PSH, FHx, SHx, unless otherwise noted    ALLERGIES & MEDICATIONS  --------------------------------------------------------------------------------  Allergies    No Known Allergies    Intolerances    Standing Inpatient Medications  atorvastatin 80 milliGRAM(s) Oral at bedtime  buMETAnide Infusion 1 mG/Hr IV Continuous <Continuous>  cefepime   IVPB 1000 milliGRAM(s) IV Intermittent every 12 hours  cyanocobalamin 1000 MICROGram(s) Oral daily  dextrose 10% Bolus 125 milliLiter(s) IV Bolus once  dextrose 5%. 1000 milliLiter(s) IV Continuous <Continuous>  dextrose 5%. 1000 milliLiter(s) IV Continuous <Continuous>  dextrose 50% Injectable 12.5 Gram(s) IV Push once  dextrose 50% Injectable 25 Gram(s) IV Push once  famotidine    Tablet 20 milliGRAM(s) Oral daily  glucagon  Injectable 1 milliGRAM(s) IntraMuscular once  heparin   Injectable 5000 Unit(s) SubCutaneous every 12 hours  insulin glargine Injectable (LANTUS) 15 Unit(s) SubCutaneous at bedtime  insulin lispro (ADMELOG) corrective regimen sliding scale   SubCutaneous at bedtime  insulin lispro (ADMELOG) corrective regimen sliding scale   SubCutaneous three times a day before meals  insulin lispro Injectable (ADMELOG) 3 Unit(s) SubCutaneous three times a day before meals  melatonin 6 milliGRAM(s) Oral at bedtime  sodium bicarbonate 650 milliGRAM(s) Oral three times a day    PRN Inpatient Medications  acetaminophen     Tablet .. 650 milliGRAM(s) Oral every 6 hours PRN  aluminum hydroxide/magnesium hydroxide/simethicone Suspension 30 milliLiter(s) Oral every 4 hours PRN  dextrose Oral Gel 15 Gram(s) Oral once PRN  ondansetron Injectable 4 milliGRAM(s) IV Push every 8 hours PRN    REVIEW OF SYSTEMS  --------------------------------------------------------------------------------  Gen: See HPI, no fever  Skin: +Chronic B/L LE edema  Head/Eyes/Ears: No HA  Respiratory: See HPI, No SOB at time of evaluation  CV: See HPI, No CP at time of evaluation  GI: No abdominal pain/nausea or vomiting, diarrhea   : See HPI, +increased UOP   MSK: +Chronic B/L LE edema  Heme: No easy bruising    All other systems were reviewed and are negative, except as noted.    VITALS/PHYSICAL EXAM  --------------------------------------------------------------------------------  T(C): 36.6 (07-12-24 @ 09:30), Max: 36.8 (07-11-24 @ 21:36)  HR: 96 (07-12-24 @ 09:30) (86 - 96)  BP: 145/71 (07-12-24 @ 09:30) (138/66 - 149/73)  RR: 18 (07-12-24 @ 09:30) (16 - 18)  SpO2: 99% (07-12-24 @ 09:30) (97% - 100%)  Wt(kg): --    07-11-24 @ 07:01  -  07-12-24 @ 07:00  --------------------------------------------------------  IN: 0 mL / OUT: 1600 mL / NET: -1600 mL    Physical Exam:  	Gen: resting, ill appearing   	HEENT: No JVD, on supplemental oxygen via NC  	Pulm: CTA B/L   	CV: S1S2+  	Abd: Soft, +BS               : +Condom catheter with 1500cc urine in bag noted  	Ext: +B/L LE pitting edema (improving)  	Neuro: Awake, alert  	Skin: Chronic skin changes/hyperpigmented skin in B/L LE  	Vascular access: Peripheral IV line    LABS/STUDIES  --------------------------------------------------------------------------------              7.9    5.27  >-----------<  146      [07-12-24 @ 05:37]              23.6     138  |  97  |  95  ----------------------------<  206      [07-12-24 @ 05:37]  3.7   |  26  |  3.34        Ca     8.9     [07-12-24 @ 05:37]      Mg     1.80     [07-12-24 @ 05:37]      Phos  7.3     [07-12-24 @ 05:37]    CK 10      [07-12-24 @ 05:37]    Creatinine Trend:  SCr 3.34 [07-12 @ 05:37]  SCr 4.21 [07-11 @ 03:16]  SCr 4.81 [07-10 @ 07:10]  SCr 5.03 [07-09 @ 06:00]  SCr 4.93 [07-08 @ 06:18]    C3 Complement 191      [07-05-24 @ 16:42]  C4 Complement 34      [07-05-24 @ 16:42]  MPO-ANCA <5.0, interpretation: Negative      [07-05-24 @ 16:42]  PR3-ANCA <5.0, interpretation: Negative      [07-05-24 @ 16:42]  anti-GBM <0.2      [07-05-24 @ 16:42]  ASLO 96      [07-05-24 @ 16:42] [facility] Division of Kidney Diseases & Hypertension  FOLLOW UP NOTE  [phone]--------------------------------------------------------------------------------    Chief Complaint: BRITT     24 hour events/subjective: Pt. seen and examined earlier this morning, daughter present at bedside. Pt. says he feels better. Pt. and daughter are both aware that pt. is having urinary retention, however pt. continues to refuse Kramer catheter insertion. Pt. denies previous history of BPH or urinary retention. Pt. gives history of chronic B/L LE edema, improving with diuretic infusion. No fever, SOB, N/V or abd pain during rounds today.     PAST HISTORY  --------------------------------------------------------------------------------  No significant changes to PMH, PSH, FHx, SHx, unless otherwise noted    ALLERGIES & MEDICATIONS  --------------------------------------------------------------------------------  Allergies    No Known Allergies    Intolerances    Standing Inpatient Medications  atorvastatin 80 milliGRAM(s) Oral at bedtime  buMETAnide Infusion 1 mG/Hr IV Continuous <Continuous>  cefepime   IVPB 1000 milliGRAM(s) IV Intermittent every 12 hours  cyanocobalamin 1000 MICROGram(s) Oral daily  dextrose 10% Bolus 125 milliLiter(s) IV Bolus once  dextrose 5%. 1000 milliLiter(s) IV Continuous <Continuous>  dextrose 5%. 1000 milliLiter(s) IV Continuous <Continuous>  dextrose 50% Injectable 12.5 Gram(s) IV Push once  dextrose 50% Injectable 25 Gram(s) IV Push once  famotidine    Tablet 20 milliGRAM(s) Oral daily  glucagon  Injectable 1 milliGRAM(s) IntraMuscular once  heparin   Injectable 5000 Unit(s) SubCutaneous every 12 hours  insulin glargine Injectable (LANTUS) 15 Unit(s) SubCutaneous at bedtime  insulin lispro (ADMELOG) corrective regimen sliding scale   SubCutaneous at bedtime  insulin lispro (ADMELOG) corrective regimen sliding scale   SubCutaneous three times a day before meals  insulin lispro Injectable (ADMELOG) 3 Unit(s) SubCutaneous three times a day before meals  melatonin 6 milliGRAM(s) Oral at bedtime  sodium bicarbonate 650 milliGRAM(s) Oral three times a day    PRN Inpatient Medications  acetaminophen     Tablet .. 650 milliGRAM(s) Oral every 6 hours PRN  aluminum hydroxide/magnesium hydroxide/simethicone Suspension 30 milliLiter(s) Oral every 4 hours PRN  dextrose Oral Gel 15 Gram(s) Oral once PRN  ondansetron Injectable 4 milliGRAM(s) IV Push every 8 hours PRN    REVIEW OF SYSTEMS  --------------------------------------------------------------------------------  Gen: See HPI, no fever  Skin: +Chronic B/L LE edema  Head/Eyes/Ears: No HA  Respiratory: No SOB  CV: No CP  GI: No abdominal pain/nausea or vomiting, diarrhea   : See HPI   MSK: +Chronic B/L LE edema  Heme: No easy bruising    All other systems were reviewed and are negative, except as noted.    VITALS/PHYSICAL EXAM  --------------------------------------------------------------------------------  T(C): 36.6 (07-12-24 @ 09:30), Max: 36.8 (07-11-24 @ 21:36)  HR: 96 (07-12-24 @ 09:30) (86 - 96)  BP: 145/71 (07-12-24 @ 09:30) (138/66 - 149/73)  RR: 18 (07-12-24 @ 09:30) (16 - 18)  SpO2: 99% (07-12-24 @ 09:30) (97% - 100%)  Wt(kg): --    07-11-24 @ 07:01  -  07-12-24 @ 07:00  --------------------------------------------------------  IN: 0 mL / OUT: 1600 mL / NET: -1600 mL    Physical Exam:  	Gen: resting, ill appearing   	HEENT: No JVD, on supplemental oxygen via NC  	Pulm: CTA B/L   	CV: S1S2+  	Abd: Soft, +BS               : +Condom catheter with ~1500 cc of urine seen in bag  	Ext: +B/L LE pitting edema (improving)  	Neuro: Awake, alert  	Skin: Chronic skin changes/hyperpigmented skin in B/L LE  	Vascular access: Peripheral IV line    LABS/STUDIES  --------------------------------------------------------------------------------              7.9    5.27  >-----------<  146      [07-12-24 @ 05:37]              23.6     138  |  97  |  95  ----------------------------<  206      [07-12-24 @ 05:37]  3.7   |  26  |  3.34        Ca     8.9     [07-12-24 @ 05:37]      Mg     1.80     [07-12-24 @ 05:37]      Phos  7.3     [07-12-24 @ 05:37]    CK 10      [07-12-24 @ 05:37]    Creatinine Trend:  SCr 3.34 [07-12 @ 05:37]  SCr 4.21 [07-11 @ 03:16]  SCr 4.81 [07-10 @ 07:10]  SCr 5.03 [07-09 @ 06:00]  SCr 4.93 [07-08 @ 06:18]    C3 Complement 191      [07-05-24 @ 16:42]  C4 Complement 34      [07-05-24 @ 16:42]  MPO-ANCA <5.0, interpretation: Negative      [07-05-24 @ 16:42]  PR3-ANCA <5.0, interpretation: Negative      [07-05-24 @ 16:42]  anti-GBM <0.2      [07-05-24 @ 16:42]  ASLO 96      [07-05-24 @ 16:42]

## 2024-07-12 NOTE — PROGRESS NOTE ADULT - PROBLEM SELECTOR PLAN 1
Pt. with BRITT in the setting of sepsis, hypotension, and MM. Pt. with likely ATN. Scr was WNL 1.2 on OP labs done on 6/10/24. Scr was elevated at 1.71 on admission to [facility] on 6/28/24. Scr progressively increased to 4.59 on 7/6/24. Pt. transferred to [facility] on 7/6/24 for worsening BRITT and possible need for HD. Scr was elevated at 4.93 on 7/8/24 and increased to 5.03 (7/9/10). Scr decreased to 3.34 today (7/12/24). Pt. noted to have proteinuria and microscopic hematuria on UA done on 6/28/24. Pt. with IgA kappa bands on serum immunofixation on labs done 6/10/24. Anti-GBM ab, PR3 and MPO antibodies were negative on 7/5/24. Serum C3 and C4 were not low on 7/5/24. No hydronephrosis seen on renal US done at [facility] on 7/1/24. No right hydronephrosis seen on renal US done at [facility] on 7/7/24. L kidney not well visualized due to bowel gas. Pt. was oliguric on admission, received Bumex infusion on 7/8-7/11. Pt. with urinary retention overnight noted since 7/10, with ~475cc on last bladder scan today (7/12). Pt. has been refusing Kramer insertion. 24hr UOP ~1.6 L in the past 24hrs. Pt. with significant B/L edema, recommend IV Bumex infusion at 1 mg/hr for 10 hrs today (7/12). Obtain renal US to better visualize left kidney. Continue serial bladder scans. Monitor labs and UOP. Avoid nephrotoxins. Dose medications for eGFR.     If you have any questions, please feel free to contact me  Mauricio Youssef  Nephrology Fellow  ebookpie/Page [phone]  (After 5pm or on weekends please page the on-call fellow). Pt. with BRITT in the setting of sepsis, hypotension, and MM. Pt. with likely ATN. Scr was WNL 1.2 on OP labs done on 6/10/24. Scr was elevated at 1.71 on admission to [facility] on 6/28/24. Scr progressively increased to 4.59 on 7/6/24. Pt. transferred to [facility] on 7/6/24 for worsening BRITT and possible need for HD. Scr was elevated at 4.93 on 7/8/24 and increased to 5.03 (7/9/10). Scr decreased to 3.34 today (7/12/24). Pt. noted to have proteinuria and microscopic hematuria on UA done on 6/28/24. Pt. with IgA kappa bands on serum immunofixation on labs done 6/10/24. Anti-GBM ab, PR3 and MPO antibodies were negative on 7/5/24. Serum C3 and C4 were not low on 7/5/24. No hydronephrosis seen on renal US done at [facility] on 7/1/24. No right hydronephrosis seen on renal US done at [facility] on 7/7/24. L kidney not well visualized due to bowel gas. Pt. was oliguric on admission. Pt. received Bumex infusion on 7/8-7/11. Pt. currently non-oliguric with ~1.6 L of UOP in the past 24hrs. Pt. noted to have urinary retention with ~475cc on bladder scan done earlier today (7/12). Pt. has been refusing Kramer catheter insertion. Pt. with significant B/L edema, recommend IV Bumex infusion at 1 mg/hr for 10 hrs today (7/12). Obtain renal US to better visualize left kidney. Continue serial bladder scans. Monitor labs and UOP. Avoid nephrotoxins. Dose medications for eGFR.   If you have any questions, please feel free to contact me  Mauricio Youssef  Nephrology Fellow  NearVerse/Page [phone]  (After 5pm or on weekends please page the on-call fellow).

## 2024-07-12 NOTE — PROGRESS NOTE ADULT - SUBJECTIVE AND OBJECTIVE BOX
[facility] Division of Hospital Medicine  Christophe BonnerDO  Available via MS Teams  In house pager [phone]    SUBJECTIVE / OVERNIGHT EVENTS:  Transferred yesterday to telemetry unit.  Daughter at bedside today.  Patient offers no complaints.  Daughter relayed some concerns from the patient's wife, including concerns for possible delirium.  Daughter states PT will work with patient today and nursing staff will try to move him to a chair.    Daughter discussed rehab options with SW today.    ADDITIONAL REVIEW OF SYSTEMS:    MEDICATIONS  (STANDING):  atorvastatin 80 milliGRAM(s) Oral at bedtime  buMETAnide Infusion 1 mG/Hr (5 mL/Hr) IV Continuous <Continuous>  cefepime   IVPB 1000 milliGRAM(s) IV Intermittent every 12 hours  cyanocobalamin 1000 MICROGram(s) Oral daily  dextrose 10% Bolus 125 milliLiter(s) IV Bolus once  dextrose 5%. 1000 milliLiter(s) (50 mL/Hr) IV Continuous <Continuous>  dextrose 5%. 1000 milliLiter(s) (100 mL/Hr) IV Continuous <Continuous>  dextrose 50% Injectable 25 Gram(s) IV Push once  dextrose 50% Injectable 12.5 Gram(s) IV Push once  famotidine    Tablet 20 milliGRAM(s) Oral daily  glucagon  Injectable 1 milliGRAM(s) IntraMuscular once  heparin   Injectable 5000 Unit(s) SubCutaneous every 12 hours  insulin glargine Injectable (LANTUS) 12 Unit(s) SubCutaneous at bedtime  insulin lispro (ADMELOG) corrective regimen sliding scale   SubCutaneous at bedtime  insulin lispro (ADMELOG) corrective regimen sliding scale   SubCutaneous three times a day before meals  melatonin 6 milliGRAM(s) Oral at bedtime  midodrine. 5 milliGRAM(s) Oral three times a day  sodium bicarbonate 650 milliGRAM(s) Oral three times a day    MEDICATIONS  (PRN):  acetaminophen     Tablet .. 650 milliGRAM(s) Oral every 6 hours PRN Temp greater or equal to 38C (100.4F), Mild Pain (1 - 3)  aluminum hydroxide/magnesium hydroxide/simethicone Suspension 30 milliLiter(s) Oral every 4 hours PRN Dyspepsia  dextrose Oral Gel 15 Gram(s) Oral once PRN Blood Glucose LESS THAN 70 milliGRAM(s)/deciliter  ondansetron Injectable 4 milliGRAM(s) IV Push every 8 hours PRN Nausea and/or Vomiting      I&O's Summary    11 Jul 2024 07:01  -  12 Jul 2024 07:00  --------------------------------------------------------  IN: 0 mL / OUT: 1600 mL / NET: -1600 mL        PHYSICAL EXAM:  Vital Signs Last 24 Hrs  T(C): 36.6 (12 Jul 2024 09:30), Max: 36.8 (11 Jul 2024 21:36)  T(F): 97.8 (12 Jul 2024 09:30), Max: 98.3 (11 Jul 2024 21:36)  HR: 96 (12 Jul 2024 09:30) (86 - 96)  BP: 145/71 (12 Jul 2024 09:30) (122/72 - 152/56)  BP(mean): --  RR: 18 (12 Jul 2024 09:30) (16 - 18)  SpO2: 99% (12 Jul 2024 09:30) (97% - 100%)    Parameters below as of 12 Jul 2024 09:30  Patient On (Oxygen Delivery Method): nasal cannula  O2 Flow (L/min): 1    CONSTITUTIONAL: NAD, well-groomed  EYES: PERRLA; conjunctiva and sclera clear  ENMT: Moist oral mucosa, no pharyngeal injection or exudates  NECK: Supple, no palpable masses; no thyromegaly  RESPIRATORY: Normal respiratory effort; lungs are clear to auscultation bilaterally  CARDIOVASCULAR: Regular rate and rhythm, normal S1 and S2, no murmur/rub/gallop; 1+ pitting b/l lower extremity edema; Peripheral pulses are 2+ bilaterally  ABDOMEN: Nontender to palpation, normoactive bowel sounds, no rebound/guarding; No hepatosplenomegaly  MUSCULOSKELETAL: no clubbing or cyanosis of digits; no joint swelling or tenderness to palpation  PSYCH: A+O to person, place, and time; affect appropriate  NEUROLOGY: CN 2-12 are intact and symmetric; no gross sensory deficits   SKIN: No rashes; no palpable lesions    LABS:                        7.9    5.27  )-----------( 146      ( 12 Jul 2024 05:37 )             23.6     07-12    138  |  97<L>  |  95<H>  ----------------------------<  206<H>  3.7   |  26  |  3.34<H>    Ca    8.9      12 Jul 2024 05:37  Phos  7.3     07-12  Mg     1.80     07-12        CARDIAC MARKERS ( 12 Jul 2024 05:37 )  x     / x     / 10 U/L / x     / 2.1 ng/mL  CARDIAC MARKERS ( 11 Jul 2024 03:16 )  x     / x     / 12 U/L / x     / 2.6 ng/mL      Urinalysis Basic - ( 12 Jul 2024 05:37 )    Color: x / Appearance: x / SG: x / pH: x  Gluc: 206 mg/dL / Ketone: x  / Bili: x / Urobili: x   Blood: x / Protein: x / Nitrite: x   Leuk Esterase: x / RBC: x / WBC x   Sq Epi: x / Non Sq Epi: x / Bacteria: x        SARS-CoV-2: NotDetec (28 Jun 2024 22:26)

## 2024-07-12 NOTE — CHART NOTE - NSCHARTNOTEFT_GEN_A_CORE
Patient's repeat troponin resulted as 132-->128-->133. Patient is asymptomatic at this time. Denies chest pain, palpitations , Sob.  12 Lead EKG ordered. No new ischemic changes. Will trend troponins with Am labs

## 2024-07-13 LAB
ANION GAP SERPL CALC-SCNC: 15 MMOL/L — HIGH (ref 7–14)
BUN SERPL-MCNC: 87 MG/DL — HIGH (ref 7–23)
CALCIUM SERPL-MCNC: 8.6 MG/DL — SIGNIFICANT CHANGE UP (ref 8.4–10.5)
CHLORIDE SERPL-SCNC: 98 MMOL/L — SIGNIFICANT CHANGE UP (ref 98–107)
CO2 SERPL-SCNC: 27 MMOL/L — SIGNIFICANT CHANGE UP (ref 22–31)
CREAT SERPL-MCNC: 2.83 MG/DL — HIGH (ref 0.5–1.3)
CULTURE RESULTS: NO GROWTH — SIGNIFICANT CHANGE UP
EGFR: 22 ML/MIN/1.73M2 — LOW
GLUCOSE BLDC GLUCOMTR-MCNC: 292 MG/DL — HIGH (ref 70–99)
GLUCOSE BLDC GLUCOMTR-MCNC: 296 MG/DL — HIGH (ref 70–99)
GLUCOSE BLDC GLUCOMTR-MCNC: 345 MG/DL — HIGH (ref 70–99)
GLUCOSE BLDC GLUCOMTR-MCNC: 357 MG/DL — HIGH (ref 70–99)
GLUCOSE BLDC GLUCOMTR-MCNC: 397 MG/DL — HIGH (ref 70–99)
GLUCOSE SERPL-MCNC: 256 MG/DL — HIGH (ref 70–99)
HCT VFR BLD CALC: 23.5 % — LOW (ref 39–50)
HGB BLD-MCNC: 7.6 G/DL — LOW (ref 13–17)
MAGNESIUM SERPL-MCNC: 1.7 MG/DL — SIGNIFICANT CHANGE UP (ref 1.6–2.6)
MCHC RBC-ENTMCNC: 32.2 PG — SIGNIFICANT CHANGE UP (ref 27–34)
MCHC RBC-ENTMCNC: 32.3 GM/DL — SIGNIFICANT CHANGE UP (ref 32–36)
MCV RBC AUTO: 99.6 FL — SIGNIFICANT CHANGE UP (ref 80–100)
NRBC # BLD: 0 /100 WBCS — SIGNIFICANT CHANGE UP (ref 0–0)
NRBC # FLD: 0 K/UL — SIGNIFICANT CHANGE UP (ref 0–0)
PHOSPHATE SERPL-MCNC: 6.3 MG/DL — HIGH (ref 2.5–4.5)
PLATELET # BLD AUTO: 144 K/UL — LOW (ref 150–400)
POTASSIUM SERPL-MCNC: 3.7 MMOL/L — SIGNIFICANT CHANGE UP (ref 3.5–5.3)
POTASSIUM SERPL-SCNC: 3.7 MMOL/L — SIGNIFICANT CHANGE UP (ref 3.5–5.3)
RBC # BLD: 2.36 M/UL — LOW (ref 4.2–5.8)
RBC # FLD: 18.5 % — HIGH (ref 10.3–14.5)
SODIUM SERPL-SCNC: 140 MMOL/L — SIGNIFICANT CHANGE UP (ref 135–145)
SPECIMEN SOURCE: SIGNIFICANT CHANGE UP
WBC # BLD: 4.32 K/UL — SIGNIFICANT CHANGE UP (ref 3.8–10.5)
WBC # FLD AUTO: 4.32 K/UL — SIGNIFICANT CHANGE UP (ref 3.8–10.5)

## 2024-07-13 PROCEDURE — 99233 SBSQ HOSP IP/OBS HIGH 50: CPT | Mod: GC

## 2024-07-13 PROCEDURE — 99232 SBSQ HOSP IP/OBS MODERATE 35: CPT

## 2024-07-13 RX ORDER — TAMSULOSIN HYDROCHLORIDE 0.4 MG/1
0.4 CAPSULE ORAL AT BEDTIME
Refills: 0 | Status: DISCONTINUED | OUTPATIENT
Start: 2024-07-13 | End: 2024-07-16

## 2024-07-13 RX ORDER — INSULIN LISPRO 100 [IU]/ML
4 INJECTION, SOLUTION SUBCUTANEOUS
Refills: 0 | Status: DISCONTINUED | OUTPATIENT
Start: 2024-07-13 | End: 2024-07-13

## 2024-07-13 RX ORDER — INSULIN GLARGINE 100 [IU]/ML
18 INJECTION, SOLUTION SUBCUTANEOUS AT BEDTIME
Refills: 0 | Status: DISCONTINUED | OUTPATIENT
Start: 2024-07-13 | End: 2024-07-13

## 2024-07-13 RX ORDER — INSULIN GLARGINE 100 [IU]/ML
21 INJECTION, SOLUTION SUBCUTANEOUS AT BEDTIME
Refills: 0 | Status: DISCONTINUED | OUTPATIENT
Start: 2024-07-13 | End: 2024-07-14

## 2024-07-13 RX ORDER — INSULIN LISPRO 100 [IU]/ML
6 INJECTION, SOLUTION SUBCUTANEOUS
Refills: 0 | Status: DISCONTINUED | OUTPATIENT
Start: 2024-07-13 | End: 2024-07-15

## 2024-07-13 RX ADMIN — Medication 6 MILLIGRAM(S): at 22:39

## 2024-07-13 RX ADMIN — ATORVASTATIN CALCIUM 80 MILLIGRAM(S): 20 TABLET, FILM COATED ORAL at 22:40

## 2024-07-13 RX ADMIN — Medication 650 MILLIGRAM(S): at 12:11

## 2024-07-13 RX ADMIN — SODIUM BICARBONATE 650 MILLIGRAM(S): 650 TABLET ORAL at 13:23

## 2024-07-13 RX ADMIN — CEFEPIME 100 MILLIGRAM(S): 1 INJECTION, POWDER, FOR SOLUTION INTRAMUSCULAR; INTRAVENOUS at 17:38

## 2024-07-13 RX ADMIN — SODIUM BICARBONATE 650 MILLIGRAM(S): 650 TABLET ORAL at 22:40

## 2024-07-13 RX ADMIN — CEFEPIME 100 MILLIGRAM(S): 1 INJECTION, POWDER, FOR SOLUTION INTRAMUSCULAR; INTRAVENOUS at 06:02

## 2024-07-13 RX ADMIN — HEPARIN SODIUM 5000 UNIT(S): 50 INJECTION, SOLUTION INTRAVENOUS at 06:01

## 2024-07-13 RX ADMIN — HEPARIN SODIUM 5000 UNIT(S): 50 INJECTION, SOLUTION INTRAVENOUS at 17:41

## 2024-07-13 RX ADMIN — INSULIN LISPRO 3 UNIT(S): 100 INJECTION, SOLUTION SUBCUTANEOUS at 08:49

## 2024-07-13 RX ADMIN — SODIUM BICARBONATE 650 MILLIGRAM(S): 650 TABLET ORAL at 06:01

## 2024-07-13 RX ADMIN — Medication 650 MILLIGRAM(S): at 13:00

## 2024-07-13 RX ADMIN — INSULIN LISPRO 5: 100 INJECTION, SOLUTION SUBCUTANEOUS at 13:21

## 2024-07-13 RX ADMIN — INSULIN LISPRO 3: 100 INJECTION, SOLUTION SUBCUTANEOUS at 08:49

## 2024-07-13 RX ADMIN — Medication 20 MILLIGRAM(S): at 13:24

## 2024-07-13 RX ADMIN — TAMSULOSIN HYDROCHLORIDE 0.4 MILLIGRAM(S): 0.4 CAPSULE ORAL at 22:40

## 2024-07-13 RX ADMIN — INSULIN LISPRO 6 UNIT(S): 100 INJECTION, SOLUTION SUBCUTANEOUS at 13:21

## 2024-07-13 RX ADMIN — INSULIN LISPRO 1: 100 INJECTION, SOLUTION SUBCUTANEOUS at 22:43

## 2024-07-13 RX ADMIN — Medication 1000 MICROGRAM(S): at 13:23

## 2024-07-13 RX ADMIN — INSULIN LISPRO 4: 100 INJECTION, SOLUTION SUBCUTANEOUS at 18:50

## 2024-07-13 RX ADMIN — INSULIN LISPRO 6 UNIT(S): 100 INJECTION, SOLUTION SUBCUTANEOUS at 18:51

## 2024-07-13 RX ADMIN — INSULIN GLARGINE 21 UNIT(S): 100 INJECTION, SOLUTION SUBCUTANEOUS at 22:42

## 2024-07-13 NOTE — DIETITIAN INITIAL EVALUATION ADULT - PERSON TAUGHT/METHOD
Pt and his daughter provided with written and verbal nutrition education on Carbohydrate Counting for People with Diabetes.  Discussed carbohydrate sources, carbohydrate portions, protein sources, mixed meals, and nutrition label reading.  Stressed importance of balanced meals with adequate protein and fiber.  Pt was informed of current A1c, goal A1c, and goal fingerstick range. Discussed importance of self monitoring blood glucose and encouraged outpatient endocrinology follow up. Provided verbal education on low potassium food. Pt and daughter verbalized understanding of nutrition education./verbal instruction/written material/patient instructed/daughter instructed

## 2024-07-13 NOTE — DIETITIAN INITIAL EVALUATION ADULT - REASON FOR ADMISSION
Per chart review (7/12): 83-year-old male with pmhx of DM2, COPD, BPH, HLD, CAD, MM on chemo presented to the ED for new onset sepsis and ATN.  Patient sent to [facility] for nephrology and dialysis evaluation.

## 2024-07-13 NOTE — DIETITIAN INITIAL EVALUATION ADULT - ETIOLOGY
suboptimal appetitie endocrine dysfunction/renal dysfuntion  inadequate oral intake  fluid retention endocrine dysfunction/renal dysfunction

## 2024-07-13 NOTE — PROGRESS NOTE ADULT - PROBLEM SELECTOR PLAN 1
- patient presented for new onset BRITT likely secondary to sepsis leading to ATN  - nephrology following, no current plans for HD  - hold aspirin and plavix for now pending port placement for dialysis if required pending nephro eval    Nephrology recommending diuresis, has been on bumetanide infusion. Monitored off diuretics 7/12, still with good UOP. Monitor UOP and Cr. Improving.

## 2024-07-13 NOTE — DIETITIAN NUTRITION RISK NOTIFICATION - TREATMENT: THE FOLLOWING DIET HAS BEEN RECOMMENDED
Diet, Renal Restrictions:   For patients receiving Renal Replacement - No Protein Restr, No Conc K, No Conc Phos, Low Sodium  Soft and Bite Sized (SOFTBTSZ) (07-06-24 @ 23:50) [Active]       Refer to initial RDN note for recommendations

## 2024-07-13 NOTE — DIETITIAN INITIAL EVALUATION ADULT - NUTRITIONGOAL OUTCOME1
PO intake to meet >75% estimated needs to optimize nutrition needs PO intake to meet >75% estimated needs to optimize nutrition Continue following current therapeutic diet to gradually lose weight to meet his dry weight

## 2024-07-13 NOTE — PROGRESS NOTE ADULT - PROBLEM SELECTOR PLAN 1
Pt. with BRITT in the setting of sepsis, hypotension, and MM. Pt. with likely ATN. Scr was WNL 1.2 on OP labs done on 6/10/24. Scr was elevated at 1.71 on admission to [facility] on 6/28/24. Scr progressively increased to 4.59 on 7/6/24. Pt. transferred to [facility] on 7/6/24 for worsening BRITT and possible need for HD. Scr was elevated at 4.93 on 7/8/24 and increased to 5.03 (7/9/10). Scr decreased to 2.83 today (7/13/24). Pt. noted to have proteinuria and microscopic hematuria on UA done on 6/28/24. Pt. with IgA kappa bands on serum immunofixation on labs done 6/10/24. Anti-GBM ab, PR3 and MPO antibodies were negative on 7/5/24. Serum C3 and C4 were not low on 7/5/24. No hydronephrosis seen on renal US done at [facility] on 7/1/24. No right hydronephrosis seen on renal US done at [facility] on 7/7/24. L kidney not well visualized due to bowel gas. Pt. was oliguric on admission. Pt. received Bumex infusion on 7/8-7/11. Pt. noted to be retaining urine on 7/12 and had indwelling felix placed. Pt.  non-oliguric with ~2.4L of UOP in the past 24hrs. Continue serial bladder scans. Hold diuretics today. Obtain renal US to better visualize left kidney. Monitor labs and UOP. Avoid nephrotoxins. Dose medications for eGFR.     If you have any questions, please feel free to contact me  Suhas Hunter  Nephrology Fellow  Page [phone] / Microsoft Teams (Preferred)  (After 4pm or on weekends please page the on-call fellow)

## 2024-07-13 NOTE — DIETITIAN INITIAL EVALUATION ADULT - PROBLEM SELECTOR PLAN 3
- patient found to have pseudomonas bacteremia from unclear etiology  - pending CT chest for evaluation of effusion on CXR  - Bcx had been negative from [facility]  - c/w zosyn until 7/13 as per ID from [place]  - ID consult in the AM - day team to follow  - pulm consult in the AM if there are significant findings on CT chest  - monitor for sepsis

## 2024-07-13 NOTE — DIETITIAN INITIAL EVALUATION ADULT - SIGNS/SYMPTOMS
A1c@ 8.0%, fingersticks between 205-372mg/dL, elevated BUN & Creatinine, phos high PO intake meeting <75% estimated needs  PO intake <=75% of EER >= 1 month, moderated edema (2+) Edema 1+ generalized L/R arm, scrotum; 4+ L/R leg per RN flowsheets dated 7/12.

## 2024-07-13 NOTE — DIETITIAN INITIAL EVALUATION ADULT - PERTINENT LABORATORY DATA
07-13    140  |  98  |  87<H>  ----------------------------<  256<H>  3.7   |  27  |  2.83<H>    Ca    8.6      13 Jul 2024 05:56  Phos  6.3     07-13  Mg     1.70     07-13    POCT Blood Glucose.: 292 mg/dL (07-13-24 @ 08:45)  A1C with Estimated Average Glucose Result: 8.0 % (07-07-24 @ 06:31)  A1C with Estimated Average Glucose Result: 7.9 % (06-30-24 @ 06:30)

## 2024-07-13 NOTE — DIETITIAN INITIAL EVALUATION ADULT - ORAL INTAKE PTA/DIET HISTORY
Met patient and his daughter at bedside. Patient reports poor appetite / PO intake for about a year PTA. Denies prior use of nutrition shakes/ vitamins PTA. No other reported issues.  Met patient and his daughter at bedside. Patient reports fair appetite / PO intake for a while PTA. Denies prior use of nutrition shakes/ vitamins PTA. No other reported issues.

## 2024-07-13 NOTE — DIETITIAN INITIAL EVALUATION ADULT - ADD RECOMMEND
1. Recommend adding Nepro 1x daily (420 kcals, 19g protein).   2. Recommend adding consistent carbohydrate renal with no snacks diet  3. Please consistently document %PO intake in nursing flowsheets   4. Monitor PO intake, Labs, electrolytes, weights, BMs, and skin integrity.  1. Recommend adding Nepro 1x daily (420 kcals, 19g protein).   2. Recommend adding consistent carbohydrate renal with evening snacks diet  3. Please consistently document %PO intake in nursing flowsheets   4. Monitor PO intake, Labs, electrolytes, weights, BMs, and skin integrity.

## 2024-07-13 NOTE — DIETITIAN INITIAL EVALUATION ADULT - NS FNS DIET ORDER
Diet, Renal Restrictions:   For patients receiving Renal Replacement - No Protein Restr, No Conc K, No Conc Phos, Low Sodium  Soft and Bite Sized (SOFTBTSZ) (07-06-24 @ 23:50) [Active]

## 2024-07-13 NOTE — DIETITIAN INITIAL EVALUATION ADULT - PHYSCIAL ASSESSMENT
Patient reports UBW of ~300lbs. Current dosing weight 138kg/304.2lbs (7/6). Unable to obtain current body weight at time of visit. Patient denies any weight changes.  Weight trend is relatively stable per [facility] weight history: 130.6kg(6/24); 129.2kg(4/4); 130.6kg(2/21); 134.3kg(11/20/23); 134.3kg(8/18/23) Patient reports UBW of ~300lbs without time frame. Current dosing weight 138kg/304.2lbs (7/6). Unable to obtain current body weight at time of visit. Patient denies any weight changes.  [facility]: 130.6kg(6/[facility]); 129.2kg(4/4); 130.6kg(2/21); 134.3kg(11/20/23); 134.3kg(8/18/23), weight gain most like r/t edema 2/2 fluid shifts. Patient reports UBW of ~300lbs without time frame. Current dosing weight 138kg/304.2lbs (7/6). Unable to obtain current body weight at time of visit. Patient denies any weight changes.  Edema noted. Per [facility] weight history: 130.6kg(6/24); 129.2kg(4/4); 130.6kg(2/21); 134.3kg(11/20/23); 134.3kg(8/18/23), weight gain most likely 2/2 fluid shifts. Please obtain daily weight and continue monitor weight trends

## 2024-07-13 NOTE — DIETITIAN INITIAL EVALUATION ADULT - DIET TYPE
Recommend adding consistent carbohydrate renal with no snacks diet Discussed with MD and recommend adding consistent carbohydrate renal with evening snacks diet. Keep the current food consistency./soft and bite-sized/consistent carbohydrate renal with evening snacks

## 2024-07-13 NOTE — DIETITIAN INITIAL EVALUATION ADULT - NUTRITIONGOAL OUTCOME2
Improvements in glycemic control with fingerstick 100-180mg/dL, A1c via following diet and insulin regimen, and renal biomarker

## 2024-07-13 NOTE — PROGRESS NOTE ADULT - SUBJECTIVE AND OBJECTIVE BOX
[facility] Division of Hospital Medicine  Christophetray BonnerDO  Available via MS Teams  In house pager [phone]    SUBJECTIVE / OVERNIGHT EVENTS:  No acute events overnight. Patient seen and examined at bedside this morning.  Denies further chest/epigastric pain. States he feels well overall. Reports good UOP.  Daughter and son at bedside.   Discussed renal function and urine output, cessation of antibiotics today/tomorrow. Also talked about stable troponin and TTE results with hyperdynamic LV.  Discussed plans for JAGJIT, being worked on with SW.    ADDITIONAL REVIEW OF SYSTEMS:    MEDICATIONS  (STANDING):  atorvastatin 80 milliGRAM(s) Oral at bedtime  cefepime   IVPB 1000 milliGRAM(s) IV Intermittent every 12 hours  cyanocobalamin 1000 MICROGram(s) Oral daily  dextrose 10% Bolus 125 milliLiter(s) IV Bolus once  dextrose 5%. 1000 milliLiter(s) (50 mL/Hr) IV Continuous <Continuous>  dextrose 5%. 1000 milliLiter(s) (100 mL/Hr) IV Continuous <Continuous>  dextrose 50% Injectable 25 Gram(s) IV Push once  dextrose 50% Injectable 12.5 Gram(s) IV Push once  famotidine    Tablet 20 milliGRAM(s) Oral daily  glucagon  Injectable 1 milliGRAM(s) IntraMuscular once  heparin   Injectable 5000 Unit(s) SubCutaneous every 12 hours  insulin glargine Injectable (LANTUS) 15 Unit(s) SubCutaneous at bedtime  insulin lispro (ADMELOG) corrective regimen sliding scale   SubCutaneous at bedtime  insulin lispro (ADMELOG) corrective regimen sliding scale   SubCutaneous three times a day before meals  insulin lispro Injectable (ADMELOG) 3 Unit(s) SubCutaneous three times a day before meals  melatonin 6 milliGRAM(s) Oral at bedtime  sodium bicarbonate 650 milliGRAM(s) Oral three times a day    MEDICATIONS  (PRN):  acetaminophen     Tablet .. 650 milliGRAM(s) Oral every 6 hours PRN Temp greater or equal to 38C (100.4F), Mild Pain (1 - 3)  aluminum hydroxide/magnesium hydroxide/simethicone Suspension 30 milliLiter(s) Oral every 4 hours PRN Dyspepsia  dextrose Oral Gel 15 Gram(s) Oral once PRN Blood Glucose LESS THAN 70 milliGRAM(s)/deciliter  ondansetron Injectable 4 milliGRAM(s) IV Push every 8 hours PRN Nausea and/or Vomiting      I&O's Summary    12 Jul 2024 07:01  -  13 Jul 2024 07:00  --------------------------------------------------------  IN: 600 mL / OUT: 2460 mL / NET: -1860 mL        PHYSICAL EXAM:  Vital Signs Last 24 Hrs  T(C): 36.7 (13 Jul 2024 05:00), Max: 36.7 (12 Jul 2024 13:30)  T(F): 98.1 (13 Jul 2024 05:00), Max: 98.1 (13 Jul 2024 05:00)  HR: 94 (13 Jul 2024 05:00) (90 - 101)  BP: 132/57 (13 Jul 2024 05:00) (129/62 - 141/68)  BP(mean): --  RR: 16 (13 Jul 2024 05:00) (16 - 18)  SpO2: 97% (13 Jul 2024 05:00) (96% - 99%)    Parameters below as of 13 Jul 2024 05:00  Patient On (Oxygen Delivery Method): room air      CONSTITUTIONAL: NAD, well-groomed  EYES: PERRLA; conjunctiva and sclera clear  ENMT: Moist oral mucosa, no pharyngeal injection or exudates  NECK: Supple, no palpable masses; no thyromegaly  RESPIRATORY: Normal respiratory effort; lungs are clear to auscultation bilaterally  CARDIOVASCULAR: Regular rate and rhythm, normal S1 and S2, no murmur/rub/gallop; b/l lower extremity non-pitting edema; Peripheral pulses are 2+ bilaterally  ABDOMEN: Nontender to palpation, normoactive bowel sounds, no rebound/guarding; No hepatosplenomegaly  MUSCULOSKELETAL: no clubbing or cyanosis of digits; no joint swelling or tenderness to palpation  PSYCH: A+O to person, place, and time; affect appropriate  NEUROLOGY: CN 2-12 are intact and symmetric; no gross sensory deficits   SKIN: No rashes; no palpable lesions    LABS:                        7.6    4.32  )-----------( 144      ( 13 Jul 2024 05:56 )             23.5     07-13    140  |  98  |  87<H>  ----------------------------<  256<H>  3.7   |  27  |  2.83<H>    Ca    8.6      13 Jul 2024 05:56  Phos  6.3     07-13  Mg     1.70     07-13        CARDIAC MARKERS ( 12 Jul 2024 05:37 )  x     / x     / 10 U/L / x     / 2.1 ng/mL      Urinalysis Basic - ( 13 Jul 2024 05:56 )    Color: x / Appearance: x / SG: x / pH: x  Gluc: 256 mg/dL / Ketone: x  / Bili: x / Urobili: x   Blood: x / Protein: x / Nitrite: x   Leuk Esterase: x / RBC: x / WBC x   Sq Epi: x / Non Sq Epi: x / Bacteria: x        SARS-CoV-2: NotDetec (28 Jun 2024 22:26)

## 2024-07-13 NOTE — PROGRESS NOTE ADULT - ASSESSMENT
84 y/o M with pmhx of DM2, COPD, BPH, HLD, CAD, MM on chemo presented to the ED for new onset sepsis and ATN.  Patient sent to [facility] for nephrology and dialysis evaluation.

## 2024-07-13 NOTE — DIETITIAN INITIAL EVALUATION ADULT - OTHER INFO
Patient reports fair to good appetite in house. Per RN flowsheets intake is variable (51-75%; %). Patient denies any in house nausea, vomiting, diarrhea, or constipation. Last BM noted on 7/12 per RN flowsheets. No reported chewing/swallowing issues. Patient tolerated current food consistency well. No known food allergies. Medications notable for antibiotics and insulin. Labs reviewed, A1c 7.9% indicating poor control, fingersticks 205-372mg/dL. Patient amenable to a trial of Nepro 1x daily (420 kcals, 19g protein) to optimize nutrition. Food preferences explored and noted, patient dislikes fish and oatmeal, prefers farina, applesauce, turkey sandwich, corn. diet office informed. Educated with. RD to remain available for further nutritional interventions as indicated  Patient reports fair to good appetite in house. Per RN flowsheets intake is variable (51-75%; %). Patient denies any in house nausea, vomiting, diarrhea, or constipation. Last BM noted on 7/12 per RN flowsheets. No reported chewing/swallowing issues. Patient tolerated current food consistency well. No known food allergies. Medications notable for antibiotics and insulin. Labs reviewed, A1c 7.9% indicating poor control, fingersticks 205-372mg/dL. Recommend adding consistent carbohydrate renal with no snacks diet. Patient amenable to a trial of Nepro 1x daily (420 kcals, 19g protein) to optimize nutrition. Food preferences explored and noted, patient dislikes fish and oatmeal, prefers farina, applesauce, turkey sandwich, corn. diet office informed. Educated with consistent carbohydrate renal diet. RD to remain available for further nutritional interventions as indicated  Patient reports fair to good appetite in house. Per RN flowsheets intake is variable (51-75%; %). Patient denies any in house nausea, vomiting, diarrhea, or constipation. Last BM noted on 7/12 per RN flowsheets. No reported chewing/swallowing issues. Patient tolerated current food consistency well. No known food allergies. Medications notable for antibiotics and insulin. Labs reviewed, A1c 7.9% indicating poor control, fingersticks 205-372mg/dL. Discussed with MD and recommend adding consistent carbohydrate renal with evening snacks diet. Patient amenable to a trial of Nepro 1x daily (420 kcals, 19g protein) to optimize nutrition. Food preferences explored and noted, patient dislikes fish and oatmeal, prefers farina, applesauce, turkey sandwich, corn. diet office informed. Educated with consistent carbohydrate renal diet. RD to remain available for further nutritional interventions as indicated

## 2024-07-13 NOTE — PROGRESS NOTE ADULT - SUBJECTIVE AND OBJECTIVE BOX
[facility] Division of Kidney Diseases & Hypertension  FOLLOW UP NOTE  [phone]--------------------------------------------------------------------------------  Chief Complaint: BRITT    24 hour events/subjective: Pt. accepted indwelling felix last evening, with good UOP. Pt. seen and examined this morning, daughter present at bedside. Pt. gives history of chronic B/L LE edema. Denies fever, SOB, N/V or abd pain.     PAST HISTORY  --------------------------------------------------------------------------------  No significant changes to PMH, PSH, FHx, SHx, unless otherwise noted    ALLERGIES & MEDICATIONS  --------------------------------------------------------------------------------  Allergies    No Known Allergies    Intolerances    Standing Inpatient Medications  atorvastatin 80 milliGRAM(s) Oral at bedtime  cefepime   IVPB 1000 milliGRAM(s) IV Intermittent every 12 hours  cyanocobalamin 1000 MICROGram(s) Oral daily  dextrose 10% Bolus 125 milliLiter(s) IV Bolus once  dextrose 5%. 1000 milliLiter(s) IV Continuous <Continuous>  dextrose 5%. 1000 milliLiter(s) IV Continuous <Continuous>  dextrose 50% Injectable 12.5 Gram(s) IV Push once  dextrose 50% Injectable 25 Gram(s) IV Push once  famotidine    Tablet 20 milliGRAM(s) Oral daily  glucagon  Injectable 1 milliGRAM(s) IntraMuscular once  heparin   Injectable 5000 Unit(s) SubCutaneous every 12 hours  insulin glargine Injectable (LANTUS) 15 Unit(s) SubCutaneous at bedtime  insulin lispro (ADMELOG) corrective regimen sliding scale   SubCutaneous at bedtime  insulin lispro (ADMELOG) corrective regimen sliding scale   SubCutaneous three times a day before meals  insulin lispro Injectable (ADMELOG) 3 Unit(s) SubCutaneous three times a day before meals  melatonin 6 milliGRAM(s) Oral at bedtime  sodium bicarbonate 650 milliGRAM(s) Oral three times a day    PRN Inpatient Medications  acetaminophen     Tablet .. 650 milliGRAM(s) Oral every 6 hours PRN  aluminum hydroxide/magnesium hydroxide/simethicone Suspension 30 milliLiter(s) Oral every 4 hours PRN  dextrose Oral Gel 15 Gram(s) Oral once PRN  ondansetron Injectable 4 milliGRAM(s) IV Push every 8 hours PRN    REVIEW OF SYSTEMS  --------------------------------------------------------------------------------  Gen: No fever  Skin: +Chronic B/L LE edema  Head/Eyes/Ears: No HA  Respiratory: No SOB  CV: No CP  GI: No abdominal pain/nausea or vomiting, diarrhea   : See HPI, +catheter  MSK: +Chronic B/L LE edema  Heme: No easy bruising    All other systems were reviewed and are negative, except as noted.    VITALS/PHYSICAL EXAM  --------------------------------------------------------------------------------  T(C): 36.7 (07-13-24 @ 05:00), Max: 36.7 (07-12-24 @ 13:30)  HR: 94 (07-13-24 @ 05:00) (90 - 101)  BP: 132/57 (07-13-24 @ 05:00) (129/62 - 141/68)  RR: 16 (07-13-24 @ 05:00) (16 - 18)  SpO2: 97% (07-13-24 @ 05:00) (96% - 99%)  Wt(kg): --    07-12-24 @ 07:01  -  07-13-24 @ 07:00  --------------------------------------------------------  IN: 600 mL / OUT: 2460 mL / NET: -1860 mL    Physical Exam:  Gen: resting, ill appearing   HEENT: No JVD, off supplemental oxygen  Pulm: CTA B/L   CV: S1S2+  Abd: Soft, +BS   : +Indwelling catheter  Ext: +B/L LE pitting edema (improving)  Neuro: Awake, alert  Skin: Chronic skin changes/hyperpigmented skin in B/L LE  Vascular access: Peripheral IV line    LABS/STUDIES  --------------------------------------------------------------------------------              7.6    4.32  >-----------<  144      [07-13-24 @ 05:56]              23.5     140  |  98  |  87  ----------------------------<  256      [07-13-24 @ 05:56]  3.7   |  27  |  2.83        Ca     8.6     [07-13-24 @ 05:56]      Mg     1.70     [07-13-24 @ 05:56]      Phos  6.3     [07-13-24 @ 05:56]    CK 10      [07-12-24 @ 05:37]    Creatinine Trend:  SCr 2.83 [07-13 @ 05:56]  SCr 3.34 [07-12 @ 05:37]  SCr 4.21 [07-11 @ 03:16]  SCr 4.81 [07-10 @ 07:10]  SCr 5.03 [07-09 @ 06:00]

## 2024-07-13 NOTE — DIETITIAN INITIAL EVALUATION ADULT - PERTINENT MEDS FT
MEDICATIONS  (STANDING):  atorvastatin 80 milliGRAM(s) Oral at bedtime  cefepime   IVPB 1000 milliGRAM(s) IV Intermittent every 12 hours  cyanocobalamin 1000 MICROGram(s) Oral daily  dextrose 10% Bolus 125 milliLiter(s) IV Bolus once  dextrose 5%. 1000 milliLiter(s) (100 mL/Hr) IV Continuous <Continuous>  dextrose 5%. 1000 milliLiter(s) (50 mL/Hr) IV Continuous <Continuous>  dextrose 50% Injectable 12.5 Gram(s) IV Push once  dextrose 50% Injectable 25 Gram(s) IV Push once  famotidine    Tablet 20 milliGRAM(s) Oral daily  glucagon  Injectable 1 milliGRAM(s) IntraMuscular once  heparin   Injectable 5000 Unit(s) SubCutaneous every 12 hours  insulin glargine Injectable (LANTUS) 15 Unit(s) SubCutaneous at bedtime  insulin lispro (ADMELOG) corrective regimen sliding scale   SubCutaneous at bedtime  insulin lispro (ADMELOG) corrective regimen sliding scale   SubCutaneous three times a day before meals  insulin lispro Injectable (ADMELOG) 3 Unit(s) SubCutaneous three times a day before meals  melatonin 6 milliGRAM(s) Oral at bedtime  sodium bicarbonate 650 milliGRAM(s) Oral three times a day    MEDICATIONS  (PRN):  acetaminophen     Tablet .. 650 milliGRAM(s) Oral every 6 hours PRN Temp greater or equal to 38C (100.4F), Mild Pain (1 - 3)  aluminum hydroxide/magnesium hydroxide/simethicone Suspension 30 milliLiter(s) Oral every 4 hours PRN Dyspepsia  dextrose Oral Gel 15 Gram(s) Oral once PRN Blood Glucose LESS THAN 70 milliGRAM(s)/deciliter  ondansetron Injectable 4 milliGRAM(s) IV Push every 8 hours PRN Nausea and/or Vomiting

## 2024-07-14 LAB
ANION GAP SERPL CALC-SCNC: 14 MMOL/L — SIGNIFICANT CHANGE UP (ref 7–14)
BUN SERPL-MCNC: 79 MG/DL — HIGH (ref 7–23)
CALCIUM SERPL-MCNC: 9.1 MG/DL — SIGNIFICANT CHANGE UP (ref 8.4–10.5)
CHLORIDE SERPL-SCNC: 98 MMOL/L — SIGNIFICANT CHANGE UP (ref 98–107)
CO2 SERPL-SCNC: 28 MMOL/L — SIGNIFICANT CHANGE UP (ref 22–31)
CREAT SERPL-MCNC: 2.56 MG/DL — HIGH (ref 0.5–1.3)
EGFR: 24 ML/MIN/1.73M2 — LOW
GLUCOSE BLDC GLUCOMTR-MCNC: 216 MG/DL — HIGH (ref 70–99)
GLUCOSE BLDC GLUCOMTR-MCNC: 227 MG/DL — HIGH (ref 70–99)
GLUCOSE BLDC GLUCOMTR-MCNC: 273 MG/DL — HIGH (ref 70–99)
GLUCOSE BLDC GLUCOMTR-MCNC: 319 MG/DL — HIGH (ref 70–99)
GLUCOSE BLDC GLUCOMTR-MCNC: 340 MG/DL — HIGH (ref 70–99)
GLUCOSE SERPL-MCNC: 208 MG/DL — HIGH (ref 70–99)
HCT VFR BLD CALC: 22.8 % — LOW (ref 39–50)
HGB BLD-MCNC: 7.6 G/DL — LOW (ref 13–17)
MAGNESIUM SERPL-MCNC: 1.7 MG/DL — SIGNIFICANT CHANGE UP (ref 1.6–2.6)
MCHC RBC-ENTMCNC: 32.5 PG — SIGNIFICANT CHANGE UP (ref 27–34)
MCHC RBC-ENTMCNC: 33.3 GM/DL — SIGNIFICANT CHANGE UP (ref 32–36)
MCV RBC AUTO: 97.4 FL — SIGNIFICANT CHANGE UP (ref 80–100)
NRBC # BLD: 0 /100 WBCS — SIGNIFICANT CHANGE UP (ref 0–0)
NRBC # FLD: 0 K/UL — SIGNIFICANT CHANGE UP (ref 0–0)
PHOSPHATE SERPL-MCNC: 5.7 MG/DL — HIGH (ref 2.5–4.5)
PLATELET # BLD AUTO: 134 K/UL — LOW (ref 150–400)
POTASSIUM SERPL-MCNC: 3.8 MMOL/L — SIGNIFICANT CHANGE UP (ref 3.5–5.3)
POTASSIUM SERPL-SCNC: 3.8 MMOL/L — SIGNIFICANT CHANGE UP (ref 3.5–5.3)
RBC # BLD: 2.34 M/UL — LOW (ref 4.2–5.8)
RBC # FLD: 18 % — HIGH (ref 10.3–14.5)
SODIUM SERPL-SCNC: 140 MMOL/L — SIGNIFICANT CHANGE UP (ref 135–145)
WBC # BLD: 4.03 K/UL — SIGNIFICANT CHANGE UP (ref 3.8–10.5)
WBC # FLD AUTO: 4.03 K/UL — SIGNIFICANT CHANGE UP (ref 3.8–10.5)

## 2024-07-14 PROCEDURE — 99232 SBSQ HOSP IP/OBS MODERATE 35: CPT

## 2024-07-14 PROCEDURE — 93010 ELECTROCARDIOGRAM REPORT: CPT

## 2024-07-14 RX ORDER — INSULIN GLARGINE 100 [IU]/ML
24 INJECTION, SOLUTION SUBCUTANEOUS AT BEDTIME
Refills: 0 | Status: DISCONTINUED | OUTPATIENT
Start: 2024-07-14 | End: 2024-07-16

## 2024-07-14 RX ORDER — CLOPIDOGREL BISULFATE 75 MG/1
75 TABLET, FILM COATED ORAL DAILY
Refills: 0 | Status: DISCONTINUED | OUTPATIENT
Start: 2024-07-14 | End: 2024-07-16

## 2024-07-14 RX ORDER — ASPIRIN 325 MG/1
81 TABLET, FILM COATED ORAL DAILY
Refills: 0 | Status: DISCONTINUED | OUTPATIENT
Start: 2024-07-14 | End: 2024-07-16

## 2024-07-14 RX ADMIN — SODIUM BICARBONATE 650 MILLIGRAM(S): 650 TABLET ORAL at 05:47

## 2024-07-14 RX ADMIN — CLOPIDOGREL BISULFATE 75 MILLIGRAM(S): 75 TABLET, FILM COATED ORAL at 13:51

## 2024-07-14 RX ADMIN — Medication 9 MILLIGRAM(S): at 21:51

## 2024-07-14 RX ADMIN — INSULIN LISPRO 3: 100 INJECTION, SOLUTION SUBCUTANEOUS at 18:47

## 2024-07-14 RX ADMIN — Medication 20 MILLIGRAM(S): at 13:51

## 2024-07-14 RX ADMIN — HEPARIN SODIUM 5000 UNIT(S): 50 INJECTION, SOLUTION INTRAVENOUS at 05:47

## 2024-07-14 RX ADMIN — ASPIRIN 81 MILLIGRAM(S): 325 TABLET, FILM COATED ORAL at 13:51

## 2024-07-14 RX ADMIN — SODIUM BICARBONATE 650 MILLIGRAM(S): 650 TABLET ORAL at 13:50

## 2024-07-14 RX ADMIN — ATORVASTATIN CALCIUM 80 MILLIGRAM(S): 20 TABLET, FILM COATED ORAL at 21:52

## 2024-07-14 RX ADMIN — TAMSULOSIN HYDROCHLORIDE 0.4 MILLIGRAM(S): 0.4 CAPSULE ORAL at 21:52

## 2024-07-14 RX ADMIN — HEPARIN SODIUM 5000 UNIT(S): 50 INJECTION, SOLUTION INTRAVENOUS at 18:46

## 2024-07-14 RX ADMIN — Medication 650 MILLIGRAM(S): at 05:53

## 2024-07-14 RX ADMIN — Medication 1000 MICROGRAM(S): at 13:51

## 2024-07-14 RX ADMIN — CEFEPIME 100 MILLIGRAM(S): 1 INJECTION, POWDER, FOR SOLUTION INTRAMUSCULAR; INTRAVENOUS at 07:28

## 2024-07-14 RX ADMIN — INSULIN LISPRO 6 UNIT(S): 100 INJECTION, SOLUTION SUBCUTANEOUS at 08:57

## 2024-07-14 RX ADMIN — INSULIN LISPRO 6 UNIT(S): 100 INJECTION, SOLUTION SUBCUTANEOUS at 18:48

## 2024-07-14 RX ADMIN — INSULIN LISPRO 6 UNIT(S): 100 INJECTION, SOLUTION SUBCUTANEOUS at 13:48

## 2024-07-14 RX ADMIN — INSULIN LISPRO 4: 100 INJECTION, SOLUTION SUBCUTANEOUS at 13:48

## 2024-07-14 RX ADMIN — INSULIN LISPRO 2: 100 INJECTION, SOLUTION SUBCUTANEOUS at 08:56

## 2024-07-14 RX ADMIN — INSULIN GLARGINE 24 UNIT(S): 100 INJECTION, SOLUTION SUBCUTANEOUS at 21:56

## 2024-07-14 RX ADMIN — SODIUM BICARBONATE 650 MILLIGRAM(S): 650 TABLET ORAL at 21:52

## 2024-07-14 NOTE — PROGRESS NOTE ADULT - SUBJECTIVE AND OBJECTIVE BOX
[facility] Division of Hospital Medicine  Christophe BonnerDO  Available via MS Teams  In house pager [phone]    SUBJECTIVE / OVERNIGHT EVENTS:  No acute events overnight. Patient seen and examined at bedside this morning.  Daughter at bedside offering most of information as patient asleep for most of encounter.  She reports he could not sleep last night, asking for a different sleep aid.  Also mentioned Tmax 99.5F overnight. No further fevers at this time - we discussed low threshold to re-initiate antibiotics (highest suspicion would a be a developing cellulitis).      ADDITIONAL REVIEW OF SYSTEMS:    MEDICATIONS  (STANDING):  atorvastatin 80 milliGRAM(s) Oral at bedtime  cyanocobalamin 1000 MICROGram(s) Oral daily  dextrose 10% Bolus 125 milliLiter(s) IV Bolus once  dextrose 5%. 1000 milliLiter(s) (50 mL/Hr) IV Continuous <Continuous>  dextrose 5%. 1000 milliLiter(s) (100 mL/Hr) IV Continuous <Continuous>  dextrose 50% Injectable 12.5 Gram(s) IV Push once  dextrose 50% Injectable 25 Gram(s) IV Push once  famotidine    Tablet 20 milliGRAM(s) Oral daily  glucagon  Injectable 1 milliGRAM(s) IntraMuscular once  heparin   Injectable 5000 Unit(s) SubCutaneous every 12 hours  insulin glargine Injectable (LANTUS) 24 Unit(s) SubCutaneous at bedtime  insulin lispro (ADMELOG) corrective regimen sliding scale   SubCutaneous at bedtime  insulin lispro (ADMELOG) corrective regimen sliding scale   SubCutaneous three times a day before meals  insulin lispro Injectable (ADMELOG) 6 Unit(s) SubCutaneous three times a day before meals  melatonin 9 milliGRAM(s) Oral at bedtime  sodium bicarbonate 650 milliGRAM(s) Oral three times a day  tamsulosin 0.4 milliGRAM(s) Oral at bedtime    MEDICATIONS  (PRN):  acetaminophen     Tablet .. 650 milliGRAM(s) Oral every 6 hours PRN Temp greater or equal to 38C (100.4F), Mild Pain (1 - 3)  aluminum hydroxide/magnesium hydroxide/simethicone Suspension 30 milliLiter(s) Oral every 4 hours PRN Dyspepsia  dextrose Oral Gel 15 Gram(s) Oral once PRN Blood Glucose LESS THAN 70 milliGRAM(s)/deciliter  ondansetron Injectable 4 milliGRAM(s) IV Push every 8 hours PRN Nausea and/or Vomiting      I&O's Summary    13 Jul 2024 07:01  -  14 Jul 2024 07:00  --------------------------------------------------------  IN: 575 mL / OUT: 1775 mL / NET: -1200 mL        PHYSICAL EXAM:  Vital Signs Last 24 Hrs  T(C): 36.7 (14 Jul 2024 05:45), Max: 37.5 (14 Jul 2024 02:36)  T(F): 98 (14 Jul 2024 05:45), Max: 99.5 (14 Jul 2024 02:36)  HR: 101 (14 Jul 2024 05:45) (83 - 107)  BP: 125/70 (14 Jul 2024 05:45) (108/56 - 125/70)  BP(mean): --  RR: 18 (14 Jul 2024 05:45) (16 - 18)  SpO2: 93% (14 Jul 2024 05:45) (93% - 97%)    Parameters below as of 14 Jul 2024 05:45  Patient On (Oxygen Delivery Method): room air      CONSTITUTIONAL: NAD, well-groomed  EYES: PERRLA; conjunctiva and sclera clear  ENMT: Moist oral mucosa, no pharyngeal injection or exudates; normal dentition  NECK: Supple, no palpable masses; no thyromegaly  RESPIRATORY: Normal respiratory effort; lungs are clear to auscultation bilaterally  CARDIOVASCULAR: Regular rate, intermittently irregular rhythm, normal S1 and S2, no murmur/rub/gallop; 1+ pitting b/l lower extremity edema; Peripheral pulses are 2+ bilaterally  ABDOMEN: Nontender to palpation, normoactive bowel sounds, no rebound/guarding; No hepatosplenomegaly  MUSCULOSKELETAL:  no clubbing or cyanosis of digits; no joint swelling or tenderness to palpation  PSYCH: A+O to person, place, and time; affect appropriate  NEUROLOGY: CN 2-12 are intact and symmetric; no gross sensory deficits   SKIN: No rashes; right shin skin contraction with mild erythema mid-shin b/l without significant warmth    LABS:                        7.6    4.03  )-----------( 134      ( 14 Jul 2024 05:54 )             22.8     07-14    140  |  98  |  79<H>  ----------------------------<  208<H>  3.8   |  28  |  2.56<H>    Ca    9.1      14 Jul 2024 05:54  Phos  5.7     07-14  Mg     1.70     07-14            Urinalysis Basic - ( 14 Jul 2024 05:54 )    Color: x / Appearance: x / SG: x / pH: x  Gluc: 208 mg/dL / Ketone: x  / Bili: x / Urobili: x   Blood: x / Protein: x / Nitrite: x   Leuk Esterase: x / RBC: x / WBC x   Sq Epi: x / Non Sq Epi: x / Bacteria: x        Culture - Urine (collected 12 Jul 2024 18:45)  Source: Clean Catch Clean Catch (Midstream)  Final Report (13 Jul 2024 18:19):    No growth      SARS-CoV-2: Cyrus (28 Jun 2024 22:26)

## 2024-07-14 NOTE — PROGRESS NOTE ADULT - PROBLEM SELECTOR PLAN 1
- patient presented for new onset BRITT likely secondary to sepsis leading to ATN  - nephrology following, no current plans for HD    Nephrology recommending diuresis, has been on bumetanide infusion. Being monitored off diuretics, still with good UOP. Monitor UOP and Cr. Improving.    Restart DAPT now that kidney function is improved and there is no current plan for HD.

## 2024-07-14 NOTE — PROGRESS NOTE ADULT - ASSESSMENT
84 y/o M with pmhx of DM2, COPD, BPH, HLD, CAD, MM on chemo presented to the ED for new onset sepsis and ATN.  Patient sent to [facility] for nephrology and dialysis evaluation.

## 2024-07-15 LAB
ANION GAP SERPL CALC-SCNC: 10 MMOL/L — SIGNIFICANT CHANGE UP (ref 7–14)
BLD GP AB SCN SERPL QL: NEGATIVE — SIGNIFICANT CHANGE UP
BUN SERPL-MCNC: 82 MG/DL — HIGH (ref 7–23)
CALCIUM SERPL-MCNC: 8.9 MG/DL — SIGNIFICANT CHANGE UP (ref 8.4–10.5)
CHLORIDE SERPL-SCNC: 99 MMOL/L — SIGNIFICANT CHANGE UP (ref 98–107)
CO2 SERPL-SCNC: 31 MMOL/L — SIGNIFICANT CHANGE UP (ref 22–31)
CREAT SERPL-MCNC: 2.6 MG/DL — HIGH (ref 0.5–1.3)
EGFR: 24 ML/MIN/1.73M2 — LOW
GLUCOSE BLDC GLUCOMTR-MCNC: 141 MG/DL — HIGH (ref 70–99)
GLUCOSE BLDC GLUCOMTR-MCNC: 249 MG/DL — HIGH (ref 70–99)
GLUCOSE BLDC GLUCOMTR-MCNC: 274 MG/DL — HIGH (ref 70–99)
GLUCOSE BLDC GLUCOMTR-MCNC: 289 MG/DL — HIGH (ref 70–99)
GLUCOSE SERPL-MCNC: 127 MG/DL — HIGH (ref 70–99)
HCT VFR BLD CALC: 19.2 % — CRITICAL LOW (ref 39–50)
HCT VFR BLD CALC: 20.4 % — CRITICAL LOW (ref 39–50)
HCT VFR BLD CALC: 24.8 % — LOW (ref 39–50)
HGB BLD-MCNC: 5.8 G/DL — CRITICAL LOW (ref 13–17)
HGB BLD-MCNC: 6.7 G/DL — CRITICAL LOW (ref 13–17)
HGB BLD-MCNC: 8.3 G/DL — LOW (ref 13–17)
MAGNESIUM SERPL-MCNC: 1.7 MG/DL — SIGNIFICANT CHANGE UP (ref 1.6–2.6)
MCHC RBC-ENTMCNC: 30.2 GM/DL — LOW (ref 32–36)
MCHC RBC-ENTMCNC: 32.2 PG — SIGNIFICANT CHANGE UP (ref 27–34)
MCHC RBC-ENTMCNC: 32.2 PG — SIGNIFICANT CHANGE UP (ref 27–34)
MCHC RBC-ENTMCNC: 32.4 PG — SIGNIFICANT CHANGE UP (ref 27–34)
MCHC RBC-ENTMCNC: 32.8 GM/DL — SIGNIFICANT CHANGE UP (ref 32–36)
MCHC RBC-ENTMCNC: 33.5 GM/DL — SIGNIFICANT CHANGE UP (ref 32–36)
MCV RBC AUTO: 106.7 FL — HIGH (ref 80–100)
MCV RBC AUTO: 96.1 FL — SIGNIFICANT CHANGE UP (ref 80–100)
MCV RBC AUTO: 98.6 FL — SIGNIFICANT CHANGE UP (ref 80–100)
NRBC # BLD: 0 /100 WBCS — SIGNIFICANT CHANGE UP (ref 0–0)
NRBC # FLD: 0 K/UL — SIGNIFICANT CHANGE UP (ref 0–0)
PHOSPHATE SERPL-MCNC: 6.5 MG/DL — HIGH (ref 2.5–4.5)
PLATELET # BLD AUTO: 124 K/UL — LOW (ref 150–400)
PLATELET # BLD AUTO: 127 K/UL — LOW (ref 150–400)
PLATELET # BLD AUTO: 130 K/UL — LOW (ref 150–400)
POTASSIUM SERPL-MCNC: 3.8 MMOL/L — SIGNIFICANT CHANGE UP (ref 3.5–5.3)
POTASSIUM SERPL-SCNC: 3.8 MMOL/L — SIGNIFICANT CHANGE UP (ref 3.5–5.3)
RBC # BLD: 1.8 M/UL — LOW (ref 4.2–5.8)
RBC # BLD: 2.07 M/UL — LOW (ref 4.2–5.8)
RBC # BLD: 2.58 M/UL — LOW (ref 4.2–5.8)
RBC # FLD: 18 % — HIGH (ref 10.3–14.5)
RBC # FLD: 18.4 % — HIGH (ref 10.3–14.5)
RBC # FLD: 19.2 % — HIGH (ref 10.3–14.5)
RH IG SCN BLD-IMP: POSITIVE — SIGNIFICANT CHANGE UP
SODIUM SERPL-SCNC: 140 MMOL/L — SIGNIFICANT CHANGE UP (ref 135–145)
WBC # BLD: 3.51 K/UL — LOW (ref 3.8–10.5)
WBC # BLD: 4.36 K/UL — SIGNIFICANT CHANGE UP (ref 3.8–10.5)
WBC # BLD: 4.41 K/UL — SIGNIFICANT CHANGE UP (ref 3.8–10.5)
WBC # FLD AUTO: 3.51 K/UL — LOW (ref 3.8–10.5)
WBC # FLD AUTO: 4.36 K/UL — SIGNIFICANT CHANGE UP (ref 3.8–10.5)
WBC # FLD AUTO: 4.41 K/UL — SIGNIFICANT CHANGE UP (ref 3.8–10.5)

## 2024-07-15 PROCEDURE — 99233 SBSQ HOSP IP/OBS HIGH 50: CPT

## 2024-07-15 PROCEDURE — 99232 SBSQ HOSP IP/OBS MODERATE 35: CPT | Mod: GC

## 2024-07-15 PROCEDURE — 76770 US EXAM ABDO BACK WALL COMP: CPT | Mod: 26

## 2024-07-15 RX ORDER — INSULIN LISPRO 100 [IU]/ML
8 INJECTION, SOLUTION SUBCUTANEOUS
Refills: 0 | Status: DISCONTINUED | OUTPATIENT
Start: 2024-07-15 | End: 2024-07-16

## 2024-07-15 RX ADMIN — SODIUM BICARBONATE 650 MILLIGRAM(S): 650 TABLET ORAL at 05:53

## 2024-07-15 RX ADMIN — INSULIN LISPRO 2: 100 INJECTION, SOLUTION SUBCUTANEOUS at 18:20

## 2024-07-15 RX ADMIN — Medication 20 MILLIGRAM(S): at 12:38

## 2024-07-15 RX ADMIN — HEPARIN SODIUM 5000 UNIT(S): 50 INJECTION, SOLUTION INTRAVENOUS at 18:20

## 2024-07-15 RX ADMIN — SODIUM BICARBONATE 650 MILLIGRAM(S): 650 TABLET ORAL at 12:38

## 2024-07-15 RX ADMIN — TAMSULOSIN HYDROCHLORIDE 0.4 MILLIGRAM(S): 0.4 CAPSULE ORAL at 22:21

## 2024-07-15 RX ADMIN — Medication 9 MILLIGRAM(S): at 22:20

## 2024-07-15 RX ADMIN — CLOPIDOGREL BISULFATE 75 MILLIGRAM(S): 75 TABLET, FILM COATED ORAL at 12:38

## 2024-07-15 RX ADMIN — INSULIN LISPRO 6 UNIT(S): 100 INJECTION, SOLUTION SUBCUTANEOUS at 13:12

## 2024-07-15 RX ADMIN — ATORVASTATIN CALCIUM 80 MILLIGRAM(S): 20 TABLET, FILM COATED ORAL at 22:21

## 2024-07-15 RX ADMIN — INSULIN LISPRO 6 UNIT(S): 100 INJECTION, SOLUTION SUBCUTANEOUS at 18:20

## 2024-07-15 RX ADMIN — INSULIN LISPRO 6 UNIT(S): 100 INJECTION, SOLUTION SUBCUTANEOUS at 09:09

## 2024-07-15 RX ADMIN — ASPIRIN 81 MILLIGRAM(S): 325 TABLET, FILM COATED ORAL at 12:38

## 2024-07-15 RX ADMIN — HEPARIN SODIUM 5000 UNIT(S): 50 INJECTION, SOLUTION INTRAVENOUS at 05:53

## 2024-07-15 RX ADMIN — INSULIN GLARGINE 24 UNIT(S): 100 INJECTION, SOLUTION SUBCUTANEOUS at 22:23

## 2024-07-15 RX ADMIN — INSULIN LISPRO 3: 100 INJECTION, SOLUTION SUBCUTANEOUS at 13:11

## 2024-07-15 RX ADMIN — Medication 1000 MICROGRAM(S): at 12:38

## 2024-07-15 RX ADMIN — SODIUM BICARBONATE 650 MILLIGRAM(S): 650 TABLET ORAL at 22:21

## 2024-07-15 RX ADMIN — INSULIN LISPRO 1: 100 INJECTION, SOLUTION SUBCUTANEOUS at 22:23

## 2024-07-15 NOTE — PROGRESS NOTE ADULT - SUBJECTIVE AND OBJECTIVE BOX
[facility] Division of Kidney Diseases & Hypertension  FOLLOW UP NOTE  [phone]--------------------------------------------------------------------------------  Chief Complaint: BRITT    24 hour events/subjective: Pt seen and examined this morning, daughter present at bedside. Pt. gives history of improving chronic b/l LE edema, and good UOP. Denies HA, fever, SOB, CP, N/V, or abdominal pain.    PAST HISTORY  --------------------------------------------------------------------------------  No significant changes to PMH, PSH, FHx, SHx, unless otherwise noted    ALLERGIES & MEDICATIONS  --------------------------------------------------------------------------------  Allergies    No Known Allergies    Intolerances    Standing Inpatient Medications  aspirin  chewable 81 milliGRAM(s) Oral daily  atorvastatin 80 milliGRAM(s) Oral at bedtime  clopidogrel Tablet 75 milliGRAM(s) Oral daily  cyanocobalamin 1000 MICROGram(s) Oral daily  dextrose 10% Bolus 125 milliLiter(s) IV Bolus once  dextrose 5%. 1000 milliLiter(s) IV Continuous <Continuous>  dextrose 5%. 1000 milliLiter(s) IV Continuous <Continuous>  dextrose 50% Injectable 12.5 Gram(s) IV Push once  dextrose 50% Injectable 25 Gram(s) IV Push once  famotidine    Tablet 20 milliGRAM(s) Oral daily  glucagon  Injectable 1 milliGRAM(s) IntraMuscular once  heparin   Injectable 5000 Unit(s) SubCutaneous every 12 hours  insulin glargine Injectable (LANTUS) 24 Unit(s) SubCutaneous at bedtime  insulin lispro (ADMELOG) corrective regimen sliding scale   SubCutaneous at bedtime  insulin lispro (ADMELOG) corrective regimen sliding scale   SubCutaneous three times a day before meals  insulin lispro Injectable (ADMELOG) 6 Unit(s) SubCutaneous three times a day before meals  melatonin 9 milliGRAM(s) Oral at bedtime  sodium bicarbonate 650 milliGRAM(s) Oral three times a day  tamsulosin 0.4 milliGRAM(s) Oral at bedtime    PRN Inpatient Medications  acetaminophen     Tablet .. 650 milliGRAM(s) Oral every 6 hours PRN  aluminum hydroxide/magnesium hydroxide/simethicone Suspension 30 milliLiter(s) Oral every 4 hours PRN  dextrose Oral Gel 15 Gram(s) Oral once PRN  ondansetron Injectable 4 milliGRAM(s) IV Push every 8 hours PRN    REVIEW OF SYSTEMS  --------------------------------------------------------------------------------  Gen: No fever  Skin: +Chronic B/L LE edema  Head/Eyes/Ears: No HA  Respiratory: No SOB  CV: No CP  GI: No abdominal pain/nausea or vomiting, diarrhea   : See HPI, +catheter  MSK: +Chronic B/L LE edema  Heme: No easy bruising    All other systems were reviewed and are negative, except as noted.    VITALS/PHYSICAL EXAM  --------------------------------------------------------------------------------  T(C): 36.7 (07-15-24 @ 09:50), Max: 37 (07-14-24 @ 21:13)  HR: 100 (07-15-24 @ 09:50) (80 - 100)  BP: 124/60 (07-15-24 @ 09:50) (104/64 - 149/59)  RR: 18 (07-15-24 @ 09:50) (18 - 19)  SpO2: 93% (07-15-24 @ 09:50) (93% - 99%)  Wt(kg): --    07-14-24 @ 07:01  -  07-15-24 @ 07:00  --------------------------------------------------------  IN: 0 mL / OUT: 675 mL / NET: -675 mL    Physical Exam:  Gen: resting, no acute distress   HEENT: No JVD, off supplemental oxygen  Pulm: CTA B/L   CV: S1S2+  Abd: Soft, +BS   : +Indwelling catheter  Ext: +B/L LE pitting edema (improving)  Neuro: Awake, alert  Skin: Chronic skin changes/hyperpigmented skin in B/L LE  Vascular access: Peripheral IV line    LABS/STUDIES  --------------------------------------------------------------------------------              6.7    3.51  >-----------<  124      [07-15-24 @ 06:34]              20.4     140  |  99  |  82  ----------------------------<  127      [07-15-24 @ 06:34]  3.8   |  31  |  2.60        Ca     8.9     [07-15-24 @ 06:34]      Mg     1.70     [07-15-24 @ 06:34]      Phos  6.5     [07-15-24 @ 06:34]    Creatinine Trend:  SCr 2.60 [07-15 @ 06:34]  SCr 2.56 [07-14 @ 05:54]  SCr 2.83 [07-13 @ 05:56]  SCr 3.34 [07-12 @ 05:37]  SCr 4.21 [07-11 @ 03:16] [facility] Division of Kidney Diseases & Hypertension  FOLLOW UP NOTE  [phone]--------------------------------------------------------------------------------    Chief Complaint: BRITT    24 hour events/subjective: Pt seen and examined this morning, daughter present at bedside. Pt. gives history of improving chronic b/l LE edema, and good UOP. Denies HA, fever, SOB, CP, N/V, or abdominal pain.    PAST HISTORY  --------------------------------------------------------------------------------  No significant changes to PMH, PSH, FHx, SHx, unless otherwise noted    ALLERGIES & MEDICATIONS  --------------------------------------------------------------------------------  Allergies    No Known Allergies    Intolerances    Standing Inpatient Medications  aspirin  chewable 81 milliGRAM(s) Oral daily  atorvastatin 80 milliGRAM(s) Oral at bedtime  clopidogrel Tablet 75 milliGRAM(s) Oral daily  cyanocobalamin 1000 MICROGram(s) Oral daily  dextrose 10% Bolus 125 milliLiter(s) IV Bolus once  dextrose 5%. 1000 milliLiter(s) IV Continuous <Continuous>  dextrose 5%. 1000 milliLiter(s) IV Continuous <Continuous>  dextrose 50% Injectable 12.5 Gram(s) IV Push once  dextrose 50% Injectable 25 Gram(s) IV Push once  famotidine    Tablet 20 milliGRAM(s) Oral daily  glucagon  Injectable 1 milliGRAM(s) IntraMuscular once  heparin   Injectable 5000 Unit(s) SubCutaneous every 12 hours  insulin glargine Injectable (LANTUS) 24 Unit(s) SubCutaneous at bedtime  insulin lispro (ADMELOG) corrective regimen sliding scale   SubCutaneous at bedtime  insulin lispro (ADMELOG) corrective regimen sliding scale   SubCutaneous three times a day before meals  insulin lispro Injectable (ADMELOG) 6 Unit(s) SubCutaneous three times a day before meals  melatonin 9 milliGRAM(s) Oral at bedtime  sodium bicarbonate 650 milliGRAM(s) Oral three times a day  tamsulosin 0.4 milliGRAM(s) Oral at bedtime    PRN Inpatient Medications  acetaminophen     Tablet .. 650 milliGRAM(s) Oral every 6 hours PRN  aluminum hydroxide/magnesium hydroxide/simethicone Suspension 30 milliLiter(s) Oral every 4 hours PRN  dextrose Oral Gel 15 Gram(s) Oral once PRN  ondansetron Injectable 4 milliGRAM(s) IV Push every 8 hours PRN    REVIEW OF SYSTEMS  --------------------------------------------------------------------------------  Gen: No fever  Skin: +Chronic B/L LE edema  Head/Eyes/Ears: No HA  Respiratory: No SOB  CV: No CP  GI: No abdominal pain/nausea or vomiting, diarrhea   : See HPI, +catheter  MSK: +Chronic B/L LE edema  Heme: No easy bruising    All other systems were reviewed and are negative, except as noted.    VITALS/PHYSICAL EXAM  --------------------------------------------------------------------------------  T(C): 36.7 (07-15-24 @ 09:50), Max: 37 (07-14-24 @ 21:13)  HR: 100 (07-15-24 @ 09:50) (80 - 100)  BP: 124/60 (07-15-24 @ 09:50) (104/64 - 149/59)  RR: 18 (07-15-24 @ 09:50) (18 - 19)  SpO2: 93% (07-15-24 @ 09:50) (93% - 99%)  Wt(kg): --    07-14-24 @ 07:01  -  07-15-24 @ 07:00  --------------------------------------------------------  IN: 0 mL / OUT: 675 mL / NET: -675 mL    Physical Exam:  Gen: resting, no acute distress   HEENT: No JVD  Pulm: CTA B/L   CV: S1S2+  Abd: Soft, +BS   : +Indwelling urinary catheter  Ext: +Decreased B/L LE pitting edema  Neuro: Awake, alert  Skin: Chronic skin changes/hyperpigmented skin in B/L LE  Vascular access: Peripheral IV line    LABS/STUDIES  --------------------------------------------------------------------------------              6.7    3.51  >-----------<  124      [07-15-24 @ 06:34]              20.4     140  |  99  |  82  ----------------------------<  127      [07-15-24 @ 06:34]  3.8   |  31  |  2.60        Ca     8.9     [07-15-24 @ 06:34]      Mg     1.70     [07-15-24 @ 06:34]      Phos  6.5     [07-15-24 @ 06:34]    Creatinine Trend:  SCr 2.60 [07-15 @ 06:34]  SCr 2.56 [07-14 @ 05:54]  SCr 2.83 [07-13 @ 05:56]  SCr 3.34 [07-12 @ 05:37]  SCr 4.21 [07-11 @ 03:16]

## 2024-07-15 NOTE — PROGRESS NOTE ADULT - ATTENDING COMMENTS
Patient examined and ROS reviewed. A case of BRITT on CKD in the setting of sepsis, hypotension, and MM. Serum creatinine increased to 5.03 mg/dl and now gradually decreased 2.83 mg/dl. Patient is being diuresed and being managed with Kramer's catheter. Advised to hold diuresis and maintain fluid balance.
Pt. with BRITT, likely ATN. Scr decreased to 2.56 yesterday, elevated/stable at 2.6 today. Pt. non-oliguric. Assessment and plan for BRITT as outlined above. Monitor labs and urine output. Avoid any potential nephrotoxins. Dose medications as per eGFR.
Pt. with BRITT, likely ATN. Scr increased to 5.03 today. Assessment and plan for BRITT as outlined above. Plan for IV Bumex infusion today. Monitor labs and urine output. Avoid any potential nephrotoxins. Dose medications as per eGFR. Will need to consider HD, if BRITT continues to worsen.
Pt. with BRITT, likely ATN. Scr decreased to 4.21 today. Pt. non-oliguric. Assessment and plan for BRITT as outlined above. Plan for IV Bumex infusion today. Monitor labs and urine output. Avoid any potential nephrotoxins. Dose medications as per eGFR.
Pt. with BRITT, likely ATN. Scr decreased to 4.8 today. Pt. non-oliguric. Assessment and plan for BRITT as outlined above. Plan for IV Bumex infusion today. Monitor labs and urine output. Avoid any potential nephrotoxins. Dose medications as per eGFR.
Pt. with BRITT, likely ATN. Scr decreased to 3.34 today. Pt. non-oliguric. Assessment and plan for BRITT as outlined above. Plan for IV Bumex infusion today. Monitor labs and urine output. Avoid any potential nephrotoxins. Dose medications as per eGFR.
Pt. with oliguric BRITT, likely ATN. Scr increased to 4.93 today. Assessment and plan for BRITT as outlined above. Monitor labs and urine output. Avoid any potential nephrotoxins. Dose medications as per eGFR. Will need to consider HD, if BRITT continues to worsen.

## 2024-07-15 NOTE — PROVIDER CONTACT NOTE (CRITICAL VALUE NOTIFICATION) - ACTION/TREATMENT ORDERED:
1 U RBC transfusion, repeat CBC, monitor s/s of bleeding.
repeat CBC, monitor pt
EKG sent and shown to provider. Labs completed. Pt will be placed on telemetry.
EKG sent and shown to provider. Labs completed. Pt will be placed on telemetry.

## 2024-07-15 NOTE — CHART NOTE - NSCHARTNOTEFT_GEN_A_CORE
Reached out to patient's outpatient oncologist (Eileen Kilgorecy, Office: [phone]) and at this time, per , it is okay to hold off on any chemo medications while patient is in subacute rehab.

## 2024-07-15 NOTE — PROGRESS NOTE ADULT - ASSESSMENT
84 y/o M with pmhx of DM2, COPD, BPH, HLD, CAD, MM on chemo presented to the ED for new onset sepsis and ATN.  Patient sent to [facility] for nephrology and dialysis evaluation.

## 2024-07-15 NOTE — PROGRESS NOTE ADULT - SUBJECTIVE AND OBJECTIVE BOX
Patient is a 83y old  Male who presents with a chief complaint of acute kidney failure (15 Jul 2024 09:55)      INTERVAL HPI/OVERNIGHT EVENTS:  Seen by me this afternoon, being transfused at time of visit, doing well, no complaints, no SOB/CP or dizziness.    Review of Systems: 12 point review of systems otherwise negative    MEDICATIONS  (STANDING):  aspirin  chewable 81 milliGRAM(s) Oral daily  atorvastatin 80 milliGRAM(s) Oral at bedtime  clopidogrel Tablet 75 milliGRAM(s) Oral daily  cyanocobalamin 1000 MICROGram(s) Oral daily  dextrose 10% Bolus 125 milliLiter(s) IV Bolus once  dextrose 5%. 1000 milliLiter(s) (100 mL/Hr) IV Continuous <Continuous>  dextrose 5%. 1000 milliLiter(s) (50 mL/Hr) IV Continuous <Continuous>  dextrose 50% Injectable 12.5 Gram(s) IV Push once  dextrose 50% Injectable 25 Gram(s) IV Push once  famotidine    Tablet 20 milliGRAM(s) Oral daily  glucagon  Injectable 1 milliGRAM(s) IntraMuscular once  heparin   Injectable 5000 Unit(s) SubCutaneous every 12 hours  insulin glargine Injectable (LANTUS) 24 Unit(s) SubCutaneous at bedtime  insulin lispro (ADMELOG) corrective regimen sliding scale   SubCutaneous at bedtime  insulin lispro (ADMELOG) corrective regimen sliding scale   SubCutaneous three times a day before meals  insulin lispro Injectable (ADMELOG) 6 Unit(s) SubCutaneous three times a day before meals  melatonin 9 milliGRAM(s) Oral at bedtime  sodium bicarbonate 650 milliGRAM(s) Oral three times a day  tamsulosin 0.4 milliGRAM(s) Oral at bedtime    MEDICATIONS  (PRN):  acetaminophen     Tablet .. 650 milliGRAM(s) Oral every 6 hours PRN Temp greater or equal to 38C (100.4F), Mild Pain (1 - 3)  aluminum hydroxide/magnesium hydroxide/simethicone Suspension 30 milliLiter(s) Oral every 4 hours PRN Dyspepsia  dextrose Oral Gel 15 Gram(s) Oral once PRN Blood Glucose LESS THAN 70 milliGRAM(s)/deciliter  ondansetron Injectable 4 milliGRAM(s) IV Push every 8 hours PRN Nausea and/or Vomiting      Allergies    No Known Allergies    Intolerances          Vital Signs Last 24 Hrs  T(C): 36.4 (15 Jul 2024 18:37), Max: 37 (14 Jul 2024 21:13)  T(F): 97.6 (15 Jul 2024 18:37), Max: 98.6 (14 Jul 2024 21:13)  HR: 88 (15 Jul 2024 18:37) (80 - 100)  BP: 142/71 (15 Jul 2024 18:37) (105/64 - 155/75)  BP(mean): --  RR: 19 (15 Jul 2024 18:37) (18 - 19)  SpO2: 95% (15 Jul 2024 18:37) (93% - 99%)    Parameters below as of 15 Jul 2024 18:37  Patient On (Oxygen Delivery Method): room air      CAPILLARY BLOOD GLUCOSE      POCT Blood Glucose.: 249 mg/dL (15 Jul 2024 18:12)  POCT Blood Glucose.: 274 mg/dL (15 Jul 2024 13:06)  POCT Blood Glucose.: 141 mg/dL (15 Jul 2024 08:49)  POCT Blood Glucose.: 216 mg/dL (14 Jul 2024 21:54)      07-14 @ 07:01 - 07-15 @ 07:00  --------------------------------------------------------  IN: 0 mL / OUT: 675 mL / NET: -675 mL    07-15 @ 07:01 - 07-15 @ 21:02  --------------------------------------------------------  IN: 470 mL / OUT: 600 mL / NET: -130 mL        Physical Exam:    Daily     Daily  General:  Well appearing, NAD, obese  HEENT:  Nonicteric, PERRLA  CV:  RRR, no murmur, no JVD  Lungs:  CTA B/L, no wheezes, rales, rhonchi  Abdomen:  Soft, non-tender, no distended, positive BS, no hepatosplenomegaly  Extremities:  2+ pulses, no c/c, +LE edema/lymphedema, chronic skin changes  Skin:  Warm and dry, no rashes  :  +Kramer in place  Neuro:  AAOx3, non-focal, CN II-XII grossly intact  No Restraints    LABS:                        8.3    4.36  )-----------( 127      ( 15 Jul 2024 15:30 )             24.8     07-15    140  |  99  |  82<H>  ----------------------------<  127<H>  3.8   |  31  |  2.60<H>    Ca    8.9      15 Jul 2024 06:34  Phos  6.5     07-15  Mg     1.70     07-15        Urinalysis Basic - ( 15 Jul 2024 06:34 )    Color: x / Appearance: x / SG: x / pH: x  Gluc: 127 mg/dL / Ketone: x  / Bili: x / Urobili: x   Blood: x / Protein: x / Nitrite: x   Leuk Esterase: x / RBC: x / WBC x   Sq Epi: x / Non Sq Epi: x / Bacteria: x          RADIOLOGY & ADDITIONAL TESTS:  Reviewed by me

## 2024-07-15 NOTE — PROGRESS NOTE ADULT - PROBLEM SELECTOR PLAN 1
Pt. with BRITT in the setting of sepsis, hypotension, and MM. Pt. with likely ATN. Scr was WNL 1.2 on OP labs done on 6/10/24. Scr was elevated at 1.71 on admission to [facility] on 6/28/24. Scr progressively increased to 4.59 on 7/6/24. Pt. transferred to [facility] on 7/6/24 for worsening BRITT and possible need for HD. Scr was elevated at 4.93 on 7/8/24 and increased to 5.03 (7/9/10). Scr stable at 2.6 today (7/15/24). Pt. noted to have proteinuria and microscopic hematuria on UA done on 6/28/24. Pt. with IgA kappa bands on serum immunofixation on labs done 6/10/24. Anti-GBM ab, PR3 and MPO antibodies were negative on 7/5/24. Serum C3 and C4 were not low on 7/5/24. No right hydronephrosis seen on renal US done at [facility] on 7/7/24. L kidney not well visualized due to bowel gas. Pt. was oliguric on admission. Pt. received Bumex infusion on 7/8-7/11. Pt. noted to be retaining urine on 7/12 and had indwelling felix placed. Pt.  non-oliguric with ~675 mL of UOP in the past 24hrs. Continue serial bladder scans. Continue holding diuretics. Monitor labs and UOP. Avoid nephrotoxins. Dose medications for eGFR.     If you have any questions, please feel free to contact me  Suhas Hunter  Nephrology Fellow  Page [phone] / [employer] Teams (Preferred)  (After 4pm or on weekends please page the on-call fellow). Pt. with BRITT in the setting of sepsis, hypotension, and MM. Pt. with likely ATN. Scr was WNL 1.2 on OP labs done on 6/10/24. Scr was elevated at 1.71 on admission to [facility] on 6/28/24. Scr progressively increased to 4.59 on 7/6/24. Pt. transferred to [facility] on 7/6/24 for worsening BRITT and possible need for HD. Scr was elevated at 4.93 on 7/8/24 and increased to 5.03 (7/9/10). Scr elevated/stable at 2.6 today (7/15/24). Pt. noted to have proteinuria and microscopic hematuria on UA done on 6/28/24. Pt. with IgA kappa bands on serum immunofixation on labs done 6/10/24. Anti-GBM ab, PR3 and MPO antibodies were negative on 7/5/24. Serum C3 and C4 were not low on 7/5/24. No right hydronephrosis seen on renal US done at [facility] on 7/7/24. L kidney not well visualized due to bowel gas. Pt. was oliguric on admission. Pt. received Bumex infusion on 7/8-7/11. Pt. underwent Kramer catheter placement for urine retention.  Pt. currently non-oliguric with ~675 mL of documented UOP in the past 24hrs. Monitor labs and UOP. Avoid nephrotoxins. Dose medications for eGFR.     If you have any questions, please feel free to contact me  Suhas Hunter  Nephrology Fellow  Page [phone] / [employer] Teams (Preferred)  (After 4pm or on weekends please page the on-call fellow).

## 2024-07-15 NOTE — PROVIDER CONTACT NOTE (CRITICAL VALUE NOTIFICATION) - RECOMMENDATIONS
continue to monitor?
continue to monitor?
transfusion, repeat CBC, monitor s/s of bleeding.
repeat CBC, monitor pt

## 2024-07-15 NOTE — PROGRESS NOTE ADULT - PROBLEM SELECTOR PLAN 1
Presented for new onset BRITT likely secondary to sepsis leading to ATN, baseline Cr 1.2 on 6/2024 CKD Stage 3  - nephrology following, no current plans for HD  - S/p bumex infusion 7/8-7/11, Cr overall much improved peaked at 5.03 on 7/9  - Most recent Cr lingering close to 2.6  - Repeat Kidney US 7/15 w/ No hydronephrosis. A right renal complex cyst measuring 3.5 cm  - Monitor UOP/BMP closely  - Restarted DAPT now that kidney function is improved and there is no current plan for HD  - Will do TOV tonight, c/w flomax

## 2024-07-15 NOTE — PROVIDER CONTACT NOTE (CRITICAL VALUE NOTIFICATION) - ASSESSMENT
Pt asymptomatic, VS as per flow sheet.
Pt AxO3.Troponin 142
Pt AxO3.Troponin 132
Pt asymptomatic, no S/S of bleeding.

## 2024-07-15 NOTE — PROVIDER CONTACT NOTE (CRITICAL VALUE NOTIFICATION) - BACKGROUND
82 y/o M with pmhx of DM2, COPD, BPH, HLD, CAD, MM on chemo presented to the ED for new onset sepsis and ATN.
pt admitted for Sepsis
pt admitted for illiteracy and low-level literacy
84 y/o M with pmhx of DM2, COPD, BPH, HLD, CAD, MM on chemo presented to the ED for new onset sepsis and ATN.

## 2024-07-16 ENCOUNTER — TRANSCRIPTION ENCOUNTER (OUTPATIENT)
Age: 84
End: 2024-07-16

## 2024-07-16 VITALS
RESPIRATION RATE: 18 BRPM | OXYGEN SATURATION: 98 % | HEART RATE: 95 BPM | DIASTOLIC BLOOD PRESSURE: 78 MMHG | SYSTOLIC BLOOD PRESSURE: 152 MMHG | TEMPERATURE: 98 F

## 2024-07-16 LAB
ANION GAP SERPL CALC-SCNC: 15 MMOL/L — HIGH (ref 7–14)
BUN SERPL-MCNC: 81 MG/DL — HIGH (ref 7–23)
CALCIUM SERPL-MCNC: 8.6 MG/DL — SIGNIFICANT CHANGE UP (ref 8.4–10.5)
CHLORIDE SERPL-SCNC: 96 MMOL/L — LOW (ref 98–107)
CO2 SERPL-SCNC: 28 MMOL/L — SIGNIFICANT CHANGE UP (ref 22–31)
CREAT SERPL-MCNC: 2.36 MG/DL — HIGH (ref 0.5–1.3)
EGFR: 27 ML/MIN/1.73M2 — LOW
GLUCOSE BLDC GLUCOMTR-MCNC: 166 MG/DL — HIGH (ref 70–99)
GLUCOSE BLDC GLUCOMTR-MCNC: 224 MG/DL — HIGH (ref 70–99)
GLUCOSE BLDC GLUCOMTR-MCNC: 264 MG/DL — HIGH (ref 70–99)
GLUCOSE SERPL-MCNC: 180 MG/DL — HIGH (ref 70–99)
HCT VFR BLD CALC: 22.8 % — LOW (ref 39–50)
HGB BLD-MCNC: 7.6 G/DL — LOW (ref 13–17)
MAGNESIUM SERPL-MCNC: 1.7 MG/DL — SIGNIFICANT CHANGE UP (ref 1.6–2.6)
MCHC RBC-ENTMCNC: 32.2 PG — SIGNIFICANT CHANGE UP (ref 27–34)
MCHC RBC-ENTMCNC: 33.3 GM/DL — SIGNIFICANT CHANGE UP (ref 32–36)
MCV RBC AUTO: 96.6 FL — SIGNIFICANT CHANGE UP (ref 80–100)
NRBC # BLD: 0 /100 WBCS — SIGNIFICANT CHANGE UP (ref 0–0)
NRBC # FLD: 0 K/UL — SIGNIFICANT CHANGE UP (ref 0–0)
PHOSPHATE SERPL-MCNC: 5.3 MG/DL — HIGH (ref 2.5–4.5)
PLATELET # BLD AUTO: 130 K/UL — LOW (ref 150–400)
POTASSIUM SERPL-MCNC: 3.7 MMOL/L — SIGNIFICANT CHANGE UP (ref 3.5–5.3)
POTASSIUM SERPL-SCNC: 3.7 MMOL/L — SIGNIFICANT CHANGE UP (ref 3.5–5.3)
RBC # BLD: 2.36 M/UL — LOW (ref 4.2–5.8)
RBC # FLD: 18.6 % — HIGH (ref 10.3–14.5)
SODIUM SERPL-SCNC: 139 MMOL/L — SIGNIFICANT CHANGE UP (ref 135–145)
WBC # BLD: 3.63 K/UL — LOW (ref 3.8–10.5)
WBC # FLD AUTO: 3.63 K/UL — LOW (ref 3.8–10.5)

## 2024-07-16 PROCEDURE — 99239 HOSP IP/OBS DSCHRG MGMT >30: CPT

## 2024-07-16 RX ORDER — METOPROLOL TARTRATE 50 MG
25 TABLET ORAL
Refills: 0 | Status: DISCONTINUED | OUTPATIENT
Start: 2024-07-16 | End: 2024-07-16

## 2024-07-16 RX ORDER — INSULIN ASPART 100 [IU]/ML
22 INJECTION, SUSPENSION SUBCUTANEOUS
Refills: 0 | DISCHARGE

## 2024-07-16 RX ORDER — FUROSEMIDE 10 MG/ML
1 INJECTION, SOLUTION INTRAMUSCULAR; INTRAVENOUS
Refills: 0 | DISCHARGE

## 2024-07-16 RX ORDER — FAMOTIDINE 40 MG
1 TABLET ORAL
Qty: 0 | Refills: 0 | DISCHARGE
Start: 2024-07-16

## 2024-07-16 RX ORDER — CYANOCOBALAMIN (VITAMIN B-12) 1000 MCG
1 TABLET, EXTENDED RELEASE ORAL
Refills: 0 | DISCHARGE

## 2024-07-16 RX ORDER — ATORVASTATIN CALCIUM 20 MG/1
1 TABLET, FILM COATED ORAL
Qty: 0 | Refills: 0 | DISCHARGE
Start: 2024-07-16

## 2024-07-16 RX ORDER — CLOPIDOGREL BISULFATE 75 MG/1
1 TABLET, FILM COATED ORAL
Refills: 0 | DISCHARGE

## 2024-07-16 RX ORDER — METFORMIN HYDROCHLORIDE 850 MG/1
1 TABLET, FILM COATED ORAL
Refills: 0 | DISCHARGE

## 2024-07-16 RX ORDER — LENALIDOMIDE 25 MG/1
1 CAPSULE ORAL
Refills: 0 | DISCHARGE

## 2024-07-16 RX ORDER — METOPROLOL TARTRATE 50 MG
1 TABLET ORAL
Refills: 0 | DISCHARGE

## 2024-07-16 RX ORDER — INSULIN ASPART 100 [IU]/ML
12 INJECTION, SUSPENSION SUBCUTANEOUS
Refills: 0 | DISCHARGE

## 2024-07-16 RX ORDER — INSULIN LISPRO 100 [IU]/ML
1 INJECTION, SOLUTION SUBCUTANEOUS
Qty: 0 | Refills: 0 | DISCHARGE
Start: 2024-07-16

## 2024-07-16 RX ORDER — ATORVASTATIN CALCIUM 20 MG/1
1 TABLET, FILM COATED ORAL
Refills: 0 | DISCHARGE

## 2024-07-16 RX ORDER — SODIUM BICARBONATE 650 MG/1
1 TABLET ORAL
Qty: 0 | Refills: 0 | DISCHARGE
Start: 2024-07-16

## 2024-07-16 RX ORDER — ASPIRIN 325 MG/1
1 TABLET, FILM COATED ORAL
Refills: 0 | DISCHARGE

## 2024-07-16 RX ORDER — CYANOCOBALAMIN (VITAMIN B-12) 1000 MCG
1 TABLET, EXTENDED RELEASE ORAL
Qty: 0 | Refills: 0 | DISCHARGE
Start: 2024-07-16

## 2024-07-16 RX ORDER — ACETAMINOPHEN 325 MG
2 TABLET ORAL
Qty: 0 | Refills: 0 | DISCHARGE
Start: 2024-07-16

## 2024-07-16 RX ORDER — INSULIN GLARGINE 100 [IU]/ML
25 INJECTION, SOLUTION SUBCUTANEOUS
Qty: 0 | Refills: 0 | DISCHARGE
Start: 2024-07-16

## 2024-07-16 RX ORDER — INSULIN LISPRO 100 [IU]/ML
8 INJECTION, SOLUTION SUBCUTANEOUS
Qty: 0 | Refills: 0 | DISCHARGE
Start: 2024-07-16

## 2024-07-16 RX ORDER — TAMSULOSIN HYDROCHLORIDE 0.4 MG/1
1 CAPSULE ORAL
Qty: 0 | Refills: 0 | DISCHARGE
Start: 2024-07-16

## 2024-07-16 RX ORDER — INSULIN GLARGINE 100 [IU]/ML
25 INJECTION, SOLUTION SUBCUTANEOUS AT BEDTIME
Refills: 0 | Status: DISCONTINUED | OUTPATIENT
Start: 2024-07-16 | End: 2024-07-16

## 2024-07-16 RX ORDER — CLOPIDOGREL BISULFATE 75 MG/1
1 TABLET, FILM COATED ORAL
Qty: 0 | Refills: 0 | DISCHARGE
Start: 2024-07-16

## 2024-07-16 RX ORDER — HEPARIN SODIUM 50 [USP'U]/ML
5000 INJECTION, SOLUTION INTRAVENOUS
Qty: 0 | Refills: 0 | DISCHARGE
Start: 2024-07-16

## 2024-07-16 RX ORDER — ASPIRIN 325 MG/1
1 TABLET, FILM COATED ORAL
Qty: 0 | Refills: 0 | DISCHARGE
Start: 2024-07-16

## 2024-07-16 RX ORDER — DEXAMETHASONE 1 MG/1
10 TABLET ORAL
Refills: 0 | DISCHARGE

## 2024-07-16 RX ORDER — METOPROLOL TARTRATE 50 MG
1 TABLET ORAL
Qty: 0 | Refills: 0 | DISCHARGE
Start: 2024-07-16

## 2024-07-16 RX ADMIN — INSULIN LISPRO 3: 100 INJECTION, SOLUTION SUBCUTANEOUS at 18:18

## 2024-07-16 RX ADMIN — INSULIN LISPRO 8 UNIT(S): 100 INJECTION, SOLUTION SUBCUTANEOUS at 18:18

## 2024-07-16 RX ADMIN — HEPARIN SODIUM 5000 UNIT(S): 50 INJECTION, SOLUTION INTRAVENOUS at 06:00

## 2024-07-16 RX ADMIN — Medication 25 MILLIGRAM(S): at 18:31

## 2024-07-16 RX ADMIN — Medication 1000 MICROGRAM(S): at 12:49

## 2024-07-16 RX ADMIN — Medication 20 MILLIGRAM(S): at 12:48

## 2024-07-16 RX ADMIN — INSULIN LISPRO 1: 100 INJECTION, SOLUTION SUBCUTANEOUS at 09:02

## 2024-07-16 RX ADMIN — CLOPIDOGREL BISULFATE 75 MILLIGRAM(S): 75 TABLET, FILM COATED ORAL at 12:49

## 2024-07-16 RX ADMIN — HEPARIN SODIUM 5000 UNIT(S): 50 INJECTION, SOLUTION INTRAVENOUS at 18:20

## 2024-07-16 RX ADMIN — INSULIN LISPRO 8 UNIT(S): 100 INJECTION, SOLUTION SUBCUTANEOUS at 13:59

## 2024-07-16 RX ADMIN — INSULIN LISPRO 8 UNIT(S): 100 INJECTION, SOLUTION SUBCUTANEOUS at 09:03

## 2024-07-16 RX ADMIN — ASPIRIN 81 MILLIGRAM(S): 325 TABLET, FILM COATED ORAL at 12:49

## 2024-07-16 RX ADMIN — SODIUM BICARBONATE 650 MILLIGRAM(S): 650 TABLET ORAL at 06:00

## 2024-07-16 RX ADMIN — INSULIN LISPRO 2: 100 INJECTION, SOLUTION SUBCUTANEOUS at 13:58

## 2024-07-16 RX ADMIN — SODIUM BICARBONATE 650 MILLIGRAM(S): 650 TABLET ORAL at 12:48

## 2024-07-16 NOTE — DISCHARGE NOTE NURSING/CASE MANAGEMENT/SOCIAL WORK - PATIENT PORTAL LINK FT
You can access the FollowMyHealth Patient Portal offered by [facility] by registering at the following website: [email or web address]. By joining [employer]’s FollowMyHealth portal, you will also be able to view your health information using other applications (apps) compatible with our system.

## 2024-07-16 NOTE — PROGRESS NOTE ADULT - PROBLEM SELECTOR PLAN 8
- wound care
Chronic  - Wound care recs appreciated  - Keep LE's elevated  - No e/o infection at present time
CT Chest: 5 mm right apical nodule. One-year follow-up chest CT recommended if there are risk factors for malignancy.
CT Chest: 5 mm right apical nodule. One-year follow-up chest CT recommended if there are risk factors for malignancy.
- wound care
- wound care
- DNR/DNI as per MOLST form and family decision  - MOLST form in chart
CT Chest: 5 mm right apical nodule. One-year follow-up chest CT recommended if there are risk factors for malignancy.
- wound care
Chronic  - Wound care recs appreciated  - Keep LE's elevated  - No e/o infection at present time

## 2024-07-16 NOTE — PROGRESS NOTE ADULT - PROBLEM SELECTOR PROBLEM 4
Epigastric pain
DM2 (diabetes mellitus, type 2)
Epigastric pain
DM2 (diabetes mellitus, type 2)
Epigastric pain
DM2 (diabetes mellitus, type 2)
Epigastric pain
DM2 (diabetes mellitus, type 2)

## 2024-07-16 NOTE — PROGRESS NOTE ADULT - PROBLEM SELECTOR PLAN 6
- hold BP meds for now, midodrine has been d/c  - BP seems to be on the rise  - If BP remains elevated will begin to resume home meds slowly (lasix 20mg, metoprolol 25mg BID)  - Monitor BP closely
- hold BP meds for now, will stop midodrine
- hold chemo for now
- hold BP meds for now, c/w midodrine for BP support
- hold chemo for now
- hold BP meds for now, will stop midodrine
- Hold BP meds for now (lasix 20mg/metoprolol), midodrine has been d/c  - BP/HR seems to be on the rise  - Resuming metoprolol 25mg BID at this time (home med)  - Monitor BP closely
- hold BP meds for now, will stop midodrine

## 2024-07-16 NOTE — PROGRESS NOTE ADULT - PROVIDER SPECIALTY LIST ADULT
Nephrology
Infectious Disease
Hospitalist
Nephrology
Hospitalist

## 2024-07-16 NOTE — PROGRESS NOTE ADULT - PROBLEM SELECTOR PLAN 7
- hold chemo for now
Last chemo approx 2-3 weeks ago, he takes a pill for 14 days and has 7 days off  - No plans for chemo until after Rehab per pt's Oncologist Dr. Rhonda Farris  - Outpatient oncology f/up after discharge from Rehab    # Anemia  - Mixed LETHA and AOCD, could also be chemo related  - Hg dropped to 6.7 and on repeat one on 7/15 was 5.8  - Giving 1U PRBC, adequate response on repeat CBC  - Keep Hg above 7
- hold chemo for now  outpatient oncology f/u
- hold chemo for now
- wound care
- wound care
Last chemo approx 2-3 weeks ago, he takes a pill for 14 days and has 7 days off  - No plans for chemo until after Rehab per pt's Oncologist Dr. Rhonda Farris  - Outpatient oncology f/up after discharge from Rehab    # Anemia  - Mixed LETHA and AOCD, could also be chemo related  - Hg dropped to 6.7 and on repeat one today was 5.8  - Giving 1U PRBC, adequate response on repeat CBC  - Keep Hg above 7
- wound care
- wound care
- hold chemo for now  outpatient oncology f/u

## 2024-07-16 NOTE — PROGRESS NOTE ADULT - TIME BILLING
- Ordering, reviewing, and interpreting labs, testing, and imaging  - Independently obtaining a review of systems and performing a physical exam  - Reviewing consultant documentation/recommendations  - Counselling and educating patient and family regarding interpretation of aforementioned items and plan of care  - Documentation of encounter
Time-based billing (NON-critical care).     53 minutes spent on total encounter; more than 50% of the visit was spent counseling and / or coordinating care by the attending physician.  The necessity of the time spent during the encounter on this date of service was due to:     review of laboratory data, radiology results, consultants' recommendations, documentation in [place], discussion with patient/daughter and interdisciplinary staff (such as , social workers, etc). Interventions were performed as documented above.
Time-based billing (NON-critical care).     36 minutes spent on total encounter; more than 50% of the visit was spent counseling and / or coordinating care by the attending physician.  The necessity of the time spent during the encounter on this date of service was due to:     review of laboratory data, radiology results, consultants' recommendations, documentation in [place], discussion with patient/daughter
Time-based billing (NON-critical care).     55 minutes spent on total encounter; more than 50% of the visit was spent counseling and / or coordinating care by the attending physician.  The necessity of the time spent during the encounter on this date of service was due to:     review of laboratory data, radiology results, consultants' recommendations, documentation in [place], discussion with patient/daughter and interdisciplinary staff (such as , social workers, etc). Interventions were performed as documented above.
Time-based billing (NON-critical care).     55 minutes spent on total encounter; more than 50% of the visit was spent counseling and / or coordinating care by the attending physician.  The necessity of the time spent during the encounter on this date of service was due to:     review of laboratory data, radiology results, consultants' recommendations, documentation in [place], discussion with patient/daughter and interdisciplinary staff (such as , social workers, etc). Interventions were performed as documented above.
- Ordering, reviewing, and interpreting labs, testing, and imaging  - Independently obtaining a review of systems and performing a physical exam  - Reviewing consultant documentation/recommendations  - Counselling and educating patient regarding interpretation of aforementioned items and plan of care  - Documentation of encounter

## 2024-07-16 NOTE — PROGRESS NOTE ADULT - PROBLEM SELECTOR PROBLEM 1
ATN (acute tubular necrosis)
BRITT (acute kidney injury)
ATN (acute tubular necrosis)

## 2024-07-16 NOTE — PROGRESS NOTE ADULT - PROBLEM SELECTOR PLAN 5
Patient taking Novolog 70/30 12U in the AM and 22U in the evening  - A1C of 8.0 which is probably adequate for patient's age, although possibly falsely low in setting of recently worsened anemia  - Hyperglycemic recently, likely reflects recent renal recovery which can cause better insulin clearance  - C/w lantus 24U qhs and increasing lispro to 8U TID  - C/w CARLOS and carb consistent diet  - Monitor FS's closely
- hold BP meds for now, c/w midodrine for BP support
Patient taking Novolog 70/30 12U in the AM and 22U in the evening  - A1C of 8.0 which is probably adequate for patient's age, although possibly falsely low in setting of recently worsened anemia  - Hyperglycemic recently, likely reflects recent renal recovery which can cause better insulin clearance  - Increasing lantus to 25U qhs and c/w lispro 8U TID  - C/w CARLOS and carb consistent diet  - Monitor FS's closely
- hold BP meds for now, c/w midodrine for BP support
- hold BP meds for now, c/w midodrine for BP support
- patient taking novolog 70/30 12 U in the AM and 22 U in the evening    Hyperglycemic recently, likely reflects recent renal recovery which can cause better insulin clearance.  Adjusting insulin glargine qhs and lispro ac TID +SS to address hyperglycemia.   f/s qachs goal 140-180    A1c 8.0% but possibly falsely decreased in setting of recently worsened anemia.
- hold BP meds for now, c/w midodrine for BP support
- patient taking novolog 70/30 12 U in the AM and 22 U in the evening    Hyperglycemic recently, likely reflects recent renal recovery which can cause better insulin clearance.  Adjusting insulin glargine qhs and lispro ac TID +SS to address hyperglycemia.   f/s qachs goal 140-180    A1c 8.0% but possibly falsely decreased in setting of recently worsened anemia.
- patient taking novolog 70/30 12 U in the AM and 22 U in the evening  - will c/w 10 unit lantus QHS with low dose sliding scale
- patient taking novolog 70/30 12 U in the AM and 22 U in the evening  - will c/w 10 unit lantus QHS with low dose sliding scale - may need adjustments as renal function improves

## 2024-07-16 NOTE — PROGRESS NOTE ADULT - PROBLEM SELECTOR PROBLEM 7
Lymphedema
Multiple myeloma
Lymphedema
Multiple myeloma
Lymphedema
Multiple myeloma
Lymphedema
Multiple myeloma

## 2024-07-16 NOTE — PROGRESS NOTE ADULT - PROBLEM SELECTOR PLAN 3
- patient found to have pseudomonas bacteremia from unclear etiology  - CT chest: trace b/l pleural effusions  - repeat Bcx had been negative from [facility]  - ID consult appreciated  - c/w cefepime until 7/13 as per ID  - monitor for sepsis
- patient found to have pseudomonas bacteremia from unclear etiology  - CT chest: trace b/l pleural effusions  - repeat Bcx had been negative from [facility]  - c/w zosyn until 7/13 as per ID from [place]  - ID consult appreciated  - monitor for sepsis
- patient found to have pseudomonas bacteremia from unclear etiology  - CT chest: trace b/l pleural effusions  - repeat Bcx had been negative from [facility]  - c/w zosyn until 7/13 as per ID from [place]  - ID consult appreciated  - monitor for sepsis
- patient found to have pseudomonas bacteremia from unclear etiology  - pending CT chest for evaluation of effusion on CXR  - Bcx had been negative from [facility]  - c/w zosyn until 7/13 as per ID from [place]  - ID consult P  - monitor for sepsis
- patient found to have pseudomonas bacteremia from unclear etiology  - CT chest: trace b/l pleural effusions  - repeat Bcx had been negative from [facility]  - c/w zosyn until 7/13 as per ID from [place]  - ID consult appreciated  - monitor for sepsis
- patient found to have pseudomonas bacteremia from unclear etiology  - CT chest: trace b/l pleural effusions  - repeat Bcx had been negative from [facility]  - c/w zosyn until 7/13 as per ID from [place]  - ID consult appreciated  - monitor for sepsis
- patient found to have pseudomonas bacteremia from unclear etiology  - CT chest: trace b/l pleural effusions  - repeat Bcx had been negative from [facility]  - c/w zosyn until 7/13 as per ID from [place]  - ID consult appreciated  - monitor for sepsis
- patient found to have pseudomonas bacteremia from unclear etiology  - CT chest: trace b/l pleural effusions  - repeat Bcx had been negative from [facility]  - ID consult appreciated  - c/w cefepime until 7/13 as per ID  - monitor for sepsis
Patient found to have pseudomonas bacteremia from unclear etiology  - CT chest: trace b/l pleural effusions  - repeat Bcx had been negative from [facility]  - ID consult appreciated  - Completed cefepime on 7/13 as per ID recs
Patient found to have pseudomonas bacteremia from unclear etiology  - CT chest: trace b/l pleural effusions  - repeat Bcx had been negative from [facility]  - ID consult appreciated  - Completed cefepime on 7/13 as per ID recs

## 2024-07-16 NOTE — PROGRESS NOTE ADULT - PROBLEM SELECTOR PLAN 4
- ECG NSR, without ischemic ST-T changes  - troponin elevated to 132, though in setting of BRITT/ATN may be simply due to clearance  - troponin peaked 143  - monitor on telemetry for now  - limited TTE with hyperdynamic LV, LVEF 87% suggestive of intravascular depletion    Will also address as indigestion. Offer famotidine, maalox and monitor response. Symptoms seem to have improved.    Do not currently suspect cardiac chest pain given above w/u.
- patient taking novolog 70/30 12 U in the AM and 22 U in the evening  - will c/w 10 unit lantus QHS with low dose sliding scale
- patient taking novolog 70/30 12 U in the AM and 22 U in the evening  - will c/w 10 unit lantus QHS with low dose sliding scale
- ECG NSR, without ischemic ST-T changes  - troponin elevated to 132, though in setting of BRITT/ATN may be simply due to clearance  - difficulty repeating troponin, will need to try again to assess for elevation  - monitor on telemetry for now  - obtain repeat limited TTE to assess LVEF and systolic function    Will also address as indigestion. Offer famotidine, maalox and monitor response.
- ECG NSR, without ischemic ST-T changes  - troponin elevated to 132, though in setting of BRITT/ATN may be simply due to clearance  - troponin increasing slightly - trend to peak, repeat ECG  - monitor on telemetry for now  - obtain repeat limited TTE to assess LVEF and systolic function    Will also address as indigestion. Offer famotidine, maalox and monitor response. Symptoms seem to have improved.
- patient taking novolog 70/30 12 U in the AM and 22 U in the evening  - will c/w 10 unit lantus QHS with low dose sliding scale
- patient taking novolog 70/30 12 U in the AM and 22 U in the evening  - will c/w 10 unit lantus QHS with low dose sliding scale
- ECG NSR, without ischemic ST-T changes  - troponin elevated to 132, though in setting of BRITT/ATN may be simply due to clearance  - troponin peaked 143  - monitor on telemetry for now  - limited TTE with hyperdynamic LV, LVEF 87% suggestive of intravascular depletion    Will also address as indigestion. Offer famotidine, maalox and monitor response. Symptoms seem to have improved.  Do not currently suspect cardiac chest pain given above w/u.    Pulse on palpation intermittently irregular. Telemetry with APCs and PVCs. Obtain formal EKG.
ECG NSR, without ischemic ST-T changes  - troponin elevated to 132, though in setting of BRITT/ATN may be simply due to clearance  - troponin peaked 143  - limited TTE with hyperdynamic LV, LVEF 87% suggestive of intravascular depletion  - Possible indigestion, c/w famotidine daily  - Symptoms seem to have improved  - Telemetry with APCs and PVCs, f/up EKG
ECG NSR, without ischemic ST-T changes  - troponin elevated to 132, though in setting of BRITT/ATN may be simply due to clearance  - troponin peaked 143  - limited TTE with hyperdynamic LV, LVEF 87% suggestive of intravascular depletion  - Possible indigestion, c/w famotidine daily  - Symptoms seem to have improved  - Telemetry with APCs and PVCs, EKG 7/12 SR , LAD, LVH, occasional PVC's, no acute ST/T changes

## 2024-07-16 NOTE — PROGRESS NOTE ADULT - PROBLEM SELECTOR PLAN 9
- heparin subq for dvt ppx    Discussed during interdisciplinary rounds with case management, social work, nursing, and nursing manager.  Discussed with nephrology team.  Discussed with patient and daughter at bedside.
CT Chest: 5 mm right apical nodule. One-year follow-up chest CT recommended if there are risk factors for malignancy.  Discussed with patient and daughter.
- heparin subq for dvt ppx    Discussed during interdisciplinary rounds with case management, social work, nursing, and nursing manager.  Discussed with patient and daughter at bedside.
CT Chest: 5 mm right apical nodule. One-year follow-up chest CT recommended if there are risk factors for malignancy.  Discussed with patient and daughter.
CT Chest: 5 mm right apical nodule. One-year follow-up chest CT recommended if there are risk factors for malignancy.
CT Chest: 5 mm right apical nodule. One-year follow-up chest CT recommended if there are risk factors for malignancy.  Discussed with patient and daughter.
CT Chest: 5 mm right apical nodule. One-year follow-up chest CT rec if there are risk factors for malignancy  - Prior attending discussed with patient and daughter
- heparin subq for dvt ppx    Discussed during interdisciplinary rounds with case management, social work, nursing, and nursing manager.  Discussed with patient and daughter at bedside.
- patient provided med list at bedside
CT Chest: 5 mm right apical nodule  - One-year follow-up chest CT rec if there are risk factors for malignancy

## 2024-07-16 NOTE — PROGRESS NOTE ADULT - SUBJECTIVE AND OBJECTIVE BOX
Patient is a 83y old  Male who presents with a chief complaint of acute kidney failure (15 Jul 2024 21:01)      INTERVAL HPI/OVERNIGHT EVENTS:  Seen by me this afternoon, doing well, Daughter Rhonda at bedside. Ongoing TOV. No complaints, wants to be discharged soon.    Review of Systems: 12 point review of systems otherwise negative    MEDICATIONS  (STANDING):  aspirin  chewable 81 milliGRAM(s) Oral daily  atorvastatin 80 milliGRAM(s) Oral at bedtime  clopidogrel Tablet 75 milliGRAM(s) Oral daily  cyanocobalamin 1000 MICROGram(s) Oral daily  dextrose 10% Bolus 125 milliLiter(s) IV Bolus once  dextrose 5%. 1000 milliLiter(s) (100 mL/Hr) IV Continuous <Continuous>  dextrose 5%. 1000 milliLiter(s) (50 mL/Hr) IV Continuous <Continuous>  dextrose 50% Injectable 12.5 Gram(s) IV Push once  dextrose 50% Injectable 25 Gram(s) IV Push once  famotidine    Tablet 20 milliGRAM(s) Oral daily  glucagon  Injectable 1 milliGRAM(s) IntraMuscular once  heparin   Injectable 5000 Unit(s) SubCutaneous every 12 hours  insulin glargine Injectable (LANTUS) 25 Unit(s) SubCutaneous at bedtime  insulin lispro (ADMELOG) corrective regimen sliding scale   SubCutaneous at bedtime  insulin lispro (ADMELOG) corrective regimen sliding scale   SubCutaneous three times a day before meals  insulin lispro Injectable (ADMELOG) 8 Unit(s) SubCutaneous three times a day before meals  melatonin 9 milliGRAM(s) Oral at bedtime  sodium bicarbonate 650 milliGRAM(s) Oral three times a day  tamsulosin 0.4 milliGRAM(s) Oral at bedtime    MEDICATIONS  (PRN):  acetaminophen     Tablet .. 650 milliGRAM(s) Oral every 6 hours PRN Temp greater or equal to 38C (100.4F), Mild Pain (1 - 3)  aluminum hydroxide/magnesium hydroxide/simethicone Suspension 30 milliLiter(s) Oral every 4 hours PRN Dyspepsia  dextrose Oral Gel 15 Gram(s) Oral once PRN Blood Glucose LESS THAN 70 milliGRAM(s)/deciliter  ondansetron Injectable 4 milliGRAM(s) IV Push every 8 hours PRN Nausea and/or Vomiting      Allergies    No Known Allergies    Intolerances          Vital Signs Last 24 Hrs  T(C): 36.4 (16 Jul 2024 05:55), Max: 36.9 (16 Jul 2024 01:21)  T(F): 97.6 (16 Jul 2024 05:55), Max: 98.4 (16 Jul 2024 01:21)  HR: 83 (16 Jul 2024 05:55) (83 - 95)  BP: 117/62 (16 Jul 2024 05:55) (117/62 - 155/75)  BP(mean): --  RR: 18 (16 Jul 2024 05:55) (18 - 19)  SpO2: 96% (16 Jul 2024 05:55) (94% - 96%)    Parameters below as of 16 Jul 2024 05:55  Patient On (Oxygen Delivery Method): room air      CAPILLARY BLOOD GLUCOSE      POCT Blood Glucose.: 224 mg/dL (16 Jul 2024 13:21)  POCT Blood Glucose.: 166 mg/dL (16 Jul 2024 08:44)  POCT Blood Glucose.: 289 mg/dL (15 Jul 2024 21:50)  POCT Blood Glucose.: 249 mg/dL (15 Jul 2024 18:12)      07-15 @ 07:01  -  07-16 @ 07:00  --------------------------------------------------------  IN: 470 mL / OUT: 1250 mL / NET: -780 mL        Physical Exam:    Daily     Daily   General:  Well appearing, NAD, obese  HEENT:  Nonicteric, PERRLA  CV:  RRR, no murmur, no JVD  Lungs:  CTA B/L, no wheezes, rales, rhonchi  Abdomen:  Soft, non-tender, no distended, positive BS, no hepatosplenomegaly  Extremities:  2+ pulses, no c/c, +LE edema/lymphedema, chronic skin changes  Skin:  Warm and dry, no rashes  :  No felix  Neuro:  AAOx3, non-focal, CN II-XII grossly intact  No Restraints    LABS:                        7.6    3.63  )-----------( 130      ( 16 Jul 2024 05:27 )             22.8     07-16    139  |  96<L>  |  81<H>  ----------------------------<  180<H>  3.7   |  28  |  2.36<H>    Ca    8.6      16 Jul 2024 05:27  Phos  5.3     07-16  Mg     1.70     07-16        Urinalysis Basic - ( 16 Jul 2024 05:27 )    Color: x / Appearance: x / SG: x / pH: x  Gluc: 180 mg/dL / Ketone: x  / Bili: x / Urobili: x   Blood: x / Protein: x / Nitrite: x   Leuk Esterase: x / RBC: x / WBC x   Sq Epi: x / Non Sq Epi: x / Bacteria: x          RADIOLOGY & ADDITIONAL TESTS:  Reviewed by me

## 2024-07-16 NOTE — DISCHARGE NOTE NURSING/CASE MANAGEMENT/SOCIAL WORK - NSDCCRNAME_GEN_ALL_CORE_FT
[facility] address: [address] , [place] [address], 6:30-7pm  via [employer] (arranged by family)

## 2024-07-16 NOTE — PROGRESS NOTE ADULT - REASON FOR ADMISSION
acute kidney failure

## 2024-07-16 NOTE — PROGRESS NOTE ADULT - PROBLEM SELECTOR PLAN 2
- as above

## 2024-07-16 NOTE — PROGRESS NOTE ADULT - PROBLEM SELECTOR PLAN 1
Presented for new onset BRITT likely secondary to sepsis leading to ATN, baseline Cr 1.2 on 6/2024 CKD Stage 3  - Nephrology following, no current plans for HD  - S/p bumex infusion 7/8-7/11, Cr overall much improved peaked at 5.03 on 7/9  - Most recent Cr lingering close to 2.4  - Repeat Kidney US 7/15 w/ No hydronephrosis. A right renal complex cyst measuring 3.5 cm  - Monitor UOP/BMP closely  - C/w DAPT now that kidney function is improved and there is no current plan for HD  - Ongoing TOV, c/w flomax

## 2024-07-16 NOTE — PROGRESS NOTE ADULT - PROBLEM SELECTOR PLAN 10
- heparin subq for dvt ppx    Discussed with patient, son, and daughter at bedside.
- heparin subq for dvt ppx
Heparin subq for dvt ppx  PT eval --> JAGJIT    Prior attending discussed with Daughter.
- heparin subq for dvt ppx    Discussed with patient and daughter at bedside.
- heparin subq for dvt ppx    Discussed during interdisciplinary rounds with case management, social work, nursing, and nursing manager.  Discussed with patient and son at bedside.
- heparin subq for dvt ppx    Discussed with patient and daughter at bedside.
Heparin subq for dvt ppx  PT eval --> JAGJIT    Discussed with Daughter Rhonda at length at bedside today 7/16.

## 2024-07-16 NOTE — PROGRESS NOTE ADULT - PROBLEM SELECTOR PROBLEM 8
Goals of care, counseling/discussion
Lymphedema
Lung nodules
Lymphedema

## 2024-07-16 NOTE — PROGRESS NOTE ADULT - NUTRITIONAL ASSESSMENT
This patient has been assessed with a concern for Malnutrition and has been determined to have a diagnosis/diagnoses of Morbid obesity (BMI > 40).    This patient is being managed with:   Diet Renal Restrictions-  For patients receiving Renal Replacement - No Protein Restr No Conc K No Conc Phos Low Sodium  Consistent Carbohydrate {Evening Snack} (CSTCHOSN)  Soft and Bite Sized (SOFTBTSZ)  Supplement Feeding Modality:  Oral  Nepro Cans or Servings Per Day:  1       Frequency:  Daily  Entered: Jul 13 2024  2:08PM  

## 2024-07-16 NOTE — PROGRESS NOTE ADULT - PROBLEM SELECTOR PROBLEM 6
Multiple myeloma
Benign essential HTN
Multiple myeloma
Benign essential HTN
Multiple myeloma
Benign essential HTN
Benign essential HTN
Multiple myeloma

## 2024-07-16 NOTE — PROGRESS NOTE ADULT - PROBLEM SELECTOR PROBLEM 2
BRITT (acute kidney injury)

## 2024-07-16 NOTE — PROGRESS NOTE ADULT - PROBLEM SELECTOR PROBLEM 3
Bacteremia due to Pseudomonas

## 2024-07-18 NOTE — PATIENT PROFILE ADULT - HEALTH LITERACY
Assessment/Plan:    1. 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 Lump on neck  -     US head neck soft tissue; Future; Expected date: 07/18/2024  3. Mixed hyperlipidemia  -     Lipid panel; Future  -     Lipid panel      There are no Patient Instructions on file for this visit.    No follow-ups on file.    Subjective:      Patient ID: Ronny Fisher is a 76 y.o. male.    Chief Complaint   Patient presents with    Follow-up     6 month follow up       Here for f/u, follows with heme/onc, watching labs every 6 weeks, xtandi daily and lupron every 3 months, has had epidurals x 2 with ablation in btw, the second epidural worked better than the first lumbar disk disease. Has ENT appt nxt week ifd needed for cerumen. Notes lump on R shoulder/neck x 2-3 months, not painful, but noticeable.         The following portions of the patient's history were reviewed and updated as appropriate: allergies, current medications, past family history, past medical history, past social history, past surgical history and problem list.    Review of Systems   Constitutional:  Negative for fatigue and fever.   HENT:  Positive for hearing loss. Negative for congestion.    Eyes:  Negative for visual disturbance.   Respiratory:  Negative for chest tightness and shortness of breath.    Cardiovascular:  Negative for chest pain and palpitations.   Gastrointestinal:  Negative for abdominal pain.   Genitourinary:  Negative for difficulty urinating.   Musculoskeletal:  Negative for arthralgias.   Neurological:  Negative for headaches.   Hematological:  Positive for adenopathy (lump on neck/shoulder). Does not bruise/bleed easily.         Current Outpatient Medications   Medication Sig Dispense Refill    acetaminophen (TYLENOL) 500 mg tablet Take 500 mg by mouth every 6 (six) hours as needed for mild pain      Calcium-Vitamin D-Vitamin K (VIACTIV CALCIUM PLUS D PO) Take by mouth      Cholecalciferol 25 MCG (1000 UT) capsule Take  "1,000 Units by mouth daily      ciclopirox (PENLAC) 8 % solution APPLY ENOUGH TO COVER TOPICALLY DAILY.      Diclofenac Sodium (VOLTAREN) 1 % APPLY 2 GRAMS TO AFFECTED AREA 4 TIMES A  g 4    enzalutamide (XTANDI) 40 mg capsule Take 160 mg by mouth daily      leuprolide (ELIGARD 6 MONTH KIT) 45 MG subcutaneous injection Inject 45 mg under the skin every 3 (three) months      minoxidil (ROGAINE) 2 % external solution Apply topically      Misc Natural Products (GLUCOSAMINE CHONDROITIN ADV PO) Take 1 tablet by mouth daily      Omega-3 Fatty Acids (FISH OIL OMEGA-3) 1000 MG CAPS Take 2 g by mouth daily      Multiple Vitamins-Minerals (VITAMIN D3 COMPLETE PO) Take by mouth (Patient not taking: Reported on 12/18/2023)      SALINE NASAL SPRAY NA into each nostril (Patient not taking: Reported on 7/18/2024)       No current facility-administered medications for this visit.       Objective:    /88 (BP Location: Right arm, Patient Position: Sitting, Cuff Size: Standard)   Pulse 88   Temp (!) 97.3 °F (36.3 °C) (Temporal)   Ht 5' 9\" (1.753 m)   Wt 93 kg (205 lb)   SpO2 96%   BMI 30.27 kg/m²        Physical Exam  Vitals reviewed.   Constitutional:       Appearance: Normal appearance. He is well-developed.   HENT:      Right Ear: Ear canal and external ear normal.      Left Ear: Tympanic membrane and external ear normal. There is impacted cerumen (removed with lighted curette).      Ears:      Comments: R TM slightly bulging  Neck:      Comments: Lump on R shoulder/posterior neck , 2cm well circumscribed, firm, no redness or induration  Cardiovascular:      Rate and Rhythm: Normal rate and regular rhythm.      Heart sounds: Normal heart sounds.   Pulmonary:      Effort: Pulmonary effort is normal.      Breath sounds: Normal breath sounds.   Musculoskeletal:      Right lower leg: No edema.      Left lower leg: No edema.   Skin:     General: Skin is warm.   Neurological:      General: No focal deficit present.     "  Mental Status: He is alert and oriented to person, place, and time.   Psychiatric:         Mood and Affect: Mood normal.         Behavior: Behavior normal.         Thought Content: Thought content normal.         Judgment: Judgment normal.                  Corry Augustin MD   no

## 2024-09-19 PROBLEM — E11.9 TYPE 2 DIABETES MELLITUS WITHOUT COMPLICATIONS: Chronic | Status: ACTIVE | Noted: 2024-07-07

## 2024-09-19 PROBLEM — C90.00 MULTIPLE MYELOMA NOT HAVING ACHIEVED REMISSION: Chronic | Status: ACTIVE | Noted: 2024-07-07

## 2024-09-19 PROBLEM — I89.0 LYMPHEDEMA, NOT ELSEWHERE CLASSIFIED: Chronic | Status: ACTIVE | Noted: 2024-07-07

## 2024-09-19 PROBLEM — J44.9 CHRONIC OBSTRUCTIVE PULMONARY DISEASE, UNSPECIFIED: Chronic | Status: ACTIVE | Noted: 2024-07-07

## 2024-09-26 ENCOUNTER — APPOINTMENT (OUTPATIENT)
Dept: VASCULAR SURGERY | Facility: CLINIC | Age: 84
End: 2024-09-26
Payer: MEDICARE

## 2024-09-26 PROCEDURE — 93925 LOWER EXTREMITY STUDY: CPT | Mod: 26

## 2024-09-26 PROCEDURE — 93923 UPR/LXTR ART STDY 3+ LVLS: CPT

## 2024-09-26 PROCEDURE — 99204 OFFICE O/P NEW MOD 45 MIN: CPT

## 2024-09-26 NOTE — ASSESSMENT
[FreeTextEntry1] : 83-year-old gentleman former smoker with a history of diabetes, coronary artery disease, hyperlipidemia, hypertension and lymphedema presents to the office from the rehab facility with bilateral heel ulceration since July In the office today patient went arterial Dopplers which shows tibial disease bilaterally.  There does appear to be pulsatile flow into the toes.  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  At this time I do believe this is the main issue causing the poor healing of the feet. Given these findings would recommend angiogram with possible intervention.  In the interim, patient to continue with rehab and continue with the current dressing changes.

## 2024-09-26 NOTE — HISTORY OF PRESENT ILLNESS
[FreeTextEntry1] : 83-year-old gentleman former smoker with a history of diabetes, coronary artery disease, hyperlipidemia, hypertension and lymphedema presents to the office from the rehab facility with bilateral heel ulceration since July with minimal improvement.  Patient had been hospitalized at [facility] several months ago.  At the time of that admission patient became deconditioned and developed a right calf ulceration with ulceration of both heels.  He has had difficulty walking since that time due to the pain and ulcerations.  Patient previously underwent a venous duplex study which showed severe insufficiency bilaterally and underwent bilateral ablations.

## 2024-09-26 NOTE — PHYSICAL EXAM
[JVD] : no jugular venous distention  [Normal Breath Sounds] : Normal breath sounds [Normal Rate and Rhythm] : normal rate and rhythm [2+] : left 2+ [0] : left 0 [1+] : left 1+ [Ankle Swelling (On Exam)] : not present [Varicose Veins Of Lower Extremities] : not present [] : not present [No Rash or Lesion] : No rash or lesion [Alert] : alert [Calm] : calm [de-identified] : Appears well/elevated BMI [FreeTextEntry1] : Both heels with eschar. Bilateral Charcot foot

## 2024-10-03 PROBLEM — I73.9 PAD (PERIPHERAL ARTERY DISEASE): Status: ACTIVE | Noted: 2024-10-03

## 2024-10-03 NOTE — HISTORY OF PRESENT ILLNESS
[FreeTextEntry1] : accompanied by plavix/aspirin BS: [FreeTextEntry5] : yesterday at [FreeTextEntry6] : Dr. Olsen

## 2024-10-03 NOTE — PROCEDURE
[FreeTextEntry1] : aortogram, right leg angiogram [D/C IV on discharge] : D/C IV on discharge [Resume diet] : resume diet

## 2024-10-04 ENCOUNTER — APPOINTMENT (OUTPATIENT)
Dept: ENDOVASCULAR SURGERY | Facility: CLINIC | Age: 84
End: 2024-10-04
Payer: MEDICARE

## 2024-10-04 ENCOUNTER — RESULT REVIEW (OUTPATIENT)
Age: 84
End: 2024-10-04

## 2024-10-04 ENCOUNTER — LABORATORY RESULT (OUTPATIENT)
Age: 84
End: 2024-10-04

## 2024-10-04 DIAGNOSIS — I73.9 PERIPHERAL VASCULAR DISEASE, UNSPECIFIED: ICD-10-CM

## 2024-10-04 PROCEDURE — 75625 CONTRAST EXAM ABDOMINL AORTA: CPT

## 2024-10-04 PROCEDURE — 37229Z: CUSTOM | Mod: RT

## 2024-10-09 ENCOUNTER — OUTPATIENT (OUTPATIENT)
Dept: OUTPATIENT SERVICES | Facility: HOSPITAL | Age: 84
LOS: 1 days | Discharge: HOME | End: 2024-10-09
Payer: MEDICARE

## 2024-10-09 ENCOUNTER — APPOINTMENT (OUTPATIENT)
Dept: WOUND CARE | Facility: HOSPITAL | Age: 84
End: 2024-10-09
Payer: MEDICARE

## 2024-10-09 VITALS
HEIGHT: 73 IN | HEART RATE: 84 BPM | TEMPERATURE: 97.7 F | BODY MASS INDEX: 38.17 KG/M2 | WEIGHT: 288 LBS | SYSTOLIC BLOOD PRESSURE: 187 MMHG | OXYGEN SATURATION: 93 % | DIASTOLIC BLOOD PRESSURE: 92 MMHG | RESPIRATION RATE: 16 BRPM

## 2024-10-09 DIAGNOSIS — L97.412 NON-PRESSURE CHRONIC ULCER OF RIGHT HEEL AND MIDFOOT WITH FAT LAYER EXPOSED: ICD-10-CM

## 2024-10-09 DIAGNOSIS — I87.2 VENOUS INSUFFICIENCY (CHRONIC) (PERIPHERAL): ICD-10-CM

## 2024-10-09 DIAGNOSIS — Z98.89 OTHER SPECIFIED POSTPROCEDURAL STATES: Chronic | ICD-10-CM

## 2024-10-09 DIAGNOSIS — Z98.890 OTHER SPECIFIED POSTPROCEDURAL STATES: Chronic | ICD-10-CM

## 2024-10-09 DIAGNOSIS — E11.51 TYPE 2 DIABETES MELLITUS WITH DIABETIC PERIPHERAL ANGIOPATHY W/OUT GANGRENE: ICD-10-CM

## 2024-10-09 DIAGNOSIS — L97.422 NON-PRESSURE CHRONIC ULCER OF LEFT HEEL AND MIDFOOT WITH FAT LAYER EXPOSED: ICD-10-CM

## 2024-10-09 DIAGNOSIS — E11.42 TYPE 2 DIABETES MELLITUS WITH DIABETIC POLYNEUROPATHY: ICD-10-CM

## 2024-10-09 DIAGNOSIS — E11.622 TYPE 2 DIABETES MELLITUS WITH OTHER SKIN ULCER: ICD-10-CM

## 2024-10-09 DIAGNOSIS — S91.309A UNSPECIFIED OPEN WOUND, UNSPECIFIED FOOT, INITIAL ENCOUNTER: ICD-10-CM

## 2024-10-09 DIAGNOSIS — R60.0 LOCALIZED EDEMA: ICD-10-CM

## 2024-10-09 DIAGNOSIS — Z95.9 PRESENCE OF CARDIAC AND VASCULAR IMPLANT AND GRAFT, UNSPECIFIED: Chronic | ICD-10-CM

## 2024-10-09 PROCEDURE — 99203 OFFICE O/P NEW LOW 30 MIN: CPT

## 2024-10-09 RX ORDER — SODIUM BICARBONATE 650 MG/1
TABLET ORAL
Refills: 0 | Status: ACTIVE | COMMUNITY

## 2024-10-09 RX ORDER — POTASSIUM CHLORIDE 1500 MG/1
20 TABLET, EXTENDED RELEASE ORAL
Refills: 0 | Status: ACTIVE | COMMUNITY

## 2024-10-09 RX ORDER — TAMSULOSIN HCL 0.4 MG
0.4 CAPSULE ORAL
Refills: 0 | Status: ACTIVE | COMMUNITY

## 2024-10-09 RX ORDER — CLOPIDOGREL 75 MG/1
TABLET, FILM COATED ORAL
Refills: 0 | Status: ACTIVE | COMMUNITY

## 2024-10-11 DIAGNOSIS — E78.5 HYPERLIPIDEMIA, UNSPECIFIED: ICD-10-CM

## 2024-10-11 DIAGNOSIS — Z79.899 OTHER LONG TERM (CURRENT) DRUG THERAPY: ICD-10-CM

## 2024-10-11 DIAGNOSIS — E11.42 TYPE 2 DIABETES MELLITUS WITH DIABETIC POLYNEUROPATHY: ICD-10-CM

## 2024-10-11 DIAGNOSIS — Z98.890 OTHER SPECIFIED POSTPROCEDURAL STATES: ICD-10-CM

## 2024-10-11 DIAGNOSIS — E11.51 TYPE 2 DIABETES MELLITUS WITH DIABETIC PERIPHERAL ANGIOPATHY WITHOUT GANGRENE: ICD-10-CM

## 2024-10-11 DIAGNOSIS — Z79.4 LONG TERM (CURRENT) USE OF INSULIN: ICD-10-CM

## 2024-10-11 DIAGNOSIS — Z83.3 FAMILY HISTORY OF DIABETES MELLITUS: ICD-10-CM

## 2024-10-11 DIAGNOSIS — Z79.84 LONG TERM (CURRENT) USE OF ORAL HYPOGLYCEMIC DRUGS: ICD-10-CM

## 2024-10-11 DIAGNOSIS — E11.621 TYPE 2 DIABETES MELLITUS WITH FOOT ULCER: ICD-10-CM

## 2024-10-11 DIAGNOSIS — Z82.3 FAMILY HISTORY OF STROKE: ICD-10-CM

## 2024-10-11 DIAGNOSIS — R60.0 LOCALIZED EDEMA: ICD-10-CM

## 2024-10-11 DIAGNOSIS — I87.2 VENOUS INSUFFICIENCY (CHRONIC) (PERIPHERAL): ICD-10-CM

## 2024-10-11 DIAGNOSIS — Z87.891 PERSONAL HISTORY OF NICOTINE DEPENDENCE: ICD-10-CM

## 2024-10-11 DIAGNOSIS — I73.9 PERIPHERAL VASCULAR DISEASE, UNSPECIFIED: ICD-10-CM

## 2024-10-11 DIAGNOSIS — L97.422 NON-PRESSURE CHRONIC ULCER OF LEFT HEEL AND MIDFOOT WITH FAT LAYER EXPOSED: ICD-10-CM

## 2024-10-11 DIAGNOSIS — Z79.82 LONG TERM (CURRENT) USE OF ASPIRIN: ICD-10-CM

## 2024-10-11 DIAGNOSIS — I25.10 ATHEROSCLEROTIC HEART DISEASE OF NATIVE CORONARY ARTERY WITHOUT ANGINA PECTORIS: ICD-10-CM

## 2024-10-11 DIAGNOSIS — I10 ESSENTIAL (PRIMARY) HYPERTENSION: ICD-10-CM

## 2024-10-11 DIAGNOSIS — L97.412 NON-PRESSURE CHRONIC ULCER OF RIGHT HEEL AND MIDFOOT WITH FAT LAYER EXPOSED: ICD-10-CM

## 2024-10-11 DIAGNOSIS — Z82.49 FAMILY HISTORY OF ISCHEMIC HEART DISEASE AND OTHER DISEASES OF THE CIRCULATORY SYSTEM: ICD-10-CM

## 2024-10-21 ENCOUNTER — OUTPATIENT (OUTPATIENT)
Dept: OUTPATIENT SERVICES | Facility: HOSPITAL | Age: 84
LOS: 1 days | End: 2024-10-21
Payer: MEDICARE

## 2024-10-21 DIAGNOSIS — Z98.890 OTHER SPECIFIED POSTPROCEDURAL STATES: Chronic | ICD-10-CM

## 2024-10-21 DIAGNOSIS — Z98.89 OTHER SPECIFIED POSTPROCEDURAL STATES: Chronic | ICD-10-CM

## 2024-10-21 DIAGNOSIS — E11.9 TYPE 2 DIABETES MELLITUS WITHOUT COMPLICATIONS: ICD-10-CM

## 2024-10-21 DIAGNOSIS — Z95.9 PRESENCE OF CARDIAC AND VASCULAR IMPLANT AND GRAFT, UNSPECIFIED: Chronic | ICD-10-CM

## 2024-10-21 PROCEDURE — 73718 MRI LOWER EXTREMITY W/O DYE: CPT | Mod: 26,LT,MH,76

## 2024-10-21 PROCEDURE — 73630 X-RAY EXAM OF FOOT: CPT | Mod: 26,50

## 2024-10-23 ENCOUNTER — OUTPATIENT (OUTPATIENT)
Dept: OUTPATIENT SERVICES | Facility: HOSPITAL | Age: 84
LOS: 1 days | Discharge: HOME | End: 2024-10-23
Payer: MEDICARE

## 2024-10-23 ENCOUNTER — APPOINTMENT (OUTPATIENT)
Dept: WOUND CARE | Facility: HOSPITAL | Age: 84
End: 2024-10-23
Payer: MEDICARE

## 2024-10-23 VITALS
TEMPERATURE: 98 F | BODY MASS INDEX: 38.17 KG/M2 | RESPIRATION RATE: 20 BRPM | HEIGHT: 73 IN | DIASTOLIC BLOOD PRESSURE: 87 MMHG | SYSTOLIC BLOOD PRESSURE: 140 MMHG | OXYGEN SATURATION: 96 % | HEART RATE: 65 BPM | WEIGHT: 288 LBS

## 2024-10-23 DIAGNOSIS — Z79.82 LONG TERM (CURRENT) USE OF ASPIRIN: ICD-10-CM

## 2024-10-23 DIAGNOSIS — Z98.89 OTHER SPECIFIED POSTPROCEDURAL STATES: Chronic | ICD-10-CM

## 2024-10-23 DIAGNOSIS — E11.621 TYPE 2 DIABETES MELLITUS WITH FOOT ULCER: ICD-10-CM

## 2024-10-23 DIAGNOSIS — Z95.9 PRESENCE OF CARDIAC AND VASCULAR IMPLANT AND GRAFT, UNSPECIFIED: Chronic | ICD-10-CM

## 2024-10-23 DIAGNOSIS — E11.51 TYPE 2 DIABETES MELLITUS WITH DIABETIC PERIPHERAL ANGIOPATHY WITHOUT GANGRENE: ICD-10-CM

## 2024-10-23 DIAGNOSIS — Z82.3 FAMILY HISTORY OF STROKE: ICD-10-CM

## 2024-10-23 DIAGNOSIS — E78.5 HYPERLIPIDEMIA, UNSPECIFIED: ICD-10-CM

## 2024-10-23 DIAGNOSIS — L97.509 TYPE 2 DIABETES MELLITUS WITH FOOT ULCER: ICD-10-CM

## 2024-10-23 DIAGNOSIS — Z82.49 FAMILY HISTORY OF ISCHEMIC HEART DISEASE AND OTHER DISEASES OF THE CIRCULATORY SYSTEM: ICD-10-CM

## 2024-10-23 DIAGNOSIS — Z87.891 PERSONAL HISTORY OF NICOTINE DEPENDENCE: ICD-10-CM

## 2024-10-23 DIAGNOSIS — Z98.890 OTHER SPECIFIED POSTPROCEDURAL STATES: Chronic | ICD-10-CM

## 2024-10-23 DIAGNOSIS — E11.42 TYPE 2 DIABETES MELLITUS WITH DIABETIC POLYNEUROPATHY: ICD-10-CM

## 2024-10-23 DIAGNOSIS — L97.412 NON-PRESSURE CHRONIC ULCER OF RIGHT HEEL AND MIDFOOT WITH FAT LAYER EXPOSED: ICD-10-CM

## 2024-10-23 DIAGNOSIS — I87.2 VENOUS INSUFFICIENCY (CHRONIC) (PERIPHERAL): ICD-10-CM

## 2024-10-23 DIAGNOSIS — I73.9 PERIPHERAL VASCULAR DISEASE, UNSPECIFIED: ICD-10-CM

## 2024-10-23 DIAGNOSIS — Z79.84 LONG TERM (CURRENT) USE OF ORAL HYPOGLYCEMIC DRUGS: ICD-10-CM

## 2024-10-23 DIAGNOSIS — R60.0 LOCALIZED EDEMA: ICD-10-CM

## 2024-10-23 DIAGNOSIS — L97.422 NON-PRESSURE CHRONIC ULCER OF LEFT HEEL AND MIDFOOT WITH FAT LAYER EXPOSED: ICD-10-CM

## 2024-10-23 DIAGNOSIS — Z79.899 OTHER LONG TERM (CURRENT) DRUG THERAPY: ICD-10-CM

## 2024-10-23 DIAGNOSIS — I10 ESSENTIAL (PRIMARY) HYPERTENSION: ICD-10-CM

## 2024-10-23 DIAGNOSIS — Z87.09 PERSONAL HISTORY OF OTHER DISEASES OF THE RESPIRATORY SYSTEM: ICD-10-CM

## 2024-10-23 DIAGNOSIS — Z98.890 OTHER SPECIFIED POSTPROCEDURAL STATES: ICD-10-CM

## 2024-10-23 DIAGNOSIS — I25.10 ATHEROSCLEROTIC HEART DISEASE OF NATIVE CORONARY ARTERY WITHOUT ANGINA PECTORIS: ICD-10-CM

## 2024-10-23 DIAGNOSIS — Z83.3 FAMILY HISTORY OF DIABETES MELLITUS: ICD-10-CM

## 2024-10-23 DIAGNOSIS — Z79.4 LONG TERM (CURRENT) USE OF INSULIN: ICD-10-CM

## 2024-10-23 PROCEDURE — 11042 DBRDMT SUBQ TIS 1ST 20SQCM/<: CPT

## 2024-10-23 PROCEDURE — 11045 DBRDMT SUBQ TISS EACH ADDL: CPT

## 2024-10-23 PROCEDURE — 88304 TISSUE EXAM BY PATHOLOGIST: CPT | Mod: 26

## 2024-10-24 ENCOUNTER — NON-APPOINTMENT (OUTPATIENT)
Age: 84
End: 2024-10-24

## 2024-10-24 LAB
GRAM STN FLD: ABNORMAL
GRAM STN FLD: ABNORMAL
SPECIMEN SOURCE: SIGNIFICANT CHANGE UP
SPECIMEN SOURCE: SIGNIFICANT CHANGE UP

## 2024-10-26 LAB
-  AMOXICILLIN/CLAVULANIC ACID: SIGNIFICANT CHANGE UP
-  AMPICILLIN/SULBACTAM: SIGNIFICANT CHANGE UP
-  AMPICILLIN: SIGNIFICANT CHANGE UP
-  AMPICILLIN: SIGNIFICANT CHANGE UP
-  AZTREONAM: SIGNIFICANT CHANGE UP
-  AZTREONAM: SIGNIFICANT CHANGE UP
-  CEFAZOLIN: SIGNIFICANT CHANGE UP
-  CEFEPIME: SIGNIFICANT CHANGE UP
-  CEFEPIME: SIGNIFICANT CHANGE UP
-  CEFOXITIN: SIGNIFICANT CHANGE UP
-  CEFTAZIDIME: SIGNIFICANT CHANGE UP
-  CEFTRIAXONE: SIGNIFICANT CHANGE UP
-  CIPROFLOXACIN: SIGNIFICANT CHANGE UP
-  CIPROFLOXACIN: SIGNIFICANT CHANGE UP
-  ERTAPENEM: SIGNIFICANT CHANGE UP
-  GENTAMICIN: SIGNIFICANT CHANGE UP
-  IMIPENEM: SIGNIFICANT CHANGE UP
-  LEVOFLOXACIN: SIGNIFICANT CHANGE UP
-  LEVOFLOXACIN: SIGNIFICANT CHANGE UP
-  MEROPENEM: SIGNIFICANT CHANGE UP
-  MEROPENEM: SIGNIFICANT CHANGE UP
-  PIPERACILLIN/TAZOBACTAM: SIGNIFICANT CHANGE UP
-  PIPERACILLIN/TAZOBACTAM: SIGNIFICANT CHANGE UP
-  TOBRAMYCIN: SIGNIFICANT CHANGE UP
-  TRIMETHOPRIM/SULFAMETHOXAZOLE: SIGNIFICANT CHANGE UP
-  VANCOMYCIN: SIGNIFICANT CHANGE UP
METHOD TYPE: SIGNIFICANT CHANGE UP

## 2024-10-26 NOTE — PATIENT PROFILE ADULT. - PRO INTERPRETER NEED 2
English
Admission H&P:  HPI:   **STROKE HPI***    HPI: 70y Female with PMHx of HTN, DM, anxiety, recent PE on Eliquis 5mg BID presents to [facility] ED with dizziness and unsteady gait that started today at 9AM. LKW 10/25 at 9AM. Pt reports that  yesterday she was "confused" with not remembering she had a doctor's appointment scheduled. However, pt denies having any dizziness, unsteady gait, vision changes, sensory deficits, and/or motor deficits yesterday, 10/24.  mRS 0, denies using any assistive device to walk. She described her gait as "if I was drunk" and although she notes having gait instability for the past year, it has never been this "bad" as per pt. Pt is also reporting "hazy" vision with describing it as "smoky." Stroke code called as pre-notification due to pt expressing her sx while in her PCP office today. NIHSS 1 for subtle RUE drift and unsteady gait. CTH showed no acute hemorrhage. CTA H/N showed no significant stenosis or occlusion. CTP showed no perfusion mismatch. Tenecteplase was not offered due to last Eliquis dose taken at 7AM today and having an elevated PT/PTT/INR. MT was not offered due to low suspicion of LVO. Pt was admitted to stroke tele for further w/u.    (25 Oct 2024 13:51)    Interval events:  - Patient ambulated with OT this morning. Reports still feeling unsteady on her feet. Feels okay sitting down.      PAST MEDICAL & SURGICAL HISTORY:  DM (diabetes mellitus)      HTN (hypertension)      No significant past surgical history        Home Medications:  aspirin 81 mg oral capsule: 1 cap(s) orally once a day (25 Oct 2024 16:47)  atorvastatin 80 mg oral tablet: 1 tab(s) orally once a day (25 Oct 2024 14:11)  Doxepin: 25 milligram(s) orally once a day (25 Oct 2024 16:48)  Farxiga 10 mg oral tablet: 1 tab(s) orally once a day (25 Oct 2024 16:49)  gabapentin 300 mg oral capsule: 1 cap(s) orally once a day (at bedtime) (25 Oct 2024 22:50)  Icosapent Ethyl 1 g oral capsule: 2 cap(s) orally once a day (25 Oct 2024 16:50)  Insulin Lispro: 18 unit(s) 3 times a day (with meals) (25 Oct 2024 16:51)  losartan: 100 milligram(s) orally once a day (25 Oct 2024 16:52)  QUEtiapine 200 mg oral tablet: 1 tab(s) orally once a day (at bedtime) (25 Oct 2024 22:51)    Allergies    No Known Allergies    Intolerances      FAMILY HISTORY:    Social History:  Smoking status: denies   Drinking: denies   Drug Use: denies (25 Oct 2024 13:51)      REVIEW OF SYSTEMS:  RESPIRATORY: No cough, No dyspnea  CARDIOVASCULAR: No chest pain, or leg swelling  GASTROINTESTINAL: no diarrhea  GENITOURINARY: No dysuria,   NEUROLOGICAL: No numbness, or tremors  ALLERGY AND IMMUNOLOGIC: No hives or eczema    Diet, DASH/TLC:   Sodium & Cholesterol Restricted (10-25-24 @ 13:49) [Active]      CURRENT MEDICATIONS:   apixaban 5 milliGRAM(s) Oral every 12 hours  atorvastatin 80 milliGRAM(s) Oral at bedtime  dextrose 5%. 1000 milliLiter(s) IV Continuous <Continuous>  dextrose 5%. 1000 milliLiter(s) IV Continuous <Continuous>  dextrose 50% Injectable 12.5 Gram(s) IV Push once  dextrose 50% Injectable 25 Gram(s) IV Push once  dextrose 50% Injectable 25 Gram(s) IV Push once  dextrose Oral Gel 15 Gram(s) Oral once PRN  gabapentin 300 milliGRAM(s) Oral at bedtime  glucagon  Injectable 1 milliGRAM(s) IntraMuscular once  insulin glargine Injectable (LANTUS) 40 Unit(s) SubCutaneous at bedtime  insulin lispro (ADMELOG) corrective regimen sliding scale   SubCutaneous three times a day before meals  insulin lispro (ADMELOG) corrective regimen sliding scale   SubCutaneous at bedtime  insulin lispro Injectable (ADMELOG) 18 Unit(s) SubCutaneous three times a day before meals  melatonin 5 milliGRAM(s) Oral at bedtime PRN  QUEtiapine 200 milliGRAM(s) Oral at bedtime      VITAL SIGNS, INS/OUTS (last 24 hours):  Vital Signs Last 24 Hrs  T(C): 36.8 (26 Oct 2024 10:07), Max: 36.8 (25 Oct 2024 15:33)  T(F): 98.3 (26 Oct 2024 10:07), Max: 98.3 (25 Oct 2024 15:33)  HR: 95 (26 Oct 2024 09:28) (88 - 96)  BP: 133/65 (26 Oct 2024 09:28) (85/53 - 153/79)  BP(mean): 93 (26 Oct 2024 09:28) (65 - 101)  RR: 18 (26 Oct 2024 09:28) (15 - 28)  SpO2: 97% (26 Oct 2024 09:28) (93% - 99%)    Parameters below as of 26 Oct 2024 09:28  Patient On (Oxygen Delivery Method): room air      I&O's Summary    26 Oct 2024 07:01  -  26 Oct 2024 11:42  --------------------------------------------------------  IN: 300 mL / OUT: 0 mL / NET: 300 mL        PHYSICAL EXAM:  Gen: Reclining in bed at time of exam, appears stated age  HEENT: NCAT, MMM, clear OP  Neck: supple, trachea at midline  CV: RRR, +S1/S2  Pulm: adequate respiratory effort, no increase in work of breathing  Abd: soft, ND  Skin: warm and dry,  Ext: no edema  Neuro: Alert, speaking in full sentences   Psych: affect and behavior appropriate, pleasant at time of interview    BASIC LABS:                        10.4   9.29  )-----------( 368      ( 26 Oct 2024 07:31 )             31.6     10-26    135  |  101  |  37[H]  ----------------------------<  194[H]  4.6   |  24  |  1.67[H]    Ca    10.1      26 Oct 2024 07:31  Phos  4.4     10-26  Mg     2.5     10-26    TPro  7.5  /  Alb  4.0  /  TBili  0.2  /  DBili  x   /  AST  15  /  ALT  19  /  AlkPhos  143[H]  10-25    PT/INR - ( 25 Oct 2024 11:05 )   PT: 14.0 sec;   INR: 1.20          PTT - ( 25 Oct 2024 11:05 )  PTT:35.5 sec  Urinalysis Basic - ( 26 Oct 2024 07:31 )    Color: x / Appearance: x / SG: x / pH: x  Gluc: 194 mg/dL / Ketone: x  / Bili: x / Urobili: x   Blood: x / Protein: x / Nitrite: x   Leuk Esterase: x / RBC: x / WBC x   Sq Epi: x / Non Sq Epi: x / Bacteria: x      CAPILLARY BLOOD GLUCOSE  285 (25 Oct 2024 12:24)      POCT Blood Glucose.: 323 mg/dL (26 Oct 2024 11:11)  POCT Blood Glucose.: 195 mg/dL (26 Oct 2024 06:42)  POCT Blood Glucose.: 185 mg/dL (25 Oct 2024 21:15)  POCT Blood Glucose.: 239 mg/dL (25 Oct 2024 16:25)      OTHER LABS:  CARDIAC MARKERS ( 25 Oct 2024 11:05 )  x     / x     / x     / x     / <1.0 ng/mL        MICRODATA:      IMAGING:    EKG:

## 2024-10-27 LAB
-  AMPICILLIN/SULBACTAM: SIGNIFICANT CHANGE UP
-  AMPICILLIN: SIGNIFICANT CHANGE UP
-  AZTREONAM: SIGNIFICANT CHANGE UP
-  CEFAZOLIN: SIGNIFICANT CHANGE UP
-  CEFEPIME: SIGNIFICANT CHANGE UP
-  CEFTRIAXONE: SIGNIFICANT CHANGE UP
-  CIPROFLOXACIN: SIGNIFICANT CHANGE UP
-  ERTAPENEM: SIGNIFICANT CHANGE UP
-  GENTAMICIN: SIGNIFICANT CHANGE UP
-  IMIPENEM: SIGNIFICANT CHANGE UP
-  LEVOFLOXACIN: SIGNIFICANT CHANGE UP
-  MEROPENEM: SIGNIFICANT CHANGE UP
-  PIPERACILLIN/TAZOBACTAM: SIGNIFICANT CHANGE UP
-  TOBRAMYCIN: SIGNIFICANT CHANGE UP
-  TRIMETHOPRIM/SULFAMETHOXAZOLE: SIGNIFICANT CHANGE UP
CULTURE RESULTS: ABNORMAL
METHOD TYPE: SIGNIFICANT CHANGE UP
ORGANISM # SPEC MICROSCOPIC CNT: ABNORMAL
ORGANISM # SPEC MICROSCOPIC CNT: SIGNIFICANT CHANGE UP
SPECIMEN SOURCE: SIGNIFICANT CHANGE UP

## 2024-10-28 ENCOUNTER — INPATIENT (INPATIENT)
Facility: HOSPITAL | Age: 84
LOS: 42 days | Discharge: NURSING, REHAB OR LONG-TERM CARE | DRG: 623 | End: 2024-12-10
Attending: FAMILY MEDICINE | Admitting: INTERNAL MEDICINE
Payer: MEDICARE

## 2024-10-28 ENCOUNTER — APPOINTMENT (OUTPATIENT)
Dept: WOUND CARE | Facility: HOSPITAL | Age: 84
End: 2024-10-28

## 2024-10-28 VITALS
TEMPERATURE: 98 F | HEIGHT: 73 IN | HEART RATE: 69 BPM | RESPIRATION RATE: 18 BRPM | WEIGHT: 289.91 LBS | OXYGEN SATURATION: 96 % | SYSTOLIC BLOOD PRESSURE: 161 MMHG | DIASTOLIC BLOOD PRESSURE: 87 MMHG

## 2024-10-28 DIAGNOSIS — M86.9 OSTEOMYELITIS, UNSPECIFIED: ICD-10-CM

## 2024-10-28 DIAGNOSIS — Z98.890 OTHER SPECIFIED POSTPROCEDURAL STATES: Chronic | ICD-10-CM

## 2024-10-28 DIAGNOSIS — J44.9 CHRONIC OBSTRUCTIVE PULMONARY DISEASE, UNSPECIFIED: ICD-10-CM

## 2024-10-28 DIAGNOSIS — E78.5 HYPERLIPIDEMIA, UNSPECIFIED: ICD-10-CM

## 2024-10-28 DIAGNOSIS — I25.10 ATHEROSCLEROTIC HEART DISEASE OF NATIVE CORONARY ARTERY WITHOUT ANGINA PECTORIS: ICD-10-CM

## 2024-10-28 DIAGNOSIS — Z95.5 PRESENCE OF CORONARY ANGIOPLASTY IMPLANT AND GRAFT: ICD-10-CM

## 2024-10-28 DIAGNOSIS — I10 ESSENTIAL (PRIMARY) HYPERTENSION: ICD-10-CM

## 2024-10-28 DIAGNOSIS — Z95.9 PRESENCE OF CARDIAC AND VASCULAR IMPLANT AND GRAFT, UNSPECIFIED: Chronic | ICD-10-CM

## 2024-10-28 DIAGNOSIS — Z98.89 OTHER SPECIFIED POSTPROCEDURAL STATES: Chronic | ICD-10-CM

## 2024-10-28 DIAGNOSIS — Z29.9 ENCOUNTER FOR PROPHYLACTIC MEASURES, UNSPECIFIED: ICD-10-CM

## 2024-10-28 DIAGNOSIS — E11.9 TYPE 2 DIABETES MELLITUS WITHOUT COMPLICATIONS: ICD-10-CM

## 2024-10-28 DIAGNOSIS — C90.00 MULTIPLE MYELOMA NOT HAVING ACHIEVED REMISSION: ICD-10-CM

## 2024-10-28 DIAGNOSIS — N40.1 BENIGN PROSTATIC HYPERPLASIA WITH LOWER URINARY TRACT SYMPTOMS: ICD-10-CM

## 2024-10-28 LAB
ALBUMIN SERPL ELPH-MCNC: 2.7 G/DL — LOW (ref 3.3–5)
ALP SERPL-CCNC: 29 U/L — LOW (ref 40–120)
ALT FLD-CCNC: 20 U/L — SIGNIFICANT CHANGE UP (ref 12–78)
ANION GAP SERPL CALC-SCNC: 6 MMOL/L — SIGNIFICANT CHANGE UP (ref 5–17)
APTT BLD: 36.2 SEC — HIGH (ref 24.5–35.6)
AST SERPL-CCNC: 24 U/L — SIGNIFICANT CHANGE UP (ref 15–37)
BASOPHILS # BLD AUTO: 0.02 K/UL — SIGNIFICANT CHANGE UP (ref 0–0.2)
BASOPHILS NFR BLD AUTO: 0.4 % — SIGNIFICANT CHANGE UP (ref 0–2)
BILIRUB SERPL-MCNC: 0.5 MG/DL — SIGNIFICANT CHANGE UP (ref 0.2–1.2)
BUN SERPL-MCNC: 22 MG/DL — SIGNIFICANT CHANGE UP (ref 7–23)
CALCIUM SERPL-MCNC: 9.2 MG/DL — SIGNIFICANT CHANGE UP (ref 8.5–10.1)
CHLORIDE SERPL-SCNC: 105 MMOL/L — SIGNIFICANT CHANGE UP (ref 96–108)
CO2 SERPL-SCNC: 32 MMOL/L — HIGH (ref 22–31)
CREAT SERPL-MCNC: 1.3 MG/DL — SIGNIFICANT CHANGE UP (ref 0.5–1.3)
EGFR: 55 ML/MIN/1.73M2 — LOW
EOSINOPHIL # BLD AUTO: 0.25 K/UL — SIGNIFICANT CHANGE UP (ref 0–0.5)
EOSINOPHIL NFR BLD AUTO: 4.6 % — SIGNIFICANT CHANGE UP (ref 0–6)
GLUCOSE SERPL-MCNC: 96 MG/DL — SIGNIFICANT CHANGE UP (ref 70–99)
HCT VFR BLD CALC: 34.4 % — LOW (ref 39–50)
HGB BLD-MCNC: 10.6 G/DL — LOW (ref 13–17)
IMM GRANULOCYTES NFR BLD AUTO: 0.4 % — SIGNIFICANT CHANGE UP (ref 0–0.9)
INR BLD: 1.09 RATIO — SIGNIFICANT CHANGE UP (ref 0.85–1.16)
LYMPHOCYTES # BLD AUTO: 1.6 K/UL — SIGNIFICANT CHANGE UP (ref 1–3.3)
LYMPHOCYTES # BLD AUTO: 29.5 % — SIGNIFICANT CHANGE UP (ref 13–44)
MCHC RBC-ENTMCNC: 28.9 PG — SIGNIFICANT CHANGE UP (ref 27–34)
MCHC RBC-ENTMCNC: 30.8 GM/DL — LOW (ref 32–36)
MCV RBC AUTO: 93.7 FL — SIGNIFICANT CHANGE UP (ref 80–100)
MONOCYTES # BLD AUTO: 0.32 K/UL — SIGNIFICANT CHANGE UP (ref 0–0.9)
MONOCYTES NFR BLD AUTO: 5.9 % — SIGNIFICANT CHANGE UP (ref 2–14)
NEUTROPHILS # BLD AUTO: 3.22 K/UL — SIGNIFICANT CHANGE UP (ref 1.8–7.4)
NEUTROPHILS NFR BLD AUTO: 59.2 % — SIGNIFICANT CHANGE UP (ref 43–77)
NRBC # BLD: 0 /100 WBCS — SIGNIFICANT CHANGE UP (ref 0–0)
PLATELET # BLD AUTO: 198 K/UL — SIGNIFICANT CHANGE UP (ref 150–400)
POTASSIUM SERPL-MCNC: 4.1 MMOL/L — SIGNIFICANT CHANGE UP (ref 3.5–5.3)
POTASSIUM SERPL-SCNC: 4.1 MMOL/L — SIGNIFICANT CHANGE UP (ref 3.5–5.3)
PROT SERPL-MCNC: 7.7 G/DL — SIGNIFICANT CHANGE UP (ref 6–8.3)
PROTHROM AB SERPL-ACNC: 12.8 SEC — SIGNIFICANT CHANGE UP (ref 9.9–13.4)
RBC # BLD: 3.67 M/UL — LOW (ref 4.2–5.8)
RBC # FLD: 15.9 % — HIGH (ref 10.3–14.5)
SODIUM SERPL-SCNC: 143 MMOL/L — SIGNIFICANT CHANGE UP (ref 135–145)
WBC # BLD: 5.43 K/UL — SIGNIFICANT CHANGE UP (ref 3.8–10.5)
WBC # FLD AUTO: 5.43 K/UL — SIGNIFICANT CHANGE UP (ref 3.8–10.5)

## 2024-10-28 PROCEDURE — 99221 1ST HOSP IP/OBS SF/LOW 40: CPT

## 2024-10-28 PROCEDURE — 71045 X-RAY EXAM CHEST 1 VIEW: CPT | Mod: 26

## 2024-10-28 PROCEDURE — 99285 EMERGENCY DEPT VISIT HI MDM: CPT

## 2024-10-28 PROCEDURE — 99223 1ST HOSP IP/OBS HIGH 75: CPT | Mod: GC

## 2024-10-28 PROCEDURE — 93010 ELECTROCARDIOGRAM REPORT: CPT

## 2024-10-28 RX ORDER — METOPROLOL TARTRATE 100 MG/1
25 TABLET, FILM COATED ORAL
Refills: 0 | Status: DISCONTINUED | OUTPATIENT
Start: 2024-10-28 | End: 2024-10-29

## 2024-10-28 RX ORDER — SODIUM CHLORIDE 9 MG/ML
1000 INJECTION, SOLUTION INTRAMUSCULAR; INTRAVENOUS; SUBCUTANEOUS
Refills: 0 | Status: DISCONTINUED | OUTPATIENT
Start: 2024-10-28 | End: 2024-10-29

## 2024-10-28 RX ORDER — MAGNESIUM, ALUMINUM HYDROXIDE 200-225/5
30 SUSPENSION, ORAL (FINAL DOSE FORM) ORAL EVERY 4 HOURS
Refills: 0 | Status: DISCONTINUED | OUTPATIENT
Start: 2024-10-28 | End: 2024-10-29

## 2024-10-28 RX ORDER — METOPROLOL TARTRATE 100 MG/1
25 TABLET, FILM COATED ORAL
Refills: 0 | Status: DISCONTINUED | OUTPATIENT
Start: 2024-10-28 | End: 2024-10-28

## 2024-10-28 RX ORDER — ACETAMINOPHEN, DIPHENHYDRAMINE HCL, PHENYLEPHRINE HCL 325; 25; 5 MG/1; MG/1; MG/1
3 TABLET ORAL AT BEDTIME
Refills: 0 | Status: DISCONTINUED | OUTPATIENT
Start: 2024-10-28 | End: 2024-10-29

## 2024-10-28 RX ORDER — ACETAMINOPHEN 500MG 500 MG/1
650 TABLET, COATED ORAL EVERY 6 HOURS
Refills: 0 | Status: DISCONTINUED | OUTPATIENT
Start: 2024-10-28 | End: 2024-10-29

## 2024-10-28 RX ORDER — INSULIN GLARGINE 100 [IU]/ML
12 INJECTION, SOLUTION SUBCUTANEOUS AT BEDTIME
Refills: 0 | Status: DISCONTINUED | OUTPATIENT
Start: 2024-10-28 | End: 2024-10-29

## 2024-10-28 RX ORDER — FAMOTIDINE 20 MG/1
20 TABLET, FILM COATED ORAL DAILY
Refills: 0 | Status: DISCONTINUED | OUTPATIENT
Start: 2024-10-28 | End: 2024-10-29

## 2024-10-28 RX ORDER — 0.9 % SODIUM CHLORIDE 0.9 %
1000 INTRAVENOUS SOLUTION INTRAVENOUS
Refills: 0 | Status: DISCONTINUED | OUTPATIENT
Start: 2024-10-28 | End: 2024-10-29

## 2024-10-28 RX ORDER — TAMSULOSIN HYDROCHLORIDE 0.4 MG/1
0.4 CAPSULE ORAL AT BEDTIME
Refills: 0 | Status: DISCONTINUED | OUTPATIENT
Start: 2024-10-28 | End: 2024-10-29

## 2024-10-28 RX ORDER — GLUCAGON INJECTION, SOLUTION 0.5 MG/.1ML
1 INJECTION, SOLUTION SUBCUTANEOUS ONCE
Refills: 0 | Status: DISCONTINUED | OUTPATIENT
Start: 2024-10-28 | End: 2024-10-29

## 2024-10-28 RX ORDER — HEPARIN SODIUM,PORCINE 1000/ML
5000 VIAL (ML) INJECTION ONCE
Refills: 0 | Status: COMPLETED | OUTPATIENT
Start: 2024-10-28 | End: 2024-10-28

## 2024-10-28 RX ADMIN — SODIUM CHLORIDE 75 MILLILITER(S): 9 INJECTION, SOLUTION INTRAMUSCULAR; INTRAVENOUS; SUBCUTANEOUS at 17:28

## 2024-10-28 RX ADMIN — INSULIN GLARGINE 12 UNIT(S): 100 INJECTION, SOLUTION SUBCUTANEOUS at 22:36

## 2024-10-28 RX ADMIN — ACETAMINOPHEN, DIPHENHYDRAMINE HCL, PHENYLEPHRINE HCL 3 MILLIGRAM(S): 325; 25; 5 TABLET ORAL at 22:36

## 2024-10-28 RX ADMIN — Medication 80 MILLIGRAM(S): at 22:36

## 2024-10-28 RX ADMIN — TAMSULOSIN HYDROCHLORIDE 0.4 MILLIGRAM(S): 0.4 CAPSULE ORAL at 22:36

## 2024-10-28 RX ADMIN — Medication 5000 UNIT(S): at 18:20

## 2024-10-28 RX ADMIN — SODIUM CHLORIDE 75 MILLILITER(S): 9 INJECTION, SOLUTION INTRAMUSCULAR; INTRAVENOUS; SUBCUTANEOUS at 22:52

## 2024-10-28 RX ADMIN — METOPROLOL TARTRATE 25 MILLIGRAM(S): 100 TABLET, FILM COATED ORAL at 17:28

## 2024-10-28 NOTE — ED ADULT NURSE NOTE - NSICDXPASTMEDICALHX_GEN_ALL_CORE_FT
PAST MEDICAL HISTORY:  Benign prostatic hyperplasia with lower urinary tract symptoms, symptom details unspecified     CAD (coronary artery disease) w/ 4 stents    Chronic obstructive pulmonary disease, unspecified COPD type     COPD, mild     Difficulty walking     DM (diabetes mellitus)     DM2 (diabetes mellitus, type 2)     GI bleed 2020    History of blood transfusion Transfusion of Plasma    HLD (hyperlipidemia)     HTN (hypertension)     Lymphedema     Multiple myeloma     Obesity, morbid, BMI 40.0-49.9     SANTO on CPAP     Stented coronary artery

## 2024-10-28 NOTE — H&P ADULT - PROBLEM SELECTOR PLAN 2
Chronic  - continue insulin regimen  - continue gabapentin 100 mg QD at bedtime for b/l LE neuropathy Chronic  - Insulin sliding scale q6  - Home basal insulin 23 U at bedtime, will half given patient will be npo overnight for procedure tomorrow  - continue gabapentin 100 mg QD at bedtime for b/l LE neuropathy

## 2024-10-28 NOTE — H&P ADULT - NSHPSOCIALHISTORY_GEN_ALL_CORE
Tobacco:   Alcohol:  Lives with:  Ambulates with: Tobacco: smoker, quit 40 years ago  Alcohol: none  Lives with: wife  Ambulates with: does not ambulate

## 2024-10-28 NOTE — H&P ADULT - NSHPLABSRESULTS_GEN_ALL_CORE
LABS:                        10.6   5.43  )-----------( 198      ( 28 Oct 2024 13:11 )             34.4     10-28    143  |  105  |  22  ----------------------------<  96  4.1   |  32[H]  |  1.30    Ca    9.2      28 Oct 2024 13:11    TPro  7.7  /  Alb  2.7[L]  /  TBili  0.5  /  DBili  x   /  AST  24  /  ALT  20  /  AlkPhos  29[L]  10-28    PT/INR - ( 28 Oct 2024 13:11 )   PT: 12.8 sec;   INR: 1.09 ratio         PTT - ( 28 Oct 2024 13:11 )  PTT:36.2 sec  Urinalysis Basic - ( 28 Oct 2024 13:11 )    Color: x / Appearance: x / SG: x / pH: x  Gluc: 96 mg/dL / Ketone: x  / Bili: x / Urobili: x   Blood: x / Protein: x / Nitrite: x   Leuk Esterase: x / RBC: x / WBC x   Sq Epi: x / Non Sq Epi: x / Bacteria: x

## 2024-10-28 NOTE — ED ADULT NURSE NOTE - OBJECTIVE STATEMENT
Pt presents to the Ed from the [facility] via ambulance s/p pre-op surgery. Pt has a single lumen midline on the let upper arm, dressing intact.

## 2024-10-28 NOTE — ED ADULT NURSE NOTE - NSICDXPASTSURGICALHX_GEN_ALL_CORE_FT
PAST SURGICAL HISTORY:  H/O knee surgery Left - 11/7/2017    History of prostate surgery laser to relieve a blockage.    No significant past surgical history     S/P angioplasty with stent 2004 and 2005 (Has 4 stents)

## 2024-10-28 NOTE — H&P ADULT - HISTORY OF PRESENT ILLNESS
Patient with a past medical history of hypertension, diabetes, hyperlipidemia, bilateral heel osteomyelitis currently on outpatient antibiotics through [facility] is presenting for surgery.  He is scheduled for surgery with Dr. frias tomorrow.  Denies any fevers.  Endorsing pain to his heels but no other areas of pain.  No nausea or vomiting.  No other acute complaints today.    Denies fever, chills, chest pain, palpitations, SOB, cough, abdominal pain, nausea, vomiting, diarrhea, constipation, urinary frequency, urgency, or dysuria, headaches, changes in vision, dizziness, numbness, tingling.  Denies recent travel, recent antibiotic use, or sick contacts.    ED Course:   Vitals: BP: 161/87, HR 69: , Temp: 97.8 , RR: 18, SpO2: 96% on   Labs:  H/H: 10.6/34.4, PTT: 36.2, Albumin: 2.7  Tissue culture: (incomplete)  Surgical pathology report: (incomplete)  MR foot:   Xray foot:   EKBPM normal sinus rhythm, QTc: 477  Received in the ED:    Patient with a past medical history of hypertension, diabetes, hyperlipidemia, bilateral heel osteomyelitis currently on outpatient antibiotics through [facility] is presenting for surgery.  He is scheduled for surgery with Dr. frias tomorrow.  Denies any fevers.  Endorsing pain to his heels but no other areas of pain.  No nausea or vomiting.  No other acute complaints today.    Denies fever, chills, chest pain, palpitations, SOB, cough, abdominal pain, nausea, vomiting, diarrhea, constipation, urinary frequency, urgency, or dysuria, headaches, changes in vision, dizziness, numbness, tingling.  Denies recent travel, recent antibiotic use, or sick contacts.    ED Course:   Vitals: BP: 161/87, HR 69: , Temp: 97.8 , RR: 18, SpO2: 96% on   Labs:  H/H: 10.6/34.4, PTT: 36.2, Albumin: 2.7  Tissue culture: (incomplete)      Surgical pathology report: (incomplete)  MR foot:   Xray foot:   EKBPM normal sinus rhythm, QTc: 477  Received in the ED:       HPI: Patient presents to [facility] from rehab for bilateral foot surgery planned with podiatry, Gabriel Deshpande, tomorrow for his bilateral heel osteomyelitis. States his osteomyelitis started 6 months ago, Currently c/o pain in b/l heels. Denies any numbness or tingling down LLE/RLE. Denies any trauma. Patient currently does not walk due to deconditioning. Denies any other complaints. Denies fevers, chills, HA, Dizziness, chest pain, SOB, N/V/D, bladder symptoms,

## 2024-10-28 NOTE — CONSULT NOTE ADULT - SUBJECTIVE AND OBJECTIVE BOX
History of Present Illness: The patient is an 83 year old male with a history of HTN, HL, DM, CKD, GI bleed, COPD, SANTO, BPH, CAD s/p PCI, PAD, multiple myeloma, lymphedema who presents with heel ulcer. Plan is for podiatry surgery tomorrow. He was admitted to [facility] in July and was transferred for concern he may need dialysis, which was not needed in the end. He has been in [facility]. No chest pain or shortness of breath.    Past Medical/Surgical History:  HTN, HL, DM, CKD, GI bleed, COPD, SANTO, BPH, CAD s/p PCI, PAD, multiple myeloma, lymphedema    Medications:  Home Medications:  acetaminophen 325 mg oral tablet: 2 tab(s) orally every 6 hours As needed Temp greater or equal to 38C (100.4F), Mild Pain (1 - 3) (16 Jul 2024 16:50)  aspirin 81 mg oral tablet, chewable: 1 tab(s) orally once a day (16 Jul 2024 16:50)  atorvastatin 80 mg oral tablet: 1 tab(s) orally once a day (at bedtime) (16 Jul 2024 16:50)  clopidogrel 75 mg oral tablet: 1 tab(s) orally once a day (16 Jul 2024 16:50)  cyanocobalamin 1000 mcg oral tablet: 1 tab(s) orally once a day (16 Jul 2024 16:50)  famotidine 20 mg oral tablet: 1 tab(s) orally once a day (16 Jul 2024 16:50)  heparin: 5,000 unit(s) subcutaneous every 12 hours (16 Jul 2024 16:50)  insulin glargine 100 units/mL subcutaneous solution: 25 unit(s) subcutaneous once a day (at bedtime) (16 Jul 2024 16:50)  insulin lispro 100 units/mL injectable solution: 8 international unit(s) injectable 3 times a day (before meals) (16 Jul 2024 16:50)  insulin lispro 100 units/mL injectable solution: 1 unit(s) injectable once a day (at bedtime) 0 Unit(s) if Glucose 0 - 250  1 Unit(s) if Glucose 251 - 300  2 Unit(s) if Glucose 301 - 350  3 Unit(s) if Glucose 351 - 400  4 Unit(s) if Glucose 400 (16 Jul 2024 16:50)  insulin lispro 100 units/mL injectable solution: 1 unit(s) injectable 3 times a day (before meals) 1 Unit(s) if Glucose 151 - 200  2 Unit(s) if Glucose 201 - 250  3 Unit(s) if Glucose 251 - 300  4 Unit(s) if Glucose 301 - 350  5 Unit(s) if Glucose 351 - 400  6 Unit(s) if Glucose Greater Than 400 (16 Jul 2024 16:50)  melatonin 3 mg oral tablet: 3 tab(s) orally once a day (at bedtime) (16 Jul 2024 16:50)  metoprolol tartrate 25 mg oral tablet: 1 tab(s) orally 2 times a day (16 Jul 2024 16:50)  sodium bicarbonate 650 mg oral tablet: 1 tab(s) orally 3 times a day (16 Jul 2024 16:50)  tamsulosin 0.4 mg oral capsule: 1 cap(s) orally once a day (at bedtime) (16 Jul 2024 16:50)      Family History: Non-contributory family history of premature cardiovascular atherosclerotic disease    Social History: No tobacco, alcohol or drug use    Review of Systems:  General: No fevers, chills, weight gain  Skin: No rashes, color changes  Cardiovascular: No chest pain, orthopnea  Respiratory: No shortness of breath, cough  Gastrointestinal: No nausea, abdominal pain  Genitourinary: No incontinence, pain with urination  Musculoskeletal: No pain, swelling, decreased range of motion  Neurological: No headache, weakness  Psychiatric: No depression, anxiety  Endocrine: No weight gain, increased thirst  All other systems are comprehensively negative.    Physical Exam:  Vitals:        Vital Signs Last 24 Hrs  T(C): 36.4 (28 Oct 2024 17:25), Max: 36.6 (28 Oct 2024 11:04)  T(F): 97.5 (28 Oct 2024 17:25), Max: 97.8 (28 Oct 2024 11:04)  HR: 85 (28 Oct 2024 17:25) (69 - 85)  BP: 162/88 (28 Oct 2024 17:25) (161/87 - 162/88)  BP(mean): --  RR: 17 (28 Oct 2024 17:25) (17 - 18)  SpO2: 95% (28 Oct 2024 17:25) (95% - 96%)  Parameters below as of 28 Oct 2024 17:25  Patient On (Oxygen Delivery Method): room air  General: NAD  HEENT: MMM  Neck: No JVD, no carotid bruit  Lungs: CTAB  CV: RRR, nl S1/S2, no M/R/G  Abdomen: S/NT/ND, +BS  Extremities: +Lymphedema, no cyanosis  Neuro: AAOx3, non-focal  Skin: No rash    Labs:                        10.6   5.43  )-----------( 198      ( 28 Oct 2024 13:11 )             34.4     10-28    143  |  105  |  22  ----------------------------<  96  4.1   |  32[H]  |  1.30    Ca    9.2      28 Oct 2024 13:11    TPro  7.7  /  Alb  2.7[L]  /  TBili  0.5  /  DBili  x   /  AST  24  /  ALT  20  /  AlkPhos  29[L]  10-28        PT/INR - ( 28 Oct 2024 13:11 )   PT: 12.8 sec;   INR: 1.09 ratio         PTT - ( 28 Oct 2024 13:11 )  PTT:36.2 sec    ECG/Telemetry: NSR, LAD, no ST abnormality

## 2024-10-28 NOTE — PATIENT PROFILE ADULT - FALL HARM RISK - HARM RISK INTERVENTIONS

## 2024-10-28 NOTE — H&P ADULT - ASSESSMENT
Patient presents to [facility] from rehab for bilateral foot surgery planned with podiatry, Gabriel Deshpande, on 10/29 for his bilateral heel osteomyelitis. States his osteomyelitis started 6 months ago, Currently c/o pain in b/l heels. Denies any numbness or tingling down LLE/RLE. Patient currently does not walk due to deconditioning.

## 2024-10-28 NOTE — CONSULT NOTE ADULT - ASSESSMENT
Bilateral heel ulcers down to skin, subcutaneous tissue, fat, bone  Bilateral calcaneal osteomyelitis

## 2024-10-28 NOTE — H&P ADULT - PROBLEM SELECTOR PLAN 7
DVT ppx: Heparin 5000 sq BID, hold after midnight for OR  NPO after midnight for OR Chronic  - Chemotherapy held while in rehab

## 2024-10-28 NOTE — H&P ADULT - ATTENDING COMMENTS
Patient presents to [facility] from rehab for bilateral foot surgery planned with podiatry, Gabriel Deshpande, on 10/29 for his bilateral heel osteomyelitis. States his osteomyelitis started 6 months ago, Currently c/o pain in b/l heels. Denies any numbness or tingling down LLE/RLE. Patient currently does not walk due to deconditioning.    Podiatry planning for OR in am, medical clearance as above, awaiting cards clearance.  ID consulted, will f/u recs on abx regiment, f/u cultures, monitor WBC and fev Patient presents to [facility] from rehab for bilateral foot surgery planned with podiatry, Gabriel Deshpande, on 10/29 for his bilateral heel osteomyelitis. States his osteomyelitis started 6 months ago, Currently c/o pain in b/l heels. Denies any numbness or tingling down LLE/RLE. Patient currently does not walk due to deconditioning.    Podiatry planning for OR in am for osteomyelitis, medical clearance as above, awaiting cards clearance.  ID consulted, will f/u recs on abx regiment, f/u cultures, monitor WBC and fevers, possible role fo Meropenem based on previous cultures.  PT roya.    Chadwick Tompkins, Attending Physician Patient presents to [facility] from rehab for bilateral foot surgery planned with podiatry, Gabriel Deshpande, on 10/29 for his bilateral heel osteomyelitis. States his osteomyelitis started 6 months ago, Currently c/o pain in b/l heels. Denies any numbness or tingling down LLE/RLE. Patient currently does not walk due to deconditioning.    Podiatry planning for OR in am for osteomyelitis, medical clearance as above, awaiting cards clearance.  ID consulted, will f/u recs on abx regiment, f/u cultures, monitor WBC and fevers, possible role of Meropenem based on previous cultures.  PT roya.    Chadwick Tompkins, Attending Physician

## 2024-10-28 NOTE — ED ADULT TRIAGE NOTE - CHIEF COMPLAINT QUOTE
craig NIX from [facility] for b/l heel ulcers, pending surgery tomorrow.. midline access to [facility]

## 2024-10-28 NOTE — H&P ADULT - PROBLEM SELECTOR PLAN 1
Bilateral heel osteomyelitis, plan for OR with Dr. Rios on 10/29  - RCRI score 2, class III risk, 10.1% risk of major cardiac event  - Does not meet sepsis criteria   - Will need cardiac clearance prior to procedure due to extensive cardiac history  - On chronic abx therapy through [facility]  - Will Consult ID, FU Recs Bilateral heel osteomyelitis, plan for OR with Dr. Rios on 10/29  - Wound Cx (10/23): Proteus mirabilis, Pseudomonas aeruginosa, and Klebsiella pneumoniae, Enterococcus faecalis  - Sp 14 days of Augmentin and 10 days of zosyn  - Consulted ID SHARON Farr Recs  - RCRI score 2, class III risk, 10.1% risk of major cardiac event  - Does not meet sepsis criteria   - Will need cardiac clearance prior to procedure due to extensive cardiac history, cardiology Dr. Rock consulted Bilateral heel osteomyelitis, plan for OR with Dr. Rios on 10/29  - Wound Cx (10/23): Proteus mirabilis, Pseudomonas aeruginosa, and Klebsiella pneumoniae, Enterococcus faecalis  - Sp 14 days of Augmentin and 10 days of zosyn  - Consulted ID Dr. Meyers, SHARON Recs  - RCRI score 2, > 4 METS, class III risk, 10.1% risk of major cardiac event, medically optimized for OR, awaiting Cards clearance  - Does not meet sepsis criteria   - Will need cardiac clearance prior to procedure due to extensive cardiac history, cardiology Dr. Rock consulted  - ID Dr. Meyers consulted, will f/u recs Bilateral heel osteomyelitis, plan for OR with Dr. Rios on 10/29  - Wound Cx (10/23): Proteus mirabilis, Pseudomonas aeruginosa, and Klebsiella pneumoniae, Enterococcus faecalis  - Sp 14 days of Augmentin and 10 days of zosyn  - FU Blood Cx  - Consulted ID Dr. Meyers, FU Recs  - RCRI score 2, > 4 METS, class III risk, 10.1% risk of major cardiac event, medically optimized for OR, awaiting Cards clearance  - Does not meet sepsis criteria   - Will need cardiac clearance prior to procedure due to extensive cardiac history, cardiology Dr. Rock consulted Bilateral heel osteomyelitis, plan for OR with Dr. Rios on 10/29  - Wound Cx (10/23): Proteus mirabilis, Pseudomonas aeruginosa, and Klebsiella pneumoniae, Enterococcus faecalis  - Sp 14 days of Augmentin and 10 days of zosyn  - FU Blood Cx  - Consulted ID Dr. Meyers, FU Recs  - RCRI score 2, > 4 METS prior to heel ulcers, no acute chest pain or sob, no known h/o CHF, or valcular disease, but does have CAD with stents, class III risk, 10.1% risk of major cardiac event, medically optimized for OR, awaiting Cards clearance  - Does not meet sepsis criteria   - Will need cardiac clearance prior to procedure due to extensive cardiac history, cardiology Dr. Rock consulted

## 2024-10-28 NOTE — ED ADULT NURSE NOTE - CHIEF COMPLAINT QUOTE
craig NIX from [facility] for b/l heel ulcers, pending surgery tomorrow.. midline access to [facility]

## 2024-10-28 NOTE — PATIENT PROFILE ADULT - FUNCTIONAL ASSESSMENT - BASIC MOBILITY 6.
1-calculated by average/Not able to assess (calculate score using [facility] averaging method)

## 2024-10-28 NOTE — H&P ADULT - NSHPPHYSICALEXAM_GEN_ALL_CORE
T(C): 36.6 (10-28-24 @ 11:04), Max: 36.6 (10-28-24 @ 11:04)  HR: 69 (10-28-24 @ 11:04) (69 - 69)  BP: 161/87 (10-28-24 @ 11:04) (161/87 - 161/87)  RR: 18 (10-28-24 @ 11:04) (18 - 18)  SpO2: 96% (10-28-24 @ 11:04) (96% - 96%)    CONSTITUTIONAL: Well groomed, no apparent distress  EYES: PERRLA and symmetric, EOMI, No conjunctival or scleral injection, non-icteric  ENT: Oral mucosa with moist membranes  NECK: Supple, symmetric and without tracheal deviation   RESP: CTA b/l  CV: RRR, normal S1/S2  GI: Soft, NT, ND, no rebound, no guarding; no palpable masses; no hepatosplenomegaly  SKIN: B/L heels wrapped due to heel wounds   PSYCH: Appropriate insight/judgment; A+O x 3, mood and affect appropriate

## 2024-10-28 NOTE — CONSULT NOTE ADULT - SUBJECTIVE AND OBJECTIVE BOX
NOTE IN PROGRESS    Pt for operating room tomorrow HPI:  83y year old Male seen at [facility] [id number] for bilateral heel ulcers down to bone. Pt is well known to the [facility] team. Pt sent in from the wound care center for bilateral calcaneal osteomyelitis, for surgical intervention. Denies any fever, chills, nausea, vomiting, chest pain, shortness of breath, or calf pain at this time.      PAST MEDICAL & SURGICAL HISTORY:  HTN (hypertension)      CAD (coronary artery disease)  w/ 4 stents      HLD (hyperlipidemia)      DM (diabetes mellitus)      Benign prostatic hyperplasia with lower urinary tract symptoms, symptom details unspecified      Stented coronary artery      SANTO on CPAP      Chronic obstructive pulmonary disease, unspecified COPD type      Obesity, morbid, BMI 40.0-49.9      Difficulty walking      GI bleed  2020      History of blood transfusion  Transfusion of Plasma      DM2 (diabetes mellitus, type 2)      COPD, mild      Lymphedema      Multiple myeloma      Chronic obstructive pulmonary disease, unspecified COPD type      H/O knee surgery  Left - 11/7/2017      S/P angioplasty with stent  2004 and 2005 (Has 4 stents)      History of prostate surgery  laser to relieve a blockage.      No significant past surgical history          Allergies    No Known Allergies    Intolerances        MEDICATIONS  (STANDING):    MEDICATIONS  (PRN):      Social History:  Tobacco: smoker, quit 40 years ago  Alcohol: none  Lives with: wife  Ambulates with: does not ambulate (28 Oct 2024 14:08)      FAMILY HISTORY:  Family history of essential hypertension (Father)    No pertinent family history in first degree relatives        Vital Signs Last 24 Hrs  T(C): 36.7 (29 Oct 2024 10:34), Max: 36.7 (28 Oct 2024 20:59)  T(F): 98.1 (29 Oct 2024 10:34), Max: 98.1 (28 Oct 2024 20:59)  HR: 74 (29 Oct 2024 10:34) (72 - 85)  BP: 158/74 (29 Oct 2024 10:34) (144/78 - 162/88)  BP(mean): --  RR: 14 (29 Oct 2024 10:34) (14 - 17)  SpO2: 96% (29 Oct 2024 10:34) (92% - 96%)    Parameters below as of 29 Oct 2024 10:34  Patient On (Oxygen Delivery Method): room air        PHYSICAL EXAM:  Vascular: DP & PT palpable bilaterally, Capillary refill 3 seconds  Neurological: Light touch sensation not intact bilaterally  Musculoskeletal: 4/5 strength in all quadrants bilaterally, AJ & STJ ROM intact  Dermatological: Bilateral heel wounds down to skin, subcutaneous tissue, fat, bone with necrotic wound bed, probe to bone, periwound erythema, malodor, no fluctuance, no malodor, no proximal streaking at this time                          9.3    4.87  )-----------( 176      ( 29 Oct 2024 04:40 )             30.5       10-29    145  |  109[H]  |  21  ----------------------------<  67[L]  3.6   |  31  |  1.10    Ca    9.0      29 Oct 2024 04:40    TPro  7.7  /  Alb  2.7[L]  /  TBili  0.5  /  DBili  x   /  AST  24  /  ALT  20  /  AlkPhos  29[L]  10-28      PT/INR - ( 28 Oct 2024 13:11 )   PT: 12.8 sec;   INR: 1.09 ratio         PTT - ( 28 Oct 2024 13:11 )  PTT:36.2 sec        Imaging: MRI shows marrow edema of bilateral calcaneus

## 2024-10-28 NOTE — H&P ADULT - PROBLEM SELECTOR PROBLEM 6
Multiple myeloma Benign prostatic hyperplasia with lower urinary tract symptoms, symptom details unspecified

## 2024-10-28 NOTE — ED ADULT NURSE NOTE - NSICDXFAMILYHX_GEN_ALL_CORE_FT
FAMILY HISTORY:  No pertinent family history in first degree relatives    Father  Still living? Unknown  Family history of essential hypertension, Age at diagnosis: Age Unknown

## 2024-10-28 NOTE — ED PROVIDER NOTE - PHYSICAL EXAMINATION
Constitutional: Awake, Alert, non-toxic. No acute distress.  HEAD: Normocephalic, atraumatic.   EYES: PERRL, EOM intact, conjunctiva and sclera are clear bilaterally.  ENT: External ears normal. No rhinorrhea, no tracheal deviation   NECK: Supple, non-tender  CARDIOVASCULAR: regular rate and rhythm.  RESPIRATORY: Normal respiratory effort; breath sounds CTAB, no wheezes, rhonchi, or rales. Speaking in full sentences. No accessory muscle use.   ABDOMEN: Soft; non-tender, non-distended. No rebound or guarding.   MSK:  2+ b/l lower extremity edema, no deformities  SKIN: Warm, dry, b/l heel wounds  NEURO: A&O x3. Sensory and motor functions are grossly intact. Speech is normal. No facial droop.  PSYCH: Appearance and judgement seem appropriate for gender and age.

## 2024-10-28 NOTE — ED PROVIDER NOTE - OBJECTIVE STATEMENT
Patient with a past medical history of hypertension, diabetes, hyperlipidemia, bilateral heel osteomyelitis currently on outpatient antibiotics through [facility] is presenting for surgery.  He is scheduled for surgery with Dr. frias tomorrow.  Denies any fevers.  Endorsing pain to his heels but no other areas of pain.  No nausea or vomiting.  No other acute complaints today.

## 2024-10-28 NOTE — CONSULT NOTE ADULT - ASSESSMENT
The patient is an 83 year old male with a history of HTN, HL, DM, CKD, GI bleed, COPD, SANTO, BPH, CAD s/p PCI, PAD, multiple myeloma, lymphedema who presents with heel ulcer.     Plan:  - ECG with sinus rhythm and no evidence of ischemia/infarction  - Echo 6/30/24 with normal LV systolic function, mild pulm HTN  - CXR with grossly clear lungs  - Lower extremity swelling consistent with known history of lymphedema  - Continue aspirin 81 mg daily  - Continue clopidogrel 75 mg daily - if needed can be held prior to surgery  - Continue atorvastatin 80 mg daily  - Continue metoprolol tartrate 25 mg bid  - Podiatry eval  - The patient is at intermediate/high risk for cardiac events for an intermediate risk surgery. The patient is optimized to proceed for surgery from a cardiac standpoint.

## 2024-10-29 DIAGNOSIS — S91.302A UNSPECIFIED OPEN WOUND, LEFT FOOT, INITIAL ENCOUNTER: ICD-10-CM

## 2024-10-29 DIAGNOSIS — S91.301A UNSPECIFIED OPEN WOUND, RIGHT FOOT, INITIAL ENCOUNTER: ICD-10-CM

## 2024-10-29 LAB
A1C WITH ESTIMATED AVERAGE GLUCOSE RESULT: 5.9 % — HIGH (ref 4–5.6)
ANION GAP SERPL CALC-SCNC: 5 MMOL/L — SIGNIFICANT CHANGE UP (ref 5–17)
BASOPHILS # BLD AUTO: 0.01 K/UL — SIGNIFICANT CHANGE UP (ref 0–0.2)
BASOPHILS NFR BLD AUTO: 0.2 % — SIGNIFICANT CHANGE UP (ref 0–2)
BUN SERPL-MCNC: 21 MG/DL — SIGNIFICANT CHANGE UP (ref 7–23)
CALCIUM SERPL-MCNC: 9 MG/DL — SIGNIFICANT CHANGE UP (ref 8.5–10.1)
CHLORIDE SERPL-SCNC: 109 MMOL/L — HIGH (ref 96–108)
CO2 SERPL-SCNC: 31 MMOL/L — SIGNIFICANT CHANGE UP (ref 22–31)
CREAT SERPL-MCNC: 1.1 MG/DL — SIGNIFICANT CHANGE UP (ref 0.5–1.3)
CULTURE RESULTS: ABNORMAL
EGFR: 67 ML/MIN/1.73M2 — SIGNIFICANT CHANGE UP
EOSINOPHIL # BLD AUTO: 0.26 K/UL — SIGNIFICANT CHANGE UP (ref 0–0.5)
EOSINOPHIL NFR BLD AUTO: 5.3 % — SIGNIFICANT CHANGE UP (ref 0–6)
ESTIMATED AVERAGE GLUCOSE: 123 MG/DL — HIGH (ref 68–114)
GLUCOSE SERPL-MCNC: 67 MG/DL — LOW (ref 70–99)
HCT VFR BLD CALC: 30.5 % — LOW (ref 39–50)
HGB BLD-MCNC: 9.3 G/DL — LOW (ref 13–17)
IMM GRANULOCYTES NFR BLD AUTO: 0.2 % — SIGNIFICANT CHANGE UP (ref 0–0.9)
LYMPHOCYTES # BLD AUTO: 1.76 K/UL — SIGNIFICANT CHANGE UP (ref 1–3.3)
LYMPHOCYTES # BLD AUTO: 36.1 % — SIGNIFICANT CHANGE UP (ref 13–44)
MCHC RBC-ENTMCNC: 28.4 PG — SIGNIFICANT CHANGE UP (ref 27–34)
MCHC RBC-ENTMCNC: 30.5 GM/DL — LOW (ref 32–36)
MCV RBC AUTO: 93.3 FL — SIGNIFICANT CHANGE UP (ref 80–100)
MONOCYTES # BLD AUTO: 0.33 K/UL — SIGNIFICANT CHANGE UP (ref 0–0.9)
MONOCYTES NFR BLD AUTO: 6.8 % — SIGNIFICANT CHANGE UP (ref 2–14)
NEUTROPHILS # BLD AUTO: 2.5 K/UL — SIGNIFICANT CHANGE UP (ref 1.8–7.4)
NEUTROPHILS NFR BLD AUTO: 51.4 % — SIGNIFICANT CHANGE UP (ref 43–77)
NRBC # BLD: 0 /100 WBCS — SIGNIFICANT CHANGE UP (ref 0–0)
PLATELET # BLD AUTO: 176 K/UL — SIGNIFICANT CHANGE UP (ref 150–400)
POTASSIUM SERPL-MCNC: 3.6 MMOL/L — SIGNIFICANT CHANGE UP (ref 3.5–5.3)
POTASSIUM SERPL-SCNC: 3.6 MMOL/L — SIGNIFICANT CHANGE UP (ref 3.5–5.3)
RBC # BLD: 3.27 M/UL — LOW (ref 4.2–5.8)
RBC # FLD: 16 % — HIGH (ref 10.3–14.5)
SODIUM SERPL-SCNC: 145 MMOL/L — SIGNIFICANT CHANGE UP (ref 135–145)
SPECIMEN SOURCE: SIGNIFICANT CHANGE UP
WBC # BLD: 4.87 K/UL — SIGNIFICANT CHANGE UP (ref 3.8–10.5)
WBC # FLD AUTO: 4.87 K/UL — SIGNIFICANT CHANGE UP (ref 3.8–10.5)

## 2024-10-29 PROCEDURE — 11044 DBRDMT BONE 1ST 20 SQ CM/<: CPT

## 2024-10-29 PROCEDURE — 88304 TISSUE EXAM BY PATHOLOGIST: CPT | Mod: 26

## 2024-10-29 PROCEDURE — 11047 DBRDMT BONE EACH ADDL: CPT

## 2024-10-29 PROCEDURE — 88311 DECALCIFY TISSUE: CPT | Mod: 26

## 2024-10-29 PROCEDURE — 99233 SBSQ HOSP IP/OBS HIGH 50: CPT

## 2024-10-29 RX ORDER — MAGNESIUM, ALUMINUM HYDROXIDE 200-225/5
30 SUSPENSION, ORAL (FINAL DOSE FORM) ORAL EVERY 4 HOURS
Refills: 0 | Status: DISCONTINUED | OUTPATIENT
Start: 2024-10-29 | End: 2024-11-19

## 2024-10-29 RX ORDER — SACCHAROMYCES BOULARDII 250 MG
250 CAPSULE ORAL
Refills: 0 | Status: DISCONTINUED | OUTPATIENT
Start: 2024-10-29 | End: 2024-11-19

## 2024-10-29 RX ORDER — METOPROLOL TARTRATE 100 MG/1
25 TABLET, FILM COATED ORAL
Refills: 0 | Status: DISCONTINUED | OUTPATIENT
Start: 2024-10-29 | End: 2024-10-29

## 2024-10-29 RX ORDER — MEROPENEM 500 MG/1
INJECTION, POWDER, FOR SOLUTION INTRAVENOUS
Refills: 0 | Status: DISCONTINUED | OUTPATIENT
Start: 2024-10-29 | End: 2024-11-19

## 2024-10-29 RX ORDER — 0.9 % SODIUM CHLORIDE 0.9 %
1000 INTRAVENOUS SOLUTION INTRAVENOUS
Refills: 0 | Status: DISCONTINUED | OUTPATIENT
Start: 2024-10-29 | End: 2024-11-02

## 2024-10-29 RX ORDER — TAMSULOSIN HYDROCHLORIDE 0.4 MG/1
0.4 CAPSULE ORAL AT BEDTIME
Refills: 0 | Status: DISCONTINUED | OUTPATIENT
Start: 2024-10-29 | End: 2024-11-19

## 2024-10-29 RX ORDER — MEROPENEM 500 MG/1
1000 INJECTION, POWDER, FOR SOLUTION INTRAVENOUS EVERY 8 HOURS
Refills: 0 | Status: DISCONTINUED | OUTPATIENT
Start: 2024-10-30 | End: 2024-11-19

## 2024-10-29 RX ORDER — INSULIN GLARGINE 100 [IU]/ML
8 INJECTION, SOLUTION SUBCUTANEOUS AT BEDTIME
Refills: 0 | Status: DISCONTINUED | OUTPATIENT
Start: 2024-10-29 | End: 2024-11-19

## 2024-10-29 RX ORDER — FAMOTIDINE 20 MG/1
20 TABLET, FILM COATED ORAL DAILY
Refills: 0 | Status: DISCONTINUED | OUTPATIENT
Start: 2024-10-29 | End: 2024-11-19

## 2024-10-29 RX ORDER — METOPROLOL TARTRATE 100 MG/1
25 TABLET, FILM COATED ORAL
Refills: 0 | Status: DISCONTINUED | OUTPATIENT
Start: 2024-10-29 | End: 2024-11-19

## 2024-10-29 RX ORDER — 0.9 % SODIUM CHLORIDE 0.9 %
1000 INTRAVENOUS SOLUTION INTRAVENOUS
Refills: 0 | Status: DISCONTINUED | OUTPATIENT
Start: 2024-10-29 | End: 2024-11-19

## 2024-10-29 RX ORDER — GLUCAGON INJECTION, SOLUTION 0.5 MG/.1ML
1 INJECTION, SOLUTION SUBCUTANEOUS ONCE
Refills: 0 | Status: DISCONTINUED | OUTPATIENT
Start: 2024-10-29 | End: 2024-11-19

## 2024-10-29 RX ORDER — ACETAMINOPHEN, DIPHENHYDRAMINE HCL, PHENYLEPHRINE HCL 325; 25; 5 MG/1; MG/1; MG/1
3 TABLET ORAL AT BEDTIME
Refills: 0 | Status: DISCONTINUED | OUTPATIENT
Start: 2024-10-29 | End: 2024-11-19

## 2024-10-29 RX ORDER — INSULIN GLARGINE 100 [IU]/ML
8 INJECTION, SOLUTION SUBCUTANEOUS AT BEDTIME
Refills: 0 | Status: DISCONTINUED | OUTPATIENT
Start: 2024-10-29 | End: 2024-10-29

## 2024-10-29 RX ORDER — HYDROMORPHONE HYDROCHLORIDE 2 MG/1
0.5 TABLET ORAL
Refills: 0 | Status: DISCONTINUED | OUTPATIENT
Start: 2024-10-29 | End: 2024-10-29

## 2024-10-29 RX ORDER — 0.9 % SODIUM CHLORIDE 0.9 %
1000 INTRAVENOUS SOLUTION INTRAVENOUS
Refills: 0 | Status: DISCONTINUED | OUTPATIENT
Start: 2024-10-29 | End: 2024-10-29

## 2024-10-29 RX ORDER — ONDANSETRON HYDROCHLORIDE 4 MG/1
4 TABLET, FILM COATED ORAL ONCE
Refills: 0 | Status: DISCONTINUED | OUTPATIENT
Start: 2024-10-29 | End: 2024-10-29

## 2024-10-29 RX ORDER — ACETAMINOPHEN 500MG 500 MG/1
650 TABLET, COATED ORAL EVERY 6 HOURS
Refills: 0 | Status: DISCONTINUED | OUTPATIENT
Start: 2024-10-29 | End: 2024-11-19

## 2024-10-29 RX ORDER — MEROPENEM 500 MG/1
1000 INJECTION, POWDER, FOR SOLUTION INTRAVENOUS ONCE
Refills: 0 | Status: COMPLETED | OUTPATIENT
Start: 2024-10-29 | End: 2024-10-29

## 2024-10-29 RX ORDER — SODIUM CHLORIDE 9 MG/ML
1000 INJECTION, SOLUTION INTRAMUSCULAR; INTRAVENOUS; SUBCUTANEOUS
Refills: 0 | Status: DISCONTINUED | OUTPATIENT
Start: 2024-10-29 | End: 2024-10-29

## 2024-10-29 RX ORDER — HYDROMORPHONE HYDROCHLORIDE 2 MG/1
0.5 TABLET ORAL EVERY 6 HOURS
Refills: 0 | Status: DISCONTINUED | OUTPATIENT
Start: 2024-10-29 | End: 2024-11-03

## 2024-10-29 RX ADMIN — METOPROLOL TARTRATE 25 MILLIGRAM(S): 100 TABLET, FILM COATED ORAL at 17:20

## 2024-10-29 RX ADMIN — Medication 12.5 GRAM(S): at 13:36

## 2024-10-29 RX ADMIN — Medication 80 MILLIGRAM(S): at 22:34

## 2024-10-29 RX ADMIN — INSULIN GLARGINE 8 UNIT(S): 100 INJECTION, SOLUTION SUBCUTANEOUS at 22:24

## 2024-10-29 RX ADMIN — TAMSULOSIN HYDROCHLORIDE 0.4 MILLIGRAM(S): 0.4 CAPSULE ORAL at 22:33

## 2024-10-29 RX ADMIN — Medication 12.5 GRAM(S): at 07:05

## 2024-10-29 RX ADMIN — Medication 75 MILLILITER(S): at 14:25

## 2024-10-29 RX ADMIN — Medication 1: at 22:38

## 2024-10-29 RX ADMIN — METOPROLOL TARTRATE 25 MILLIGRAM(S): 100 TABLET, FILM COATED ORAL at 06:52

## 2024-10-29 RX ADMIN — Medication 1000 MICROGRAM(S): at 15:39

## 2024-10-29 RX ADMIN — ACETAMINOPHEN, DIPHENHYDRAMINE HCL, PHENYLEPHRINE HCL 3 MILLIGRAM(S): 325; 25; 5 TABLET ORAL at 22:33

## 2024-10-29 RX ADMIN — ACETAMINOPHEN 500MG 650 MILLIGRAM(S): 500 TABLET, COATED ORAL at 15:39

## 2024-10-29 RX ADMIN — Medication 2: at 17:20

## 2024-10-29 RX ADMIN — MEROPENEM 100 MILLIGRAM(S): 500 INJECTION, POWDER, FOR SOLUTION INTRAVENOUS at 20:26

## 2024-10-29 RX ADMIN — ACETAMINOPHEN 500MG 650 MILLIGRAM(S): 500 TABLET, COATED ORAL at 16:16

## 2024-10-29 NOTE — PROGRESS NOTE ADULT - ASSESSMENT
84yo M with a PMH of HTN, HLD, Type 2 DM, CKD3a, hx of GI bleed, COPD, SANTO, BPH, CAD s/p PCI, PAD, multiple myeloma, lymphedema who presents with b/l heel ulcers and suspected OM planned for bilateral foot surgery with podiatry, Gabriel Deshpande, on 10/29 for his suspected bilateral heel osteomyelitis now s/p partial calcanectomy.

## 2024-10-29 NOTE — PROGRESS NOTE ADULT - SUBJECTIVE AND OBJECTIVE BOX
Patient is a 83y old  Male who presents with a chief complaint of Bilateral Heel Osteomyelitis.      INTERVAL HPI/OVERNIGHT EVENTS: Pt evaluated in the PACU after receiving half amp of D50 for FSG in mid-60s. Pt awake, alert and answering simple questions. Pt denies fever, chills, SOB, CP, foot pain, n/v, dizziness.    MEDICATIONS  (STANDING):  atorvastatin 80 milliGRAM(s) Oral at bedtime  cyanocobalamin 1000 MICROGram(s) Oral daily  dextrose 5% + sodium chloride 0.45%. 1000 milliLiter(s) (75 mL/Hr) IV Continuous <Continuous>  dextrose 5%. 1000 milliLiter(s) (100 mL/Hr) IV Continuous <Continuous>  dextrose 5%. 1000 milliLiter(s) (50 mL/Hr) IV Continuous <Continuous>  dextrose 50% Injectable 12.5 Gram(s) IV Push once  dextrose 50% Injectable 25 Gram(s) IV Push once  dextrose 50% Injectable 25 Gram(s) IV Push once  dextrose 50% Injectable 12.5 Gram(s) IV Push once  famotidine    Tablet 20 milliGRAM(s) Oral daily  glucagon  Injectable 1 milliGRAM(s) IntraMuscular once  insulin glargine Injectable (LANTUS) 8 Unit(s) SubCutaneous at bedtime  insulin lispro (ADMELOG) corrective regimen sliding scale   SubCutaneous three times a day before meals  melatonin 3 milliGRAM(s) Oral at bedtime  meropenem  IVPB 1000 milliGRAM(s) IV Intermittent every 8 hours  meropenem  IVPB      metoprolol tartrate 25 milliGRAM(s) Oral two times a day  saccharomyces boulardii 250 milliGRAM(s) Oral two times a day  tamsulosin 0.4 milliGRAM(s) Oral at bedtime    MEDICATIONS  (PRN):  acetaminophen     Tablet .. 650 milliGRAM(s) Oral every 6 hours PRN Temp greater or equal to 38C (100.4F), Mild Pain (1 - 3)  aluminum hydroxide/magnesium hydroxide/simethicone Suspension 30 milliLiter(s) Oral every 4 hours PRN Dyspepsia  dextrose Oral Gel 15 Gram(s) Oral once PRN Blood Glucose LESS THAN 70 milliGRAM(s)/deciliter  HYDROmorphone  Injectable 0.5 milliGRAM(s) IV Push every 6 hours PRN Severe Pain (7 - 10)  oxycodone    5 mG/acetaminophen 325 mG 1 Tablet(s) Oral every 6 hours PRN Moderate Pain (4 - 6)      Allergies    No Known Allergies    Intolerances        REVIEW OF SYSTEMS:  CONSTITUTIONAL: No fever or chills  HEENT:  No headache, no sore throat  RESPIRATORY: No cough, wheezing, or shortness of breath  CARDIOVASCULAR: No chest pain, palpitations  GASTROINTESTINAL: No abd pain, nausea, vomiting   GENITOURINARY: No dysuria, frequency   NEUROLOGICAL: no focal weakness or dizziness  MUSCULOSKELETAL: no myalgias ; no foot pain    Vital Signs Last 24 Hrs  T(C): 36.3 (29 Oct 2024 05:11), Max: 36.7 (28 Oct 2024 20:59)  T(F): 97.4 (29 Oct 2024 05:11), Max: 98.1 (28 Oct 2024 20:59)  HR: 73 (29 Oct 2024 05:11) (69 - 85)  BP: 144/78 (29 Oct 2024 05:11) (144/78 - 162/88)  BP(mean): --  RR: 16 (29 Oct 2024 05:11) (16 - 18)  SpO2: 94% (29 Oct 2024 05:11) (92% - 96%)    Parameters below as of 29 Oct 2024 05:11  Patient On (Oxygen Delivery Method): room air      PHYSICAL EXAM:  GENERAL: NAD at rest, obese  HEENT:  anicteric, moist mucous membranes  CHEST/LUNG:  CTA b/l, no rales, wheezes, or rhonchi  HEART:  RRR, S1, S2  ABDOMEN:  BS+, soft, nontender, nondistended  EXTREMITIES: +edema ; no cyanosis or calf tenderness ; podiatric dressings C/D/I  NERVOUS SYSTEM: answers simple questions and follows commands appropriately    LABS:                          9.3    4.87  )-----------( 176      ( 29 Oct 2024 04:40 )             30.5     CBC Full  -  ( 29 Oct 2024 04:40 )  WBC Count : 4.87 K/uL  Hemoglobin : 9.3 g/dL  Hematocrit : 30.5 %  Platelet Count - Automated : 176 K/uL  Mean Cell Volume : 93.3 fl  Mean Cell Hemoglobin : 28.4 pg  Mean Cell Hemoglobin Concentration : 30.5 gm/dL  Auto Neutrophil # : 2.50 K/uL  Auto Lymphocyte # : 1.76 K/uL  Auto Monocyte # : 0.33 K/uL  Auto Eosinophil # : 0.26 K/uL  Auto Basophil # : 0.01 K/uL  Auto Neutrophil % : 51.4 %  Auto Lymphocyte % : 36.1 %  Auto Monocyte % : 6.8 %  Auto Eosinophil % : 5.3 %  Auto Basophil % : 0.2 %    10-29    145  |  109[H]  |  21  ----------------------------<  67[L]  3.6   |  31  |  1.10    Ca    9.0      29 Oct 2024 04:40    TPro  7.7  /  Alb  2.7[L]  /  TBili  0.5  /  DBili  x   /  AST  24  /  ALT  20  /  AlkPhos  29[L]  10-28      PT/INR - ( 28 Oct 2024 13:11 )   PT: 12.8 sec;   INR: 1.09 ratio         PTT - ( 28 Oct 2024 13:11 )  PTT:36.2 sec      CAPILLARY BLOOD GLUCOSE      POCT Blood Glucose.: 212 mg/dL (29 Oct 2024 17:13)  POCT Blood Glucose.: 112 mg/dL (29 Oct 2024 14:40)  POCT Blood Glucose.: 99 mg/dL (29 Oct 2024 14:24)  POCT Blood Glucose.: 95 mg/dL (29 Oct 2024 14:01)  POCT Blood Glucose.: 66 mg/dL (29 Oct 2024 13:25)  POCT Blood Glucose.: 65 mg/dL (29 Oct 2024 13:24)  POCT Blood Glucose.: 132 mg/dL (29 Oct 2024 07:08)  POCT Blood Glucose.: 60 mg/dL (29 Oct 2024 06:43)      Culture - Tissue with Gram Stain (collected 10-23-24 @ 11:48)  Source: Tissue  Gram Stain (10-24-24 @ 05:02):    No polymorphonuclear leukocytes seen per low power field    Few Gram Negative Rods seen per oil power field    Rare Gram positive cocci in pairs seen per oil power field  Final Report (10-29-24 @ 06:52):    Numerous Proteus mirabilis    Numerous Pseudomonas aeruginosa    See previous culture [id number]    Culture - Tissue with Gram Stain (collected 10-23-24 @ 11:42)  Source: Tissue  Gram Stain (10-24-24 @ 05:03):    No polymorphonuclear leukocytes seen per low power field    Numerous Gram Negative Rods seen per oil power field  Final Report (10-27-24 @ 12:11):    Numerous Proteus mirabilis    Numerous Pseudomonas aeruginosa    Moderate Enterococcus faecalis    Moderate Klebsiella pneumoniae ESBL    Moderate Gram Positive Cocci in Clusters "Susceptibilities not performed"    Few Bacteroides fragilis "Susceptibilities not performed"  Organism: Proteus mirabilis  Pseudomonas aeruginosa  Enterococcus faecalis  Klebsiella pneumoniae ESBL (10-27-24 @ 12:11)  Organism: Klebsiella pneumoniae ESBL (10-27-24 @ 12:11)      Method Type: VICKY      -  Ampicillin: R >16 These ampicillin results predict results for amoxicillin      -  Ampicillin/Sulbactam: R 8/4      -  Aztreonam: R >16      -  Cefazolin: R >16      -  Cefepime: R 8      -  Ceftriaxone: R >32      -  Ciprofloxacin: I 0.5      -  Ertapenem: S <=0.5      -  Gentamicin: S <=2      -  Imipenem: S <=1      -  Levofloxacin: S <=0.5      -  Meropenem: S <=1      -  Piperacillin/Tazobactam: R <=8      -  Tobramycin: S <=2      -  Trimethoprim/Sulfamethoxazole: S <=0.5/9.5  Organism: Enterococcus faecalis (10-27-24 @ 12:11)      Method Type: VICKY      -  Ampicillin: S <=2 Predicts results to ampicillin/sulbactam, amoxacillin-clavulanate and  piperacillin-tazobactam.      -  Vancomycin: S 2  Organism: Pseudomonas aeruginosa (10-27-24 @ 12:11)      Method Type: VICKY      -  Aztreonam: R >16      -  Cefepime: R >16      -  Ceftazidime: R >16      -  Ciprofloxacin: S <=0.25      -  Imipenem: S <=1      -  Levofloxacin: S <=0.5      -  Meropenem: S <=1      -  Piperacillin/Tazobactam: R >64  Organism: Proteus mirabilis (10-27-24 @ 12:11)      Method Type: VICKY      -  Amoxicillin/Clavulanic Acid: S <=8/4      -  Ampicillin: R >16 These ampicillin results predict results for amoxicillin      -  Ampicillin/Sulbactam: S <=4/2      -  Aztreonam: S <=4      -  Cefazolin: S <=2      -  Cefepime: S <=2      -  Cefoxitin: S <=8      -  Ceftriaxone: S <=1      -  Ciprofloxacin: R >2      -  Ertapenem: S <=0.5      -  Gentamicin: S <=2      -  Levofloxacin: R 4      -  Meropenem: S <=1      -  Piperacillin/Tazobactam: S <=8      -  Tobramycin: S <=2      -  Trimethoprim/Sulfamethoxazole: R >2/38        RADIOLOGY & ADDITIONAL TESTS:    Personally reviewed.     Consultant(s) Notes Reviewed:  [x] YES  [ ] NO

## 2024-10-29 NOTE — CASE MANAGEMENT PROGRESS NOTE - NSCMPROGRESSNOTE_GEN_ALL_CORE
CM consult noted for disposition planning. Patient is for OR today for podiatric surgery. CM will follow for transitional care planning.

## 2024-10-29 NOTE — CAREGIVER ENGAGEMENT NOTE - CAREGIVER OUTREACH NOTES - FREE TEXT
YESENIA contacted Sheba, pt's emergency contact, to explain social work role and complete CC assessment. Sheba responded with "I'm not interested" and hung up the phone. YESENIA will follow up with pt and pt's family after surgery. SWI contacted Sheba, pt's emergency contact, to explain social work role and complete CC assessment. Sheba responded with "I'm not interested" and hung up the phone. SWI will follow up with pt and pt's family after surgery. SWI to remain available for any needs.

## 2024-10-29 NOTE — CARE COORDINATION ASSESSMENT. - NSPASTMEDSURGHISTORY_GEN_ALL_CORE_FT
PAST MEDICAL & SURGICAL HISTORY:  DM (diabetes mellitus)      HLD (hyperlipidemia)      CAD (coronary artery disease)  w/ 4 stents      HTN (hypertension)      H/O knee surgery  Left - 11/7/2017      Difficulty walking      Obesity, morbid, BMI 40.0-49.9      Chronic obstructive pulmonary disease, unspecified COPD type      SANTO on CPAP      Stented coronary artery      Benign prostatic hyperplasia with lower urinary tract symptoms, symptom details unspecified      S/P angioplasty with stent  2004 and 2005 (Has 4 stents)      History of prostate surgery  laser to relieve a blockage.      History of blood transfusion  Transfusion of Plasma      GI bleed  2020      Multiple myeloma      Lymphedema      COPD, mild      DM2 (diabetes mellitus, type 2)      No significant past surgical history      Chronic obstructive pulmonary disease, unspecified COPD type

## 2024-10-29 NOTE — CARE COORDINATION ASSESSMENT. - TRANSPORTATION UTILIZED PRIOR TO ADMISSION
Prior to hospitalization in June, pt was driving. Since that hospitalization, the pt has not been transporting.

## 2024-10-29 NOTE — CARE COORDINATION ASSESSMENT. - PRO ARRIVE FROM
Sub acute rehab at [facility] Sub acute rehab at [facility]/inpatient rehabilitation [facility]

## 2024-10-29 NOTE — CARE COORDINATION ASSESSMENT. - NSCAREPROVIDERS_GEN_ALL_CORE_FT
CARE PROVIDERS:  Accepting Physician: Chadwick Tompkins  Access Services: Campbell Smith  Administration: Tristen Davis  Administration: Donovan Shelby  Admitting: Chadwick Tompkins  Attending: Humza Mario  Consultant: Michoacano Casillas  Consultant: Schuyler Rock  Consultant: Ketan Rios  Consultant: Omar Rodriguez  Consultant: Jona Mason  ED Attending: Ortiz Vines  ED Nurse: Deepa Chase  HIM/Billing & Coding: Christina Mares  HIM/Billing & Coding: Starla Urbina  Intern: Nadia Medeiros  Nurse: Velia Sosa  Nurse: Boaz Estrada  Nurse: Gigi Bruce  Nurse: Kika Chris  Nurse: Debby Mata  Nurse: Gallo García  Nurse: Jolene Melara  Nurse: Bella Zafar  Nurse: Vijaya Tavares  Ordered: ADM, User  Ordered: Doctor, Unknown  Outpatient Provider: Jim Yan  Outpatient Provider: Abel Blum  Outpatient Provider: Ashok Cevallos  Outpatient Provider: Nicanor Medina  Override: Kirsty Sebastian  Override: Velia Sosa  PCA/Nursing Assistant: Mitzy Chavez  Physical Therapy: Vini Burns  Primary Team: Roger Pickett  Primary Team: Humza Mario  Primary Team: Arlene Cai  Primary Team: Cory Marshall  Primary Team: Nadia Lira  Primary Team: Cameron Brady  Registered Dietitian: Comfort Zazueta  Respiratory Therapy: Nupur Townsend  : Virginia Montero  : Barbie De Luna  Team: [facility], Team   CARE PROVIDERS:  Accepting Physician: Chadwick Tompkins  Access Services: Campbell Smith  Administration: Tristen Davis  Administration: Donovan Shelby  Admitting: Chadwick Tompkins  Attending: Humza Mario  Consultant: Michoacano Casillas  Consultant: Schuyler Rock  Consultant: Ketan Rios  Consultant: Omar Rodriguez  Consultant: Jnoa Mason  ED Attending: Ortiz Vines  ED Nurse: Deepa Chase  HIM/Billing & Coding: Christina Mares  HIM/Billing & Coding: Starla Urbina  Intern: Nadia Medeiros  Nurse: Boaz Estrada  Nurse: Gigi Bruce  Nurse: Velia Sosa  Nurse: Kika Chris  Nurse: Debby Mata  Nurse: Gallo García  Nurse: Jolene Melara  Nurse: Bella Zafar  Nurse: Vijaya Tavares  Ordered: ADM, User  Ordered: Doctor, Unknown  Outpatient Provider: Jim Yan  Outpatient Provider: Abel Blum  Outpatient Provider: Ashok Cevallos  Outpatient Provider: Nicanor Medina  Override: Kirsty Sebastian  Override: Velia Ssoa  PCA/Nursing Assistant: Mitzy Chavez  Physical Therapy: Vini Burns  Primary Team: Roger Pickett  Primary Team: Humza Mario  Primary Team: Arlene Cai  Primary Team: Cory Marshall  Primary Team: Nadia Lira  Primary Team: Cameron Brady  Registered Dietitian: Comfort Zazueta  Respiratory Therapy: Nupur Townsend  : Virginia Montero  : Rhoda De La Garza  : Barbie De Luna  Team: [facility], Team

## 2024-10-29 NOTE — CARE COORDINATION ASSESSMENT. - OTHER PERTINENT DISCHARGE PLANNING INFORMATION:
[facility] met with pt and family at bedside. Pt provided permission for [facility] to conduct assessment with family in the room. Pt resides with spouse Sheba, however pt has not been home since June and has been at [facility] [place]. Pt and family unsure if pt received PT outpatient vs. homecare. Family believes that full 100 JAGJIT days were used up at [facility]. SWI to follow and remain available for any needs.

## 2024-10-29 NOTE — PROGRESS NOTE ADULT - PROBLEM SELECTOR PLAN 1
- Bilateral heel osteomyelitis per outpatient w/up and admitted with a plan for OR with podiatry, Dr. Rios on 10/29  - Wound Cx (10/23): Proteus mirabilis, Pseudomonas aeruginosa, ESBL Klebsiella pneumoniae, Enterococcus faecalis  - s/p 14 days of Augmentin and 10 days of zosyn  - f/up Blood Cx  - Consulted ID Dr. Casillas, recs appreciated   - start meropenem this evening after surgery  - afebrile, no leukocytosis  - cardiology Dr. Rock consulted, recs appreciated - Bilateral heel osteomyelitis per outpatient w/up and admitted with a plan for OR with podiatry, Dr. Rios on 10/29  - Wound Cx (10/23): Proteus mirabilis, Pseudomonas aeruginosa, ESBL Klebsiella pneumoniae, Enterococcus faecalis  - s/p 14 days of Augmentin and 10 days of zosyn  - f/up Blood Cx  - f/up OR cultures and pathology  - Consulted ID Dr. Casillas, recs appreciated   - start meropenem this evening after surgery  - afebrile, no leukocytosis  - cardiology Dr. Rock consulted, recs appreciated

## 2024-10-29 NOTE — PROGRESS NOTE ADULT - PROBLEM SELECTOR PLAN 5
CAD sp stents x4  - ASA and Plavix on 10/29; will restart at least ASA tomorrow morning   - pt did have some strikethrough bleeding per nursing this evening -- f/up with podiatry when able to restart plavix CAD sp stents x4  - ASA and Plavix on 10/29; will restart at least ASA tomorrow morning   - c/w lipitor  - pt did have some strikethrough bleeding per nursing this evening -- f/up with podiatry when able to restart plavix  - cardiology Dr. Rock consulted, recs appreciated

## 2024-10-29 NOTE — PROGRESS NOTE ADULT - ASSESSMENT
The patient is an 83 year old male with a history of HTN, HL, DM, CKD, GI bleed, COPD, SANTO, BPH, CAD s/p PCI, PAD, multiple myeloma, lymphedema who presents with heel ulcer.     Plan:  - ECG with sinus rhythm and no evidence of ischemia/infarction  - Echo 6/30/24 with normal LV systolic function, mild pulm HTN  - CXR with grossly clear lungs  - Lower extremity swelling consistent with known history of lymphedema  - Continue aspirin 81 mg daily  - Continue clopidogrel 75 mg daily - if needed can be held prior to surgery  - Continue atorvastatin 80 mg daily  - Continue metoprolol tartrate 25 mg bid  - Podiatry follow-up  - The patient is at intermediate/high risk for cardiac events for an intermediate risk surgery. The patient is optimized to proceed for surgery from a cardiac standpoint.

## 2024-10-29 NOTE — PROGRESS NOTE ADULT - PROBLEM SELECTOR PLAN 8
DVT ppx: pharm AC held for OR   - pt did have some strikethrough bleeding per nursing this evening -- f/up with podiatry when able to restart   - SCDs

## 2024-10-29 NOTE — CONSULT NOTE ADULT - ASSESSMENT
[facility]  Chart Reviewed-  Bilateral heel osteomyelitis, in OR with Dr. Rios - 10/29  - Wound Cx (10/23): Proteus mirabilis, Pseudomonas aeruginosa, and Klebsiella pneumoniae, Enterococcus faecalis  recs to follow on abx    Full Consult to follow for any immediate concerns please fell free to contact us directly at  [phone] and have us paged or text my cell # [phone]  Michoacano Casillas MD PhD   [facility]  Chart Reviewed-  Bilateral heel osteomyelitis, in OR with Dr. Rios - 10/29  - Wound Cx (10/23): Proteus mirabilis, Pseudomonas aeruginosa, and Klebsiella pneumoniae, Enterococcus faecalis    rec start of Meropenem post OR    Full Consult to follow for any immediate concerns please fell free to contact us directly at  [phone] and have us paged or text my cell # [phone]  Michoacano Casillas MD PhD

## 2024-10-29 NOTE — PROGRESS NOTE ADULT - SUBJECTIVE AND OBJECTIVE BOX
Chief Complaint: Heel ulcer    Interval Events: No events overnight.    Review of Systems:  General: No fevers, chills, weight gain  Skin: No rashes, color changes  Cardiovascular: No chest pain, orthopnea  Respiratory: No shortness of breath, cough  Gastrointestinal: No nausea, abdominal pain  Genitourinary: No incontinence, pain with urination  Musculoskeletal: No pain, swelling, decreased range of motion  Neurological: No headache, weakness  Psychiatric: No depression, anxiety  Endocrine: No weight gain, increased thirst  All other systems are comprehensively negative.    Physical Exam:  Vitals:        Vital Signs Last 24 Hrs  T(C): 36.3 (29 Oct 2024 05:11), Max: 36.7 (28 Oct 2024 20:59)  T(F): 97.4 (29 Oct 2024 05:11), Max: 98.1 (28 Oct 2024 20:59)  HR: 73 (29 Oct 2024 05:11) (69 - 85)  BP: 144/78 (29 Oct 2024 05:11) (144/78 - 162/88)  BP(mean): --  RR: 16 (29 Oct 2024 05:11) (16 - 18)  SpO2: 94% (29 Oct 2024 05:11) (92% - 96%)  Parameters below as of 29 Oct 2024 05:11  Patient On (Oxygen Delivery Method): room air  General: NAD  HEENT: MMM  Neck: No JVD, no carotid bruit  Lungs: CTAB  CV: RRR, nl S1/S2, no M/R/G  Abdomen: S/NT/ND, +BS  Extremities: +Lymphedema, no cyanosis  Neuro: AAOx3, non-focal  Skin: No rash    Labs:                        9.3    4.87  )-----------( 176      ( 29 Oct 2024 04:40 )             30.5     10-29    145  |  109[H]  |  21  ----------------------------<  67[L]  3.6   |  31  |  1.10    Ca    9.0      29 Oct 2024 04:40    TPro  7.7  /  Alb  2.7[L]  /  TBili  0.5  /  DBili  x   /  AST  24  /  ALT  20  /  AlkPhos  29[L]  10-28        PT/INR - ( 28 Oct 2024 13:11 )   PT: 12.8 sec;   INR: 1.09 ratio         PTT - ( 28 Oct 2024 13:11 )  PTT:36.2 sec    ECG/Telemetry: Sinus rhythm Medications

## 2024-10-30 LAB
ANION GAP SERPL CALC-SCNC: 5 MMOL/L — SIGNIFICANT CHANGE UP (ref 5–17)
BUN SERPL-MCNC: 20 MG/DL — SIGNIFICANT CHANGE UP (ref 7–23)
CALCIUM SERPL-MCNC: 8.8 MG/DL — SIGNIFICANT CHANGE UP (ref 8.5–10.1)
CHLORIDE SERPL-SCNC: 105 MMOL/L — SIGNIFICANT CHANGE UP (ref 96–108)
CO2 SERPL-SCNC: 32 MMOL/L — HIGH (ref 22–31)
CREAT SERPL-MCNC: 1.1 MG/DL — SIGNIFICANT CHANGE UP (ref 0.5–1.3)
EGFR: 67 ML/MIN/1.73M2 — SIGNIFICANT CHANGE UP
GLUCOSE SERPL-MCNC: 118 MG/DL — HIGH (ref 70–99)
GRAM STN FLD: ABNORMAL
GRAM STN FLD: ABNORMAL
GRAM STN FLD: SIGNIFICANT CHANGE UP
GRAM STN FLD: SIGNIFICANT CHANGE UP
HCT VFR BLD CALC: 28.5 % — LOW (ref 39–50)
HGB BLD-MCNC: 8.9 G/DL — LOW (ref 13–17)
MAGNESIUM SERPL-MCNC: 1.6 MG/DL — SIGNIFICANT CHANGE UP (ref 1.6–2.6)
MCHC RBC-ENTMCNC: 28.8 PG — SIGNIFICANT CHANGE UP (ref 27–34)
MCHC RBC-ENTMCNC: 31.2 G/DL — LOW (ref 32–36)
MCV RBC AUTO: 92.2 FL — SIGNIFICANT CHANGE UP (ref 80–100)
NRBC # BLD: 0 /100 WBCS — SIGNIFICANT CHANGE UP (ref 0–0)
PHOSPHATE SERPL-MCNC: 3.2 MG/DL — SIGNIFICANT CHANGE UP (ref 2.5–4.5)
PLATELET # BLD AUTO: 180 K/UL — SIGNIFICANT CHANGE UP (ref 150–400)
POTASSIUM SERPL-MCNC: 4.2 MMOL/L — SIGNIFICANT CHANGE UP (ref 3.5–5.3)
POTASSIUM SERPL-SCNC: 4.2 MMOL/L — SIGNIFICANT CHANGE UP (ref 3.5–5.3)
RBC # BLD: 3.09 M/UL — LOW (ref 4.2–5.8)
RBC # FLD: 16 % — HIGH (ref 10.3–14.5)
SODIUM SERPL-SCNC: 142 MMOL/L — SIGNIFICANT CHANGE UP (ref 135–145)
SPECIMEN SOURCE: SIGNIFICANT CHANGE UP
SPECIMEN SOURCE: SIGNIFICANT CHANGE UP
WBC # BLD: 7.93 K/UL — SIGNIFICANT CHANGE UP (ref 3.8–10.5)
WBC # FLD AUTO: 7.93 K/UL — SIGNIFICANT CHANGE UP (ref 3.8–10.5)

## 2024-10-30 PROCEDURE — 99233 SBSQ HOSP IP/OBS HIGH 50: CPT

## 2024-10-30 PROCEDURE — 73630 X-RAY EXAM OF FOOT: CPT | Mod: 26,50

## 2024-10-30 PROCEDURE — 99232 SBSQ HOSP IP/OBS MODERATE 35: CPT

## 2024-10-30 RX ORDER — ACETAMINOPHEN 500MG 500 MG/1
1000 TABLET, COATED ORAL ONCE
Refills: 0 | Status: DISCONTINUED | OUTPATIENT
Start: 2024-10-30 | End: 2024-11-19

## 2024-10-30 RX ORDER — CLOPIDOGREL 75 MG/1
75 TABLET, FILM COATED ORAL DAILY
Refills: 0 | Status: DISCONTINUED | OUTPATIENT
Start: 2024-10-30 | End: 2024-11-19

## 2024-10-30 RX ADMIN — Medication 81 MILLIGRAM(S): at 12:12

## 2024-10-30 RX ADMIN — FAMOTIDINE 20 MILLIGRAM(S): 20 TABLET, FILM COATED ORAL at 12:12

## 2024-10-30 RX ADMIN — MEROPENEM 100 MILLIGRAM(S): 500 INJECTION, POWDER, FOR SOLUTION INTRAVENOUS at 13:43

## 2024-10-30 RX ADMIN — Medication 75 MILLILITER(S): at 06:49

## 2024-10-30 RX ADMIN — Medication 80 MILLIGRAM(S): at 21:24

## 2024-10-30 RX ADMIN — METOPROLOL TARTRATE 25 MILLIGRAM(S): 100 TABLET, FILM COATED ORAL at 06:47

## 2024-10-30 RX ADMIN — Medication 250 MILLIGRAM(S): at 17:13

## 2024-10-30 RX ADMIN — MEROPENEM 100 MILLIGRAM(S): 500 INJECTION, POWDER, FOR SOLUTION INTRAVENOUS at 21:24

## 2024-10-30 RX ADMIN — MEROPENEM 100 MILLIGRAM(S): 500 INJECTION, POWDER, FOR SOLUTION INTRAVENOUS at 06:47

## 2024-10-30 RX ADMIN — TAMSULOSIN HYDROCHLORIDE 0.4 MILLIGRAM(S): 0.4 CAPSULE ORAL at 21:25

## 2024-10-30 RX ADMIN — Medication 250 MILLIGRAM(S): at 06:47

## 2024-10-30 RX ADMIN — CLOPIDOGREL 75 MILLIGRAM(S): 75 TABLET, FILM COATED ORAL at 12:12

## 2024-10-30 RX ADMIN — Medication 1000 MICROGRAM(S): at 12:12

## 2024-10-30 RX ADMIN — ACETAMINOPHEN 500MG 650 MILLIGRAM(S): 500 TABLET, COATED ORAL at 07:20

## 2024-10-30 RX ADMIN — INSULIN GLARGINE 8 UNIT(S): 100 INJECTION, SOLUTION SUBCUTANEOUS at 21:25

## 2024-10-30 RX ADMIN — METOPROLOL TARTRATE 25 MILLIGRAM(S): 100 TABLET, FILM COATED ORAL at 17:13

## 2024-10-30 RX ADMIN — ACETAMINOPHEN, DIPHENHYDRAMINE HCL, PHENYLEPHRINE HCL 3 MILLIGRAM(S): 325; 25; 5 TABLET ORAL at 21:24

## 2024-10-30 RX ADMIN — ACETAMINOPHEN 500MG 650 MILLIGRAM(S): 500 TABLET, COATED ORAL at 06:47

## 2024-10-30 NOTE — PROGRESS NOTE ADULT - ASSESSMENT
The patient is an 83 year old male with a history of HTN, HL, DM, CKD, GI bleed, COPD, SANTO, BPH, CAD s/p PCI, PAD, multiple myeloma, lymphedema who presents with heel ulcer.     Plan:  - ECG with sinus rhythm and no evidence of ischemia/infarction  - Echo 6/30/24 with normal LV systolic function, mild pulm HTN  - CXR with grossly clear lungs  - Lower extremity swelling consistent with known history of lymphedema  - Continue aspirin 81 mg daily  - Resume clopidogrel if no contraindication  - Continue atorvastatin 80 mg daily  - Continue metoprolol tartrate 25 mg bid  - Podiatry and ID follow-up

## 2024-10-30 NOTE — CONSULT NOTE ADULT - ASSESSMENT
84 yo male with hypertension, diabetes, hyperlipidemia, bilateral heel osteomyelitis currently on outpatient antibiotics through midline is presented for surgery planned with podiatry, Gabriel Deshpande, for his bilateral heel osteomyelitis. States his osteomyelitis started 6 months ago.   Calcanectomy, partial 29-Oct-2024 12:58:43  Jona Mason    RECOMMENDATIONS  bilateral heel osteomyelitis  prior micro with Proteus mirabilis, Pseudomonas aeruginosa, Enterococcus faecalis, Klebsiella pneumoniae ESBL  sp surgery  Meropenem started 10/29  continue for now pending new micro and  Path-in lab    Thank you for consulting us and involving us in the management of this most interesting and challenging case.  We will follow along in the care of this patient. Please call us at [phone] or text me directly on my cell# at [phone] with any concerns.

## 2024-10-30 NOTE — PROGRESS NOTE ADULT - SUBJECTIVE AND OBJECTIVE BOX
Chief Complaint: Heel ulcer    Interval Events: No events overnight.    Review of Systems:  General: No fevers, chills, weight gain  Skin: No rashes, color changes  Cardiovascular: No chest pain, orthopnea  Respiratory: No shortness of breath, cough  Gastrointestinal: No nausea, abdominal pain  Genitourinary: No incontinence, pain with urination  Musculoskeletal: No pain, swelling, decreased range of motion  Neurological: No headache, weakness  Psychiatric: No depression, anxiety  Endocrine: No weight gain, increased thirst  All other systems are comprehensively negative.    Physical Exam:  Vital Signs Last 24 Hrs  T(C): 36.9 (30 Oct 2024 04:53), Max: 36.9 (30 Oct 2024 04:53)  T(F): 98.5 (30 Oct 2024 04:53), Max: 98.5 (30 Oct 2024 04:53)  HR: 107 (30 Oct 2024 04:53) (74 - 107)  BP: 152/77 (30 Oct 2024 04:53) (97/45 - 158/74)  BP(mean): --  RR: 17 (30 Oct 2024 04:53) (14 - 20)  SpO2: 90% (30 Oct 2024 04:53) (90% - 99%)  Parameters below as of 30 Oct 2024 04:53  Patient On (Oxygen Delivery Method): room air  General: NAD  HEENT: MMM  Neck: No JVD, no carotid bruit  Lungs: CTAB  CV: RRR, nl S1/S2, no M/R/G  Abdomen: S/NT/ND, +BS  Extremities: +Lymphedema, no cyanosis  Neuro: AAOx3, non-focal  Skin: No rash    Labs:    10-29    145  |  109[H]  |  21  ----------------------------<  67[L]  3.6   |  31  |  1.10    Ca    9.0      29 Oct 2024 04:40    TPro  7.7  /  Alb  2.7[L]  /  TBili  0.5  /  DBili  x   /  AST  24  /  ALT  20  /  AlkPhos  29[L]  10-28                        9.3    4.87  )-----------( 176      ( 29 Oct 2024 04:40 )             30.5       ECG/Telemetry: Sinus rhythm

## 2024-10-30 NOTE — SOCIAL WORK PROGRESS NOTE - NSSWPROGRESSNOTE_GEN_ALL_CORE
Pt admitted from [facility] subacute rehab, he was nearing his DC as he exhausted his Medicare days and was pvt pay. Spouse had made arrangements for him to go to assisted living facility at [facility] in [place] upon Dc from [place].  As per spouse/pt they will consider having pt DC to [facility] from hospital if they are able to accept. NADYA left message for Fallon at [facility] to follow up. NADYA will continue to follow to ensure safe transitional planning.

## 2024-10-30 NOTE — PROGRESS NOTE ADULT - PROBLEM SELECTOR PLAN 1
Patient evaluated and chart reviewed.  S/p b/l heel debridement with partial calcanectomy.  Moderate strikethrough bleeding noted b/l.  DSD was reapplied today and antibiotic beads were left in place.  Cont abx per ID recs.  Surgical pathology pending.  Podiatry will continue to follow while in-house.  Plan to be discussed with attending.

## 2024-10-30 NOTE — PROGRESS NOTE ADULT - ASSESSMENT
84yo M with a PMH of HTN, HLD, Type 2 DM, CKD3a, hx of GI bleed, COPD, SANTO, BPH, CAD s/p PCI, PAD, multiple myeloma, lymphedema who presents with b/l heel ulcers and suspected OM planned for bilateral foot surgery with podiatry, Gabriel Deshpande, on 10/29 for his suspected bilateral heel osteomyelitis now s/p partial calcanectomy.        Problem/Plan - 1:  ·  Problem: Osteomyelitis of foot.   ·  Plan: - Bilateral heel osteomyelitis per outpatient w/up and admitted with a plan for OR with podiatry, Dr. Rios on 10/29  - Wound Cx (10/23): Proteus mirabilis, Pseudomonas aeruginosa, ESBL Klebsiella pneumoniae, Enterococcus faecalis  - s/p 14 days of Augmentin and 10 days of zosyn  - Blood Cx NGTD  - s/p partial calcanectomy on 10/29.    - f/up OR cultures and pathology  - Tylenol PRN for mild pain, percocet 1 tab PO Q6h PRN for moderate pain, and Dilaudid 0.5mg IV Q6h PRN for severe pain.  - Consulted ID Dr. Casillas, recs appreciated   - continue meropenem  - afebrile, no leukocytosis  - cardiology Dr. Rock consulted, recs appreciated.     Problem/Plan - 2:  ·  Problem: DM (diabetes mellitus).   ·  Plan: - type 2 DM on insulin  - Home basal insulin 23 U at bedtime, given half dose of 12un last night but mild hypoglycemia in the mid-60s this morning and in PACU -- pt given D50; started on D5 1/2NS and will maintain overnight   - continue decreased lantus dose to 8units QHS   - continue low-dose lispro corrective ISS  - monitor FSG     Problem/Plan - 3:  ·  Problem: HTN (hypertension).   ·  Plan: Chronic   - continue metoprolol 25mg BID.     Problem/Plan - 4:  ·  Problem: HLD (hyperlipidemia).   ·  Plan: Chronic   - Continue atorvastatin 80mg QD.     Problem/Plan - 5:  ·  Problem: CAD (coronary artery disease).   ·  Plan: CAD sp stents x4  - ASA and Plavix on 10/29; will restart ASA and Plavix this morning   - c/w lipitor  - pt did have some strikethrough bleeding per nursing this evening -- Confirmed with podiatry: rishabh to restart plavix  - cardiology Dr. Rock consulted, recs appreciated.     Problem/Plan - 6:  ·  Problem: Benign prostatic hyperplasia with lower urinary tract symptoms, symptom details unspecified.   ·  Plan: Chronic  -Continue Flomax.     Problem/Plan - 7:  ·  Problem: Multiple myeloma.   ·  Plan: Chronic  - Chemotherapy held while in rehab.     Problem/Plan - 8:  ·  Problem: Need for prophylactic measure.   ·  Plan: DVT ppx: pharm AC held for OR   - pt did have some strikethrough bleeding per nursing this evening -- f/up with podiatry when able to restart   - SCDs.   84yo M with a PMH of HTN, HLD, Type 2 DM, CKD3a, hx of GI bleed, COPD, SANTO, BPH, CAD s/p PCI, PAD, multiple myeloma, lymphedema who presents with b/l heel ulcers and suspected OM planned for bilateral foot surgery with podiatry, Gabriel Deshpande, on 10/29 for his suspected bilateral heel osteomyelitis now s/p partial calcanectomy.        Problem/Plan - 1:  ·  Problem: Osteomyelitis of foot.   ·  Plan: - Bilateral heel osteomyelitis per outpatient w/up and admitted with a plan for OR with podiatry, Dr. Rios on 10/29  - Wound Cx (10/23): Proteus mirabilis, Pseudomonas aeruginosa, ESBL Klebsiella pneumoniae, Enterococcus faecalis  - s/p 14 days of Augmentin and 10 days of zosyn  - Blood Cx NGTD  - s/p partial calcanectomy on 10/29.    - f/up OR cultures and pathology  - Tylenol PRN for mild pain, percocet 1 tab PO Q6h PRN for moderate pain, and Dilaudid 0.5mg IV Q6h PRN for severe pain.  - Consulted ID Dr. Casillas, recs appreciated   - continue meropenem  - afebrile, no leukocytosis  - cardiology Dr. Rock consulted, recs appreciated.     Problem/Plan - 2:  ·  Problem: DM (diabetes mellitus).   ·  Plan: - type 2 DM on insulin  - Home basal insulin 23 U at bedtime, given half dose of 12un last night but mild hypoglycemia in the mid-60s this morning and in PACU -- pt given D50; started on D5 1/2NS and will maintain overnight   - continue decreased lantus dose to 8units QHS   - continue low-dose lispro corrective ISS  - monitor FSG     Problem/Plan - 3:  ·  Problem: HTN (hypertension).   ·  Plan: Chronic   - continue metoprolol 25mg BID.     Problem/Plan - 4:  ·  Problem: HLD (hyperlipidemia).   ·  Plan: Chronic   - Continue atorvastatin 80mg QD.     Problem/Plan - 5:  ·  Problem: CAD (coronary artery disease).   ·  Plan: CAD sp stents x4  - ASA and Plavix on 10/29; will restart ASA and Plavix this morning   - c/w lipitor  - pt did have some strikethrough bleeding per nursing this evening -- Confirmed with podiatry: rishabh to restart plavix  - cardiology Dr. Rock consulted, recs appreciated.     Problem/Plan - 6:  ·  Problem: Benign prostatic hyperplasia with lower urinary tract symptoms, symptom details unspecified.   ·  Plan: Chronic  -Continue Flomax.     Problem/Plan - 7:  ·  Problem: Multiple myeloma.   ·  Plan: Chronic  - Chemotherapy held while in rehab.     Problem/Plan - 8:  ·  Problem: Need for prophylactic measure.   ·  Plan: DVT ppx: pharm AC held for OR   - pt did have some strikethrough bleeding per nursing this evening -- f/up with podiatry when able to restart   - SCDs.    ---  TIME SPENT:  52 minutes spent on total encounter. The necessity of the time spent during the encounter on this date of service was due to:     direct patient care including but not limited to reviewing chart, medications ,laboratory data, imaging reports, discussion of plan of care with consultants on the case, coordination of care with multidisciplinary team involved in the case and discussion of plan with patient.  Patient agreeable to plan of care and verbalized understanding the anticipated hospital course and treatment plan.    --- 84yo M with a PMH of HTN, HLD, Type 2 DM, CKD3a, hx of GI bleed, COPD, SANTO, BPH, CAD s/p PCI, PAD, multiple myeloma, lymphedema who presents with b/l heel ulcers and suspected OM planned for bilateral foot surgery with podiatry, Gabriel Deshpande, on 10/29 for his suspected bilateral heel osteomyelitis now s/p partial calcanectomy.        Problem/Plan - 1:  ·  Problem: Osteomyelitis of foot.   ·  Plan: - Bilateral heel osteomyelitis per outpatient w/up and admitted with a plan for OR with podiatry, Dr. Rios on 10/29  - Wound Cx (10/23): Proteus mirabilis, Pseudomonas aeruginosa, ESBL Klebsiella pneumoniae, Enterococcus faecalis  - s/p 14 days of Augmentin and 10 days of zosyn  - Blood Cx NGTD  - s/p partial calcanectomy on 10/29.    - f/up OR cultures and pathology  - Tylenol PRN for mild pain, percocet 1 tab PO Q6h PRN for moderate pain, and Dilaudid 0.5mg IV Q6h PRN for severe pain.  - Consulted ID Dr. Casillas, recs appreciated   - continue meropenem  - afebrile, no leukocytosis  - cardiology Dr. Rock consulted, recs appreciated.     Problem/Plan - 2:  ·  Problem: DM (diabetes mellitus).   ·  Plan: - type 2 DM on insulin  - Home basal insulin 23 U at bedtime, given half dose of 12un last night but mild hypoglycemia in the mid-60s this morning and in PACU -- pt given D50; started on D5 1/2NS and will maintain overnight   - continue decreased lantus dose to 8units QHS   - continue low-dose lispro corrective ISS  - monitor FSG     Problem/Plan - 3:  ·  Problem: HTN (hypertension).   ·  Plan: Chronic   - continue metoprolol 25mg BID.     Problem/Plan - 4:  ·  Problem: HLD (hyperlipidemia).   ·  Plan: Chronic   - Continue atorvastatin 80mg QD.     Problem/Plan - 5:  ·  Problem: CAD (coronary artery disease).   ·  Plan: CAD sp stents x4  - ASA and Plavix on 10/29; will restart ASA and Plavix this morning   - c/w lipitor  - pt did have some strikethrough bleeding per nursing this evening -- Confirmed with podiatry: rishabh to restart plavix  - cardiology Dr. Rock consulted, recs appreciated.     Problem/Plan - 6:  ·  Problem: Benign prostatic hyperplasia with lower urinary tract symptoms, symptom details unspecified.   ·  Plan: Chronic  -Continue Flomax.     Problem/Plan - 7:  ·  Problem: Multiple myeloma.   ·  Plan: Chronic  - Chemotherapy held while in rehab.     Problem/Plan - 8:  ·  Problem: Need for prophylactic measure.   ·  Plan: DVT ppx: pharm AC held for OR   - pt did have some strikethrough bleeding per nursing this evening -- f/up with podiatry when able to restart   - SCDs.    ---  TIME SPENT:  52 minutes spent on total encounter. The necessity of the time spent during the encounter on this date of service was due to:     direct patient care including but not limited to reviewing chart, medications ,laboratory data, imaging reports, discussion of plan of care with consultants on the case, coordination of care with multidisciplinary team involved in the case and discussion of plan with patient.  Patient and family agreeable to plan of care and verbalized understanding the anticipated hospital course and treatment plan.    ---

## 2024-10-30 NOTE — PROGRESS NOTE ADULT - SUBJECTIVE AND OBJECTIVE BOX
83y year old Male seen at [facility] [id number] 353 D1 s/p b/l heel debridement with partial calcanectomy. Patient relates no overnight events and states that they are doing well at this time.  Denies any fever, chills, nausea, vomiting, chest pain, shortness of breath, or calf pain at this time.    Allergies    No Known Allergies    Intolerances        MEDICATIONS  (STANDING):  aspirin  chewable 81 milliGRAM(s) Oral daily  atorvastatin 80 milliGRAM(s) Oral at bedtime  clopidogrel Tablet 75 milliGRAM(s) Oral daily  cyanocobalamin 1000 MICROGram(s) Oral daily  dextrose 5% + sodium chloride 0.45%. 1000 milliLiter(s) (75 mL/Hr) IV Continuous <Continuous>  dextrose 5%. 1000 milliLiter(s) (50 mL/Hr) IV Continuous <Continuous>  dextrose 5%. 1000 milliLiter(s) (100 mL/Hr) IV Continuous <Continuous>  dextrose 50% Injectable 12.5 Gram(s) IV Push once  dextrose 50% Injectable 25 Gram(s) IV Push once  dextrose 50% Injectable 12.5 Gram(s) IV Push once  dextrose 50% Injectable 25 Gram(s) IV Push once  famotidine    Tablet 20 milliGRAM(s) Oral daily  glucagon  Injectable 1 milliGRAM(s) IntraMuscular once  insulin glargine Injectable (LANTUS) 8 Unit(s) SubCutaneous at bedtime  insulin lispro (ADMELOG) corrective regimen sliding scale   SubCutaneous three times a day before meals  melatonin 3 milliGRAM(s) Oral at bedtime  meropenem  IVPB      meropenem  IVPB 1000 milliGRAM(s) IV Intermittent every 8 hours  metoprolol tartrate 25 milliGRAM(s) Oral two times a day  saccharomyces boulardii 250 milliGRAM(s) Oral two times a day  tamsulosin 0.4 milliGRAM(s) Oral at bedtime    MEDICATIONS  (PRN):  acetaminophen     Tablet .. 650 milliGRAM(s) Oral every 6 hours PRN Temp greater or equal to 38C (100.4F), Mild Pain (1 - 3)  aluminum hydroxide/magnesium hydroxide/simethicone Suspension 30 milliLiter(s) Oral every 4 hours PRN Dyspepsia  dextrose Oral Gel 15 Gram(s) Oral once PRN Blood Glucose LESS THAN 70 milliGRAM(s)/deciliter  HYDROmorphone  Injectable 0.5 milliGRAM(s) IV Push every 6 hours PRN Severe Pain (7 - 10)  oxycodone    5 mG/acetaminophen 325 mG 1 Tablet(s) Oral every 6 hours PRN Moderate Pain (4 - 6)      Vital Signs Last 24 Hrs  T(C): 36.8 (30 Oct 2024 11:28), Max: 36.9 (30 Oct 2024 04:53)  T(F): 98.3 (30 Oct 2024 11:28), Max: 98.5 (30 Oct 2024 04:53)  HR: 82 (30 Oct 2024 11:28) (76 - 107)  BP: 151/85 (30 Oct 2024 11:28) (100/46 - 152/77)  BP(mean): --  RR: 17 (30 Oct 2024 11:28) (17 - 20)  SpO2: 91% (30 Oct 2024 11:28) (90% - 97%)    Parameters below as of 30 Oct 2024 11:28  Patient On (Oxygen Delivery Method): room air        PHYSICAL EXAM:  Vascular: DP & PT palpable bilaterally, Capillary refill 3 seconds  Neurological: Light touch sensation not intact bilaterally  Musculoskeletal: 4/5 strength in all quadrants bilaterally, AJ & STJ ROM intact  Dermatological: Surgical site of b/l feet noted with intact sutures, no dehiscence, mild ang-incision erythema, no proximal streaking, no fluctuance, no malodor, no signs of acute infection at this time.                          8.9    7.93  )-----------( 180      ( 30 Oct 2024 10:25 )             28.5       10-30    142  |  105  |  20  ----------------------------<  118[H]  4.2   |  32[H]  |  1.10    Ca    8.8      30 Oct 2024 10:25  Phos  3.2     10-30  Mg     1.6     10-30              Culture - Tissue with Gram Stain (collected 29 Oct 2024 12:00)  Source: Tissue  Gram Stain (30 Oct 2024 01:31):    No polymorphonuclear leukocytes seen per low power field    No organisms seen per oil power field    Culture - Tissue with Gram Stain (collected 29 Oct 2024 12:00)  Source: Tissue  Gram Stain (30 Oct 2024 01:31):    No polymorphonuclear leukocytes seen per low power field    No organisms seen per oil power field    Culture - Blood (collected 28 Oct 2024 13:00)  Source: .Blood BLOOD  Preliminary Report (29 Oct 2024 19:02):    No growth at 24 hours    Culture - Blood (collected 28 Oct 2024 12:50)  Source: .Blood BLOOD  Preliminary Report (29 Oct 2024 19:02):    No growth at 24 hours

## 2024-10-30 NOTE — CONSULT NOTE ADULT - SUBJECTIVE AND OBJECTIVE BOX
[facility] DIVISION of INFECTIOUS DISEASE  Michoacano Casillas MD PhD, Cherry Vincent MD, Queta Ngo MD, Roselia Mcmanus MD, Andrez Bolden MD  and providing coverage with Trey Montelongo MD  Providing Infectious Disease Consultations at [facility], [facility], [place], [place], [facility], [facility]    Office# [phone] to schedule follow up appointments  Answering Service for urgent calls or New Consults [phone]  Cell# to text for urgent issues Michoacano Casillas [phone]       HPI: 84 yo male with hypertension, diabetes, hyperlipidemia, bilateral heel osteomyelitis currently on outpatient antibiotics through midline is presented for surgery planned with podiatry, Gabriel Deshpande, for his bilateral heel osteomyelitis. States his osteomyelitis started 6 months ago.   Calcanectomy, partial 29-Oct-2024 12:58:43  Jona Mason      PAST MEDICAL & SURGICAL HISTORY:  HTN (hypertension)      CAD (coronary artery disease)  w/ 4 stents      HLD (hyperlipidemia)      DM (diabetes mellitus)      Benign prostatic hyperplasia with lower urinary tract symptoms, symptom details unspecified      Stented coronary artery      SANTO on CPAP      Chronic obstructive pulmonary disease, unspecified COPD type      Obesity, morbid, BMI 40.0-49.9      Difficulty walking      GI bleed  2020      History of blood transfusion  Transfusion of Plasma      DM2 (diabetes mellitus, type 2)      COPD, mild      Lymphedema      Multiple myeloma      Chronic obstructive pulmonary disease, unspecified COPD type      H/O knee surgery  Left - 11/7/2017      S/P angioplasty with stent  2004 and 2005 (Has 4 stents)      History of prostate surgery  laser to relieve a blockage.      No significant past surgical history          Antimicrobials  meropenem  IVPB      meropenem  IVPB 1000 milliGRAM(s) IV Intermittent every 8 hours      Immunological      Other  acetaminophen     Tablet .. 650 milliGRAM(s) Oral every 6 hours PRN  aluminum hydroxide/magnesium hydroxide/simethicone Suspension 30 milliLiter(s) Oral every 4 hours PRN  aspirin  chewable 81 milliGRAM(s) Oral daily  atorvastatin 80 milliGRAM(s) Oral at bedtime  clopidogrel Tablet 75 milliGRAM(s) Oral daily  cyanocobalamin 1000 MICROGram(s) Oral daily  dextrose 5% + sodium chloride 0.45%. 1000 milliLiter(s) IV Continuous <Continuous>  dextrose 5%. 1000 milliLiter(s) IV Continuous <Continuous>  dextrose 5%. 1000 milliLiter(s) IV Continuous <Continuous>  dextrose 50% Injectable 12.5 Gram(s) IV Push once  dextrose 50% Injectable 25 Gram(s) IV Push once  dextrose 50% Injectable 12.5 Gram(s) IV Push once  dextrose 50% Injectable 25 Gram(s) IV Push once  dextrose Oral Gel 15 Gram(s) Oral once PRN  famotidine    Tablet 20 milliGRAM(s) Oral daily  glucagon  Injectable 1 milliGRAM(s) IntraMuscular once  HYDROmorphone  Injectable 0.5 milliGRAM(s) IV Push every 6 hours PRN  insulin glargine Injectable (LANTUS) 8 Unit(s) SubCutaneous at bedtime  insulin lispro (ADMELOG) corrective regimen sliding scale   SubCutaneous three times a day before meals  melatonin 3 milliGRAM(s) Oral at bedtime  metoprolol tartrate 25 milliGRAM(s) Oral two times a day  oxycodone    5 mG/acetaminophen 325 mG 1 Tablet(s) Oral every 6 hours PRN  saccharomyces boulardii 250 milliGRAM(s) Oral two times a day  tamsulosin 0.4 milliGRAM(s) Oral at bedtime      Allergies    No Known Allergies    Intolerances        SOCIAL HISTORY:  Social History:  Tobacco: smoker, quit 40 years ago  Alcohol: none  Lives with: wife  Ambulates with: does not ambulate (28 Oct 2024 14:08)      FAMILY HISTORY:  Family history of essential hypertension (Father)    No pertinent family history in first degree relatives        ROS:    EYES:  Negative  blurry vision or double vision  GASTROINTESTINAL:  Negative for nausea, vomiting, diarrhea  -otherwise negative except for subjective    Vital Signs Last 24 Hrs  T(C): 36.8 (30 Oct 2024 11:28), Max: 36.9 (30 Oct 2024 04:53)  T(F): 98.3 (30 Oct 2024 11:28), Max: 98.5 (30 Oct 2024 04:53)  HR: 82 (30 Oct 2024 11:28) (76 - 107)  BP: 151/85 (30 Oct 2024 11:28) (97/45 - 152/77)  BP(mean): --  RR: 17 (30 Oct 2024 11:28) (17 - 20)  SpO2: 91% (30 Oct 2024 11:28) (90% - 99%)    Parameters below as of 30 Oct 2024 11:28  Patient On (Oxygen Delivery Method): room air        PE:  In no distress  HEENT:  NC, PERRL, sclerae anicteric, conjunctivae clear, EOMI.  Sinuses nontender, no nasal exudate.  No buccal or pharyngeal lesions, erythema or exudate  Neck:  Supple, no adenopathy  Lungs:  No accessory muscle use, bilaterally clear to auscultation  Cor:  distant  Abd:  Symmetric, normoactive BS.  Soft, nontender, no masses, guarding or rebound.  Liver and spleen not enlarged  Extrem:  No cyanosis, chronic lymphedema changes, wrapped heels  Neuro: grossly intact  Musc: moving all limbs freely, no focal deficits        LABS:                        8.9    7.93  )-----------( 180      ( 30 Oct 2024 10:25 )             28.5       WBC Count: 7.93 K/uL (10-30-24 @ 10:25)  WBC Count: 4.87 K/uL (10-29-24 @ 04:40)  WBC Count: 5.43 K/uL (10-28-24 @ 13:11)      10-30    142  |  105  |  20  ----------------------------<  118[H]  4.2   |  32[H]  |  1.10    Ca    8.8      30 Oct 2024 10:25  Phos  3.2     10-30  Mg     1.6     10-30    TPro  7.7  /  Alb  2.7[L]  /  TBili  0.5  /  DBili  x   /  AST  24  /  ALT  20  /  AlkPhos  29[L]  10-28      Creatinine: 1.10 mg/dL (10-30-24 @ 10:25)  Creatinine: 1.10 mg/dL (10-29-24 @ 04:40)  Creatinine: 1.30 mg/dL (10-28-24 @ 13:11)      Urinalysis Basic - ( 30 Oct 2024 10:25 )    Color: x / Appearance: x / SG: x / pH: x  Gluc: 118 mg/dL / Ketone: x  / Bili: x / Urobili: x   Blood: x / Protein: x / Nitrite: x   Leuk Esterase: x / RBC: x / WBC x   Sq Epi: x / Non Sq Epi: x / Bacteria: x              MICROBIOLOGY:    Culture - Tissue with Gram Stain (10.29.24 @ 12:00)    Gram Stain:   No polymorphonuclear leukocytes seen per low power field  No organisms seen per oil power field   Specimen Source: Tissue    Culture - Tissue with Gram Stain (10.23.24 @ 11:42)    -  Cefoxitin: S <=8   -  Ceftazidime: R >16   -  Piperacillin/Tazobactam: R >64   -  Piperacillin/Tazobactam: R <=8   -  Piperacillin/Tazobactam: S <=8   -  Tobramycin: S <=2   -  Tobramycin: S <=2   -  Trimethoprim/Sulfamethoxazole: S <=0.5/9.5   -  Trimethoprim/Sulfamethoxazole: R >2/38   -  Vancomycin: S 2   -  Ertapenem: S <=0.5   -  Ertapenem: S <=0.5   -  Gentamicin: S <=2   -  Gentamicin: S <=2   -  Imipenem: S <=1   -  Imipenem: S <=1   -  Levofloxacin: S <=0.5   -  Levofloxacin: S <=0.5   -  Levofloxacin: R 4   -  Meropenem: S <=1   -  Meropenem: S <=1   -  Meropenem: S <=1   Gram Stain:   No polymorphonuclear leukocytes seen per low power field  Numerous Gram Negative Rods seen per oil power field   -  Amoxicillin/Clavulanic Acid: S <=8/4   -  Ampicillin: R >16 These ampicillin results predict results for amoxicillin   -  Ampicillin: S <=2 Predicts results to ampicillin/sulbactam, amoxacillin-clavulanate and  piperacillin-tazobactam.   -  Ampicillin: R >16 These ampicillin results predict results for amoxicillin   -  Ampicillin/Sulbactam: R 8/4   -  Ampicillin/Sulbactam: S <=4/2   -  Aztreonam: S <=4   -  Aztreonam: R >16   -  Aztreonam: R >16   -  Cefazolin: R >16   -  Cefazolin: S <=2   -  Cefepime: S <=2   -  Cefepime: R 8   -  Cefepime: R >16   -  Ceftriaxone: R >32   -  Ceftriaxone: S <=1   -  Ciprofloxacin: R >2   -  Ciprofloxacin: I 0.5   -  Ciprofloxacin: S <=0.25   Specimen Source: Tissue   Culture Results:   Numerous Proteus mirabilis  Numerous Pseudomonas aeruginosa  Moderate Enterococcus faecalis  Moderate Klebsiella pneumoniae ESBL  Moderate Gram Positive Cocci in Clusters "Susceptibilities not performed"  Few Bacteroides fragilis "Susceptibilities not performed"   Organism Identification: Proteus mirabilis  Pseudomonas aeruginosa  Enterococcus faecalis  Klebsiella pneumoniae ESBL   Organism: Proteus mirabilis   Organism: Pseudomonas aeruginosa   Organism: Enterococcus faecalis   Organism: Klebsiella pneumoniae ESBL   Method Type: VICKY   Method Type: VICKY   Method Type: VICKY   Method Type: VICKY        RADIOLOGY & ADDITIONAL STUDIES:    --< from: MR Foot No Cont, Left (10.21.24 @ 11:32) >    ACC: [id number] EXAM:  MR FOOT LT   ORDERED BY:  MARIO SEYMOUR     PROCEDURE DATE:  10/21/2024          INTERPRETATION:  CLINICAL INFORMATION: Evaluation for hindfoot   osteomyelitis in the setting of diabetes mellitus.    COMPARISON: Bilateral foot radiographs dated 10/21/2024.    CONTRAST:  IV Contrast: None.    TECHNIQUE: MRI of the LEFT ANKLE was performed.    FINDINGS:    LOCALIZER: No additional findings.    OSSEOUS STRUCTURES: Focal marrow edema at the calcaneal tuberosity deep   to a cutaneous ulceration. Serpiginous heterogenous T1/T2 signal at the   medial tibial plafond. No fracture.    JOINTS: Moderate chondral thinning of the anterior tibiotalar joint.   Small tibiotalar effusion.    MUSCLES AND TENDONS: Split tear of the peroneal brevis tendon at the   level of lateral malleolus with distal reconstitution. The peroneus   longus tendon is intact. Mild tenosynovitis involving the distal tibialis   posterior tendon. The flexor hallucis longus and flexor digitorum longus   tendons are intact. Mild Achilles paratenonitis without discrete tear.   The extensor tendons are intact. Moderate fatty atrophy of the intrinsic   forefoot musculature.    LIGAMENTS: The anterior talofibular ligament is not well visualized,   likely secondary to prior rupture. The anterior and posterior   tibiofibular, posterior talofibular, calcaneofibular, deltoid and spring   ligaments are intact.    SINUS TARSI: Normal.    PLANTAR FASCIA: Mild thickening of the central cord with perifascial   edema. No tear.    NERVES: Visualized nerves are preserved.    SUBCUTANEOUS TISSUES: Extensive diffuse subcutaneous edema with skin   thickening. No discrete fluid collections. Cutaneous ulceration noted at   the heel.    IMPRESSION:  1.  Focal low T1 signal with high STIR signal at the calcaneal tuberosity   deep to a soft tissue wound. Findings are consistent with osteomyelitis.  2.  Serpiginous heterogenous T1/STIR signal involving the medial tibial   plafond, consistent with osteonecrosis.  3.  Split tear of the peroneal brevis tendon with distal reconstitution.    < from: MR Foot No Cont, Right (10.21.24 @ 11:25) >    ACC: [id number] EXAM:  MR FOOT RT   ORDERED BY:  MARIO SEYMOUR     PROCEDURE DATE:  10/21/2024          INTERPRETATION:  CLINICAL INFORMATION: Pain. Evaluation for hindfoot   osteomyelitis in the setting of diabetes mellitus.    COMPARISON: Bilateralfoot radiographs dated 10/21/2024.    CONTRAST:  IV Contrast: None.    TECHNIQUE: MRI of the RIGHT ANKLE was performed.    FINDINGS:    LOCALIZER: No additional findings.    OSSEOUS STRUCTURES: Area of marrow edema involving the calcaneal   tuberosity with adjacent subcutaneous fluid collection. No sequestrum   identified. No fracture.    JOINTS: Moderate chondral thinning along the anterior tibiotalar joint.   No effusion.    MUSCLES AND TENDONS: Mild tenosynovitis involving the peroneal tendons  without discrete tear. There is mild flexor hallucis longus   tenosynovitis. Mild Achilles tendinosis without discrete tear. The medial   flexor and extensor tendons are otherwise intact. Moderate fatty atrophy   of the visualized musculature.    LIGAMENTS: Thickening of the anterior talofibular ligament, suggestive of   scar remodeling. The anterior and posterior tibiofibular, posterior   talofibular, calcaneofibular, deltoid and spring ligaments are intact.    SINUS TARSI: Normal.    PLANTAR FASCIA: Central cord is thickened in appearance. Subcutaneous   fluid extends to the attachment of the central cord onto the calcaneous.    NERVES: Visualized nerves are preserved.    SUBCUTANEOUS TISSUES: Extensive diffuse subcutaneous edema with skin   thickening. Within the subcutaneous tissues along the medial aspect of   the hindfoot, there is a subcutaneous fluid collection measuring 2.3 x   5.1 x 4.3 cm abutting the calcaneal tuberosity that appears to extend to   the skin surface.    IMPRESSION:  Area of marrow edema involving the calcaneal tuberosity with adjacent   subcutaneous fluid collection along the medial hindfoot at the site of a   soft tissue wound. Findings are consistent with osteomyelitis with   overlying small ill-definedfluid collection.

## 2024-10-30 NOTE — CASE MANAGEMENT PROGRESS NOTE - NSCMPROGRESSNOTE_GEN_ALL_CORE
Patient discussed in rounds and remains on Meropenem after s/p day 1 of Calcarectomy. DC pending pathology.  Discharge disposition is [facility] vs home.  CM remains available throughout hospital stay.     Patient discussed in rounds and remains on Meropenem after s/p day 1 of Calcarectomy. DC pending pathology.  Discharge disposition is [facility] vs [facility].  CM remains available throughout hospital stay.

## 2024-10-30 NOTE — PROGRESS NOTE ADULT - SUBJECTIVE AND OBJECTIVE BOX
CHIEF COMPLAINT/INTERVAL HISTORY:  Pt. seen and evaluated for bilateral heel osteomyelitis.  Pt. is s/p partial calcanectomy yesterday.  Pt. reports having some pain in the feet, but would prefer not to take narcotics.  Denies having CP or SOB.  Tolerating IV antibiotics.     REVIEW OF SYSTEMS:  No fever, CP, SOB, or abdominal pain.      Vital Signs Last 24 Hrs  T(C): 36.9 (30 Oct 2024 04:53), Max: 36.9 (30 Oct 2024 04:53)  T(F): 98.5 (30 Oct 2024 04:53), Max: 98.5 (30 Oct 2024 04:53)  HR: 107 (30 Oct 2024 04:53) (74 - 107)  BP: 152/77 (30 Oct 2024 04:53) (97/45 - 158/74)  BP(mean): --  RR: 17 (30 Oct 2024 04:53) (14 - 20)  SpO2: 90% (30 Oct 2024 04:53) (90% - 99%)    Parameters below as of 30 Oct 2024 04:53  Patient On (Oxygen Delivery Method): room air        PHYSICAL EXAM:  GENERAL: NAD  HEENT: EOMI, hearing normal, conjunctiva and sclera clear  Chest: CTA bilaterally, no wheezing  CV: S1S2, RRR,   GI: soft, +BS, NT/ND  Musculoskeletal: + LE edema, clean and dry dressings over bilateral feet.  Psychiatric: affect nL, mood nL  Skin: warm and dry    LABS:                        9.3    4.87  )-----------( 176      ( 29 Oct 2024 04:40 )             30.5     10-29    145  |  109[H]  |  21  ----------------------------<  67[L]  3.6   |  31  |  1.10    Ca    9.0      29 Oct 2024 04:40    TPro  7.7  /  Alb  2.7[L]  /  TBili  0.5  /  DBili  x   /  AST  24  /  ALT  20  /  AlkPhos  29[L]  10-28    PT/INR - ( 28 Oct 2024 13:11 )   PT: 12.8 sec;   INR: 1.09 ratio         PTT - ( 28 Oct 2024 13:11 )  PTT:36.2 sec  Urinalysis Basic - ( 29 Oct 2024 04:40 )    Color: x / Appearance: x / SG: x / pH: x  Gluc: 67 mg/dL / Ketone: x  / Bili: x / Urobili: x   Blood: x / Protein: x / Nitrite: x   Leuk Esterase: x / RBC: x / WBC x   Sq Epi: x / Non Sq Epi: x / Bacteria: x

## 2024-10-31 LAB
-  AZTREONAM: SIGNIFICANT CHANGE UP
-  AZTREONAM: SIGNIFICANT CHANGE UP
-  CEFEPIME: SIGNIFICANT CHANGE UP
-  CEFEPIME: SIGNIFICANT CHANGE UP
-  CEFTAZIDIME: SIGNIFICANT CHANGE UP
-  CEFTAZIDIME: SIGNIFICANT CHANGE UP
-  CIPROFLOXACIN: SIGNIFICANT CHANGE UP
-  CIPROFLOXACIN: SIGNIFICANT CHANGE UP
-  IMIPENEM: SIGNIFICANT CHANGE UP
-  IMIPENEM: SIGNIFICANT CHANGE UP
-  LEVOFLOXACIN: SIGNIFICANT CHANGE UP
-  LEVOFLOXACIN: SIGNIFICANT CHANGE UP
-  MEROPENEM: SIGNIFICANT CHANGE UP
-  MEROPENEM: SIGNIFICANT CHANGE UP
-  PIPERACILLIN/TAZOBACTAM: SIGNIFICANT CHANGE UP
-  PIPERACILLIN/TAZOBACTAM: SIGNIFICANT CHANGE UP
ANION GAP SERPL CALC-SCNC: 6 MMOL/L — SIGNIFICANT CHANGE UP (ref 5–17)
BASOPHILS # BLD AUTO: 0.02 K/UL — SIGNIFICANT CHANGE UP (ref 0–0.2)
BASOPHILS NFR BLD AUTO: 0.3 % — SIGNIFICANT CHANGE UP (ref 0–2)
BUN SERPL-MCNC: 18 MG/DL — SIGNIFICANT CHANGE UP (ref 7–23)
CALCIUM SERPL-MCNC: 8.9 MG/DL — SIGNIFICANT CHANGE UP (ref 8.5–10.1)
CHLORIDE SERPL-SCNC: 105 MMOL/L — SIGNIFICANT CHANGE UP (ref 96–108)
CO2 SERPL-SCNC: 29 MMOL/L — SIGNIFICANT CHANGE UP (ref 22–31)
CREAT SERPL-MCNC: 1 MG/DL — SIGNIFICANT CHANGE UP (ref 0.5–1.3)
EGFR: 76 ML/MIN/1.73M2 — SIGNIFICANT CHANGE UP
EOSINOPHIL # BLD AUTO: 0.29 K/UL — SIGNIFICANT CHANGE UP (ref 0–0.5)
EOSINOPHIL NFR BLD AUTO: 4 % — SIGNIFICANT CHANGE UP (ref 0–6)
GLUCOSE SERPL-MCNC: 128 MG/DL — HIGH (ref 70–99)
HCT VFR BLD CALC: 28.9 % — LOW (ref 39–50)
HGB BLD-MCNC: 8.9 G/DL — LOW (ref 13–17)
IMM GRANULOCYTES NFR BLD AUTO: 0.4 % — SIGNIFICANT CHANGE UP (ref 0–0.9)
LYMPHOCYTES # BLD AUTO: 1.77 K/UL — SIGNIFICANT CHANGE UP (ref 1–3.3)
LYMPHOCYTES # BLD AUTO: 24.5 % — SIGNIFICANT CHANGE UP (ref 13–44)
MAGNESIUM SERPL-MCNC: 1.6 MG/DL — SIGNIFICANT CHANGE UP (ref 1.6–2.6)
MCHC RBC-ENTMCNC: 28.1 PG — SIGNIFICANT CHANGE UP (ref 27–34)
MCHC RBC-ENTMCNC: 30.8 G/DL — LOW (ref 32–36)
MCV RBC AUTO: 91.2 FL — SIGNIFICANT CHANGE UP (ref 80–100)
METHOD TYPE: SIGNIFICANT CHANGE UP
METHOD TYPE: SIGNIFICANT CHANGE UP
MONOCYTES # BLD AUTO: 0.55 K/UL — SIGNIFICANT CHANGE UP (ref 0–0.9)
MONOCYTES NFR BLD AUTO: 7.6 % — SIGNIFICANT CHANGE UP (ref 2–14)
NEUTROPHILS # BLD AUTO: 4.56 K/UL — SIGNIFICANT CHANGE UP (ref 1.8–7.4)
NEUTROPHILS NFR BLD AUTO: 63.2 % — SIGNIFICANT CHANGE UP (ref 43–77)
NRBC # BLD: 0 /100 WBCS — SIGNIFICANT CHANGE UP (ref 0–0)
PLATELET # BLD AUTO: 164 K/UL — SIGNIFICANT CHANGE UP (ref 150–400)
POTASSIUM SERPL-MCNC: 3.9 MMOL/L — SIGNIFICANT CHANGE UP (ref 3.5–5.3)
POTASSIUM SERPL-SCNC: 3.9 MMOL/L — SIGNIFICANT CHANGE UP (ref 3.5–5.3)
RBC # BLD: 3.17 M/UL — LOW (ref 4.2–5.8)
RBC # FLD: 16.5 % — HIGH (ref 10.3–14.5)
SODIUM SERPL-SCNC: 140 MMOL/L — SIGNIFICANT CHANGE UP (ref 135–145)
WBC # BLD: 7.22 K/UL — SIGNIFICANT CHANGE UP (ref 3.8–10.5)
WBC # FLD AUTO: 7.22 K/UL — SIGNIFICANT CHANGE UP (ref 3.8–10.5)

## 2024-10-31 PROCEDURE — 99232 SBSQ HOSP IP/OBS MODERATE 35: CPT

## 2024-10-31 RX ADMIN — ACETAMINOPHEN, DIPHENHYDRAMINE HCL, PHENYLEPHRINE HCL 3 MILLIGRAM(S): 325; 25; 5 TABLET ORAL at 22:23

## 2024-10-31 RX ADMIN — INSULIN GLARGINE 8 UNIT(S): 100 INJECTION, SOLUTION SUBCUTANEOUS at 22:38

## 2024-10-31 RX ADMIN — METOPROLOL TARTRATE 25 MILLIGRAM(S): 100 TABLET, FILM COATED ORAL at 18:05

## 2024-10-31 RX ADMIN — Medication 80 MILLIGRAM(S): at 22:23

## 2024-10-31 RX ADMIN — METOPROLOL TARTRATE 25 MILLIGRAM(S): 100 TABLET, FILM COATED ORAL at 05:31

## 2024-10-31 RX ADMIN — TAMSULOSIN HYDROCHLORIDE 0.4 MILLIGRAM(S): 0.4 CAPSULE ORAL at 22:23

## 2024-10-31 RX ADMIN — Medication 1000 MICROGRAM(S): at 11:32

## 2024-10-31 RX ADMIN — MEROPENEM 100 MILLIGRAM(S): 500 INJECTION, POWDER, FOR SOLUTION INTRAVENOUS at 14:30

## 2024-10-31 RX ADMIN — Medication 81 MILLIGRAM(S): at 11:32

## 2024-10-31 RX ADMIN — Medication 250 MILLIGRAM(S): at 18:05

## 2024-10-31 RX ADMIN — FAMOTIDINE 20 MILLIGRAM(S): 20 TABLET, FILM COATED ORAL at 11:33

## 2024-10-31 RX ADMIN — Medication 250 MILLIGRAM(S): at 05:31

## 2024-10-31 RX ADMIN — Medication 1: at 12:10

## 2024-10-31 RX ADMIN — MEROPENEM 100 MILLIGRAM(S): 500 INJECTION, POWDER, FOR SOLUTION INTRAVENOUS at 22:46

## 2024-10-31 RX ADMIN — CLOPIDOGREL 75 MILLIGRAM(S): 75 TABLET, FILM COATED ORAL at 11:33

## 2024-10-31 RX ADMIN — MEROPENEM 100 MILLIGRAM(S): 500 INJECTION, POWDER, FOR SOLUTION INTRAVENOUS at 05:31

## 2024-10-31 NOTE — PROGRESS NOTE ADULT - SUBJECTIVE AND OBJECTIVE BOX
[facility] DIVISION of INFECTIOUS DISEASE  Michoacano Casillas MD PhD, Cherry Vincent MD, Queta Ngo MD, Roselia Mcmanus MD, Andrez Bolden MD  and providing coverage with Trey Montelongo MD  Providing Infectious Disease Consultations at [facility], [facility], [facility], [facility]'s    Office# [phone] to schedule follow up appointments  Answering Service for urgent calls or New Consults [phone]  Cell# to text for urgent issues Michoacano Casillas [phone]     infectious diseases progress note:    DALILA HERNADEZ is a 83y y. o. Male patient    Overnight and events of the last 24hrs reviewed    Allergies    No Known Allergies    Intolerances        ANTIBIOTICS/RELEVANT:  antimicrobials  meropenem  IVPB      meropenem  IVPB 1000 milliGRAM(s) IV Intermittent every 8 hours    immunologic:    OTHER:  acetaminophen     Tablet .. 650 milliGRAM(s) Oral every 6 hours PRN  acetaminophen   IVPB .. 1000 milliGRAM(s) IV Intermittent once  aluminum hydroxide/magnesium hydroxide/simethicone Suspension 30 milliLiter(s) Oral every 4 hours PRN  aspirin  chewable 81 milliGRAM(s) Oral daily  atorvastatin 80 milliGRAM(s) Oral at bedtime  clopidogrel Tablet 75 milliGRAM(s) Oral daily  cyanocobalamin 1000 MICROGram(s) Oral daily  dextrose 5% + sodium chloride 0.45%. 1000 milliLiter(s) IV Continuous <Continuous>  dextrose 5%. 1000 milliLiter(s) IV Continuous <Continuous>  dextrose 5%. 1000 milliLiter(s) IV Continuous <Continuous>  dextrose 50% Injectable 12.5 Gram(s) IV Push once  dextrose 50% Injectable 25 Gram(s) IV Push once  dextrose 50% Injectable 25 Gram(s) IV Push once  dextrose 50% Injectable 12.5 Gram(s) IV Push once  dextrose Oral Gel 15 Gram(s) Oral once PRN  famotidine    Tablet 20 milliGRAM(s) Oral daily  glucagon  Injectable 1 milliGRAM(s) IntraMuscular once  HYDROmorphone  Injectable 0.5 milliGRAM(s) IV Push every 6 hours PRN  insulin glargine Injectable (LANTUS) 8 Unit(s) SubCutaneous at bedtime  insulin lispro (ADMELOG) corrective regimen sliding scale   SubCutaneous three times a day before meals  melatonin 3 milliGRAM(s) Oral at bedtime  metoprolol tartrate 25 milliGRAM(s) Oral two times a day  oxycodone    5 mG/acetaminophen 325 mG 1 Tablet(s) Oral every 6 hours PRN  saccharomyces boulardii 250 milliGRAM(s) Oral two times a day  tamsulosin 0.4 milliGRAM(s) Oral at bedtime      Objective:  Vital Signs Last 24 Hrs  T(C): 36.7 (31 Oct 2024 04:54), Max: 36.9 (30 Oct 2024 20:27)  T(F): 98.1 (31 Oct 2024 04:54), Max: 98.5 (30 Oct 2024 20:27)  HR: 93 (31 Oct 2024 04:54) (82 - 93)  BP: 133/79 (31 Oct 2024 04:54) (133/79 - 159/79)  BP(mean): --  RR: 17 (31 Oct 2024 04:54) (17 - 17)  SpO2: 92% (31 Oct 2024 04:54) (91% - 92%)    Parameters below as of 31 Oct 2024 04:54  Patient On (Oxygen Delivery Method): room air        T(C): 36.7 (10-31-24 @ 04:54), Max: 36.9 (10-30-24 @ 04:53)  T(C): 36.7 (10-31-24 @ 04:54), Max: 36.9 (10-30-24 @ 04:53)  T(C): 36.7 (10-31-24 @ 04:54), Max: 36.9 (10-30-24 @ 04:53)    PHYSICAL EXAM:  HEENT: NC atraumatic  Neck: supple  Respiratory: no accessory muscle use, breathing comfortably  Cardiovascular: distant  Gastrointestinal: normal appearing, nondistended  Extremities: no clubbing, no cyanosis,        LABS:                          8.9    7.22  )-----------( 164      ( 31 Oct 2024 07:02 )             28.9       WBC  7.22 10-31 @ 07:02  7.93 10-30 @ 10:25  4.87 10-29 @ 04:40  5.43 10-28 @ 13:11      10-31    140  |  105  |  18  ----------------------------<  128[H]  3.9   |  29  |  1.00    Ca    8.9      31 Oct 2024 07:02  Phos  3.2     10-30  Mg     1.6     10-31        Creatinine: 1.00 mg/dL (10-31-24 @ 07:02)  Creatinine: 1.10 mg/dL (10-30-24 @ 10:25)  Creatinine: 1.10 mg/dL (10-29-24 @ 04:40)  Creatinine: 1.30 mg/dL (10-28-24 @ 13:11)        Urinalysis Basic - ( 31 Oct 2024 07:02 )    Color: x / Appearance: x / SG: x / pH: x  Gluc: 128 mg/dL / Ketone: x  / Bili: x / Urobili: x   Blood: x / Protein: x / Nitrite: x   Leuk Esterase: x / RBC: x / WBC x   Sq Epi: x / Non Sq Epi: x / Bacteria: x            INFLAMMATORY MARKERS      MICROBIOLOGY:    Culture - Tissue with Gram Stain (10.29.24 @ 12:00)    Gram Stain:   No polymorphonuclear leukocytes seen per low power field  No organisms seen per oil power field   Specimen Source: Tissue   Culture Results:   Rare Pseudomonas aeruginosa        RADIOLOGY & ADDITIONAL STUDIES:

## 2024-10-31 NOTE — PROGRESS NOTE ADULT - SUBJECTIVE AND OBJECTIVE BOX
Chief Complaint: Heel ulcer    Interval Events: No events overnight.    Review of Systems:  General: No fevers, chills, weight gain  Skin: No rashes, color changes  Cardiovascular: No chest pain, orthopnea  Respiratory: No shortness of breath, cough  Gastrointestinal: No nausea, abdominal pain  Genitourinary: No incontinence, pain with urination  Musculoskeletal: No pain, swelling, decreased range of motion  Neurological: No headache, weakness  Psychiatric: No depression, anxiety  Endocrine: No weight gain, increased thirst  All other systems are comprehensively negative.    Physical Exam:  Vital Signs Last 24 Hrs  T(C): 36.7 (31 Oct 2024 04:54), Max: 36.9 (30 Oct 2024 20:27)  T(F): 98.1 (31 Oct 2024 04:54), Max: 98.5 (30 Oct 2024 20:27)  HR: 93 (31 Oct 2024 04:54) (82 - 93)  BP: 133/79 (31 Oct 2024 04:54) (133/79 - 159/79)  BP(mean): --  RR: 17 (31 Oct 2024 04:54) (17 - 17)  SpO2: 92% (31 Oct 2024 04:54) (91% - 92%)  Parameters below as of 31 Oct 2024 04:54  Patient On (Oxygen Delivery Method): room air  General: NAD  HEENT: MMM  Neck: No JVD, no carotid bruit  Lungs: CTAB  CV: RRR, nl S1/S2, no M/R/G  Abdomen: S/NT/ND, +BS  Extremities: +Lymphedema, no cyanosis  Neuro: AAOx3, non-focal  Skin: No rash    Labs:    10-30    142  |  105  |  20  ----------------------------<  118[H]  4.2   |  32[H]  |  1.10    Ca    8.8      30 Oct 2024 10:25  Phos  3.2     10-30  Mg     1.6     10-30                          8.9    7.93  )-----------( 180      ( 30 Oct 2024 10:25 )             28.5       ECG/Telemetry: Sinus rhythm

## 2024-10-31 NOTE — CAREGIVER ENGAGEMENT NOTE - CAREGIVER OUTREACH NOTES - FREE TEXT
Post-Op Assessment Note    CV Status:  Stable  Pain Score: 0    Pain management: adequate     Mental Status:  Alert and awake   Hydration Status:  Euvolemic   PONV Controlled:  Controlled   Airway Patency:  Patent      Post Op Vitals Reviewed: Yes      Staff: CRNA         No complications documented      BP   121/76   Temp   98   Pulse  66   Resp   16   SpO2   100 SW spoke with dtr at bedside regarding d/c plan. Per MD, pt is pending pathology for abx needed on d/c. Pt was previously @ [facility] and has exhausted [facility] benefit. If pt needs IV abx, pt would need to pay OOP cost for [facility]. [facility] is able to accept pt back but dtr Rhonda will also contact [facility] to discuss their OOP rate. Family has also been working with [facility] in [place] for placement. Pt would need [phone] as it would be new placement. NADYA called [facility] and spoke with Dian who transferred to [facility]. SW left voicemail for Starla regarding documents needed for review for pt to d/c to [facility]. SW to follow and remain available for any needs.

## 2024-10-31 NOTE — PROGRESS NOTE ADULT - SUBJECTIVE AND OBJECTIVE BOX
CHIEF COMPLAINT/INTERVAL HISTORY:  Pt. seen and evaluated for bilateral heel osteomyelitis.  Pt. is in no distress.  Reports having less pain in the feet today.  Tolerating IV antibiotics.      REVIEW OF SYSTEMS:  No fever, CP, SOB, or abdominal pain      Vital Signs Last 24 Hrs  T(C): 36.7 (31 Oct 2024 04:54), Max: 36.9 (30 Oct 2024 20:27)  T(F): 98.1 (31 Oct 2024 04:54), Max: 98.5 (30 Oct 2024 20:27)  HR: 93 (31 Oct 2024 04:54) (82 - 93)  BP: 133/79 (31 Oct 2024 04:54) (133/79 - 159/79)  BP(mean): --  RR: 17 (31 Oct 2024 04:54) (17 - 17)  SpO2: 92% (31 Oct 2024 04:54) (91% - 92%)    Parameters below as of 31 Oct 2024 04:54  Patient On (Oxygen Delivery Method): room air        PHYSICAL EXAM:  GENERAL: NAD  HEENT: EOMI, hearing normal, conjunctiva and sclera clear  Chest: CTA bilaterally, no wheezing  CV: S1S2, RRR,   GI: soft, +BS, NT/ND  Musculoskeletal: 1+ LE edema, clean and dry dressings over bilateral feet.  Psychiatric: affect nL, mood nL  Skin: warm and dry    LABS:                        8.9    7.22  )-----------( 164      ( 31 Oct 2024 07:02 )             28.9     10-30    142  |  105  |  20  ----------------------------<  118[H]  4.2   |  32[H]  |  1.10    Ca    8.8      30 Oct 2024 10:25  Phos  3.2     10-30  Mg     1.6     10-30        Urinalysis Basic - ( 30 Oct 2024 10:25 )    Color: x / Appearance: x / SG: x / pH: x  Gluc: 118 mg/dL / Ketone: x  / Bili: x / Urobili: x   Blood: x / Protein: x / Nitrite: x   Leuk Esterase: x / RBC: x / WBC x   Sq Epi: x / Non Sq Epi: x / Bacteria: x

## 2024-10-31 NOTE — PROGRESS NOTE ADULT - ASSESSMENT
The patient is an 83 year old male with a history of HTN, HL, DM, CKD, GI bleed, COPD, SANTO, BPH, CAD s/p PCI, PAD, multiple myeloma, lymphedema who presents with heel ulcer.     Plan:  - ECG with sinus rhythm and no evidence of ischemia/infarction  - Echo 6/30/24 with normal LV systolic function, mild pulm HTN  - CXR with grossly clear lungs  - Lower extremity swelling consistent with known history of lymphedema  - Continue aspirin 81 mg daily  - Continue clopidogrel 75 mg daily  - Continue atorvastatin 80 mg daily  - Continue metoprolol tartrate 25 mg bid  - Podiatry and ID follow-up  - Awaiting pathology results

## 2024-10-31 NOTE — PHYSICAL THERAPY INITIAL EVALUATION ADULT - PERTINENT HX OF CURRENT PROBLEM, REHAB EVAL
Patient with a past medical history of hypertension, diabetes, hyperlipidemia, bilateral heel osteomyelitis currently on outpatient antibiotics through midline is presenting for surgery.  Patient had surgery bilateral calcaneous.  Denies any fevers.  Endorsing pain to his heels but no other areas of pain.  No nausea or vomiting.  No other acute complaints today.

## 2024-10-31 NOTE — PROGRESS NOTE ADULT - ASSESSMENT
82 yo male with hypertension, diabetes, hyperlipidemia, bilateral heel osteomyelitis currently on outpatient antibiotics through midline is presented for surgery planned with podiatry, Gabriel Deshpande, for his bilateral heel osteomyelitis. States his osteomyelitis started 6 months ago.   Calcanectomy, partial 29-Oct-2024 12:58:43  Jona Mason    RECOMMENDATIONS  bilateral heel osteomyelitis  prior micro with Proteus mirabilis, Pseudomonas aeruginosa, Enterococcus faecalis, Klebsiella pneumoniae ESBL  Culture - Tissue with Gram Stain (10.29.24 @ 12:00) Rare Pseudomonas aeruginosa  sp surgery  Meropenem started 10/29-continue for now  Path-in lab    Thank you for consulting us and involving us in the management of this most interesting and challenging case.  We will follow along in the care of this patient. Please call us at [phone] or text me directly on my cell# at [phone] with any concerns.

## 2024-10-31 NOTE — PROGRESS NOTE ADULT - ASSESSMENT
82yo M with a PMH of HTN, HLD, Type 2 DM, CKD3a, hx of GI bleed, COPD, SANTO, BPH, CAD s/p PCI, PAD, multiple myeloma, lymphedema who presents with b/l heel ulcers and suspected OM planned for bilateral foot surgery with podiatry, Gabriel Deshpande, on 10/29 for his suspected bilateral heel osteomyelitis now s/p partial calcanectomy.        Problem/Plan - 1:  ·  Problem: Osteomyelitis of foot.   ·  Plan: - Bilateral heel osteomyelitis per outpatient w/up and admitted with a plan for OR with podiatry, Dr. Rios on 10/29  - Wound Cx (10/23): Proteus mirabilis, Pseudomonas aeruginosa, ESBL Klebsiella pneumoniae, Enterococcus faecalis  - s/p 14 days of Augmentin and 10 days of zosyn  - Blood Cx NGTD  - s/p partial calcanectomy on 10/29.    - f/up OR cultures +rare pseudomonas aeruginosa.  Check pathology.  - Tylenol PRN for mild pain, percocet 1 tab PO Q6h PRN for moderate pain, and Dilaudid 0.5mg IV Q6h PRN for severe pain.  - Consulted ID Dr. Casillas, recs appreciated   - continue meropenem  - afebrile, no leukocytosis  - cardiology Dr. Rock consulted, recs appreciated.     Problem/Plan - 2:  ·  Problem: DM (diabetes mellitus).   ·  Plan: - type 2 DM on insulin  - Home basal insulin 23 U at bedtime, given half dose of 12un last night but mild hypoglycemia in the mid-60s this morning and in PACU  on 10/29-- pt given D50; started on D5 1/2NS and will maintain overnight   - continue decreased lantus dose to 8units QHS   - continue low-dose lispro corrective ISS  - monitor FSG     Problem/Plan - 3:  ·  Problem: HTN (hypertension).   ·  Plan: Chronic   - continue metoprolol 25mg BID.     Problem/Plan - 4:  ·  Problem: HLD (hyperlipidemia).   ·  Plan: Chronic   - Continue atorvastatin 80mg QD.     Problem/Plan - 5:  ·  Problem: CAD (coronary artery disease).   ·  Plan: CAD sp stents x4  - Held ASA and Plavix on 10/29; Restarted on ASA and Plavix on 10/30  - c/w lipitor  - pt did have some strikethrough bleeding per nursing this evening -- Confirmed with podiatry: okay to restart plavix  - cardiology Dr. Rock consulted, recs appreciated.     Problem/Plan - 6:  ·  Problem: Benign prostatic hyperplasia with lower urinary tract symptoms, symptom details unspecified.   ·  Plan: Chronic  -Continue Flomax.     Problem/Plan - 7:  ·  Problem: Multiple myeloma.   ·  Plan: Chronic  - Chemotherapy held while in rehab.     Problem/Plan - 8:  ·  Problem: Need for prophylactic measure.   ·  Plan: DVT ppx: heparin 5000 units SQ Q12h     82yo M with a PMH of HTN, HLD, Type 2 DM, CKD3a, hx of GI bleed, COPD, SANTO, BPH, CAD s/p PCI, PAD, multiple myeloma, lymphedema who presents with b/l heel ulcers and suspected OM planned for bilateral foot surgery with podiatry, Gabriel Deshpande, on 10/29 for his suspected bilateral heel osteomyelitis now s/p partial calcanectomy.        Problem/Plan - 1:  ·  Problem: Osteomyelitis of foot.   ·  Plan: - Bilateral heel osteomyelitis per outpatient w/up and admitted with a plan for OR with podiatry, Dr. Rios on 10/29  - Wound Cx (10/23): Proteus mirabilis, Pseudomonas aeruginosa, ESBL Klebsiella pneumoniae, Enterococcus faecalis  - s/p 14 days of Augmentin and 10 days of zosyn  - Blood Cx NGTD  - s/p partial calcanectomy on 10/29.    - f/up OR cultures +rare pseudomonas aeruginosa.  Check pathology.  - Tylenol PRN for mild pain, percocet 1 tab PO Q6h PRN for moderate pain, and Dilaudid 0.5mg IV Q6h PRN for severe pain.  - Consulted ID Dr. Casillas, recs appreciated   - continue meropenem  - afebrile, no leukocytosis  - cardiology Dr. Rock consulted, recs appreciated.     Problem/Plan - 2:  ·  Problem: DM (diabetes mellitus).   ·  Plan: - type 2 DM on insulin  - Home basal insulin 23 U at bedtime, given half dose of 12un last night but mild hypoglycemia in the mid-60s this morning and in PACU  on 10/29-- pt given D50; started on D5 1/2NS and will maintain overnight   - continue decreased lantus dose to 8units QHS   - continue low-dose lispro corrective ISS  - monitor FSG     Problem/Plan - 3:  ·  Problem: HTN (hypertension).   ·  Plan: Chronic   - continue metoprolol 25mg BID.     Problem/Plan - 4:  ·  Problem: HLD (hyperlipidemia).   ·  Plan: Chronic   - Continue atorvastatin 80mg QD.     Problem/Plan - 5:  ·  Problem: CAD (coronary artery disease).   ·  Plan: CAD sp stents x4  - Held ASA and Plavix on 10/29; Restarted on ASA and Plavix on 10/30  - c/w lipitor  - pt did have some strikethrough bleeding per nursing this evening -- Confirmed with podiatry: okay to restart plavix  - cardiology Dr. Rock consulted, recs appreciated.     Problem/Plan - 6:  ·  Problem: Benign prostatic hyperplasia with lower urinary tract symptoms, symptom details unspecified.   ·  Plan: Chronic  -Continue Flomax.     Problem/Plan - 7:  ·  Problem: Multiple myeloma.   ·  Plan: Chronic  - Chemotherapy held while in rehab.     Problem/Plan - 8:  ·  Problem: Need for prophylactic measure.   ·  Plan: DVT ppx: SCD

## 2024-11-01 ENCOUNTER — NON-APPOINTMENT (OUTPATIENT)
Age: 84
End: 2024-11-01

## 2024-11-01 LAB
ANION GAP SERPL CALC-SCNC: 4 MMOL/L — LOW (ref 5–17)
BASOPHILS # BLD AUTO: 0.01 K/UL — SIGNIFICANT CHANGE UP (ref 0–0.2)
BASOPHILS NFR BLD AUTO: 0.2 % — SIGNIFICANT CHANGE UP (ref 0–2)
BUN SERPL-MCNC: 21 MG/DL — SIGNIFICANT CHANGE UP (ref 7–23)
CALCIUM SERPL-MCNC: 9.9 MG/DL — SIGNIFICANT CHANGE UP (ref 8.5–10.1)
CHLORIDE SERPL-SCNC: 105 MMOL/L — SIGNIFICANT CHANGE UP (ref 96–108)
CO2 SERPL-SCNC: 31 MMOL/L — SIGNIFICANT CHANGE UP (ref 22–31)
CREAT SERPL-MCNC: 0.96 MG/DL — SIGNIFICANT CHANGE UP (ref 0.5–1.3)
EGFR: 79 ML/MIN/1.73M2 — SIGNIFICANT CHANGE UP
EOSINOPHIL # BLD AUTO: 0.49 K/UL — SIGNIFICANT CHANGE UP (ref 0–0.5)
EOSINOPHIL NFR BLD AUTO: 7.9 % — HIGH (ref 0–6)
GLUCOSE SERPL-MCNC: 104 MG/DL — HIGH (ref 70–99)
HCT VFR BLD CALC: 28.5 % — LOW (ref 39–50)
HGB BLD-MCNC: 9.1 G/DL — LOW (ref 13–17)
IMM GRANULOCYTES NFR BLD AUTO: 0.3 % — SIGNIFICANT CHANGE UP (ref 0–0.9)
LYMPHOCYTES # BLD AUTO: 1.49 K/UL — SIGNIFICANT CHANGE UP (ref 1–3.3)
LYMPHOCYTES # BLD AUTO: 24.1 % — SIGNIFICANT CHANGE UP (ref 13–44)
MCHC RBC-ENTMCNC: 29.4 PG — SIGNIFICANT CHANGE UP (ref 27–34)
MCHC RBC-ENTMCNC: 31.9 G/DL — LOW (ref 32–36)
MCV RBC AUTO: 92.2 FL — SIGNIFICANT CHANGE UP (ref 80–100)
MONOCYTES # BLD AUTO: 0.41 K/UL — SIGNIFICANT CHANGE UP (ref 0–0.9)
MONOCYTES NFR BLD AUTO: 6.6 % — SIGNIFICANT CHANGE UP (ref 2–14)
NEUTROPHILS # BLD AUTO: 3.76 K/UL — SIGNIFICANT CHANGE UP (ref 1.8–7.4)
NEUTROPHILS NFR BLD AUTO: 60.9 % — SIGNIFICANT CHANGE UP (ref 43–77)
NRBC # BLD: 0 /100 WBCS — SIGNIFICANT CHANGE UP (ref 0–0)
PLATELET # BLD AUTO: 153 K/UL — SIGNIFICANT CHANGE UP (ref 150–400)
POTASSIUM SERPL-MCNC: 3.7 MMOL/L — SIGNIFICANT CHANGE UP (ref 3.5–5.3)
POTASSIUM SERPL-SCNC: 3.7 MMOL/L — SIGNIFICANT CHANGE UP (ref 3.5–5.3)
RBC # BLD: 3.09 M/UL — LOW (ref 4.2–5.8)
RBC # FLD: 16.3 % — HIGH (ref 10.3–14.5)
SODIUM SERPL-SCNC: 140 MMOL/L — SIGNIFICANT CHANGE UP (ref 135–145)
WBC # BLD: 6.18 K/UL — SIGNIFICANT CHANGE UP (ref 3.8–10.5)
WBC # FLD AUTO: 6.18 K/UL — SIGNIFICANT CHANGE UP (ref 3.8–10.5)

## 2024-11-01 PROCEDURE — 99233 SBSQ HOSP IP/OBS HIGH 50: CPT

## 2024-11-01 PROCEDURE — ZZZZZ: CPT

## 2024-11-01 PROCEDURE — 36573 INSJ PICC RS&I 5 YR+: CPT

## 2024-11-01 RX ORDER — SODIUM CHLORIDE 9 MG/ML
10 INJECTION, SOLUTION INTRAMUSCULAR; INTRAVENOUS; SUBCUTANEOUS
Refills: 0 | Status: DISCONTINUED | OUTPATIENT
Start: 2024-11-01 | End: 2024-11-19

## 2024-11-01 RX ORDER — CHLORHEXIDINE GLUCONATE 1.2 MG/ML
1 RINSE ORAL
Refills: 0 | Status: DISCONTINUED | OUTPATIENT
Start: 2024-11-01 | End: 2024-11-08

## 2024-11-01 RX ADMIN — Medication 80 MILLIGRAM(S): at 21:53

## 2024-11-01 RX ADMIN — CLOPIDOGREL 75 MILLIGRAM(S): 75 TABLET, FILM COATED ORAL at 12:03

## 2024-11-01 RX ADMIN — MEROPENEM 100 MILLIGRAM(S): 500 INJECTION, POWDER, FOR SOLUTION INTRAVENOUS at 15:00

## 2024-11-01 RX ADMIN — Medication 250 MILLIGRAM(S): at 05:56

## 2024-11-01 RX ADMIN — MEROPENEM 100 MILLIGRAM(S): 500 INJECTION, POWDER, FOR SOLUTION INTRAVENOUS at 05:47

## 2024-11-01 RX ADMIN — Medication 81 MILLIGRAM(S): at 12:04

## 2024-11-01 RX ADMIN — Medication 1: at 17:24

## 2024-11-01 RX ADMIN — Medication 1000 MICROGRAM(S): at 12:03

## 2024-11-01 RX ADMIN — METOPROLOL TARTRATE 25 MILLIGRAM(S): 100 TABLET, FILM COATED ORAL at 05:47

## 2024-11-01 RX ADMIN — FAMOTIDINE 20 MILLIGRAM(S): 20 TABLET, FILM COATED ORAL at 12:04

## 2024-11-01 RX ADMIN — METOPROLOL TARTRATE 25 MILLIGRAM(S): 100 TABLET, FILM COATED ORAL at 17:04

## 2024-11-01 RX ADMIN — Medication 1: at 12:42

## 2024-11-01 RX ADMIN — ACETAMINOPHEN, DIPHENHYDRAMINE HCL, PHENYLEPHRINE HCL 3 MILLIGRAM(S): 325; 25; 5 TABLET ORAL at 21:53

## 2024-11-01 RX ADMIN — TAMSULOSIN HYDROCHLORIDE 0.4 MILLIGRAM(S): 0.4 CAPSULE ORAL at 21:52

## 2024-11-01 RX ADMIN — MEROPENEM 100 MILLIGRAM(S): 500 INJECTION, POWDER, FOR SOLUTION INTRAVENOUS at 21:52

## 2024-11-01 RX ADMIN — Medication 250 MILLIGRAM(S): at 17:04

## 2024-11-01 RX ADMIN — INSULIN GLARGINE 8 UNIT(S): 100 INJECTION, SOLUTION SUBCUTANEOUS at 22:27

## 2024-11-01 NOTE — DISCHARGE NOTE PROVIDER - NSDCCAREPROVSEEN_GEN_ALL_CORE_FT
Alphonso Wilde Andry, Alphonso Rock, Schuyler Rios, Ketan Ngo, Queta Wilde, Alphonso Casillas, [place] SANON

## 2024-11-01 NOTE — DISCHARGE NOTE PROVIDER - PROVIDER TOKENS
PROVIDER:[TOKEN:[[id number]],FOLLOWUP:[1 week]] PROVIDER:[TOKEN:[[id number]],FOLLOWUP:[1 week]],PROVIDER:[TOKEN:[[id number]],FOLLOWUP:[1 week]],PROVIDER:[TOKEN:[[id number]],FOLLOWUP:[2 weeks]]

## 2024-11-01 NOTE — PROGRESS NOTE ADULT - SUBJECTIVE AND OBJECTIVE BOX
Chief Complaint: Heel ulcer    Interval Events: No events overnight.    Review of Systems:  General: No fevers, chills, weight gain  Skin: No rashes, color changes  Cardiovascular: No chest pain, orthopnea  Respiratory: No shortness of breath, cough  Gastrointestinal: No nausea, abdominal pain  Genitourinary: No incontinence, pain with urination  Musculoskeletal: No pain, swelling, decreased range of motion  Neurological: No headache, weakness  Psychiatric: No depression, anxiety  Endocrine: No weight gain, increased thirst  All other systems are comprehensively negative.    Physical Exam:  Vital Signs Last 24 Hrs  T(C): 36.5 (01 Nov 2024 04:50), Max: 37 (31 Oct 2024 20:19)  T(F): 97.7 (01 Nov 2024 04:50), Max: 98.6 (31 Oct 2024 20:19)  HR: 91 (01 Nov 2024 04:50) (86 - 91)  BP: 114/70 (01 Nov 2024 04:50) (114/70 - 146/84)  BP(mean): --  RR: 17 (01 Nov 2024 04:50) (17 - 18)  SpO2: 96% (01 Nov 2024 04:50) (92% - 96%)  Parameters below as of 01 Nov 2024 04:50  Patient On (Oxygen Delivery Method): room air  General: NAD  HEENT: MMM  Neck: No JVD, no carotid bruit  Lungs: CTAB  CV: RRR, nl S1/S2, no M/R/G  Abdomen: S/NT/ND, +BS  Extremities: +Lymphedema, no cyanosis  Neuro: AAOx3, non-focal  Skin: No rash    Labs:    10-31    140  |  105  |  18  ----------------------------<  128[H]  3.9   |  29  |  1.00    Ca    8.9      31 Oct 2024 07:02  Phos  3.2     10-30  Mg     1.6     10-31                          8.9    7.22  )-----------( 164      ( 31 Oct 2024 07:02 )             28.9       ECG/Telemetry: Sinus rhythm

## 2024-11-01 NOTE — DISCHARGE NOTE PROVIDER - CARE PROVIDERS DIRECT ADDRESSES
[email or web address],DirectAddress_Unknown,DirectAddress_Unknown

## 2024-11-01 NOTE — DISCHARGE NOTE PROVIDER - NSDCMRMEDTOKEN_GEN_ALL_CORE_FT
acetaminophen 325 mg oral tablet: 2 tab(s) orally every 6 hours As needed Temp greater or equal to 38C (100.4F), Mild Pain (1 - 3)  aspirin 81 mg oral tablet, chewable: 1 tab(s) orally once a day  atorvastatin 80 mg oral tablet: 1 tab(s) orally once a day (at bedtime)  clopidogrel 75 mg oral tablet: 1 tab(s) orally once a day  cyanocobalamin 1000 mcg oral tablet: 1 tab(s) orally once a day  famotidine 20 mg oral tablet: 1 tab(s) orally once a day  heparin: 5,000 unit(s) subcutaneous every 12 hours  insulin glargine 100 units/mL subcutaneous solution: 25 unit(s) subcutaneous once a day (at bedtime)  insulin lispro 100 units/mL injectable solution: 8 international unit(s) injectable 3 times a day (before meals)  insulin lispro 100 units/mL injectable solution: 1 unit(s) injectable once a day (at bedtime) 0 Unit(s) if Glucose 0 - 250  1 Unit(s) if Glucose 251 - 300  2 Unit(s) if Glucose 301 - 350  3 Unit(s) if Glucose 351 - 400  4 Unit(s) if Glucose 400  insulin lispro 100 units/mL injectable solution: 1 unit(s) injectable 3 times a day (before meals) 1 Unit(s) if Glucose 151 - 200  2 Unit(s) if Glucose 201 - 250  3 Unit(s) if Glucose 251 - 300  4 Unit(s) if Glucose 301 - 350  5 Unit(s) if Glucose 351 - 400  6 Unit(s) if Glucose Greater Than 400  melatonin 3 mg oral tablet: 3 tab(s) orally once a day (at bedtime)  metoprolol tartrate 25 mg oral tablet: 1 tab(s) orally 2 times a day  sodium bicarbonate 650 mg oral tablet: 1 tab(s) orally 3 times a day  tamsulosin 0.4 mg oral capsule: 1 cap(s) orally once a day (at bedtime)   acetaminophen 325 mg oral tablet: 2 tab(s) orally every 6 hours as needed for Temp greater or equal to 38C (100.4F), Mild Pain (1 - 3)  aspirin 81 mg oral delayed release tablet: 1 tab(s) orally once a day  atorvastatin 80 mg oral tablet: 1 tab(s) orally once a day (at bedtime)  clopidogrel 75 mg oral tablet: 1 tab(s) orally once a day  cyanocobalamin 1000 mcg oral tablet: 1 tab(s) orally once a day  famotidine 20 mg oral tablet: 1 tab(s) orally once a day  Freestyle [place] 3 [place]: Dispense 1 [place]  Freestyle [place] 3 Sensors: Please change every 14 days  glucometer (per patient&#x27;s insurance): Test blood sugars four times a day. Dispense #1 glucometer.  lancets: 1 application subcutaneously 4 times a day  Lantus Solostar Pen 100 units/mL subcutaneous solution: 8 unit(s) subcutaneous once a day (at bedtime) unit(s) subcutaneous once a day  melatonin 3 mg oral tablet: 3 tab(s) orally once a day (at bedtime)  metoprolol tartrate 25 mg oral tablet: 1 tab(s) orally 2 times a day  polyethylene glycol 3350 oral powder for reconstitution: 17 gram(s) orally once a day as needed for  constipation  senna leaf extract oral tablet: 1 tab(s) orally once a day (before a meal)  tamsulosin 0.4 mg oral capsule: 1 cap(s) orally once a day (at bedtime)  test strips (per patient&#x27;s insurance): 1 application subcutaneously 4 times a day. ** Compatible with patient&#x27;s glucometer **

## 2024-11-01 NOTE — PROGRESS NOTE ADULT - ASSESSMENT
The patient is an 83 year old male with a history of HTN, HL, DM, CKD, GI bleed, COPD, SANTO, BPH, CAD s/p PCI, PAD, multiple myeloma, lymphedema who presents with heel ulcer.     Plan:  - ECG with sinus rhythm and no evidence of ischemia/infarction  - Echo 6/30/24 with normal LV systolic function, mild pulm HTN  - CXR with grossly clear lungs  - Lower extremity swelling consistent with known history of lymphedema  - Continue aspirin 81 mg daily  - Continue clopidogrel 75 mg daily  - Continue atorvastatin 80 mg daily  - Continue metoprolol tartrate 25 mg bid  - Podiatry and ID follow-up  - Path results with acute on chronic osteomyelitis

## 2024-11-01 NOTE — DISCHARGE NOTE PROVIDER - NSDCCPCAREPLAN_GEN_ALL_CORE_FT
PRINCIPAL DISCHARGE DIAGNOSIS  Diagnosis: Foot osteomyelitis  Assessment and Plan of Treatment: Bilateral heel ulcers, MRI feet showed osteomyolitis.   s/p partial calcanectomy on 10/29.    PICC line placed for intravenous antibiotic treatment.   Continue Residual osteo so rec Meropenem 1 gram IV q8 x 6 weeks so last day 12/9. weekly CBC w diff, CMP, ESR faxed to [phone]  PICC can be removed at end of Rx  WOUND CARE INSTRUCTIONS:  1. Remove bilateral heel wound dressings.  2. Dress wound with aquacel Ag, and dry, sterile dressing, and ACE bandage.  3. Change dressings three times a week (MWF or TThSat) and monitor for any signs of infection.  4. Patient is to follow up in [facility] within 1 week upon discharge.        SECONDARY DISCHARGE DIAGNOSES  Diagnosis: CAD (coronary artery disease)  Assessment and Plan of Treatment:     Diagnosis: Chronic obstructive pulmonary disease, unspecified COPD type  Assessment and Plan of Treatment:     Diagnosis: DM (diabetes mellitus)  Assessment and Plan of Treatment:     Diagnosis: HTN (hypertension)  Assessment and Plan of Treatment:      PRINCIPAL DISCHARGE DIAGNOSIS  Diagnosis: Foot osteomyelitis  Assessment and Plan of Treatment: Bilateral heel ulcers, MRI feet showed osteomyolitis.   s/p partial calcanectomy on 10/29.  Culture positive for pseudomonous and bone pathology + OM.   PICC line placed for intravenous antibiotic treatment. completed   IV and po antibiotic x 8 weeks   Patient can be full weight bearing bilaterally in surgical shoes.   Wound Care Instructions:  1. Remove bilateral heel wound dressings.  2. Dress wound with aquacel Ag, and dry, sterile dressing, and ACE bandage.  3. Change dressings three times a week (MWF or TThSat) and monitor for any signs of infection.  4. Patient is to follow up in the [facility] within 3-5 days of discharge.        SECONDARY DISCHARGE DIAGNOSES  Diagnosis: HTN (hypertension)  Assessment and Plan of Treatment: conrinue current regimen.    Diagnosis: CAD (coronary artery disease)  Assessment and Plan of Treatment: continue lipitor, ASA/PLAVIX, Metoprolol. follow up cardio out patient    Diagnosis: DM (diabetes mellitus)  Assessment and Plan of Treatment: continue Lantus and home blood glucose monitoring as discussed    Diagnosis: Chronic obstructive pulmonary disease, unspecified COPD type  Assessment and Plan of Treatment:      PROVIDER:[TOKEN:[[id number]],FOLLOWUP:[1-3 days]] PRINCIPAL DISCHARGE DIAGNOSIS  Diagnosis: Foot osteomyelitis  Assessment and Plan of Treatment: Bilateral heel ulcers, MRI feet showed osteomyolitis.   s/p partial calcanectomy on 10/29.  Culture positive for pseudomonous and bone pathology + OM.   PICC line placed for intravenous antibiotic treatment. completed   IV and po antibiotic x 8 weeks   Patient can be full weight bearing bilaterally in surgical shoes.   Wound Care Instructions:  1. Remove bilateral heel wound dressings.  2. Dress wound with aquacel Ag, and dry, sterile dressing, and ACE bandage.  3. Change dressings three times a week (MWF or TThSat) and monitor for any signs of infection.  4. Patient is to follow up in the [facility] within 3-5 days of discharge.        SECONDARY DISCHARGE DIAGNOSES  Diagnosis: HTN (hypertension)  Assessment and Plan of Treatment: conrinue current regimen.    Diagnosis: CAD (coronary artery disease)  Assessment and Plan of Treatment: continue lipitor, ASA/PLAVIX, Metoprolol. follow up cardio out patient    Diagnosis: DM (diabetes mellitus)  Assessment and Plan of Treatment: continue Lantus and home blood glucose monitoring as discussed

## 2024-11-01 NOTE — PROCEDURE NOTE - PROCEDURE FINDINGS AND DETAILS
50cm bard single lumen power picc inserted into patent right basilic vein under sterile conditions and image guidance.  Tip is in the SVC.  PICC can be accessed.

## 2024-11-01 NOTE — PROGRESS NOTE ADULT - ASSESSMENT
84yo M with a PMH of HTN, HLD, Type 2 DM, CKD3a, hx of GI bleed, COPD, SANTO, BPH, CAD s/p PCI, PAD, multiple myeloma, lymphedema who presents with b/l heel ulcers and suspected OM planned for bilateral foot surgery with podiatry, Gabriel Deshpande, on 10/29 for his suspected bilateral heel osteomyelitis now s/p partial calcanectomy.        Problem/Plan - 1:  ·  Problem: Osteomyelitis of foot.   ·  Plan: - Bilateral heel osteomyelitis per outpatient w/up and admitted with a plan for OR with podiatry, Dr. Rios on 10/29  - Wound Cx (10/23): Proteus mirabilis, Pseudomonas aeruginosa, ESBL Klebsiella pneumoniae, Enterococcus faecalis  - s/p 14 days of Augmentin and 10 days of zosyn  - Blood Cx NGTD  - s/p partial calcanectomy on 10/29.    - f/up OR cultures +rare pseudomonas aeruginosa.  Pathology: bilateral heel acute and chronic osteomyelitis.  - Tylenol PRN for mild pain, percocet 1 tab PO Q6h PRN for moderate pain, and Dilaudid 0.5mg IV Q6h PRN for severe pain.  - Consulted ID Dr. Casillas, recs appreciated   - continue meropenem  - afebrile, no leukocytosis  - cardiology Dr. Rock consulted, recs appreciated.     Problem/Plan - 2:  ·  Problem: DM (diabetes mellitus).   ·  Plan: - type 2 DM on insulin  - Home basal insulin 23 U at bedtime, given half dose of 12un last night but mild hypoglycemia in the mid-60s this morning and in PACU  on 10/29-- pt given D50; started on D5 1/2NS and will maintain overnight   - continue decreased lantus dose to 8units QHS   - continue low-dose lispro corrective ISS  - monitor FSG     Problem/Plan - 3:  ·  Problem: HTN (hypertension).   ·  Plan: Chronic   - continue metoprolol 25mg BID.     Problem/Plan - 4:  ·  Problem: HLD (hyperlipidemia).   ·  Plan: Chronic   - Continue atorvastatin 80mg QD.     Problem/Plan - 5:  ·  Problem: CAD (coronary artery disease).   ·  Plan: CAD sp stents x4  - Held ASA and Plavix on 10/29; Restarted on ASA and Plavix on 10/30  - c/w lipitor  - pt did have some strikethrough bleeding per nursing this evening -- Confirmed with podiatry: okay to restart plavix  - cardiology Dr. Rock consulted, recs appreciated.     Problem/Plan - 6:  ·  Problem: Benign prostatic hyperplasia with lower urinary tract symptoms, symptom details unspecified.   ·  Plan: Chronic  -Continue Flomax.     Problem/Plan - 7:  ·  Problem: Multiple myeloma.   ·  Plan: Chronic  - Chemotherapy held while in rehab.     Problem/Plan - 8:  ·  Problem: Need for prophylactic measure.   ·  Plan: DVT ppx: SCD    ---  TIME SPENT:  52 minutes spent on total encounter. The necessity of the time spent during the encounter on this date of service was due to:     direct patient care including but not limited to reviewing chart, medications ,laboratory data, imaging reports, discussion of plan of care with consultants on the case, coordination of care with multidisciplinary team involved in the case and discussion of plan with patient.  Patient agreeable to plan of care and verbalized understanding the anticipated hospital course and treatment plan.    ---

## 2024-11-01 NOTE — PROGRESS NOTE ADULT - SUBJECTIVE AND OBJECTIVE BOX
[facility] DIVISION of INFECTIOUS DISEASE  Michoacano Casillas MD PhD, Cherry Vincent MD, Queta Ngo MD, Roselia Mcmanus MD, Andrez Bolden MD  and providing coverage with Trey Montelongo MD  Providing Infectious Disease Consultations at [facility], [facility] [place] [place], [facility], [facility]'s    Office# [phone] to schedule follow up appointments  Answering Service for urgent calls or New Consults [phone]  Cell# to text for urgent issues Michoacano Casillas [phone]     infectious diseases progress note:    DALILA HERNADEZ is a 83y y. o. Male patient    Overnight and events of the last 24hrs reviewed    Allergies    No Known Allergies    Intolerances        ANTIBIOTICS/RELEVANT:  antimicrobials  meropenem  IVPB      meropenem  IVPB 1000 milliGRAM(s) IV Intermittent every 8 hours    immunologic:    OTHER:  acetaminophen     Tablet .. 650 milliGRAM(s) Oral every 6 hours PRN  acetaminophen   IVPB .. 1000 milliGRAM(s) IV Intermittent once  aluminum hydroxide/magnesium hydroxide/simethicone Suspension 30 milliLiter(s) Oral every 4 hours PRN  aspirin  chewable 81 milliGRAM(s) Oral daily  atorvastatin 80 milliGRAM(s) Oral at bedtime  clopidogrel Tablet 75 milliGRAM(s) Oral daily  cyanocobalamin 1000 MICROGram(s) Oral daily  dextrose 5% + sodium chloride 0.45%. 1000 milliLiter(s) IV Continuous <Continuous>  dextrose 5%. 1000 milliLiter(s) IV Continuous <Continuous>  dextrose 5%. 1000 milliLiter(s) IV Continuous <Continuous>  dextrose 50% Injectable 25 Gram(s) IV Push once  dextrose 50% Injectable 12.5 Gram(s) IV Push once  dextrose 50% Injectable 12.5 Gram(s) IV Push once  dextrose 50% Injectable 25 Gram(s) IV Push once  dextrose Oral Gel 15 Gram(s) Oral once PRN  famotidine    Tablet 20 milliGRAM(s) Oral daily  glucagon  Injectable 1 milliGRAM(s) IntraMuscular once  HYDROmorphone  Injectable 0.5 milliGRAM(s) IV Push every 6 hours PRN  insulin glargine Injectable (LANTUS) 8 Unit(s) SubCutaneous at bedtime  insulin lispro (ADMELOG) corrective regimen sliding scale   SubCutaneous three times a day before meals  melatonin 3 milliGRAM(s) Oral at bedtime  metoprolol tartrate 25 milliGRAM(s) Oral two times a day  oxycodone    5 mG/acetaminophen 325 mG 1 Tablet(s) Oral every 6 hours PRN  saccharomyces boulardii 250 milliGRAM(s) Oral two times a day  tamsulosin 0.4 milliGRAM(s) Oral at bedtime      Objective:  Vital Signs Last 24 Hrs  T(C): 36.5 (01 Nov 2024 04:50), Max: 37 (31 Oct 2024 20:19)  T(F): 97.7 (01 Nov 2024 04:50), Max: 98.6 (31 Oct 2024 20:19)  HR: 91 (01 Nov 2024 04:50) (86 - 91)  BP: 114/70 (01 Nov 2024 04:50) (114/70 - 146/84)  BP(mean): --  RR: 17 (01 Nov 2024 04:50) (17 - 18)  SpO2: 96% (01 Nov 2024 04:50) (92% - 96%)    Parameters below as of 01 Nov 2024 04:50  Patient On (Oxygen Delivery Method): room air        T(C): 36.5 (11-01-24 @ 04:50), Max: 37 (10-31-24 @ 20:19)  T(C): 36.5 (11-01-24 @ 04:50), Max: 37 (10-31-24 @ 20:19)  T(C): 36.5 (11-01-24 @ 04:50), Max: 37 (10-31-24 @ 20:19)    PHYSICAL EXAM:  HEENT: NC atraumatic  Neck: supple  Respiratory: no accessory muscle use, breathing comfortably  Cardiovascular: distant  Gastrointestinal: normal appearing, nondistended  Extremities: no clubbing, no cyanosis,        LABS:                          9.1    6.18  )-----------( 153      ( 01 Nov 2024 08:38 )             28.5       WBC  6.18 11-01 @ 08:38  7.22 10-31 @ 07:02  7.93 10-30 @ 10:25  4.87 10-29 @ 04:40  5.43 10-28 @ 13:11      11-01    140  |  105  |  21  ----------------------------<  104[H]  3.7   |  31  |  0.96    Ca    9.9      01 Nov 2024 08:38  Mg     1.6     10-31        Creatinine: 0.96 mg/dL (11-01-24 @ 08:38)  Creatinine: 1.00 mg/dL (10-31-24 @ 07:02)  Creatinine: 1.10 mg/dL (10-30-24 @ 10:25)  Creatinine: 1.10 mg/dL (10-29-24 @ 04:40)  Creatinine: 1.30 mg/dL (10-28-24 @ 13:11)        Urinalysis Basic - ( 01 Nov 2024 08:38 )    Color: x / Appearance: x / SG: x / pH: x  Gluc: 104 mg/dL / Ketone: x  / Bili: x / Urobili: x   Blood: x / Protein: x / Nitrite: x   Leuk Esterase: x / RBC: x / WBC x   Sq Epi: x / Non Sq Epi: x / Bacteria: x            INFLAMMATORY MARKERS      MICROBIOLOGY:    Culture - Tissue with Gram Stain (10.29.24 @ 12:00)    Gram Stain:   No polymorphonuclear leukocytes seen per low power field  No organisms seen per oil power field   -  Aztreonam: R >16   -  Cefepime: I 16   -  Ceftazidime: R >16   -  Ciprofloxacin: S <=0.25   -  Imipenem: S <=1   -  Levofloxacin: S <=0.5   -  Meropenem: S <=1   -  Piperacillin/Tazobactam: R >64   Specimen Source: Tissue   Culture Results:   Rare Pseudomonas aeruginosa   Organism Identification: Pseudomonas aeruginosa   Organism: Pseudomonas aeruginosa   Method Type: VICKY        RADIOLOGY & ADDITIONAL STUDIES:  Surgical Pathology Report (10.29.24 @ 12:00)    Surgical Pathology Report:   ACCESSION No:  [id number]  Patient:     DALILA HERNADEZ      Accession:                             [id number]    Collected Date/Time:                   10/29/2024 12:00 EDT  Received Date/Time:                    10/29/2024 13:16 EDT    Surgical Pathology Report - Auth (Verified)    Specimen(s) Submitted  1  Right heel bone pathology  2  Right heel bone proximal margin pathology  3  Left heel bone pathology  4  Left heel bone proximal margin pathology    Final Diagnosis  1. Right heel bone    - Acute and chronic osteomyelitis    2. Right heel bone proximal margin  - Acute and chronic osteomyelitis    3. Left heel bone  - Acute and chronic osteomyelitis    4. Left heel bone proximal margin  - Acute and chronic osteomyelitis  Verified by: Omar Jaimes MD  (Electronic Signature)  Reported on: 10/31/24 13:51 EDT, [facility], [address] [place] [address], [place] [address]  _________________________________________________________________      Clinical Information  Bilateral heel ulcers  Procedure: Bilateral heel ulcer debridement    Perioperative Diagnosis  Bilateral heel ulcers    Postoperative Diagnosis  Same      Gross Description  1.  Received: In formalin labeled  "right heel bone pathology" ,  consisting  ofa portion of tan-brown bony tissue with attached gray-tan soft tissue  displaying a smooth clean bony cut surface. The bone cut surface is  tan-yellow and porous.  Dimensions:  5.0 x 3.0 x 1.2 cm  Submitted:  Representative sections following fixation and acid  decalcification in four cassettes: 1A-1D    2.  Received: In formalin labeled  "right heel bone proximal margin  pathology" , consisting of an ovoid piece of tan-brown bony tissue with  a smooth clean surgical cut. The bone cut surface is brown-yellow and  porous.  Dimensions:  5.5 x 4.0 x 0.8 cm  Submitted:  Representative sections following fixation and acid  decalcification in 6 cassettes:  2A-2F: Full face of bone    3.  Received: In formalin labeled  "left heel bone pathology" , consisting  of a portion of tan-brown soft tissue with attached gray-tan soft tissue  displaying a smooth clean bony cut surface. The bone cut surface is  tan-yellow and porous.  Dimensions:  4.5 x 3.0 x 1.2 cm  Submitted:  Representative sections following fixation and acid  decalcification in four cassettes: 3A-3D    4.  Received: In formalin labeled  "left heel bone proximal margin  pathology" , consisting of two ovoid pieces of tan-brown bony tissue with  smooth clean surgical cuts. The bone cut surface is brown-yellow, porous  and hemorrhagic.  Dimensions:  4.5 x 3.0 x 1.0 cm and 1.2 x 1.0 x 0.2 cm  Submitted:  Representative sections following fixation and acid  decalcification in 5 cassettes:  4A: Smaller piece  4B-4E: Full face of larger piece    EDMUND Zhu ([facility]) 10/30/2024 08:46 AM      Disclaimer  In addition to other data that may appear on the specimen containers, all  labels have been inspected to confirm the presence of the patient's name  and date of birth.      Histological processing performed at [facility], Department of  Pathology, [address] [place] [address].    For slide(s) received with immunohistochemical study control(s), the  control result(s) has/have been verified by the sign out pathologist.  For slide(s) without received controls positive control slides has/have  been reviewed and approved by performing immunohistochemistry lab*.*    For those cases signed out at [facility], [address] [place] [address]: The immunohistochemical/in-situ hybridization tests  have been developed and their performance characteristics determined by  [facility], [address] [place] [id number]. They have  not been cleared or approved by the U.S. Food and Drug Administration.  The FDA has  determined that such clearance or approval is not necessary.  This test is used for clinical purposes. The laboratory is certified  under the CLIA-88 as qualified to perform high complexity clinical  testing.    For those cases signed out at [address] [place] [address]:  These immunohistochemical/ in-situ hybridization tests have been  developed and their performance characteristics determined by [facility] /  [facility], Department of Pathology, Division of  Immunopathology, [address] [place] [address]. They have  not been cleared or approved by the U.S. Food and Drug Administration.  The FDA has determined that such clearance or approval is not necessary.  This test is used for clinical purposes. The laboratory is certified  under the CLIA-88 as qualified to perform high complexity clinical  testing.    For tests performed at [facility]: [address] [place] [address].    For tests performed at [facility] [address], [address] [place] [address]. For tests performed at [facility]: [address], [place] [address]. For tests  performed at [facility].: [address] [place]  [address]. For tests performed at [facility].: [address], [address]  [place] [address]. For tests performed at [facility]: [facility], [facility] [address]. For tests performed at [facility]: [address] [place] [address], [address] [facility], [place] [address]. For tests performed at  [facility]: [address] [place] [address]  [id number].

## 2024-11-01 NOTE — DISCHARGE NOTE PROVIDER - CARE PROVIDER_API CALL
Rachel Lester  Internal Medicine  [address] [place] [address]  Phone: [phone]  Fax: [phone]  Follow Up Time: 1 week   Rachel Lester  Internal Medicine  [address] [place] [address]  Phone: [phone]  Fax: [phone]  Follow Up Time: 1 week    Ketan RiosJeremie  Podiatric Medicine  [address] [place] [address]  Phone: [phone]  Fax: [phone]  Follow Up Time: 1 week    Schuyler Rock  Cardiology  [address] [facility], [facility] [address]  [place] [address]  Phone: [phone]  Fax: [phone]  Follow Up Time: 2 weeks

## 2024-11-01 NOTE — CHART NOTE - NSCHARTNOTEFT_GEN_A_CORE
Wound Care Instructions:  1. Remove bilateral foot dressings.  2. Dress bilateral foot wounds with adaptic, aquacel Ag, dry, sterile dressing, and ACE bandage.  3. Change dressings three times a week (MWF or TThSat) and monitor for any signs of infection.  4. Keep bilateral extremities offloaded at all times.  5. Patient is to follow up in [facility] within 1 week upon discharge.

## 2024-11-01 NOTE — SOCIAL WORK PROGRESS NOTE - NSSWPROGRESSNOTE_GEN_ALL_CORE
Per ID note, rec Meropenem 1 gram IV q8 x 6 weeks so last day 12/9. Pt is pending picc insertion. SW met with pt and pt's dtr Rhonda at bedside regarding dc planning. Rhonda made aware that pt requires IV abx and continues to be rec for [facility]. Updated TAMANNA was requested. Dtr resistent to [facility] as pt has no more days left. Dtr called and [facility] mail for [facility] to inquire if pt would be able to d/c back to [facility] with IV abx. SW to follow and remain available for any needs.

## 2024-11-01 NOTE — DISCHARGE NOTE PROVIDER - HOSPITAL COURSE
Patient with a past medical history of hypertension, diabetes, hyperlipidemia, bilateral heel osteomyelitis currently on outpatient antibiotics through [facility] is presenting for surgery.  He is scheduled for surgery with Dr. frias tomorrow.  Denies any fevers.  Endorsing pain to his heels but no other areas of pain.  No nausea or vomiting.  No other acute complaints today.    Denies fever, chills, chest pain, palpitations, SOB, cough, abdominal pain, nausea, vomiting, diarrhea, constipation, urinary frequency, urgency, or dysuria, headaches, changes in vision, dizziness, numbness, tingling.  Denies recent travel, recent antibiotic use, or sick contacts.    ED Course:   Vitals: BP: 161/87, HR 69: , Temp: 97.8 , RR: 18, SpO2: 96% on   Labs:  H/H: 10.6/34.4, PTT: 36.2, Albumin: 2.7  CXR: no acute radiographic findings  EKBPM normal sinus rhythm, QTc: 477      HPI: Patient presents to [facility] from rehab for bilateral foot surgery planned with podiatry, Gabriel Deshpande, tomorrow for his bilateral heel osteomyelitis. States his osteomyelitis started 6 months ago, Currently c/o pain in b/l heels. Denies any numbness or tingling down LLE/RLE. Denies any trauma. Patient currently does not walk due to deconditioning. Denies any other complaints. Denies fevers, chills, HA, Dizziness, chest pain, SOB, N/V/D, bladder symptoms,      Pt. was admitted with ID and Podiatry consultation.  He was given IV antibiotics.  Pt. had preop cardiology evaluation.  He was brought to OR on 10/29 and tolerated partial calcanectomy.  Blood cultures showed no growth.  Tissue cultures with rare pseudomonas aeruginosa.  Pathology showed acute and chronic osteomyelitis at the right and left heel bone proximal margins.  ID recommended PICC line placement and to continue course of IV antibiotics until .         Patient with a past medical history of hypertension, diabetes, hyperlipidemia, bilateral heel osteomyelitis currently on outpatient antibiotics through [facility] is presenting for surgery.  He is scheduled for surgery with Dr. frias tomorrow.  Denies any fevers.  Endorsing pain to his heels but no other areas of pain.  No nausea or vomiting.  No other acute complaints today.    Denies fever, chills, chest pain, palpitations, SOB, cough, abdominal pain, nausea, vomiting, diarrhea, constipation, urinary frequency, urgency, or dysuria, headaches, changes in vision, dizziness, numbness, tingling.  Denies recent travel, recent antibiotic use, or sick contacts.    ED Course:   Vitals: BP: 161/87, HR 69: , Temp: 97.8 , RR: 18, SpO2: 96% on   Labs:  H/H: 10.6/34.4, PTT: 36.2, Albumin: 2.7  CXR: no acute radiographic findings  EKBPM normal sinus rhythm, QTc: 477      HPI: Patient presents to [facility] from rehab for bilateral foot surgery planned with podiatry, Gabriel Deshpande, tomorrow for his bilateral heel osteomyelitis. States his osteomyelitis started 6 months ago, Currently c/o pain in b/l heels. Denies any numbness or tingling down LLE/RLE. Denies any trauma. Patient currently does not walk due to deconditioning. Denies any other complaints. Denies fevers, chills, HA, Dizziness, chest pain, SOB, N/V/D, bladder symptoms,      Pt. was admitted with ID and Podiatry consultation.  He was given IV antibiotics.  Pt. had preop cardiology evaluation.  He was brought to OR on 10/29 and tolerated partial calcanectomy.  Blood cultures showed no growth.  Tissue cultures with rare pseudomonas aeruginosa.  Pathology showed acute and chronic osteomyelitis at the right and left heel bone proximal margins.  ID recommended PICC line placement and to continue course of IV antibiotics until . .       Patient with a past medical history of hypertension, diabetes, hyperlipidemia, bilateral heel osteomyelitis currently on outpatient antibiotics through [facility] is presenting for surgery.  He is scheduled for surgery with Dr. frias tomorrow.  Denies any fevers.  Endorsing pain to his heels but no other areas of pain.  No nausea or vomiting.  No other acute complaints today.    Denies fever, chills, chest pain, palpitations, SOB, cough, abdominal pain, nausea, vomiting, diarrhea, constipation, urinary frequency, urgency, or dysuria, headaches, changes in vision, dizziness, numbness, tingling.  Denies recent travel, recent antibiotic use, or sick contacts.    ED Course:   Vitals: BP: 161/87, HR 69: , Temp: 97.8 , RR: 18, SpO2: 96% on   Labs:  H/H: 10.6/34.4, PTT: 36.2, Albumin: 2.7  CXR: no acute radiographic findings  EKBPM normal sinus rhythm, QTc: 477      HPI: Patient presents to [facility] from rehab for bilateral foot surgery planned with podiatry, Gabriel Deshpande, tomorrow for his bilateral heel osteomyelitis. States his osteomyelitis started 6 months ago, Currently c/o pain in b/l heels. Denies any numbness or tingling down LLE/RLE. Denies any trauma. Patient currently does not walk due to deconditioning. Denies any other complaints. Denies fevers, chills, HA, Dizziness, chest pain, SOB, N/V/D, bladder symptoms,      Pt. was admitted with ID and Podiatry consultation.  He was given IV antibiotics.  Pt. had preop cardiology evaluation.  He was brought to OR on 10/29 and tolerated partial calcanectomy.  Blood cultures showed no growth.  Tissue cultures with rare pseudomonas aeruginosa.  Pathology showed acute and chronic osteomyelitis at the right and left heel bone proximal margins.  ID recommended PICC line placement and to continue course of IV antibiotics until .       Patient with a past medical history of hypertension, diabetes, hyperlipidemia, bilateral heel osteomyelitis currently on outpatient antibiotics through [facility] is presenting for surgery.  He is scheduled for surgery with Dr. frias tomorrow.  Denies any fevers.  Endorsing pain to his heels but no other areas of pain.  No nausea or vomiting.  No other acute complaints today.    Denies fever, chills, chest pain, palpitations, SOB, cough, abdominal pain, nausea, vomiting, diarrhea, constipation, urinary frequency, urgency, or dysuria, headaches, changes in vision, dizziness, numbness, tingling.  Denies recent travel, recent antibiotic use, or sick contacts.    ED Course:   Vitals: BP: 161/87, HR 69: , Temp: 97.8 , RR: 18, SpO2: 96% on   Labs:  H/H: 10.6/34.4, PTT: 36.2, Albumin: 2.7  CXR: no acute radiographic findings  EKBPM normal sinus rhythm, QTc: 477      HPI: Patient presents to [facility] from rehab for bilateral foot surgery planned with podiatry, Gabriel Deshpande, tomorrow for his bilateral heel osteomyelitis. States his osteomyelitis started 6 months ago, Currently c/o pain in b/l heels. Denies any numbness or tingling down LLE/RLE. Denies any trauma. Patient currently does not walk due to deconditioning. Denies any other complaints. Denies fevers, chills, HA, Dizziness, chest pain, SOB, N/V/D, bladder symptoms,      Pt. was admitted with ID and Podiatry consultation.  He was given IV antibiotics.  Pt. had preop cardiology evaluation.  He was brought to OR on 10/29 and tolerated partial calcanectomy.  Blood cultures showed no growth.  Tissue cultures with rare pseudomonas aeruginosa.  Pathology showed acute and chronic osteomyelitis at the right and left heel bone proximal margins.  ID recommended PICC line placement and to continue course of IV antibiotics until ..       Patient with a past medical history of hypertension, diabetes, hyperlipidemia, bilateral heel osteomyelitis currently on outpatient antibiotics through [facility] is presenting for surgery.  He is scheduled for surgery with Dr. frias tomorrow.  Denies any fevers.  Endorsing pain to his heels but no other areas of pain.  No nausea or vomiting.  No other acute complaints today.    Denies fever, chills, chest pain, palpitations, SOB, cough, abdominal pain, nausea, vomiting, diarrhea, constipation, urinary frequency, urgency, or dysuria, headaches, changes in vision, dizziness, numbness, tingling.  Denies recent travel, recent antibiotic use, or sick contacts.    ED Course:   Vitals: BP: 161/87, HR 69: , Temp: 97.8 , RR: 18, SpO2: 96% on   Labs:  H/H: 10.6/34.4, PTT: 36.2, Albumin: 2.7  CXR: no acute radiographic findings  EKBPM normal sinus rhythm, QTc: 477      HPI: Patient presents to [facility] from rehab for bilateral foot surgery planned with podiatry, Gabriel Deshpande, tomorrow for his bilateral heel osteomyelitis. States his osteomyelitis started 6 months ago, Currently c/o pain in b/l heels. Denies any numbness or tingling down LLE/RLE. Denies any trauma. Patient currently does not walk due to deconditioning. Denies any other complaints. Denies fevers, chills, HA, Dizziness, chest pain, SOB, N/V/D, bladder symptoms,      Pt. was admitted with ID and Podiatry consultation.  He was given IV antibiotics.  Pt. had preop cardiology evaluation.  He was brought to OR on 10/29 and tolerated partial calcanectomy.  Blood cultures showed no growth.  Tissue cultures with rare pseudomonas aeruginosa.      Pathology:  Final Diagnosis  1. Right heel bone    - Acute and chronic osteomyelitis    2. Right heel bone proximal margin  - Acute and chronic osteomyelitis    3. Left heel bone  - Acute and chronic osteomyelitis    4. Left heel bone proximal margin  - Acute and chronic osteomyelitis    ID recommended PICC line placement and to continue course of IV antibiotics until .       Patient with a past medical history of hypertension, diabetes, hyperlipidemia, bilateral heel osteomyelitis currently on outpatient antibiotics through [facility] is presenting for surgery.  He is scheduled for surgery with Dr. frias tomorrow.  Denies any fevers.  Endorsing pain to his heels but no other areas of pain.  No nausea or vomiting.  No other acute complaints today.    Denies fever, chills, chest pain, palpitations, SOB, cough, abdominal pain, nausea, vomiting, diarrhea, constipation, urinary frequency, urgency, or dysuria, headaches, changes in vision, dizziness, numbness, tingling.  Denies recent travel, recent antibiotic use, or sick contacts.    ED Course:   Vitals: BP: 161/87, HR 69: , Temp: 97.8 , RR: 18, SpO2: 96% on   Labs:  H/H: 10.6/34.4, PTT: 36.2, Albumin: 2.7  CXR: no acute radiographic findings  EKBPM normal sinus rhythm, QTc: 477      HPI: Patient presents to [facility] from rehab for bilateral foot surgery planned with podiatry, Gabriel Deshpande, tomorrow for his bilateral heel osteomyelitis. States his osteomyelitis started 6 months ago, Currently c/o pain in b/l heels. Denies any numbness or tingling down LLE/RLE. Denies any trauma. Patient currently does not walk due to deconditioning. Denies any other complaints. Denies fevers, chills, HA, Dizziness, chest pain, SOB, N/V/D, bladder symptoms,      Pt. was admitted with ID and Podiatry consultation.  He was given IV antibiotics.  Pt. had preop cardiology evaluation.  He was brought to OR on 10/29 and tolerated partial calcanectomy.  Blood cultures showed no growth.  Tissue cultures with rare pseudomonas aeruginosa.      Pathology:  Final Diagnosis  1. Right heel bone    - Acute and chronic osteomyelitis    2. Right heel bone proximal margin  - Acute and chronic osteomyelitis    3. Left heel bone  - Acute and chronic osteomyelitis    4. Left heel bone proximal margin  - Acute and chronic osteomyelitis    ID recommended PICC line placement and to continue course of IV antibiotics until .  Pt. scheduled for debridement of right heel on .        Patient with a past medical history of hypertension, diabetes, hyperlipidemia, bilateral heel osteomyelitis currently on outpatient antibiotics through [facility] is presenting for surgery.  He is scheduled for surgery with Dr. frias tomorrow.  Denies any fevers.  Endorsing pain to his heels but no other areas of pain.  No nausea or vomiting.  No other acute complaints today.    Denies fever, chills, chest pain, palpitations, SOB, cough, abdominal pain, nausea, vomiting, diarrhea, constipation, urinary frequency, urgency, or dysuria, headaches, changes in vision, dizziness, numbness, tingling.  Denies recent travel, recent antibiotic use, or sick contacts.    ED Course:   Vitals: BP: 161/87, HR 69: , Temp: 97.8 , RR: 18, SpO2: 96% on   Labs:  H/H: 10.6/34.4, PTT: 36.2, Albumin: 2.7  CXR: no acute radiographic findings  EKBPM normal sinus rhythm, QTc: 477      HPI: Patient presents to [facility] from rehab for bilateral foot surgery planned with podiatry, Gabriel Deshpande, tomorrow for his bilateral heel osteomyelitis. States his osteomyelitis started 6 months ago, Currently c/o pain in b/l heels. Denies any numbness or tingling down LLE/RLE. Denies any trauma. Patient currently does not walk due to deconditioning. Denies any other complaints. Denies fevers, chills, HA, Dizziness, chest pain, SOB, N/V/D, bladder symptoms,      Pt. was admitted with ID and Podiatry consultation.  He was given IV antibiotics.  Pt. had preop cardiology evaluation.  He was brought to OR on 10/29 and tolerated partial calcanectomy.  Blood cultures showed no growth.  Tissue cultures with rare pseudomonas aeruginosa.      Pathology:  Final Diagnosis  1. Right heel bone    - Acute and chronic osteomyelitis    2. Right heel bone proximal margin  - Acute and chronic osteomyelitis    3. Left heel bone  - Acute and chronic osteomyelitis    4. Left heel bone proximal margin  - Acute and chronic osteomyelitis    ID recommended PICC line placement and to continue course of IV antibiotics until .  Pt. scheduled for debridement of right heel with Dr. Frias on .          Patient with a past medical history of hypertension, diabetes, hyperlipidemia, bilateral heel osteomyelitis currently on outpatient antibiotics through [facility] is presenting for surgery.  He is scheduled for surgery with Dr. frias tomorrow.  Denies any fevers.  Endorsing pain to his heels but no other areas of pain.  No nausea or vomiting.  No other acute complaints today.    Denies fever, chills, chest pain, palpitations, SOB, cough, abdominal pain, nausea, vomiting, diarrhea, constipation, urinary frequency, urgency, or dysuria, headaches, changes in vision, dizziness, numbness, tingling.  Denies recent travel, recent antibiotic use, or sick contacts.    ED Course:   Vitals: BP: 161/87, HR 69: , Temp: 97.8 , RR: 18, SpO2: 96% on   Labs:  H/H: 10.6/34.4, PTT: 36.2, Albumin: 2.7  CXR: no acute radiographic findings  EKBPM normal sinus rhythm, QTc: 477      HPI: Patient presents to [facility] from rehab for bilateral foot surgery planned with podiatry, Gabriel Deshpande, tomorrow for his bilateral heel osteomyelitis. States his osteomyelitis started 6 months ago, Currently c/o pain in b/l heels. Denies any numbness or tingling down LLE/RLE. Denies any trauma. Patient currently does not walk due to deconditioning. Denies any other complaints. Denies fevers, chills, HA, Dizziness, chest pain, SOB, N/V/D, bladder symptoms,      Pt. was admitted with ID and Podiatry consultation.  He was given IV antibiotics.  Pt. had preop cardiology evaluation.  He was brought to OR on 10/29 and tolerated partial calcanectomy.  Blood cultures showed no growth.  Tissue cultures with rare pseudomonas aeruginosa.      Pathology:  Final Diagnosis  1. Right heel bone    - Acute and chronic osteomyelitis    2. Right heel bone proximal margin  - Acute and chronic osteomyelitis    3. Left heel bone  - Acute and chronic osteomyelitis    4. Left heel bone proximal margin  - Acute and chronic osteomyelitis    ID recommended PICC line placement and to continue course of IV antibiotics until .  Pt. scheduled for debridement of right heel with Dr. Frias on ...            Patient with a past medical history of hypertension, diabetes, hyperlipidemia, bilateral heel osteomyelitis currently on outpatient antibiotics through [facility] is presenting for surgery.  He is scheduled for surgery with Dr. frias tomorrow.  Denies any fevers.  Endorsing pain to his heels but no other areas of pain.  No nausea or vomiting.  No other acute complaints today.    Denies fever, chills, chest pain, palpitations, SOB, cough, abdominal pain, nausea, vomiting, diarrhea, constipation, urinary frequency, urgency, or dysuria, headaches, changes in vision, dizziness, numbness, tingling.  Denies recent travel, recent antibiotic use, or sick contacts.    ED Course:   Vitals: BP: 161/87, HR 69: , Temp: 97.8 , RR: 18, SpO2: 96% on   Labs:  H/H: 10.6/34.4, PTT: 36.2, Albumin: 2.7  CXR: no acute radiographic findings  EKBPM normal sinus rhythm, QTc: 477      HPI: Patient presents to [facility] from rehab for bilateral foot surgery planned with podiatry, Gabriel Deshpande, tomorrow for his bilateral heel osteomyelitis. States his osteomyelitis started 6 months ago, Currently c/o pain in b/l heels. Denies any numbness or tingling down LLE/RLE. Denies any trauma. Patient currently does not walk due to deconditioning. Denies any other complaints. Denies fevers, chills, HA, Dizziness, chest pain, SOB, N/V/D, bladder symptoms,      Pt. was admitted with ID and Podiatry consultation.  He was given IV antibiotics.  Pt. had preop cardiology evaluation.  He was brought to OR on 10/29 and tolerated partial calcanectomy.  Blood cultures showed no growth.  Tissue cultures with rare pseudomonas aeruginosa.      Pathology:  Final Diagnosis  1. Right heel bone    - Acute and chronic osteomyelitis    2. Right heel bone proximal margin  - Acute and chronic osteomyelitis    3. Left heel bone  - Acute and chronic osteomyelitis    4. Left heel bone proximal margin  - Acute and chronic osteomyelitis    ID recommended PICC line placement and to continue course of IV antibiotics until .  Pt. scheduled for debridement of right heel with Dr. Frias on  and tolerated procedure well.   Tissue culture grew Carbapenem resistant pseudomonas aeruginosa and rare enterococcus faecalis.  PO Cipro was then added to his antibiotic regimen per ID.             Patient with a past medical history of hypertension, diabetes, hyperlipidemia, bilateral heel osteomyelitis currently on outpatient antibiotics through [facility] is presenting for surgery.  He is scheduled for surgery with Dr. frias tomorrow.  Denies any fevers.  Endorsing pain to his heels but no other areas of pain.  No nausea or vomiting.  No other acute complaints today.    Denies fever, chills, chest pain, palpitations, SOB, cough, abdominal pain, nausea, vomiting, diarrhea, constipation, urinary frequency, urgency, or dysuria, headaches, changes in vision, dizziness, numbness, tingling.  Denies recent travel, recent antibiotic use, or sick contacts.    ED Course:   Vitals: BP: 161/87, HR 69: , Temp: 97.8 , RR: 18, SpO2: 96% on   Labs:  H/H: 10.6/34.4, PTT: 36.2, Albumin: 2.7  CXR: no acute radiographic findings  EKBPM normal sinus rhythm, QTc: 477      HPI: Patient presents to [facility] from rehab for bilateral foot surgery planned with podiatry, Gabriel Deshpande, tomorrow for his bilateral heel osteomyelitis. States his osteomyelitis started 6 months ago, Currently c/o pain in b/l heels. Denies any numbness or tingling down LLE/RLE. Denies any trauma. Patient currently does not walk due to deconditioning. Denies any other complaints. Denies fevers, chills, HA, Dizziness, chest pain, SOB, N/V/D, bladder symptoms,      Pt. was admitted with ID and Podiatry consultation.  He was given IV antibiotics.  Pt. had preop cardiology evaluation.  He was brought to OR on 10/29 and tolerated partial calcanectomy.  Blood cultures showed no growth.  Tissue cultures with rare pseudomonas aeruginosa.      Pathology:  Final Diagnosis  1. Right heel bone    - Acute and chronic osteomyelitis    2. Right heel bone proximal margin  - Acute and chronic osteomyelitis    3. Left heel bone  - Acute and chronic osteomyelitis    4. Left heel bone proximal margin  - Acute and chronic osteomyelitis    ID recommended PICC line placement and to continue course of IV antibiotics until .  Pt. scheduled for debridement of right heel with Dr. Frias on  and tolerated procedure well.   Tissue culture grew Carbapenem resistant pseudomonas aeruginosa and rare enterococcus faecalis.  PO Cipro was then added to his antibiotic regimen per ID.  Pt. had developed mild degree of acute kidney injury likely due to decrease oral intake and hydration.  He had Nephrology consultation and was given IV fluids.             Patient with a past medical history of hypertension, diabetes, hyperlipidemia, bilateral heel osteomyelitis currently on outpatient antibiotics through [facility] is presenting for surgery.  He is scheduled for surgery with Dr. frias tomorrow.  Denies any fevers.  Endorsing pain to his heels but no other areas of pain.  No nausea or vomiting.  No other acute complaints today.    Denies fever, chills, chest pain, palpitations, SOB, cough, abdominal pain, nausea, vomiting, diarrhea, constipation, urinary frequency, urgency, or dysuria, headaches, changes in vision, dizziness, numbness, tingling.  Denies recent travel, recent antibiotic use, or sick contacts.    ED Course:   Vitals: BP: 161/87, HR 69: , Temp: 97.8 , RR: 18, SpO2: 96% on   Labs:  H/H: 10.6/34.4, PTT: 36.2, Albumin: 2.7  CXR: no acute radiographic findings  EKBPM normal sinus rhythm, QTc: 477      HPI: Patient presents to [facility] from rehab for bilateral foot surgery planned with podiatry, Gabriel Deshpande, tomorrow for his bilateral heel osteomyelitis. States his osteomyelitis started 6 months ago, Currently c/o pain in b/l heels. Denies any numbness or tingling down LLE/RLE. Denies any trauma. Patient currently does not walk due to deconditioning. Denies any other complaints. Denies fevers, chills, HA, Dizziness, chest pain, SOB, N/V/D, bladder symptoms,      Pt. was admitted with ID and Podiatry consultation.  He was given IV antibiotics.  Pt. had preop cardiology evaluation.  He was brought to OR on 10/29 and tolerated partial calcanectomy.  Blood cultures showed no growth.  Tissue cultures with rare pseudomonas aeruginosa.      Pathology:  Final Diagnosis  1. Right heel bone    - Acute and chronic osteomyelitis    2. Right heel bone proximal margin  - Acute and chronic osteomyelitis    3. Left heel bone  - Acute and chronic osteomyelitis    4. Left heel bone proximal margin  - Acute and chronic osteomyelitis    ID recommended PICC line placement and to continue course of IV antibiotics until .  Pt. scheduled for debridement of right heel with Dr. Frias on  and tolerated procedure well.   Tissue culture grew Carbapenem resistant pseudomonas aeruginosa and rare enterococcus faecalis.  PO Cipro was then added to his antibiotic regimen per ID.  Pt. had developed mild degree of acute kidney injury likely due to decrease oral intake and hydration.  He had Nephrology consultation and was given IV fluids with improvement in his kidney function.               Patient with a past medical history of hypertension, diabetes, hyperlipidemia, bilateral heel osteomyelitis currently on outpatient antibiotics through [facility] is presenting for surgery.  He is scheduled for surgery with Dr. frias tomorrow.  Denies any fevers.  Endorsing pain to his heels but no other areas of pain.  No nausea or vomiting.  No other acute complaints today.    Denies fever, chills, chest pain, palpitations, SOB, cough, abdominal pain, nausea, vomiting, diarrhea, constipation, urinary frequency, urgency, or dysuria, headaches, changes in vision, dizziness, numbness, tingling.  Denies recent travel, recent antibiotic use, or sick contacts.    ED Course:   Vitals: BP: 161/87, HR 69: , Temp: 97.8 , RR: 18, SpO2: 96% on   Labs:  H/H: 10.6/34.4, PTT: 36.2, Albumin: 2.7  CXR: no acute radiographic findings  EKBPM normal sinus rhythm, QTc: 477    HPI: Patient presents to [facility] from rehab for bilateral foot surgery planned with podiatry, Gabriel Deshpande, for his bilateral heel osteomyelitis. States his osteomyelitis started 6 months ago, Currently c/o pain in b/l heels. Denies any numbness or tingling down LLE/RLE. Denies any trauma. Patient currently does not walk due to deconditioning. Denies any other complaints. Denies fevers, chills, HA, Dizziness, chest pain, SOB, N/V/D, bladder symptoms,      Pt. was admitted with ID and Podiatry consultation.  He was given IV antibiotics.  Pt. had preop cardiology evaluation.  He was brought to OR on 10/29 and tolerated partial calcanectomy.  Blood cultures showed no growth.  Tissue cultures with rare pseudomonas aeruginosa.  Bone pathology + for OM .     ID recommended PICC line placement and to continue course of IV antibiotics until .  Pt. had debridement of right heel with Dr. Frias on  and tolerated procedure well.   Tissue culture grew Carbapenem resistant pseudomonas aeruginosa and rare enterococcus faecalis.  PO Cipro was then added to his antibiotic regimen per ID.  Pt. had developed mild degree of acute kidney injury likely due to decrease oral intake and hydration.  He had Nephrology consultation and was given IV fluids with improvement in his kidney function.   His DM is well controlled via current insulin regimen. Anemia of chronic disease, H/H monitored and stable.   Stable CAD/HTN : followed up by cardiology during hospital stay               Patient with a past medical history of hypertension, diabetes, hyperlipidemia, bilateral heel osteomyelitis currently on outpatient antibiotics through midline is presenting for surgery.  He is scheduled for surgery with Dr. frias tomorrow.  Denies any fevers.  Endorsing pain to his heels but no other areas of pain.  No nausea or vomiting.  No other acute complaints today.    Denies fever, chills, chest pain, palpitations, SOB, cough, abdominal pain, nausea, vomiting, diarrhea, constipation, urinary frequency, urgency, or dysuria, headaches, changes in vision, dizziness, numbness, tingling.  Denies recent travel, recent antibiotic use, or sick contacts.    ED Course:   Vitals: BP: 161/87, HR 69: , Temp: 97.8 , RR: 18, SpO2: 96% on   Labs:  H/H: 10.6/34.4, PTT: 36.2, Albumin: 2.7  CXR: no acute radiographic findings  EKBPM normal sinus rhythm, QTc: 477    HPI: Patient presents to [facility] from rehab for bilateral foot surgery planned with podiatry, Gabriel Deshpande, for his bilateral heel osteomyelitis. States his osteomyelitis started 6 months ago, Currently c/o pain in b/l heels. Denies any numbness or tingling down LLE/RLE. Denies any trauma. Patient currently does not walk due to deconditioning. Denies any other complaints. Denies fevers, chills, HA, Dizziness, chest pain, SOB, N/V/D, bladder symptoms,      Pt. was admitted with ID and Podiatry consultation.  He was given IV antibiotics.  Pt. had preop cardiology evaluation.  He was brought to OR on 10/29 and tolerated partial calcanectomy.  Blood cultures showed no growth.  Tissue cultures with rare pseudomonas aeruginosa.  Bone pathology + for OM .     ID recommended PICC line placement and to continue course of IV antibiotics until .  Pt. had debridement of right heel with Dr. Frias on  and tolerated procedure well.   Tissue culture grew Carbapenem resistant pseudomonas aeruginosa and rare enterococcus faecalis.  PO Cipro was then added to his antibiotic regimen per ID.  Pt. had developed mild degree of acute kidney injury likely due to decrease oral intake and hydration.  He had Nephrology consultation and was given IV fluids with improvement in his kidney function.   His DM is well controlled via current insulin regimen. Anemia of chronic disease, H/H monitored and stable.   Stable CAD/HTN : followed up by cardiology during hospital stay  Physical :   Vital stable   GENERAL: NAD, lying in bed comfortably  HEAD:  Normocephalic  EYES:  conjunctiva and sclera clear  ENT: Moist mucous membranes  NECK: Supple  CHEST/LUNG: Clear to auscultation bilaterally; No rales or rhonchi; no wheezing. Unlabored respirations  HEART: Regular rate and rhythm; S1S2+  ABDOMEN: Bowel sounds present; Soft, Nontender, Nondistended.   EXTREMITIES:  + distal Peripheral Pulses;  No cyanosis, or edema, B/L feet in clean dressing   NERVOUS SYSTEM:  Alert & Oriented X3;  No gross focal deficits   MSK: moves all extremities  SKIN: No rashes

## 2024-11-01 NOTE — SOCIAL WORK PROGRESS NOTE - NSSWPROGRESSNOTE_GEN_ALL_CORE
NADYA called and left voicemail for Starla @ [facility] regarding d/c planning to inquire if they will allow IV abx with home care for pt. SW requested call back. SW to follow and remain available for any needs.

## 2024-11-01 NOTE — PROGRESS NOTE ADULT - ASSESSMENT
84 yo male with hypertension, diabetes, hyperlipidemia, bilateral heel osteomyelitis currently on outpatient antibiotics through midline is presented for surgery planned with podiatry, Gabriel Deshpande, for his bilateral heel osteomyelitis. States his osteomyelitis started 6 months ago.   Calcanectomy, partial 29-Oct-2024 12:58:43  Jona Mason    RECOMMENDATIONS  bilateral heel osteomyelitis  prior micro with Proteus mirabilis, Pseudomonas aeruginosa, Enterococcus faecalis, Klebsiella pneumoniae ESBL  Culture - Tissue with Gram Stain (10.29.24 @ 12:00) Rare Pseudomonas aeruginosa  -  Ciprofloxacin: S <=0.25-  Levofloxacin: S <=0.5-  Meropenem: S <=1  sp surgery  Path- Right heel bone proximal margin- Acute and chronic osteomyelitis, Left heel bone proximal margin- Acute and chronic osteomyelitis  Residual osteo so rec Meropenem 1 gram IV q8 x 6 weeks so last day 12/9  weekly CBC w diff, CMP, ESR faxed to [phone]  PICC ordered and can be removed at end of Rx    Thank you for consulting us and involving us in the management of this most interesting and challenging case.  We will follow along in the care of this patient. Please call us at [phone] or text me directly on my cell# at [phone] with any concerns.

## 2024-11-02 LAB
ANION GAP SERPL CALC-SCNC: 3 MMOL/L — LOW (ref 5–17)
BASOPHILS # BLD AUTO: 0.02 K/UL — SIGNIFICANT CHANGE UP (ref 0–0.2)
BASOPHILS NFR BLD AUTO: 0.3 % — SIGNIFICANT CHANGE UP (ref 0–2)
BUN SERPL-MCNC: 23 MG/DL — SIGNIFICANT CHANGE UP (ref 7–23)
CALCIUM SERPL-MCNC: 9.8 MG/DL — SIGNIFICANT CHANGE UP (ref 8.5–10.1)
CHLORIDE SERPL-SCNC: 108 MMOL/L — SIGNIFICANT CHANGE UP (ref 96–108)
CO2 SERPL-SCNC: 30 MMOL/L — SIGNIFICANT CHANGE UP (ref 22–31)
CREAT SERPL-MCNC: 1 MG/DL — SIGNIFICANT CHANGE UP (ref 0.5–1.3)
CULTURE RESULTS: SIGNIFICANT CHANGE UP
CULTURE RESULTS: SIGNIFICANT CHANGE UP
EGFR: 76 ML/MIN/1.73M2 — SIGNIFICANT CHANGE UP
EOSINOPHIL # BLD AUTO: 0.61 K/UL — HIGH (ref 0–0.5)
EOSINOPHIL NFR BLD AUTO: 9.8 % — HIGH (ref 0–6)
GLUCOSE SERPL-MCNC: 104 MG/DL — HIGH (ref 70–99)
HCT VFR BLD CALC: 29 % — LOW (ref 39–50)
HGB BLD-MCNC: 9.2 G/DL — LOW (ref 13–17)
IMM GRANULOCYTES NFR BLD AUTO: 0.3 % — SIGNIFICANT CHANGE UP (ref 0–0.9)
LYMPHOCYTES # BLD AUTO: 1.61 K/UL — SIGNIFICANT CHANGE UP (ref 1–3.3)
LYMPHOCYTES # BLD AUTO: 25.9 % — SIGNIFICANT CHANGE UP (ref 13–44)
MAGNESIUM SERPL-MCNC: 2 MG/DL — SIGNIFICANT CHANGE UP (ref 1.6–2.6)
MCHC RBC-ENTMCNC: 28.9 PG — SIGNIFICANT CHANGE UP (ref 27–34)
MCHC RBC-ENTMCNC: 31.7 G/DL — LOW (ref 32–36)
MCV RBC AUTO: 91.2 FL — SIGNIFICANT CHANGE UP (ref 80–100)
MONOCYTES # BLD AUTO: 0.4 K/UL — SIGNIFICANT CHANGE UP (ref 0–0.9)
MONOCYTES NFR BLD AUTO: 6.4 % — SIGNIFICANT CHANGE UP (ref 2–14)
NEUTROPHILS # BLD AUTO: 3.56 K/UL — SIGNIFICANT CHANGE UP (ref 1.8–7.4)
NEUTROPHILS NFR BLD AUTO: 57.3 % — SIGNIFICANT CHANGE UP (ref 43–77)
NRBC # BLD: 0 /100 WBCS — SIGNIFICANT CHANGE UP (ref 0–0)
PLATELET # BLD AUTO: 159 K/UL — SIGNIFICANT CHANGE UP (ref 150–400)
POTASSIUM SERPL-MCNC: 4.1 MMOL/L — SIGNIFICANT CHANGE UP (ref 3.5–5.3)
POTASSIUM SERPL-SCNC: 4.1 MMOL/L — SIGNIFICANT CHANGE UP (ref 3.5–5.3)
RBC # BLD: 3.18 M/UL — LOW (ref 4.2–5.8)
RBC # FLD: 16.5 % — HIGH (ref 10.3–14.5)
SODIUM SERPL-SCNC: 141 MMOL/L — SIGNIFICANT CHANGE UP (ref 135–145)
SPECIMEN SOURCE: SIGNIFICANT CHANGE UP
SPECIMEN SOURCE: SIGNIFICANT CHANGE UP
WBC # BLD: 6.22 K/UL — SIGNIFICANT CHANGE UP (ref 3.8–10.5)
WBC # FLD AUTO: 6.22 K/UL — SIGNIFICANT CHANGE UP (ref 3.8–10.5)

## 2024-11-02 PROCEDURE — 99232 SBSQ HOSP IP/OBS MODERATE 35: CPT

## 2024-11-02 RX ADMIN — MEROPENEM 100 MILLIGRAM(S): 500 INJECTION, POWDER, FOR SOLUTION INTRAVENOUS at 12:14

## 2024-11-02 RX ADMIN — CLOPIDOGREL 75 MILLIGRAM(S): 75 TABLET, FILM COATED ORAL at 12:13

## 2024-11-02 RX ADMIN — METOPROLOL TARTRATE 25 MILLIGRAM(S): 100 TABLET, FILM COATED ORAL at 17:28

## 2024-11-02 RX ADMIN — Medication 250 MILLIGRAM(S): at 17:29

## 2024-11-02 RX ADMIN — Medication 1: at 17:27

## 2024-11-02 RX ADMIN — MEROPENEM 100 MILLIGRAM(S): 500 INJECTION, POWDER, FOR SOLUTION INTRAVENOUS at 06:30

## 2024-11-02 RX ADMIN — MEROPENEM 100 MILLIGRAM(S): 500 INJECTION, POWDER, FOR SOLUTION INTRAVENOUS at 21:44

## 2024-11-02 RX ADMIN — Medication 81 MILLIGRAM(S): at 12:13

## 2024-11-02 RX ADMIN — Medication 80 MILLIGRAM(S): at 21:43

## 2024-11-02 RX ADMIN — Medication 250 MILLIGRAM(S): at 05:55

## 2024-11-02 RX ADMIN — TAMSULOSIN HYDROCHLORIDE 0.4 MILLIGRAM(S): 0.4 CAPSULE ORAL at 21:43

## 2024-11-02 RX ADMIN — INSULIN GLARGINE 8 UNIT(S): 100 INJECTION, SOLUTION SUBCUTANEOUS at 22:12

## 2024-11-02 RX ADMIN — METOPROLOL TARTRATE 25 MILLIGRAM(S): 100 TABLET, FILM COATED ORAL at 05:55

## 2024-11-02 RX ADMIN — CHLORHEXIDINE GLUCONATE 1 APPLICATION(S): 1.2 RINSE ORAL at 07:06

## 2024-11-02 RX ADMIN — FAMOTIDINE 20 MILLIGRAM(S): 20 TABLET, FILM COATED ORAL at 12:13

## 2024-11-02 RX ADMIN — ACETAMINOPHEN, DIPHENHYDRAMINE HCL, PHENYLEPHRINE HCL 3 MILLIGRAM(S): 325; 25; 5 TABLET ORAL at 21:43

## 2024-11-02 RX ADMIN — Medication 1000 MICROGRAM(S): at 12:13

## 2024-11-02 NOTE — PROGRESS NOTE ADULT - ASSESSMENT
82yo M with a PMH of HTN, HLD, Type 2 DM, CKD3a, hx of GI bleed, COPD, SANTO, BPH, CAD s/p PCI, PAD, multiple myeloma, lymphedema who presents with b/l heel ulcers and suspected OM planned for bilateral foot surgery with podiatry, Gabriel Deshpande, on 10/29 for his suspected bilateral heel osteomyelitis now s/p partial calcanectomy.        Problem/Plan - 1:  ·  Problem: Osteomyelitis of foot.   ·  Plan: - Bilateral heel osteomyelitis per outpatient w/up and admitted with a plan for OR with podiatry, Dr. Rios on 10/29  - Wound Cx (10/23): Proteus mirabilis, Pseudomonas aeruginosa, ESBL Klebsiella pneumoniae, Enterococcus faecalis  - s/p 14 days of Augmentin and 10 days of zosyn  - Blood Cx NGTD  - s/p partial calcanectomy on 10/29.    - f/up OR cultures +rare pseudomonas aeruginosa.  Pathology: bilateral heel acute and chronic osteomyelitis.  - Tylenol PRN for mild pain, percocet 1 tab PO Q6h PRN for moderate pain, and Dilaudid 0.5mg IV Q6h PRN for severe pain.  - Consulted ID Dr. Casillas, recs appreciated   - PICC line placed.  Continue meropenem  - afebrile, no leukocytosis  - cardiology Dr. Rock consulted, recs appreciated.     Problem/Plan - 2:  ·  Problem: DM (diabetes mellitus).   ·  Plan: - type 2 DM on insulin  - Home basal insulin 23 U at bedtime, given half dose of 12un last night but mild hypoglycemia in the mid-60s this morning and in PACU  on 10/29-- pt given D50; started on D5 1/2NS and will maintain overnight   - continue decreased lantus dose to 8units QHS   - continue low-dose lispro corrective ISS  - monitor FSG     Problem/Plan - 3:  ·  Problem: HTN (hypertension).   ·  Plan: Chronic   - continue metoprolol 25mg BID.     Problem/Plan - 4:  ·  Problem: HLD (hyperlipidemia).   ·  Plan: Chronic   - Continue atorvastatin 80mg QD.     Problem/Plan - 5:  ·  Problem: CAD (coronary artery disease).   ·  Plan: CAD sp stents x4  - Held ASA and Plavix on 10/29; Restarted on ASA and Plavix on 10/30  - c/w lipitor  - pt did have some strikethrough bleeding per nursing this evening -- Confirmed with podiatry: okay to restart plavix  - cardiology Dr. Rock consulted, recs appreciated.     Problem/Plan - 6:  ·  Problem: Benign prostatic hyperplasia with lower urinary tract symptoms, symptom details unspecified.   ·  Plan: Chronic  -Continue Flomax.     Problem/Plan - 7:  ·  Problem: Multiple myeloma.   ·  Plan: Chronic  - Chemotherapy held while in rehab.     Problem/Plan - 8:  ·  Problem: Need for prophylactic measure.   ·  Plan: DVT ppx: SCD

## 2024-11-02 NOTE — PROGRESS NOTE ADULT - ASSESSMENT
The patient is an 83 year old male with a history of HTN, HL, DM, CKD, GI bleed, COPD, SANTO, BPH, CAD s/p PCI, PAD, multiple myeloma, lymphedema who presents with heel ulcer.     Plan:  - ECG with sinus rhythm and no evidence of ischemia/infarction  - Echo 6/30/24 with normal LV systolic function, mild pulm HTN  - CXR with grossly clear lungs  - Lower extremity swelling consistent with known history of lymphedema  - Continue aspirin 81 mg daily  - Continue clopidogrel 75 mg daily  - Continue atorvastatin 80 mg daily  - Continue metoprolol tartrate 25 mg bid  - Podiatry and ID follow-up  - Path results with acute on chronic osteomyelitis  - IV antibiotics

## 2024-11-02 NOTE — PROGRESS NOTE ADULT - SUBJECTIVE AND OBJECTIVE BOX
CHIEF COMPLAINT/INTERVAL HISTORY:  Pt. seen and evaluated for bilateral heel osteomyelitis.  Pt. is in no distress.  Denies having significant pain in the feet this morning.   Tolerating IV antibiotics.  Denies having CP or SOB.      REVIEW OF SYSTEMS:  No fever, CP, SOB, or abdominal pain.      Vital Signs Last 24 Hrs  T(C): 36.7 (02 Nov 2024 05:07), Max: 37 (01 Nov 2024 20:07)  T(F): 98 (02 Nov 2024 05:07), Max: 98.6 (01 Nov 2024 20:07)  HR: 85 (02 Nov 2024 05:07) (84 - 92)  BP: 112/68 (02 Nov 2024 05:07) (112/68 - 164/68)  BP(mean): --  RR: 17 (02 Nov 2024 05:07) (17 - 17)  SpO2: 95% (02 Nov 2024 05:07) (92% - 95%)    Parameters below as of 01 Nov 2024 20:07  Patient On (Oxygen Delivery Method): room air        PHYSICAL EXAM:  GENERAL: NAD  HEENT: EOMI, hearing normal, conjunctiva and sclera clear  Chest: CTA bilaterally, no wheezing  CV: S1S2, RRR,   GI: soft, +BS, NT/ND  Musculoskeletal: decreased edema of LE, clean and dry dressing over bilateral feet.  Psychiatric: affect nL, mood nL  Skin: warm and dry    LABS:                        9.2    6.22  )-----------( 159      ( 02 Nov 2024 07:20 )             29.0     11-02    141  |  108  |  23  ----------------------------<  104[H]  4.1   |  30  |  1.00    Ca    9.8      02 Nov 2024 07:20  Mg     2.0     11-02        Urinalysis Basic - ( 02 Nov 2024 07:20 )    Color: x / Appearance: x / SG: x / pH: x  Gluc: 104 mg/dL / Ketone: x  / Bili: x / Urobili: x   Blood: x / Protein: x / Nitrite: x   Leuk Esterase: x / RBC: x / WBC x   Sq Epi: x / Non Sq Epi: x / Bacteria: x

## 2024-11-02 NOTE — PROGRESS NOTE ADULT - ASSESSMENT
82 yo male with hypertension, diabetes, hyperlipidemia, bilateral heel osteomyelitis currently on outpatient antibiotics through midline is presented for surgery planned with podiatry, Gabriel Deshpande, for his bilateral heel osteomyelitis. States his osteomyelitis started 6 months ago.   Calcanectomy, partial 29-Oct-2024 12:58:43  Jona Mason    RECOMMENDATIONS  bilateral heel osteomyelitis  prior micro with Proteus mirabilis, Pseudomonas aeruginosa, Enterococcus faecalis, Klebsiella pneumoniae ESBL  Culture - Tissue with Gram Stain (10.29.24 @ 12:00) Rare Pseudomonas aeruginosa  -  Ciprofloxacin: S <=0.25-  Levofloxacin: S <=0.5-  Meropenem: S <=1  sp surgery  Path- Right heel bone proximal margin- Acute and chronic osteomyelitis, Left heel bone proximal margin- Acute and chronic osteomyelitis  Residual osteo so rec Meropenem 1 gram IV q8 x 6 weeks so last day 12/9  weekly CBC w diff, CMP, ESR faxed to [phone]  PICC ordered and can be removed at end of Rx    Thank you for consulting us and involving us in the management of this most interesting and challenging case.  We will follow along in the care of this patient. Please call us at [phone] or text me directly on my cell# at [phone] with any concerns.

## 2024-11-02 NOTE — PROGRESS NOTE ADULT - SUBJECTIVE AND OBJECTIVE BOX
Chief Complaint: Heel ulcer    Interval Events: No events overnight.    Review of Systems:  General: No fevers, chills, weight gain  Skin: No rashes, color changes  Cardiovascular: No chest pain, orthopnea  Respiratory: No shortness of breath, cough  Gastrointestinal: No nausea, abdominal pain  Genitourinary: No incontinence, pain with urination  Musculoskeletal: No pain, swelling, decreased range of motion  Neurological: No headache, weakness  Psychiatric: No depression, anxiety  Endocrine: No weight gain, increased thirst  All other systems are comprehensively negative.    Physical Exam:  Vital Signs Last 24 Hrs  T(C): 36.7 (02 Nov 2024 05:07), Max: 37 (01 Nov 2024 20:07)  T(F): 98 (02 Nov 2024 05:07), Max: 98.6 (01 Nov 2024 20:07)  HR: 85 (02 Nov 2024 05:07) (84 - 92)  BP: 112/68 (02 Nov 2024 05:07) (112/68 - 164/68)  BP(mean): --  RR: 17 (02 Nov 2024 05:07) (17 - 17)  SpO2: 95% (02 Nov 2024 05:07) (92% - 95%)  Parameters below as of 01 Nov 2024 20:07  Patient On (Oxygen Delivery Method): room air  General: NAD  HEENT: MMM  Neck: No JVD, no carotid bruit  Lungs: CTAB  CV: RRR, nl S1/S2, no M/R/G  Abdomen: S/NT/ND, +BS  Extremities: +Lymphedema, no cyanosis  Neuro: AAOx3, non-focal  Skin: No rash    Labs:    11-01    140  |  105  |  21  ----------------------------<  104[H]  3.7   |  31  |  0.96    Ca    9.9      01 Nov 2024 08:38                          9.2    6.22  )-----------( 159      ( 02 Nov 2024 07:20 )             29.0       ECG/Telemetry: Sinus rhythm

## 2024-11-02 NOTE — PROGRESS NOTE ADULT - SUBJECTIVE AND OBJECTIVE BOX
[facility] DIVISION of INFECTIOUS DISEASE  Michoacano Casillas MD PhD, Cherry Vincent MD, Queta Ngo MD, Roselia Mcmanus MD, Andrez Bolden MD  and providing coverage with Trey Montelongo MD  Providing Infectious Disease Consultations at [facility], [facility], [facility]    Office# [phone] to schedule follow up appointments  Answering Service for urgent calls or New Consults [phone]  Cell# to text for urgent issues Micohacano Casillas [phone]     infectious diseases progress note:    DALILA HERNADEZ is a 83y y. o. Male patient    Overnight and events of the last 24hrs reviewed    Allergies    No Known Allergies    Intolerances        ANTIBIOTICS/RELEVANT:  antimicrobials  meropenem  IVPB      meropenem  IVPB 1000 milliGRAM(s) IV Intermittent every 8 hours    immunologic:    OTHER:  acetaminophen     Tablet .. 650 milliGRAM(s) Oral every 6 hours PRN  acetaminophen   IVPB .. 1000 milliGRAM(s) IV Intermittent once  aluminum hydroxide/magnesium hydroxide/simethicone Suspension 30 milliLiter(s) Oral every 4 hours PRN  aspirin  chewable 81 milliGRAM(s) Oral daily  atorvastatin 80 milliGRAM(s) Oral at bedtime  chlorhexidine 4% Liquid 1 Application(s) Topical <User Schedule>  clopidogrel Tablet 75 milliGRAM(s) Oral daily  cyanocobalamin 1000 MICROGram(s) Oral daily  dextrose 5%. 1000 milliLiter(s) IV Continuous <Continuous>  dextrose 5%. 1000 milliLiter(s) IV Continuous <Continuous>  dextrose 50% Injectable 25 Gram(s) IV Push once  dextrose 50% Injectable 12.5 Gram(s) IV Push once  dextrose 50% Injectable 12.5 Gram(s) IV Push once  dextrose 50% Injectable 25 Gram(s) IV Push once  dextrose Oral Gel 15 Gram(s) Oral once PRN  famotidine    Tablet 20 milliGRAM(s) Oral daily  glucagon  Injectable 1 milliGRAM(s) IntraMuscular once  HYDROmorphone  Injectable 0.5 milliGRAM(s) IV Push every 6 hours PRN  insulin glargine Injectable (LANTUS) 8 Unit(s) SubCutaneous at bedtime  insulin lispro (ADMELOG) corrective regimen sliding scale   SubCutaneous three times a day before meals  melatonin 3 milliGRAM(s) Oral at bedtime  metoprolol tartrate 25 milliGRAM(s) Oral two times a day  oxycodone    5 mG/acetaminophen 325 mG 1 Tablet(s) Oral every 6 hours PRN  saccharomyces boulardii 250 milliGRAM(s) Oral two times a day  sodium chloride 0.9% lock flush 10 milliLiter(s) IV Push every 1 hour PRN  tamsulosin 0.4 milliGRAM(s) Oral at bedtime      Objective:  Vital Signs Last 24 Hrs  T(C): 36.8 (02 Nov 2024 12:26), Max: 37 (01 Nov 2024 20:07)  T(F): 98.2 (02 Nov 2024 12:26), Max: 98.6 (01 Nov 2024 20:07)  HR: 85 (02 Nov 2024 12:26) (85 - 92)  BP: 162/91 (02 Nov 2024 12:26) (112/68 - 164/68)  BP(mean): --  RR: 18 (02 Nov 2024 12:26) (17 - 18)  SpO2: 98% (02 Nov 2024 12:26) (92% - 98%)    Parameters below as of 02 Nov 2024 12:26  Patient On (Oxygen Delivery Method): room air        T(C): 36.8 (11-02-24 @ 12:26), Max: 37 (10-31-24 @ 20:19)  T(C): 36.8 (11-02-24 @ 12:26), Max: 37 (10-31-24 @ 20:19)  T(C): 36.8 (11-02-24 @ 12:26), Max: 37 (10-31-24 @ 20:19)    PHYSICAL EXAM:  HEENT: NC atraumatic  Neck: supple  Respiratory: no accessory muscle use, breathing comfortably  Cardiovascular: distant  Gastrointestinal: normal appearing, nondistended  Extremities: no clubbing, no cyanosis,        LABS:                          9.2    6.22  )-----------( 159      ( 02 Nov 2024 07:20 )             29.0       WBC  6.22 11-02 @ 07:20  6.18 11-01 @ 08:38  7.22 10-31 @ 07:02  7.93 10-30 @ 10:25  4.87 10-29 @ 04:40  5.43 10-28 @ 13:11      11-02    141  |  108  |  23  ----------------------------<  104[H]  4.1   |  30  |  1.00    Ca    9.8      02 Nov 2024 07:20  Mg     2.0     11-02        Creatinine: 1.00 mg/dL (11-02-24 @ 07:20)  Creatinine: 0.96 mg/dL (11-01-24 @ 08:38)  Creatinine: 1.00 mg/dL (10-31-24 @ 07:02)  Creatinine: 1.10 mg/dL (10-30-24 @ 10:25)  Creatinine: 1.10 mg/dL (10-29-24 @ 04:40)  Creatinine: 1.30 mg/dL (10-28-24 @ 13:11)        Urinalysis Basic - ( 02 Nov 2024 07:20 )    Color: x / Appearance: x / SG: x / pH: x  Gluc: 104 mg/dL / Ketone: x  / Bili: x / Urobili: x   Blood: x / Protein: x / Nitrite: x   Leuk Esterase: x / RBC: x / WBC x   Sq Epi: x / Non Sq Epi: x / Bacteria: x            INFLAMMATORY MARKERS      MICROBIOLOGY:              RADIOLOGY & ADDITIONAL STUDIES:

## 2024-11-03 LAB
ANION GAP SERPL CALC-SCNC: 6 MMOL/L — SIGNIFICANT CHANGE UP (ref 5–17)
BASOPHILS # BLD AUTO: 0.02 K/UL — SIGNIFICANT CHANGE UP (ref 0–0.2)
BASOPHILS NFR BLD AUTO: 0.3 % — SIGNIFICANT CHANGE UP (ref 0–2)
BUN SERPL-MCNC: 25 MG/DL — HIGH (ref 7–23)
CALCIUM SERPL-MCNC: 9.2 MG/DL — SIGNIFICANT CHANGE UP (ref 8.5–10.1)
CHLORIDE SERPL-SCNC: 107 MMOL/L — SIGNIFICANT CHANGE UP (ref 96–108)
CO2 SERPL-SCNC: 30 MMOL/L — SIGNIFICANT CHANGE UP (ref 22–31)
CREAT SERPL-MCNC: 0.96 MG/DL — SIGNIFICANT CHANGE UP (ref 0.5–1.3)
CULTURE RESULTS: ABNORMAL
CULTURE RESULTS: ABNORMAL
EGFR: 79 ML/MIN/1.73M2 — SIGNIFICANT CHANGE UP
EOSINOPHIL # BLD AUTO: 0.56 K/UL — HIGH (ref 0–0.5)
EOSINOPHIL NFR BLD AUTO: 9.7 % — HIGH (ref 0–6)
GLUCOSE SERPL-MCNC: 86 MG/DL — SIGNIFICANT CHANGE UP (ref 70–99)
HCT VFR BLD CALC: 30.4 % — LOW (ref 39–50)
HGB BLD-MCNC: 9.4 G/DL — LOW (ref 13–17)
IMM GRANULOCYTES NFR BLD AUTO: 0.3 % — SIGNIFICANT CHANGE UP (ref 0–0.9)
LYMPHOCYTES # BLD AUTO: 1.65 K/UL — SIGNIFICANT CHANGE UP (ref 1–3.3)
LYMPHOCYTES # BLD AUTO: 28.6 % — SIGNIFICANT CHANGE UP (ref 13–44)
MCHC RBC-ENTMCNC: 28.6 PG — SIGNIFICANT CHANGE UP (ref 27–34)
MCHC RBC-ENTMCNC: 30.9 G/DL — LOW (ref 32–36)
MCV RBC AUTO: 92.4 FL — SIGNIFICANT CHANGE UP (ref 80–100)
MONOCYTES # BLD AUTO: 0.33 K/UL — SIGNIFICANT CHANGE UP (ref 0–0.9)
MONOCYTES NFR BLD AUTO: 5.7 % — SIGNIFICANT CHANGE UP (ref 2–14)
NEUTROPHILS # BLD AUTO: 3.18 K/UL — SIGNIFICANT CHANGE UP (ref 1.8–7.4)
NEUTROPHILS NFR BLD AUTO: 55.4 % — SIGNIFICANT CHANGE UP (ref 43–77)
NRBC # BLD: 0 /100 WBCS — SIGNIFICANT CHANGE UP (ref 0–0)
ORGANISM # SPEC MICROSCOPIC CNT: ABNORMAL
ORGANISM # SPEC MICROSCOPIC CNT: ABNORMAL
ORGANISM # SPEC MICROSCOPIC CNT: SIGNIFICANT CHANGE UP
ORGANISM # SPEC MICROSCOPIC CNT: SIGNIFICANT CHANGE UP
PLATELET # BLD AUTO: 199 K/UL — SIGNIFICANT CHANGE UP (ref 150–400)
POTASSIUM SERPL-MCNC: 3.7 MMOL/L — SIGNIFICANT CHANGE UP (ref 3.5–5.3)
POTASSIUM SERPL-SCNC: 3.7 MMOL/L — SIGNIFICANT CHANGE UP (ref 3.5–5.3)
RBC # BLD: 3.29 M/UL — LOW (ref 4.2–5.8)
RBC # FLD: 16.2 % — HIGH (ref 10.3–14.5)
SODIUM SERPL-SCNC: 143 MMOL/L — SIGNIFICANT CHANGE UP (ref 135–145)
SPECIMEN SOURCE: SIGNIFICANT CHANGE UP
SPECIMEN SOURCE: SIGNIFICANT CHANGE UP
WBC # BLD: 5.76 K/UL — SIGNIFICANT CHANGE UP (ref 3.8–10.5)
WBC # FLD AUTO: 5.76 K/UL — SIGNIFICANT CHANGE UP (ref 3.8–10.5)

## 2024-11-03 PROCEDURE — 99232 SBSQ HOSP IP/OBS MODERATE 35: CPT

## 2024-11-03 RX ORDER — HYDROMORPHONE HYDROCHLORIDE 2 MG/1
0.5 TABLET ORAL EVERY 6 HOURS
Refills: 0 | Status: DISCONTINUED | OUTPATIENT
Start: 2024-11-03 | End: 2024-11-03

## 2024-11-03 RX ORDER — NYSTATIN 100000 [USP'U]/G
1 POWDER TOPICAL
Refills: 0 | Status: DISCONTINUED | OUTPATIENT
Start: 2024-11-03 | End: 2024-11-19

## 2024-11-03 RX ORDER — OXYCODONE AND ACETAMINOPHEN 5; 325 MG/1; MG/1
1 TABLET ORAL EVERY 6 HOURS
Refills: 0 | Status: DISCONTINUED | OUTPATIENT
Start: 2024-11-03 | End: 2024-11-03

## 2024-11-03 RX ADMIN — CHLORHEXIDINE GLUCONATE 1 APPLICATION(S): 1.2 RINSE ORAL at 05:49

## 2024-11-03 RX ADMIN — TAMSULOSIN HYDROCHLORIDE 0.4 MILLIGRAM(S): 0.4 CAPSULE ORAL at 21:31

## 2024-11-03 RX ADMIN — MEROPENEM 100 MILLIGRAM(S): 500 INJECTION, POWDER, FOR SOLUTION INTRAVENOUS at 05:18

## 2024-11-03 RX ADMIN — FAMOTIDINE 20 MILLIGRAM(S): 20 TABLET, FILM COATED ORAL at 12:39

## 2024-11-03 RX ADMIN — METOPROLOL TARTRATE 25 MILLIGRAM(S): 100 TABLET, FILM COATED ORAL at 17:08

## 2024-11-03 RX ADMIN — INSULIN GLARGINE 8 UNIT(S): 100 INJECTION, SOLUTION SUBCUTANEOUS at 21:33

## 2024-11-03 RX ADMIN — Medication 250 MILLIGRAM(S): at 17:08

## 2024-11-03 RX ADMIN — MEROPENEM 100 MILLIGRAM(S): 500 INJECTION, POWDER, FOR SOLUTION INTRAVENOUS at 21:32

## 2024-11-03 RX ADMIN — NYSTATIN 1 APPLICATION(S): 100000 POWDER TOPICAL at 21:33

## 2024-11-03 RX ADMIN — MEROPENEM 100 MILLIGRAM(S): 500 INJECTION, POWDER, FOR SOLUTION INTRAVENOUS at 17:07

## 2024-11-03 RX ADMIN — Medication 1: at 17:12

## 2024-11-03 RX ADMIN — Medication 80 MILLIGRAM(S): at 21:32

## 2024-11-03 RX ADMIN — Medication 250 MILLIGRAM(S): at 05:18

## 2024-11-03 RX ADMIN — CLOPIDOGREL 75 MILLIGRAM(S): 75 TABLET, FILM COATED ORAL at 12:39

## 2024-11-03 RX ADMIN — METOPROLOL TARTRATE 25 MILLIGRAM(S): 100 TABLET, FILM COATED ORAL at 05:18

## 2024-11-03 RX ADMIN — Medication 81 MILLIGRAM(S): at 12:39

## 2024-11-03 RX ADMIN — Medication 1: at 12:37

## 2024-11-03 RX ADMIN — Medication 1000 MICROGRAM(S): at 12:39

## 2024-11-03 RX ADMIN — ACETAMINOPHEN, DIPHENHYDRAMINE HCL, PHENYLEPHRINE HCL 3 MILLIGRAM(S): 325; 25; 5 TABLET ORAL at 21:31

## 2024-11-03 NOTE — PROGRESS NOTE ADULT - SUBJECTIVE AND OBJECTIVE BOX
[facility] DIV[facility] of INFECTIOUS DISEASE  Michoacano Casillas MD PhD, Cherry Vincent MD, Queta Ngo MD, Roselai Mcmanus MD, Andrez Bolden MD  and providing coverage with Trey Montelongo MD  Providing Infectious Disease Consultations at [facility], [facility], [facility]'s    Office# [phone] to schedule follow up appointments  Answering Service for urgent calls or New Consults [phone]  Cell# to text for urgent issues Michoacano Casillas [phone]     infectious diseases progress note:    DALILA HERNADEZ is a 83y y. o. Male patient    Overnight and events of the last 24hrs reviewed    Allergies    No Known Allergies    Intolerances        ANTIBIOTICS/RELEVANT:  antimicrobials  meropenem  IVPB 1000 milliGRAM(s) IV Intermittent every 8 hours  meropenem  IVPB        immunologic:    OTHER:  acetaminophen     Tablet .. 650 milliGRAM(s) Oral every 6 hours PRN  acetaminophen   IVPB .. 1000 milliGRAM(s) IV Intermittent once  aluminum hydroxide/magnesium hydroxide/simethicone Suspension 30 milliLiter(s) Oral every 4 hours PRN  aspirin  chewable 81 milliGRAM(s) Oral daily  atorvastatin 80 milliGRAM(s) Oral at bedtime  chlorhexidine 4% Liquid 1 Application(s) Topical <User Schedule>  clopidogrel Tablet 75 milliGRAM(s) Oral daily  cyanocobalamin 1000 MICROGram(s) Oral daily  dextrose 5%. 1000 milliLiter(s) IV Continuous <Continuous>  dextrose 5%. 1000 milliLiter(s) IV Continuous <Continuous>  dextrose 50% Injectable 25 Gram(s) IV Push once  dextrose 50% Injectable 12.5 Gram(s) IV Push once  dextrose 50% Injectable 12.5 Gram(s) IV Push once  dextrose 50% Injectable 25 Gram(s) IV Push once  dextrose Oral Gel 15 Gram(s) Oral once PRN  famotidine    Tablet 20 milliGRAM(s) Oral daily  glucagon  Injectable 1 milliGRAM(s) IntraMuscular once  HYDROmorphone  Injectable 0.5 milliGRAM(s) IV Push every 6 hours PRN  insulin glargine Injectable (LANTUS) 8 Unit(s) SubCutaneous at bedtime  insulin lispro (ADMELOG) corrective regimen sliding scale   SubCutaneous three times a day before meals  melatonin 3 milliGRAM(s) Oral at bedtime  metoprolol tartrate 25 milliGRAM(s) Oral two times a day  oxycodone    5 mG/acetaminophen 325 mG 1 Tablet(s) Oral every 6 hours PRN  saccharomyces boulardii 250 milliGRAM(s) Oral two times a day  sodium chloride 0.9% lock flush 10 milliLiter(s) IV Push every 1 hour PRN  tamsulosin 0.4 milliGRAM(s) Oral at bedtime      Objective:  Vital Signs Last 24 Hrs  T(C): 36.8 (03 Nov 2024 11:26), Max: 36.8 (02 Nov 2024 12:26)  T(F): 98.3 (03 Nov 2024 11:26), Max: 98.3 (03 Nov 2024 11:26)  HR: 89 (03 Nov 2024 11:26) (77 - 89)  BP: 157/81 (03 Nov 2024 11:26) (144/80 - 162/91)  BP(mean): --  RR: 18 (03 Nov 2024 11:26) (18 - 18)  SpO2: 96% (03 Nov 2024 11:26) (94% - 98%)    Parameters below as of 03 Nov 2024 11:26  Patient On (Oxygen Delivery Method): room air        T(C): 36.8 (11-03-24 @ 11:26), Max: 37 (11-01-24 @ 20:07)  T(C): 36.8 (11-03-24 @ 11:26), Max: 37 (10-31-24 @ 20:19)  T(C): 36.8 (11-03-24 @ 11:26), Max: 37 (10-31-24 @ 20:19)    PHYSICAL EXAM:  HEENT: NC atraumatic  Neck: supple  Respiratory: no accessory muscle use, breathing comfortably  Cardiovascular: distant  Gastrointestinal: normal appearing, nondistended  Extremities: no clubbing, no cyanosis,        LABS:                          9.4    5.76  )-----------( 199      ( 03 Nov 2024 07:21 )             30.4       WBC  5.76 11-03 @ 07:21  6.22 11-02 @ 07:20  6.18 11-01 @ 08:38  7.22 10-31 @ 07:02  7.93 10-30 @ 10:25  4.87 10-29 @ 04:40  5.43 10-28 @ 13:11      11-03    143  |  107  |  25[H]  ----------------------------<  86  3.7   |  30  |  0.96    Ca    9.2      03 Nov 2024 07:21  Mg     2.0     11-02        Creatinine: 0.96 mg/dL (11-03-24 @ 07:21)  Creatinine: 1.00 mg/dL (11-02-24 @ 07:20)  Creatinine: 0.96 mg/dL (11-01-24 @ 08:38)  Creatinine: 1.00 mg/dL (10-31-24 @ 07:02)  Creatinine: 1.10 mg/dL (10-30-24 @ 10:25)  Creatinine: 1.10 mg/dL (10-29-24 @ 04:40)  Creatinine: 1.30 mg/dL (10-28-24 @ 13:11)        Urinalysis Basic - ( 03 Nov 2024 07:21 )    Color: x / Appearance: x / SG: x / pH: x  Gluc: 86 mg/dL / Ketone: x  / Bili: x / Urobili: x   Blood: x / Protein: x / Nitrite: x   Leuk Esterase: x / RBC: x / WBC x   Sq Epi: x / Non Sq Epi: x / Bacteria: x            INFLAMMATORY MARKERS      MICROBIOLOGY:              RADIOLOGY & ADDITIONAL STUDIES:

## 2024-11-03 NOTE — PROGRESS NOTE ADULT - ASSESSMENT
84 yo male with hypertension, diabetes, hyperlipidemia, bilateral heel osteomyelitis currently on outpatient antibiotics through midline is presented for surgery planned with podiatry, Gabriel Deshpande, for his bilateral heel osteomyelitis. States his osteomyelitis started 6 months ago.   Calcanectomy, partial 29-Oct-2024 12:58:43  Jona Mason    RECOMMENDATIONS  bilateral heel osteomyelitis  prior micro with Proteus mirabilis, Pseudomonas aeruginosa, Enterococcus faecalis, Klebsiella pneumoniae ESBL  Culture - Tissue with Gram Stain (10.29.24 @ 12:00) Rare Pseudomonas aeruginosa  -  Ciprofloxacin: S <=0.25-  Levofloxacin: S <=0.5-  Meropenem: S <=1  sp surgery  Path- Right heel bone proximal margin- Acute and chronic osteomyelitis, Left heel bone proximal margin- Acute and chronic osteomyelitis  Residual osteo so rec Meropenem 1 gram IV q8 x 6 weeks so last day 12/9  weekly CBC w diff, CMP, ESR faxed to [phone]  PICC can be removed at end of Rx    Thank you for consulting us and involving us in the management of this most interesting and challenging case.  We will follow along in the care of this patient. Please call us at [phone] or text me directly on my cell# at [phone] with any concerns.

## 2024-11-03 NOTE — PROGRESS NOTE ADULT - ASSESSMENT
82yo M with a PMH of HTN, HLD, Type 2 DM, CKD3a, hx of GI bleed, COPD, SANTO, BPH, CAD s/p PCI, PAD, multiple myeloma, lymphedema who presents with b/l heel ulcers and suspected OM planned for bilateral foot surgery with podiatry, Gabriel Deshpande, on 10/29 for his suspected bilateral heel osteomyelitis now s/p partial calcanectomy.        Problem/Plan - 1:  ·  Problem: Osteomyelitis of foot.   ·  Plan: - Bilateral heel osteomyelitis per outpatient w/up and admitted with a plan for OR with podiatry, Dr. Rios on 10/29  - Wound Cx (10/23): Proteus mirabilis, Pseudomonas aeruginosa, ESBL Klebsiella pneumoniae, Enterococcus faecalis  - s/p 14 days of Augmentin and 10 days of zosyn  - Blood Cx NG  - s/p partial calcanectomy on 10/29.    - f/up OR cultures +rare pseudomonas aeruginosa.  Pathology: bilateral heel acute and chronic osteomyelitis.  - Tylenol PRN for mild pain, percocet 1 tab PO Q6h PRN for moderate pain, and Dilaudid 0.5mg IV Q6h PRN for severe pain.  - Consulted ID Dr. Casillas, recs appreciated   - PICC line placed.  Continue meropenem  - afebrile, no leukocytosis  - cardiology Dr. Rock consulted, recs appreciated.     Problem/Plan - 2:  ·  Problem: DM (diabetes mellitus).   ·  Plan: - type 2 DM on insulin  - Home basal insulin 23 U at bedtime, given half dose of 12un last night but mild hypoglycemia in the mid-60s this morning and in PACU  on 10/29-- pt given D50; started on D5 1/2NS and will maintain overnight   - continue decreased lantus dose 8units QHS   - continue low-dose lispro corrective ISS  - monitor FSG     Problem/Plan - 3:  ·  Problem: HTN (hypertension).   ·  Plan: Chronic   - continue metoprolol 25mg BID.     Problem/Plan - 4:  ·  Problem: HLD (hyperlipidemia).   ·  Plan: Chronic   - Continue atorvastatin 80mg QD.     Problem/Plan - 5:  ·  Problem: CAD (coronary artery disease).   ·  Plan: CAD sp stents x4  - Held ASA and Plavix on 10/29; Restarted on ASA and Plavix on 10/30  - c/w lipitor  - pt did have some strikethrough bleeding per nursing this evening -- Confirmed with podiatry: okay to restart plavix  - cardiology Dr. Rock consulted, recs appreciated.     Problem/Plan - 6:  ·  Problem: Benign prostatic hyperplasia with lower urinary tract symptoms, symptom details unspecified.   ·  Plan: Chronic  -Continue Flomax.     Problem/Plan - 7:  ·  Problem: Multiple myeloma.   ·  Plan: Chronic  - Chemotherapy held while in rehab.     Problem/Plan - 8:  ·  Problem: Need for prophylactic measure.   ·  Plan: DVT ppx: SCD.  No chemical VTE ppx as patient on ASA and Palvix with strikethrough bleeding of right heel dressing.

## 2024-11-03 NOTE — PROGRESS NOTE ADULT - SUBJECTIVE AND OBJECTIVE BOX
CHIEF COMPLAINT/INTERVAL HISTORY:  Pt. seen and evaluated for bilateral heel osteomyelitis.  Pt. is in no distress.  Tolerating IV antibiotics.  Denies having any pain at this time.      REVIEW OF SYSTEMS:  No fever, CP, SOB, or abdominal pain    Vital Signs Last 24 Hrs  T(C): 36.5 (03 Nov 2024 05:03), Max: 36.8 (02 Nov 2024 12:26)  T(F): 97.7 (03 Nov 2024 05:03), Max: 98.2 (02 Nov 2024 12:26)  HR: 82 (03 Nov 2024 05:03) (77 - 85)  BP: 145/82 (03 Nov 2024 05:03) (144/80 - 162/91)  BP(mean): --  RR: 18 (03 Nov 2024 05:03) (18 - 18)  SpO2: 94% (03 Nov 2024 05:03) (94% - 98%)    Parameters below as of 03 Nov 2024 05:03  Patient On (Oxygen Delivery Method): room air        PHYSICAL EXAM:  GENERAL: NAD  HEENT: EOMI, hearing normal, conjunctiva and sclera clear  Chest: CTA bilaterally, no wheezing  CV: S1S2, RRR,   GI: soft, +BS, NT/ND  Musculoskeletal: decreased edema of bilateral LE, dressing over bilateral feet with ACE wrap  Psychiatric: affect nL, mood nL  Skin: warm and dry    LABS:                        9.4    5.76  )-----------( 199      ( 03 Nov 2024 07:21 )             30.4     11-03    143  |  107  |  25[H]  ----------------------------<  86  3.7   |  30  |  0.96    Ca    9.2      03 Nov 2024 07:21  Mg     2.0     11-02        Urinalysis Basic - ( 03 Nov 2024 07:21 )    Color: x / Appearance: x / SG: x / pH: x  Gluc: 86 mg/dL / Ketone: x  / Bili: x / Urobili: x   Blood: x / Protein: x / Nitrite: x   Leuk Esterase: x / RBC: x / WBC x   Sq Epi: x / Non Sq Epi: x / Bacteria: x

## 2024-11-04 LAB
ANION GAP SERPL CALC-SCNC: 2 MMOL/L — LOW (ref 5–17)
BASOPHILS # BLD AUTO: 0.02 K/UL — SIGNIFICANT CHANGE UP (ref 0–0.2)
BASOPHILS NFR BLD AUTO: 0.3 % — SIGNIFICANT CHANGE UP (ref 0–2)
BUN SERPL-MCNC: 25 MG/DL — HIGH (ref 7–23)
CALCIUM SERPL-MCNC: 10 MG/DL — SIGNIFICANT CHANGE UP (ref 8.5–10.1)
CHLORIDE SERPL-SCNC: 106 MMOL/L — SIGNIFICANT CHANGE UP (ref 96–108)
CO2 SERPL-SCNC: 35 MMOL/L — HIGH (ref 22–31)
CREAT SERPL-MCNC: 0.89 MG/DL — SIGNIFICANT CHANGE UP (ref 0.5–1.3)
EGFR: 86 ML/MIN/1.73M2 — SIGNIFICANT CHANGE UP
EOSINOPHIL # BLD AUTO: 0.39 K/UL — SIGNIFICANT CHANGE UP (ref 0–0.5)
EOSINOPHIL NFR BLD AUTO: 6.8 % — HIGH (ref 0–6)
GLUCOSE SERPL-MCNC: 101 MG/DL — HIGH (ref 70–99)
HCT VFR BLD CALC: 29.8 % — LOW (ref 39–50)
HGB BLD-MCNC: 9.2 G/DL — LOW (ref 13–17)
IMM GRANULOCYTES NFR BLD AUTO: 0.2 % — SIGNIFICANT CHANGE UP (ref 0–0.9)
LYMPHOCYTES # BLD AUTO: 1.66 K/UL — SIGNIFICANT CHANGE UP (ref 1–3.3)
LYMPHOCYTES # BLD AUTO: 29 % — SIGNIFICANT CHANGE UP (ref 13–44)
MCHC RBC-ENTMCNC: 28.6 PG — SIGNIFICANT CHANGE UP (ref 27–34)
MCHC RBC-ENTMCNC: 30.9 G/DL — LOW (ref 32–36)
MCV RBC AUTO: 92.5 FL — SIGNIFICANT CHANGE UP (ref 80–100)
MONOCYTES # BLD AUTO: 0.36 K/UL — SIGNIFICANT CHANGE UP (ref 0–0.9)
MONOCYTES NFR BLD AUTO: 6.3 % — SIGNIFICANT CHANGE UP (ref 2–14)
NEUTROPHILS # BLD AUTO: 3.29 K/UL — SIGNIFICANT CHANGE UP (ref 1.8–7.4)
NEUTROPHILS NFR BLD AUTO: 57.4 % — SIGNIFICANT CHANGE UP (ref 43–77)
NRBC # BLD: 0 /100 WBCS — SIGNIFICANT CHANGE UP (ref 0–0)
PLATELET # BLD AUTO: 180 K/UL — SIGNIFICANT CHANGE UP (ref 150–400)
POTASSIUM SERPL-MCNC: 3.9 MMOL/L — SIGNIFICANT CHANGE UP (ref 3.5–5.3)
POTASSIUM SERPL-SCNC: 3.9 MMOL/L — SIGNIFICANT CHANGE UP (ref 3.5–5.3)
RBC # BLD: 3.22 M/UL — LOW (ref 4.2–5.8)
RBC # FLD: 16.1 % — HIGH (ref 10.3–14.5)
SODIUM SERPL-SCNC: 143 MMOL/L — SIGNIFICANT CHANGE UP (ref 135–145)
WBC # BLD: 5.73 K/UL — SIGNIFICANT CHANGE UP (ref 3.8–10.5)
WBC # FLD AUTO: 5.73 K/UL — SIGNIFICANT CHANGE UP (ref 3.8–10.5)

## 2024-11-04 PROCEDURE — 99232 SBSQ HOSP IP/OBS MODERATE 35: CPT

## 2024-11-04 RX ADMIN — MEROPENEM 100 MILLIGRAM(S): 500 INJECTION, POWDER, FOR SOLUTION INTRAVENOUS at 06:13

## 2024-11-04 RX ADMIN — Medication 80 MILLIGRAM(S): at 21:28

## 2024-11-04 RX ADMIN — INSULIN GLARGINE 8 UNIT(S): 100 INJECTION, SOLUTION SUBCUTANEOUS at 21:27

## 2024-11-04 RX ADMIN — Medication 1000 MICROGRAM(S): at 12:24

## 2024-11-04 RX ADMIN — Medication 250 MILLIGRAM(S): at 06:13

## 2024-11-04 RX ADMIN — Medication 81 MILLIGRAM(S): at 12:25

## 2024-11-04 RX ADMIN — Medication 250 MILLIGRAM(S): at 17:32

## 2024-11-04 RX ADMIN — ACETAMINOPHEN, DIPHENHYDRAMINE HCL, PHENYLEPHRINE HCL 3 MILLIGRAM(S): 325; 25; 5 TABLET ORAL at 21:28

## 2024-11-04 RX ADMIN — CLOPIDOGREL 75 MILLIGRAM(S): 75 TABLET, FILM COATED ORAL at 12:25

## 2024-11-04 RX ADMIN — Medication 1: at 12:35

## 2024-11-04 RX ADMIN — NYSTATIN 1 APPLICATION(S): 100000 POWDER TOPICAL at 06:13

## 2024-11-04 RX ADMIN — METOPROLOL TARTRATE 25 MILLIGRAM(S): 100 TABLET, FILM COATED ORAL at 06:13

## 2024-11-04 RX ADMIN — FAMOTIDINE 20 MILLIGRAM(S): 20 TABLET, FILM COATED ORAL at 12:25

## 2024-11-04 RX ADMIN — METOPROLOL TARTRATE 25 MILLIGRAM(S): 100 TABLET, FILM COATED ORAL at 17:32

## 2024-11-04 RX ADMIN — MEROPENEM 100 MILLIGRAM(S): 500 INJECTION, POWDER, FOR SOLUTION INTRAVENOUS at 21:27

## 2024-11-04 RX ADMIN — CHLORHEXIDINE GLUCONATE 1 APPLICATION(S): 1.2 RINSE ORAL at 06:31

## 2024-11-04 RX ADMIN — TAMSULOSIN HYDROCHLORIDE 0.4 MILLIGRAM(S): 0.4 CAPSULE ORAL at 21:28

## 2024-11-04 RX ADMIN — MEROPENEM 100 MILLIGRAM(S): 500 INJECTION, POWDER, FOR SOLUTION INTRAVENOUS at 14:03

## 2024-11-04 RX ADMIN — NYSTATIN 1 APPLICATION(S): 100000 POWDER TOPICAL at 17:32

## 2024-11-04 NOTE — PROGRESS NOTE ADULT - SUBJECTIVE AND OBJECTIVE BOX
CHIEF COMPLAINT/INTERVAL HISTORY:  Pt. seen and evaluated for bilateral heel osteomyelitis.  Pt. is in no distress.  Tolerating IV antibiotics.  Reports having some pain involving the heels.      REVIEW OF SYSTEMS:  No fever, CP, SOB, or abdominal pain      Vital Signs Last 24 Hrs  T(C): 36.5 (04 Nov 2024 05:02), Max: 36.8 (03 Nov 2024 11:26)  T(F): 97.7 (04 Nov 2024 05:02), Max: 98.3 (03 Nov 2024 11:26)  HR: 89 (04 Nov 2024 05:02) (89 - 89)  BP: 148/76 (04 Nov 2024 05:02) (133/79 - 157/81)  BP(mean): --  RR: 17 (04 Nov 2024 05:02) (17 - 18)  SpO2: 96% (04 Nov 2024 05:02) (95% - 96%)    Parameters below as of 04 Nov 2024 05:02  Patient On (Oxygen Delivery Method): room air        PHYSICAL EXAM:  GENERAL: NAD  HEENT: EOMI, hearing normal, conjunctiva and sclera clear  Chest: CTA bilaterally, no wheezing  CV: S1S2, RRR,   GI: soft, +BS, NT/ND  Musculoskeletal: decreased edema of bialteral LE, dressing with ACE wrap over bilateral feet  Psychiatric: affect nL, mood nL  Skin: warm and dry    LABS:                        9.2    5.73  )-----------( 180      ( 04 Nov 2024 07:15 )             29.8     11-03    143  |  107  |  25[H]  ----------------------------<  86  3.7   |  30  |  0.96    Ca    9.2      03 Nov 2024 07:21        Urinalysis Basic - ( 03 Nov 2024 07:21 )    Color: x / Appearance: x / SG: x / pH: x  Gluc: 86 mg/dL / Ketone: x  / Bili: x / Urobili: x   Blood: x / Protein: x / Nitrite: x   Leuk Esterase: x / RBC: x / WBC x   Sq Epi: x / Non Sq Epi: x / Bacteria: x

## 2024-11-04 NOTE — PROGRESS NOTE ADULT - ASSESSMENT
The patient is an 83 year old male with a history of HTN, HL, DM, CKD, GI bleed, COPD, SANTO, BPH, CAD s/p PCI, PAD, multiple myeloma, lymphedema who presents with heel ulcer.     Plan:  - ECG with sinus rhythm and no evidence of ischemia/infarction  - Echo 6/30/24 with normal LV systolic function, mild pulm HTN  - CXR with grossly clear lungs  - Lower extremity swelling consistent with known history of lymphedema  - Continue aspirin 81 mg daily  - Continue clopidogrel 75 mg daily  - Continue atorvastatin 80 mg daily  - Continue metoprolol tartrate 25 mg bid  - Podiatry and ID follow-up  - Path results with acute on chronic osteomyelitis  - IV antibiotics  - Discharge plannning

## 2024-11-04 NOTE — PROGRESS NOTE ADULT - SUBJECTIVE AND OBJECTIVE BOX
Chief Complaint: Heel ulcer    Interval Events: No events overnight.    Review of Systems:  General: No fevers, chills, weight gain  Skin: No rashes, color changes  Cardiovascular: No chest pain, orthopnea  Respiratory: No shortness of breath, cough  Gastrointestinal: No nausea, abdominal pain  Genitourinary: No incontinence, pain with urination  Musculoskeletal: No pain, swelling, decreased range of motion  Neurological: No headache, weakness  Psychiatric: No depression, anxiety  Endocrine: No weight gain, increased thirst  All other systems are comprehensively negative.    Physical Exam:  Vital Signs Last 24 Hrs  T(C): 36.5 (04 Nov 2024 05:02), Max: 36.8 (03 Nov 2024 11:26)  T(F): 97.7 (04 Nov 2024 05:02), Max: 98.3 (03 Nov 2024 11:26)  HR: 89 (04 Nov 2024 05:02) (89 - 89)  BP: 148/76 (04 Nov 2024 05:02) (133/79 - 157/81)  BP(mean): --  RR: 17 (04 Nov 2024 05:02) (17 - 18)  SpO2: 96% (04 Nov 2024 05:02) (95% - 96%)  Parameters below as of 04 Nov 2024 05:02  Patient On (Oxygen Delivery Method): room air  General: NAD  HEENT: MMM  Neck: No JVD, no carotid bruit  Lungs: CTAB  CV: RRR, nl S1/S2, no M/R/G  Abdomen: S/NT/ND, +BS  Extremities: +Lymphedema, no cyanosis  Neuro: AAOx3, non-focal  Skin: No rash    Labs:    11-03    143  |  107  |  25[H]  ----------------------------<  86  3.7   |  30  |  0.96    Ca    9.2      03 Nov 2024 07:21                          9.4    5.76  )-----------( 199      ( 03 Nov 2024 07:21 )             30.4       ECG/Telemetry: Sinus rhythm

## 2024-11-05 LAB
ANION GAP SERPL CALC-SCNC: -2 MMOL/L — LOW (ref 5–17)
BASOPHILS # BLD AUTO: 0.02 K/UL — SIGNIFICANT CHANGE UP (ref 0–0.2)
BASOPHILS NFR BLD AUTO: 0.3 % — SIGNIFICANT CHANGE UP (ref 0–2)
BUN SERPL-MCNC: 23 MG/DL — SIGNIFICANT CHANGE UP (ref 7–23)
CALCIUM SERPL-MCNC: 9.9 MG/DL — SIGNIFICANT CHANGE UP (ref 8.5–10.1)
CHLORIDE SERPL-SCNC: 108 MMOL/L — SIGNIFICANT CHANGE UP (ref 96–108)
CO2 SERPL-SCNC: 36 MMOL/L — HIGH (ref 22–31)
CREAT SERPL-MCNC: 0.86 MG/DL — SIGNIFICANT CHANGE UP (ref 0.5–1.3)
EGFR: 86 ML/MIN/1.73M2 — SIGNIFICANT CHANGE UP
EOSINOPHIL # BLD AUTO: 0.39 K/UL — SIGNIFICANT CHANGE UP (ref 0–0.5)
EOSINOPHIL NFR BLD AUTO: 6.6 % — HIGH (ref 0–6)
GLUCOSE SERPL-MCNC: 107 MG/DL — HIGH (ref 70–99)
HCT VFR BLD CALC: 29.2 % — LOW (ref 39–50)
HGB BLD-MCNC: 9 G/DL — LOW (ref 13–17)
IMM GRANULOCYTES NFR BLD AUTO: 0.2 % — SIGNIFICANT CHANGE UP (ref 0–0.9)
LYMPHOCYTES # BLD AUTO: 1.75 K/UL — SIGNIFICANT CHANGE UP (ref 1–3.3)
LYMPHOCYTES # BLD AUTO: 29.6 % — SIGNIFICANT CHANGE UP (ref 13–44)
MCHC RBC-ENTMCNC: 28.6 PG — SIGNIFICANT CHANGE UP (ref 27–34)
MCHC RBC-ENTMCNC: 30.8 G/DL — LOW (ref 32–36)
MCV RBC AUTO: 92.7 FL — SIGNIFICANT CHANGE UP (ref 80–100)
MONOCYTES # BLD AUTO: 0.39 K/UL — SIGNIFICANT CHANGE UP (ref 0–0.9)
MONOCYTES NFR BLD AUTO: 6.6 % — SIGNIFICANT CHANGE UP (ref 2–14)
NEUTROPHILS # BLD AUTO: 3.35 K/UL — SIGNIFICANT CHANGE UP (ref 1.8–7.4)
NEUTROPHILS NFR BLD AUTO: 56.7 % — SIGNIFICANT CHANGE UP (ref 43–77)
NRBC # BLD: 0 /100 WBCS — SIGNIFICANT CHANGE UP (ref 0–0)
PLATELET # BLD AUTO: 193 K/UL — SIGNIFICANT CHANGE UP (ref 150–400)
POTASSIUM SERPL-MCNC: 4.1 MMOL/L — SIGNIFICANT CHANGE UP (ref 3.5–5.3)
POTASSIUM SERPL-SCNC: 4.1 MMOL/L — SIGNIFICANT CHANGE UP (ref 3.5–5.3)
RBC # BLD: 3.15 M/UL — LOW (ref 4.2–5.8)
RBC # FLD: 15.8 % — HIGH (ref 10.3–14.5)
SODIUM SERPL-SCNC: 142 MMOL/L — SIGNIFICANT CHANGE UP (ref 135–145)
WBC # BLD: 5.91 K/UL — SIGNIFICANT CHANGE UP (ref 3.8–10.5)
WBC # FLD AUTO: 5.91 K/UL — SIGNIFICANT CHANGE UP (ref 3.8–10.5)

## 2024-11-05 PROCEDURE — 99232 SBSQ HOSP IP/OBS MODERATE 35: CPT

## 2024-11-05 RX ORDER — ENOXAPARIN SODIUM 30 MG/.3ML
40 INJECTION SUBCUTANEOUS EVERY 24 HOURS
Refills: 0 | Status: DISCONTINUED | OUTPATIENT
Start: 2024-11-05 | End: 2024-11-18

## 2024-11-05 RX ADMIN — Medication 250 MILLIGRAM(S): at 17:31

## 2024-11-05 RX ADMIN — FAMOTIDINE 20 MILLIGRAM(S): 20 TABLET, FILM COATED ORAL at 11:47

## 2024-11-05 RX ADMIN — NYSTATIN 1 APPLICATION(S): 100000 POWDER TOPICAL at 05:42

## 2024-11-05 RX ADMIN — Medication 1000 MICROGRAM(S): at 11:48

## 2024-11-05 RX ADMIN — ENOXAPARIN SODIUM 40 MILLIGRAM(S): 30 INJECTION SUBCUTANEOUS at 11:49

## 2024-11-05 RX ADMIN — MEROPENEM 100 MILLIGRAM(S): 500 INJECTION, POWDER, FOR SOLUTION INTRAVENOUS at 21:38

## 2024-11-05 RX ADMIN — ACETAMINOPHEN, DIPHENHYDRAMINE HCL, PHENYLEPHRINE HCL 3 MILLIGRAM(S): 325; 25; 5 TABLET ORAL at 21:38

## 2024-11-05 RX ADMIN — INSULIN GLARGINE 8 UNIT(S): 100 INJECTION, SOLUTION SUBCUTANEOUS at 21:39

## 2024-11-05 RX ADMIN — CHLORHEXIDINE GLUCONATE 1 APPLICATION(S): 1.2 RINSE ORAL at 09:00

## 2024-11-05 RX ADMIN — Medication 80 MILLIGRAM(S): at 21:39

## 2024-11-05 RX ADMIN — METOPROLOL TARTRATE 25 MILLIGRAM(S): 100 TABLET, FILM COATED ORAL at 17:31

## 2024-11-05 RX ADMIN — Medication 250 MILLIGRAM(S): at 05:41

## 2024-11-05 RX ADMIN — CLOPIDOGREL 75 MILLIGRAM(S): 75 TABLET, FILM COATED ORAL at 11:48

## 2024-11-05 RX ADMIN — MEROPENEM 100 MILLIGRAM(S): 500 INJECTION, POWDER, FOR SOLUTION INTRAVENOUS at 14:36

## 2024-11-05 RX ADMIN — Medication 1: at 17:38

## 2024-11-05 RX ADMIN — TAMSULOSIN HYDROCHLORIDE 0.4 MILLIGRAM(S): 0.4 CAPSULE ORAL at 21:38

## 2024-11-05 RX ADMIN — METOPROLOL TARTRATE 25 MILLIGRAM(S): 100 TABLET, FILM COATED ORAL at 05:41

## 2024-11-05 RX ADMIN — NYSTATIN 1 APPLICATION(S): 100000 POWDER TOPICAL at 17:31

## 2024-11-05 RX ADMIN — MEROPENEM 100 MILLIGRAM(S): 500 INJECTION, POWDER, FOR SOLUTION INTRAVENOUS at 05:39

## 2024-11-05 RX ADMIN — Medication 81 MILLIGRAM(S): at 11:48

## 2024-11-05 NOTE — DIETITIAN INITIAL EVALUATION ADULT - PROBLEM SELECTOR PLAN 1
Bilateral heel osteomyelitis, plan for OR with Dr. Rios on 10/29  - Wound Cx (10/23): Proteus mirabilis, Pseudomonas aeruginosa, and Klebsiella pneumoniae, Enterococcus faecalis  - Sp 14 days of Augmentin and 10 days of zosyn  - FU Blood Cx  - Consulted ID Dr. Meyers, FU Recs  - RCRI score 2, > 4 METS prior to heel ulcers, no acute chest pain or sob, no known h/o CHF, or valcular disease, but does have CAD with stents, class III risk, 10.1% risk of major cardiac event, medically optimized for OR, awaiting Cards clearance  - Does not meet sepsis criteria   - Will need cardiac clearance prior to procedure due to extensive cardiac history, cardiology Dr. Rock consulted

## 2024-11-05 NOTE — PROGRESS NOTE ADULT - SUBJECTIVE AND OBJECTIVE BOX
[facility] DIV[facility] of INFECTIOUS DISEASE  Michoacano Casillas MD PhD, Cherry Vincent MD, Queta Ngo MD, Roselia Mcmanus MD, Andrez Bolden MD  and providing coverage with Trey Montelongo MD  Providing Infectious Disease Consultations at [facility], [facility], [facility]'s    Office# [phone] to schedule follow up appointments  Answering Service for urgent calls or New Consults [phone]  Cell# to text for urgent issues Michoacano Casillas [phone]     infectious diseases progress note:    DALILA HERNADEZ is a 84y y. o. Male patient    Overnight and events of the last 24hrs reviewed    Allergies    No Known Allergies    Intolerances        ANTIBIOTICS/RELEVANT:  antimicrobials  meropenem  IVPB      meropenem  IVPB 1000 milliGRAM(s) IV Intermittent every 8 hours    immunologic:    OTHER:  acetaminophen     Tablet .. 650 milliGRAM(s) Oral every 6 hours PRN  acetaminophen   IVPB .. 1000 milliGRAM(s) IV Intermittent once  aluminum hydroxide/magnesium hydroxide/simethicone Suspension 30 milliLiter(s) Oral every 4 hours PRN  aspirin  chewable 81 milliGRAM(s) Oral daily  atorvastatin 80 milliGRAM(s) Oral at bedtime  chlorhexidine 4% Liquid 1 Application(s) Topical <User Schedule>  clopidogrel Tablet 75 milliGRAM(s) Oral daily  cyanocobalamin 1000 MICROGram(s) Oral daily  dextrose 5%. 1000 milliLiter(s) IV Continuous <Continuous>  dextrose 5%. 1000 milliLiter(s) IV Continuous <Continuous>  dextrose 50% Injectable 12.5 Gram(s) IV Push once  dextrose 50% Injectable 25 Gram(s) IV Push once  dextrose 50% Injectable 25 Gram(s) IV Push once  dextrose 50% Injectable 12.5 Gram(s) IV Push once  dextrose Oral Gel 15 Gram(s) Oral once PRN  enoxaparin Injectable 40 milliGRAM(s) SubCutaneous every 24 hours  famotidine    Tablet 20 milliGRAM(s) Oral daily  glucagon  Injectable 1 milliGRAM(s) IntraMuscular once  HYDROmorphone  Injectable 0.5 milliGRAM(s) IV Push every 6 hours PRN  insulin glargine Injectable (LANTUS) 8 Unit(s) SubCutaneous at bedtime  insulin lispro (ADMELOG) corrective regimen sliding scale   SubCutaneous three times a day before meals  melatonin 3 milliGRAM(s) Oral at bedtime  metoprolol tartrate 25 milliGRAM(s) Oral two times a day  nystatin Powder 1 Application(s) Topical two times a day  oxycodone    5 mG/acetaminophen 325 mG 1 Tablet(s) Oral every 6 hours PRN  saccharomyces boulardii 250 milliGRAM(s) Oral two times a day  sodium chloride 0.9% lock flush 10 milliLiter(s) IV Push every 1 hour PRN  tamsulosin 0.4 milliGRAM(s) Oral at bedtime      Objective:  Vital Signs Last 24 Hrs  T(C): 36.4 (05 Nov 2024 05:28), Max: 36.7 (04 Nov 2024 20:36)  T(F): 97.6 (05 Nov 2024 05:28), Max: 98.1 (04 Nov 2024 20:36)  HR: 84 (05 Nov 2024 05:28) (84 - 90)  BP: 120/72 (05 Nov 2024 05:28) (119/66 - 158/83)  BP(mean): --  RR: 18 (05 Nov 2024 05:28) (18 - 18)  SpO2: 96% (05 Nov 2024 05:28) (96% - 99%)    Parameters below as of 05 Nov 2024 05:28  Patient On (Oxygen Delivery Method): room air        T(C): 36.4 (11-05-24 @ 05:28), Max: 36.8 (11-03-24 @ 11:26)  T(C): 36.4 (11-05-24 @ 05:28), Max: 36.8 (11-02-24 @ 12:26)  T(C): 36.4 (11-05-24 @ 05:28), Max: 37 (11-01-24 @ 20:07)    PHYSICAL EXAM:  HEENT: NC atraumatic  Neck: supple  Respiratory: no accessory muscle use, breathing comfortably  Cardiovascular: distant  Gastrointestinal: normal appearing, nondistended  Extremities: no clubbing, no cyanosis,        LABS:                          9.0    5.91  )-----------( 193      ( 05 Nov 2024 08:30 )             29.2       WBC  5.91 11-05 @ 08:30  5.73 11-04 @ 07:15  5.76 11-03 @ 07:21  6.22 11-02 @ 07:20  6.18 11-01 @ 08:38  7.22 10-31 @ 07:02  7.93 10-30 @ 10:25      11-05    142  |  108  |  23  ----------------------------<  107[H]  4.1   |  36[H]  |  0.86    Ca    9.9      05 Nov 2024 08:30        Creatinine: 0.86 mg/dL (11-05-24 @ 08:30)  Creatinine: 0.89 mg/dL (11-04-24 @ 07:15)  Creatinine: 0.96 mg/dL (11-03-24 @ 07:21)  Creatinine: 1.00 mg/dL (11-02-24 @ 07:20)  Creatinine: 0.96 mg/dL (11-01-24 @ 08:38)  Creatinine: 1.00 mg/dL (10-31-24 @ 07:02)  Creatinine: 1.10 mg/dL (10-30-24 @ 10:25)        Urinalysis Basic - ( 05 Nov 2024 08:30 )    Color: x / Appearance: x / SG: x / pH: x  Gluc: 107 mg/dL / Ketone: x  / Bili: x / Urobili: x   Blood: x / Protein: x / Nitrite: x   Leuk Esterase: x / RBC: x / WBC x   Sq Epi: x / Non Sq Epi: x / Bacteria: x            INFLAMMATORY MARKERS      MICROBIOLOGY:              RADIOLOGY & ADDITIONAL STUDIES:

## 2024-11-05 NOTE — DIETITIAN INITIAL EVALUATION ADULT - PROBLEM SELECTOR PLAN 2
Chronic  - Insulin sliding scale q6  - Home basal insulin 23 U at bedtime, will half given patient will be npo overnight for procedure tomorrow  - continue gabapentin 100 mg QD at bedtime for b/l LE neuropathy

## 2024-11-05 NOTE — DIETITIAN INITIAL EVALUATION ADULT - PERTINENT MEDS FT
MEDICATIONS  (STANDING):  acetaminophen   IVPB .. 1000 milliGRAM(s) IV Intermittent once  aspirin  chewable 81 milliGRAM(s) Oral daily  atorvastatin 80 milliGRAM(s) Oral at bedtime  chlorhexidine 4% Liquid 1 Application(s) Topical <User Schedule>  clopidogrel Tablet 75 milliGRAM(s) Oral daily  cyanocobalamin 1000 MICROGram(s) Oral daily  dextrose 5%. 1000 milliLiter(s) (50 mL/Hr) IV Continuous <Continuous>  dextrose 5%. 1000 milliLiter(s) (100 mL/Hr) IV Continuous <Continuous>  dextrose 50% Injectable 25 Gram(s) IV Push once  dextrose 50% Injectable 12.5 Gram(s) IV Push once  dextrose 50% Injectable 12.5 Gram(s) IV Push once  dextrose 50% Injectable 25 Gram(s) IV Push once  famotidine    Tablet 20 milliGRAM(s) Oral daily  glucagon  Injectable 1 milliGRAM(s) IntraMuscular once  insulin glargine Injectable (LANTUS) 8 Unit(s) SubCutaneous at bedtime  insulin lispro (ADMELOG) corrective regimen sliding scale   SubCutaneous three times a day before meals  melatonin 3 milliGRAM(s) Oral at bedtime  meropenem  IVPB 1000 milliGRAM(s) IV Intermittent every 8 hours  meropenem  IVPB      metoprolol tartrate 25 milliGRAM(s) Oral two times a day  nystatin Powder 1 Application(s) Topical two times a day  saccharomyces boulardii 250 milliGRAM(s) Oral two times a day  tamsulosin 0.4 milliGRAM(s) Oral at bedtime    MEDICATIONS  (PRN):  acetaminophen     Tablet .. 650 milliGRAM(s) Oral every 6 hours PRN Temp greater or equal to 38C (100.4F), Mild Pain (1 - 3)  aluminum hydroxide/magnesium hydroxide/simethicone Suspension 30 milliLiter(s) Oral every 4 hours PRN Dyspepsia  dextrose Oral Gel 15 Gram(s) Oral once PRN Blood Glucose LESS THAN 70 milliGRAM(s)/deciliter  HYDROmorphone  Injectable 0.5 milliGRAM(s) IV Push every 6 hours PRN Severe Pain (7 - 10)  oxycodone    5 mG/acetaminophen 325 mG 1 Tablet(s) Oral every 6 hours PRN Moderate Pain (4 - 6)  sodium chloride 0.9% lock flush 10 milliLiter(s) IV Push every 1 hour PRN Pre/post blood products, medications, blood draw, and to maintain line patency

## 2024-11-05 NOTE — PROGRESS NOTE ADULT - ASSESSMENT
82yo M with a PMH of HTN, HLD, Type 2 DM, CKD3a, hx of GI bleed, COPD, SANTO, BPH, CAD s/p PCI, PAD, multiple myeloma, lymphedema who presents with b/l heel ulcers and suspected OM planned for bilateral foot surgery with podiatry, Gabriel Deshpande, on 10/29 for his suspected bilateral heel osteomyelitis now s/p partial calcanectomy.        Problem/Plan - 1:  ·  Problem: Osteomyelitis of foot.   ·  Plan: - Bilateral heel osteomyelitis per outpatient w/up and admitted with a plan for OR with podiatry, Dr. Rios on 10/29  - Wound Cx (10/23): Proteus mirabilis, Pseudomonas aeruginosa, ESBL Klebsiella pneumoniae, Enterococcus faecalis  - s/p 14 days of Augmentin and 10 days of zosyn  - Blood Cx NG  - s/p partial calcanectomy on 10/29.    - f/up OR cultures +rare pseudomonas aeruginosa.  Pathology: bilateral heel acute and chronic osteomyelitis.  - Tylenol PRN for mild pain, percocet 1 tab PO Q6h PRN for moderate pain, and Dilaudid 0.5mg IV Q6h PRN for severe pain.  - Consulted ID Dr. Casillas, recs appreciated   - PICC line placed.  Continue meropenem  - afebrile, no leukocytosis  - cardiology Dr. Rock consulted, recs appreciated.     Problem/Plan - 2:  ·  Problem: DM (diabetes mellitus).   ·  Plan: - type 2 DM on insulin  - Home basal insulin 23 U at bedtime, given half dose of 12un last night but mild hypoglycemia in the mid-60s this morning and in PACU  on 10/29-- pt given D50; started on D5 1/2NS and will maintain overnight   - continue decreased lantus dose 8units QHS   - continue low-dose lispro corrective ISS  - monitor FSG     Problem/Plan - 3:  ·  Problem: HTN (hypertension).   ·  Plan: Chronic   - continue metoprolol 25mg BID.     Problem/Plan - 4:  ·  Problem: HLD (hyperlipidemia).   ·  Plan: Chronic   - Continue atorvastatin 80mg QD.     Problem/Plan - 5:  ·  Problem: CAD (coronary artery disease).   ·  Plan: CAD sp stents x4  - Held ASA and Plavix on 10/29; Restarted on ASA and Plavix on 10/30  - c/w lipitor  - pt did have some strikethrough bleeding per nursing this evening -- Confirmed with podiatry: okay to restart plavix  - cardiology Dr. Rock consulted, recs appreciated.     Problem/Plan - 6:  ·  Problem: Benign prostatic hyperplasia with lower urinary tract symptoms, symptom details unspecified.   ·  Plan: Chronic  -Continue Flomax.     Problem/Plan - 7:  ·  Problem: Multiple myeloma.   ·  Plan: Chronic  - Chemotherapy held while in rehab.     Problem/Plan - 8:  ·  Problem: Need for prophylactic measure.   ·  Plan: DVT ppx: Start lovenox 40mg SQ daily

## 2024-11-05 NOTE — DIETITIAN INITIAL EVALUATION ADULT - OTHER INFO
Reason for Admission: Bilateral Heel Osteomyelitis  History of Present Illness:   Patient with a past medical history of hypertension, diabetes, hyperlipidemia, bilateral heel osteomyelitis currently on outpatient antibiotics through midline is presenting for surgery.  He is scheduled for surgery with Dr. frias tomorrow.  Denies any fevers.  Endorsing pain to his heels but no other areas of pain.  No nausea or vomiting.  No other acute complaints today.  weight 289#   11/4 BM

## 2024-11-05 NOTE — PROGRESS NOTE ADULT - SUBJECTIVE AND OBJECTIVE BOX
CHIEF COMPLAINT/INTERVAL HISTORY:  Pt. seen and evaluated for bilateral heel osteomyelitis.  Pt. is in no distress.  Tolerating IV antibiotics.  Denies having CP or SOB.      REVIEW OF SYSTEMS:  No fever, CP, SOB, or abdominal pain    Vital Signs Last 24 Hrs  T(C): 36.4 (05 Nov 2024 05:28), Max: 36.7 (04 Nov 2024 20:36)  T(F): 97.6 (05 Nov 2024 05:28), Max: 98.1 (04 Nov 2024 20:36)  HR: 84 (05 Nov 2024 05:28) (84 - 90)  BP: 120/72 (05 Nov 2024 05:28) (119/66 - 158/83)  BP(mean): --  RR: 18 (05 Nov 2024 05:28) (18 - 18)  SpO2: 96% (05 Nov 2024 05:28) (96% - 99%)    Parameters below as of 05 Nov 2024 05:28  Patient On (Oxygen Delivery Method): room air        PHYSICAL EXAM:  GENERAL: NAD  HEENT: EOMI, hearing normal, conjunctiva and sclera clear  Chest: CTA bilaterally, no wheezing  CV: S1S2, RRR,   GI: soft, +BS, NT/ND  Musculoskeletal: decreased bilateral LE edema, clean dressing with ACE wrap over bilateral feet  Psychiatric: affect nL, mood nL  Skin: warm and dry    LABS:                        9.0    5.91  )-----------( 193      ( 05 Nov 2024 08:30 )             29.2     11-05    142  |  108  |  23  ----------------------------<  107[H]  4.1   |  36[H]  |  0.86    Ca    9.9      05 Nov 2024 08:30        Urinalysis Basic - ( 05 Nov 2024 08:30 )    Color: x / Appearance: x / SG: x / pH: x  Gluc: 107 mg/dL / Ketone: x  / Bili: x / Urobili: x   Blood: x / Protein: x / Nitrite: x   Leuk Esterase: x / RBC: x / WBC x   Sq Epi: x / Non Sq Epi: x / Bacteria: x

## 2024-11-05 NOTE — SOCIAL WORK PROGRESS NOTE - NSSWPROGRESSNOTE_GEN_ALL_CORE
SW left message for pts dtr Rhonda and tried calling pts spouse Sheba- phone kept ringing. SW to follow.

## 2024-11-05 NOTE — DIETITIAN INITIAL EVALUATION ADULT - ORAL INTAKE PTA/DIET HISTORY
patient seen with CNA and PT in room. limited interview. patient reports with no difficulties chewing swallowing. food preferences noted.   no food allergies. A1c 5.9% on insulin protocol PTA . limited diet history obtained.

## 2024-11-05 NOTE — PROGRESS NOTE ADULT - SUBJECTIVE AND OBJECTIVE BOX
Chief Complaint: Heel ulcer    Interval Events: No events overnight.    Review of Systems:  General: No fevers, chills, weight gain  Skin: No rashes, color changes  Cardiovascular: No chest pain, orthopnea  Respiratory: No shortness of breath, cough  Gastrointestinal: No nausea, abdominal pain  Genitourinary: No incontinence, pain with urination  Musculoskeletal: No pain, swelling, decreased range of motion  Neurological: No headache, weakness  Psychiatric: No depression, anxiety  Endocrine: No weight gain, increased thirst  All other systems are comprehensively negative.    Physical Exam:  Vital Signs Last 24 Hrs  T(C): 36.4 (05 Nov 2024 05:28), Max: 36.7 (04 Nov 2024 20:36)  T(F): 97.6 (05 Nov 2024 05:28), Max: 98.1 (04 Nov 2024 20:36)  HR: 84 (05 Nov 2024 05:28) (84 - 90)  BP: 120/72 (05 Nov 2024 05:28) (119/66 - 158/83)  BP(mean): --  RR: 18 (05 Nov 2024 05:28) (18 - 18)  SpO2: 96% (05 Nov 2024 05:28) (96% - 99%)  Parameters below as of 05 Nov 2024 05:28  Patient On (Oxygen Delivery Method): room air  General: NAD  HEENT: MMM  Neck: No JVD, no carotid bruit  Lungs: CTAB  CV: RRR, nl S1/S2, no M/R/G  Abdomen: S/NT/ND, +BS  Extremities: +Lymphedema, no cyanosis  Neuro: AAOx3, non-focal  Skin: No rash    Labs:    11-03    143  |  107  |  25[H]  ----------------------------<  86  3.7   |  30  |  0.96    Ca    9.2      03 Nov 2024 07:21                          9.4    5.76  )-----------( 199      ( 03 Nov 2024 07:21 )             30.4       ECG/Telemetry: Sinus rhythm

## 2024-11-05 NOTE — DIETITIAN INITIAL EVALUATION ADULT - PERTINENT LABORATORY DATA
11-05    142  |  108  |  23  ----------------------------<  107[H]  4.1   |  36[H]  |  0.86    Ca    9.9      05 Nov 2024 08:30    POCT Blood Glucose.: 101 mg/dL (11-05-24 @ 08:05)  A1C with Estimated Average Glucose Result: 5.9 % (10-29-24 @ 04:40)  A1C with Estimated Average Glucose Result: 8.0 % (07-07-24 @ 06:31)  A1C with Estimated Average Glucose Result: 7.9 % (06-30-24 @ 06:30)

## 2024-11-05 NOTE — PROGRESS NOTE ADULT - ASSESSMENT
84 yo male with hypertension, diabetes, hyperlipidemia, bilateral heel osteomyelitis currently on outpatient antibiotics through midline is presented for surgery planned with podiatry, Gabriel Deshpande, for his bilateral heel osteomyelitis. States his osteomyelitis started 6 months ago.   Calcanectomy, partial 29-Oct-2024 12:58:43  Jona Mason    RECOMMENDATIONS  bilateral heel osteomyelitis  prior micro with Proteus mirabilis, Pseudomonas aeruginosa, Enterococcus faecalis, Klebsiella pneumoniae ESBL  Culture - Tissue with Gram Stain (10.29.24 @ 12:00) Rare Pseudomonas aeruginosa  -  Ciprofloxacin: S <=0.25-  Levofloxacin: S <=0.5-  Meropenem: S <=1  sp surgery  Path- Right heel bone proximal margin- Acute and chronic osteomyelitis, Left heel bone proximal margin- Acute and chronic osteomyelitis  Residual osteo so rec Meropenem 1 gram IV q8 x 6 weeks so last day 12/9  weekly CBC w diff, CMP, ESR faxed to [phone]  PICC can be removed at end of Rx    Thank you for consulting us and involving us in the management of this most interesting and challenging case.  We will follow along in the care of this patient. Please call us at [phone] or text me directly on my cell# at [phone] with any concerns.

## 2024-11-05 NOTE — PROGRESS NOTE ADULT - ASSESSMENT
The patient is an 83 year old male with a history of HTN, HL, DM, CKD, GI bleed, COPD, SANTO, BPH, CAD s/p PCI, PAD, multiple myeloma, lymphedema who presents with heel ulcer.     Plan:  - ECG with sinus rhythm and no evidence of ischemia/infarction  - Echo 6/30/24 with normal LV systolic function, mild pulm HTN  - CXR with grossly clear lungs  - Lower extremity swelling consistent with known history of lymphedema  - Continue aspirin 81 mg daily  - Continue clopidogrel 75 mg daily  - Continue atorvastatin 80 mg daily  - Continue metoprolol tartrate 25 mg bid  - Podiatry and ID follow-up  - Path results with acute on chronic osteomyelitis  - IV antibiotics  - Discharge planning

## 2024-11-06 LAB
ANION GAP SERPL CALC-SCNC: 1 MMOL/L — LOW (ref 5–17)
BASOPHILS # BLD AUTO: 0.01 K/UL — SIGNIFICANT CHANGE UP (ref 0–0.2)
BASOPHILS NFR BLD AUTO: 0.2 % — SIGNIFICANT CHANGE UP (ref 0–2)
BUN SERPL-MCNC: 23 MG/DL — SIGNIFICANT CHANGE UP (ref 7–23)
CALCIUM SERPL-MCNC: 10 MG/DL — SIGNIFICANT CHANGE UP (ref 8.5–10.1)
CHLORIDE SERPL-SCNC: 107 MMOL/L — SIGNIFICANT CHANGE UP (ref 96–108)
CO2 SERPL-SCNC: 34 MMOL/L — HIGH (ref 22–31)
CREAT SERPL-MCNC: 0.88 MG/DL — SIGNIFICANT CHANGE UP (ref 0.5–1.3)
EGFR: 86 ML/MIN/1.73M2 — SIGNIFICANT CHANGE UP
EOSINOPHIL # BLD AUTO: 0.4 K/UL — SIGNIFICANT CHANGE UP (ref 0–0.5)
EOSINOPHIL NFR BLD AUTO: 7.1 % — HIGH (ref 0–6)
GLUCOSE SERPL-MCNC: 99 MG/DL — SIGNIFICANT CHANGE UP (ref 70–99)
HCT VFR BLD CALC: 28.9 % — LOW (ref 39–50)
HGB BLD-MCNC: 8.8 G/DL — LOW (ref 13–17)
IMM GRANULOCYTES NFR BLD AUTO: 0.2 % — SIGNIFICANT CHANGE UP (ref 0–0.9)
LYMPHOCYTES # BLD AUTO: 1.73 K/UL — SIGNIFICANT CHANGE UP (ref 1–3.3)
LYMPHOCYTES # BLD AUTO: 30.9 % — SIGNIFICANT CHANGE UP (ref 13–44)
MAGNESIUM SERPL-MCNC: 1.6 MG/DL — SIGNIFICANT CHANGE UP (ref 1.6–2.6)
MCHC RBC-ENTMCNC: 27.9 PG — SIGNIFICANT CHANGE UP (ref 27–34)
MCHC RBC-ENTMCNC: 30.4 G/DL — LOW (ref 32–36)
MCV RBC AUTO: 91.7 FL — SIGNIFICANT CHANGE UP (ref 80–100)
MONOCYTES # BLD AUTO: 0.34 K/UL — SIGNIFICANT CHANGE UP (ref 0–0.9)
MONOCYTES NFR BLD AUTO: 6.1 % — SIGNIFICANT CHANGE UP (ref 2–14)
NEUTROPHILS # BLD AUTO: 3.11 K/UL — SIGNIFICANT CHANGE UP (ref 1.8–7.4)
NEUTROPHILS NFR BLD AUTO: 55.5 % — SIGNIFICANT CHANGE UP (ref 43–77)
NRBC # BLD: 0 /100 WBCS — SIGNIFICANT CHANGE UP (ref 0–0)
PLATELET # BLD AUTO: 206 K/UL — SIGNIFICANT CHANGE UP (ref 150–400)
POTASSIUM SERPL-MCNC: 3.9 MMOL/L — SIGNIFICANT CHANGE UP (ref 3.5–5.3)
POTASSIUM SERPL-SCNC: 3.9 MMOL/L — SIGNIFICANT CHANGE UP (ref 3.5–5.3)
RBC # BLD: 3.15 M/UL — LOW (ref 4.2–5.8)
RBC # FLD: 15.9 % — HIGH (ref 10.3–14.5)
SODIUM SERPL-SCNC: 142 MMOL/L — SIGNIFICANT CHANGE UP (ref 135–145)
WBC # BLD: 5.6 K/UL — SIGNIFICANT CHANGE UP (ref 3.8–10.5)
WBC # FLD AUTO: 5.6 K/UL — SIGNIFICANT CHANGE UP (ref 3.8–10.5)

## 2024-11-06 PROCEDURE — 99233 SBSQ HOSP IP/OBS HIGH 50: CPT

## 2024-11-06 RX ADMIN — Medication 1000 MICROGRAM(S): at 11:32

## 2024-11-06 RX ADMIN — Medication 250 MILLIGRAM(S): at 06:29

## 2024-11-06 RX ADMIN — Medication 81 MILLIGRAM(S): at 11:32

## 2024-11-06 RX ADMIN — FAMOTIDINE 20 MILLIGRAM(S): 20 TABLET, FILM COATED ORAL at 11:32

## 2024-11-06 RX ADMIN — NYSTATIN 1 APPLICATION(S): 100000 POWDER TOPICAL at 17:11

## 2024-11-06 RX ADMIN — CLOPIDOGREL 75 MILLIGRAM(S): 75 TABLET, FILM COATED ORAL at 11:32

## 2024-11-06 RX ADMIN — TAMSULOSIN HYDROCHLORIDE 0.4 MILLIGRAM(S): 0.4 CAPSULE ORAL at 21:46

## 2024-11-06 RX ADMIN — MEROPENEM 100 MILLIGRAM(S): 500 INJECTION, POWDER, FOR SOLUTION INTRAVENOUS at 13:25

## 2024-11-06 RX ADMIN — Medication 250 MILLIGRAM(S): at 17:11

## 2024-11-06 RX ADMIN — Medication 1: at 17:40

## 2024-11-06 RX ADMIN — ACETAMINOPHEN, DIPHENHYDRAMINE HCL, PHENYLEPHRINE HCL 3 MILLIGRAM(S): 325; 25; 5 TABLET ORAL at 21:46

## 2024-11-06 RX ADMIN — CHLORHEXIDINE GLUCONATE 1 APPLICATION(S): 1.2 RINSE ORAL at 06:30

## 2024-11-06 RX ADMIN — NYSTATIN 1 APPLICATION(S): 100000 POWDER TOPICAL at 06:31

## 2024-11-06 RX ADMIN — Medication 80 MILLIGRAM(S): at 21:46

## 2024-11-06 RX ADMIN — MEROPENEM 100 MILLIGRAM(S): 500 INJECTION, POWDER, FOR SOLUTION INTRAVENOUS at 21:43

## 2024-11-06 RX ADMIN — INSULIN GLARGINE 8 UNIT(S): 100 INJECTION, SOLUTION SUBCUTANEOUS at 21:47

## 2024-11-06 RX ADMIN — ENOXAPARIN SODIUM 40 MILLIGRAM(S): 30 INJECTION SUBCUTANEOUS at 11:37

## 2024-11-06 RX ADMIN — METOPROLOL TARTRATE 25 MILLIGRAM(S): 100 TABLET, FILM COATED ORAL at 17:10

## 2024-11-06 RX ADMIN — MEROPENEM 100 MILLIGRAM(S): 500 INJECTION, POWDER, FOR SOLUTION INTRAVENOUS at 06:29

## 2024-11-06 RX ADMIN — METOPROLOL TARTRATE 25 MILLIGRAM(S): 100 TABLET, FILM COATED ORAL at 06:29

## 2024-11-06 NOTE — PROGRESS NOTE ADULT - ASSESSMENT
82yo M with a PMH of HTN, HLD, Type 2 DM, CKD3a, hx of GI bleed, COPD, SANTO, BPH, CAD s/p PCI, PAD, multiple myeloma, lymphedema who presents with b/l heel ulcers and suspected OM planned for bilateral foot surgery with podiatry, Gabriel Deshpande, on 10/29 for his suspected bilateral heel osteomyelitis now s/p partial calcanectomy.        Problem/Plan - 1:  ·  Problem: Osteomyelitis of foot.   ·  Plan: - Bilateral heel osteomyelitis per outpatient w/up and admitted with a plan for OR with podiatry, Dr. Rios on 10/29  - Wound Cx (10/23): Proteus mirabilis, Pseudomonas aeruginosa, ESBL Klebsiella pneumoniae, Enterococcus faecalis  - s/p 14 days of Augmentin and 10 days of zosyn  - Blood Cx NG  - s/p partial calcanectomy on 10/29.    - f/up OR cultures +rare pseudomonas aeruginosa.  Pathology: bilateral heel acute and chronic osteomyelitis.  - Tylenol PRN for mild pain, percocet 1 tab PO Q6h PRN for moderate pain, and Dilaudid 0.5mg IV Q6h PRN for severe pain.  - Consulted ID Dr. Casillas, recs appreciated   - PICC line placed.  Continue meropenem till Dec.   - afebrile, no leukocytosis, dressing/wound care as per podiatry recommendation   - cardiology Dr. Rock consulted, recs appreciated.  - SW for discharge plan      Problem/Plan - 2:  ·  Problem: DM (diabetes mellitus).   ·  Plan: - type 2 DM on insulin  - Home basal insulin 23 U at bedtime, given half dose of 12un last night but mild hypoglycemia in the mid-60s this morning and in PACU  on 10/29-- pt given D50; started on D5 1/2NS and will maintain overnight   - continue decreased lantus dose 8units QHS   - continue low-dose lispro corrective ISS  - monitor FSG     Problem/Plan - 3:  ·  Problem: HTN (hypertension).   ·  Plan: Chronic   - continue metoprolol 25mg BID.     Problem/Plan - 4:  ·  Problem: HLD (hyperlipidemia).   ·  Plan: Chronic   - Continue atorvastatin 80mg QD.     Problem/Plan - 5:  ·  Problem: CAD (coronary artery disease).   ·  Plan: CAD sp stents x4  - Held ASA and Plavix on 10/29; Restarted on ASA and Plavix on 10/30  - c/w lipitor  - pt did have some strikethrough bleeding per nursing this evening -- Confirmed with podiatry: okay to restart plavix  - cardiology Dr. Rock consulted, recs appreciated.     Problem/Plan - 6:  ·  Problem: Benign prostatic hyperplasia with lower urinary tract symptoms, symptom details unspecified.   ·  Plan: Chronic  -Continue Flomax.     Problem/Plan - 7:  ·  Problem: Multiple myeloma.   ·  Plan: Chronic  - Chemotherapy held while in rehab.     Problem/Plan - 8:  ·  Problem: Need for prophylactic measure.   ·  Plan: DVT ppx: Start lovenox 40mg SQ daily

## 2024-11-06 NOTE — PROGRESS NOTE ADULT - SUBJECTIVE AND OBJECTIVE BOX
Chief Complaint: Heel ulcer    Interval Events: No events overnight.    Review of Systems:  General: No fevers, chills, weight gain  Skin: No rashes, color changes  Cardiovascular: No chest pain, orthopnea  Respiratory: No shortness of breath, cough  Gastrointestinal: No nausea, abdominal pain  Genitourinary: No incontinence, pain with urination  Musculoskeletal: No pain, swelling, decreased range of motion  Neurological: No headache, weakness  Psychiatric: No depression, anxiety  Endocrine: No weight gain, increased thirst  All other systems are comprehensively negative.    Physical Exam:  Vital Signs Last 24 Hrs  T(C): 36.6 (06 Nov 2024 05:01), Max: 36.8 (05 Nov 2024 20:06)  T(F): 97.9 (06 Nov 2024 05:01), Max: 98.3 (05 Nov 2024 20:06)  HR: 84 (06 Nov 2024 05:01) (84 - 90)  BP: 134/82 (06 Nov 2024 05:01) (134/82 - 159/91)  BP(mean): --  RR: 17 (06 Nov 2024 05:01) (17 - 18)  SpO2: 97% (06 Nov 2024 05:01) (95% - 97%)  Parameters below as of 06 Nov 2024 05:01  Patient On (Oxygen Delivery Method): room air  General: NAD  HEENT: MMM  Neck: No JVD, no carotid bruit  Lungs: CTAB  CV: RRR, nl S1/S2, no M/R/G  Abdomen: S/NT/ND, +BS  Extremities: +Lymphedema, no cyanosis  Neuro: AAOx3, non-focal  Skin: No rash    Labs:    11-06    142  |  107  |  23  ----------------------------<  99  3.9   |  34[H]  |  0.88    Ca    10.0      06 Nov 2024 07:20                          8.8    5.60  )-----------( 206      ( 06 Nov 2024 07:20 )             28.9       ECG/Telemetry: Sinus rhythm

## 2024-11-06 NOTE — PROGRESS NOTE ADULT - SUBJECTIVE AND OBJECTIVE BOX
Patient is a 84y old  Male who presents with a chief complaint of Bilateral Heel Osteomyelitis (05 Nov 2024 11:18)      Subjective:  INTERVAL HPI/OVERNIGHT EVENTS: Patient seen and examined at bedside.  Patient c/o intermittent pain B/L heel but tolerated   MEDICATIONS  (STANDING):  acetaminophen   IVPB .. 1000 milliGRAM(s) IV Intermittent once  aspirin  chewable 81 milliGRAM(s) Oral daily  atorvastatin 80 milliGRAM(s) Oral at bedtime  chlorhexidine 4% Liquid 1 Application(s) Topical <User Schedule>  clopidogrel Tablet 75 milliGRAM(s) Oral daily  cyanocobalamin 1000 MICROGram(s) Oral daily  dextrose 5%. 1000 milliLiter(s) (50 mL/Hr) IV Continuous <Continuous>  dextrose 5%. 1000 milliLiter(s) (100 mL/Hr) IV Continuous <Continuous>  dextrose 50% Injectable 12.5 Gram(s) IV Push once  dextrose 50% Injectable 25 Gram(s) IV Push once  dextrose 50% Injectable 12.5 Gram(s) IV Push once  dextrose 50% Injectable 25 Gram(s) IV Push once  enoxaparin Injectable 40 milliGRAM(s) SubCutaneous every 24 hours  famotidine    Tablet 20 milliGRAM(s) Oral daily  glucagon  Injectable 1 milliGRAM(s) IntraMuscular once  insulin glargine Injectable (LANTUS) 8 Unit(s) SubCutaneous at bedtime  insulin lispro (ADMELOG) corrective regimen sliding scale   SubCutaneous three times a day before meals  melatonin 3 milliGRAM(s) Oral at bedtime  meropenem  IVPB      meropenem  IVPB 1000 milliGRAM(s) IV Intermittent every 8 hours  metoprolol tartrate 25 milliGRAM(s) Oral two times a day  nystatin Powder 1 Application(s) Topical two times a day  saccharomyces boulardii 250 milliGRAM(s) Oral two times a day  tamsulosin 0.4 milliGRAM(s) Oral at bedtime    MEDICATIONS  (PRN):  acetaminophen     Tablet .. 650 milliGRAM(s) Oral every 6 hours PRN Temp greater or equal to 38C (100.4F), Mild Pain (1 - 3)  aluminum hydroxide/magnesium hydroxide/simethicone Suspension 30 milliLiter(s) Oral every 4 hours PRN Dyspepsia  dextrose Oral Gel 15 Gram(s) Oral once PRN Blood Glucose LESS THAN 70 milliGRAM(s)/deciliter  HYDROmorphone  Injectable 0.5 milliGRAM(s) IV Push every 6 hours PRN Severe Pain (7 - 10)  oxycodone    5 mG/acetaminophen 325 mG 1 Tablet(s) Oral every 6 hours PRN Moderate Pain (4 - 6)  sodium chloride 0.9% lock flush 10 milliLiter(s) IV Push every 1 hour PRN Pre/post blood products, medications, blood draw, and to maintain line patency      Allergies    No Known Allergies    Intolerances        REVIEW OF SYSTEMS:  CONSTITUTIONAL: No fever or chills  HEENT:  No headache, no sore throat  RESPIRATORY: No cough or shortness of breath  CARDIOVASCULAR: No chest pain or palpitations  GASTROINTESTINAL: No abd pain, nausea, vomiting, or diarrhea      Objective:  Vital Signs Last 24 Hrs  T(C): 36.6 (06 Nov 2024 11:58), Max: 36.8 (05 Nov 2024 20:06)  T(F): 97.8 (06 Nov 2024 11:58), Max: 98.3 (05 Nov 2024 20:06)  HR: 79 (06 Nov 2024 11:58) (79 - 90)  BP: 148/82 (06 Nov 2024 11:58) (134/82 - 159/91)  BP(mean): --  RR: 18 (06 Nov 2024 11:58) (17 - 18)  SpO2: 91% (06 Nov 2024 11:58) (91% - 97%)    Parameters below as of 06 Nov 2024 11:58  Patient On (Oxygen Delivery Method): room air        GENERAL: NAD, lying in bed comfortably  HEAD:  Normocephalic  EYES:  conjunctiva and sclera clear  ENT: Moist mucous membranes  NECK: Supple  CHEST/LUNG: Clear to auscultation bilaterally; No rales or rhonchi; no wheezing. Unlabored respirations  HEART: Regular rate and rhythm; S1S2+  ABDOMEN: Bowel sounds present; Soft, Nontender, Nondistended.   EXTREMITIES:  + distal Peripheral Pulses;  No cyanosis, or edema, skin creases seen B/L LE likely from improved edema   NERVOUS SYSTEM:  Alert & Oriented X3;  No gross focal deficits   MSK: moves all extremities  SKIN: B/L FOOT IN DRESSING AND ACEBANDAGE, wiggles toes B/L and normal skin color.     LABS:                        8.8    5.60  )-----------( 206      ( 06 Nov 2024 07:20 )             28.9     06 Nov 2024 07:20    142    |  107    |  23     ----------------------------<  99     3.9     |  34     |  0.88     Ca    10.0       06 Nov 2024 07:20  Mg     1.6       06 Nov 2024 07:20        Urinalysis Basic - ( 06 Nov 2024 07:20 )    Color: x / Appearance: x / SG: x / pH: x  Gluc: 99 mg/dL / Ketone: x  / Bili: x / Urobili: x   Blood: x / Protein: x / Nitrite: x   Leuk Esterase: x / RBC: x / WBC x   Sq Epi: x / Non Sq Epi: x / Bacteria: x      CAPILLARY BLOOD GLUCOSE      POCT Blood Glucose.: 148 mg/dL (06 Nov 2024 12:09)  POCT Blood Glucose.: 92 mg/dL (06 Nov 2024 08:15)  POCT Blood Glucose.: 174 mg/dL (05 Nov 2024 21:08)  POCT Blood Glucose.: 164 mg/dL (05 Nov 2024 17:06)          RADIOLOGY & ADDITIONAL TESTS:    Personally reviewed.     Consultant(s) Notes Reviewed:  [x] YES  [ ] NO    Plan of care discussed with patient ; all questions answered

## 2024-11-07 LAB
ALBUMIN SERPL ELPH-MCNC: 2.2 G/DL — LOW (ref 3.3–5)
ALP SERPL-CCNC: 34 U/L — LOW (ref 40–120)
ALT FLD-CCNC: 21 U/L — SIGNIFICANT CHANGE UP (ref 12–78)
ANION GAP SERPL CALC-SCNC: 1 MMOL/L — LOW (ref 5–17)
AST SERPL-CCNC: 16 U/L — SIGNIFICANT CHANGE UP (ref 15–37)
BASOPHILS # BLD AUTO: 0.02 K/UL — SIGNIFICANT CHANGE UP (ref 0–0.2)
BASOPHILS NFR BLD AUTO: 0.4 % — SIGNIFICANT CHANGE UP (ref 0–2)
BILIRUB SERPL-MCNC: 0.4 MG/DL — SIGNIFICANT CHANGE UP (ref 0.2–1.2)
BUN SERPL-MCNC: 25 MG/DL — HIGH (ref 7–23)
CALCIUM SERPL-MCNC: 9.7 MG/DL — SIGNIFICANT CHANGE UP (ref 8.5–10.1)
CHLORIDE SERPL-SCNC: 107 MMOL/L — SIGNIFICANT CHANGE UP (ref 96–108)
CO2 SERPL-SCNC: 34 MMOL/L — HIGH (ref 22–31)
CREAT SERPL-MCNC: 0.9 MG/DL — SIGNIFICANT CHANGE UP (ref 0.5–1.3)
EGFR: 85 ML/MIN/1.73M2 — SIGNIFICANT CHANGE UP
EOSINOPHIL # BLD AUTO: 0.37 K/UL — SIGNIFICANT CHANGE UP (ref 0–0.5)
EOSINOPHIL NFR BLD AUTO: 7 % — HIGH (ref 0–6)
GLUCOSE SERPL-MCNC: 123 MG/DL — HIGH (ref 70–99)
HCT VFR BLD CALC: 29.2 % — LOW (ref 39–50)
HGB BLD-MCNC: 9 G/DL — LOW (ref 13–17)
IMM GRANULOCYTES NFR BLD AUTO: 0.4 % — SIGNIFICANT CHANGE UP (ref 0–0.9)
LYMPHOCYTES # BLD AUTO: 1.67 K/UL — SIGNIFICANT CHANGE UP (ref 1–3.3)
LYMPHOCYTES # BLD AUTO: 31.7 % — SIGNIFICANT CHANGE UP (ref 13–44)
MCHC RBC-ENTMCNC: 28.3 PG — SIGNIFICANT CHANGE UP (ref 27–34)
MCHC RBC-ENTMCNC: 30.8 G/DL — LOW (ref 32–36)
MCV RBC AUTO: 91.8 FL — SIGNIFICANT CHANGE UP (ref 80–100)
MONOCYTES # BLD AUTO: 0.35 K/UL — SIGNIFICANT CHANGE UP (ref 0–0.9)
MONOCYTES NFR BLD AUTO: 6.7 % — SIGNIFICANT CHANGE UP (ref 2–14)
NEUTROPHILS # BLD AUTO: 2.83 K/UL — SIGNIFICANT CHANGE UP (ref 1.8–7.4)
NEUTROPHILS NFR BLD AUTO: 53.8 % — SIGNIFICANT CHANGE UP (ref 43–77)
NRBC # BLD: 0 /100 WBCS — SIGNIFICANT CHANGE UP (ref 0–0)
PLATELET # BLD AUTO: 201 K/UL — SIGNIFICANT CHANGE UP (ref 150–400)
POTASSIUM SERPL-MCNC: 3.9 MMOL/L — SIGNIFICANT CHANGE UP (ref 3.5–5.3)
POTASSIUM SERPL-SCNC: 3.9 MMOL/L — SIGNIFICANT CHANGE UP (ref 3.5–5.3)
PROT SERPL-MCNC: 6.7 G/DL — SIGNIFICANT CHANGE UP (ref 6–8.3)
RBC # BLD: 3.18 M/UL — LOW (ref 4.2–5.8)
RBC # FLD: 15.6 % — HIGH (ref 10.3–14.5)
SODIUM SERPL-SCNC: 142 MMOL/L — SIGNIFICANT CHANGE UP (ref 135–145)
WBC # BLD: 5.26 K/UL — SIGNIFICANT CHANGE UP (ref 3.8–10.5)
WBC # FLD AUTO: 5.26 K/UL — SIGNIFICANT CHANGE UP (ref 3.8–10.5)

## 2024-11-07 PROCEDURE — 99232 SBSQ HOSP IP/OBS MODERATE 35: CPT

## 2024-11-07 RX ADMIN — MEROPENEM 100 MILLIGRAM(S): 500 INJECTION, POWDER, FOR SOLUTION INTRAVENOUS at 05:51

## 2024-11-07 RX ADMIN — MEROPENEM 100 MILLIGRAM(S): 500 INJECTION, POWDER, FOR SOLUTION INTRAVENOUS at 21:28

## 2024-11-07 RX ADMIN — METOPROLOL TARTRATE 25 MILLIGRAM(S): 100 TABLET, FILM COATED ORAL at 05:51

## 2024-11-07 RX ADMIN — Medication 250 MILLIGRAM(S): at 05:51

## 2024-11-07 RX ADMIN — NYSTATIN 1 APPLICATION(S): 100000 POWDER TOPICAL at 17:45

## 2024-11-07 RX ADMIN — NYSTATIN 1 APPLICATION(S): 100000 POWDER TOPICAL at 05:51

## 2024-11-07 RX ADMIN — ACETAMINOPHEN, DIPHENHYDRAMINE HCL, PHENYLEPHRINE HCL 3 MILLIGRAM(S): 325; 25; 5 TABLET ORAL at 21:28

## 2024-11-07 RX ADMIN — CHLORHEXIDINE GLUCONATE 1 APPLICATION(S): 1.2 RINSE ORAL at 05:52

## 2024-11-07 RX ADMIN — Medication 1: at 17:41

## 2024-11-07 RX ADMIN — Medication 250 MILLIGRAM(S): at 17:44

## 2024-11-07 RX ADMIN — METOPROLOL TARTRATE 25 MILLIGRAM(S): 100 TABLET, FILM COATED ORAL at 17:43

## 2024-11-07 RX ADMIN — Medication 80 MILLIGRAM(S): at 21:28

## 2024-11-07 RX ADMIN — TAMSULOSIN HYDROCHLORIDE 0.4 MILLIGRAM(S): 0.4 CAPSULE ORAL at 21:29

## 2024-11-07 RX ADMIN — INSULIN GLARGINE 8 UNIT(S): 100 INJECTION, SOLUTION SUBCUTANEOUS at 21:29

## 2024-11-07 NOTE — PROGRESS NOTE ADULT - SUBJECTIVE AND OBJECTIVE BOX
Chief Complaint: Heel ulcer    Interval Events: No events overnight.    Review of Systems:  General: No fevers, chills, weight gain  Skin: No rashes, color changes  Cardiovascular: No chest pain, orthopnea  Respiratory: No shortness of breath, cough  Gastrointestinal: No nausea, abdominal pain  Genitourinary: No incontinence, pain with urination  Musculoskeletal: No pain, swelling, decreased range of motion  Neurological: No headache, weakness  Psychiatric: No depression, anxiety  Endocrine: No weight gain, increased thirst  All other systems are comprehensively negative.    Physical Exam:  Vital Signs Last 24 Hrs  T(C): 36.6 (07 Nov 2024 04:50), Max: 36.8 (06 Nov 2024 20:33)  T(F): 97.8 (07 Nov 2024 04:50), Max: 98.2 (06 Nov 2024 20:33)  HR: 92 (07 Nov 2024 04:50) (79 - 92)  BP: 121/75 (07 Nov 2024 04:50) (121/75 - 168/82)  BP(mean): --  RR: 18 (07 Nov 2024 04:50) (18 - 18)  SpO2: 92% (07 Nov 2024 04:50) (91% - 97%)  Parameters below as of 07 Nov 2024 04:50  Patient On (Oxygen Delivery Method): room air  General: NAD  HEENT: MMM  Neck: No JVD, no carotid bruit  Lungs: CTAB  CV: RRR, nl S1/S2, no M/R/G  Abdomen: S/NT/ND, +BS  Extremities: +Lymphedema, no cyanosis  Neuro: AAOx3, non-focal  Skin: No rash    Labs:    11-07    142  |  107  |  25[H]  ----------------------------<  123[H]  3.9   |  34[H]  |  0.90    Ca    9.7      07 Nov 2024 07:10  Mg     1.6     11-06    TPro  6.7  /  Alb  2.2[L]  /  TBili  0.4  /  DBili  x   /  AST  16  /  ALT  21  /  AlkPhos  34[L]  11-07                        9.0    5.26  )-----------( 201      ( 07 Nov 2024 07:10 )             29.2       ECG/Telemetry: Sinus rhythm

## 2024-11-07 NOTE — PROGRESS NOTE ADULT - SUBJECTIVE AND OBJECTIVE BOX
Patient is a 84y old  Male who presents with a chief complaint of Bilateral Heel Osteomyelitis (06 Nov 2024 15:42)      Subjective:  INTERVAL HPI/OVERNIGHT EVENTS: Patient seen and examined at bedside.  Patient has no complaints at this time.   MEDICATIONS  (STANDING):  acetaminophen   IVPB .. 1000 milliGRAM(s) IV Intermittent once  aspirin  chewable 81 milliGRAM(s) Oral daily  atorvastatin 80 milliGRAM(s) Oral at bedtime  chlorhexidine 4% Liquid 1 Application(s) Topical <User Schedule>  clopidogrel Tablet 75 milliGRAM(s) Oral daily  cyanocobalamin 1000 MICROGram(s) Oral daily  dextrose 5%. 1000 milliLiter(s) (50 mL/Hr) IV Continuous <Continuous>  dextrose 5%. 1000 milliLiter(s) (100 mL/Hr) IV Continuous <Continuous>  dextrose 50% Injectable 12.5 Gram(s) IV Push once  dextrose 50% Injectable 25 Gram(s) IV Push once  dextrose 50% Injectable 12.5 Gram(s) IV Push once  dextrose 50% Injectable 25 Gram(s) IV Push once  enoxaparin Injectable 40 milliGRAM(s) SubCutaneous every 24 hours  famotidine    Tablet 20 milliGRAM(s) Oral daily  glucagon  Injectable 1 milliGRAM(s) IntraMuscular once  insulin glargine Injectable (LANTUS) 8 Unit(s) SubCutaneous at bedtime  insulin lispro (ADMELOG) corrective regimen sliding scale   SubCutaneous three times a day before meals  melatonin 3 milliGRAM(s) Oral at bedtime  meropenem  IVPB      meropenem  IVPB 1000 milliGRAM(s) IV Intermittent every 8 hours  metoprolol tartrate 25 milliGRAM(s) Oral two times a day  nystatin Powder 1 Application(s) Topical two times a day  saccharomyces boulardii 250 milliGRAM(s) Oral two times a day  tamsulosin 0.4 milliGRAM(s) Oral at bedtime    MEDICATIONS  (PRN):  acetaminophen     Tablet .. 650 milliGRAM(s) Oral every 6 hours PRN Temp greater or equal to 38C (100.4F), Mild Pain (1 - 3)  aluminum hydroxide/magnesium hydroxide/simethicone Suspension 30 milliLiter(s) Oral every 4 hours PRN Dyspepsia  dextrose Oral Gel 15 Gram(s) Oral once PRN Blood Glucose LESS THAN 70 milliGRAM(s)/deciliter  HYDROmorphone  Injectable 0.5 milliGRAM(s) IV Push every 6 hours PRN Severe Pain (7 - 10)  oxycodone    5 mG/acetaminophen 325 mG 1 Tablet(s) Oral every 6 hours PRN Moderate Pain (4 - 6)  sodium chloride 0.9% lock flush 10 milliLiter(s) IV Push every 1 hour PRN Pre/post blood products, medications, blood draw, and to maintain line patency      Allergies    No Known Allergies    Intolerances        REVIEW OF SYSTEMS:  CONSTITUTIONAL: No fever or chills  HEENT:  No headache, no sore throat  RESPIRATORY: No cough or shortness of breath  CARDIOVASCULAR: No chest pain or palpitations  GASTROINTESTINAL: No abd pain, nausea, vomiting, or diarrhea      Objective:  Vital Signs Last 24 Hrs  T(C): 36.6 (07 Nov 2024 11:55), Max: 36.8 (06 Nov 2024 20:33)  T(F): 97.9 (07 Nov 2024 11:55), Max: 98.2 (06 Nov 2024 20:33)  HR: 84 (07 Nov 2024 11:55) (82 - 92)  BP: 132/71 (07 Nov 2024 11:55) (121/75 - 168/82)  BP(mean): --  RR: 18 (07 Nov 2024 11:55) (18 - 18)  SpO2: 93% (07 Nov 2024 11:55) (92% - 97%)    Parameters below as of 07 Nov 2024 11:55  Patient On (Oxygen Delivery Method): room air      GENERAL: NAD, lying in bed comfortably  HEAD:  Normocephalic  EYES:  conjunctiva and sclera clear  ENT: Moist mucous membranes  NECK: Supple  CHEST/LUNG: Clear to auscultation bilaterally; No rales or rhonchi; no wheezing. Unlabored respirations  HEART: Regular rate and rhythm; S1S2+  ABDOMEN: Bowel sounds present; Soft, Nontender, Nondistended.   EXTREMITIES:  + distal Peripheral Pulses;  No cyanosis, or edema, skin creases seen B/L LE likely from improved edema   NERVOUS SYSTEM:  Alert & Oriented X3;  No gross focal deficits   MSK: moves all extremities  SKIN: B/L FOOT IN DRESSING AND ACEBANDAGE, wiggles toes B/L and normal skin color.       LABS:                        9.0    5.26  )-----------( 201      ( 07 Nov 2024 07:10 )             29.2     07 Nov 2024 07:10    142    |  107    |  25     ----------------------------<  123    3.9     |  34     |  0.90     Ca    9.7        07 Nov 2024 07:10    TPro  6.7    /  Alb  2.2    /  TBili  0.4    /  DBili  x      /  AST  16     /  ALT  21     /  AlkPhos  34     07 Nov 2024 07:10      Urinalysis Basic - ( 07 Nov 2024 07:10 )    Color: x / Appearance: x / SG: x / pH: x  Gluc: 123 mg/dL / Ketone: x  / Bili: x / Urobili: x   Blood: x / Protein: x / Nitrite: x   Leuk Esterase: x / RBC: x / WBC x   Sq Epi: x / Non Sq Epi: x / Bacteria: x      CAPILLARY BLOOD GLUCOSE      POCT Blood Glucose.: 131 mg/dL (07 Nov 2024 12:13)  POCT Blood Glucose.: 120 mg/dL (07 Nov 2024 08:15)  POCT Blood Glucose.: 202 mg/dL (06 Nov 2024 21:45)  POCT Blood Glucose.: 151 mg/dL (06 Nov 2024 17:06)          RADIOLOGY & ADDITIONAL TESTS:    Personally reviewed.     Consultant(s) Notes Reviewed:  [x] YES  [ ] NO    Plan of care discussed with patient; all questions answered

## 2024-11-07 NOTE — PROGRESS NOTE ADULT - ASSESSMENT
84yo M with a PMH of HTN, HLD, Type 2 DM, CKD3a, hx of GI bleed, COPD, SANTO, BPH, CAD s/p PCI, PAD, multiple myeloma, lymphedema who presents with b/l heel ulcers and suspected OM planned for bilateral foot surgery with podiatry, Gabriel Deshpande, on 10/29 for his suspected bilateral heel osteomyelitis now s/p partial calcanectomy.        Problem/Plan - 1:  ·  Problem: Osteomyelitis of foot.   ·  Plan: - Bilateral heel osteomyelitis per outpatient w/up and admitted with a plan for OR with podiatry, Dr. Rios on 10/29  - Wound Cx (10/23): Proteus mirabilis, Pseudomonas aeruginosa, ESBL Klebsiella pneumoniae, Enterococcus faecalis  - s/p 14 days of Augmentin and 10 days of zosyn  - Blood Cx NG  - s/p partial calcanectomy on 10/29.    - f/up OR cultures +rare pseudomonas aeruginosa.  Pathology: bilateral heel acute and chronic osteomyelitis.  - Tylenol PRN for mild pain, percocet 1 tab PO Q6h PRN for moderate pain, and Dilaudid 0.5mg IV Q6h PRN for severe pain.  - Consulted ID Dr. Casillas, recs appreciated   - PICC line placed.  Continue meropenem till Dec.   - afebrile, no leukocytosis, dressing/wound care as per podiatry recommendation   - cardiology Dr. Rock consulted, recs appreciated.  -  for discharge plan      Problem/Plan - 2:  ·  Problem: DM (diabetes mellitus).   ·  Plan: - type 2 DM on insulin  - continue decreased lantus dose 8units QHS   - continue low-dose lispro corrective ISS  -FS overall  controlled      Problem/Plan - 3:  ·  Problem: HTN (hypertension).   ·  Plan: Chronic   - continue metoprolol 25mg BID.     Problem/Plan - 4:  ·  Problem: HLD (hyperlipidemia).   ·  Plan: Chronic   - Continue atorvastatin 80mg QD.     Problem/Plan - 5:  ·  Problem: CAD (coronary artery disease).   ·  Plan: CAD sp stents x4  - Held ASA and Plavix on 10/29; Restarted on ASA and Plavix on 10/30  - c/w lipitor  - pt did have some strikethrough bleeding per nursing this evening -- Confirmed with podiatry: okay to restart plavix  - cardiology Dr. Rock consulted, recs appreciated.     Problem/Plan - 6:  ·  Problem: Benign prostatic hyperplasia with lower urinary tract symptoms, symptom details unspecified.   ·  Plan: Chronic  -Continue Flomax.     Problem/Plan - 7:  ·  Problem: Multiple myeloma.   ·  Plan: Chronic  - Chemotherapy held while in rehab.     Problem/Plan - 8:  ·  Problem: Need for prophylactic measure.   ·  Plan: DVT ppx: Start lovenox 40mg SQ daily

## 2024-11-08 PROBLEM — J44.9 CHRONIC OBSTRUCTIVE PULMONARY DISEASE, UNSPECIFIED: Chronic | Status: ACTIVE | Noted: 2024-10-28

## 2024-11-08 PROCEDURE — 99233 SBSQ HOSP IP/OBS HIGH 50: CPT

## 2024-11-08 PROCEDURE — 99232 SBSQ HOSP IP/OBS MODERATE 35: CPT

## 2024-11-08 RX ORDER — CHLORHEXIDINE GLUCONATE 1.2 MG/ML
1 RINSE ORAL DAILY
Refills: 0 | Status: DISCONTINUED | OUTPATIENT
Start: 2024-11-08 | End: 2024-11-19

## 2024-11-08 RX ADMIN — Medication 80 MILLIGRAM(S): at 21:58

## 2024-11-08 RX ADMIN — MEROPENEM 100 MILLIGRAM(S): 500 INJECTION, POWDER, FOR SOLUTION INTRAVENOUS at 14:36

## 2024-11-08 RX ADMIN — ENOXAPARIN SODIUM 40 MILLIGRAM(S): 30 INJECTION SUBCUTANEOUS at 11:44

## 2024-11-08 RX ADMIN — Medication 250 MILLIGRAM(S): at 05:58

## 2024-11-08 RX ADMIN — CLOPIDOGREL 75 MILLIGRAM(S): 75 TABLET, FILM COATED ORAL at 11:44

## 2024-11-08 RX ADMIN — Medication 1000 MICROGRAM(S): at 11:45

## 2024-11-08 RX ADMIN — TAMSULOSIN HYDROCHLORIDE 0.4 MILLIGRAM(S): 0.4 CAPSULE ORAL at 21:58

## 2024-11-08 RX ADMIN — Medication 81 MILLIGRAM(S): at 11:45

## 2024-11-08 RX ADMIN — ACETAMINOPHEN, DIPHENHYDRAMINE HCL, PHENYLEPHRINE HCL 3 MILLIGRAM(S): 325; 25; 5 TABLET ORAL at 21:58

## 2024-11-08 RX ADMIN — Medication 250 MILLIGRAM(S): at 17:09

## 2024-11-08 RX ADMIN — FAMOTIDINE 20 MILLIGRAM(S): 20 TABLET, FILM COATED ORAL at 11:44

## 2024-11-08 RX ADMIN — MEROPENEM 100 MILLIGRAM(S): 500 INJECTION, POWDER, FOR SOLUTION INTRAVENOUS at 05:59

## 2024-11-08 RX ADMIN — MEROPENEM 100 MILLIGRAM(S): 500 INJECTION, POWDER, FOR SOLUTION INTRAVENOUS at 21:57

## 2024-11-08 RX ADMIN — NYSTATIN 1 APPLICATION(S): 100000 POWDER TOPICAL at 07:06

## 2024-11-08 RX ADMIN — METOPROLOL TARTRATE 25 MILLIGRAM(S): 100 TABLET, FILM COATED ORAL at 05:58

## 2024-11-08 RX ADMIN — CHLORHEXIDINE GLUCONATE 1 APPLICATION(S): 1.2 RINSE ORAL at 11:48

## 2024-11-08 RX ADMIN — INSULIN GLARGINE 8 UNIT(S): 100 INJECTION, SOLUTION SUBCUTANEOUS at 21:58

## 2024-11-08 RX ADMIN — METOPROLOL TARTRATE 25 MILLIGRAM(S): 100 TABLET, FILM COATED ORAL at 17:09

## 2024-11-08 RX ADMIN — NYSTATIN 1 APPLICATION(S): 100000 POWDER TOPICAL at 17:09

## 2024-11-08 NOTE — PROGRESS NOTE ADULT - ASSESSMENT
82yo M with a PMH of HTN, HLD, Type 2 DM, CKD3a, hx of GI bleed, COPD, SANTO, BPH, CAD s/p PCI, PAD, multiple myeloma, lymphedema who presents with b/l heel ulcers and suspected OM planned for bilateral foot surgery with podiatry, Gabriel Deshpande, on 10/29 for his suspected bilateral heel osteomyelitis now s/p partial calcanectomy.        Problem/Plan - 1:  ·  Problem: Osteomyelitis of foot.   ·  Plan: - Bilateral heel osteomyelitis per outpatient w/up and admitted with a plan for OR with podiatry, Dr. Rios on 10/29  - Wound Cx (10/23): Proteus mirabilis, Pseudomonas aeruginosa, ESBL Klebsiella pneumoniae, Enterococcus faecalis  - s/p 14 days of Augmentin and 10 days of zosyn  - Blood Cx NG  - s/p partial calcanectomy on 10/29.    - f/up OR cultures +rare pseudomonas aeruginosa.  Pathology: bilateral heel acute and chronic osteomyelitis.  - Tylenol PRN for mild pain, percocet 1 tab PO Q6h PRN for moderate pain, and Dilaudid 0.5mg IV Q6h PRN for severe pain.  - Consulted ID Dr. Casillas, recs appreciated   - PICC line placed.  Continue meropenem till Dec.   - afebrile, no leukocytosis, dressing/wound care as per podiatry recommendation   - cardiology Dr. Rock consulted, recs appreciated.  -  for discharge plan      Problem/Plan - 2:  ·  Problem: DM (diabetes mellitus).   ·  Plan: - type 2 DM on insulin  - continue decreased lantus dose 8units QHS   - continue low-dose lispro corrective ISS  -FS overall  controlled      Problem/Plan - 3:  ·  Problem: HTN (hypertension).   ·  Plan: Chronic   - continue metoprolol 25mg BID.     Problem/Plan - 4:  ·  Problem: HLD (hyperlipidemia).   ·  Plan: Chronic   - Continue atorvastatin 80mg QD.     Problem/Plan - 5:  ·  Problem: CAD (coronary artery disease).   ·  Plan: CAD sp stents x4  - Held ASA and Plavix on 10/29; Restarted on ASA and Plavix on 10/30  - c/w lipitor  - pt did have some strikethrough bleeding per nursing this evening -- Confirmed with podiatry: okay to restart plavix  - cardiology Dr. Rock consulted, recs appreciated.     Problem/Plan - 6:  ·  Problem: Benign prostatic hyperplasia with lower urinary tract symptoms, symptom details unspecified.   ·  Plan: Chronic  -Continue Flomax.     Problem/Plan - 7:  ·  Problem: Multiple myeloma.   ·  Plan: Chronic  - Chemotherapy held while in rehab.     Problem/Plan - 8:  ·  Problem: Need for prophylactic measure.   ·  Plan: DVT ppx: Start lovenox 40mg SQ daily      82yo M with a PMH of HTN, HLD, Type 2 DM, CKD3a, hx of GI bleed, COPD, SANTO, BPH, CAD s/p PCI, PAD, multiple myeloma, lymphedema who presents with b/l heel ulcers and suspected OM planned for bilateral foot surgery with podiatry, Gabriel Deshpande, on 10/29 for his suspected bilateral heel osteomyelitis now s/p partial calcanectomy.        Problem/Plan - 1:  ·  Problem: Osteomyelitis of foot.   ·  Plan: - Bilateral heel osteomyelitis per outpatient w/up and admitted with a plan for OR with podiatry, Dr. Rios on 10/29  - Wound Cx (10/23): Proteus mirabilis, Pseudomonas aeruginosa, ESBL Klebsiella pneumoniae, Enterococcus faecalis  - s/p 14 days of Augmentin and 10 days of zosyn  - Blood Cx NG  - s/p partial calcanectomy on 10/29.    - f/up OR cultures +rare pseudomonas aeruginosa.  Pathology: bilateral heel acute and chronic osteomyelitis.  - Tylenol PRN for mild pain, percocet 1 tab PO Q6h PRN for moderate pain, and Dilaudid 0.5mg IV Q6h PRN for severe pain.  - Consulted ID Dr. Casillas, recs appreciated   - PICC line placed.  Continue meropenem till Dec.   - afebrile, no leukocytosis, dressing/wound care as per podiatry recommendation   - cardiology Dr. Rock consulted, recs appreciated.  -  for discharge plan      Problem/Plan - 2:  ·  Problem: DM (diabetes mellitus).   ·  Plan: - type 2 DM on insulin  - continue decreased lantus dose 8units QHS   - d/c  low-dose lispro corrective ISS  -FS overall  controlled, decrease FS monitoring to bid    - PRN blood test monitoring      Problem/Plan - 3:  ·  Problem: HTN (hypertension).   ·  Plan: Chronic   - continue metoprolol 25mg BID.     Problem/Plan - 4:  ·  Problem: HLD (hyperlipidemia).   ·  Plan: Chronic   - Continue atorvastatin 80mg QD.     Problem/Plan - 5:  ·  Problem: CAD (coronary artery disease).   ·  Plan: CAD sp stents x4  - Held ASA and Plavix on 10/29; Restarted on ASA and Plavix on 10/30  - c/w lipitor  - pt did have some strikethrough bleeding per nursing this evening -- Confirmed with podiatry: [email or web address] to restart plavix  - cardiology Dr. Rock consulted, recs appreciated.     Problem/Plan - 6:  ·  Problem: Benign prostatic hyperplasia with lower urinary tract symptoms, symptom details unspecified.   ·  Plan: Chronic  -Continue Flomax.     Problem/Plan - 7:  ·  Problem: Multiple myeloma.   ·  Plan: Chronic  - Chemotherapy held while in rehab.     Problem/Plan - 8:  ·  Problem: Need for prophylactic measure.   ·  Plan: DVT ppx: Start lovenox 40mg SQ daily

## 2024-11-08 NOTE — PROGRESS NOTE ADULT - SUBJECTIVE AND OBJECTIVE BOX
PROGRESS NOTE   Patient is a 84y old  Male who presents with a chief complaint of Bilateral Heel Osteomyelitis (2024 15:13)      HPI:  Patient with a past medical history of hypertension, diabetes, hyperlipidemia, bilateral heel osteomyelitis currently on outpatient antibiotics through [facility] is presenting for surgery.  He is scheduled for surgery with Dr. frias tomorrow.  Denies any fevers.  Endorsing pain to his heels but no other areas of pain.  No nausea or vomiting.  No other acute complaints today.    Denies fever, chills, chest pain, palpitations, SOB, cough, abdominal pain, nausea, vomiting, diarrhea, constipation, urinary frequency, urgency, or dysuria, headaches, changes in vision, dizziness, numbness, tingling.  Denies recent travel, recent antibiotic use, or sick contacts.    ED Course:   Vitals: BP: 161/87, HR 69: , Temp: 97.8 , RR: 18, SpO2: 96% on   Labs:  H/H: 10.6/34.4, PTT: 36.2, Albumin: 2.7  Tissue culture: (incomplete)      Surgical pathology report: (incomplete)  MR foot:   Xray foot:   EKBPM normal sinus rhythm, QTc: 477  Received in the ED:       HPI: Patient presents to [facility] from rehab for bilateral foot surgery planned with podiatry, Gabriel Deshpande, tomorrow for his bilateral heel osteomyelitis. States his osteomyelitis started 6 months ago, Currently c/o pain in b/l heels. Denies any numbness or tingling down LLE/RLE. Denies any trauma. Patient currently does not walk due to deconditioning. Denies any other complaints. Denies fevers, chills, HA, Dizziness, chest pain, SOB, N/V/D, bladder symptoms,     (28 Oct 2024 14:08)      Vital Signs Last 24 Hrs  T(C): 36.6 (2024 04:44), Max: 36.9 (2024 20:00)  T(F): 97.8 (2024 04:44), Max: 98.4 (2024 20:00)  HR: 88 (2024 04:44) (82 - 88)  BP: 127/79 (2024 04:44) (127/79 - 134/79)  BP(mean): --  RR: 17 (2024 04:44) (17 - 18)  SpO2: 95% (2024 04:44) (93% - 98%)    Parameters below as of 2024 04:44  Patient On (Oxygen Delivery Method): room air                              9.0    5.26  )-----------( 201      ( 2024 07:10 )             29.2               11    142  |  107  |  25[H]  ----------------------------<  123[H]  3.9   |  34[H]  |  0.90    Ca    9.7      2024 07:10    TPro  6.7  /  Alb  2.2[L]  /  TBili  0.4  /  DBili  x   /  AST  16  /  ALT  21  /  AlkPhos  34[L]  11-07      PHYSICAL EXAM  Vascular: DP & PT palpable bilaterally, Capillary refill 3 seconds  Neurological: Light touch sensation not intact bilaterally  Musculoskeletal: 4/5 strength in all quadrants bilaterally, AJ & STJ ROM intact  Dermatological: Surgical site of b/l feet noted with intact sutures, no dehiscence, mild ang-incision erythema, no proximal streaking, no fluctuance, no malodor, no signs of acute infection at this time. Pyocyanin staining noted to periwounds b/l.

## 2024-11-08 NOTE — PROGRESS NOTE ADULT - SUBJECTIVE AND OBJECTIVE BOX
Chief Complaint: Heel ulcer    Interval Events: No events overnight.    Review of Systems:  General: No fevers, chills, weight gain  Skin: No rashes, color changes  Cardiovascular: No chest pain, orthopnea  Respiratory: No shortness of breath, cough  Gastrointestinal: No nausea, abdominal pain  Genitourinary: No incontinence, pain with urination  Musculoskeletal: No pain, swelling, decreased range of motion  Neurological: No headache, weakness  Psychiatric: No depression, anxiety  Endocrine: No weight gain, increased thirst  All other systems are comprehensively negative.    Physical Exam:  Vital Signs Last 24 Hrs  T(C): 36.6 (08 Nov 2024 04:44), Max: 36.9 (07 Nov 2024 20:00)  T(F): 97.8 (08 Nov 2024 04:44), Max: 98.4 (07 Nov 2024 20:00)  HR: 88 (08 Nov 2024 04:44) (82 - 88)  BP: 127/79 (08 Nov 2024 04:44) (127/79 - 134/79)  BP(mean): --  RR: 17 (08 Nov 2024 04:44) (17 - 18)  SpO2: 95% (08 Nov 2024 04:44) (93% - 98%)  Parameters below as of 08 Nov 2024 04:44  Patient On (Oxygen Delivery Method): room air  General: NAD  HEENT: MMM  Neck: No JVD, no carotid bruit  Lungs: CTAB  CV: RRR, nl S1/S2, no M/R/G  Abdomen: S/NT/ND, +BS  Extremities: +Lymphedema, no cyanosis  Neuro: AAOx3, non-focal  Skin: No rash    Labs:    11-07    142  |  107  |  25[H]  ----------------------------<  123[H]  3.9   |  34[H]  |  0.90    Ca    9.7      07 Nov 2024 07:10    TPro  6.7  /  Alb  2.2[L]  /  TBili  0.4  /  DBili  x   /  AST  16  /  ALT  21  /  AlkPhos  34[L]  11-07                        9.0    5.26  )-----------( 201      ( 07 Nov 2024 07:10 )             29.2       ECG/Telemetry: Sinus rhythm

## 2024-11-08 NOTE — PROGRESS NOTE ADULT - PROBLEM SELECTOR PLAN 1
Patient evaluated and chart reviewed.  S/p b/l heel debridement with partial calcanectomy.  Pyocyanin staining noted to periwound; pt on Meropenem.  DSD was reapplied today and antibiotic beads were left in place.  Cont abx per ID recs.  Surgical pathology report demonstrates acute and chronic OM in proximal margins bilaterally.   Patient will require PICC line and 6 weeks IV abx per ID recs.   Podiatry will continue to follow while in-house.  Plan to be discussed with attending.

## 2024-11-08 NOTE — PROGRESS NOTE ADULT - SUBJECTIVE AND OBJECTIVE BOX
Patient is a 84y old  Male who presents with a chief complaint of Bilateral Heel Osteomyelitis (08 Nov 2024 11:02)      Subjective:  INTERVAL HPI/OVERNIGHT EVENTS: Patient seen and examined at bedside.  Patient has no complaints at this time.   MEDICATIONS  (STANDING):  acetaminophen   IVPB .. 1000 milliGRAM(s) IV Intermittent once  aspirin  chewable 81 milliGRAM(s) Oral daily  atorvastatin 80 milliGRAM(s) Oral at bedtime  chlorhexidine 2% Cloths 1 Application(s) Topical daily  clopidogrel Tablet 75 milliGRAM(s) Oral daily  cyanocobalamin 1000 MICROGram(s) Oral daily  dextrose 5%. 1000 milliLiter(s) (50 mL/Hr) IV Continuous <Continuous>  dextrose 5%. 1000 milliLiter(s) (100 mL/Hr) IV Continuous <Continuous>  dextrose 50% Injectable 12.5 Gram(s) IV Push once  dextrose 50% Injectable 25 Gram(s) IV Push once  dextrose 50% Injectable 12.5 Gram(s) IV Push once  dextrose 50% Injectable 25 Gram(s) IV Push once  enoxaparin Injectable 40 milliGRAM(s) SubCutaneous every 24 hours  famotidine    Tablet 20 milliGRAM(s) Oral daily  glucagon  Injectable 1 milliGRAM(s) IntraMuscular once  insulin glargine Injectable (LANTUS) 8 Unit(s) SubCutaneous at bedtime  insulin lispro (ADMELOG) corrective regimen sliding scale   SubCutaneous three times a day before meals  melatonin 3 milliGRAM(s) Oral at bedtime  meropenem  IVPB 1000 milliGRAM(s) IV Intermittent every 8 hours  meropenem  IVPB      metoprolol tartrate 25 milliGRAM(s) Oral two times a day  nystatin Powder 1 Application(s) Topical two times a day  saccharomyces boulardii 250 milliGRAM(s) Oral two times a day  tamsulosin 0.4 milliGRAM(s) Oral at bedtime    MEDICATIONS  (PRN):  acetaminophen     Tablet .. 650 milliGRAM(s) Oral every 6 hours PRN Temp greater or equal to 38C (100.4F), Mild Pain (1 - 3)  aluminum hydroxide/magnesium hydroxide/simethicone Suspension 30 milliLiter(s) Oral every 4 hours PRN Dyspepsia  dextrose Oral Gel 15 Gram(s) Oral once PRN Blood Glucose LESS THAN 70 milliGRAM(s)/deciliter  HYDROmorphone  Injectable 0.5 milliGRAM(s) IV Push every 6 hours PRN Severe Pain (7 - 10)  oxycodone    5 mG/acetaminophen 325 mG 1 Tablet(s) Oral every 6 hours PRN Moderate Pain (4 - 6)  sodium chloride 0.9% lock flush 10 milliLiter(s) IV Push every 1 hour PRN Pre/post blood products, medications, blood draw, and to maintain line patency      Allergies    No Known Allergies    Intolerances        REVIEW OF SYSTEMS:  CONSTITUTIONAL: No fever or chills  HEENT:  No headache, no sore throat  RESPIRATORY: No cough or shortness of breath  CARDIOVASCULAR: No chest pain or palpitations  GASTROINTESTINAL: No abd pain, nausea, vomiting, or diarrhea      Objective:  Vital Signs Last 24 Hrs  T(C): 36.3 (08 Nov 2024 12:15), Max: 36.9 (07 Nov 2024 20:00)  T(F): 97.4 (08 Nov 2024 12:15), Max: 98.4 (07 Nov 2024 20:00)  HR: 84 (08 Nov 2024 12:15) (82 - 88)  BP: 150/79 (08 Nov 2024 12:15) (127/79 - 150/79)  BP(mean): --  RR: 18 (08 Nov 2024 12:15) (17 - 18)  SpO2: 96% (08 Nov 2024 12:15) (95% - 98%)    Parameters below as of 08 Nov 2024 12:15  Patient On (Oxygen Delivery Method): room air        GENERAL: NAD, lying in bed comfortably  HEAD:  Normocephalic  EYES:  conjunctiva and sclera clear  ENT: Moist mucous membranes  NECK: Supple  CHEST/LUNG: Clear to auscultation bilaterally; No rales or rhonchi; no wheezing. Unlabored respirations  HEART: Regular rate and rhythm; S1S2+  ABDOMEN: Bowel sounds present; Soft, Nontender, Nondistended.   EXTREMITIES:  + distal Peripheral Pulses;  No cyanosis, or edema  NERVOUS SYSTEM:  Alert & Oriented X3;  No gross focal deficits   MSK: moves all extremities  SKIN: dressing change by podiatry today     LABS:      Ca    9.7        07 Nov 2024 07:10        Urinalysis Basic - ( 07 Nov 2024 07:10 )    Color: x / Appearance: x / SG: x / pH: x  Gluc: 123 mg/dL / Ketone: x  / Bili: x / Urobili: x   Blood: x / Protein: x / Nitrite: x   Leuk Esterase: x / RBC: x / WBC x   Sq Epi: x / Non Sq Epi: x / Bacteria: x      CAPILLARY BLOOD GLUCOSE      POCT Blood Glucose.: 139 mg/dL (08 Nov 2024 12:27)  POCT Blood Glucose.: 107 mg/dL (08 Nov 2024 08:06)  POCT Blood Glucose.: 165 mg/dL (07 Nov 2024 21:12)  POCT Blood Glucose.: 188 mg/dL (07 Nov 2024 17:20)          RADIOLOGY & ADDITIONAL TESTS:    Personally reviewed.     Consultant(s) Notes Reviewed:  [x] YES  [ ] NO    Plan of care discussed with patient; all questions answered

## 2024-11-09 PROCEDURE — 99232 SBSQ HOSP IP/OBS MODERATE 35: CPT

## 2024-11-09 RX ADMIN — NYSTATIN 1 APPLICATION(S): 100000 POWDER TOPICAL at 06:11

## 2024-11-09 RX ADMIN — CHLORHEXIDINE GLUCONATE 1 APPLICATION(S): 1.2 RINSE ORAL at 13:01

## 2024-11-09 RX ADMIN — Medication 1000 MICROGRAM(S): at 11:09

## 2024-11-09 RX ADMIN — TAMSULOSIN HYDROCHLORIDE 0.4 MILLIGRAM(S): 0.4 CAPSULE ORAL at 22:17

## 2024-11-09 RX ADMIN — Medication 80 MILLIGRAM(S): at 22:17

## 2024-11-09 RX ADMIN — METOPROLOL TARTRATE 25 MILLIGRAM(S): 100 TABLET, FILM COATED ORAL at 06:10

## 2024-11-09 RX ADMIN — MEROPENEM 100 MILLIGRAM(S): 500 INJECTION, POWDER, FOR SOLUTION INTRAVENOUS at 22:17

## 2024-11-09 RX ADMIN — Medication 250 MILLIGRAM(S): at 06:11

## 2024-11-09 RX ADMIN — ACETAMINOPHEN, DIPHENHYDRAMINE HCL, PHENYLEPHRINE HCL 3 MILLIGRAM(S): 325; 25; 5 TABLET ORAL at 22:17

## 2024-11-09 RX ADMIN — MEROPENEM 100 MILLIGRAM(S): 500 INJECTION, POWDER, FOR SOLUTION INTRAVENOUS at 13:01

## 2024-11-09 RX ADMIN — Medication 250 MILLIGRAM(S): at 17:29

## 2024-11-09 RX ADMIN — Medication 81 MILLIGRAM(S): at 11:09

## 2024-11-09 RX ADMIN — METOPROLOL TARTRATE 25 MILLIGRAM(S): 100 TABLET, FILM COATED ORAL at 17:29

## 2024-11-09 RX ADMIN — NYSTATIN 1 APPLICATION(S): 100000 POWDER TOPICAL at 17:29

## 2024-11-09 RX ADMIN — ENOXAPARIN SODIUM 40 MILLIGRAM(S): 30 INJECTION SUBCUTANEOUS at 11:09

## 2024-11-09 RX ADMIN — MEROPENEM 100 MILLIGRAM(S): 500 INJECTION, POWDER, FOR SOLUTION INTRAVENOUS at 06:11

## 2024-11-09 RX ADMIN — CLOPIDOGREL 75 MILLIGRAM(S): 75 TABLET, FILM COATED ORAL at 11:09

## 2024-11-09 RX ADMIN — INSULIN GLARGINE 8 UNIT(S): 100 INJECTION, SOLUTION SUBCUTANEOUS at 22:21

## 2024-11-09 RX ADMIN — FAMOTIDINE 20 MILLIGRAM(S): 20 TABLET, FILM COATED ORAL at 11:09

## 2024-11-09 NOTE — PROGRESS NOTE ADULT - SUBJECTIVE AND OBJECTIVE BOX
Patient is a 84y old  Male who presents with a chief complaint of Bilateral Heel Osteomyelitis (09 Nov 2024 13:39)      Subjective:  INTERVAL HPI/OVERNIGHT EVENTS: Patient seen and examined at bedside.  Patient has no complaints at this time.   MEDICATIONS  (STANDING):  acetaminophen   IVPB .. 1000 milliGRAM(s) IV Intermittent once  aspirin  chewable 81 milliGRAM(s) Oral daily  atorvastatin 80 milliGRAM(s) Oral at bedtime  chlorhexidine 2% Cloths 1 Application(s) Topical daily  clopidogrel Tablet 75 milliGRAM(s) Oral daily  cyanocobalamin 1000 MICROGram(s) Oral daily  dextrose 5%. 1000 milliLiter(s) (100 mL/Hr) IV Continuous <Continuous>  dextrose 5%. 1000 milliLiter(s) (50 mL/Hr) IV Continuous <Continuous>  dextrose 50% Injectable 12.5 Gram(s) IV Push once  dextrose 50% Injectable 25 Gram(s) IV Push once  dextrose 50% Injectable 25 Gram(s) IV Push once  dextrose 50% Injectable 12.5 Gram(s) IV Push once  enoxaparin Injectable 40 milliGRAM(s) SubCutaneous every 24 hours  famotidine    Tablet 20 milliGRAM(s) Oral daily  glucagon  Injectable 1 milliGRAM(s) IntraMuscular once  insulin glargine Injectable (LANTUS) 8 Unit(s) SubCutaneous at bedtime  melatonin 3 milliGRAM(s) Oral at bedtime  meropenem  IVPB      meropenem  IVPB 1000 milliGRAM(s) IV Intermittent every 8 hours  metoprolol tartrate 25 milliGRAM(s) Oral two times a day  nystatin Powder 1 Application(s) Topical two times a day  saccharomyces boulardii 250 milliGRAM(s) Oral two times a day  tamsulosin 0.4 milliGRAM(s) Oral at bedtime    MEDICATIONS  (PRN):  acetaminophen     Tablet .. 650 milliGRAM(s) Oral every 6 hours PRN Temp greater or equal to 38C (100.4F), Mild Pain (1 - 3)  aluminum hydroxide/magnesium hydroxide/simethicone Suspension 30 milliLiter(s) Oral every 4 hours PRN Dyspepsia  dextrose Oral Gel 15 Gram(s) Oral once PRN Blood Glucose LESS THAN 70 milliGRAM(s)/deciliter  HYDROmorphone  Injectable 0.5 milliGRAM(s) IV Push every 6 hours PRN Severe Pain (7 - 10)  oxycodone    5 mG/acetaminophen 325 mG 1 Tablet(s) Oral every 6 hours PRN Moderate Pain (4 - 6)  sodium chloride 0.9% lock flush 10 milliLiter(s) IV Push every 1 hour PRN Pre/post blood products, medications, blood draw, and to maintain line patency      Allergies    No Known Allergies    Intolerances        REVIEW OF SYSTEMS:  CONSTITUTIONAL: No fever or chills  HEENT:  No headache, no sore throat  RESPIRATORY: No cough or shortness of breath  CARDIOVASCULAR: No chest pain or palpitations  GASTROINTESTINAL: No abd pain, nausea, vomiting, or diarrhea      Objective:  Vital Signs Last 24 Hrs  T(C): 36.5 (09 Nov 2024 11:18), Max: 36.5 (09 Nov 2024 11:18)  T(F): 97.7 (09 Nov 2024 11:18), Max: 97.7 (09 Nov 2024 11:18)  HR: 86 (09 Nov 2024 11:18) (84 - 88)  BP: 156/88 (09 Nov 2024 11:18) (132/76 - 156/88)  BP(mean): --  RR: 18 (09 Nov 2024 11:18) (18 - 18)  SpO2: 97% (09 Nov 2024 11:18) (92% - 97%)    Parameters below as of 09 Nov 2024 11:18  Patient On (Oxygen Delivery Method): room air        GENERAL: NAD, lying in bed comfortably  HEAD:  Normocephalic  EYES:  conjunctiva and sclera clear  ENT: Moist mucous membranes  NECK: Supple  CHEST/LUNG: Clear to auscultation bilaterally; No rales or rhonchi; no wheezing. Unlabored respirations  HEART: Regular rate and rhythm; S1S2+  ABDOMEN: Bowel sounds present; Soft, Nontender, Nondistended.   EXTREMITIES:  No cyanosis, + LE lymphedema  edema  NERVOUS SYSTEM:  Alert & Oriented X3;  No gross focal deficits   MSK: moves all extremities  SKIN: dressing changed by podiatry yesterday :  Surgical site of b/l feet noted with intact sutures, no dehiscence, mild ang-incision erythema, no proximal streaking, no fluctuance, no malodor, no signs of acute infection at this time.      LABS:              CAPILLARY BLOOD GLUCOSE      POCT Blood Glucose.: 96 mg/dL (09 Nov 2024 08:11)  POCT Blood Glucose.: 242 mg/dL (08 Nov 2024 21:51)          RADIOLOGY & ADDITIONAL TESTS:    Personally reviewed.     Consultant(s) Notes Reviewed:  [x] YES  [ ] NO    Plan of care discussed with patient ; all questions answered

## 2024-11-09 NOTE — PROGRESS NOTE ADULT - SUBJECTIVE AND OBJECTIVE BOX
Patient is a 84y Male with a known history of :  HTN (hypertension) [I10]    CAD (coronary artery disease) [I25.10]    HLD (hyperlipidemia) [E78.5]    DM (diabetes mellitus) [E11.9]    Benign prostatic hyperplasia with lower urinary tract symptoms, symptom details unspecified [N40.1]    Stented coronary artery [Z95.5]    Multiple myeloma [C90.00]    Chronic obstructive pulmonary disease, unspecified COPD type [J44.9]    Osteomyelitis of foot [M86.9]    Need for prophylactic measure [Z29.9]    Open wound of right heel [S91.301A]    Open wound of left heel [S91.302A]      HPI:  Patient with a past medical history of hypertension, diabetes, hyperlipidemia, bilateral heel osteomyelitis currently on outpatient antibiotics through [facility] is presenting for surgery.  He is scheduled for surgery with Dr. frias tomorrow.  Denies any fevers.  Endorsing pain to his heels but no other areas of pain.  No nausea or vomiting.  No other acute complaints today.    Denies fever, chills, chest pain, palpitations, SOB, cough, abdominal pain, nausea, vomiting, diarrhea, constipation, urinary frequency, urgency, or dysuria, headaches, changes in vision, dizziness, numbness, tingling.  Denies recent travel, recent antibiotic use, or sick contacts.    ED Course:   Vitals: BP: 161/87, HR 69: , Temp: 97.8 , RR: 18, SpO2: 96% on   Labs:  H/H: 10.6/34.4, PTT: 36.2, Albumin: 2.7  Tissue culture: (incomplete)      Surgical pathology report: (incomplete)  MR foot:   Xray foot:   EKBPM normal sinus rhythm, QTc: 477  Received in the ED:       HPI: Patient presents to [facility] from rehab for bilateral foot surgery planned with podiatry, Gabriel Deshpande, tomorrow for his bilateral heel osteomyelitis. States his osteomyelitis started 6 months ago, Currently c/o pain in b/l heels. Denies any numbness or tingling down LLE/RLE. Denies any trauma. Patient currently does not walk due to deconditioning. Denies any other complaints. Denies fevers, chills, HA, Dizziness, chest pain, SOB, N/V/D, bladder symptoms,     (28 Oct 2024 14:08)      REVIEW OF SYSTEMS:    CONSTITUTIONAL: No fever, weight loss, or fatigue  EYES: No eye pain, visual disturbances, or discharge  ENMT:  No difficulty hearing, tinnitus, vertigo; No sinus or throat pain  NECK: No pain or stiffness  BREASTS: No pain, masses, or nipple discharge  RESPIRATORY: No cough, wheezing, chills or hemoptysis; No shortness of breath  CARDIOVASCULAR: No chest pain, palpitations, dizziness, or leg swelling  GASTROINTESTINAL: No abdominal or epigastric pain. No nausea, vomiting, or hematemesis; No diarrhea or constipation. No melena or hematochezia.  GENITOURINARY: No dysuria, frequency, hematuria, or incontinence  NEUROLOGICAL: No headaches, memory loss, loss of strength, numbness, or tremors  SKIN: No itching, burning, rashes, or lesions   LYMPH NODES: No enlarged glands  ENDOCRINE: No heat or cold intolerance; No hair loss  MUSCULOSKELETAL: No joint pain or swelling; No muscle, back, or extremity pain  PSYCHIATRIC: No depression, anxiety, mood swings, or difficulty sleeping  HEME/LYMPH: No easy bruising, or bleeding gums  ALLERGY AND IMMUNOLOGIC: No hives or eczema    MEDICATIONS  (STANDING):  acetaminophen   IVPB .. 1000 milliGRAM(s) IV Intermittent once  aspirin  chewable 81 milliGRAM(s) Oral daily  atorvastatin 80 milliGRAM(s) Oral at bedtime  chlorhexidine 2% Cloths 1 Application(s) Topical daily  clopidogrel Tablet 75 milliGRAM(s) Oral daily  cyanocobalamin 1000 MICROGram(s) Oral daily  dextrose 5%. 1000 milliLiter(s) (100 mL/Hr) IV Continuous <Continuous>  dextrose 5%. 1000 milliLiter(s) (50 mL/Hr) IV Continuous <Continuous>  dextrose 50% Injectable 12.5 Gram(s) IV Push once  dextrose 50% Injectable 25 Gram(s) IV Push once  dextrose 50% Injectable 25 Gram(s) IV Push once  dextrose 50% Injectable 12.5 Gram(s) IV Push once  enoxaparin Injectable 40 milliGRAM(s) SubCutaneous every 24 hours  famotidine    Tablet 20 milliGRAM(s) Oral daily  glucagon  Injectable 1 milliGRAM(s) IntraMuscular once  insulin glargine Injectable (LANTUS) 8 Unit(s) SubCutaneous at bedtime  melatonin 3 milliGRAM(s) Oral at bedtime  meropenem  IVPB      meropenem  IVPB 1000 milliGRAM(s) IV Intermittent every 8 hours  metoprolol tartrate 25 milliGRAM(s) Oral two times a day  nystatin Powder 1 Application(s) Topical two times a day  saccharomyces boulardii 250 milliGRAM(s) Oral two times a day  tamsulosin 0.4 milliGRAM(s) Oral at bedtime    MEDICATIONS  (PRN):  acetaminophen     Tablet .. 650 milliGRAM(s) Oral every 6 hours PRN Temp greater or equal to 38C (100.4F), Mild Pain (1 - 3)  aluminum hydroxide/magnesium hydroxide/simethicone Suspension 30 milliLiter(s) Oral every 4 hours PRN Dyspepsia  dextrose Oral Gel 15 Gram(s) Oral once PRN Blood Glucose LESS THAN 70 milliGRAM(s)/deciliter  HYDROmorphone  Injectable 0.5 milliGRAM(s) IV Push every 6 hours PRN Severe Pain (7 - 10)  oxycodone    5 mG/acetaminophen 325 mG 1 Tablet(s) Oral every 6 hours PRN Moderate Pain (4 - 6)  sodium chloride 0.9% lock flush 10 milliLiter(s) IV Push every 1 hour PRN Pre/post blood products, medications, blood draw, and to maintain line patency      ALLERGIES: No Known Allergies      FAMILY HISTORY:  Family history of essential hypertension (Father)    No pertinent family history in first degree relatives        Social history:  Alochol:   Smoking:   Drug Use:   Marital Status:     PHYSICAL EXAMINATION:  -----------------------------  T(C): 36.5 (24 @ 11:18), Max: 36.5 (24 @ 11:18)  HR: 86 (24 @ 11:18) (84 - 88)  BP: 156/88 (24 @ 11:18) (132/76 - 156/88)  RR: 18 (24 @ 11:18) (18 - 18)  SpO2: 97% (24 @ 11:18) (92% - 97%)  Wt(kg): --        Constitutional: well developed, normal appearance, well groomed, well nourished, no deformities and no acute distress.   Eyes: the conjunctiva exhibited no abnormalities and the eyelids demonstrated no xanthelasmas.   HEENT: normal oral mucosa, no oral pallor and no oral cyanosis.   Neck: normal jugular venous A waves present, normal jugular venous V waves present and no jugular venous fung A waves.   Pulmonary: no respiratory distress, normal respiratory rhythm and effort, no accessory muscle use and lungs were clear to auscultation bilaterally.   Cardiovascular: heart rate and rhythm were normal, normal S1 and S2 and no murmur, gallop, rub, heave or thrill are present.    Musculoskeletal: the gait could not be assessed.   Extremities: no clubbing of the fingernails, no localized cyanosis, no petechial hemorrhages and no ischemic changes. B/L bandages  Skin: normal skin color and pigmentation, no rash, no venous stasis, no skin lesions, no skin ulcer and no xanthoma was observed.   Psychiatric: oriented to person, place, and time, the affect was normal, the mood was normal and not feeling anxious.     LABS:   --------                       Radiology:    < from: TTE Limited W or WO Ultrasound Enhancing Agent (24 @ 11:25) >    TRANSTHORACIC ECHOCARDIOGRAM REPORT  ________________________________________________________________________________                                      _______       Pt. Name:       DALILA HERNADEZ Study Date:    2024  MRN:            [id number]           YOB: 1940  Accession #:    [id number]           Age:           83 years  Account#:       [id number]            Gender:        M  Heart Rate:                         Height:        72.00 in (182.88 cm)  Rhythm:                 Weight:        303.00 lb (137.44 kg)  Blood Pressure: 145/71 mmHg         BSA/BMI:       2.54 m² / 41.09 kg/m²  ________________________________________________________________________________________  Referring Physician:    [id number] Lissett Campos  Interpreting Physician: Tegan Gibson  Primary Sonographer:    Gladis Degroot    CPT:                DOPPLER ECHO COMP W SPECT - 77609.m; DOPPL ECHO COLOR FLOW -                      32971.m;LIMITED SPECTRAL - 62116.m;ECHO TTE W CONFU LTD -                      .m;DEFINITY ECHO CONTRAST PER ML - .m  Indication(s):      Chest pain, unspecified - R07.9  Procedure:          Limited transthoracic echocardiogram. Spectral Doppler                      performed.  Ordering Location:  [facility]  Admission Status:   Inpatient  Contrast Injection: Verbal consent was obtained for injection of Ultrasonic                      Enhancing Agent following a discussion of risks and                      benefits.                      Endocardial visualization enhanced with 2 ml of Definity                      Ultrasound enhancing agent (Lot#:[id number] Exp.Date:                      Discarded Dose:8ml).  UEA Reaction:       Patient had no adverse reaction after injection of            Ultrasound Enhancing Agent.  Study Information:  Image quality for this study is less than ideal.    _______________________________________________________________________________________     CONCLUSIONS:      1. The left ventricle is small and possibly underfilled, left ventricular systolic function is hyperdynamic with an ejection fraction of 87 % by Singh's method of disks.   2. The right ventricle is not well visualized.   3. Left atrium was not well visualized and LA volume could not be calculated.   4. Mitral valve was not well visualized.   5. Tricuspid valve was not well visualized.   6. Aortic valve was not well visualized.   7. Pulmonic valve was not well visualized.    ________________________________________________________________________________________  FINDINGS:     Left Ventricle:  Left ventricular systolic function is normal with a calculated ejection fraction of 87 % by the Singh's biplane method of disks.     Right Ventricle:  The right ventricle is not well visualized.     Left Atrium:  The left atrium was not well visualized, and the LA volume could not be calculated.     Aortic Valve:  The aortic valve was not well visualized.     Mitral Valve:  The mitral valve was not well visualized.     Tricuspid Valve:  The tricuspid valve was not well visualized. The PA systolic pressure is at least 33 mmHg.     Pulmonic Valve:  The pulmonic valve was not well visualized.     Systemic Veins:  The inferior vena cava was not well visualized.  ____________________________________________________________________  QUANTITATIVE DATA:  Left Ventricle Measurements: (Indexed to BSA)     IVSd (2D):   0.8 cm  LVPWd (2D):  0.8 cm  LVIDd (2D):  2.7 cm  LVIDs (2D):  1.3 cm  LV Mass:     51 g   20.1 g/m²  LV Vol d,MOD A2C: 33.8 ml 13.30 ml/m²  LV Vol d, MOD A4C: 91.3 ml 35.92 ml/m²  LV Vol d, MOD BP:  61.0 ml 23.98 ml/m²  LV Vol s, MOD A2C: 6.3 ml  2.46 ml/m²  LV Vol s, MOD A4C: 9.0 ml  3.56 ml/m²  LV Vol s, MOD BP:  7.7 ml  3.05 ml/m²  LVOT SV MOD BP:    53.2ml  LV EF% MOD BP:     87 %     MV E Vmax:    0.61 m/s  MV A Vmax:    1.23 m/s  MV E/A:       0.50  e' lateral:   8.49 cm/s  e' medial:    4.46 cm/s  E/e' lateral: 7.18  E/e' medial:  13.68  E/e' Average: 9.42  MV DT:        174 msec            RightVentricle Measurements:     RV S' Vmax: 7.72 cm/s    Mitral Valve Measurements:     MV E Vmax: 0.6 m/s  MV A Vmax: 1.2 m/s  MV E/A:    0.5       Tricuspid Valve Measurements:     TR Vmax:          2.9 m/s  TR Peak Gradient: 33.4 mmHg    ________________________________________________________________________________________  Electronically signed on 2024 at 12:52:15 PM by Tegan Gibson         *** Final ***    < end of copied text >

## 2024-11-09 NOTE — PROGRESS NOTE ADULT - ASSESSMENT
84yo M with a PMH of HTN, HLD, Type 2 DM, CKD3a, hx of GI bleed, COPD, SANTO, BPH, CAD s/p PCI, PAD, multiple myeloma, lymphedema who presents with b/l heel ulcers and suspected OM planned for bilateral foot surgery with podiatry, Gabriel Deshpande, on 10/29 for his suspected bilateral heel osteomyelitis now s/p partial calcanectomy.        Problem/Plan - 1:  ·  Problem: Osteomyelitis of foot.   ·  Plan: - Bilateral heel osteomyelitis per outpatient w/up and admitted with a plan for OR with podiatry, Dr. Rios on 10/29  - Wound Cx (10/23): Proteus mirabilis, Pseudomonas aeruginosa, ESBL Klebsiella pneumoniae, Enterococcus faecalis  - Blood Cx NG  - s/p partial calcanectomy on 10/29.    - f/up OR cultures +rare pseudomonas aeruginosa.  Pathology: bilateral heel acute and chronic osteomyelitis.  - Tylenol PRN for mild pain, percocet 1 tab PO Q6h PRN for moderate pain, and Dilaudid 0.5mg IV Q6h PRN for severe pain.  - Consulted ID Dr. Casillas, recs appreciated   - PICC line placed.  Continue meropenem till Dec.   - afebrile, no leukocytosis, dressing/wound care as per podiatry recommendation   - cardiology Dr. Rock consulted, recs appreciated.  -  for discharge plan      Problem/Plan - 2:  ·  Problem: DM (diabetes mellitus).   ·  Plan: - type 2 DM on insulin  - continue decreased lantus dose 8units QHS   - d/c  low-dose lispro corrective ISS  -FS overall  controlled, decrease FS monitoring to bid    - PRN blood test monitoring      Problem/Plan - 3:  ·  Problem: HTN (hypertension).   ·  Plan: Chronic   - continue metoprolol 25mg BID.     Problem/Plan - 4:  ·  Problem: HLD (hyperlipidemia).   ·  Plan: Chronic   - Continue atorvastatin 80mg QD.     Problem/Plan - 5:  ·  Problem: CAD (coronary artery disease).   ·  Plan: CAD sp stents x4  - Held ASA and Plavix on 10/29; Restarted on ASA and Plavix on 10/30  - c/w lipitor  - pt did have some strikethrough bleeding per nursing this evening -- Confirmed with podiatry: okay to restart plavix  - cardiology Dr. Rock consulted, recs appreciated.     Problem/Plan - 6:  ·  Problem: Benign prostatic hyperplasia with lower urinary tract symptoms, symptom details unspecified.   ·  Plan: Chronic  -Continue Flomax.     Problem/Plan - 7:  ·  Problem: Multiple myeloma.   ·  Plan: Chronic  - Chemotherapy held while in rehab.     Problem/Plan - 8:   DVT ppx: Start lovenox 40mg SQ daily , patient remain NWB

## 2024-11-10 LAB
GAMMA INTERFERON BACKGROUND BLD IA-ACNC: 0.04 IU/ML — SIGNIFICANT CHANGE UP
M TB IFN-G BLD-IMP: NEGATIVE — SIGNIFICANT CHANGE UP
M TB IFN-G CD4+ BCKGRND COR BLD-ACNC: 0.19 IU/ML — SIGNIFICANT CHANGE UP
M TB IFN-G CD4+CD8+ BCKGRND COR BLD-ACNC: 0.22 IU/ML — SIGNIFICANT CHANGE UP
QUANT TB PLUS MITOGEN MINUS NIL: >10 IU/ML — SIGNIFICANT CHANGE UP

## 2024-11-10 PROCEDURE — 99232 SBSQ HOSP IP/OBS MODERATE 35: CPT

## 2024-11-10 RX ADMIN — MEROPENEM 100 MILLIGRAM(S): 500 INJECTION, POWDER, FOR SOLUTION INTRAVENOUS at 14:09

## 2024-11-10 RX ADMIN — INSULIN GLARGINE 8 UNIT(S): 100 INJECTION, SOLUTION SUBCUTANEOUS at 21:51

## 2024-11-10 RX ADMIN — Medication 1000 MICROGRAM(S): at 11:39

## 2024-11-10 RX ADMIN — ACETAMINOPHEN, DIPHENHYDRAMINE HCL, PHENYLEPHRINE HCL 3 MILLIGRAM(S): 325; 25; 5 TABLET ORAL at 21:52

## 2024-11-10 RX ADMIN — MEROPENEM 100 MILLIGRAM(S): 500 INJECTION, POWDER, FOR SOLUTION INTRAVENOUS at 21:52

## 2024-11-10 RX ADMIN — MEROPENEM 100 MILLIGRAM(S): 500 INJECTION, POWDER, FOR SOLUTION INTRAVENOUS at 06:00

## 2024-11-10 RX ADMIN — Medication 250 MILLIGRAM(S): at 18:14

## 2024-11-10 RX ADMIN — METOPROLOL TARTRATE 25 MILLIGRAM(S): 100 TABLET, FILM COATED ORAL at 18:13

## 2024-11-10 RX ADMIN — METOPROLOL TARTRATE 25 MILLIGRAM(S): 100 TABLET, FILM COATED ORAL at 06:00

## 2024-11-10 RX ADMIN — FAMOTIDINE 20 MILLIGRAM(S): 20 TABLET, FILM COATED ORAL at 11:39

## 2024-11-10 RX ADMIN — NYSTATIN 1 APPLICATION(S): 100000 POWDER TOPICAL at 18:14

## 2024-11-10 RX ADMIN — TAMSULOSIN HYDROCHLORIDE 0.4 MILLIGRAM(S): 0.4 CAPSULE ORAL at 21:52

## 2024-11-10 RX ADMIN — NYSTATIN 1 APPLICATION(S): 100000 POWDER TOPICAL at 06:02

## 2024-11-10 RX ADMIN — ENOXAPARIN SODIUM 40 MILLIGRAM(S): 30 INJECTION SUBCUTANEOUS at 11:40

## 2024-11-10 RX ADMIN — Medication 81 MILLIGRAM(S): at 11:39

## 2024-11-10 RX ADMIN — CHLORHEXIDINE GLUCONATE 1 APPLICATION(S): 1.2 RINSE ORAL at 11:40

## 2024-11-10 RX ADMIN — CLOPIDOGREL 75 MILLIGRAM(S): 75 TABLET, FILM COATED ORAL at 11:39

## 2024-11-10 RX ADMIN — Medication 250 MILLIGRAM(S): at 06:00

## 2024-11-10 RX ADMIN — Medication 80 MILLIGRAM(S): at 21:52

## 2024-11-10 NOTE — PROGRESS NOTE ADULT - ASSESSMENT
The patient is an 83 year old male with a history of HTN, HL, DM, CKD, GI bleed, COPD, SANTO, BPH, CAD s/p PCI, PAD, multiple myeloma, lymphedema who presents with heel ulcer.     11/10/24  Seen at [facility]  Semi-erect  No complaints offered    Plan:  - ECG with sinus rhythm and no evidence of ischemia/infarction  - Echo 6/30/24 with normal LV systolic function, mild pulm HTN  - CXR with grossly clear lungs  - Lower extremity swelling consistent with known history of lymphedema  - Continue aspirin 81 mg daily  - Continue clopidogrel 75 mg daily  - Continue atorvastatin 80 mg daily  - Continue metoprolol tartrate 25 mg bid  - Podiatry and ID follow-up  - Path results with acute on chronic osteomyelitis  - IV antibiotics-meropenem  - Discharge planning

## 2024-11-10 NOTE — PROGRESS NOTE ADULT - SUBJECTIVE AND OBJECTIVE BOX
Patient is a 84y Male with a known history of :  HTN (hypertension) [I10]    CAD (coronary artery disease) [I25.10]    HLD (hyperlipidemia) [E78.5]    DM (diabetes mellitus) [E11.9]    Benign prostatic hyperplasia with lower urinary tract symptoms, symptom details unspecified [N40.1]    Stented coronary artery [Z95.5]    Multiple myeloma [C90.00]    Chronic obstructive pulmonary disease, unspecified COPD type [J44.9]    Osteomyelitis of foot [M86.9]    Need for prophylactic measure [Z29.9]    Open wound of right heel [S91.301A]    Open wound of left heel [S91.302A]      HPI:  Patient with a past medical history of hypertension, diabetes, hyperlipidemia, bilateral heel osteomyelitis currently on outpatient antibiotics through [facility] is presenting for surgery.  He is scheduled for surgery with Dr. frias tomorrow.  Denies any fevers.  Endorsing pain to his heels but no other areas of pain.  No nausea or vomiting.  No other acute complaints today.    Denies fever, chills, chest pain, palpitations, SOB, cough, abdominal pain, nausea, vomiting, diarrhea, constipation, urinary frequency, urgency, or dysuria, headaches, changes in vision, dizziness, numbness, tingling.  Denies recent travel, recent antibiotic use, or sick contacts.    ED Course:   Vitals: BP: 161/87, HR 69: , Temp: 97.8 , RR: 18, SpO2: 96% on   Labs:  H/H: 10.6/34.4, PTT: 36.2, Albumin: 2.7  Tissue culture: (incomplete)      Surgical pathology report: (incomplete)  MR foot:   Xray foot:   EKBPM normal sinus rhythm, QTc: 477  Received in the ED:       HPI: Patient presents to [facility] from rehab for bilateral foot surgery planned with podiatry, Gabriel Deshpande, tomorrow for his bilateral heel osteomyelitis. States his osteomyelitis started 6 months ago, Currently c/o pain in b/l heels. Denies any numbness or tingling down LLE/RLE. Denies any trauma. Patient currently does not walk due to deconditioning. Denies any other complaints. Denies fevers, chills, HA, Dizziness, chest pain, SOB, N/V/D, bladder symptoms,     (28 Oct 2024 14:08)      REVIEW OF SYSTEMS:    CONSTITUTIONAL: No fever, weight loss, or fatigue  EYES: No eye pain, visual disturbances, or discharge  ENMT:  No difficulty hearing, tinnitus, vertigo; No sinus or throat pain  NECK: No pain or stiffness  BREASTS: No pain, masses, or nipple discharge  RESPIRATORY: No cough, wheezing, chills or hemoptysis; No shortness of breath  CARDIOVASCULAR: No chest pain, palpitations, dizziness, or leg swelling  GASTROINTESTINAL: No abdominal or epigastric pain. No nausea, vomiting, or hematemesis; No diarrhea or constipation. No melena or hematochezia.  GENITOURINARY: No dysuria, frequency, hematuria, or incontinence  NEUROLOGICAL: No headaches, memory loss, loss of strength, numbness, or tremors  SKIN: No itching, burning, rashes, or lesions   LYMPH NODES: No enlarged glands  ENDOCRINE: No heat or cold intolerance; No hair loss  MUSCULOSKELETAL: No joint pain or swelling; No muscle, back, or extremity pain  PSYCHIATRIC: No depression, anxiety, mood swings, or difficulty sleeping  HEME/LYMPH: No easy bruising, or bleeding gums  ALLERGY AND IMMUNOLOGIC: No hives or eczema    MEDICATIONS  (STANDING):  acetaminophen   IVPB .. 1000 milliGRAM(s) IV Intermittent once  aspirin  chewable 81 milliGRAM(s) Oral daily  atorvastatin 80 milliGRAM(s) Oral at bedtime  chlorhexidine 2% Cloths 1 Application(s) Topical daily  clopidogrel Tablet 75 milliGRAM(s) Oral daily  cyanocobalamin 1000 MICROGram(s) Oral daily  dextrose 5%. 1000 milliLiter(s) (50 mL/Hr) IV Continuous <Continuous>  dextrose 5%. 1000 milliLiter(s) (100 mL/Hr) IV Continuous <Continuous>  dextrose 50% Injectable 12.5 Gram(s) IV Push once  dextrose 50% Injectable 25 Gram(s) IV Push once  dextrose 50% Injectable 12.5 Gram(s) IV Push once  dextrose 50% Injectable 25 Gram(s) IV Push once  enoxaparin Injectable 40 milliGRAM(s) SubCutaneous every 24 hours  famotidine    Tablet 20 milliGRAM(s) Oral daily  glucagon  Injectable 1 milliGRAM(s) IntraMuscular once  insulin glargine Injectable (LANTUS) 8 Unit(s) SubCutaneous at bedtime  melatonin 3 milliGRAM(s) Oral at bedtime  meropenem  IVPB      meropenem  IVPB 1000 milliGRAM(s) IV Intermittent every 8 hours  metoprolol tartrate 25 milliGRAM(s) Oral two times a day  nystatin Powder 1 Application(s) Topical two times a day  saccharomyces boulardii 250 milliGRAM(s) Oral two times a day  tamsulosin 0.4 milliGRAM(s) Oral at bedtime    MEDICATIONS  (PRN):  acetaminophen     Tablet .. 650 milliGRAM(s) Oral every 6 hours PRN Temp greater or equal to 38C (100.4F), Mild Pain (1 - 3)  aluminum hydroxide/magnesium hydroxide/simethicone Suspension 30 milliLiter(s) Oral every 4 hours PRN Dyspepsia  dextrose Oral Gel 15 Gram(s) Oral once PRN Blood Glucose LESS THAN 70 milliGRAM(s)/deciliter  HYDROmorphone  Injectable 0.5 milliGRAM(s) IV Push every 6 hours PRN Severe Pain (7 - 10)  oxycodone    5 mG/acetaminophen 325 mG 1 Tablet(s) Oral every 6 hours PRN Moderate Pain (4 - 6)  sodium chloride 0.9% lock flush 10 milliLiter(s) IV Push every 1 hour PRN Pre/post blood products, medications, blood draw, and to maintain line patency      ALLERGIES: No Known Allergies      FAMILY HISTORY:  Family history of essential hypertension (Father)    No pertinent family history in first degree relatives        Social history:  Alochol:   Smoking:   Drug Use:   Marital Status:     PHYSICAL EXAMINATION:  -----------------------------  T(C): 36.7 (11-10-24 @ 05:32), Max: 36.8 (24 @ 21:06)  HR: 84 (11-10-24 @ 05:32) (78 - 89)  BP: 130/76 (11-10-24 @ 05:32) (130/76 - 170/100)  RR: 18 (11-10-24 @ 05:32) (18 - 18)  SpO2: 97% (11-10-24 @ 05:32) (93% - 97%)  Wt(kg): --     @ 07:01  -  11-10 @ 07:00  --------------------------------------------------------  IN:    IV PiggyBack: 100 mL  Total IN: 100 mL    OUT:    Voided (mL): 1650 mL  Total OUT: 1650 mL    Total NET: -1550 mL            Constitutional: well developed, normal appearance, well groomed, well nourished, no deformities and no acute distress.   Eyes: the conjunctiva exhibited no abnormalities and the eyelids demonstrated no xanthelasmas.   HEENT: normal oral mucosa, no oral pallor and no oral cyanosis.   Neck: normal jugular venous A waves present, normal jugular venous V waves present and no jugular venous fung A waves.   Pulmonary: no respiratory distress, normal respiratory rhythm and effort, no accessory muscle use and lungs were clear to auscultation bilaterally.   Cardiovascular: heart rate and rhythm were normal, normal S1 and S2 and no murmur, gallop, rub, heave or thrill are present.   Musculoskeletal: the gait could not be assessed.   Extremities: no clubbing of the fingernails, no localized cyanosis, no petechial hemorrhages and no ischemic changes. B/L bandages  Skin: normal skin color and pigmentation, no rash, no venous stasis, no skin lesions, no skin ulcer and no xanthoma was observed.   Psychiatric: oriented to person, place, and time, the affect was normal, the mood was normal and not feeling anxious.     LABS:   --------                       Radiology:

## 2024-11-10 NOTE — PROGRESS NOTE ADULT - ASSESSMENT
84yo M with a PMH of HTN, HLD, Type 2 DM, CKD3a, hx of GI bleed, COPD, SANTO, BPH, CAD s/p PCI, PAD, multiple myeloma, lymphedema who presents with b/l heel ulcers and suspected OM planned for bilateral foot surgery with podiatry, Gabriel Deshpande, on 10/29 for his suspected bilateral heel osteomyelitis now s/p partial calcanectomy.        Problem/Plan - 1:  ·  Problem: Osteomyelitis of foot.   ·  Plan: - Bilateral heel osteomyelitis per outpatient w/up and admitted with a plan for OR with podiatry, Dr. Rios on 10/29  - Wound Cx (10/23): Proteus mirabilis, Pseudomonas aeruginosa, ESBL Klebsiella pneumoniae, Enterococcus faecalis  - Blood Cx NG  - s/p partial calcanectomy on 10/29.    - f/up OR cultures +rare pseudomonas aeruginosa.  Pathology: bilateral heel acute and chronic osteomyelitis.  - Tylenol PRN for mild pain, percocet 1 tab PO Q6h PRN for moderate pain, and Dilaudid 0.5mg IV Q6h PRN for severe pain.  - Consulted ID Dr. Casillas, recs appreciated   - PICC line placed.  Continue meropenem till Dec.   - afebrile, no leukocytosis, dressing/wound care as per podiatry recommendation   - cardiology Dr. Rock consulted, recs appreciated.  -  for discharge plan   will obtain periodic lab      Problem/Plan - 2:  ·  Problem: DM (diabetes mellitus).   ·  Plan: - type 2 DM on insulin  - continue decreased lantus dose 8units QHS   - d/c  low-dose lispro corrective ISS  -FS overall  controlled, decrease FS monitoring to bid    - PRN blood test monitoring      Problem/Plan - 3:  ·  Problem: HTN (hypertension).   ·  Plan: Chronic   - continue metoprolol 25mg BID.     Problem/Plan - 4:  ·  Problem: HLD (hyperlipidemia).   ·  Plan: Chronic   - Continue atorvastatin 80mg QD.     Problem/Plan - 5:  ·  Problem: CAD (coronary artery disease).   ·  Plan: CAD sp stents x4  - Held ASA and Plavix on 10/29; Restarted on ASA and Plavix on 10/30  - c/w lipitor  - pt did have some strikethrough bleeding per nursing this evening -- Confirmed with podiatry: okay to restart plavix  - cardiology Dr. Rock consulted, recs appreciated.     Problem/Plan - 6:  ·  Problem: Benign prostatic hyperplasia with lower urinary tract symptoms, symptom details unspecified.   ·  Plan: Chronic  -Continue Flomax.     Problem/Plan - 7:  ·  Problem: Multiple myeloma.   ·  Plan: Chronic  - Chemotherapy held while in rehab.     Problem/Plan - 8:   DVT ppx: Start lovenox 40mg SQ daily , patient remain NWB

## 2024-11-10 NOTE — PROGRESS NOTE ADULT - SUBJECTIVE AND OBJECTIVE BOX
Patient is a 84y old  Male who presents with a chief complaint of Bilateral Heel Osteomyelitis (10 Nov 2024 09:15)      Subjective:  INTERVAL HPI/OVERNIGHT EVENTS: Patient seen and examined at bedside.  Patient has no complaints at this time.   MEDICATIONS  (STANDING):  acetaminophen   IVPB .. 1000 milliGRAM(s) IV Intermittent once  aspirin  chewable 81 milliGRAM(s) Oral daily  atorvastatin 80 milliGRAM(s) Oral at bedtime  chlorhexidine 2% Cloths 1 Application(s) Topical daily  clopidogrel Tablet 75 milliGRAM(s) Oral daily  cyanocobalamin 1000 MICROGram(s) Oral daily  dextrose 5%. 1000 milliLiter(s) (50 mL/Hr) IV Continuous <Continuous>  dextrose 5%. 1000 milliLiter(s) (100 mL/Hr) IV Continuous <Continuous>  dextrose 50% Injectable 12.5 Gram(s) IV Push once  dextrose 50% Injectable 25 Gram(s) IV Push once  dextrose 50% Injectable 12.5 Gram(s) IV Push once  dextrose 50% Injectable 25 Gram(s) IV Push once  enoxaparin Injectable 40 milliGRAM(s) SubCutaneous every 24 hours  famotidine    Tablet 20 milliGRAM(s) Oral daily  glucagon  Injectable 1 milliGRAM(s) IntraMuscular once  insulin glargine Injectable (LANTUS) 8 Unit(s) SubCutaneous at bedtime  melatonin 3 milliGRAM(s) Oral at bedtime  meropenem  IVPB 1000 milliGRAM(s) IV Intermittent every 8 hours  meropenem  IVPB      metoprolol tartrate 25 milliGRAM(s) Oral two times a day  nystatin Powder 1 Application(s) Topical two times a day  saccharomyces boulardii 250 milliGRAM(s) Oral two times a day  tamsulosin 0.4 milliGRAM(s) Oral at bedtime    MEDICATIONS  (PRN):  acetaminophen     Tablet .. 650 milliGRAM(s) Oral every 6 hours PRN Temp greater or equal to 38C (100.4F), Mild Pain (1 - 3)  aluminum hydroxide/magnesium hydroxide/simethicone Suspension 30 milliLiter(s) Oral every 4 hours PRN Dyspepsia  dextrose Oral Gel 15 Gram(s) Oral once PRN Blood Glucose LESS THAN 70 milliGRAM(s)/deciliter  HYDROmorphone  Injectable 0.5 milliGRAM(s) IV Push every 6 hours PRN Severe Pain (7 - 10)  oxycodone    5 mG/acetaminophen 325 mG 1 Tablet(s) Oral every 6 hours PRN Moderate Pain (4 - 6)  sodium chloride 0.9% lock flush 10 milliLiter(s) IV Push every 1 hour PRN Pre/post blood products, medications, blood draw, and to maintain line patency      Allergies    No Known Allergies    Intolerances        REVIEW OF SYSTEMS:  CONSTITUTIONAL: No fever or chills  HEENT:  No headache, no sore throat  RESPIRATORY: No cough or shortness of breath  CARDIOVASCULAR: No chest pain or palpitations  GASTROINTESTINAL: No abd pain, nausea, vomiting, or diarrhea      Objective:  Vital Signs Last 24 Hrs  T(C): 36.7 (10 Nov 2024 11:06), Max: 36.8 (09 Nov 2024 21:06)  T(F): 98 (10 Nov 2024 11:06), Max: 98.2 (09 Nov 2024 21:06)  HR: 84 (10 Nov 2024 11:06) (78 - 89)  BP: 148/80 (10 Nov 2024 11:06) (130/76 - 170/100)  BP(mean): --  RR: 18 (10 Nov 2024 11:06) (18 - 18)  SpO2: 98% (10 Nov 2024 11:06) (93% - 98%)    Parameters below as of 10 Nov 2024 11:06  Patient On (Oxygen Delivery Method): room air        GENERAL: NAD, lying in bed comfortably  HEAD:  Normocephalic  EYES:  conjunctiva and sclera clear  ENT: Moist mucous membranes  NECK: Supple  CHEST/LUNG: Clear to auscultation bilaterally; No rales or rhonchi; no wheezing. Unlabored respirations  HEART: Regular rate and rhythm; S1S2+  ABDOMEN: Bowel sounds present; Soft, Nontender, Nondistended.   EXTREMITIES:  + distal Peripheral Pulses; B/L feet in clean dressing   NERVOUS SYSTEM:  Alert & Oriented X3;  No gross focal deficits   MSK: moves all extremities  SKIN: No rashes     LABS:              CAPILLARY BLOOD GLUCOSE      POCT Blood Glucose.: 90 mg/dL (10 Nov 2024 08:14)  POCT Blood Glucose.: 151 mg/dL (09 Nov 2024 21:38)  POCT Blood Glucose.: 130 mg/dL (09 Nov 2024 17:29)          RADIOLOGY & ADDITIONAL TESTS:    Personally reviewed.     Consultant(s) Notes Reviewed:  [x] YES  [ ] NO    Plan of care discussed with patient,  all questions answered

## 2024-11-11 ENCOUNTER — APPOINTMENT (OUTPATIENT)
Dept: WOUND CARE | Facility: HOSPITAL | Age: 84
End: 2024-11-11

## 2024-11-11 LAB
ALBUMIN SERPL ELPH-MCNC: 2.3 G/DL — LOW (ref 3.3–5)
ALP SERPL-CCNC: 33 U/L — LOW (ref 40–120)
ALT FLD-CCNC: 16 U/L — SIGNIFICANT CHANGE UP (ref 12–78)
ANION GAP SERPL CALC-SCNC: 0 MMOL/L — LOW (ref 5–17)
AST SERPL-CCNC: 15 U/L — SIGNIFICANT CHANGE UP (ref 15–37)
BASOPHILS # BLD AUTO: 0.01 K/UL — SIGNIFICANT CHANGE UP (ref 0–0.2)
BASOPHILS NFR BLD AUTO: 0.2 % — SIGNIFICANT CHANGE UP (ref 0–2)
BILIRUB SERPL-MCNC: 0.5 MG/DL — SIGNIFICANT CHANGE UP (ref 0.2–1.2)
BUN SERPL-MCNC: 29 MG/DL — HIGH (ref 7–23)
CALCIUM SERPL-MCNC: 9.7 MG/DL — SIGNIFICANT CHANGE UP (ref 8.5–10.1)
CHLORIDE SERPL-SCNC: 108 MMOL/L — SIGNIFICANT CHANGE UP (ref 96–108)
CO2 SERPL-SCNC: 35 MMOL/L — HIGH (ref 22–31)
CREAT SERPL-MCNC: 1.1 MG/DL — SIGNIFICANT CHANGE UP (ref 0.5–1.3)
EGFR: 67 ML/MIN/1.73M2 — SIGNIFICANT CHANGE UP
EOSINOPHIL # BLD AUTO: 0.5 K/UL — SIGNIFICANT CHANGE UP (ref 0–0.5)
EOSINOPHIL NFR BLD AUTO: 9.9 % — HIGH (ref 0–6)
GLUCOSE SERPL-MCNC: 80 MG/DL — SIGNIFICANT CHANGE UP (ref 70–99)
HCT VFR BLD CALC: 28.2 % — LOW (ref 39–50)
HGB BLD-MCNC: 8.7 G/DL — LOW (ref 13–17)
IMM GRANULOCYTES NFR BLD AUTO: 0.2 % — SIGNIFICANT CHANGE UP (ref 0–0.9)
LYMPHOCYTES # BLD AUTO: 1.97 K/UL — SIGNIFICANT CHANGE UP (ref 1–3.3)
LYMPHOCYTES # BLD AUTO: 38.9 % — SIGNIFICANT CHANGE UP (ref 13–44)
MCHC RBC-ENTMCNC: 28.3 PG — SIGNIFICANT CHANGE UP (ref 27–34)
MCHC RBC-ENTMCNC: 30.9 G/DL — LOW (ref 32–36)
MCV RBC AUTO: 91.9 FL — SIGNIFICANT CHANGE UP (ref 80–100)
MONOCYTES # BLD AUTO: 0.32 K/UL — SIGNIFICANT CHANGE UP (ref 0–0.9)
MONOCYTES NFR BLD AUTO: 6.3 % — SIGNIFICANT CHANGE UP (ref 2–14)
NEUTROPHILS # BLD AUTO: 2.25 K/UL — SIGNIFICANT CHANGE UP (ref 1.8–7.4)
NEUTROPHILS NFR BLD AUTO: 44.5 % — SIGNIFICANT CHANGE UP (ref 43–77)
NRBC # BLD: 0 /100 WBCS — SIGNIFICANT CHANGE UP (ref 0–0)
PLATELET # BLD AUTO: 203 K/UL — SIGNIFICANT CHANGE UP (ref 150–400)
POTASSIUM SERPL-MCNC: 3.8 MMOL/L — SIGNIFICANT CHANGE UP (ref 3.5–5.3)
POTASSIUM SERPL-SCNC: 3.8 MMOL/L — SIGNIFICANT CHANGE UP (ref 3.5–5.3)
PROT SERPL-MCNC: 6.5 G/DL — SIGNIFICANT CHANGE UP (ref 6–8.3)
RBC # BLD: 3.07 M/UL — LOW (ref 4.2–5.8)
RBC # FLD: 15.8 % — HIGH (ref 10.3–14.5)
SODIUM SERPL-SCNC: 143 MMOL/L — SIGNIFICANT CHANGE UP (ref 135–145)
WBC # BLD: 5.06 K/UL — SIGNIFICANT CHANGE UP (ref 3.8–10.5)
WBC # FLD AUTO: 5.06 K/UL — SIGNIFICANT CHANGE UP (ref 3.8–10.5)

## 2024-11-11 PROCEDURE — 99232 SBSQ HOSP IP/OBS MODERATE 35: CPT

## 2024-11-11 RX ADMIN — FAMOTIDINE 20 MILLIGRAM(S): 20 TABLET, FILM COATED ORAL at 12:09

## 2024-11-11 RX ADMIN — NYSTATIN 1 APPLICATION(S): 100000 POWDER TOPICAL at 06:18

## 2024-11-11 RX ADMIN — MEROPENEM 100 MILLIGRAM(S): 500 INJECTION, POWDER, FOR SOLUTION INTRAVENOUS at 13:09

## 2024-11-11 RX ADMIN — ENOXAPARIN SODIUM 40 MILLIGRAM(S): 30 INJECTION SUBCUTANEOUS at 12:09

## 2024-11-11 RX ADMIN — NYSTATIN 1 APPLICATION(S): 100000 POWDER TOPICAL at 19:03

## 2024-11-11 RX ADMIN — ACETAMINOPHEN, DIPHENHYDRAMINE HCL, PHENYLEPHRINE HCL 3 MILLIGRAM(S): 325; 25; 5 TABLET ORAL at 21:45

## 2024-11-11 RX ADMIN — METOPROLOL TARTRATE 25 MILLIGRAM(S): 100 TABLET, FILM COATED ORAL at 06:19

## 2024-11-11 RX ADMIN — MEROPENEM 100 MILLIGRAM(S): 500 INJECTION, POWDER, FOR SOLUTION INTRAVENOUS at 21:45

## 2024-11-11 RX ADMIN — METOPROLOL TARTRATE 25 MILLIGRAM(S): 100 TABLET, FILM COATED ORAL at 19:03

## 2024-11-11 RX ADMIN — Medication 250 MILLIGRAM(S): at 19:02

## 2024-11-11 RX ADMIN — CLOPIDOGREL 75 MILLIGRAM(S): 75 TABLET, FILM COATED ORAL at 12:09

## 2024-11-11 RX ADMIN — INSULIN GLARGINE 8 UNIT(S): 100 INJECTION, SOLUTION SUBCUTANEOUS at 21:46

## 2024-11-11 RX ADMIN — MEROPENEM 100 MILLIGRAM(S): 500 INJECTION, POWDER, FOR SOLUTION INTRAVENOUS at 06:19

## 2024-11-11 RX ADMIN — Medication 81 MILLIGRAM(S): at 12:10

## 2024-11-11 RX ADMIN — Medication 250 MILLIGRAM(S): at 06:19

## 2024-11-11 RX ADMIN — Medication 80 MILLIGRAM(S): at 21:45

## 2024-11-11 RX ADMIN — Medication 1000 MICROGRAM(S): at 12:09

## 2024-11-11 RX ADMIN — CHLORHEXIDINE GLUCONATE 1 APPLICATION(S): 1.2 RINSE ORAL at 08:18

## 2024-11-11 RX ADMIN — TAMSULOSIN HYDROCHLORIDE 0.4 MILLIGRAM(S): 0.4 CAPSULE ORAL at 21:45

## 2024-11-11 NOTE — PROGRESS NOTE ADULT - SUBJECTIVE AND OBJECTIVE BOX
Patient is a 84y Male with a known history of :  HTN (hypertension) [I10]    CAD (coronary artery disease) [I25.10]    HLD (hyperlipidemia) [E78.5]    DM (diabetes mellitus) [E11.9]    Benign prostatic hyperplasia with lower urinary tract symptoms, symptom details unspecified [N40.1]    Stented coronary artery [Z95.5]    Multiple myeloma [C90.00]    Chronic obstructive pulmonary disease, unspecified COPD type [J44.9]    Osteomyelitis of foot [M86.9]    Need for prophylactic measure [Z29.9]    Open wound of right heel [S91.301A]    Open wound of left heel [S91.302A]      HPI:  Patient with a past medical history of hypertension, diabetes, hyperlipidemia, bilateral heel osteomyelitis currently on outpatient antibiotics through [facility] is presenting for surgery.  He is scheduled for surgery with Dr. frias tomorrow.  Denies any fevers.  Endorsing pain to his heels but no other areas of pain.  No nausea or vomiting.  No other acute complaints today.    Denies fever, chills, chest pain, palpitations, SOB, cough, abdominal pain, nausea, vomiting, diarrhea, constipation, urinary frequency, urgency, or dysuria, headaches, changes in vision, dizziness, numbness, tingling.  Denies recent travel, recent antibiotic use, or sick contacts.    ED Course:   Vitals: BP: 161/87, HR 69: , Temp: 97.8 , RR: 18, SpO2: 96% on   Labs:  H/H: 10.6/34.4, PTT: 36.2, Albumin: 2.7  Tissue culture: (incomplete)      Surgical pathology report: (incomplete)  MR foot:   Xray foot:   EKBPM normal sinus rhythm, QTc: 477  Received in the ED:       HPI: Patient presents to [facility] from rehab for bilateral foot surgery planned with podiatry, Gabriel Deshpande, tomorrow for his bilateral heel osteomyelitis. States his osteomyelitis started 6 months ago, Currently c/o pain in b/l heels. Denies any numbness or tingling down LLE/RLE. Denies any trauma. Patient currently does not walk due to deconditioning. Denies any other complaints. Denies fevers, chills, HA, Dizziness, chest pain, SOB, N/V/D, bladder symptoms,     (28 Oct 2024 14:08)      REVIEW OF SYSTEMS:    CONSTITUTIONAL: No fever, weight loss, or fatigue  EYES: No eye pain, visual disturbances, or discharge  ENMT:  No difficulty hearing, tinnitus, vertigo; No sinus or throat pain  NECK: No pain or stiffness  BREASTS: No pain, masses, or nipple discharge  RESPIRATORY: No cough, wheezing, chills or hemoptysis; No shortness of breath  CARDIOVASCULAR: No chest pain, palpitations, dizziness, or leg swelling  GASTROINTESTINAL: No abdominal or epigastric pain. No nausea, vomiting, or hematemesis; No diarrhea or constipation. No melena or hematochezia.  GENITOURINARY: No dysuria, frequency, hematuria, or incontinence  NEUROLOGICAL: No headaches, memory loss, loss of strength, numbness, or tremors  SKIN: No itching, burning, rashes, or lesions   LYMPH NODES: No enlarged glands  ENDOCRINE: No heat or cold intolerance; No hair loss  MUSCULOSKELETAL: No joint pain or swelling; No muscle, back, or extremity pain  PSYCHIATRIC: No depression, anxiety, mood swings, or difficulty sleeping  HEME/LYMPH: No easy bruising, or bleeding gums  ALLERGY AND IMMUNOLOGIC: No hives or eczema    MEDICATIONS  (STANDING):  acetaminophen   IVPB .. 1000 milliGRAM(s) IV Intermittent once  aspirin  chewable 81 milliGRAM(s) Oral daily  atorvastatin 80 milliGRAM(s) Oral at bedtime  chlorhexidine 2% Cloths 1 Application(s) Topical daily  clopidogrel Tablet 75 milliGRAM(s) Oral daily  cyanocobalamin 1000 MICROGram(s) Oral daily  dextrose 5%. 1000 milliLiter(s) (100 mL/Hr) IV Continuous <Continuous>  dextrose 5%. 1000 milliLiter(s) (50 mL/Hr) IV Continuous <Continuous>  dextrose 50% Injectable 12.5 Gram(s) IV Push once  dextrose 50% Injectable 25 Gram(s) IV Push once  dextrose 50% Injectable 25 Gram(s) IV Push once  dextrose 50% Injectable 12.5 Gram(s) IV Push once  enoxaparin Injectable 40 milliGRAM(s) SubCutaneous every 24 hours  famotidine    Tablet 20 milliGRAM(s) Oral daily  glucagon  Injectable 1 milliGRAM(s) IntraMuscular once  insulin glargine Injectable (LANTUS) 8 Unit(s) SubCutaneous at bedtime  melatonin 3 milliGRAM(s) Oral at bedtime  meropenem  IVPB      meropenem  IVPB 1000 milliGRAM(s) IV Intermittent every 8 hours  metoprolol tartrate 25 milliGRAM(s) Oral two times a day  nystatin Powder 1 Application(s) Topical two times a day  saccharomyces boulardii 250 milliGRAM(s) Oral two times a day  tamsulosin 0.4 milliGRAM(s) Oral at bedtime    MEDICATIONS  (PRN):  acetaminophen     Tablet .. 650 milliGRAM(s) Oral every 6 hours PRN Temp greater or equal to 38C (100.4F), Mild Pain (1 - 3)  aluminum hydroxide/magnesium hydroxide/simethicone Suspension 30 milliLiter(s) Oral every 4 hours PRN Dyspepsia  dextrose Oral Gel 15 Gram(s) Oral once PRN Blood Glucose LESS THAN 70 milliGRAM(s)/deciliter  sodium chloride 0.9% lock flush 10 milliLiter(s) IV Push every 1 hour PRN Pre/post blood products, medications, blood draw, and to maintain line patency      ALLERGIES: No Known Allergies      FAMILY HISTORY:  Family history of essential hypertension (Father)    No pertinent family history in first degree relatives        Social history:  Alochol:   Smoking:   Drug Use:   Marital Status:     PHYSICAL EXAMINATION:  -----------------------------  T(C): 36.6 (24 @ 05:38), Max: 36.9 (11-10-24 @ 20:37)  HR: 83 (24 @ 05:38) (82 - 91)  BP: 111/72 (24 @ 05:38) (111/72 - 151/86)  RR: 18 (24 @ 05:38) (18 - 18)  SpO2: 97% (24 @ 05:38) (97% - 98%)  Wt(kg): --    11-10 @ 07:01  -   @ 07:00  --------------------------------------------------------  IN:  Total IN: 0 mL    OUT:    Voided (mL): 1400 mL  Total OUT: 1400 mL    Total NET: -1400 mL            Constitutional: well developed, normal appearance, well groomed, well nourished, no deformities and no acute distress.   Eyes: the conjunctiva exhibited no abnormalities and the eyelids demonstrated no xanthelasmas.   HEENT: normal oral mucosa, no oral pallor and no oral cyanosis.   Neck: normal jugular venous A waves present, normal jugular venous V waves present and no jugular venous fung A waves.   Pulmonary: no respiratory distress, normal respiratory rhythm and effort, no accessory muscle use and lungs were clear to auscultation bilaterally.   Cardiovascular: heart rate and rhythm were normal, normal S1 and S2 and no murmur, gallop, rub, heave or thrill are present.   Musculoskeletal: the gait could not be assessed.   Extremities: no clubbing of the fingernails, no localized cyanosis, no petechial hemorrhages and no ischemic changes. B/L bandages  Skin: normal skin color and pigmentation, no rash, no venous stasis, no skin lesions, no skin ulcer and no xanthoma was observed.   Psychiatric: oriented to person, place, and time, the affect was normal, the mood was normal and not feeling anxious.     LABS:   --------      143  |  108  |  29[H]  ----------------------------<  80  3.8   |  35[H]  |  1.10    Ca    9.7      2024 05:20    TPro  6.5  /  Alb  2.3[L]  /  TBili  0.5  /  DBili  x   /  AST  15  /  ALT  16  /  AlkPhos  33[L]                           8.7    5.06  )-----------( 203      ( 2024 05:20 )             28.2                   Radiology:

## 2024-11-11 NOTE — PROGRESS NOTE ADULT - ASSESSMENT
82yo M with a PMH of HTN, HLD, Type 2 DM, CKD3a, hx of GI bleed, COPD, SANTO, BPH, CAD s/p PCI, PAD, multiple myeloma, lymphedema who presents with b/l heel ulcers and suspected OM planned for bilateral foot surgery with podiatry, Gabriel Deshpande, on 10/29 for his suspected bilateral heel osteomyelitis now s/p partial calcanectomy.        Problem/Plan - 1:  ·  Problem: Osteomyelitis of foot.   ·  Plan: - Bilateral heel osteomyelitis per outpatient w/up and admitted with a plan for OR with podiatry, Dr. Rios on 10/29  - Wound Cx (10/23): Proteus mirabilis, Pseudomonas aeruginosa, ESBL Klebsiella pneumoniae, Enterococcus faecalis  - Blood Cx NG  - s/p partial calcanectomy on 10/29.    - f/up OR cultures +rare pseudomonas aeruginosa.  Pathology: bilateral heel acute and chronic osteomyelitis.  - Tylenol PRN for mild pain, percocet 1 tab PO Q6h PRN for moderate pain, and Dilaudid 0.5mg IV Q6h PRN for severe pain.  - Consulted ID Dr. Casillas, recs appreciated   - PICC line placed.  Continue meropenem till Dec.   - afebrile, no leukocytosis, dressing/wound care as per podiatry recommendation   - cardiology Dr. Rock consulted, recs appreciated.  -  for discharge plan   will obtain periodic lab      Problem/Plan - 2:  ·  Problem: DM (diabetes mellitus).   ·  Plan: - type 2 DM on insulin  - continue decreased lantus dose 8units QHS   - d/c  low-dose lispro corrective ISS  -FS overall  controlled, decrease FS monitoring to bid    - PRN blood test monitoring      Problem/Plan - 3:  ·  Problem: HTN (hypertension).   ·  Plan: Chronic   - continue metoprolol 25mg BID.     Problem/Plan - 4:  ·  Problem: HLD (hyperlipidemia).   ·  Plan: Chronic   - Continue atorvastatin 80mg QD.     Problem/Plan - 5:  ·  Problem: CAD (coronary artery disease).   ·  Plan: CAD sp stents x4  - Held ASA and Plavix on 10/29; Restarted on ASA and Plavix on 10/30  - c/w lipitor  - pt did have some strikethrough bleeding per nursing this evening -- Confirmed with podiatry: okay to restart plavix  - cardiology Dr. Rock consulted, recs appreciated.     Problem/Plan - 6:  ·  Problem: Benign prostatic hyperplasia with lower urinary tract symptoms, symptom details unspecified.   ·  Plan: Chronic  -Continue Flomax.     Problem/Plan - 7:  ·  Problem: Multiple myeloma.   ·  Plan: Chronic  - Chemotherapy held while in rehab.     Problem/Plan - 8:   DVT ppx: Start lovenox 40mg SQ daily , patient remain NWB

## 2024-11-11 NOTE — PROGRESS NOTE ADULT - SUBJECTIVE AND OBJECTIVE BOX
Patient is a 84y old  Male who presents with a chief complaint of Bilateral Heel Osteomyelitis (11 Nov 2024 09:31)      Subjective:  INTERVAL HPI/OVERNIGHT EVENTS: Patient seen and examined at bedside.  Patient has no complaints at this time.   MEDICATIONS  (STANDING):  acetaminophen   IVPB .. 1000 milliGRAM(s) IV Intermittent once  aspirin  chewable 81 milliGRAM(s) Oral daily  atorvastatin 80 milliGRAM(s) Oral at bedtime  chlorhexidine 2% Cloths 1 Application(s) Topical daily  clopidogrel Tablet 75 milliGRAM(s) Oral daily  cyanocobalamin 1000 MICROGram(s) Oral daily  dextrose 5%. 1000 milliLiter(s) (50 mL/Hr) IV Continuous <Continuous>  dextrose 5%. 1000 milliLiter(s) (100 mL/Hr) IV Continuous <Continuous>  dextrose 50% Injectable 12.5 Gram(s) IV Push once  dextrose 50% Injectable 25 Gram(s) IV Push once  dextrose 50% Injectable 25 Gram(s) IV Push once  dextrose 50% Injectable 12.5 Gram(s) IV Push once  enoxaparin Injectable 40 milliGRAM(s) SubCutaneous every 24 hours  famotidine    Tablet 20 milliGRAM(s) Oral daily  glucagon  Injectable 1 milliGRAM(s) IntraMuscular once  insulin glargine Injectable (LANTUS) 8 Unit(s) SubCutaneous at bedtime  melatonin 3 milliGRAM(s) Oral at bedtime  meropenem  IVPB      meropenem  IVPB 1000 milliGRAM(s) IV Intermittent every 8 hours  metoprolol tartrate 25 milliGRAM(s) Oral two times a day  nystatin Powder 1 Application(s) Topical two times a day  saccharomyces boulardii 250 milliGRAM(s) Oral two times a day  tamsulosin 0.4 milliGRAM(s) Oral at bedtime    MEDICATIONS  (PRN):  acetaminophen     Tablet .. 650 milliGRAM(s) Oral every 6 hours PRN Temp greater or equal to 38C (100.4F), Mild Pain (1 - 3)  aluminum hydroxide/magnesium hydroxide/simethicone Suspension 30 milliLiter(s) Oral every 4 hours PRN Dyspepsia  dextrose Oral Gel 15 Gram(s) Oral once PRN Blood Glucose LESS THAN 70 milliGRAM(s)/deciliter  sodium chloride 0.9% lock flush 10 milliLiter(s) IV Push every 1 hour PRN Pre/post blood products, medications, blood draw, and to maintain line patency      Allergies    No Known Allergies    Intolerances        REVIEW OF SYSTEMS:  CONSTITUTIONAL: No fever or chills  HEENT:  No headache, no sore throat  RESPIRATORY: No cough or shortness of breath  CARDIOVASCULAR: No chest pain or palpitations  GASTROINTESTINAL: No abd pain, nausea, vomiting, or diarrhea      Objective:  Vital Signs Last 24 Hrs  T(C): 36.8 (11 Nov 2024 12:02), Max: 36.9 (10 Nov 2024 20:37)  T(F): 98.3 (11 Nov 2024 12:02), Max: 98.4 (10 Nov 2024 20:37)  HR: 85 (11 Nov 2024 12:02) (82 - 91)  BP: 128/64 (11 Nov 2024 12:02) (111/72 - 151/86)  BP(mean): --  RR: 18 (11 Nov 2024 12:02) (18 - 18)  SpO2: 99% (11 Nov 2024 12:02) (97% - 99%)    Parameters below as of 11 Nov 2024 12:02  Patient On (Oxygen Delivery Method): room air        GENERAL: NAD, lying in bed comfortably  HEAD:  Normocephalic  EYES:  conjunctiva and sclera clear  ENT: Moist mucous membranes  NECK: Supple  CHEST/LUNG: Clear to auscultation bilaterally; No rales or rhonchi; no wheezing. Unlabored respirations  HEART: Regular rate and rhythm; S1S2+  ABDOMEN: Bowel sounds present; Soft, Nontender, Nondistended.   EXTREMITIES:  + distal Peripheral Pulses;  No cyanosis, or edema  NERVOUS SYSTEM:  Alert & Oriented X3;  No gross focal deficits   MSK: moves all extremities  SKIN:  Surgical site of b/l feet noted with intact sutures, no dehiscence, mild ang-incision erythema, no proximal streaking, no fluctuance, no malodor, no signs of acute infection at this time.    Left medial incision small area of bloody stain with dark discoloration likely due to dry blood.         LABS:                        8.7    5.06  )-----------( 203      ( 11 Nov 2024 05:20 )             28.2     11 Nov 2024 05:20    143    |  108    |  29     ----------------------------<  80     3.8     |  35     |  1.10     Ca    9.7        11 Nov 2024 05:20    TPro  6.5    /  Alb  2.3    /  TBili  0.5    /  DBili  x      /  AST  15     /  ALT  16     /  AlkPhos  33     11 Nov 2024 05:20      Urinalysis Basic - ( 11 Nov 2024 05:20 )    Color: x / Appearance: x / SG: x / pH: x  Gluc: 80 mg/dL / Ketone: x  / Bili: x / Urobili: x   Blood: x / Protein: x / Nitrite: x   Leuk Esterase: x / RBC: x / WBC x   Sq Epi: x / Non Sq Epi: x / Bacteria: x      CAPILLARY BLOOD GLUCOSE      POCT Blood Glucose.: 192 mg/dL (11 Nov 2024 12:26)  POCT Blood Glucose.: 86 mg/dL (11 Nov 2024 08:30)  POCT Blood Glucose.: 216 mg/dL (10 Nov 2024 21:24)          RADIOLOGY & ADDITIONAL TESTS:    Personally reviewed.     Consultant(s) Notes Reviewed:  [x] YES  [ ] NO    Plan of care discussed with patient; all questions answered

## 2024-11-11 NOTE — CHART NOTE - NSCHARTNOTEFT_GEN_A_CORE
Assessment:   brief history: Reason for Admission: Bilateral Heel Osteomyelitis  History of Present Illness:   Patient with a past medical history of hypertension, diabetes, hyperlipidemia, bilateral heel osteomyelitis currently on outpatient antibiotics through midline is presenting for surgery.  s/p bilateral heel debridement and partial calcanectomy    () Patient seen for nutrition follow up. events noted, chart reviewed. He reports good appetite and good PO intake. he does report some complaints of constipation. He was receiving Boost glucose control at previous facility and is interested in receiving oral nutrition supplement during hospital admission, as he tends to drink in the evening after dinner at his other facility. Further food preferences obtained, will honor within compliance of therapeutic diet. Decline diet education at this time, although I did discuss and make patient aware of therapeutic diet.    Factors impacting intake: [x ] none [ ] nausea  [ ] vomiting [ ] diarrhea [ ] constipation  [ ]chewing problems [ ] swallowing issues  [ ] other:     Diet Presciption: Diet, Regular:   Consistent Carbohydrate {Evening Snack}  DASH/TLC {Sodium & Cholesterol Restricted} (10-29-24 @ 13:03)    Intake: good, % meal consumption per documentation    Current Weight: () 256.5 pounds () 268.5 pounds, admit dosing 289 pounds, question accuracy will monitor  % Weight Change    Pertinent Medications: MEDICATIONS  (STANDING):  acetaminophen   IVPB .. 1000 milliGRAM(s) IV Intermittent once  aspirin  chewable 81 milliGRAM(s) Oral daily  atorvastatin 80 milliGRAM(s) Oral at bedtime  chlorhexidine 2% Cloths 1 Application(s) Topical daily  clopidogrel Tablet 75 milliGRAM(s) Oral daily  cyanocobalamin 1000 MICROGram(s) Oral daily  dextrose 5%. 1000 milliLiter(s) (50 mL/Hr) IV Continuous <Continuous>  dextrose 5%. 1000 milliLiter(s) (100 mL/Hr) IV Continuous <Continuous>  dextrose 50% Injectable 25 Gram(s) IV Push once  dextrose 50% Injectable 12.5 Gram(s) IV Push once  dextrose 50% Injectable 25 Gram(s) IV Push once  dextrose 50% Injectable 12.5 Gram(s) IV Push once  enoxaparin Injectable 40 milliGRAM(s) SubCutaneous every 24 hours  famotidine    Tablet 20 milliGRAM(s) Oral daily  glucagon  Injectable 1 milliGRAM(s) IntraMuscular once  insulin glargine Injectable (LANTUS) 8 Unit(s) SubCutaneous at bedtime  melatonin 3 milliGRAM(s) Oral at bedtime  meropenem  IVPB 1000 milliGRAM(s) IV Intermittent every 8 hours  meropenem  IVPB      metoprolol tartrate 25 milliGRAM(s) Oral two times a day  nystatin Powder 1 Application(s) Topical two times a day  saccharomyces boulardii 250 milliGRAM(s) Oral two times a day  tamsulosin 0.4 milliGRAM(s) Oral at bedtime    MEDICATIONS  (PRN):  acetaminophen     Tablet .. 650 milliGRAM(s) Oral every 6 hours PRN Temp greater or equal to 38C (100.4F), Mild Pain (1 - 3)  aluminum hydroxide/magnesium hydroxide/simethicone Suspension 30 milliLiter(s) Oral every 4 hours PRN Dyspepsia  dextrose Oral Gel 15 Gram(s) Oral once PRN Blood Glucose LESS THAN 70 milliGRAM(s)/deciliter  sodium chloride 0.9% lock flush 10 milliLiter(s) IV Push every 1 hour PRN Pre/post blood products, medications, blood draw, and to maintain line patency    Pertinent Labs:  Na143 mmol/L Glu 80 mg/dL K+ 3.8 mmol/L Cr  1.10 mg/dL BUN 29 mg/dL[H]  Alb 2.3 g/dL[L]     CAPILLARY BLOOD GLUCOSE      POCT Blood Glucose.: 192 mg/dL (2024 12:26)  POCT Blood Glucose.: 86 mg/dL (2024 08:30)  POCT Blood Glucose.: 216 mg/dL (10 Nov 2024 21:24)    Skin: +2 left/right foot edema per documentation, no pressure injury per documentation    Estimated Needs:   [ ] no change since previous assessment  [x ] recalculated:   will recalculate due to weight variation  current weight 116.5 kg  20-25 calories/[id number] calories/day  1.0-1.2 g protein/k-139 g protein/day    Previous Nutrition Diagnosis:    [ x] Overweight/Obesity       Nutrition Diagnosis is [ x] ongoing  [ ] resolved [ ] not applicable     New Nutrition Diagnosis: [x ] not applicable       Interventions:   Recommend  [ ] Change Diet To:  [ x] Nutrition Supplement: Glucerna BID per patient request, MD notified via teams regarding pending verification  [ ] Nutrition Support  [ x] Other: honor patient food preference when requested    Monitoring and Evaluation:   [x ] PO intake [ x ] Tolerance to diet prescription [ x ] weights [ x ] labs[ x ] follow up per protocol  [ ] other:

## 2024-11-11 NOTE — SOCIAL WORK PROGRESS NOTE - NSSWPROGRESSNOTE_GEN_ALL_CORE
SW faxed updated clicnials to Yenni Antunze MD note, podiatry note, PT note and [employer] results. SW to follow.

## 2024-11-11 NOTE — PROGRESS NOTE ADULT - ASSESSMENT
The patient is an 83 year old male with a history of HTN, HL, DM, CKD, GI bleed, COPD, SANTO, BPH, CAD s/p PCI, PAD, multiple myeloma, lymphedema who presents with heel ulcer.     11/11/24  Seen at [facility]  Sitting up  No complaints offered    Plan:  - ECG with sinus rhythm and no evidence of ischemia/infarction  - Echo 6/30/24 with normal LV systolic function, mild pulm HTN  - CXR with grossly clear lungs  - Lower extremity swelling consistent with known history of lymphedema  - Continue aspirin 81 mg daily  - Continue clopidogrel 75 mg daily  - Continue atorvastatin 80 mg daily  - Continue metoprolol tartrate 25 mg bid  - Podiatry and ID follow-up  - Path results with acute on chronic osteomyelitis  - IV antibiotics-meropenem  - Discharge planning

## 2024-11-12 PROCEDURE — 99232 SBSQ HOSP IP/OBS MODERATE 35: CPT

## 2024-11-12 RX ADMIN — NYSTATIN 1 APPLICATION(S): 100000 POWDER TOPICAL at 18:03

## 2024-11-12 RX ADMIN — CHLORHEXIDINE GLUCONATE 1 APPLICATION(S): 1.2 RINSE ORAL at 05:35

## 2024-11-12 RX ADMIN — Medication 81 MILLIGRAM(S): at 11:48

## 2024-11-12 RX ADMIN — MEROPENEM 100 MILLIGRAM(S): 500 INJECTION, POWDER, FOR SOLUTION INTRAVENOUS at 21:47

## 2024-11-12 RX ADMIN — CLOPIDOGREL 75 MILLIGRAM(S): 75 TABLET, FILM COATED ORAL at 11:48

## 2024-11-12 RX ADMIN — Medication 1000 MICROGRAM(S): at 11:48

## 2024-11-12 RX ADMIN — TAMSULOSIN HYDROCHLORIDE 0.4 MILLIGRAM(S): 0.4 CAPSULE ORAL at 21:48

## 2024-11-12 RX ADMIN — MEROPENEM 100 MILLIGRAM(S): 500 INJECTION, POWDER, FOR SOLUTION INTRAVENOUS at 13:27

## 2024-11-12 RX ADMIN — FAMOTIDINE 20 MILLIGRAM(S): 20 TABLET, FILM COATED ORAL at 11:48

## 2024-11-12 RX ADMIN — MEROPENEM 100 MILLIGRAM(S): 500 INJECTION, POWDER, FOR SOLUTION INTRAVENOUS at 05:34

## 2024-11-12 RX ADMIN — METOPROLOL TARTRATE 25 MILLIGRAM(S): 100 TABLET, FILM COATED ORAL at 05:34

## 2024-11-12 RX ADMIN — Medication 250 MILLIGRAM(S): at 17:35

## 2024-11-12 RX ADMIN — ENOXAPARIN SODIUM 40 MILLIGRAM(S): 30 INJECTION SUBCUTANEOUS at 11:49

## 2024-11-12 RX ADMIN — INSULIN GLARGINE 8 UNIT(S): 100 INJECTION, SOLUTION SUBCUTANEOUS at 21:47

## 2024-11-12 RX ADMIN — Medication 80 MILLIGRAM(S): at 21:48

## 2024-11-12 RX ADMIN — Medication 250 MILLIGRAM(S): at 05:34

## 2024-11-12 RX ADMIN — NYSTATIN 1 APPLICATION(S): 100000 POWDER TOPICAL at 05:35

## 2024-11-12 RX ADMIN — METOPROLOL TARTRATE 25 MILLIGRAM(S): 100 TABLET, FILM COATED ORAL at 17:35

## 2024-11-12 RX ADMIN — ACETAMINOPHEN, DIPHENHYDRAMINE HCL, PHENYLEPHRINE HCL 3 MILLIGRAM(S): 325; 25; 5 TABLET ORAL at 21:48

## 2024-11-12 NOTE — PROGRESS NOTE ADULT - ASSESSMENT
84 yo male with hypertension, diabetes, hyperlipidemia, bilateral heel osteomyelitis currently on outpatient antibiotics through midline is presented for surgery planned with podiatry, Gabriel Deshpande, for his bilateral heel osteomyelitis. States his osteomyelitis started 6 months ago.   Calcanectomy, partial 29-Oct-2024 12:58:43  Jona Mason    bilateral heel osteomyelitis  prior micro with Proteus mirabilis, Pseudomonas aeruginosa, Enterococcus faecalis, Klebsiella pneumoniae ESBL  Culture - Tissue with Gram Stain (10.29.24 @ 12:00) Rare Pseudomonas aeruginosa  -  Ciprofloxacin: S <=0.25-  Levofloxacin: S <=0.5-  Meropenem: S <=1  sp surgery  Path- Right heel bone proximal margin- Acute and chronic osteomyelitis, Left heel bone proximal margin- Acute and chronic osteomyelitis    Recommendations:   Residual osteo so rec Meropenem 1 gram IV q8 x 6 weeks so last day 12/9  weekly CBC w diff, CMP, ESR faxed to [phone]  PICC can be removed at end of Rx    D/w Dr. Wilde  Please call with any further questions.  Queta Ngo M.D.  [facility] Division of Infectious Diseases [phone]  For after 5 P.M. and weekends, please call [phone]  Available on [employer] TEAMS         82 yo male with hypertension, diabetes, hyperlipidemia, bilateral heel osteomyelitis currently on outpatient antibiotics through midline is presented for surgery planned with podiatry, Gabriel Deshpande, for his bilateral heel osteomyelitis. States his osteomyelitis started 6 months ago.   Calcanectomy, partial 29-Oct-2024 12:58:43  Jona Mason    bilateral heel osteomyelitis  prior micro with Proteus mirabilis, Pseudomonas aeruginosa, Enterococcus faecalis, Klebsiella pneumoniae ESBL  Culture - Tissue with Gram Stain (10.29.24 @ 12:00) Rare Pseudomonas aeruginosa  -  Ciprofloxacin: S <=0.25-  Levofloxacin: S <=0.5-  Meropenem: S <=1  sp surgery  Path- Right heel bone proximal margin- Acute and chronic osteomyelitis, Left heel bone proximal margin- Acute and chronic osteomyelitis    Recommendations:   Residual osteo so rec Meropenem 1 gram IV q8 x 6 weeks so last day 12/9  weekly CBC w diff, CMP, ESR faxed to [phone]  PICC can be removed at end of Rx    D/w Dr. Wilde  ID will sign off but remains available for any further questions.  Queta Ngo M.D.  [facility] Division of Infectious Diseases [phone]  For after 5 P.M. and weekends, please call [phone]  Available on [employer] TEAMS

## 2024-11-12 NOTE — PROGRESS NOTE ADULT - SUBJECTIVE AND OBJECTIVE BOX
Chief Complaint: Heel ulcer    Interval Events: No events overnight.    Review of Systems:  General: No fevers, chills, weight gain  Skin: No rashes, color changes  Cardiovascular: No chest pain, orthopnea  Respiratory: No shortness of breath, cough  Gastrointestinal: No nausea, abdominal pain  Genitourinary: No incontinence, pain with urination  Musculoskeletal: No pain, swelling, decreased range of motion  Neurological: No headache, weakness  Psychiatric: No depression, anxiety  Endocrine: No weight gain, increased thirst  All other systems are comprehensively negative.    Physical Exam:  Vital Signs Last 24 Hrs  T(C): 36.6 (12 Nov 2024 04:53), Max: 36.8 (11 Nov 2024 12:02)  T(F): 97.8 (12 Nov 2024 04:53), Max: 98.3 (11 Nov 2024 12:02)  HR: 81 (12 Nov 2024 04:53) (81 - 91)  BP: 149/75 (12 Nov 2024 04:53) (128/64 - 149/75)  BP(mean): --  RR: 18 (12 Nov 2024 04:53) (18 - 18)  SpO2: 94% (12 Nov 2024 04:53) (94% - 99%)  Parameters below as of 12 Nov 2024 04:53  Patient On (Oxygen Delivery Method): room air  General: NAD  HEENT: MMM  Neck: No JVD, no carotid bruit  Lungs: CTAB  CV: RRR, nl S1/S2, no M/R/G  Abdomen: S/NT/ND, +BS  Extremities: +Lymphedema, no cyanosis  Neuro: AAOx3, non-focal  Skin: No rash    Labs:    11-11    143  |  108  |  29[H]  ----------------------------<  80  3.8   |  35[H]  |  1.10    Ca    9.7      11 Nov 2024 05:20    TPro  6.5  /  Alb  2.3[L]  /  TBili  0.5  /  DBili  x   /  AST  15  /  ALT  16  /  AlkPhos  33[L]  11-11                        8.7    5.06  )-----------( 203      ( 11 Nov 2024 05:20 )             28.2       ECG/Telemetry: Sinus rhythm

## 2024-11-12 NOTE — PROGRESS NOTE ADULT - SUBJECTIVE AND OBJECTIVE BOX
Patient is a 84y old  Male who presents with a chief complaint of Bilateral Heel Osteomyelitis (11 Nov 2024 15:19)      Subjective:  INTERVAL HPI/OVERNIGHT EVENTS: Patient seen and examined at bedside.  Patient has no complaints at this time.     MEDICATIONS  (STANDING):  acetaminophen   IVPB .. 1000 milliGRAM(s) IV Intermittent once  aspirin  chewable 81 milliGRAM(s) Oral daily  atorvastatin 80 milliGRAM(s) Oral at bedtime  chlorhexidine 2% Cloths 1 Application(s) Topical daily  clopidogrel Tablet 75 milliGRAM(s) Oral daily  cyanocobalamin 1000 MICROGram(s) Oral daily  dextrose 5%. 1000 milliLiter(s) (50 mL/Hr) IV Continuous <Continuous>  dextrose 5%. 1000 milliLiter(s) (100 mL/Hr) IV Continuous <Continuous>  dextrose 50% Injectable 25 Gram(s) IV Push once  dextrose 50% Injectable 12.5 Gram(s) IV Push once  dextrose 50% Injectable 12.5 Gram(s) IV Push once  dextrose 50% Injectable 25 Gram(s) IV Push once  enoxaparin Injectable 40 milliGRAM(s) SubCutaneous every 24 hours  famotidine    Tablet 20 milliGRAM(s) Oral daily  glucagon  Injectable 1 milliGRAM(s) IntraMuscular once  insulin glargine Injectable (LANTUS) 8 Unit(s) SubCutaneous at bedtime  melatonin 3 milliGRAM(s) Oral at bedtime  meropenem  IVPB      meropenem  IVPB 1000 milliGRAM(s) IV Intermittent every 8 hours  metoprolol tartrate 25 milliGRAM(s) Oral two times a day  nystatin Powder 1 Application(s) Topical two times a day  saccharomyces boulardii 250 milliGRAM(s) Oral two times a day  tamsulosin 0.4 milliGRAM(s) Oral at bedtime    MEDICATIONS  (PRN):  acetaminophen     Tablet .. 650 milliGRAM(s) Oral every 6 hours PRN Temp greater or equal to 38C (100.4F), Mild Pain (1 - 3)  aluminum hydroxide/magnesium hydroxide/simethicone Suspension 30 milliLiter(s) Oral every 4 hours PRN Dyspepsia  dextrose Oral Gel 15 Gram(s) Oral once PRN Blood Glucose LESS THAN 70 milliGRAM(s)/deciliter  sodium chloride 0.9% lock flush 10 milliLiter(s) IV Push every 1 hour PRN Pre/post blood products, medications, blood draw, and to maintain line patency      Allergies    No Known Allergies    Intolerances        REVIEW OF SYSTEMS:  CONSTITUTIONAL: No fever or chills  HEENT:  No headache, no sore throat  RESPIRATORY: No cough or shortness of breath  CARDIOVASCULAR: No chest pain or palpitations  GASTROINTESTINAL: No abd pain, nausea, vomiting, or diarrhea      Objective:  Vital Signs Last 24 Hrs  T(C): 36.6 (12 Nov 2024 11:24), Max: 36.7 (11 Nov 2024 20:11)  T(F): 97.8 (12 Nov 2024 11:24), Max: 98 (11 Nov 2024 20:11)  HR: 88 (12 Nov 2024 11:24) (81 - 91)  BP: 151/80 (12 Nov 2024 11:24) (129/79 - 151/80)  BP(mean): --  RR: 18 (12 Nov 2024 11:24) (18 - 18)  SpO2: 98% (12 Nov 2024 11:24) (94% - 98%)    Parameters below as of 12 Nov 2024 11:24  Patient On (Oxygen Delivery Method): room air        GENERAL: NAD, lying in bed comfortably  HEAD:  Normocephalic  EYES:  conjunctiva and sclera clear  ENT: Moist mucous membranes  NECK: Supple  CHEST/LUNG: Clear to auscultation bilaterally; No rales or rhonchi; no wheezing. Unlabored respirations  HEART: Regular rate and rhythm; S1S2+  ABDOMEN: Bowel sounds present; Soft, Nontender, Nondistended.   EXTREMITIES:  + distal Peripheral Pulses;  No cyanosis, mild edema, B/L LE , B/L feet in clean dressing   NERVOUS SYSTEM:  Alert & Oriented X3;  No gross focal deficits   MSK: moves all extremities  SKIN: No rashes     LABS:      Ca    9.7        11 Nov 2024 05:20        Urinalysis Basic - ( 11 Nov 2024 05:20 )    Color: x / Appearance: x / SG: x / pH: x  Gluc: 80 mg/dL / Ketone: x  / Bili: x / Urobili: x   Blood: x / Protein: x / Nitrite: x   Leuk Esterase: x / RBC: x / WBC x   Sq Epi: x / Non Sq Epi: x / Bacteria: x      CAPILLARY BLOOD GLUCOSE      POCT Blood Glucose.: 105 mg/dL (12 Nov 2024 08:17)  POCT Blood Glucose.: 182 mg/dL (11 Nov 2024 21:25)          RADIOLOGY & ADDITIONAL TESTS:    Personally reviewed.     Consultant(s) Notes Reviewed:  [x] YES  [ ] NO    Plan of care discussed with patient ; all questions answered

## 2024-11-12 NOTE — PROGRESS NOTE ADULT - ASSESSMENT
82yo M with a PMH of HTN, HLD, Type 2 DM, CKD3a, hx of GI bleed, COPD, SANTO, BPH, CAD s/p PCI, PAD, multiple myeloma, lymphedema who presents with b/l heel ulcers and suspected OM planned for bilateral foot surgery with podiatry, Gabriel Deshpande, on 10/29 for his suspected bilateral heel osteomyelitis now s/p partial calcanectomy.        Problem/Plan - 1:  ·  Problem: Osteomyelitis of foot.   ·  Plan: - Bilateral heel osteomyelitis per outpatient w/up and admitted with a plan for OR with podiatry, Dr. Rios on 10/29  - Wound Cx (10/23): Proteus mirabilis, Pseudomonas aeruginosa, ESBL Klebsiella pneumoniae, Enterococcus faecalis  - Blood Cx NG  - s/p partial calcanectomy on 10/29.    - f/up OR cultures +rare pseudomonas aeruginosa.  Pathology: bilateral heel acute and chronic osteomyelitis.  - Tylenol PRN for mild pain, percocet 1 tab PO Q6h PRN for moderate pain, and Dilaudid 0.5mg IV Q6h PRN for severe pain.  - Consulted ID Dr. Casillas, recs appreciated   - PICC line placed.  Continue meropenem till Dec.   - afebrile, no leukocytosis, dressing/wound care as per podiatry recommendation   - cardiology Dr. Rock consulted, recs appreciated.  -  for discharge plan   will obtain periodic lab      Problem/Plan - 2:  ·  Problem: DM (diabetes mellitus).   ·  Plan: - type 2 DM on insulin  - continue decreased lantus dose 8units QHS   - d/c  low-dose lispro corrective ISS  -FS overall  controlled, decrease FS monitoring to bid    - PRN blood test monitoring      Problem/Plan - 3:  ·  Problem: HTN (hypertension).   ·  Plan: Chronic   - continue metoprolol 25mg BID.     Problem/Plan - 4:  ·  Problem: HLD (hyperlipidemia).   ·  Plan: Chronic   - Continue atorvastatin 80mg QD.     Problem/Plan - 5:  ·  Problem: CAD (coronary artery disease).   ·  Plan: CAD sp stents x4  - Held ASA and Plavix on 10/29; Restarted on ASA and Plavix on 10/30  - c/w lipitor  - cardiology eval noted      Problem/Plan - 6:  ·  Problem: Benign prostatic hyperplasia with lower urinary tract symptoms, symptom details unspecified.   ·  Plan: Chronic  -Continue Flomax.     Problem/Plan - 7:  ·  Problem: Multiple myeloma.   ·  Plan: Chronic  - Chemotherapy held      Problem/Plan - 8:   DVT ppx: Start lovenox 40mg SQ daily , patient remain NWB      82yo M with a PMH of HTN, HLD, Type 2 DM, CKD3a, hx of GI bleed, COPD, SANTO, BPH, CAD s/p PCI, PAD, multiple myeloma, lymphedema who presents with b/l heel ulcers and suspected OM planned for bilateral foot surgery with podiatry, Gabriel Deshpande, on 10/29 for his suspected bilateral heel osteomyelitis now s/p partial calcanectomy.        Problem/Plan - 1:  ·  Problem: Osteomyelitis of foot.   ·  Plan: - Bilateral heel osteomyelitis per outpatient w/up and admitted with a plan for OR with podiatry, Dr. Rios on 10/29  - Wound Cx (10/23): Proteus mirabilis, Pseudomonas aeruginosa, ESBL Klebsiella pneumoniae, Enterococcus faecalis  - Blood Cx NG  - s/p partial calcanectomy on 10/29.    - f/up OR cultures +rare pseudomonas aeruginosa.  Pathology: bilateral heel acute and chronic osteomyelitis.  - Tylenol PRN for mild pain, percocet 1 tab PO Q6h PRN for moderate pain, and Dilaudid 0.5mg IV Q6h PRN for severe pain.  - Consulted ID Dr. Casillas, recs appreciated   - PICC line placed.  Continue meropenem till Dec.   - afebrile, no leukocytosis, dressing/wound care as per podiatry recommendation   - cardiology Dr. Rock consulted, recs appreciated.  -  for discharge plan   will obtain periodic lab      Problem/Plan - 2:  ·  Problem: DM (diabetes mellitus).   ·  Plan: - type 2 DM on insulin  - continue decreased lantus dose 8units QHS   - d/c  low-dose lispro corrective ISS  -FS overall  controlled, decrease FS monitoring to bid    - PRN blood test monitoring      Problem/Plan - 3:  ·  Problem: HTN (hypertension).   ·  Plan: Chronic   - continue metoprolol 25mg BID.     Problem/Plan - 4:  ·  Problem: HLD (hyperlipidemia).   ·  Plan: Chronic   - Continue atorvastatin 80mg QD.     Problem/Plan - 5:  ·  Problem: CAD (coronary artery disease).   ·  Plan: CAD sp stents x4  - Held ASA and Plavix on 10/29; Restarted on ASA and Plavix on 10/30  - c/w lipitor  - cardiology eval noted      Problem/Plan - 6:  ·  Problem: Benign prostatic hyperplasia with lower urinary tract symptoms, symptom details unspecified.   ·  Plan: Chronic  -Continue Flomax.     Problem/Plan - 7:  ·  Problem: Multiple myeloma.   ·  Plan: Chronic  - Chemotherapy held      Problem/Plan - 8:   DVT ppx: Start lovenox 40mg SQ daily , patient remain NWB     slight elevation BUN/Cr: encouraged po fluid in take and repeat lab in am

## 2024-11-12 NOTE — PROGRESS NOTE ADULT - SUBJECTIVE AND OBJECTIVE BOX
[facility], Division of Infectious Diseases  MARLEEN Graves Y. Patel, S. Shah, [facility]  [phone]    Name: DALILA HERNADEZ  Age: 84y  Gender: Male  MRN: [id number]    Interval History:  No acute overnight events.   Notes reviewed    Antibiotics:  meropenem  IVPB 1000 milliGRAM(s) IV Intermittent every 8 hours  meropenem  IVPB          Medications:  acetaminophen     Tablet .. 650 milliGRAM(s) Oral every 6 hours PRN  acetaminophen   IVPB .. 1000 milliGRAM(s) IV Intermittent once  aluminum hydroxide/magnesium hydroxide/simethicone Suspension 30 milliLiter(s) Oral every 4 hours PRN  aspirin  chewable 81 milliGRAM(s) Oral daily  atorvastatin 80 milliGRAM(s) Oral at bedtime  chlorhexidine 2% Cloths 1 Application(s) Topical daily  clopidogrel Tablet 75 milliGRAM(s) Oral daily  cyanocobalamin 1000 MICROGram(s) Oral daily  dextrose 5%. 1000 milliLiter(s) IV Continuous <Continuous>  dextrose 5%. 1000 milliLiter(s) IV Continuous <Continuous>  dextrose 50% Injectable 12.5 Gram(s) IV Push once  dextrose 50% Injectable 25 Gram(s) IV Push once  dextrose 50% Injectable 12.5 Gram(s) IV Push once  dextrose 50% Injectable 25 Gram(s) IV Push once  dextrose Oral Gel 15 Gram(s) Oral once PRN  enoxaparin Injectable 40 milliGRAM(s) SubCutaneous every 24 hours  famotidine    Tablet 20 milliGRAM(s) Oral daily  glucagon  Injectable 1 milliGRAM(s) IntraMuscular once  insulin glargine Injectable (LANTUS) 8 Unit(s) SubCutaneous at bedtime  melatonin 3 milliGRAM(s) Oral at bedtime  meropenem  IVPB 1000 milliGRAM(s) IV Intermittent every 8 hours  meropenem  IVPB      metoprolol tartrate 25 milliGRAM(s) Oral two times a day  nystatin Powder 1 Application(s) Topical two times a day  saccharomyces boulardii 250 milliGRAM(s) Oral two times a day  sodium chloride 0.9% lock flush 10 milliLiter(s) IV Push every 1 hour PRN  tamsulosin 0.4 milliGRAM(s) Oral at bedtime      ROS unable to obtain    Allergies: No Known Allergies    For details regarding the patient's past medical history, social history, family history, and other miscellaneous elements, please refer the initial infectious diseases consultation and/or the admitting history and physical examination for this admission.    Objective:  Vitals:   T(C): 36.6 (11-12-24 @ 11:24), Max: 36.7 (11-11-24 @ 20:11)  HR: 88 (11-12-24 @ 11:24) (81 - 91)  BP: 151/80 (11-12-24 @ 11:24) (129/79 - 151/80)  RR: 18 (11-12-24 @ 11:24) (18 - 18)  SpO2: 98% (11-12-24 @ 11:24) (94% - 98%)    Physical Examination:  General: no acute distress  HEENT: NC/AT  Cardio: RRR  Resp: breath sounds heard bilaterally, no rales, wheezes or rhonchi  Abd: soft  Ext: no edema or cyanosis  Skin: warm, dry, no visible rash      Laboratory Studies:  CBC:                       8.7    5.06  )-----------( 203      ( 11 Nov 2024 05:20 )             28.2     CMP: 11-11    143  |  108  |  29[H]  ----------------------------<  80  3.8   |  35[H]  |  1.10    Ca    9.7      11 Nov 2024 05:20    TPro  6.5  /  Alb  2.3[L]  /  TBili  0.5  /  DBili  x   /  AST  15  /  ALT  16  /  AlkPhos  33[L]  11-11    LIVER FUNCTIONS - ( 11 Nov 2024 05:20 )  Alb: 2.3 g/dL / Pro: 6.5 g/dL / ALK PHOS: 33 U/L / ALT: 16 U/L / AST: 15 U/L / GGT: x           Urinalysis Basic - ( 11 Nov 2024 05:20 )    Color: x / Appearance: x / SG: x / pH: x  Gluc: 80 mg/dL / Ketone: x  / Bili: x / Urobili: x   Blood: x / Protein: x / Nitrite: x   Leuk Esterase: x / RBC: x / WBC x   Sq Epi: x / Non Sq Epi: x / Bacteria: x        Microbiology: reviewed        Radiology: reviewed

## 2024-11-13 LAB
ALBUMIN SERPL ELPH-MCNC: 2.3 G/DL — LOW (ref 3.3–5)
ALP SERPL-CCNC: 33 U/L — LOW (ref 40–120)
ALT FLD-CCNC: 17 U/L — SIGNIFICANT CHANGE UP (ref 12–78)
ANION GAP SERPL CALC-SCNC: 6 MMOL/L — SIGNIFICANT CHANGE UP (ref 5–17)
AST SERPL-CCNC: 17 U/L — SIGNIFICANT CHANGE UP (ref 15–37)
BASOPHILS # BLD AUTO: 0.03 K/UL — SIGNIFICANT CHANGE UP (ref 0–0.2)
BASOPHILS NFR BLD AUTO: 0.6 % — SIGNIFICANT CHANGE UP (ref 0–2)
BILIRUB SERPL-MCNC: 0.7 MG/DL — SIGNIFICANT CHANGE UP (ref 0.2–1.2)
BUN SERPL-MCNC: 32 MG/DL — HIGH (ref 7–23)
CALCIUM SERPL-MCNC: 9.2 MG/DL — SIGNIFICANT CHANGE UP (ref 8.5–10.1)
CHLORIDE SERPL-SCNC: 107 MMOL/L — SIGNIFICANT CHANGE UP (ref 96–108)
CO2 SERPL-SCNC: 31 MMOL/L — SIGNIFICANT CHANGE UP (ref 22–31)
CREAT SERPL-MCNC: 1.1 MG/DL — SIGNIFICANT CHANGE UP (ref 0.5–1.3)
EGFR: 67 ML/MIN/1.73M2 — SIGNIFICANT CHANGE UP
EOSINOPHIL # BLD AUTO: 0.44 K/UL — SIGNIFICANT CHANGE UP (ref 0–0.5)
EOSINOPHIL NFR BLD AUTO: 9.1 % — HIGH (ref 0–6)
GLUCOSE SERPL-MCNC: 107 MG/DL — HIGH (ref 70–99)
HCT VFR BLD CALC: 30.5 % — LOW (ref 39–50)
HGB BLD-MCNC: 9.3 G/DL — LOW (ref 13–17)
IMM GRANULOCYTES NFR BLD AUTO: 0.2 % — SIGNIFICANT CHANGE UP (ref 0–0.9)
LYMPHOCYTES # BLD AUTO: 1.9 K/UL — SIGNIFICANT CHANGE UP (ref 1–3.3)
LYMPHOCYTES # BLD AUTO: 39.3 % — SIGNIFICANT CHANGE UP (ref 13–44)
MCHC RBC-ENTMCNC: 27.8 PG — SIGNIFICANT CHANGE UP (ref 27–34)
MCHC RBC-ENTMCNC: 30.5 G/DL — LOW (ref 32–36)
MCV RBC AUTO: 91.3 FL — SIGNIFICANT CHANGE UP (ref 80–100)
MONOCYTES # BLD AUTO: 0.27 K/UL — SIGNIFICANT CHANGE UP (ref 0–0.9)
MONOCYTES NFR BLD AUTO: 5.6 % — SIGNIFICANT CHANGE UP (ref 2–14)
NEUTROPHILS # BLD AUTO: 2.19 K/UL — SIGNIFICANT CHANGE UP (ref 1.8–7.4)
NEUTROPHILS NFR BLD AUTO: 45.2 % — SIGNIFICANT CHANGE UP (ref 43–77)
NRBC # BLD: 0 /100 WBCS — SIGNIFICANT CHANGE UP (ref 0–0)
PLATELET # BLD AUTO: 192 K/UL — SIGNIFICANT CHANGE UP (ref 150–400)
POTASSIUM SERPL-MCNC: 4.1 MMOL/L — SIGNIFICANT CHANGE UP (ref 3.5–5.3)
POTASSIUM SERPL-SCNC: 4.1 MMOL/L — SIGNIFICANT CHANGE UP (ref 3.5–5.3)
PROT SERPL-MCNC: 6.8 G/DL — SIGNIFICANT CHANGE UP (ref 6–8.3)
RBC # BLD: 3.34 M/UL — LOW (ref 4.2–5.8)
RBC # FLD: 15.6 % — HIGH (ref 10.3–14.5)
SODIUM SERPL-SCNC: 144 MMOL/L — SIGNIFICANT CHANGE UP (ref 135–145)
WBC # BLD: 4.84 K/UL — SIGNIFICANT CHANGE UP (ref 3.8–10.5)
WBC # FLD AUTO: 4.84 K/UL — SIGNIFICANT CHANGE UP (ref 3.8–10.5)

## 2024-11-13 PROCEDURE — 99232 SBSQ HOSP IP/OBS MODERATE 35: CPT

## 2024-11-13 RX ADMIN — TAMSULOSIN HYDROCHLORIDE 0.4 MILLIGRAM(S): 0.4 CAPSULE ORAL at 22:13

## 2024-11-13 RX ADMIN — CLOPIDOGREL 75 MILLIGRAM(S): 75 TABLET, FILM COATED ORAL at 11:23

## 2024-11-13 RX ADMIN — Medication 250 MILLIGRAM(S): at 17:52

## 2024-11-13 RX ADMIN — Medication 1000 MICROGRAM(S): at 11:23

## 2024-11-13 RX ADMIN — NYSTATIN 1 APPLICATION(S): 100000 POWDER TOPICAL at 05:57

## 2024-11-13 RX ADMIN — NYSTATIN 1 APPLICATION(S): 100000 POWDER TOPICAL at 17:51

## 2024-11-13 RX ADMIN — MEROPENEM 100 MILLIGRAM(S): 500 INJECTION, POWDER, FOR SOLUTION INTRAVENOUS at 05:57

## 2024-11-13 RX ADMIN — CHLORHEXIDINE GLUCONATE 1 APPLICATION(S): 1.2 RINSE ORAL at 05:56

## 2024-11-13 RX ADMIN — ENOXAPARIN SODIUM 40 MILLIGRAM(S): 30 INJECTION SUBCUTANEOUS at 11:23

## 2024-11-13 RX ADMIN — INSULIN GLARGINE 8 UNIT(S): 100 INJECTION, SOLUTION SUBCUTANEOUS at 22:13

## 2024-11-13 RX ADMIN — MEROPENEM 100 MILLIGRAM(S): 500 INJECTION, POWDER, FOR SOLUTION INTRAVENOUS at 13:15

## 2024-11-13 RX ADMIN — Medication 250 MILLIGRAM(S): at 05:57

## 2024-11-13 RX ADMIN — METOPROLOL TARTRATE 25 MILLIGRAM(S): 100 TABLET, FILM COATED ORAL at 05:56

## 2024-11-13 RX ADMIN — METOPROLOL TARTRATE 25 MILLIGRAM(S): 100 TABLET, FILM COATED ORAL at 17:50

## 2024-11-13 RX ADMIN — ACETAMINOPHEN, DIPHENHYDRAMINE HCL, PHENYLEPHRINE HCL 3 MILLIGRAM(S): 325; 25; 5 TABLET ORAL at 22:13

## 2024-11-13 RX ADMIN — Medication 81 MILLIGRAM(S): at 11:23

## 2024-11-13 RX ADMIN — Medication 80 MILLIGRAM(S): at 22:13

## 2024-11-13 RX ADMIN — FAMOTIDINE 20 MILLIGRAM(S): 20 TABLET, FILM COATED ORAL at 11:23

## 2024-11-13 RX ADMIN — MEROPENEM 100 MILLIGRAM(S): 500 INJECTION, POWDER, FOR SOLUTION INTRAVENOUS at 22:13

## 2024-11-13 NOTE — PROGRESS NOTE ADULT - PROBLEM SELECTOR PLAN 1
Patient evaluated and chart reviewed.  S/p b/l heel debridement with partial calcanectomy.  Pyocyanin staining noted to periwound; pt on Meropenem.  DSD was reapplied today.  Discussed need for out-pt debridement of right heel wound and possibility of wound vac placement.   Cont abx per ID recs.  Surgical pathology report demonstrates acute and chronic OM in proximal margins bilaterally.   Patient will require PICC line and 6 weeks IV abx per ID recs.   Podiatry will continue to follow while in-house.  Plan to be discussed with attending.

## 2024-11-13 NOTE — SOCIAL WORK PROGRESS NOTE - NSSWPROGRESSNOTE_GEN_ALL_CORE
SW spoke with this pts dtr- discussed DC planning for when antibiotics completed. Starla from [facility] planning on visit for new assessment. SW to follow.

## 2024-11-13 NOTE — CASE MANAGEMENT PROGRESS NOTE - NSCMPROGRESSNOTE_GEN_ALL_CORE
SW following for new ROSY placement at [facility], once the pt finishes with LTIVA here at [facility] until 12/9/24. Pt will need home care for wound care at the [facility].

## 2024-11-13 NOTE — PROGRESS NOTE ADULT - SUBJECTIVE AND OBJECTIVE BOX
CHIEF COMPLAINT/INTERVAL HISTORY:  Pt. seen and evaluated for bilateral heel osteomyelitis.  Pt. is in no distress.  Denies having CP or SOB.  Tolerating IV antibiotics.     REVIEW OF SYSTEMS:  No fever, CP, SOB, or abdominal pain      Vital Signs Last 24 Hrs  T(C): 36.4 (13 Nov 2024 04:58), Max: 36.8 (12 Nov 2024 20:18)  T(F): 97.6 (13 Nov 2024 04:58), Max: 98.2 (12 Nov 2024 20:18)  HR: 83 (13 Nov 2024 04:58) (83 - 88)  BP: 124/70 (13 Nov 2024 04:58) (124/70 - 151/80)  BP(mean): --  RR: 18 (13 Nov 2024 04:58) (18 - 18)  SpO2: 97% (13 Nov 2024 04:58) (93% - 98%)    Parameters below as of 13 Nov 2024 04:58  Patient On (Oxygen Delivery Method): room air        PHYSICAL EXAM:  GENERAL: NAD  HEENT: EOMI, hearing normal, conjunctiva and sclera clear  Chest: CTA bilaterally, no wheezing  CV: S1S2, RRR,   GI: soft, +BS, NT/ND  Musculoskeletal: mild LE edema, clean and dry dressing over bilateral feet.   Psychiatric: affect nL, mood nL  Skin: warm and dry

## 2024-11-13 NOTE — PROGRESS NOTE ADULT - SUBJECTIVE AND OBJECTIVE BOX
Chief Complaint: Heel ulcer    Interval Events: No events overnight. Sleeping.    Review of Systems:  General: No fevers, chills, weight gain  Skin: No rashes, color changes  Cardiovascular: No chest pain, orthopnea  Respiratory: No shortness of breath, cough  Gastrointestinal: No nausea, abdominal pain  Genitourinary: No incontinence, pain with urination  Musculoskeletal: No pain, swelling, decreased range of motion  Neurological: No headache, weakness  Psychiatric: No depression, anxiety  Endocrine: No weight gain, increased thirst  All other systems are comprehensively negative.    Physical Exam:  Vital Signs Last 24 Hrs  T(C): 36.4 (13 Nov 2024 04:58), Max: 36.8 (12 Nov 2024 20:18)  T(F): 97.6 (13 Nov 2024 04:58), Max: 98.2 (12 Nov 2024 20:18)  HR: 83 (13 Nov 2024 04:58) (83 - 88)  BP: 124/70 (13 Nov 2024 04:58) (124/70 - 151/80)  BP(mean): --  RR: 18 (13 Nov 2024 04:58) (18 - 18)  SpO2: 97% (13 Nov 2024 04:58) (93% - 98%)  Parameters below as of 13 Nov 2024 04:58  Patient On (Oxygen Delivery Method): room air  General: NAD  HEENT: MMM  Neck: No JVD, no carotid bruit  Lungs: CTAB  CV: RRR, nl S1/S2, no M/R/G  Abdomen: S/NT/ND, +BS  Extremities: +Lymphedema, no cyanosis  Neuro: AAOx3, non-focal  Skin: No rash    Labs:        ECG/Telemetry: Sinus rhythm

## 2024-11-13 NOTE — PROGRESS NOTE ADULT - SUBJECTIVE AND OBJECTIVE BOX
PROGRESS NOTE   Patient is a 84y old  Male who presents with a chief complaint of Bilateral Heel Osteomyelitis (2024 13:44)      HPI:  Patient with a past medical history of hypertension, diabetes, hyperlipidemia, bilateral heel osteomyelitis currently on outpatient antibiotics through [facility] is presenting for surgery.  He is scheduled for surgery with Dr. frias tomorrow.  Denies any fevers.  Endorsing pain to his heels but no other areas of pain.  No nausea or vomiting.  No other acute complaints today.    Denies fever, chills, chest pain, palpitations, SOB, cough, abdominal pain, nausea, vomiting, diarrhea, constipation, urinary frequency, urgency, or dysuria, headaches, changes in vision, dizziness, numbness, tingling.  Denies recent travel, recent antibiotic use, or sick contacts.    ED Course:   Vitals: BP: 161/87, HR 69: , Temp: 97.8 , RR: 18, SpO2: 96% on   Labs:  H/H: 10.6/34.4, PTT: 36.2, Albumin: 2.7  Tissue culture: (incomplete)      Surgical pathology report: (incomplete)  MR foot:   Xray foot:   EKBPM normal sinus rhythm, QTc: 477  Received in the ED:       HPI: Patient presents to [facility] from rehab for bilateral foot surgery planned with podiatry, Gabriel Deshpande, tomorrow for his bilateral heel osteomyelitis. States his osteomyelitis started 6 months ago, Currently c/o pain in b/l heels. Denies any numbness or tingling down LLE/RLE. Denies any trauma. Patient currently does not walk due to deconditioning. Denies any other complaints. Denies fevers, chills, HA, Dizziness, chest pain, SOB, N/V/D, bladder symptoms,     (28 Oct 2024 14:08)      Vital Signs Last 24 Hrs  T(C): 36.4 (2024 04:58), Max: 36.8 (2024 20:18)  T(F): 97.6 (2024 04:58), Max: 98.2 (2024 20:18)  HR: 83 (2024 04:58) (83 - 88)  BP: 124/70 (2024 04:58) (124/70 - 151/80)  BP(mean): --  RR: 18 (2024 04:58) (18 - 18)  SpO2: 97% (2024 04:58) (93% - 98%)    Parameters below as of 2024 04:58  Patient On (Oxygen Delivery Method): room air                            PHYSICAL EXAM  Vascular: DP & PT palpable bilaterally, Capillary refill 3 seconds  Neurological: Light touch sensation not intact bilaterally  Musculoskeletal: 4/5 strength in all quadrants bilaterally, AJ & STJ ROM intact  Dermatological: Surgical site of b/l feet noted with intact sutures, no dehiscence, mild ang-incision erythema, no proximal streaking, no fluctuance, no malodor, no signs of acute infection at this time. Pyocyanin staining noted to periwounds b/l.

## 2024-11-14 LAB
ANION GAP SERPL CALC-SCNC: 5 MMOL/L — SIGNIFICANT CHANGE UP (ref 5–17)
BASOPHILS # BLD AUTO: 0.02 K/UL — SIGNIFICANT CHANGE UP (ref 0–0.2)
BASOPHILS NFR BLD AUTO: 0.4 % — SIGNIFICANT CHANGE UP (ref 0–2)
BUN SERPL-MCNC: 33 MG/DL — HIGH (ref 7–23)
CALCIUM SERPL-MCNC: 9.5 MG/DL — SIGNIFICANT CHANGE UP (ref 8.5–10.1)
CHLORIDE SERPL-SCNC: 107 MMOL/L — SIGNIFICANT CHANGE UP (ref 96–108)
CO2 SERPL-SCNC: 31 MMOL/L — SIGNIFICANT CHANGE UP (ref 22–31)
CREAT SERPL-MCNC: 1 MG/DL — SIGNIFICANT CHANGE UP (ref 0.5–1.3)
EGFR: 75 ML/MIN/1.73M2 — SIGNIFICANT CHANGE UP
EOSINOPHIL # BLD AUTO: 0.48 K/UL — SIGNIFICANT CHANGE UP (ref 0–0.5)
EOSINOPHIL NFR BLD AUTO: 9.9 % — HIGH (ref 0–6)
GLUCOSE SERPL-MCNC: 102 MG/DL — HIGH (ref 70–99)
HCT VFR BLD CALC: 30.3 % — LOW (ref 39–50)
HGB BLD-MCNC: 9.6 G/DL — LOW (ref 13–17)
IMM GRANULOCYTES NFR BLD AUTO: 0.4 % — SIGNIFICANT CHANGE UP (ref 0–0.9)
LYMPHOCYTES # BLD AUTO: 1.63 K/UL — SIGNIFICANT CHANGE UP (ref 1–3.3)
LYMPHOCYTES # BLD AUTO: 33.7 % — SIGNIFICANT CHANGE UP (ref 13–44)
MAGNESIUM SERPL-MCNC: 1.8 MG/DL — SIGNIFICANT CHANGE UP (ref 1.6–2.6)
MCHC RBC-ENTMCNC: 28.3 PG — SIGNIFICANT CHANGE UP (ref 27–34)
MCHC RBC-ENTMCNC: 31.7 G/DL — LOW (ref 32–36)
MCV RBC AUTO: 89.4 FL — SIGNIFICANT CHANGE UP (ref 80–100)
MONOCYTES # BLD AUTO: 0.31 K/UL — SIGNIFICANT CHANGE UP (ref 0–0.9)
MONOCYTES NFR BLD AUTO: 6.4 % — SIGNIFICANT CHANGE UP (ref 2–14)
NEUTROPHILS # BLD AUTO: 2.37 K/UL — SIGNIFICANT CHANGE UP (ref 1.8–7.4)
NEUTROPHILS NFR BLD AUTO: 49.2 % — SIGNIFICANT CHANGE UP (ref 43–77)
NRBC # BLD: 0 /100 WBCS — SIGNIFICANT CHANGE UP (ref 0–0)
PLATELET # BLD AUTO: 197 K/UL — SIGNIFICANT CHANGE UP (ref 150–400)
POTASSIUM SERPL-MCNC: 4 MMOL/L — SIGNIFICANT CHANGE UP (ref 3.5–5.3)
POTASSIUM SERPL-SCNC: 4 MMOL/L — SIGNIFICANT CHANGE UP (ref 3.5–5.3)
RBC # BLD: 3.39 M/UL — LOW (ref 4.2–5.8)
RBC # FLD: 15.6 % — HIGH (ref 10.3–14.5)
SODIUM SERPL-SCNC: 143 MMOL/L — SIGNIFICANT CHANGE UP (ref 135–145)
WBC # BLD: 4.83 K/UL — SIGNIFICANT CHANGE UP (ref 3.8–10.5)
WBC # FLD AUTO: 4.83 K/UL — SIGNIFICANT CHANGE UP (ref 3.8–10.5)

## 2024-11-14 PROCEDURE — 99233 SBSQ HOSP IP/OBS HIGH 50: CPT

## 2024-11-14 RX ADMIN — METOPROLOL TARTRATE 25 MILLIGRAM(S): 100 TABLET, FILM COATED ORAL at 17:14

## 2024-11-14 RX ADMIN — ENOXAPARIN SODIUM 40 MILLIGRAM(S): 30 INJECTION SUBCUTANEOUS at 14:06

## 2024-11-14 RX ADMIN — INSULIN GLARGINE 8 UNIT(S): 100 INJECTION, SOLUTION SUBCUTANEOUS at 22:07

## 2024-11-14 RX ADMIN — MEROPENEM 100 MILLIGRAM(S): 500 INJECTION, POWDER, FOR SOLUTION INTRAVENOUS at 06:37

## 2024-11-14 RX ADMIN — Medication 250 MILLIGRAM(S): at 06:40

## 2024-11-14 RX ADMIN — Medication 1000 MICROGRAM(S): at 11:16

## 2024-11-14 RX ADMIN — MEROPENEM 100 MILLIGRAM(S): 500 INJECTION, POWDER, FOR SOLUTION INTRAVENOUS at 22:07

## 2024-11-14 RX ADMIN — CHLORHEXIDINE GLUCONATE 1 APPLICATION(S): 1.2 RINSE ORAL at 06:38

## 2024-11-14 RX ADMIN — CLOPIDOGREL 75 MILLIGRAM(S): 75 TABLET, FILM COATED ORAL at 11:16

## 2024-11-14 RX ADMIN — Medication 250 MILLIGRAM(S): at 17:14

## 2024-11-14 RX ADMIN — NYSTATIN 1 APPLICATION(S): 100000 POWDER TOPICAL at 06:40

## 2024-11-14 RX ADMIN — Medication 80 MILLIGRAM(S): at 22:08

## 2024-11-14 RX ADMIN — TAMSULOSIN HYDROCHLORIDE 0.4 MILLIGRAM(S): 0.4 CAPSULE ORAL at 22:08

## 2024-11-14 RX ADMIN — NYSTATIN 1 APPLICATION(S): 100000 POWDER TOPICAL at 17:14

## 2024-11-14 RX ADMIN — ACETAMINOPHEN, DIPHENHYDRAMINE HCL, PHENYLEPHRINE HCL 3 MILLIGRAM(S): 325; 25; 5 TABLET ORAL at 22:08

## 2024-11-14 RX ADMIN — FAMOTIDINE 20 MILLIGRAM(S): 20 TABLET, FILM COATED ORAL at 11:16

## 2024-11-14 RX ADMIN — METOPROLOL TARTRATE 25 MILLIGRAM(S): 100 TABLET, FILM COATED ORAL at 06:40

## 2024-11-14 RX ADMIN — Medication 81 MILLIGRAM(S): at 11:17

## 2024-11-14 RX ADMIN — MEROPENEM 100 MILLIGRAM(S): 500 INJECTION, POWDER, FOR SOLUTION INTRAVENOUS at 14:07

## 2024-11-14 NOTE — PROGRESS NOTE ADULT - SUBJECTIVE AND OBJECTIVE BOX
CHIEF COMPLAINT/INTERVAL HISTORY:  Pt. seen and evaluated for bilateral heel osteomyelitis.  Pt. is in no distress.  Denies having any CP or SOB.  Tolerating IV antibiotics.  No significant pain in the feet.     REVIEW OF SYSTEMS:  No fever, CP, SOB, or abdominal pain.     Vital Signs Last 24 Hrs  T(C): 36.5 (14 Nov 2024 05:28), Max: 37.1 (13 Nov 2024 20:13)  T(F): 97.7 (14 Nov 2024 05:28), Max: 98.7 (13 Nov 2024 20:13)  HR: 80 (14 Nov 2024 05:28) (80 - 81)  BP: 111/70 (14 Nov 2024 05:28) (111/70 - 157/96)  BP(mean): --  RR: 19 (14 Nov 2024 05:28) (17 - 19)  SpO2: 98% (14 Nov 2024 05:28) (94% - 98%)    Parameters below as of 14 Nov 2024 05:28  Patient On (Oxygen Delivery Method): room air        PHYSICAL EXAM:  GENERAL: NAD  HEENT: EOMI, hearing normal, conjunctiva and sclera clear  Chest: CTA bilaterally, no wheezing  CV: S1S2, RRR,   GI: soft, +BS, NT/ND  Musculoskeletal: +pedal edema, clean and dry dressing over bilateral feet  Psychiatric: affect nL, mood nL  Skin: warm and dry    LABS:                        9.6    4.83  )-----------( 197      ( 14 Nov 2024 08:38 )             30.3     11-14    143  |  107  |  33[H]  ----------------------------<  102[H]  4.0   |  31  |  1.00    Ca    9.5      14 Nov 2024 08:38  Mg     1.8     11-14    TPro  6.8  /  Alb  2.3[L]  /  TBili  0.7  /  DBili  x   /  AST  17  /  ALT  17  /  AlkPhos  33[L]  11-13      Urinalysis Basic - ( 14 Nov 2024 08:38 )    Color: x / Appearance: x / SG: x / pH: x  Gluc: 102 mg/dL / Ketone: x  / Bili: x / Urobili: x   Blood: x / Protein: x / Nitrite: x   Leuk Esterase: x / RBC: x / WBC x   Sq Epi: x / Non Sq Epi: x / Bacteria: x

## 2024-11-14 NOTE — PROGRESS NOTE ADULT - SUBJECTIVE AND OBJECTIVE BOX
Chief Complaint: Heel ulcer    Interval Events: No events overnight.    Review of Systems:  General: No fevers, chills, weight gain  Skin: No rashes, color changes  Cardiovascular: No chest pain, orthopnea  Respiratory: No shortness of breath, cough  Gastrointestinal: No nausea, abdominal pain  Genitourinary: No incontinence, pain with urination  Musculoskeletal: No pain, swelling, decreased range of motion  Neurological: No headache, weakness  Psychiatric: No depression, anxiety  Endocrine: No weight gain, increased thirst  All other systems are comprehensively negative.    Physical Exam:  Vital Signs Last 24 Hrs  T(C): 36.5 (14 Nov 2024 05:28), Max: 37.1 (13 Nov 2024 20:13)  T(F): 97.7 (14 Nov 2024 05:28), Max: 98.7 (13 Nov 2024 20:13)  HR: 80 (14 Nov 2024 05:28) (80 - 81)  BP: 111/70 (14 Nov 2024 05:28) (111/70 - 157/96)  BP(mean): --  RR: 19 (14 Nov 2024 05:28) (17 - 19)  SpO2: 98% (14 Nov 2024 05:28) (94% - 98%)  Parameters below as of 14 Nov 2024 05:28  Patient On (Oxygen Delivery Method): room air  General: NAD  HEENT: MMM  Neck: No JVD, no carotid bruit  Lungs: CTAB  CV: RRR, nl S1/S2, no M/R/G  Abdomen: S/NT/ND, +BS  Extremities: +Lymphedema, no cyanosis  Neuro: AAOx3, non-focal  Skin: No rash    Labs:    11-14    143  |  107  |  33[H]  ----------------------------<  102[H]  4.0   |  31  |  1.00    Ca    9.5      14 Nov 2024 08:38  Mg     1.8     11-14    TPro  6.8  /  Alb  2.3[L]  /  TBili  0.7  /  DBili  x   /  AST  17  /  ALT  17  /  AlkPhos  33[L]  11-13                        9.6    4.83  )-----------( 197      ( 14 Nov 2024 08:38 )             30.3

## 2024-11-14 NOTE — PROGRESS NOTE ADULT - ASSESSMENT
The patient is an 83 year old male with a history of HTN, HL, DM, CKD, GI bleed, COPD, SANTO, BPH, CAD s/p PCI, PAD, multiple myeloma, lymphedema who presents with heel ulcer.     Plan:  - ECG with sinus rhythm and no evidence of ischemia/infarction  - Echo 6/30/24 with normal LV systolic function, mild pulm HTN  - CXR with grossly clear lungs  - Lower extremity swelling consistent with known history of lymphedema  - If needed, can add low dose furosemide  - Continue aspirin 81 mg daily  - Continue clopidogrel 75 mg daily  - Continue atorvastatin 80 mg daily  - Continue metoprolol tartrate 25 mg bid  - Podiatry and ID follow-up  - Path results with acute on chronic osteomyelitis  - IV antibiotics  - Discharge planning

## 2024-11-14 NOTE — PROGRESS NOTE ADULT - ASSESSMENT
82yo M with a PMH of HTN, HLD, Type 2 DM, CKD3a, hx of GI bleed, COPD, SANTO, BPH, CAD s/p PCI, PAD, multiple myeloma, lymphedema who presents with b/l heel ulcers and suspected OM planned for bilateral foot surgery with podiatry, Gabriel Deshpande, on 10/29 for his suspected bilateral heel osteomyelitis now s/p partial calcanectomy.        Problem/Plan - 1:  ·  Problem: Osteomyelitis of foot.   ·  Plan: - Bilateral heel osteomyelitis per outpatient w/up and admitted with a plan for OR with podiatry, Dr. Rios on 10/29  - Wound Cx (10/23): Proteus mirabilis, Pseudomonas aeruginosa, ESBL Klebsiella pneumoniae, Enterococcus faecalis  - Blood Cx NG  - s/p partial calcanectomy on 10/29.    - f/up OR cultures +rare pseudomonas aeruginosa.  Pathology: bilateral heel acute and chronic osteomyelitis.  - Tylenol PRN for mild pain.  - Consulted ID Dr. Casillas, recs appreciated   - PICC line placed.  Continue meropenem till Dec 9.   - afebrile, no leukocytosis, dressing/wound care as per podiatry recommendation   - cardiology Dr. Rock consulted, recs appreciated.  - SW for discharge plan   will obtain periodic lab      Problem/Plan - 2:  ·  Problem: DM (diabetes mellitus).   ·  Plan: - type 2 DM on insulin  - continue decreased lantus dose 8units QHS   - d/c  low-dose lispro corrective ISS  -FS overall  controlled, decrease FS monitoring to bid    - PRN blood test monitoring      Problem/Plan - 3:  ·  Problem: HTN (hypertension).   ·  Plan: Chronic   - continue metoprolol 25mg BID.     Problem/Plan - 4:  ·  Problem: HLD (hyperlipidemia).   ·  Plan: Chronic   - Continue atorvastatin 80mg QD.     Problem/Plan - 5:  ·  Problem: CAD (coronary artery disease).   ·  Plan: CAD sp stents x4  - Held ASA and Plavix on 10/29; Restarted on ASA and Plavix on 10/30  - c/w lipitor  - cardiology eval noted      Problem/Plan - 6:  ·  Problem: Benign prostatic hyperplasia with lower urinary tract symptoms, symptom details unspecified.   ·  Plan: Chronic  -Continue Flomax.     Problem/Plan - 7:  ·  Problem: Multiple myeloma.   ·  Plan: Chronic  - Chemotherapy held      Problem/Plan - 8:   DVT ppx: lovenox 40mg SQ daily , patient remain NWB     ---  TIME SPENT:  52 minutes spent on total encounter. The necessity of the time spent during the encounter on this date of service was due to:     direct patient care including but not limited to reviewing chart, medications ,laboratory data, imaging reports, discussion of plan of care with consultants on the case, coordination of care with multidisciplinary team involved in the case and discussion of plan with patient.  Patient and family agreeable to plan of care and verbalized understanding the anticipated hospital course and treatment plan.    ---

## 2024-11-14 NOTE — SOCIAL WORK PROGRESS NOTE - NSSWPROGRESSNOTE_GEN_ALL_CORE
SW spoke with this pt at bedside, message left for Luisana at [facility] regarding reassessment. Discussed with pt the plan moving forward and how returning home is very unsafe considering hes unable to walk and declines hiring 24/7 private. PT asked SW to meet with him and his dtr Rhonda in his room tomorrow to discuss further. SW to follow.

## 2024-11-14 NOTE — GOALS OF CARE CONVERSATION - ADVANCED CARE PLANNING - CONVERSATION DETAILS
I discussed with patient his code status.  At this time patient reports that in the setting of cardiac arrest or respiratory failure that he would desire full attempt at resuscitation.  Pt. remains full code.

## 2024-11-15 PROCEDURE — 99232 SBSQ HOSP IP/OBS MODERATE 35: CPT

## 2024-11-15 RX ADMIN — Medication 80 MILLIGRAM(S): at 21:27

## 2024-11-15 RX ADMIN — Medication 250 MILLIGRAM(S): at 06:31

## 2024-11-15 RX ADMIN — NYSTATIN 1 APPLICATION(S): 100000 POWDER TOPICAL at 06:32

## 2024-11-15 RX ADMIN — METOPROLOL TARTRATE 25 MILLIGRAM(S): 100 TABLET, FILM COATED ORAL at 06:31

## 2024-11-15 RX ADMIN — CLOPIDOGREL 75 MILLIGRAM(S): 75 TABLET, FILM COATED ORAL at 12:04

## 2024-11-15 RX ADMIN — Medication 1000 MICROGRAM(S): at 12:04

## 2024-11-15 RX ADMIN — MEROPENEM 100 MILLIGRAM(S): 500 INJECTION, POWDER, FOR SOLUTION INTRAVENOUS at 13:45

## 2024-11-15 RX ADMIN — INSULIN GLARGINE 8 UNIT(S): 100 INJECTION, SOLUTION SUBCUTANEOUS at 21:29

## 2024-11-15 RX ADMIN — TAMSULOSIN HYDROCHLORIDE 0.4 MILLIGRAM(S): 0.4 CAPSULE ORAL at 21:28

## 2024-11-15 RX ADMIN — NYSTATIN 1 APPLICATION(S): 100000 POWDER TOPICAL at 18:31

## 2024-11-15 RX ADMIN — Medication 250 MILLIGRAM(S): at 18:19

## 2024-11-15 RX ADMIN — MEROPENEM 100 MILLIGRAM(S): 500 INJECTION, POWDER, FOR SOLUTION INTRAVENOUS at 21:27

## 2024-11-15 RX ADMIN — MEROPENEM 100 MILLIGRAM(S): 500 INJECTION, POWDER, FOR SOLUTION INTRAVENOUS at 06:31

## 2024-11-15 RX ADMIN — Medication 81 MILLIGRAM(S): at 12:04

## 2024-11-15 RX ADMIN — ENOXAPARIN SODIUM 40 MILLIGRAM(S): 30 INJECTION SUBCUTANEOUS at 12:05

## 2024-11-15 RX ADMIN — FAMOTIDINE 20 MILLIGRAM(S): 20 TABLET, FILM COATED ORAL at 12:04

## 2024-11-15 RX ADMIN — CHLORHEXIDINE GLUCONATE 1 APPLICATION(S): 1.2 RINSE ORAL at 06:32

## 2024-11-15 RX ADMIN — ACETAMINOPHEN, DIPHENHYDRAMINE HCL, PHENYLEPHRINE HCL 3 MILLIGRAM(S): 325; 25; 5 TABLET ORAL at 21:27

## 2024-11-15 RX ADMIN — METOPROLOL TARTRATE 25 MILLIGRAM(S): 100 TABLET, FILM COATED ORAL at 18:15

## 2024-11-15 NOTE — PROGRESS NOTE ADULT - SUBJECTIVE AND OBJECTIVE BOX
SUBJECTIVE:  84-year-old male seen status post bilateral heel wound debridement down to skin, subcutaneous tissue, fat, bone with application of antibiotic beads.  Patient relates that he has been trying to keep his feet elevated is much as possible.  No other complaints at this time.    Allergies    No Known Allergies    Intolerances        MEDICATIONS  (STANDING):  acetaminophen   IVPB .. 1000 milliGRAM(s) IV Intermittent once  aspirin  chewable 81 milliGRAM(s) Oral daily  atorvastatin 80 milliGRAM(s) Oral at bedtime  chlorhexidine 2% Cloths 1 Application(s) Topical daily  clopidogrel Tablet 75 milliGRAM(s) Oral daily  cyanocobalamin 1000 MICROGram(s) Oral daily  dextrose 5%. 1000 milliLiter(s) (50 mL/Hr) IV Continuous <Continuous>  dextrose 5%. 1000 milliLiter(s) (100 mL/Hr) IV Continuous <Continuous>  dextrose 50% Injectable 25 Gram(s) IV Push once  dextrose 50% Injectable 12.5 Gram(s) IV Push once  dextrose 50% Injectable 12.5 Gram(s) IV Push once  dextrose 50% Injectable 25 Gram(s) IV Push once  enoxaparin Injectable 40 milliGRAM(s) SubCutaneous every 24 hours  famotidine    Tablet 20 milliGRAM(s) Oral daily  glucagon  Injectable 1 milliGRAM(s) IntraMuscular once  insulin glargine Injectable (LANTUS) 8 Unit(s) SubCutaneous at bedtime  melatonin 3 milliGRAM(s) Oral at bedtime  meropenem  IVPB 1000 milliGRAM(s) IV Intermittent every 8 hours  meropenem  IVPB      metoprolol tartrate 25 milliGRAM(s) Oral two times a day  nystatin Powder 1 Application(s) Topical two times a day  saccharomyces boulardii 250 milliGRAM(s) Oral two times a day  tamsulosin 0.4 milliGRAM(s) Oral at bedtime    MEDICATIONS  (PRN):  acetaminophen     Tablet .. 650 milliGRAM(s) Oral every 6 hours PRN Temp greater or equal to 38C (100.4F), Mild Pain (1 - 3)  aluminum hydroxide/magnesium hydroxide/simethicone Suspension 30 milliLiter(s) Oral every 4 hours PRN Dyspepsia  dextrose Oral Gel 15 Gram(s) Oral once PRN Blood Glucose LESS THAN 70 milliGRAM(s)/deciliter  sodium chloride 0.9% lock flush 10 milliLiter(s) IV Push every 1 hour PRN Pre/post blood products, medications, blood draw, and to maintain line patency      Vital Signs Last 24 Hrs  T(C): 36.6 (15 Nov 2024 12:28), Max: 36.7 (14 Nov 2024 19:55)  T(F): 97.9 (15 Nov 2024 12:28), Max: 98.1 (14 Nov 2024 19:55)  HR: 83 (15 Nov 2024 12:28) (62 - 86)  BP: 131/79 (15 Nov 2024 12:28) (128/77 - 142/75)  BP(mean): --  RR: 18 (15 Nov 2024 12:28) (18 - 18)  SpO2: 99% (15 Nov 2024 12:28) (94% - 99%)    Parameters below as of 15 Nov 2024 12:28  Patient On (Oxygen Delivery Method): room air        PHYSICAL EXAM:  Vascular: DP & PT palpable bilaterally, Capillary refill 3 seconds  Neurological: Light touch sensation not intact bilaterally  Musculoskeletal: 4/5 strength in all quadrants bilaterally, AJ & STJ ROM intact  Dermatological: Surgical site of b/l feet noted with intact sutures, no dehiscence, mild ang-incision erythema, no proximal streaking, no fluctuance, no malodor, no signs of acute infection at this time.                        9.6    4.83  )-----------( 197      ( 14 Nov 2024 08:38 )             30.3       11-14    143  |  107  |  33[H]  ----------------------------<  102[H]  4.0   |  31  |  1.00    Ca    9.5      14 Nov 2024 08:38  Mg     1.8     11-14

## 2024-11-15 NOTE — PROGRESS NOTE ADULT - SUBJECTIVE AND OBJECTIVE BOX
CHIEF COMPLAINT/INTERVAL HISTORY:  Pt. seen and evaluated for bilateral heel osteomyelitis.  Pt. is in no distress.  Tolerating IV antibiotics.  Reported having loose stools x 2 yesterday.  Denies having any abdominal pain.      REVIEW OF SYSTEMS:  No fever, CP, SOB, or abdominal pain.      Vital Signs Last 24 Hrs  T(C): 36.5 (15 Nov 2024 05:13), Max: 36.7 (14 Nov 2024 19:55)  T(F): 97.7 (15 Nov 2024 05:13), Max: 98.1 (14 Nov 2024 19:55)  HR: 86 (15 Nov 2024 05:13) (62 - 86)  BP: 128/77 (15 Nov 2024 05:13) (128/77 - 155/87)  BP(mean): --  RR: 18 (15 Nov 2024 05:13) (18 - 18)  SpO2: 94% (15 Nov 2024 05:13) (92% - 96%)    Parameters below as of 15 Nov 2024 05:13  Patient On (Oxygen Delivery Method): room air        PHYSICAL EXAM:  GENERAL: NAD  HEENT: EOMI, hearing normal, conjunctiva and sclera clear  Chest: CTA bilaterally, no wheezing  CV: S1S2, RRR,   GI: soft, +BS, NT/ND  Musculoskeletal: +bilateral pedal edema, ACE wrapped dressing over bilateral feet  Psychiatric: affect nL, mood nL  Skin: warm and dry    LABS:                        9.6    4.83  )-----------( 197      ( 14 Nov 2024 08:38 )             30.3     11-14    143  |  107  |  33[H]  ----------------------------<  102[H]  4.0   |  31  |  1.00    Ca    9.5      14 Nov 2024 08:38  Mg     1.8     11-14    TPro  6.8  /  Alb  2.3[L]  /  TBili  0.7  /  DBili  x   /  AST  17  /  ALT  17  /  AlkPhos  33[L]  11-13      Urinalysis Basic - ( 14 Nov 2024 08:38 )    Color: x / Appearance: x / SG: x / pH: x  Gluc: 102 mg/dL / Ketone: x  / Bili: x / Urobili: x   Blood: x / Protein: x / Nitrite: x   Leuk Esterase: x / RBC: x / WBC x   Sq Epi: x / Non Sq Epi: x / Bacteria: x

## 2024-11-15 NOTE — SOCIAL WORK PROGRESS NOTE - NSSWPROGRESSNOTE_GEN_ALL_CORE
Extensive conversation had with pt and his daughter at bedside discussing discharge planning. Plan remains for [facility] upon DC, referral made to medicaid office to review possible community medicaid referral. [facility]YA spoke with Luisana at [facility] who reported Starla will reach out ot NADYA next week to schedule an evaluation. SW to follow.

## 2024-11-15 NOTE — PROGRESS NOTE ADULT - ASSESSMENT
82yo M with a PMH of HTN, HLD, Type 2 DM, CKD3a, hx of GI bleed, COPD, SANTO, BPH, CAD s/p PCI, PAD, multiple myeloma, lymphedema who presents with b/l heel ulcers and suspected OM planned for bilateral foot surgery with podiatry, Gabriel Deshpande, on 10/29 for his suspected bilateral heel osteomyelitis now s/p partial calcanectomy.        Problem/Plan - 1:  ·  Problem: Osteomyelitis of foot.   ·  Plan: - Bilateral heel osteomyelitis per outpatient w/up and admitted with a plan for OR with podiatry, Dr. Rios on 10/29  - Wound Cx (10/23): Proteus mirabilis, Pseudomonas aeruginosa, ESBL Klebsiella pneumoniae, Enterococcus faecalis  - Blood Cx NG  - s/p partial calcanectomy on 10/29.    - f/up OR cultures +rare pseudomonas aeruginosa.  Pathology: bilateral heel acute and chronic osteomyelitis.  - Tylenol PRN for mild pain.  - Consulted ID Dr. Casillas, recs appreciated   - PICC line placed.  Continue meropenem till Dec 9.   - Continue Florastor  - afebrile, no leukocytosis, dressing/wound care as per podiatry recommendation   - cardiology Dr. Rock consulted, recs appreciated.  - SW for discharge plan   will obtain periodic lab      Problem/Plan - 2:  ·  Problem: DM (diabetes mellitus).   ·  Plan: - type 2 DM on insulin  - continue decreased lantus dose 8units QHS   - d/c  low-dose lispro corrective ISS  -FS overall  controlled, decrease FS monitoring to bid    - PRN blood test monitoring      Problem/Plan - 3:  ·  Problem: HTN (hypertension).   ·  Plan: Chronic   - continue metoprolol 25mg BID.     Problem/Plan - 4:  ·  Problem: HLD (hyperlipidemia).   ·  Plan: Chronic   - Continue atorvastatin 80mg QD.     Problem/Plan - 5:  ·  Problem: CAD (coronary artery disease).   ·  Plan: CAD sp stents x4  - Held ASA and Plavix on 10/29; Restarted on ASA and Plavix on 10/30  - c/w lipitor  - cardiology eval noted      Problem/Plan - 6:  ·  Problem: Benign prostatic hyperplasia with lower urinary tract symptoms, symptom details unspecified.   ·  Plan: Chronic  -Continue Flomax.     Problem/Plan - 7:  ·  Problem: Multiple myeloma.   ·  Plan: Chronic  - Chemotherapy held      Problem/Plan - 8:   DVT ppx: lovenox 40mg SQ daily , patient remain NWB

## 2024-11-15 NOTE — PROGRESS NOTE ADULT - PROBLEM SELECTOR PLAN 1
Patient examined and evaluated this time.  Will plan for right heel debridement in the operating room next Tuesday, 11/19/2024.  All questions answered to satisfaction and patient and patient's wife verbalized understanding.  Continue with local wound care and offloading at this time.

## 2024-11-16 LAB
ANION GAP SERPL CALC-SCNC: 1 MMOL/L — LOW (ref 5–17)
BASOPHILS # BLD AUTO: 0.01 K/UL — SIGNIFICANT CHANGE UP (ref 0–0.2)
BASOPHILS NFR BLD AUTO: 0.2 % — SIGNIFICANT CHANGE UP (ref 0–2)
BUN SERPL-MCNC: 33 MG/DL — HIGH (ref 7–23)
CALCIUM SERPL-MCNC: 9.4 MG/DL — SIGNIFICANT CHANGE UP (ref 8.5–10.1)
CHLORIDE SERPL-SCNC: 106 MMOL/L — SIGNIFICANT CHANGE UP (ref 96–108)
CO2 SERPL-SCNC: 34 MMOL/L — HIGH (ref 22–31)
CREAT SERPL-MCNC: 1.1 MG/DL — SIGNIFICANT CHANGE UP (ref 0.5–1.3)
EGFR: 67 ML/MIN/1.73M2 — SIGNIFICANT CHANGE UP
EOSINOPHIL # BLD AUTO: 0.49 K/UL — SIGNIFICANT CHANGE UP (ref 0–0.5)
EOSINOPHIL NFR BLD AUTO: 9.9 % — HIGH (ref 0–6)
GLUCOSE SERPL-MCNC: 89 MG/DL — SIGNIFICANT CHANGE UP (ref 70–99)
HCT VFR BLD CALC: 30.1 % — LOW (ref 39–50)
HGB BLD-MCNC: 9.5 G/DL — LOW (ref 13–17)
IMM GRANULOCYTES NFR BLD AUTO: 0.4 % — SIGNIFICANT CHANGE UP (ref 0–0.9)
LYMPHOCYTES # BLD AUTO: 1.87 K/UL — SIGNIFICANT CHANGE UP (ref 1–3.3)
LYMPHOCYTES # BLD AUTO: 37.6 % — SIGNIFICANT CHANGE UP (ref 13–44)
MAGNESIUM SERPL-MCNC: 1.9 MG/DL — SIGNIFICANT CHANGE UP (ref 1.6–2.6)
MCHC RBC-ENTMCNC: 28.4 PG — SIGNIFICANT CHANGE UP (ref 27–34)
MCHC RBC-ENTMCNC: 31.6 G/DL — LOW (ref 32–36)
MCV RBC AUTO: 90.1 FL — SIGNIFICANT CHANGE UP (ref 80–100)
MONOCYTES # BLD AUTO: 0.36 K/UL — SIGNIFICANT CHANGE UP (ref 0–0.9)
MONOCYTES NFR BLD AUTO: 7.2 % — SIGNIFICANT CHANGE UP (ref 2–14)
NEUTROPHILS # BLD AUTO: 2.22 K/UL — SIGNIFICANT CHANGE UP (ref 1.8–7.4)
NEUTROPHILS NFR BLD AUTO: 44.7 % — SIGNIFICANT CHANGE UP (ref 43–77)
NRBC # BLD: 0 /100 WBCS — SIGNIFICANT CHANGE UP (ref 0–0)
PLATELET # BLD AUTO: 190 K/UL — SIGNIFICANT CHANGE UP (ref 150–400)
POTASSIUM SERPL-MCNC: 3.6 MMOL/L — SIGNIFICANT CHANGE UP (ref 3.5–5.3)
POTASSIUM SERPL-SCNC: 3.6 MMOL/L — SIGNIFICANT CHANGE UP (ref 3.5–5.3)
RBC # BLD: 3.34 M/UL — LOW (ref 4.2–5.8)
RBC # FLD: 15.7 % — HIGH (ref 10.3–14.5)
SODIUM SERPL-SCNC: 141 MMOL/L — SIGNIFICANT CHANGE UP (ref 135–145)
WBC # BLD: 4.97 K/UL — SIGNIFICANT CHANGE UP (ref 3.8–10.5)
WBC # FLD AUTO: 4.97 K/UL — SIGNIFICANT CHANGE UP (ref 3.8–10.5)

## 2024-11-16 PROCEDURE — 99232 SBSQ HOSP IP/OBS MODERATE 35: CPT

## 2024-11-16 RX ORDER — POTASSIUM CHLORIDE 600 MG/1
40 TABLET, EXTENDED RELEASE ORAL ONCE
Refills: 0 | Status: COMPLETED | OUTPATIENT
Start: 2024-11-16 | End: 2024-11-16

## 2024-11-16 RX ADMIN — MEROPENEM 100 MILLIGRAM(S): 500 INJECTION, POWDER, FOR SOLUTION INTRAVENOUS at 05:21

## 2024-11-16 RX ADMIN — METOPROLOL TARTRATE 25 MILLIGRAM(S): 100 TABLET, FILM COATED ORAL at 17:19

## 2024-11-16 RX ADMIN — ACETAMINOPHEN, DIPHENHYDRAMINE HCL, PHENYLEPHRINE HCL 3 MILLIGRAM(S): 325; 25; 5 TABLET ORAL at 21:27

## 2024-11-16 RX ADMIN — METOPROLOL TARTRATE 25 MILLIGRAM(S): 100 TABLET, FILM COATED ORAL at 05:21

## 2024-11-16 RX ADMIN — NYSTATIN 1 APPLICATION(S): 100000 POWDER TOPICAL at 05:21

## 2024-11-16 RX ADMIN — MEROPENEM 100 MILLIGRAM(S): 500 INJECTION, POWDER, FOR SOLUTION INTRAVENOUS at 14:29

## 2024-11-16 RX ADMIN — TAMSULOSIN HYDROCHLORIDE 0.4 MILLIGRAM(S): 0.4 CAPSULE ORAL at 21:27

## 2024-11-16 RX ADMIN — CHLORHEXIDINE GLUCONATE 1 APPLICATION(S): 1.2 RINSE ORAL at 05:26

## 2024-11-16 RX ADMIN — Medication 2 MILLIGRAM(S): at 11:55

## 2024-11-16 RX ADMIN — NYSTATIN 1 APPLICATION(S): 100000 POWDER TOPICAL at 17:20

## 2024-11-16 RX ADMIN — Medication 80 MILLIGRAM(S): at 21:26

## 2024-11-16 RX ADMIN — Medication 1000 MICROGRAM(S): at 12:21

## 2024-11-16 RX ADMIN — CLOPIDOGREL 75 MILLIGRAM(S): 75 TABLET, FILM COATED ORAL at 11:54

## 2024-11-16 RX ADMIN — POTASSIUM CHLORIDE 40 MILLIEQUIVALENT(S): 600 TABLET, EXTENDED RELEASE ORAL at 11:54

## 2024-11-16 RX ADMIN — Medication 81 MILLIGRAM(S): at 12:19

## 2024-11-16 RX ADMIN — ENOXAPARIN SODIUM 40 MILLIGRAM(S): 30 INJECTION SUBCUTANEOUS at 11:55

## 2024-11-16 RX ADMIN — Medication 250 MILLIGRAM(S): at 05:21

## 2024-11-16 RX ADMIN — FAMOTIDINE 20 MILLIGRAM(S): 20 TABLET, FILM COATED ORAL at 11:54

## 2024-11-16 RX ADMIN — MEROPENEM 100 MILLIGRAM(S): 500 INJECTION, POWDER, FOR SOLUTION INTRAVENOUS at 21:27

## 2024-11-16 RX ADMIN — Medication 250 MILLIGRAM(S): at 17:19

## 2024-11-16 RX ADMIN — INSULIN GLARGINE 8 UNIT(S): 100 INJECTION, SOLUTION SUBCUTANEOUS at 21:27

## 2024-11-16 NOTE — PROGRESS NOTE ADULT - SUBJECTIVE AND OBJECTIVE BOX
Chief Complaint: Heel ulcer    Interval Events: No events overnight.    Review of Systems:  General: No fevers, chills, weight gain  Skin: No rashes, color changes  Cardiovascular: No chest pain, orthopnea  Respiratory: No shortness of breath, cough  Gastrointestinal: No nausea, abdominal pain  Genitourinary: No incontinence, pain with urination  Musculoskeletal: No pain, swelling, decreased range of motion  Neurological: No headache, weakness  Psychiatric: No depression, anxiety  Endocrine: No weight gain, increased thirst  All other systems are comprehensively negative.    Physical Exam:  Vital Signs Last 24 Hrs  T(C): 36.4 (16 Nov 2024 05:42), Max: 36.8 (15 Nov 2024 20:19)  T(F): 97.6 (16 Nov 2024 05:42), Max: 98.2 (15 Nov 2024 20:19)  HR: 81 (16 Nov 2024 05:42) (65 - 113)  BP: 128/75 (16 Nov 2024 05:42) (125/81 - 131/79)  BP(mean): --  RR: 18 (16 Nov 2024 05:42) (18 - 18)  SpO2: 95% (16 Nov 2024 05:42) (91% - 99%)  Parameters below as of 16 Nov 2024 05:42  Patient On (Oxygen Delivery Method): room air  General: NAD  HEENT: MMM  Neck: No JVD, no carotid bruit  Lungs: CTAB  CV: RRR, nl S1/S2, no M/R/G  Abdomen: S/NT/ND, +BS  Extremities: +Lymphedema, no cyanosis  Neuro: AAOx3, non-focal  Skin: No rash    Labs:    11-16    141  |  106  |  33[H]  ----------------------------<  89  3.6   |  34[H]  |  1.10    Ca    9.4      16 Nov 2024 06:48  Mg     1.9     11-16                          9.5    4.97  )-----------( 190      ( 16 Nov 2024 06:48 )             30.1

## 2024-11-16 NOTE — PROGRESS NOTE ADULT - ASSESSMENT
82yo M with a PMH of HTN, HLD, Type 2 DM, CKD3a, hx of GI bleed, COPD, SANTO, BPH, CAD s/p PCI, PAD, multiple myeloma, lymphedema who presents with b/l heel ulcers and suspected OM planned for bilateral foot surgery with podiatry, Gabriel Deshpande, on 10/29 for his suspected bilateral heel osteomyelitis now s/p partial calcanectomy.        Problem/Plan - 1:  ·  Problem: Osteomyelitis of foot.   ·  Plan: - Bilateral heel osteomyelitis per outpatient w/up and admitted with a plan for OR with podiatry, Dr. Rios on 10/29  - Wound Cx (10/23): Proteus mirabilis, Pseudomonas aeruginosa, ESBL Klebsiella pneumoniae, Enterococcus faecalis  - Blood Cx NG  - s/p partial calcanectomy on 10/29.    - f/up OR cultures +rare pseudomonas aeruginosa.  Pathology: bilateral heel acute and chronic osteomyelitis.  - Tylenol PRN for mild pain.  - Consulted ID Dr. Casillas, recs appreciated   - PICC line placed.  Continue meropenem till Dec 9.   - Continue Florastor  - afebrile, no leukocytosis, dressing/wound care as per podiatry recommendation   - cardiology Dr. Rock consulted, recs appreciated.  - Per podiatry patient scheduled for right heel debridement on 11/19.  Pt. without s/s of ACS, decompensated CHF, unstable arrythmia, or symptomatic aortic stenosis.  Tolerated recent partial calcanectomy.  No absolute contraindication from medical perspective to proceed with planned debridement.       Problem/Plan - 2:  ·  Problem: DM (diabetes mellitus).   ·  Plan: - type 2 DM on insulin  - continue decreased lantus dose 8units QHS   - d/c  low-dose lispro corrective ISS  -FS overall  controlled, decrease FS monitoring to bid    - PRN blood test monitoring      Problem/Plan - 3:  ·  Problem: HTN (hypertension).   ·  Plan: Chronic   - continue metoprolol 25mg BID.     Problem/Plan - 4:  ·  Problem: HLD (hyperlipidemia).   ·  Plan: Chronic   - Continue atorvastatin 80mg QD.     Problem/Plan - 5:  ·  Problem: CAD (coronary artery disease).   ·  Plan: CAD sp stents x4  - Held ASA and Plavix on 10/29; Restarted on ASA and Plavix on 10/30  - c/w lipitor  - cardiology eval noted      Problem/Plan - 6:  ·  Problem: Benign prostatic hyperplasia with lower urinary tract symptoms, symptom details unspecified.   ·  Plan: Chronic  -Continue Flomax.     Problem/Plan - 7:  ·  Problem: Multiple myeloma.   ·  Plan: Chronic  - Chemotherapy held      Problem/Plan - 8:   DVT ppx: lovenox 40mg SQ daily , patient remain NWB     #loose stool:  likely related to IV abx.  Will give imodium x 1.

## 2024-11-16 NOTE — PROGRESS NOTE ADULT - SUBJECTIVE AND OBJECTIVE BOX
CHIEF COMPLAINT/INTERVAL HISTORY:  Pt. seen and evaluated for bilateral heel osteomyelitis.   Pt. is in no distress.  Reports mild discomfort in the heels.  Also reported having 2 episodes of loose stool this morning.  Remains on IV antibiotics. Denies having any abdominal pain.     REVIEW OF SYSTEMS:  No fever, CP, SOB, or abdominal pain     Vital Signs Last 24 Hrs  T(C): 36.4 (16 Nov 2024 05:42), Max: 36.8 (15 Nov 2024 20:19)  T(F): 97.6 (16 Nov 2024 05:42), Max: 98.2 (15 Nov 2024 20:19)  HR: 81 (16 Nov 2024 05:42) (65 - 113)  BP: 128/75 (16 Nov 2024 05:42) (125/81 - 131/79)  BP(mean): --  RR: 18 (16 Nov 2024 05:42) (18 - 18)  SpO2: 95% (16 Nov 2024 05:42) (91% - 99%)    Parameters below as of 16 Nov 2024 05:42  Patient On (Oxygen Delivery Method): room air        PHYSICAL EXAM:  GENERAL: NAD  HEENT: EOMI, hearing normal, conjunctiva and sclera clear  Chest: CTA bilaterally, no wheezing  CV: S1S2, RRR,   GI: soft, +BS, NT/ND  Musculoskeletal: +bilateral pedal edema, ACE wrapped dressing over bilateral feet  Psychiatric: affect nL, mood nL  Skin: warm and dry    LABS:                        9.5    4.97  )-----------( 190      ( 16 Nov 2024 06:48 )             30.1     11-16    141  |  106  |  33[H]  ----------------------------<  89  3.6   |  34[H]  |  1.10    Ca    9.4      16 Nov 2024 06:48  Mg     1.9     11-16        Urinalysis Basic - ( 16 Nov 2024 06:48 )    Color: x / Appearance: x / SG: x / pH: x  Gluc: 89 mg/dL / Ketone: x  / Bili: x / Urobili: x   Blood: x / Protein: x / Nitrite: x   Leuk Esterase: x / RBC: x / WBC x   Sq Epi: x / Non Sq Epi: x / Bacteria: x

## 2024-11-17 LAB
ANION GAP SERPL CALC-SCNC: 6 MMOL/L — SIGNIFICANT CHANGE UP (ref 5–17)
BASOPHILS # BLD AUTO: 0.02 K/UL — SIGNIFICANT CHANGE UP (ref 0–0.2)
BASOPHILS NFR BLD AUTO: 0.4 % — SIGNIFICANT CHANGE UP (ref 0–2)
BUN SERPL-MCNC: 35 MG/DL — HIGH (ref 7–23)
CALCIUM SERPL-MCNC: 9.2 MG/DL — SIGNIFICANT CHANGE UP (ref 8.5–10.1)
CHLORIDE SERPL-SCNC: 109 MMOL/L — HIGH (ref 96–108)
CO2 SERPL-SCNC: 29 MMOL/L — SIGNIFICANT CHANGE UP (ref 22–31)
CREAT SERPL-MCNC: 0.98 MG/DL — SIGNIFICANT CHANGE UP (ref 0.5–1.3)
EGFR: 77 ML/MIN/1.73M2 — SIGNIFICANT CHANGE UP
EOSINOPHIL # BLD AUTO: 0.47 K/UL — SIGNIFICANT CHANGE UP (ref 0–0.5)
EOSINOPHIL NFR BLD AUTO: 10.1 % — HIGH (ref 0–6)
GLUCOSE SERPL-MCNC: 105 MG/DL — HIGH (ref 70–99)
HCT VFR BLD CALC: 29.9 % — LOW (ref 39–50)
HGB BLD-MCNC: 9.3 G/DL — LOW (ref 13–17)
IMM GRANULOCYTES NFR BLD AUTO: 0.2 % — SIGNIFICANT CHANGE UP (ref 0–0.9)
LYMPHOCYTES # BLD AUTO: 1.6 K/UL — SIGNIFICANT CHANGE UP (ref 1–3.3)
LYMPHOCYTES # BLD AUTO: 34.3 % — SIGNIFICANT CHANGE UP (ref 13–44)
MAGNESIUM SERPL-MCNC: 1.9 MG/DL — SIGNIFICANT CHANGE UP (ref 1.6–2.6)
MCHC RBC-ENTMCNC: 28.2 PG — SIGNIFICANT CHANGE UP (ref 27–34)
MCHC RBC-ENTMCNC: 31.1 G/DL — LOW (ref 32–36)
MCV RBC AUTO: 90.6 FL — SIGNIFICANT CHANGE UP (ref 80–100)
MONOCYTES # BLD AUTO: 0.34 K/UL — SIGNIFICANT CHANGE UP (ref 0–0.9)
MONOCYTES NFR BLD AUTO: 7.3 % — SIGNIFICANT CHANGE UP (ref 2–14)
NEUTROPHILS # BLD AUTO: 2.22 K/UL — SIGNIFICANT CHANGE UP (ref 1.8–7.4)
NEUTROPHILS NFR BLD AUTO: 47.7 % — SIGNIFICANT CHANGE UP (ref 43–77)
NRBC # BLD: 0 /100 WBCS — SIGNIFICANT CHANGE UP (ref 0–0)
PLATELET # BLD AUTO: 198 K/UL — SIGNIFICANT CHANGE UP (ref 150–400)
POTASSIUM SERPL-MCNC: 3.9 MMOL/L — SIGNIFICANT CHANGE UP (ref 3.5–5.3)
POTASSIUM SERPL-SCNC: 3.9 MMOL/L — SIGNIFICANT CHANGE UP (ref 3.5–5.3)
RBC # BLD: 3.3 M/UL — LOW (ref 4.2–5.8)
RBC # FLD: 15.6 % — HIGH (ref 10.3–14.5)
SODIUM SERPL-SCNC: 144 MMOL/L — SIGNIFICANT CHANGE UP (ref 135–145)
WBC # BLD: 4.66 K/UL — SIGNIFICANT CHANGE UP (ref 3.8–10.5)
WBC # FLD AUTO: 4.66 K/UL — SIGNIFICANT CHANGE UP (ref 3.8–10.5)

## 2024-11-17 PROCEDURE — 99232 SBSQ HOSP IP/OBS MODERATE 35: CPT

## 2024-11-17 RX ADMIN — METOPROLOL TARTRATE 25 MILLIGRAM(S): 100 TABLET, FILM COATED ORAL at 05:09

## 2024-11-17 RX ADMIN — FAMOTIDINE 20 MILLIGRAM(S): 20 TABLET, FILM COATED ORAL at 11:38

## 2024-11-17 RX ADMIN — MEROPENEM 100 MILLIGRAM(S): 500 INJECTION, POWDER, FOR SOLUTION INTRAVENOUS at 05:10

## 2024-11-17 RX ADMIN — INSULIN GLARGINE 8 UNIT(S): 100 INJECTION, SOLUTION SUBCUTANEOUS at 21:34

## 2024-11-17 RX ADMIN — ACETAMINOPHEN, DIPHENHYDRAMINE HCL, PHENYLEPHRINE HCL 3 MILLIGRAM(S): 325; 25; 5 TABLET ORAL at 21:32

## 2024-11-17 RX ADMIN — ENOXAPARIN SODIUM 40 MILLIGRAM(S): 30 INJECTION SUBCUTANEOUS at 11:38

## 2024-11-17 RX ADMIN — Medication 250 MILLIGRAM(S): at 17:03

## 2024-11-17 RX ADMIN — CLOPIDOGREL 75 MILLIGRAM(S): 75 TABLET, FILM COATED ORAL at 11:38

## 2024-11-17 RX ADMIN — MEROPENEM 100 MILLIGRAM(S): 500 INJECTION, POWDER, FOR SOLUTION INTRAVENOUS at 21:32

## 2024-11-17 RX ADMIN — CHLORHEXIDINE GLUCONATE 1 APPLICATION(S): 1.2 RINSE ORAL at 05:10

## 2024-11-17 RX ADMIN — MEROPENEM 100 MILLIGRAM(S): 500 INJECTION, POWDER, FOR SOLUTION INTRAVENOUS at 14:13

## 2024-11-17 RX ADMIN — Medication 2: at 18:57

## 2024-11-17 RX ADMIN — NYSTATIN 1 APPLICATION(S): 100000 POWDER TOPICAL at 17:03

## 2024-11-17 RX ADMIN — TAMSULOSIN HYDROCHLORIDE 0.4 MILLIGRAM(S): 0.4 CAPSULE ORAL at 21:32

## 2024-11-17 RX ADMIN — Medication 80 MILLIGRAM(S): at 21:32

## 2024-11-17 RX ADMIN — Medication 81 MILLIGRAM(S): at 11:38

## 2024-11-17 RX ADMIN — NYSTATIN 1 APPLICATION(S): 100000 POWDER TOPICAL at 05:10

## 2024-11-17 RX ADMIN — Medication 250 MILLIGRAM(S): at 05:10

## 2024-11-17 RX ADMIN — METOPROLOL TARTRATE 25 MILLIGRAM(S): 100 TABLET, FILM COATED ORAL at 17:03

## 2024-11-17 RX ADMIN — Medication 1000 MICROGRAM(S): at 11:38

## 2024-11-17 NOTE — PROGRESS NOTE ADULT - SUBJECTIVE AND OBJECTIVE BOX
CHIEF COMPLAINT/INTERVAL HISTORY:  Pt. seen and evaluated for bilateral heel osteomyelitis.  Pt. is in no distress.  Reports mild discomfort of right heel.  Tolerating IV antibiotics.  States no further diarrhea/loose stools after dose of imodium yesterday.      REVIEW OF SYSTEMS:  No fever, CP, SOB, or abdominal pain.     Vital Signs Last 24 Hrs  T(C): 36.7 (17 Nov 2024 05:10), Max: 36.8 (16 Nov 2024 20:58)  T(F): 98 (17 Nov 2024 05:10), Max: 98.2 (16 Nov 2024 20:58)  HR: 97 (17 Nov 2024 05:10) (84 - 97)  BP: 124/72 (17 Nov 2024 05:10) (124/72 - 145/77)  BP(mean): --  RR: 17 (17 Nov 2024 05:10) (17 - 18)  SpO2: 96% (17 Nov 2024 05:10) (95% - 97%)    Parameters below as of 17 Nov 2024 05:10  Patient On (Oxygen Delivery Method): room air        PHYSICAL EXAM:  GENERAL: NAD  HEENT: EOMI, hearing normal, conjunctiva and sclera clear  Chest: CTA bilaterally, no wheezing  CV: S1S2, RRR,   GI: soft, +BS, NT/ND  Musculoskeletal: improved LE edema, ACE wrapped dressing over bilateral feet  Psychiatric: affect nL, mood nL  Skin: warm and dry    LABS:                        9.5    4.97  )-----------( 190      ( 16 Nov 2024 06:48 )             30.1     11-16    141  |  106  |  33[H]  ----------------------------<  89  3.6   |  34[H]  |  1.10    Ca    9.4      16 Nov 2024 06:48  Mg     1.9     11-16        Urinalysis Basic - ( 16 Nov 2024 06:48 )    Color: x / Appearance: x / SG: x / pH: x  Gluc: 89 mg/dL / Ketone: x  / Bili: x / Urobili: x   Blood: x / Protein: x / Nitrite: x   Leuk Esterase: x / RBC: x / WBC x   Sq Epi: x / Non Sq Epi: x / Bacteria: x

## 2024-11-17 NOTE — CHART NOTE - NSCHARTNOTEFT_GEN_A_CORE
Brief History: Reason for Admission: Bilateral Heel Osteomyelitis  History of Present Illness: Patient with a past medical history of hypertension, diabetes, hyperlipidemia, bilateral heel osteomyelitis currently on outpatient antibiotics through midline is presenting for surgery.  s/p bilateral heel debridement and partial calcanectomy    Met with patient at bedside for nutrition follow up. Chart notes and flow sheets reviewed. Patient denies new onset of chewing/swallowing difficulty as well as N/V. Additional food preferences noted and will honor if compliant with therapeutic diet order. Patient engaged and participated in diet education. States he limits sugar in his diet. Discussed sodium content of foods, sources of fiber and healthy fats. Heart Healthy/Consistent Carbohydrate handout provided.     Factors impacting intake: [X] none [ ] nausea  [ ] vomiting [ ] diarrhea [ ] constipation  [ ]chewing problems [ ] swallowing issues  [ ] other:     Diet Prescription: Diet, Regular:   Consistent Carbohydrate {Evening Snack}  DASH/TLC {Sodium & Cholesterol Restricted} (10-29-24 @ 13:03)    Intake: As per flow sheets, % intake at meals.     Current Weight:   11/16: 284.6 lb (129.1 kg)  11/15: 282.1 lb (128 kg)  11/14: 282.1 lb (128 kg)  11/08: 256.5 lb (116.59 kg) -question accuracy will monitor  11/07: 268.5 lb (122.05 kg)  Admit Weight: 289 lb (131.26 kg)  % Weight Change     Pertinent Medications: MEDICATIONS  (STANDING):  acetaminophen   IVPB .. 1000 milliGRAM(s) IV Intermittent once  aspirin  chewable 81 milliGRAM(s) Oral daily  atorvastatin 80 milliGRAM(s) Oral at bedtime  chlorhexidine 2% Cloths 1 Application(s) Topical daily  clopidogrel Tablet 75 milliGRAM(s) Oral daily  cyanocobalamin 1000 MICROGram(s) Oral daily  dextrose 5%. 1000 milliLiter(s) (50 mL/Hr) IV Continuous <Continuous>  dextrose 5%. 1000 milliLiter(s) (100 mL/Hr) IV Continuous <Continuous>  dextrose 50% Injectable 25 Gram(s) IV Push once  dextrose 50% Injectable 12.5 Gram(s) IV Push once  dextrose 50% Injectable 25 Gram(s) IV Push once  dextrose 50% Injectable 12.5 Gram(s) IV Push once  enoxaparin Injectable 40 milliGRAM(s) SubCutaneous every 24 hours  famotidine    Tablet 20 milliGRAM(s) Oral daily  glucagon  Injectable 1 milliGRAM(s) IntraMuscular once  insulin glargine Injectable (LANTUS) 8 Unit(s) SubCutaneous at bedtime  melatonin 3 milliGRAM(s) Oral at bedtime  meropenem  IVPB      meropenem  IVPB 1000 milliGRAM(s) IV Intermittent every 8 hours  metoprolol tartrate 25 milliGRAM(s) Oral two times a day  nystatin Powder 1 Application(s) Topical two times a day  saccharomyces boulardii 250 milliGRAM(s) Oral two times a day  tamsulosin 0.4 milliGRAM(s) Oral at bedtime    MEDICATIONS  (PRN):  acetaminophen     Tablet .. 650 milliGRAM(s) Oral every 6 hours PRN Temp greater or equal to 38C (100.4F), Mild Pain (1 - 3)  aluminum hydroxide/magnesium hydroxide/simethicone Suspension 30 milliLiter(s) Oral every 4 hours PRN Dyspepsia  dextrose Oral Gel 15 Gram(s) Oral once PRN Blood Glucose LESS THAN 70 milliGRAM(s)/deciliter  sodium chloride 0.9% lock flush 10 milliLiter(s) IV Push every 1 hour PRN Pre/post blood products, medications, blood draw, and to maintain line patency    Pertinent Labs: 11-17 Na144 mmol/L Glu 105 mg/dL[H] K+ 3.9 mmol/L Cr  0.98 mg/dL BUN 35 mg/dL[H] 11-13 Alb 2.3 g/dL[L]     CAPILLARY BLOOD GLUCOSE      POCT Blood Glucose.: 112 mg/dL (17 Nov 2024 12:47)  POCT Blood Glucose.: 114 mg/dL (17 Nov 2024 08:15)  POCT Blood Glucose.: 182 mg/dL (16 Nov 2024 20:58)    No edema noted  Skin: As per flow sheets, no pressure injuries    Estimated Needs:   [ ] no change since previous assessment  [X] recalculated: Recalculated due to inconsistent weights documented  Current weight: 284.6 lb (129.1 kg)  Energy Needs: 3,228-3,873 kcal/d based on 25-30 kcal/kg  Protein Needs: 129-155 g Pro/d based on 1.0-1.2g/kg      Previous Nutrition Diagnosis:   [ ] Inadequate Energy Intake [ ]Inadequate Oral Intake [ ] Excessive Energy Intake   [ ] Underweight [ ] Increased Nutrient Needs [X] Overweight/Obesity   [ ] Altered GI Function [ ] Unintended Weight Loss [ ] Food & Nutrition Related Knowledge Deficit [ ] Malnutrition     Nutrition Diagnosis is [X] ongoing  [ ] resolved [ ] not applicable   Overweight/obesity related to presumed excessive energy intake as evidenced by BMI 38.1 with estimated energy intake greater than needs.     New Nutrition Diagnosis: [X] not applicable     Interventions:   Recommend  [ ] Change Diet To:  [ ] Nutrition Supplement  [ ] Nutrition Support  [X] Other:   1. Continue consistent carbohydrate, DASH/TLC diet.  2. Honor food preferences as able.  3. Trend/monitor weights as accuracy is questionable.  4. Provided heart healthy/consistent carbohydrate handout and education.     Monitoring and Evaluation:   [X] PO intake [ x ] Tolerance to diet prescription [ x ] weights [ x ] labs[ x ] follow up per protocol  [ ] other: Brief History: Reason for Admission: Bilateral Heel Osteomyelitis  History of Present Illness: Patient with a past medical history of hypertension, diabetes, hyperlipidemia, bilateral heel osteomyelitis currently on outpatient antibiotics through midline is presenting for surgery.  s/p bilateral heel debridement and partial calcanectomy    Met with patient at bedside for nutrition follow up. Chart notes and flow sheets reviewed. Patient denies new onset of chewing/swallowing difficulty as well as N/V. Additional food preferences noted and will honor if compliant with therapeutic diet order. Patient engaged and participated in diet education. States he limits sugar in his diet. Discussed sodium content of foods, sources of fiber and healthy fats. Heart Healthy/Consistent Carbohydrate handout provided.     Factors impacting intake: [X] none [ ] nausea  [ ] vomiting [ ] diarrhea [ ] constipation  [ ]chewing problems [ ] swallowing issues  [ ] other:     Diet Prescription: Diet, Regular:   Consistent Carbohydrate {Evening Snack}  DASH/TLC {Sodium & Cholesterol Restricted} (10-29-24 @ 13:03)    Intake: As per flow sheets, % intake at meals.     Current Weight:   11/16: 284.6 lb (129.1 kg)  11/15: 282.1 lb (128 kg)  11/14: 282.1 lb (128 kg)  11/08: 256.5 lb (116.59 kg) -question accuracy will monitor  11/07: 268.5 lb (122.05 kg)  Admit Weight: 289 lb (131.26 kg)  % Weight Change     Pertinent Medications: MEDICATIONS  (STANDING):  acetaminophen   IVPB .. 1000 milliGRAM(s) IV Intermittent once  aspirin  chewable 81 milliGRAM(s) Oral daily  atorvastatin 80 milliGRAM(s) Oral at bedtime  chlorhexidine 2% Cloths 1 Application(s) Topical daily  clopidogrel Tablet 75 milliGRAM(s) Oral daily  cyanocobalamin 1000 MICROGram(s) Oral daily  dextrose 5%. 1000 milliLiter(s) (50 mL/Hr) IV Continuous <Continuous>  dextrose 5%. 1000 milliLiter(s) (100 mL/Hr) IV Continuous <Continuous>  dextrose 50% Injectable 25 Gram(s) IV Push once  dextrose 50% Injectable 12.5 Gram(s) IV Push once  dextrose 50% Injectable 25 Gram(s) IV Push once  dextrose 50% Injectable 12.5 Gram(s) IV Push once  enoxaparin Injectable 40 milliGRAM(s) SubCutaneous every 24 hours  famotidine    Tablet 20 milliGRAM(s) Oral daily  glucagon  Injectable 1 milliGRAM(s) IntraMuscular once  insulin glargine Injectable (LANTUS) 8 Unit(s) SubCutaneous at bedtime  melatonin 3 milliGRAM(s) Oral at bedtime  meropenem  IVPB      meropenem  IVPB 1000 milliGRAM(s) IV Intermittent every 8 hours  metoprolol tartrate 25 milliGRAM(s) Oral two times a day  nystatin Powder 1 Application(s) Topical two times a day  saccharomyces boulardii 250 milliGRAM(s) Oral two times a day  tamsulosin 0.4 milliGRAM(s) Oral at bedtime    MEDICATIONS  (PRN):  acetaminophen     Tablet .. 650 milliGRAM(s) Oral every 6 hours PRN Temp greater or equal to 38C (100.4F), Mild Pain (1 - 3)  aluminum hydroxide/magnesium hydroxide/simethicone Suspension 30 milliLiter(s) Oral every 4 hours PRN Dyspepsia  dextrose Oral Gel 15 Gram(s) Oral once PRN Blood Glucose LESS THAN 70 milliGRAM(s)/deciliter  sodium chloride 0.9% lock flush 10 milliLiter(s) IV Push every 1 hour PRN Pre/post blood products, medications, blood draw, and to maintain line patency    Pertinent Labs: 11-17 Na144 mmol/L Glu 105 mg/dL[H] K+ 3.9 mmol/L Cr  0.98 mg/dL BUN 35 mg/dL[H] 11-13 Alb 2.3 g/dL[L]     CAPILLARY BLOOD GLUCOSE      POCT Blood Glucose.: 112 mg/dL (17 Nov 2024 12:47)  POCT Blood Glucose.: 114 mg/dL (17 Nov 2024 08:15)  POCT Blood Glucose.: 182 mg/dL (16 Nov 2024 20:58)    Edema: L/R foot +4  Skin: As per flow sheets, no pressure injuries    Estimated Needs:   [ ] no change since previous assessment  [X] recalculated: Recalculated due to inconsistent weights documented  Current weight: 284.6 lb (129.1 kg)  Energy Needs: 3,228-3,873 kcal/d based on 25-30 kcal/kg  Protein Needs: 129-155 g Pro/d based on 1.0-1.2g/kg      Previous Nutrition Diagnosis:   [ ] Inadequate Energy Intake [ ]Inadequate Oral Intake [ ] Excessive Energy Intake   [ ] Underweight [ ] Increased Nutrient Needs [X] Overweight/Obesity   [ ] Altered GI Function [ ] Unintended Weight Loss [ ] Food & Nutrition Related Knowledge Deficit [ ] Malnutrition     Nutrition Diagnosis is [X] ongoing  [ ] resolved [ ] not applicable   Overweight/obesity related to presumed excessive energy intake as evidenced by BMI 38.1 with estimated energy intake greater than needs.     New Nutrition Diagnosis: [X] not applicable     Interventions:   Recommend  [ ] Change Diet To:  [ ] Nutrition Supplement  [ ] Nutrition Support  [X] Other:   1. Continue consistent carbohydrate, DASH/TLC diet.  2. Honor food preferences as able.  3. Trend/monitor weights as accuracy is questionable.  4. Provided heart healthy/consistent carbohydrate handout and education.     Monitoring and Evaluation:   [X] PO intake [ x ] Tolerance to diet prescription [ x ] weights [ x ] labs[ x ] follow up per protocol  [ ] other:

## 2024-11-17 NOTE — PROGRESS NOTE ADULT - SUBJECTIVE AND OBJECTIVE BOX
Chief Complaint: Heel ulcer    Interval Events: No events overnight.    Review of Systems:  General: No fevers, chills, weight gain  Skin: No rashes, color changes  Cardiovascular: No chest pain, orthopnea  Respiratory: No shortness of breath, cough  Gastrointestinal: No nausea, abdominal pain  Genitourinary: No incontinence, pain with urination  Musculoskeletal: No pain, swelling, decreased range of motion  Neurological: No headache, weakness  Psychiatric: No depression, anxiety  Endocrine: No weight gain, increased thirst  All other systems are comprehensively negative.    Physical Exam:  Vital Signs Last 24 Hrs  T(C): 36.7 (17 Nov 2024 05:10), Max: 36.8 (16 Nov 2024 20:58)  T(F): 98 (17 Nov 2024 05:10), Max: 98.2 (16 Nov 2024 20:58)  HR: 97 (17 Nov 2024 05:10) (84 - 97)  BP: 124/72 (17 Nov 2024 05:10) (124/72 - 145/77)  BP(mean): --  RR: 17 (17 Nov 2024 05:10) (17 - 18)  SpO2: 96% (17 Nov 2024 05:10) (95% - 97%)  Parameters below as of 17 Nov 2024 05:10  Patient On (Oxygen Delivery Method): room air  General: NAD  HEENT: MMM  Neck: No JVD, no carotid bruit  Lungs: CTAB  CV: RRR, nl S1/S2, no M/R/G  Abdomen: S/NT/ND, +BS  Extremities: +Lymphedema, no cyanosis  Neuro: AAOx3, non-focal  Skin: No rash    Labs:    11-16    141  |  106  |  33[H]  ----------------------------<  89  3.6   |  34[H]  |  1.10    Ca    9.4      16 Nov 2024 06:48  Mg     1.9     11-16                          9.5    4.97  )-----------( 190      ( 16 Nov 2024 06:48 )             30.1

## 2024-11-17 NOTE — PROGRESS NOTE ADULT - ASSESSMENT
82yo M with a PMH of HTN, HLD, Type 2 DM, CKD3a, hx of GI bleed, COPD, SANTO, BPH, CAD s/p PCI, PAD, multiple myeloma, lymphedema who presents with b/l heel ulcers and suspected OM planned for bilateral foot surgery with podiatry, Gabriel Deshpande, on 10/29 for his suspected bilateral heel osteomyelitis now s/p partial calcanectomy.        Problem/Plan - 1:  ·  Problem: Osteomyelitis of foot.   ·  Plan: - Bilateral heel osteomyelitis per outpatient w/up and admitted with a plan for OR with podiatry, Dr. Rios on 10/29  - Wound Cx (10/23): Proteus mirabilis, Pseudomonas aeruginosa, ESBL Klebsiella pneumoniae, Enterococcus faecalis  - Blood Cx NG  - s/p partial calcanectomy on 10/29.    - f/up OR cultures +rare pseudomonas aeruginosa.  Pathology: bilateral heel acute and chronic osteomyelitis.  - Tylenol PRN for mild pain.  - Consulted ID Dr. Casillas, recs appreciated   - PICC line placed.  Continue meropenem till Dec 9.   - Continue Florastor  - afebrile, no leukocytosis, dressing/wound care as per podiatry recommendation   - cardiology Dr. Rock consulted, recs appreciated.  - Per podiatry patient scheduled for right heel debridement on 11/19.  Pt. without s/s of ACS, decompensated CHF, unstable arrythmia, or symptomatic aortic stenosis.  Tolerated recent partial calcanectomy.  No absolute contraindication from medical perspective to proceed with planned debridement.       Problem/Plan - 2:  ·  Problem: DM (diabetes mellitus).   ·  Plan: - type 2 DM on insulin  - continue decreased lantus dose 8units QHS   - d/c  low-dose lispro corrective ISS  - FS overall  controlled, decrease FS monitoring to bid    - PRN blood test monitoring      Problem/Plan - 3:  ·  Problem: HTN (hypertension).   ·  Plan: Chronic   - continue metoprolol 25mg BID.     Problem/Plan - 4:  ·  Problem: HLD (hyperlipidemia).   ·  Plan: Chronic   - Continue atorvastatin 80mg QD.     Problem/Plan - 5:  ·  Problem: CAD (coronary artery disease).   ·  Plan: CAD sp stents x4  - Held ASA and Plavix on 10/29; Restarted on ASA and Plavix on 10/30  - c/w lipitor  - cardiology eval noted      Problem/Plan - 6:  ·  Problem: Benign prostatic hyperplasia with lower urinary tract symptoms, symptom details unspecified.   ·  Plan: Chronic  -Continue Flomax.     Problem/Plan - 7:  ·  Problem: Multiple myeloma.   ·  Plan: Chronic  - Chemotherapy held      Problem/Plan - 8:   DVT ppx: lovenox 40mg SQ daily , patient remain NWB     #loose stool:  likely related to IV abx.  Resolved s/p imodium x 1.

## 2024-11-18 PROCEDURE — 99232 SBSQ HOSP IP/OBS MODERATE 35: CPT

## 2024-11-18 RX ADMIN — MEROPENEM 100 MILLIGRAM(S): 500 INJECTION, POWDER, FOR SOLUTION INTRAVENOUS at 22:12

## 2024-11-18 RX ADMIN — CHLORHEXIDINE GLUCONATE 1 APPLICATION(S): 1.2 RINSE ORAL at 06:43

## 2024-11-18 RX ADMIN — ENOXAPARIN SODIUM 40 MILLIGRAM(S): 30 INJECTION SUBCUTANEOUS at 12:15

## 2024-11-18 RX ADMIN — NYSTATIN 1 APPLICATION(S): 100000 POWDER TOPICAL at 06:43

## 2024-11-18 RX ADMIN — ACETAMINOPHEN, DIPHENHYDRAMINE HCL, PHENYLEPHRINE HCL 3 MILLIGRAM(S): 325; 25; 5 TABLET ORAL at 22:12

## 2024-11-18 RX ADMIN — METOPROLOL TARTRATE 25 MILLIGRAM(S): 100 TABLET, FILM COATED ORAL at 06:44

## 2024-11-18 RX ADMIN — Medication 250 MILLIGRAM(S): at 06:44

## 2024-11-18 RX ADMIN — MEROPENEM 100 MILLIGRAM(S): 500 INJECTION, POWDER, FOR SOLUTION INTRAVENOUS at 13:46

## 2024-11-18 RX ADMIN — FAMOTIDINE 20 MILLIGRAM(S): 20 TABLET, FILM COATED ORAL at 12:15

## 2024-11-18 RX ADMIN — CLOPIDOGREL 75 MILLIGRAM(S): 75 TABLET, FILM COATED ORAL at 12:16

## 2024-11-18 RX ADMIN — TAMSULOSIN HYDROCHLORIDE 0.4 MILLIGRAM(S): 0.4 CAPSULE ORAL at 22:12

## 2024-11-18 RX ADMIN — Medication 80 MILLIGRAM(S): at 22:12

## 2024-11-18 RX ADMIN — Medication 1000 MICROGRAM(S): at 12:15

## 2024-11-18 RX ADMIN — METOPROLOL TARTRATE 25 MILLIGRAM(S): 100 TABLET, FILM COATED ORAL at 17:00

## 2024-11-18 RX ADMIN — NYSTATIN 1 APPLICATION(S): 100000 POWDER TOPICAL at 17:01

## 2024-11-18 RX ADMIN — Medication 250 MILLIGRAM(S): at 17:00

## 2024-11-18 RX ADMIN — MEROPENEM 100 MILLIGRAM(S): 500 INJECTION, POWDER, FOR SOLUTION INTRAVENOUS at 06:44

## 2024-11-18 RX ADMIN — INSULIN GLARGINE 8 UNIT(S): 100 INJECTION, SOLUTION SUBCUTANEOUS at 22:13

## 2024-11-18 RX ADMIN — Medication 81 MILLIGRAM(S): at 12:16

## 2024-11-18 NOTE — PROGRESS NOTE ADULT - SUBJECTIVE AND OBJECTIVE BOX
Chief Complaint: Heel ulcer    Interval Events: No events overnight.    Review of Systems:  General: No fevers, chills, weight gain  Skin: No rashes, color changes  Cardiovascular: No chest pain, orthopnea  Respiratory: No shortness of breath, cough  Gastrointestinal: No nausea, abdominal pain  Genitourinary: No incontinence, pain with urination  Musculoskeletal: No pain, swelling, decreased range of motion  Neurological: No headache, weakness  Psychiatric: No depression, anxiety  Endocrine: No weight gain, increased thirst  All other systems are comprehensively negative.    Physical Exam:  Vital Signs Last 24 Hrs  T(C): 36.5 (18 Nov 2024 06:17), Max: 36.9 (17 Nov 2024 20:14)  T(F): 97.7 (18 Nov 2024 06:17), Max: 98.4 (17 Nov 2024 20:14)  HR: 83 (18 Nov 2024 06:17) (83 - 83)  BP: 116/64 (18 Nov 2024 06:17) (102/67 - 126/75)  BP(mean): --  RR: 17 (18 Nov 2024 06:17) (17 - 17)  SpO2: 96% (18 Nov 2024 06:17) (95% - 97%)  Parameters below as of 18 Nov 2024 06:17  Patient On (Oxygen Delivery Method): room air  General: NAD  HEENT: MMM  Neck: No JVD, no carotid bruit  Lungs: CTAB  CV: RRR, nl S1/S2, no M/R/G  Abdomen: S/NT/ND, +BS  Extremities: +Lymphedema, no cyanosis  Neuro: AAOx3, non-focal  Skin: No rash    Labs:    11-17    144  |  109[H]  |  35[H]  ----------------------------<  105[H]  3.9   |  29  |  0.98    Ca    9.2      17 Nov 2024 07:55  Mg     1.9     11-17                          9.3    4.66  )-----------( 198      ( 17 Nov 2024 07:55 )             29.9

## 2024-11-18 NOTE — PROGRESS NOTE ADULT - ASSESSMENT
The patient is an 83 year old male with a history of HTN, HL, DM, CKD, GI bleed, COPD, SANTO, BPH, CAD s/p PCI, PAD, multiple myeloma, lymphedema who presents with heel ulcer.     Plan:  - ECG with sinus rhythm and no evidence of ischemia/infarction  - Echo 6/30/24 with normal LV systolic function, mild pulm HTN  - CXR with grossly clear lungs  - Lower extremity swelling consistent with known history of lymphedema  - If needed, can add low dose furosemide  - Continue aspirin 81 mg daily  - Continue clopidogrel 75 mg daily  - Continue atorvastatin 80 mg daily  - Continue metoprolol tartrate 25 mg bid  - Podiatry and ID follow-up  - Path results with acute on chronic osteomyelitis  - IV antibiotics

## 2024-11-18 NOTE — PROGRESS NOTE ADULT - SUBJECTIVE AND OBJECTIVE BOX
CHIEF COMPLAINT/INTERVAL HISTORY:  Pt. seen and evaluated for bilateral heel osteomyelitis.  Pt. is in no distress.  Denies having any CP or SOB.  Tolerating IV antibiotics.     REVIEW OF SYSTEMS:  No fever, CP, SOB, or abdominal pain.     Vital Signs Last 24 Hrs  T(C): 36.5 (18 Nov 2024 06:17), Max: 36.9 (17 Nov 2024 20:14)  T(F): 97.7 (18 Nov 2024 06:17), Max: 98.4 (17 Nov 2024 20:14)  HR: 83 (18 Nov 2024 06:17) (83 - 83)  BP: 116/64 (18 Nov 2024 06:17) (102/67 - 126/75)  BP(mean): --  RR: 17 (18 Nov 2024 06:17) (17 - 17)  SpO2: 96% (18 Nov 2024 06:17) (95% - 97%)    Parameters below as of 18 Nov 2024 06:17  Patient On (Oxygen Delivery Method): room air        PHYSICAL EXAM:  GENERAL: NAD  HEENT: EOMI, hearing normal, conjunctiva and sclera clear  Chest: CTA bilaterally, no wheezing  CV: S1S2, RRR,   GI: soft, +BS, NT/ND  Musculoskeletal: improved LE edema, ACE wrapped dressing over bilateral feet  Psychiatric: affect nL, mood nL  Skin: warm and dry    LABS:                        9.3    4.66  )-----------( 198      ( 17 Nov 2024 07:55 )             29.9     11-17    144  |  109[H]  |  35[H]  ----------------------------<  105[H]  3.9   |  29  |  0.98    Ca    9.2      17 Nov 2024 07:55  Mg     1.9     11-17        Urinalysis Basic - ( 17 Nov 2024 07:55 )    Color: x / Appearance: x / SG: x / pH: x  Gluc: 105 mg/dL / Ketone: x  / Bili: x / Urobili: x   Blood: x / Protein: x / Nitrite: x   Leuk Esterase: x / RBC: x / WBC x   Sq Epi: x / Non Sq Epi: x / Bacteria: x

## 2024-11-18 NOTE — PROGRESS NOTE ADULT - ASSESSMENT
84yo M with a PMH of HTN, HLD, Type 2 DM, CKD3a, hx of GI bleed, COPD, SANTO, BPH, CAD s/p PCI, PAD, multiple myeloma, lymphedema who presents with b/l heel ulcers and suspected OM planned for bilateral foot surgery with podiatry, Gabriel Deshpande, on 10/29 for his suspected bilateral heel osteomyelitis now s/p partial calcanectomy.        Problem/Plan - 1:  ·  Problem: Osteomyelitis of foot.   ·  Plan: - Bilateral heel osteomyelitis per outpatient w/up and admitted with a plan for OR with podiatry, Dr. Rios on 10/29  - Wound Cx (10/23): Proteus mirabilis, Pseudomonas aeruginosa, ESBL Klebsiella pneumoniae, Enterococcus faecalis  - Blood Cx NG  - s/p partial calcanectomy on 10/29.    - f/up OR cultures +rare pseudomonas aeruginosa.  Pathology: bilateral heel acute and chronic osteomyelitis.  - Tylenol PRN for mild pain.  - Consulted ID Dr. Casillas, recs appreciated   - PICC line placed.  Continue meropenem till Dec 9.   - Continue Florastor  - afebrile, no leukocytosis, dressing/wound care as per podiatry recommendation   - cardiology Dr. Rock consulted, recs appreciated.  - Patient scheduled for right heel debridement on 11/19.  Pt. without s/s of ACS, decompensated CHF, unstable arrythmia, or symptomatic aortic stenosis.  Tolerated recent partial calcanectomy.  No absolute contraindication from medical perspective to proceed with planned debridement.       Problem/Plan - 2:  ·  Problem: DM (diabetes mellitus).   ·  Plan: - type 2 DM on insulin  - continue decreased lantus dose 8units QHS   - continue low-dose lispro corrective ISS     Problem/Plan - 3:  ·  Problem: HTN (hypertension).   ·  Plan: Chronic   - continue metoprolol 25mg BID.     Problem/Plan - 4:  ·  Problem: HLD (hyperlipidemia).   ·  Plan: Chronic   - Continue atorvastatin 80mg QD.     Problem/Plan - 5:  ·  Problem: CAD (coronary artery disease).   ·  Plan: CAD sp stents x4  - Held ASA and Plavix on 10/29; Restarted on ASA and Plavix on 10/30  - c/w lipitor  - cardiology eval noted      Problem/Plan - 6:  ·  Problem: Benign prostatic hyperplasia with lower urinary tract symptoms, symptom details unspecified.   ·  Plan: Chronic  -Continue Flomax.     Problem/Plan - 7:  ·  Problem: Multiple myeloma.   ·  Plan: Chronic  - Chemotherapy held      Problem/Plan - 8:   DVT ppx: lovenox 40mg SQ daily , patient remain NWB     #loose stool:  likely related to IV abx.  Resolved s/p imodium x 1.

## 2024-11-19 LAB
ANION GAP SERPL CALC-SCNC: 4 MMOL/L — LOW (ref 5–17)
APTT BLD: 36 SEC — HIGH (ref 24.5–35.6)
BASOPHILS # BLD AUTO: 0.01 K/UL — SIGNIFICANT CHANGE UP (ref 0–0.2)
BASOPHILS NFR BLD AUTO: 0.2 % — SIGNIFICANT CHANGE UP (ref 0–2)
BUN SERPL-MCNC: 35 MG/DL — HIGH (ref 7–23)
CALCIUM SERPL-MCNC: 9.4 MG/DL — SIGNIFICANT CHANGE UP (ref 8.5–10.1)
CHLORIDE SERPL-SCNC: 107 MMOL/L — SIGNIFICANT CHANGE UP (ref 96–108)
CO2 SERPL-SCNC: 33 MMOL/L — HIGH (ref 22–31)
CREAT SERPL-MCNC: 1.3 MG/DL — SIGNIFICANT CHANGE UP (ref 0.5–1.3)
EGFR: 55 ML/MIN/1.73M2 — LOW
EOSINOPHIL # BLD AUTO: 0.58 K/UL — HIGH (ref 0–0.5)
EOSINOPHIL NFR BLD AUTO: 11.4 % — HIGH (ref 0–6)
GLUCOSE SERPL-MCNC: 95 MG/DL — SIGNIFICANT CHANGE UP (ref 70–99)
HCT VFR BLD CALC: 30.5 % — LOW (ref 39–50)
HGB BLD-MCNC: 9.3 G/DL — LOW (ref 13–17)
IMM GRANULOCYTES NFR BLD AUTO: 0.2 % — SIGNIFICANT CHANGE UP (ref 0–0.9)
INR BLD: 1.15 RATIO — SIGNIFICANT CHANGE UP (ref 0.85–1.16)
LYMPHOCYTES # BLD AUTO: 1.81 K/UL — SIGNIFICANT CHANGE UP (ref 1–3.3)
LYMPHOCYTES # BLD AUTO: 35.5 % — SIGNIFICANT CHANGE UP (ref 13–44)
MCHC RBC-ENTMCNC: 28 PG — SIGNIFICANT CHANGE UP (ref 27–34)
MCHC RBC-ENTMCNC: 30.5 G/DL — LOW (ref 32–36)
MCV RBC AUTO: 91.9 FL — SIGNIFICANT CHANGE UP (ref 80–100)
MONOCYTES # BLD AUTO: 0.39 K/UL — SIGNIFICANT CHANGE UP (ref 0–0.9)
MONOCYTES NFR BLD AUTO: 7.6 % — SIGNIFICANT CHANGE UP (ref 2–14)
NEUTROPHILS # BLD AUTO: 2.3 K/UL — SIGNIFICANT CHANGE UP (ref 1.8–7.4)
NEUTROPHILS NFR BLD AUTO: 45.1 % — SIGNIFICANT CHANGE UP (ref 43–77)
NRBC # BLD: 0 /100 WBCS — SIGNIFICANT CHANGE UP (ref 0–0)
PLATELET # BLD AUTO: 198 K/UL — SIGNIFICANT CHANGE UP (ref 150–400)
POTASSIUM SERPL-MCNC: 4.4 MMOL/L — SIGNIFICANT CHANGE UP (ref 3.5–5.3)
POTASSIUM SERPL-SCNC: 4.4 MMOL/L — SIGNIFICANT CHANGE UP (ref 3.5–5.3)
PROTHROM AB SERPL-ACNC: 13.4 SEC — SIGNIFICANT CHANGE UP (ref 9.9–13.4)
RBC # BLD: 3.32 M/UL — LOW (ref 4.2–5.8)
RBC # FLD: 15.8 % — HIGH (ref 10.3–14.5)
SODIUM SERPL-SCNC: 144 MMOL/L — SIGNIFICANT CHANGE UP (ref 135–145)
WBC # BLD: 5.1 K/UL — SIGNIFICANT CHANGE UP (ref 3.8–10.5)
WBC # FLD AUTO: 5.1 K/UL — SIGNIFICANT CHANGE UP (ref 3.8–10.5)

## 2024-11-19 PROCEDURE — 99232 SBSQ HOSP IP/OBS MODERATE 35: CPT

## 2024-11-19 PROCEDURE — 11042 DBRDMT SUBQ TIS 1ST 20SQCM/<: CPT

## 2024-11-19 PROCEDURE — 11045 DBRDMT SUBQ TISS EACH ADDL: CPT

## 2024-11-19 PROCEDURE — 88304 TISSUE EXAM BY PATHOLOGIST: CPT | Mod: 26

## 2024-11-19 DEVICE — BONE FILLER BIOCOMPOSITE STIMULAN RAPID CURE 10CC: Type: IMPLANTABLE DEVICE | Status: FUNCTIONAL

## 2024-11-19 RX ORDER — 0.9 % SODIUM CHLORIDE 0.9 %
1000 INTRAVENOUS SOLUTION INTRAVENOUS
Refills: 0 | Status: DISCONTINUED | OUTPATIENT
Start: 2024-11-19 | End: 2024-12-10

## 2024-11-19 RX ORDER — OXYCODONE HYDROCHLORIDE 30 MG/1
5 TABLET ORAL ONCE
Refills: 0 | Status: DISCONTINUED | OUTPATIENT
Start: 2024-11-19 | End: 2024-11-19

## 2024-11-19 RX ORDER — MEROPENEM 500 MG/1
1000 INJECTION, POWDER, FOR SOLUTION INTRAVENOUS EVERY 8 HOURS
Refills: 0 | Status: COMPLETED | OUTPATIENT
Start: 2024-11-19 | End: 2024-11-29

## 2024-11-19 RX ORDER — 0.9 % SODIUM CHLORIDE 0.9 %
1000 INTRAVENOUS SOLUTION INTRAVENOUS
Refills: 0 | Status: DISCONTINUED | OUTPATIENT
Start: 2024-11-19 | End: 2024-11-19

## 2024-11-19 RX ORDER — 0.9 % SODIUM CHLORIDE 0.9 %
1000 INTRAVENOUS SOLUTION INTRAVENOUS
Refills: 0 | Status: DISCONTINUED | OUTPATIENT
Start: 2024-11-19 | End: 2024-11-21

## 2024-11-19 RX ORDER — ACETAMINOPHEN 500MG 500 MG/1
1000 TABLET, COATED ORAL ONCE
Refills: 0 | Status: COMPLETED | OUTPATIENT
Start: 2024-11-19 | End: 2024-11-19

## 2024-11-19 RX ORDER — SACCHAROMYCES BOULARDII 250 MG
250 CAPSULE ORAL
Refills: 0 | Status: DISCONTINUED | OUTPATIENT
Start: 2024-11-19 | End: 2024-12-10

## 2024-11-19 RX ORDER — GLUCAGON INJECTION, SOLUTION 0.5 MG/.1ML
1 INJECTION, SOLUTION SUBCUTANEOUS ONCE
Refills: 0 | Status: DISCONTINUED | OUTPATIENT
Start: 2024-11-19 | End: 2024-12-10

## 2024-11-19 RX ORDER — FAMOTIDINE 20 MG/1
20 TABLET, FILM COATED ORAL DAILY
Refills: 0 | Status: DISCONTINUED | OUTPATIENT
Start: 2024-11-19 | End: 2024-12-10

## 2024-11-19 RX ORDER — CLOPIDOGREL 75 MG/1
75 TABLET, FILM COATED ORAL DAILY
Refills: 0 | Status: DISCONTINUED | OUTPATIENT
Start: 2024-11-20 | End: 2024-12-10

## 2024-11-19 RX ORDER — MAGNESIUM, ALUMINUM HYDROXIDE 200-225/5
30 SUSPENSION, ORAL (FINAL DOSE FORM) ORAL EVERY 4 HOURS
Refills: 0 | Status: DISCONTINUED | OUTPATIENT
Start: 2024-11-19 | End: 2024-12-10

## 2024-11-19 RX ORDER — ACETAMINOPHEN 500MG 500 MG/1
650 TABLET, COATED ORAL EVERY 6 HOURS
Refills: 0 | Status: DISCONTINUED | OUTPATIENT
Start: 2024-11-19 | End: 2024-12-10

## 2024-11-19 RX ORDER — INSULIN GLARGINE 100 [IU]/ML
8 INJECTION, SOLUTION SUBCUTANEOUS AT BEDTIME
Refills: 0 | Status: DISCONTINUED | OUTPATIENT
Start: 2024-11-19 | End: 2024-12-10

## 2024-11-19 RX ORDER — SODIUM CHLORIDE 9 MG/ML
10 INJECTION, SOLUTION INTRAMUSCULAR; INTRAVENOUS; SUBCUTANEOUS
Refills: 0 | Status: DISCONTINUED | OUTPATIENT
Start: 2024-11-19 | End: 2024-12-10

## 2024-11-19 RX ORDER — INSULIN GLARGINE 100 [IU]/ML
8 INJECTION, SOLUTION SUBCUTANEOUS AT BEDTIME
Refills: 0 | Status: DISCONTINUED | OUTPATIENT
Start: 2024-11-19 | End: 2024-11-19

## 2024-11-19 RX ORDER — ACETAMINOPHEN, DIPHENHYDRAMINE HCL, PHENYLEPHRINE HCL 325; 25; 5 MG/1; MG/1; MG/1
3 TABLET ORAL AT BEDTIME
Refills: 0 | Status: DISCONTINUED | OUTPATIENT
Start: 2024-11-19 | End: 2024-12-10

## 2024-11-19 RX ORDER — METOPROLOL TARTRATE 100 MG/1
25 TABLET, FILM COATED ORAL
Refills: 0 | Status: DISCONTINUED | OUTPATIENT
Start: 2024-11-19 | End: 2024-12-10

## 2024-11-19 RX ORDER — HYDROMORPHONE HYDROCHLORIDE 2 MG/1
0.5 TABLET ORAL
Refills: 0 | Status: DISCONTINUED | OUTPATIENT
Start: 2024-11-19 | End: 2024-11-19

## 2024-11-19 RX ORDER — CHLORHEXIDINE GLUCONATE 1.2 MG/ML
1 RINSE ORAL DAILY
Refills: 0 | Status: DISCONTINUED | OUTPATIENT
Start: 2024-11-19 | End: 2024-12-10

## 2024-11-19 RX ORDER — ONDANSETRON HYDROCHLORIDE 4 MG/1
4 TABLET, FILM COATED ORAL ONCE
Refills: 0 | Status: DISCONTINUED | OUTPATIENT
Start: 2024-11-19 | End: 2024-11-19

## 2024-11-19 RX ORDER — TAMSULOSIN HYDROCHLORIDE 0.4 MG/1
0.4 CAPSULE ORAL AT BEDTIME
Refills: 0 | Status: DISCONTINUED | OUTPATIENT
Start: 2024-11-19 | End: 2024-12-10

## 2024-11-19 RX ADMIN — CHLORHEXIDINE GLUCONATE 1 APPLICATION(S): 1.2 RINSE ORAL at 06:26

## 2024-11-19 RX ADMIN — Medication 60 MILLILITER(S): at 08:53

## 2024-11-19 RX ADMIN — Medication 60 MILLILITER(S): at 18:14

## 2024-11-19 RX ADMIN — METOPROLOL TARTRATE 25 MILLIGRAM(S): 100 TABLET, FILM COATED ORAL at 06:21

## 2024-11-19 RX ADMIN — ACETAMINOPHEN 500MG 400 MILLIGRAM(S): 500 TABLET, COATED ORAL at 18:13

## 2024-11-19 RX ADMIN — ACETAMINOPHEN, DIPHENHYDRAMINE HCL, PHENYLEPHRINE HCL 3 MILLIGRAM(S): 325; 25; 5 TABLET ORAL at 21:26

## 2024-11-19 RX ADMIN — Medication 1000 MICROGRAM(S): at 13:10

## 2024-11-19 RX ADMIN — MEROPENEM 100 MILLIGRAM(S): 500 INJECTION, POWDER, FOR SOLUTION INTRAVENOUS at 06:22

## 2024-11-19 RX ADMIN — Medication 75 MILLILITER(S): at 17:21

## 2024-11-19 RX ADMIN — MEROPENEM 100 MILLIGRAM(S): 500 INJECTION, POWDER, FOR SOLUTION INTRAVENOUS at 21:25

## 2024-11-19 RX ADMIN — MEROPENEM 100 MILLIGRAM(S): 500 INJECTION, POWDER, FOR SOLUTION INTRAVENOUS at 13:10

## 2024-11-19 RX ADMIN — METOPROLOL TARTRATE 25 MILLIGRAM(S): 100 TABLET, FILM COATED ORAL at 18:09

## 2024-11-19 RX ADMIN — NYSTATIN 1 APPLICATION(S): 100000 POWDER TOPICAL at 06:25

## 2024-11-19 RX ADMIN — Medication 250 MILLIGRAM(S): at 06:21

## 2024-11-19 RX ADMIN — FAMOTIDINE 20 MILLIGRAM(S): 20 TABLET, FILM COATED ORAL at 13:10

## 2024-11-19 RX ADMIN — TAMSULOSIN HYDROCHLORIDE 0.4 MILLIGRAM(S): 0.4 CAPSULE ORAL at 21:26

## 2024-11-19 RX ADMIN — INSULIN GLARGINE 8 UNIT(S): 100 INJECTION, SOLUTION SUBCUTANEOUS at 21:29

## 2024-11-19 RX ADMIN — Medication 1: at 21:30

## 2024-11-19 RX ADMIN — Medication 80 MILLIGRAM(S): at 21:26

## 2024-11-19 NOTE — CONSULT NOTE ADULT - PROBLEM SELECTOR PROBLEM 1
Regarding: 5 F [place]- Stomach is hurting but no fever or other symptoms  ----- Message from Mitchell NAYLOR sent at 11/18/2024 11:56 PM CST -----  Patient Name: Karol Govea    Specialist or PCP Name:    Moiz Torres        Symptoms: 5 F [place]- Stomach is hurting but no fever or other symptoms     Pregnant (females aged 13-60. If Yes, how long?) : no    Call Back # : [phone]    Which State are you currently located in?: [place]     Name of Clinic Site / Acct# : [facility] - [address] - Primary Care    Call arrived during: After Hours     Gastrointestinal hemorrhage with melena

## 2024-11-19 NOTE — PROGRESS NOTE ADULT - SUBJECTIVE AND OBJECTIVE BOX
Chief Complaint: Heel ulcer    Interval Events: No events overnight.    Review of Systems:  General: No fevers, chills, weight gain  Skin: No rashes, color changes  Cardiovascular: No chest pain, orthopnea  Respiratory: No shortness of breath, cough  Gastrointestinal: No nausea, abdominal pain  Genitourinary: No incontinence, pain with urination  Musculoskeletal: No pain, swelling, decreased range of motion  Neurological: No headache, weakness  Psychiatric: No depression, anxiety  Endocrine: No weight gain, increased thirst  All other systems are comprehensively negative.    Physical Exam:  Vital Signs Last 24 Hrs  T(C): 36.9 (19 Nov 2024 11:01), Max: 36.9 (19 Nov 2024 11:01)  T(F): 98.5 (19 Nov 2024 11:01), Max: 98.5 (19 Nov 2024 11:01)  HR: 81 (19 Nov 2024 11:01) (79 - 88)  BP: 131/79 (19 Nov 2024 11:01) (110/69 - 131/79)  BP(mean): --  RR: 17 (19 Nov 2024 11:01) (17 - 17)  SpO2: 98% (19 Nov 2024 11:01) (93% - 98%)  Parameters below as of 19 Nov 2024 11:01  Patient On (Oxygen Delivery Method): room air  General: NAD  HEENT: MMM  Neck: No JVD, no carotid bruit  Lungs: CTAB  CV: RRR, nl S1/S2, no M/R/G  Abdomen: S/NT/ND, +BS  Extremities: +Lymphedema, no cyanosis  Neuro: AAOx3, non-focal  Skin: No rash    Labs:    11-19    144  |  107  |  35[H]  ----------------------------<  95  4.4   |  33[H]  |  1.30    Ca    9.4      19 Nov 2024 08:20                          9.3    5.10  )-----------( 198      ( 19 Nov 2024 08:20 )             30.5

## 2024-11-19 NOTE — PROGRESS NOTE ADULT - ASSESSMENT
84yo M with a PMH of HTN, HLD, Type 2 DM, CKD3a, hx of GI bleed, COPD, SANTO, BPH, CAD s/p PCI, PAD, multiple myeloma, lymphedema who presents with b/l heel ulcers and suspected OM planned for bilateral foot surgery with podiatry, Gabriel Deshpande, on 10/29 for his suspected bilateral heel osteomyelitis now s/p partial calcanectomy.        Problem/Plan - 1:  ·  Problem: Osteomyelitis of foot.   ·  Plan: - Bilateral heel osteomyelitis per outpatient w/up and admitted with a plan for OR with podiatry, Dr. Rios on 10/29  - Wound Cx (10/23): Proteus mirabilis, Pseudomonas aeruginosa, ESBL Klebsiella pneumoniae, Enterococcus faecalis  - Blood Cx NG  - s/p partial calcanectomy on 10/29.    - f/up OR cultures +rare pseudomonas aeruginosa.  Pathology: bilateral heel acute and chronic osteomyelitis.  - Tylenol PRN for mild pain.  - Consulted ID Dr. Casillas, recs appreciated   - PICC line placed.  Continue meropenem till Dec 9.   - Continue Florastor  - afebrile, no leukocytosis, dressing/wound care as per podiatry recommendation   - cardiology Dr. Rock consulted, recs appreciated.  - Patient scheduled for right heel debridement on 11/19.  Pt. without s/s of ACS, decompensated CHF, unstable arrythmia, or symptomatic aortic stenosis.  Tolerated recent partial calcanectomy.  No absolute contraindication from medical perspective to proceed with planned debridement.       Problem/Plan - 2:  ·  Problem: DM (diabetes mellitus).   ·  Plan: - type 2 DM on insulin  - continue decreased lantus dose 8units QHS   - continue low-dose lispro corrective ISS     Problem/Plan - 3:  ·  Problem: HTN (hypertension).   ·  Plan: Chronic   - continue metoprolol 25mg BID.     Problem/Plan - 4:  ·  Problem: HLD (hyperlipidemia).   ·  Plan: Chronic   - Continue atorvastatin 80mg QD.     Problem/Plan - 5:  ·  Problem: CAD (coronary artery disease).   ·  Plan: CAD sp stents x4  - Held ASA and Plavix on 10/29; Restarted on ASA and Plavix on 10/30  - c/w lipitor  - cardiology eval noted      Problem/Plan - 6:  ·  Problem: Benign prostatic hyperplasia with lower urinary tract symptoms, symptom details unspecified.   ·  Plan: Chronic  -Continue Flomax.     Problem/Plan - 7:  ·  Problem: Multiple myeloma.   ·  Plan: Chronic  - Chemotherapy held      Problem/Plan - 8:   DVT ppx: holding lovenox 40mg SQ daily for debridement will resume tomorrow , patient remain NWB     #loose stool:  likely related to IV abx.  Resolved s/p imodium x 1.

## 2024-11-19 NOTE — PRE-OP CHECKLIST - SITE MARKED BY SURGEON
Is the patient currently in the state of [place]? YES    Visit mode:VIDEO    If the visit is dropped, the patient can be reconnected by: VIDEO VISIT: Text to cell phone:   No relevant phone numbers on file.       Will anyone else be joining the visit? NO  (If patient encounters technical issues they should call [phone]    How would you like to obtain your AVS? MyChart    Are changes needed to the allergy or medication list? No    Are refills needed on medications prescribed by this physician? NO    Reason for visit: Follow Up    Barbara OCONNELL      
yes
needs

## 2024-11-19 NOTE — SOCIAL WORK PROGRESS NOTE - NSSWPROGRESSNOTE_GEN_ALL_CORE
NADYA continues to follow pt for transitional planning. Pt scheduled for right heel debridement today, NADYA left message for Starla at [facility] [phone] for follow up re: eval for placement upon DC.

## 2024-11-19 NOTE — PRE-OP CHECKLIST - TEMPERATURE IN FAHRENHEIT (DEGREES F)
Please start using the nystatin suspension right away.  Continue this for at least 2 weeks.  Sterilize all of the bottles and pumping equipment in between uses.  
98.1
97.9

## 2024-11-19 NOTE — PROGRESS NOTE ADULT - SUBJECTIVE AND OBJECTIVE BOX
CHIEF COMPLAINT/INTERVAL HISTORY:  Pt. seen and evaluated for bilateral heel osteomyelitis.  Pt. is in no distress.  Reports feeling well.  Awaiting debridement of right foot.  Tolerating IV antibiotics.  Denies any CP or SOB.      REVIEW OF SYSTEMS:  No fever, CP, SOB, or abdominal pain     Vital Signs Last 24 Hrs  T(C): 36.5 (19 Nov 2024 04:54), Max: 36.7 (18 Nov 2024 22:10)  T(F): 97.7 (19 Nov 2024 04:54), Max: 98 (18 Nov 2024 22:10)  HR: 79 (19 Nov 2024 04:54) (79 - 88)  BP: 110/69 (19 Nov 2024 04:54) (110/69 - 130/78)  BP(mean): --  RR: 17 (19 Nov 2024 04:54) (17 - 17)  SpO2: 95% (19 Nov 2024 04:54) (93% - 96%)    Parameters below as of 19 Nov 2024 04:54  Patient On (Oxygen Delivery Method): room air        PHYSICAL EXAM:  GENERAL: NAD  HEENT: EOMI, hearing normal, conjunctiva and sclera clear  Chest: CTA bilaterally, no wheezing  CV: S1S2, RRR,   GI: soft, +BS, NT/ND  Musculoskeletal: improved LE edema, ACE wrapped dressing over bilateral feet  Psychiatric: affect nL, mood nL  Skin: warm and dry    LABS:                        9.3    5.10  )-----------( 198      ( 19 Nov 2024 08:20 )             30.5     11-19    144  |  107  |  35[H]  ----------------------------<  95  4.4   |  33[H]  |  1.30    Ca    9.4      19 Nov 2024 08:20      PT/INR - ( 19 Nov 2024 08:20 )   PT: 13.4 sec;   INR: 1.15 ratio         PTT - ( 19 Nov 2024 08:20 )  PTT:36.0 sec  Urinalysis Basic - ( 19 Nov 2024 08:20 )    Color: x / Appearance: x / SG: x / pH: x  Gluc: 95 mg/dL / Ketone: x  / Bili: x / Urobili: x   Blood: x / Protein: x / Nitrite: x   Leuk Esterase: x / RBC: x / WBC x   Sq Epi: x / Non Sq Epi: x / Bacteria: x

## 2024-11-19 NOTE — BRIEF OPERATIVE NOTE - NSICDXBRIEFPOSTOP_GEN_ALL_CORE_FT
POST-OP DIAGNOSIS:  Osteomyelitis of ankle or foot, acute 29-Oct-2024 12:59:03  Jona Mason  
POST-OP DIAGNOSIS:  Osteomyelitis of ankle or foot, acute 29-Oct-2024 12:59:03  Jona Mason

## 2024-11-19 NOTE — BRIEF OPERATIVE NOTE - NSICDXBRIEFPROCEDURE_GEN_ALL_CORE_FT
PROCEDURES:  Debridement, soft tissue and bone, heel region of diabetic foot 19-Nov-2024 16:57:18  Jona Mason

## 2024-11-20 LAB
ANION GAP SERPL CALC-SCNC: 4 MMOL/L — LOW (ref 5–17)
BASOPHILS # BLD AUTO: 0.02 K/UL — SIGNIFICANT CHANGE UP (ref 0–0.2)
BASOPHILS NFR BLD AUTO: 0.3 % — SIGNIFICANT CHANGE UP (ref 0–2)
BUN SERPL-MCNC: 35 MG/DL — HIGH (ref 7–23)
CALCIUM SERPL-MCNC: 9.1 MG/DL — SIGNIFICANT CHANGE UP (ref 8.5–10.1)
CHLORIDE SERPL-SCNC: 104 MMOL/L — SIGNIFICANT CHANGE UP (ref 96–108)
CO2 SERPL-SCNC: 33 MMOL/L — HIGH (ref 22–31)
CREAT SERPL-MCNC: 1.1 MG/DL — SIGNIFICANT CHANGE UP (ref 0.5–1.3)
EGFR: 67 ML/MIN/1.73M2 — SIGNIFICANT CHANGE UP
EOSINOPHIL # BLD AUTO: 0.56 K/UL — HIGH (ref 0–0.5)
EOSINOPHIL NFR BLD AUTO: 9.4 % — HIGH (ref 0–6)
GLUCOSE SERPL-MCNC: 137 MG/DL — HIGH (ref 70–99)
GRAM STN FLD: SIGNIFICANT CHANGE UP
HCT VFR BLD CALC: 28.9 % — LOW (ref 39–50)
HGB BLD-MCNC: 9.1 G/DL — LOW (ref 13–17)
IMM GRANULOCYTES NFR BLD AUTO: 0.2 % — SIGNIFICANT CHANGE UP (ref 0–0.9)
LYMPHOCYTES # BLD AUTO: 2 K/UL — SIGNIFICANT CHANGE UP (ref 1–3.3)
LYMPHOCYTES # BLD AUTO: 33.4 % — SIGNIFICANT CHANGE UP (ref 13–44)
MAGNESIUM SERPL-MCNC: 1.7 MG/DL — SIGNIFICANT CHANGE UP (ref 1.6–2.6)
MCHC RBC-ENTMCNC: 28.6 PG — SIGNIFICANT CHANGE UP (ref 27–34)
MCHC RBC-ENTMCNC: 31.5 G/DL — LOW (ref 32–36)
MCV RBC AUTO: 90.9 FL — SIGNIFICANT CHANGE UP (ref 80–100)
MONOCYTES # BLD AUTO: 0.33 K/UL — SIGNIFICANT CHANGE UP (ref 0–0.9)
MONOCYTES NFR BLD AUTO: 5.5 % — SIGNIFICANT CHANGE UP (ref 2–14)
NEUTROPHILS # BLD AUTO: 3.06 K/UL — SIGNIFICANT CHANGE UP (ref 1.8–7.4)
NEUTROPHILS NFR BLD AUTO: 51.2 % — SIGNIFICANT CHANGE UP (ref 43–77)
NRBC # BLD: 0 /100 WBCS — SIGNIFICANT CHANGE UP (ref 0–0)
PHOSPHATE SERPL-MCNC: 3.6 MG/DL — SIGNIFICANT CHANGE UP (ref 2.5–4.5)
PLATELET # BLD AUTO: 205 K/UL — SIGNIFICANT CHANGE UP (ref 150–400)
POTASSIUM SERPL-MCNC: 4.3 MMOL/L — SIGNIFICANT CHANGE UP (ref 3.5–5.3)
POTASSIUM SERPL-SCNC: 4.3 MMOL/L — SIGNIFICANT CHANGE UP (ref 3.5–5.3)
RBC # BLD: 3.18 M/UL — LOW (ref 4.2–5.8)
RBC # FLD: 15.7 % — HIGH (ref 10.3–14.5)
SODIUM SERPL-SCNC: 141 MMOL/L — SIGNIFICANT CHANGE UP (ref 135–145)
SPECIMEN SOURCE: SIGNIFICANT CHANGE UP
WBC # BLD: 5.98 K/UL — SIGNIFICANT CHANGE UP (ref 3.8–10.5)
WBC # FLD AUTO: 5.98 K/UL — SIGNIFICANT CHANGE UP (ref 3.8–10.5)

## 2024-11-20 PROCEDURE — 99232 SBSQ HOSP IP/OBS MODERATE 35: CPT

## 2024-11-20 PROCEDURE — 88304 TISSUE EXAM BY PATHOLOGIST: CPT

## 2024-11-20 PROCEDURE — 11042 DBRDMT SUBQ TIS 1ST 20SQCM/<: CPT

## 2024-11-20 PROCEDURE — 73630 X-RAY EXAM OF FOOT: CPT

## 2024-11-20 PROCEDURE — 87077 CULTURE AEROBIC IDENTIFY: CPT

## 2024-11-20 PROCEDURE — 99233 SBSQ HOSP IP/OBS HIGH 50: CPT

## 2024-11-20 PROCEDURE — 87075 CULTR BACTERIA EXCEPT BLOOD: CPT

## 2024-11-20 PROCEDURE — 87070 CULTURE OTHR SPECIMN AEROBIC: CPT

## 2024-11-20 PROCEDURE — 73718 MRI LOWER EXTREMITY W/O DYE: CPT

## 2024-11-20 PROCEDURE — 11045 DBRDMT SUBQ TISS EACH ADDL: CPT

## 2024-11-20 PROCEDURE — G0463: CPT

## 2024-11-20 PROCEDURE — 87186 SC STD MICRODIL/AGAR DIL: CPT

## 2024-11-20 RX ADMIN — CLOPIDOGREL 75 MILLIGRAM(S): 75 TABLET, FILM COATED ORAL at 12:21

## 2024-11-20 RX ADMIN — MEROPENEM 100 MILLIGRAM(S): 500 INJECTION, POWDER, FOR SOLUTION INTRAVENOUS at 21:55

## 2024-11-20 RX ADMIN — Medication 1: at 12:25

## 2024-11-20 RX ADMIN — Medication 80 MILLIGRAM(S): at 21:56

## 2024-11-20 RX ADMIN — TAMSULOSIN HYDROCHLORIDE 0.4 MILLIGRAM(S): 0.4 CAPSULE ORAL at 21:58

## 2024-11-20 RX ADMIN — INSULIN GLARGINE 8 UNIT(S): 100 INJECTION, SOLUTION SUBCUTANEOUS at 21:57

## 2024-11-20 RX ADMIN — MEROPENEM 100 MILLIGRAM(S): 500 INJECTION, POWDER, FOR SOLUTION INTRAVENOUS at 12:22

## 2024-11-20 RX ADMIN — METOPROLOL TARTRATE 25 MILLIGRAM(S): 100 TABLET, FILM COATED ORAL at 17:42

## 2024-11-20 RX ADMIN — METOPROLOL TARTRATE 25 MILLIGRAM(S): 100 TABLET, FILM COATED ORAL at 05:51

## 2024-11-20 RX ADMIN — FAMOTIDINE 20 MILLIGRAM(S): 20 TABLET, FILM COATED ORAL at 12:21

## 2024-11-20 RX ADMIN — Medication 250 MILLIGRAM(S): at 05:51

## 2024-11-20 RX ADMIN — MEROPENEM 100 MILLIGRAM(S): 500 INJECTION, POWDER, FOR SOLUTION INTRAVENOUS at 05:51

## 2024-11-20 RX ADMIN — Medication 250 MILLIGRAM(S): at 17:41

## 2024-11-20 RX ADMIN — CHLORHEXIDINE GLUCONATE 1 APPLICATION(S): 1.2 RINSE ORAL at 11:09

## 2024-11-20 RX ADMIN — Medication 81 MILLIGRAM(S): at 12:21

## 2024-11-20 RX ADMIN — Medication 400 MILLIGRAM(S): at 17:41

## 2024-11-20 RX ADMIN — Medication 1000 MICROGRAM(S): at 12:20

## 2024-11-20 RX ADMIN — Medication 1: at 17:42

## 2024-11-20 RX ADMIN — ACETAMINOPHEN, DIPHENHYDRAMINE HCL, PHENYLEPHRINE HCL 3 MILLIGRAM(S): 325; 25; 5 TABLET ORAL at 21:56

## 2024-11-20 NOTE — PROGRESS NOTE ADULT - SUBJECTIVE AND OBJECTIVE BOX
SUBJECTIVE:  84y year old Male seen at [facility] 3WES 365 W1 s/p bilateral heel wound debridement with partial calcanectomy (DOS: 10/29/24) and right heel wound debridement (DOS: 11/19/24). Patient relates no overnight events and states that they are doing well at this time. Denies any fever, chills, nausea, vomiting, chest pain, shortness of breath, or calf pain at this time.    Allergies    No Known Allergies    Intolerances        MEDICATIONS  (STANDING):  aspirin  chewable 81 milliGRAM(s) Oral daily  atorvastatin 80 milliGRAM(s) Oral at bedtime  chlorhexidine 2% Cloths 1 Application(s) Topical daily  clopidogrel Tablet 75 milliGRAM(s) Oral daily  cyanocobalamin 1000 MICROGram(s) Oral daily  dextrose 5% + sodium chloride 0.45%. 1000 milliLiter(s) (60 mL/Hr) IV Continuous <Continuous>  dextrose 5%. 1000 milliLiter(s) (50 mL/Hr) IV Continuous <Continuous>  dextrose 5%. 1000 milliLiter(s) (100 mL/Hr) IV Continuous <Continuous>  dextrose 50% Injectable 25 Gram(s) IV Push once  dextrose 50% Injectable 12.5 Gram(s) IV Push once  dextrose 50% Injectable 25 Gram(s) IV Push once  famotidine    Tablet 20 milliGRAM(s) Oral daily  glucagon  Injectable 1 milliGRAM(s) IntraMuscular once  insulin glargine Injectable (LANTUS) 8 Unit(s) SubCutaneous at bedtime  insulin lispro (ADMELOG) corrective regimen sliding scale   SubCutaneous three times a day before meals  melatonin 3 milliGRAM(s) Oral at bedtime  meropenem  IVPB 1000 milliGRAM(s) IV Intermittent every 8 hours  metoprolol tartrate 25 milliGRAM(s) Oral two times a day  saccharomyces boulardii 250 milliGRAM(s) Oral two times a day  tamsulosin 0.4 milliGRAM(s) Oral at bedtime    MEDICATIONS  (PRN):  acetaminophen     Tablet .. 650 milliGRAM(s) Oral every 6 hours PRN Temp greater or equal to 38C (100.4F), Mild Pain (1 - 3)  aluminum hydroxide/magnesium hydroxide/simethicone Suspension 30 milliLiter(s) Oral every 4 hours PRN Dyspepsia  dextrose Oral Gel 15 Gram(s) Oral once PRN Blood Glucose LESS THAN 70 milliGRAM(s)/deciliter  sodium chloride 0.9% lock flush 10 milliLiter(s) IV Push every 1 hour PRN Pre/post blood products, medications, blood draw, and to maintain line patency      Vital Signs Last 24 Hrs  T(C): 36.6 (20 Nov 2024 04:37), Max: 36.6 (19 Nov 2024 15:40)  T(F): 97.8 (20 Nov 2024 04:37), Max: 97.9 (19 Nov 2024 15:40)  HR: 74 (20 Nov 2024 04:37) (74 - 90)  BP: 146/82 (20 Nov 2024 04:37) (120/72 - 159/67)  BP(mean): --  RR: 17 (20 Nov 2024 04:37) (13 - 17)  SpO2: 95% (20 Nov 2024 04:37) (93% - 97%)    Parameters below as of 20 Nov 2024 04:37  Patient On (Oxygen Delivery Method): room air        PHYSICAL EXAM:  Vascular: DP & PT palpable bilaterally, Capillary refill 3 seconds  Neurological: Light touch sensation not intact bilaterally  Musculoskeletal: 4/5 strength in all quadrants bilaterally, AJ & STJ ROM intact  Dermatological: Bilateral heel wounds down to skin, subcutaneous tissue, fat, bone (left healing well, right healthy with granular wound base), no proximal streaking, no fluctuance, no malodor, no signs of acute infection at this time.                          9.1    5.98  )-----------( 205      ( 20 Nov 2024 09:39 )             28.9       11-20    141  |  104  |  35[H]  ----------------------------<  137[H]  4.3   |  33[H]  |  1.10    Ca    9.1      20 Nov 2024 09:39  Phos  3.6     11-20  Mg     1.7     11-20        PT/INR - ( 19 Nov 2024 08:20 )   PT: 13.4 sec;   INR: 1.15 ratio         PTT - ( 19 Nov 2024 08:20 )  PTT:36.0 sec

## 2024-11-20 NOTE — SOCIAL WORK PROGRESS NOTE - NSSWPROGRESSNOTE_GEN_ALL_CORE
As per request from pt/dtr, clinicals sent to [facility] assisted living [facility] for review faxed to [phone] tel: [phone]. Message left for Ambar Hall at [phone] as pt/dtr requesting clinicals be sent there as well. SW awaiting call back for fax number. [phone] form placed in pt's chart for MD to complete. SW to continue to follow for transitional planning for pt.

## 2024-11-20 NOTE — PROGRESS NOTE ADULT - PROBLEM SELECTOR PLAN 1
Patient examined and evaluated this time.  Patient advised regarding etiology of his symptoms and treatment plan.  Continue with local care and offloading to bilateral heels at this time.  Continue with antibiotics as per infectious disease recommendations.    Wound Care Instructions:  1. Remove bilateral heel wound dressings.  2. Dress wound with aquacel Ag, and dry, sterile dressing, and ACE bandage.  3. Change dressings three times a week (MWF or TThSat) and monitor for any signs of infection.  4. Patient is to [facility] up [facility] within 1 week upon discharge.

## 2024-11-20 NOTE — CASE MANAGEMENT PROGRESS NOTE - NSCMPROGRESSNOTE_GEN_ALL_CORE
SW team following for dispo plan-S/P-Debridement, soft tissue and bone, heel region of diabetic foot 19-Nov-2024. On HARVEY until 12/9/24. Plan is for new nursing home placement once HARVEY are completed.

## 2024-11-20 NOTE — PROGRESS NOTE ADULT - SUBJECTIVE AND OBJECTIVE BOX
Patient is a 84y old  Male who presents with a chief complaint of Bilateral Heel Osteomyelitis (20 Nov 2024 11:17)      Subjective:  INTERVAL HPI/OVERNIGHT EVENTS: Patient seen and examined at bedside.  Patient c/o mild R foot pain     MEDICATIONS  (STANDING):  aspirin  chewable 81 milliGRAM(s) Oral daily  atorvastatin 80 milliGRAM(s) Oral at bedtime  chlorhexidine 2% Cloths 1 Application(s) Topical daily  clopidogrel Tablet 75 milliGRAM(s) Oral daily  cyanocobalamin 1000 MICROGram(s) Oral daily  dextrose 5% + sodium chloride 0.45%. 1000 milliLiter(s) (60 mL/Hr) IV Continuous <Continuous>  dextrose 5%. 1000 milliLiter(s) (50 mL/Hr) IV Continuous <Continuous>  dextrose 5%. 1000 milliLiter(s) (100 mL/Hr) IV Continuous <Continuous>  dextrose 50% Injectable 25 Gram(s) IV Push once  dextrose 50% Injectable 12.5 Gram(s) IV Push once  dextrose 50% Injectable 25 Gram(s) IV Push once  famotidine    Tablet 20 milliGRAM(s) Oral daily  glucagon  Injectable 1 milliGRAM(s) IntraMuscular once  insulin glargine Injectable (LANTUS) 8 Unit(s) SubCutaneous at bedtime  insulin lispro (ADMELOG) corrective regimen sliding scale   SubCutaneous three times a day before meals  magnesium oxide 400 milliGRAM(s) Oral once  melatonin 3 milliGRAM(s) Oral at bedtime  meropenem  IVPB 1000 milliGRAM(s) IV Intermittent every 8 hours  metoprolol tartrate 25 milliGRAM(s) Oral two times a day  saccharomyces boulardii 250 milliGRAM(s) Oral two times a day  tamsulosin 0.4 milliGRAM(s) Oral at bedtime    MEDICATIONS  (PRN):  acetaminophen     Tablet .. 650 milliGRAM(s) Oral every 6 hours PRN Temp greater or equal to 38C (100.4F), Mild Pain (1 - 3)  aluminum hydroxide/magnesium hydroxide/simethicone Suspension 30 milliLiter(s) Oral every 4 hours PRN Dyspepsia  dextrose Oral Gel 15 Gram(s) Oral once PRN Blood Glucose LESS THAN 70 milliGRAM(s)/deciliter  sodium chloride 0.9% lock flush 10 milliLiter(s) IV Push every 1 hour PRN Pre/post blood products, medications, blood draw, and to maintain line patency      Allergies    No Known Allergies    Intolerances        REVIEW OF SYSTEMS:  CONSTITUTIONAL: No fever or chills  HEENT:  No headache, no sore throat  RESPIRATORY: No cough or shortness of breath  CARDIOVASCULAR: No chest pain or palpitations  GASTROINTESTINAL: No abd pain, nausea, vomiting, or diarrhea      Objective:  Vital Signs Last 24 Hrs  T(C): 36.5 (20 Nov 2024 11:39), Max: 36.6 (19 Nov 2024 17:27)  T(F): 97.7 (20 Nov 2024 11:39), Max: 97.9 (19 Nov 2024 17:27)  HR: 78 (20 Nov 2024 11:39) (74 - 90)  BP: 142/73 (20 Nov 2024 11:39) (120/72 - 152/79)  BP(mean): --  RR: 17 (20 Nov 2024 11:39) (14 - 17)  SpO2: 99% (20 Nov 2024 11:39) (93% - 99%)    Parameters below as of 20 Nov 2024 11:39  Patient On (Oxygen Delivery Method): room air        GENERAL: NAD, lying in bed comfortably  HEAD:  Normocephalic  EYES:  conjunctiva and sclera clear  ENT: Moist mucous membranes  NECK: Supple  CHEST/LUNG: Clear to auscultation bilaterally; No rales or rhonchi; no wheezing. Unlabored respirations  HEART: Regular rate and rhythm; S1S2+  ABDOMEN: Bowel sounds present; Soft, Nontender, Nondistended.   EXTREMITIES:  + distal Peripheral Pulses;  No cyanosis, or edema, B/L LE in clean dressing   NERVOUS SYSTEM:  Alert & Oriented X3;  No gross focal deficits   MSK: moves all extremities  SKIN: No rashes     LABS:                        9.1    5.98  )-----------( 205      ( 20 Nov 2024 09:39 )             28.9     20 Nov 2024 09:39    141    |  104    |  35     ----------------------------<  137    4.3     |  33     |  1.10     Ca    9.1        20 Nov 2024 09:39  Phos  3.6       20 Nov 2024 09:39  Mg     1.7       20 Nov 2024 09:39      PT/INR - ( 19 Nov 2024 08:20 )   PT: 13.4 sec;   INR: 1.15 ratio         PTT - ( 19 Nov 2024 08:20 )  PTT:36.0 sec  Urinalysis Basic - ( 20 Nov 2024 09:39 )    Color: x / Appearance: x / SG: x / pH: x  Gluc: 137 mg/dL / Ketone: x  / Bili: x / Urobili: x   Blood: x / Protein: x / Nitrite: x   Leuk Esterase: x / RBC: x / WBC x   Sq Epi: x / Non Sq Epi: x / Bacteria: x      CAPILLARY BLOOD GLUCOSE      POCT Blood Glucose.: 161 mg/dL (20 Nov 2024 12:17)  POCT Blood Glucose.: 100 mg/dL (20 Nov 2024 08:22)  POCT Blood Glucose.: 197 mg/dL (19 Nov 2024 21:27)  POCT Blood Glucose.: 87 mg/dL (19 Nov 2024 17:05)        Culture - Tissue with Gram Stain (collected 11-19-24 @ 16:50)  Source: Tissue  Gram Stain (11-20-24 @ 13:49):    No polymorphonuclear cells seen per low power field    No organisms seen per oil power field        RADIOLOGY & ADDITIONAL TESTS:    Personally reviewed.     Consultant(s) Notes Reviewed:  [x] YES  [ ] NO    Plan of care discussed with patient ; all questions answered

## 2024-11-20 NOTE — PROGRESS NOTE ADULT - SUBJECTIVE AND OBJECTIVE BOX
Chief Complaint: Heel ulcer    Interval Events: No events overnight.    Review of Systems:  General: No fevers, chills, weight gain  Skin: No rashes, color changes  Cardiovascular: No chest pain, orthopnea  Respiratory: No shortness of breath, cough  Gastrointestinal: No nausea, abdominal pain  Genitourinary: No incontinence, pain with urination  Musculoskeletal: No pain, swelling, decreased range of motion  Neurological: No headache, weakness  Psychiatric: No depression, anxiety  Endocrine: No weight gain, increased thirst  All other systems are comprehensively negative.    Physical Exam:  Vital Signs Last 24 Hrs  T(C): 36.6 (20 Nov 2024 04:37), Max: 36.9 (19 Nov 2024 11:01)  T(F): 97.8 (20 Nov 2024 04:37), Max: 98.5 (19 Nov 2024 11:01)  HR: 74 (20 Nov 2024 04:37) (74 - 90)  BP: 146/82 (20 Nov 2024 04:37) (120/72 - 159/67)  BP(mean): --  RR: 17 (20 Nov 2024 04:37) (13 - 17)  SpO2: 95% (20 Nov 2024 04:37) (93% - 98%)  Parameters below as of 20 Nov 2024 04:37  Patient On (Oxygen Delivery Method): room air  General: NAD  HEENT: MMM  Neck: No JVD, no carotid bruit  Lungs: CTAB  CV: RRR, nl S1/S2, no M/R/G  Abdomen: S/NT/ND, +BS  Extremities: +Lymphedema, no cyanosis  Neuro: AAOx3, non-focal  Skin: No rash    Labs:    11-19    144  |  107  |  35[H]  ----------------------------<  95  4.4   |  33[H]  |  1.30    Ca    9.4      19 Nov 2024 08:20                          9.3    5.10  )-----------( 198      ( 19 Nov 2024 08:20 )             30.5

## 2024-11-20 NOTE — PROGRESS NOTE ADULT - ASSESSMENT
82yo M with a PMH of HTN, HLD, Type 2 DM, CKD3a, hx of GI bleed, COPD, SANTO, BPH, CAD s/p PCI, PAD, multiple myeloma, lymphedema who presents with b/l heel ulcers and suspected OM planned for bilateral foot surgery with podiatry, Gabriel Deshpande, on 10/29 for his suspected bilateral heel osteomyelitis now s/p partial calcanectomy.        Problem/Plan - 1:  ·  Problem: Osteomyelitis of foot.   ·  Plan: - Bilateral heel osteomyelitis per outpatient w/up and admitted with a plan for OR with podiatry, Dr. Rios on 10/29  - Wound Cx (10/23): Proteus mirabilis, Pseudomonas aeruginosa, ESBL Klebsiella pneumoniae, Enterococcus faecalis  - Blood Cx NG  - s/p partial calcanectomy on 10/29.    - f/up OR cultures +rare pseudomonas aeruginosa.  Pathology: bilateral heel acute and chronic osteomyelitis.  - Tylenol PRN for mild pain.  - Consulted ID Dr. Casillas, recs appreciated   - PICC line placed.  Continue meropenem till Dec 9.   - Continue Florastor  - afebrile, no leukocytosis, dressing/wound care as per podiatry recommendation   - cardiology Dr. Rock consulted, recs appreciated.  - Patient scheduled for right heel debridement on 11/19. doing well post op   blood test level stable       Problem/Plan - 2:  ·  Problem: DM (diabetes mellitus).   ·  Plan: - type 2 DM on insulin  - continue decreased lantus dose 8units QHS   - continue low-dose lispro corrective ISS     Problem/Plan - 3:  ·  Problem: HTN (hypertension).   ·  Plan: Chronic   - continue metoprolol 25mg BID.     Problem/Plan - 4:  ·  Problem: HLD (hyperlipidemia).   ·  Plan: Chronic   - Continue atorvastatin 80mg QD.     Problem/Plan - 5:  ·  Problem: CAD (coronary artery disease).   ·  Plan: CAD sp stents x4  - Held ASA and Plavix on 10/29; Restarted on ASA and Plavix on 10/30  - c/w lipitor  - cardiology eval noted      Problem/Plan - 6:  ·  Problem: Benign prostatic hyperplasia with lower urinary tract symptoms, symptom details unspecified.   ·  Plan: Chronic  -Continue Flomax.     Problem/Plan - 7:  ·  Problem: Multiple myeloma.   ·  Plan: Chronic  - Chemotherapy held      Problem/Plan - 8:   DVT ppx: holding lovenox 40mg SQ daily for debridement will resume tomorrow , patient remain NWB     #loose stool: no recurrent loose stool today

## 2024-11-21 LAB — GRAM STN FLD: ABNORMAL

## 2024-11-21 PROCEDURE — 99232 SBSQ HOSP IP/OBS MODERATE 35: CPT

## 2024-11-21 RX ORDER — NYSTATIN 100000 [USP'U]/G
1 POWDER TOPICAL
Refills: 0 | Status: DISCONTINUED | OUTPATIENT
Start: 2024-11-21 | End: 2024-12-10

## 2024-11-21 RX ORDER — ENOXAPARIN SODIUM 30 MG/.3ML
40 INJECTION SUBCUTANEOUS EVERY 24 HOURS
Refills: 0 | Status: DISCONTINUED | OUTPATIENT
Start: 2024-11-21 | End: 2024-12-10

## 2024-11-21 RX ADMIN — ACETAMINOPHEN, DIPHENHYDRAMINE HCL, PHENYLEPHRINE HCL 3 MILLIGRAM(S): 325; 25; 5 TABLET ORAL at 22:23

## 2024-11-21 RX ADMIN — Medication 1000 MICROGRAM(S): at 12:05

## 2024-11-21 RX ADMIN — MEROPENEM 100 MILLIGRAM(S): 500 INJECTION, POWDER, FOR SOLUTION INTRAVENOUS at 05:38

## 2024-11-21 RX ADMIN — NYSTATIN 1 APPLICATION(S): 100000 POWDER TOPICAL at 17:30

## 2024-11-21 RX ADMIN — TAMSULOSIN HYDROCHLORIDE 0.4 MILLIGRAM(S): 0.4 CAPSULE ORAL at 22:23

## 2024-11-21 RX ADMIN — METOPROLOL TARTRATE 25 MILLIGRAM(S): 100 TABLET, FILM COATED ORAL at 17:39

## 2024-11-21 RX ADMIN — Medication 250 MILLIGRAM(S): at 05:39

## 2024-11-21 RX ADMIN — Medication 80 MILLIGRAM(S): at 22:23

## 2024-11-21 RX ADMIN — MEROPENEM 100 MILLIGRAM(S): 500 INJECTION, POWDER, FOR SOLUTION INTRAVENOUS at 22:23

## 2024-11-21 RX ADMIN — ENOXAPARIN SODIUM 40 MILLIGRAM(S): 30 INJECTION SUBCUTANEOUS at 22:43

## 2024-11-21 RX ADMIN — FAMOTIDINE 20 MILLIGRAM(S): 20 TABLET, FILM COATED ORAL at 12:05

## 2024-11-21 RX ADMIN — CLOPIDOGREL 75 MILLIGRAM(S): 75 TABLET, FILM COATED ORAL at 12:06

## 2024-11-21 RX ADMIN — Medication 81 MILLIGRAM(S): at 12:05

## 2024-11-21 RX ADMIN — METOPROLOL TARTRATE 25 MILLIGRAM(S): 100 TABLET, FILM COATED ORAL at 05:39

## 2024-11-21 RX ADMIN — Medication 250 MILLIGRAM(S): at 17:39

## 2024-11-21 RX ADMIN — CHLORHEXIDINE GLUCONATE 1 APPLICATION(S): 1.2 RINSE ORAL at 11:36

## 2024-11-21 RX ADMIN — INSULIN GLARGINE 8 UNIT(S): 100 INJECTION, SOLUTION SUBCUTANEOUS at 22:25

## 2024-11-21 RX ADMIN — MEROPENEM 100 MILLIGRAM(S): 500 INJECTION, POWDER, FOR SOLUTION INTRAVENOUS at 13:43

## 2024-11-21 NOTE — PROGRESS NOTE ADULT - ASSESSMENT
84yo M with a PMH of HTN, HLD, Type 2 DM, CKD3a, hx of GI bleed, COPD, SANTO, BPH, CAD s/p PCI, PAD, multiple myeloma, lymphedema who presents with b/l heel ulcers and suspected OM planned for bilateral foot surgery with podiatry, Gabriel Deshpande, on 10/29 for his suspected bilateral heel osteomyelitis now s/p partial calcanectomy.        Problem/Plan - 1:  ·  Problem: Osteomyelitis of foot.   ·  Plan: - Bilateral heel osteomyelitis per outpatient w/up and admitted with a plan for OR with podiatry, Dr. Rios on 10/29  - Wound Cx (10/23): Proteus mirabilis, Pseudomonas aeruginosa, ESBL Klebsiella pneumoniae, Enterococcus faecalis  - Blood Cx NG  - s/p partial calcanectomy on 10/29.    - f/up OR cultures +rare pseudomonas aeruginosa.  Pathology: bilateral heel acute and chronic osteomyelitis.  - Tylenol PRN for mild pain.  - Consulted ID Dr. Casillas, recs appreciated   - PICC line placed.  Continue meropenem till Dec 9.   - Continue Florastor  - afebrile, no leukocytosis, dressing/wound care as per podiatry recommendation   - cardiology Dr. Rock consulted, recs appreciated.  - Patient scheduled for right heel debridement on 11/19. doing well post op   blood test level stable       Problem/Plan - 2:  ·  Problem: DM (diabetes mellitus).   ·  Plan: - type 2 DM on insulin  - continue decreased lantus dose 8units QHS   - continue low-dose lispro corrective ISS  - controlled      Problem/Plan - 3:  ·  Problem: HTN (hypertension).   ·  Plan: Chronic   - continue metoprolol 25mg BID.     Problem/Plan - 4:  ·  Problem: HLD (hyperlipidemia).   ·  Plan: Chronic   - Continue atorvastatin 80mg QD.     Problem/Plan - 5:  ·  Problem: CAD (coronary artery disease).   ·  Plan: CAD sp stents x4  - Held ASA and Plavix on 10/29; Restarted on ASA and Plavix on 10/30  - c/w lipitor  - cardiology eval noted      Problem/Plan - 6:  ·  Problem: Benign prostatic hyperplasia with lower urinary tract symptoms, symptom details unspecified.   ·  Plan: Chronic  -Continue Flomax.     Problem/Plan - 7:  ·  Problem: Multiple myeloma.   ·  Plan: Chronic  - Chemotherapy held      Problem/Plan - 8:   DVT ppx: resume lovenox

## 2024-11-21 NOTE — PROGRESS NOTE ADULT - SUBJECTIVE AND OBJECTIVE BOX
Chief Complaint: Heel ulcer    Interval Events: No events overnight.    Review of Systems:  General: No fevers, chills, weight gain  Skin: No rashes, color changes  Cardiovascular: No chest pain, orthopnea  Respiratory: No shortness of breath, cough  Gastrointestinal: No nausea, abdominal pain  Genitourinary: No incontinence, pain with urination  Musculoskeletal: No pain, swelling, decreased range of motion  Neurological: No headache, weakness  Psychiatric: No depression, anxiety  Endocrine: No weight gain, increased thirst  All other systems are comprehensively negative.    Physical Exam:  Vital Signs Last 24 Hrs  T(C): 36.4 (21 Nov 2024 05:39), Max: 36.6 (20 Nov 2024 21:03)  T(F): 97.5 (21 Nov 2024 05:39), Max: 97.9 (20 Nov 2024 21:03)  HR: 79 (21 Nov 2024 05:39) (78 - 93)  BP: 129/79 (21 Nov 2024 05:39) (129/79 - 142/73)  BP(mean): --  RR: 17 (21 Nov 2024 05:39) (17 - 17)  SpO2: 94% (21 Nov 2024 05:39) (94% - 99%)  Parameters below as of 21 Nov 2024 05:39  Patient On (Oxygen Delivery Method): room air  General: NAD  HEENT: MMM  Neck: No JVD, no carotid bruit  Lungs: CTAB  CV: RRR, nl S1/S2, no M/R/G  Abdomen: S/NT/ND, +BS  Extremities: +Lymphedema, no cyanosis  Neuro: AAOx3, non-focal  Skin: No rash    Labs:    11-20    141  |  104  |  35[H]  ----------------------------<  137[H]  4.3   |  33[H]  |  1.10    Ca    9.1      20 Nov 2024 09:39  Phos  3.6     11-20  Mg     1.7     11-20                          9.1    5.98  )-----------( 205      ( 20 Nov 2024 09:39 )             28.9

## 2024-11-21 NOTE — PROGRESS NOTE ADULT - SUBJECTIVE AND OBJECTIVE BOX
Patient is a 84y old  Male who presents with a chief complaint of Bilateral Heel Osteomyelitis (20 Nov 2024 16:57)      Subjective:  INTERVAL HPI/OVERNIGHT EVENTS: Patient seen and examined at bedside.  Patient has no complaints at this time.   MEDICATIONS  (STANDING):  aspirin  chewable 81 milliGRAM(s) Oral daily  atorvastatin 80 milliGRAM(s) Oral at bedtime  chlorhexidine 2% Cloths 1 Application(s) Topical daily  clopidogrel Tablet 75 milliGRAM(s) Oral daily  cyanocobalamin 1000 MICROGram(s) Oral daily  dextrose 5% + sodium chloride 0.45%. 1000 milliLiter(s) (60 mL/Hr) IV Continuous <Continuous>  dextrose 5%. 1000 milliLiter(s) (50 mL/Hr) IV Continuous <Continuous>  dextrose 5%. 1000 milliLiter(s) (100 mL/Hr) IV Continuous <Continuous>  dextrose 50% Injectable 25 Gram(s) IV Push once  dextrose 50% Injectable 25 Gram(s) IV Push once  dextrose 50% Injectable 12.5 Gram(s) IV Push once  famotidine    Tablet 20 milliGRAM(s) Oral daily  glucagon  Injectable 1 milliGRAM(s) IntraMuscular once  insulin glargine Injectable (LANTUS) 8 Unit(s) SubCutaneous at bedtime  insulin lispro (ADMELOG) corrective regimen sliding scale   SubCutaneous three times a day before meals  melatonin 3 milliGRAM(s) Oral at bedtime  meropenem  IVPB 1000 milliGRAM(s) IV Intermittent every 8 hours  metoprolol tartrate 25 milliGRAM(s) Oral two times a day  saccharomyces boulardii 250 milliGRAM(s) Oral two times a day  tamsulosin 0.4 milliGRAM(s) Oral at bedtime    MEDICATIONS  (PRN):  acetaminophen     Tablet .. 650 milliGRAM(s) Oral every 6 hours PRN Temp greater or equal to 38C (100.4F), Mild Pain (1 - 3)  aluminum hydroxide/magnesium hydroxide/simethicone Suspension 30 milliLiter(s) Oral every 4 hours PRN Dyspepsia  dextrose Oral Gel 15 Gram(s) Oral once PRN Blood Glucose LESS THAN 70 milliGRAM(s)/deciliter  sodium chloride 0.9% lock flush 10 milliLiter(s) IV Push every 1 hour PRN Pre/post blood products, medications, blood draw, and to maintain line patency      Allergies    No Known Allergies    Intolerances        REVIEW OF SYSTEMS:  CONSTITUTIONAL: No fever or chills  HEENT:  No headache, no sore throat  RESPIRATORY: No cough or shortness of breath  CARDIOVASCULAR: No chest pain or palpitations  GASTROINTESTINAL: No abd pain, nausea, vomiting, or diarrhea      Objective:  Vital Signs Last 24 Hrs  T(C): 36.7 (21 Nov 2024 11:07), Max: 36.7 (21 Nov 2024 11:07)  T(F): 98 (21 Nov 2024 11:07), Max: 98 (21 Nov 2024 11:07)  HR: 86 (21 Nov 2024 11:07) (79 - 93)  BP: 152/78 (21 Nov 2024 11:07) (129/79 - 152/78)  BP(mean): --  RR: 17 (21 Nov 2024 11:07) (17 - 17)  SpO2: 100% (21 Nov 2024 11:07) (94% - 100%)    Parameters below as of 21 Nov 2024 11:07  Patient On (Oxygen Delivery Method): room air        GENERAL: NAD, lying in bed comfortably  HEAD:  Normocephalic  EYES:  conjunctiva and sclera clear  ENT: Moist mucous membranes  NECK: Supple  CHEST/LUNG: Clear to auscultation bilaterally; No rales or rhonchi; no wheezing. Unlabored respirations  HEART: Regular rate and rhythm; S1S2+  ABDOMEN: Bowel sounds present; Soft, Nontender, Nondistended.   EXTREMITIES: b/l feet in dressing   NERVOUS SYSTEM:  Alert & Oriented X3;  No gross focal deficits   MSK: moves all extremities  SKIN: No rashes     LABS:      Ca    9.1        20 Nov 2024 09:39        Urinalysis Basic - ( 20 Nov 2024 09:39 )    Color: x / Appearance: x / SG: x / pH: x  Gluc: 137 mg/dL / Ketone: x  / Bili: x / Urobili: x   Blood: x / Protein: x / Nitrite: x   Leuk Esterase: x / RBC: x / WBC x   Sq Epi: x / Non Sq Epi: x / Bacteria: x      CAPILLARY BLOOD GLUCOSE      POCT Blood Glucose.: 118 mg/dL (21 Nov 2024 12:08)  POCT Blood Glucose.: 113 mg/dL (21 Nov 2024 08:13)  POCT Blood Glucose.: 216 mg/dL (20 Nov 2024 21:51)  POCT Blood Glucose.: 170 mg/dL (20 Nov 2024 17:08)        Culture - Tissue with Gram Stain (collected 11-19-24 @ 16:50)  Source: Tissue  Gram Stain (11-20-24 @ 13:49):    No polymorphonuclear cells seen per low power field    No organisms seen per oil power field        RADIOLOGY & ADDITIONAL TESTS:    Personally reviewed.     Consultant(s) Notes Reviewed:  [x] YES  [ ] NO    Plan of care discussed with patient ; all questions answered

## 2024-11-22 PROCEDURE — 99232 SBSQ HOSP IP/OBS MODERATE 35: CPT

## 2024-11-22 RX ADMIN — ACETAMINOPHEN, DIPHENHYDRAMINE HCL, PHENYLEPHRINE HCL 3 MILLIGRAM(S): 325; 25; 5 TABLET ORAL at 22:02

## 2024-11-22 RX ADMIN — Medication 250 MILLIGRAM(S): at 17:21

## 2024-11-22 RX ADMIN — Medication 1000 MICROGRAM(S): at 12:48

## 2024-11-22 RX ADMIN — TAMSULOSIN HYDROCHLORIDE 0.4 MILLIGRAM(S): 0.4 CAPSULE ORAL at 22:02

## 2024-11-22 RX ADMIN — FAMOTIDINE 20 MILLIGRAM(S): 20 TABLET, FILM COATED ORAL at 12:48

## 2024-11-22 RX ADMIN — Medication 80 MILLIGRAM(S): at 22:01

## 2024-11-22 RX ADMIN — Medication 81 MILLIGRAM(S): at 12:48

## 2024-11-22 RX ADMIN — MEROPENEM 100 MILLIGRAM(S): 500 INJECTION, POWDER, FOR SOLUTION INTRAVENOUS at 22:01

## 2024-11-22 RX ADMIN — METOPROLOL TARTRATE 25 MILLIGRAM(S): 100 TABLET, FILM COATED ORAL at 17:20

## 2024-11-22 RX ADMIN — MEROPENEM 100 MILLIGRAM(S): 500 INJECTION, POWDER, FOR SOLUTION INTRAVENOUS at 15:21

## 2024-11-22 RX ADMIN — INSULIN GLARGINE 8 UNIT(S): 100 INJECTION, SOLUTION SUBCUTANEOUS at 22:01

## 2024-11-22 RX ADMIN — Medication 250 MILLIGRAM(S): at 05:48

## 2024-11-22 RX ADMIN — CLOPIDOGREL 75 MILLIGRAM(S): 75 TABLET, FILM COATED ORAL at 12:49

## 2024-11-22 RX ADMIN — ENOXAPARIN SODIUM 40 MILLIGRAM(S): 30 INJECTION SUBCUTANEOUS at 22:01

## 2024-11-22 RX ADMIN — METOPROLOL TARTRATE 25 MILLIGRAM(S): 100 TABLET, FILM COATED ORAL at 05:48

## 2024-11-22 RX ADMIN — CHLORHEXIDINE GLUCONATE 1 APPLICATION(S): 1.2 RINSE ORAL at 12:50

## 2024-11-22 RX ADMIN — NYSTATIN 1 APPLICATION(S): 100000 POWDER TOPICAL at 17:23

## 2024-11-22 RX ADMIN — MEROPENEM 100 MILLIGRAM(S): 500 INJECTION, POWDER, FOR SOLUTION INTRAVENOUS at 05:48

## 2024-11-22 RX ADMIN — Medication 1: at 12:34

## 2024-11-22 NOTE — PROGRESS NOTE ADULT - ASSESSMENT
82yo M with a PMH of HTN, HLD, Type 2 DM, CKD3a, hx of GI bleed, COPD, SANTO, BPH, CAD s/p PCI, PAD, multiple myeloma, lymphedema who presents with b/l heel ulcers and suspected OM planned for bilateral foot surgery with podiatry, Gabriel Deshpande, on 10/29 for his suspected bilateral heel osteomyelitis now s/p partial calcanectomy.        Problem/Plan - 1:  ·  Problem: Osteomyelitis of foot.   ·  Plan: - Bilateral heel osteomyelitis per outpatient w/up and admitted with a plan for OR with podiatry, Dr. Rios on 10/29  - Wound Cx (10/23): Proteus mirabilis, Pseudomonas aeruginosa, ESBL Klebsiella pneumoniae, Enterococcus faecalis  - Blood Cx NG  - s/p partial calcanectomy on 10/29.    - f/up OR cultures +rare pseudomonas aeruginosa.  Pathology: bilateral heel acute and chronic osteomyelitis.  - Tylenol PRN for mild pain.  - Consulted ID Dr. Casillas, recs appreciated   - PICC line placed.  Continue meropenem till Dec 9.   - Continue Florastor  - afebrile, no leukocytosis, dressing/wound care as per podiatry recommendation   - cardiology Dr. Rock consulted, recs appreciated.  - Patient scheduled for right heel debridement on 11/19. doing well post op , tissue culture 11/19 pseudomonas.   blood test level stable       Problem/Plan - 2:  ·  Problem: DM (diabetes mellitus).   ·  Plan: - type 2 DM on insulin  - continue decreased lantus dose 8units QHS   - continue low-dose lispro corrective ISS  - controlled      Problem/Plan - 3:  ·  Problem: HTN (hypertension).   ·  Plan: Chronic   - continue metoprolol 25mg BID.     Problem/Plan - 4:  ·  Problem: HLD (hyperlipidemia).   ·  Plan: Chronic   - Continue atorvastatin 80mg QD.     Problem/Plan - 5:  ·  Problem: CAD (coronary artery disease).   ·  Plan: CAD sp stents x4  - Held ASA and Plavix on 10/29; Restarted on ASA and Plavix on 10/30  - c/w lipitor  - cardiology eval noted      Problem/Plan - 6:  ·  Problem: Benign prostatic hyperplasia with lower urinary tract symptoms, symptom details unspecified.   ·  Plan: Chronic  -Continue Flomax.     Problem/Plan - 7:  ·  Problem: Multiple myeloma.   ·  Plan: Chronic  - Chemotherapy held      Problem/Plan - 8:   DVT ppx: resume lovenox

## 2024-11-22 NOTE — PROGRESS NOTE ADULT - SUBJECTIVE AND OBJECTIVE BOX
Patient is a 84y old  Male who presents with a chief complaint of Bilateral Heel Osteomyelitis (21 Nov 2024 14:35)      Subjective:  INTERVAL HPI/OVERNIGHT EVENTS: Patient seen and examined at bedside.  Patient has no complaints at this time.   MEDICATIONS  (STANDING):  aspirin  chewable 81 milliGRAM(s) Oral daily  atorvastatin 80 milliGRAM(s) Oral at bedtime  chlorhexidine 2% Cloths 1 Application(s) Topical daily  clopidogrel Tablet 75 milliGRAM(s) Oral daily  cyanocobalamin 1000 MICROGram(s) Oral daily  dextrose 5%. 1000 milliLiter(s) (50 mL/Hr) IV Continuous <Continuous>  dextrose 5%. 1000 milliLiter(s) (100 mL/Hr) IV Continuous <Continuous>  dextrose 50% Injectable 25 Gram(s) IV Push once  dextrose 50% Injectable 12.5 Gram(s) IV Push once  dextrose 50% Injectable 25 Gram(s) IV Push once  enoxaparin Injectable 40 milliGRAM(s) SubCutaneous every 24 hours  famotidine    Tablet 20 milliGRAM(s) Oral daily  glucagon  Injectable 1 milliGRAM(s) IntraMuscular once  insulin glargine Injectable (LANTUS) 8 Unit(s) SubCutaneous at bedtime  insulin lispro (ADMELOG) corrective regimen sliding scale   SubCutaneous three times a day before meals  melatonin 3 milliGRAM(s) Oral at bedtime  meropenem  IVPB 1000 milliGRAM(s) IV Intermittent every 8 hours  metoprolol tartrate 25 milliGRAM(s) Oral two times a day  nystatin Powder 1 Application(s) Topical two times a day  saccharomyces boulardii 250 milliGRAM(s) Oral two times a day  tamsulosin 0.4 milliGRAM(s) Oral at bedtime    MEDICATIONS  (PRN):  acetaminophen     Tablet .. 650 milliGRAM(s) Oral every 6 hours PRN Temp greater or equal to 38C (100.4F), Mild Pain (1 - 3)  aluminum hydroxide/magnesium hydroxide/simethicone Suspension 30 milliLiter(s) Oral every 4 hours PRN Dyspepsia  dextrose Oral Gel 15 Gram(s) Oral once PRN Blood Glucose LESS THAN 70 milliGRAM(s)/deciliter  sodium chloride 0.9% lock flush 10 milliLiter(s) IV Push every 1 hour PRN Pre/post blood products, medications, blood draw, and to maintain line patency      Allergies    No Known Allergies    Intolerances        REVIEW OF SYSTEMS:  CONSTITUTIONAL: No fever or chills  HEENT:  No headache, no sore throat  RESPIRATORY: No cough or shortness of breath  CARDIOVASCULAR: No chest pain or palpitations  GASTROINTESTINAL: No abd pain, nausea, vomiting, or diarrhea      Objective:  Vital Signs Last 24 Hrs  T(C): 36.4 (22 Nov 2024 11:02), Max: 36.6 (21 Nov 2024 21:21)  T(F): 97.6 (22 Nov 2024 11:02), Max: 97.9 (21 Nov 2024 21:21)  HR: 88 (22 Nov 2024 11:02) (84 - 88)  BP: 122/79 (22 Nov 2024 11:02) (122/79 - 124/69)  BP(mean): --  RR: 17 (22 Nov 2024 11:02) (17 - 17)  SpO2: 100% (22 Nov 2024 11:02) (96% - 100%)    Parameters below as of 22 Nov 2024 11:02  Patient On (Oxygen Delivery Method): room air        GENERAL: NAD, lying in bed comfortably  HEAD:  Normocephalic  EYES:  conjunctiva and sclera clear  ENT: Moist mucous membranes  NECK: Supple  CHEST/LUNG: Clear to auscultation bilaterally; No rales or rhonchi; no wheezing. Unlabored respirations  HEART: Regular rate and rhythm; S1S2+  ABDOMEN: Bowel sounds present; Soft, Nontender, Nondistended.   EXTREMITIES:  + distal Peripheral Pulses;  trace  edema B/L LE, feet in dressing, toes normal color   NERVOUS SYSTEM:  Alert & Oriented X3;  No gross focal deficits   MSK: moves all extremities  SKIN: No rashes     LABS:              CAPILLARY BLOOD GLUCOSE      POCT Blood Glucose.: 155 mg/dL (22 Nov 2024 12:25)  POCT Blood Glucose.: 114 mg/dL (22 Nov 2024 08:01)  POCT Blood Glucose.: 184 mg/dL (21 Nov 2024 22:19)  POCT Blood Glucose.: 111 mg/dL (21 Nov 2024 17:25)        Culture - Tissue with Gram Stain (collected 11-19-24 @ 16:50)  Source: Tissue  Gram Stain (11-21-24 @ 14:49):    No polymorphonuclear cells seen per low power field    No organisms seen per oil power field  Preliminary Report (11-21-24 @ 14:49):    Few Pseudomonas aeruginosa        RADIOLOGY & ADDITIONAL TESTS:    Personally reviewed.     Consultant(s) Notes Reviewed:  [x] YES  [ ] NO    Plan of care discussed with patient ; all questions answered

## 2024-11-22 NOTE — CHART NOTE - NSCHARTNOTEFT_GEN_A_CORE
Nutrition follow up ( chart reviewed, events noted) Brief hx:      Factors impacting intake: [ ] none [ ] nausea  [ ] vomiting [ ] diarrhea [ ] constipation  [ ]chewing problems [ ] swallowing issues  [ ] other:     Diet Prescription: Diet, Regular:   Consistent Carbohydrate {Evening Snack}  DASH/TLC {Sodium & Cholesterol Restricted} (11-19-24 @ 17:09)    Intake:     Current Weight: Weight (kg): 131.5 (11-19 @ 15:40)  % Weight Change    Pertinent Medications: MEDICATIONS  (STANDING):  aspirin  chewable 81 milliGRAM(s) Oral daily  atorvastatin 80 milliGRAM(s) Oral at bedtime  chlorhexidine 2% Cloths 1 Application(s) Topical daily  clopidogrel Tablet 75 milliGRAM(s) Oral daily  cyanocobalamin 1000 MICROGram(s) Oral daily  dextrose 5%. 1000 milliLiter(s) (50 mL/Hr) IV Continuous <Continuous>  dextrose 5%. 1000 milliLiter(s) (100 mL/Hr) IV Continuous <Continuous>  dextrose 50% Injectable 25 Gram(s) IV Push once  dextrose 50% Injectable 12.5 Gram(s) IV Push once  dextrose 50% Injectable 25 Gram(s) IV Push once  enoxaparin Injectable 40 milliGRAM(s) SubCutaneous every 24 hours  famotidine    Tablet 20 milliGRAM(s) Oral daily  glucagon  Injectable 1 milliGRAM(s) IntraMuscular once  insulin glargine Injectable (LANTUS) 8 Unit(s) SubCutaneous at bedtime  insulin lispro (ADMELOG) corrective regimen sliding scale   SubCutaneous three times a day before meals  melatonin 3 milliGRAM(s) Oral at bedtime  meropenem  IVPB 1000 milliGRAM(s) IV Intermittent every 8 hours  metoprolol tartrate 25 milliGRAM(s) Oral two times a day  nystatin Powder 1 Application(s) Topical two times a day  saccharomyces boulardii 250 milliGRAM(s) Oral two times a day  tamsulosin 0.4 milliGRAM(s) Oral at bedtime    MEDICATIONS  (PRN):  acetaminophen     Tablet .. 650 milliGRAM(s) Oral every 6 hours PRN Temp greater or equal to 38C (100.4F), Mild Pain (1 - 3)  aluminum hydroxide/magnesium hydroxide/simethicone Suspension 30 milliLiter(s) Oral every 4 hours PRN Dyspepsia  dextrose Oral Gel 15 Gram(s) Oral once PRN Blood Glucose LESS THAN 70 milliGRAM(s)/deciliter  sodium chloride 0.9% lock flush 10 milliLiter(s) IV Push every 1 hour PRN Pre/post blood products, medications, blood draw, and to maintain line patency    Pertinent Labs: 11-20 Na141 mmol/L Glu 137 mg/dL[H] K+ 4.3 mmol/L Cr  1.10 mg/dL BUN 35 mg/dL[H] 11-20 Phos 3.6 mg/dL     CAPILLARY BLOOD GLUCOSE      POCT Blood Glucose.: 114 mg/dL (22 Nov 2024 08:01)  POCT Blood Glucose.: 184 mg/dL (21 Nov 2024 22:19)  POCT Blood Glucose.: 111 mg/dL (21 Nov 2024 17:25)  POCT Blood Glucose.: 118 mg/dL (21 Nov 2024 12:08)    Skin:     Estimated Needs:   [ ] no change since previous assessment  [ ] recalculated:     Previous Nutrition Diagnosis:   [ ] Inadequate Energy Intake [ ]Inadequate Oral Intake [ ] Excessive Energy Intake   [ ] Underweight [ ] Increased Nutrient Needs [ ] Overweight/Obesity   [ ] Altered GI Function [ ] Unintended Weight Loss [ ] Food & Nutrition Related Knowledge Deficit [ ] Malnutrition     Nutrition Diagnosis is [ ] ongoing  [ ] resolved [ ] not applicable     New Nutrition Diagnosis: [ ] not applicable       Interventions:   Recommend  [ ] Change Diet To:  [ ] Nutrition Supplement  [ ] Nutrition Support  [ ] Other:     Monitoring and Evaluation:   [ ] PO intake [ x ] Tolerance to diet prescription [ x ] weights [ x ] labs[ x ] follow up per protocol  [ ] other: Nutrition follow up ( chart reviewed, events noted) Brief hx: roseann: - Bilateral heel osteomyelitis per outpatient w/up and admitted with a plan for OR with podiatry, Dr. Rios on 10/29  - Wound Cx (10/23): Proteus mirabilis, Pseudomonas aeruginosa, ESBL Klebsiella pneumoniae, Enterococcus faecalis  - Blood Cx NG  - s/p partial calcanectomy on 10/29.    - f/up OR cultures +rare pseudomonas aeruginosa.  Pathology: bilateral heel acute and chronic osteomyelitis.  - Tylenol PRN for mild pain.  - Consulted ID Dr. Casillas, recs appreciated   - PICC line placed.  Continue meropenem till Dec 9.   - Continue Florastor  - afebrile, no leukocytosis, dressing/wound care as per podiatry recommendation   - cardiology Dr. Rock consulted, recs appreciated.  - Patient scheduled for right heel debridement on 11/19. doing well post op   blood test level stable       Problem/Plan - 2:  ·  Problem: DM (diabetes mellitus).   ·  Plan: - type 2 DM on insulin  - continue decreased lantus dose 8units QHS   - continue low-dose lispro corrective ISS  - controlled        Factors impacting intake: [ ] none [ ] nausea  [ ] vomiting [ ] diarrhea [ ] constipation  [ ]chewing problems [ ] swallowing issues  [ ] other:     Diet Prescription: Diet, Regular:   Consistent Carbohydrate {Evening Snack}  DASH/TLC {Sodium & Cholesterol Restricted} (11-19-24 @ 17:09)    Intake:     Current Weight: Weight (kg): 131.5 (11-19 @ 15:40)  % Weight Change    Pertinent Medications: MEDICATIONS  (STANDING):  aspirin  chewable 81 milliGRAM(s) Oral daily  atorvastatin 80 milliGRAM(s) Oral at bedtime  chlorhexidine 2% Cloths 1 Application(s) Topical daily  clopidogrel Tablet 75 milliGRAM(s) Oral daily  cyanocobalamin 1000 MICROGram(s) Oral daily  dextrose 5%. 1000 milliLiter(s) (50 mL/Hr) IV Continuous <Continuous>  dextrose 5%. 1000 milliLiter(s) (100 mL/Hr) IV Continuous <Continuous>  dextrose 50% Injectable 25 Gram(s) IV Push once  dextrose 50% Injectable 12.5 Gram(s) IV Push once  dextrose 50% Injectable 25 Gram(s) IV Push once  enoxaparin Injectable 40 milliGRAM(s) SubCutaneous every 24 hours  famotidine    Tablet 20 milliGRAM(s) Oral daily  glucagon  Injectable 1 milliGRAM(s) IntraMuscular once  insulin glargine Injectable (LANTUS) 8 Unit(s) SubCutaneous at bedtime  insulin lispro (ADMELOG) corrective regimen sliding scale   SubCutaneous three times a day before meals  melatonin 3 milliGRAM(s) Oral at bedtime  meropenem  IVPB 1000 milliGRAM(s) IV Intermittent every 8 hours  metoprolol tartrate 25 milliGRAM(s) Oral two times a day  nystatin Powder 1 Application(s) Topical two times a day  saccharomyces boulardii 250 milliGRAM(s) Oral two times a day  tamsulosin 0.4 milliGRAM(s) Oral at bedtime    MEDICATIONS  (PRN):  acetaminophen     Tablet .. 650 milliGRAM(s) Oral every 6 hours PRN Temp greater or equal to 38C (100.4F), Mild Pain (1 - 3)  aluminum hydroxide/magnesium hydroxide/simethicone Suspension 30 milliLiter(s) Oral every 4 hours PRN Dyspepsia  dextrose Oral Gel 15 Gram(s) Oral once PRN Blood Glucose LESS THAN 70 milliGRAM(s)/deciliter  sodium chloride 0.9% lock flush 10 milliLiter(s) IV Push every 1 hour PRN Pre/post blood products, medications, blood draw, and to maintain line patency    Pertinent Labs: 11-20 Na141 mmol/L Glu 137 mg/dL[H] K+ 4.3 mmol/L Cr  1.10 mg/dL BUN 35 mg/dL[H] 11-20 Phos 3.6 mg/dL     CAPILLARY BLOOD GLUCOSE      POCT Blood Glucose.: 114 mg/dL (22 Nov 2024 08:01)  POCT Blood Glucose.: 184 mg/dL (21 Nov 2024 22:19)  POCT Blood Glucose.: 111 mg/dL (21 Nov 2024 17:25)  POCT Blood Glucose.: 118 mg/dL (21 Nov 2024 12:08)    Skin:     Estimated Needs:   [ ] no change since previous assessment  [ ] recalculated:     Previous Nutrition Diagnosis:   [ ] Inadequate Energy Intake [ ]Inadequate Oral Intake [ ] Excessive Energy Intake   [ ] Underweight [ ] Increased Nutrient Needs [ ] Overweight/Obesity   [ ] Altered GI Function [ ] Unintended Weight Loss [ ] Food & Nutrition Related Knowledge Deficit [ ] Malnutrition     Nutrition Diagnosis is [ ] ongoing  [ ] resolved [ ] not applicable     New Nutrition Diagnosis: [ ] not applicable       Interventions:   Recommend  [ ] Change Diet To:  [ ] Nutrition Supplement  [ ] Nutrition Support  [ ] Other:     Monitoring and Evaluation:   [ ] PO intake [ x ] Tolerance to diet prescription [ x ] weights [ x ] labs[ x ] follow up per protocol  [ ] other: Nutrition follow up ( chart reviewed, events noted) Brief hx: 85 YO M with hx of DM , s/p partial calcanectomy on 10/29 admitted w/ bilateral heel osteomyelitis per outpatient w/up- PICC line placed continues  meropenem until  Dec 9.      Factors impacting intake: [X ] none [ ] nausea  [ ] vomiting [ ] diarrhea [ ] constipation  [ ]chewing problems [ ] swallowing issues  [ ] other:     Diet Prescription: Diet, Regular:   Consistent Carbohydrate {Evening Snack}  DASH/TLC {Sodium & Cholesterol Restricted} (11-19-24 @ 17:09)    Intake: >/=75% served    Current Weight: Weight (kg): 122 kg ( 11-22-24)                                             117.5 kg ( 10-28-24)            range since admission has been 117.5- 128.6 kg most likely  r/t scale accuracy              Pertinent Medications: MEDICATIONS  (STANDING):  aspirin  chewable 81 milliGRAM(s) Oral daily  atorvastatin 80 milliGRAM(s) Oral at bedtime  chlorhexidine 2% Cloths 1 Application(s) Topical daily  clopidogrel Tablet 75 milliGRAM(s) Oral daily  cyanocobalamin 1000 MICROGram(s) Oral daily  dextrose 5%. 1000 milliLiter(s) (50 mL/Hr) IV Continuous <Continuous>  dextrose 5%. 1000 milliLiter(s) (100 mL/Hr) IV Continuous <Continuous>  dextrose 50% Injectable 25 Gram(s) IV Push once  dextrose 50% Injectable 12.5 Gram(s) IV Push once  dextrose 50% Injectable 25 Gram(s) IV Push once  enoxaparin Injectable 40 milliGRAM(s) SubCutaneous every 24 hours  famotidine    Tablet 20 milliGRAM(s) Oral daily  glucagon  Injectable 1 milliGRAM(s) IntraMuscular once  insulin glargine Injectable (LANTUS) 8 Unit(s) SubCutaneous at bedtime  insulin lispro (ADMELOG) corrective regimen sliding scale   SubCutaneous three times a day before meals  melatonin 3 milliGRAM(s) Oral at bedtime  meropenem  IVPB 1000 milliGRAM(s) IV Intermittent every 8 hours  metoprolol tartrate 25 milliGRAM(s) Oral two times a day  nystatin Powder 1 Application(s) Topical two times a day  saccharomyces boulardii 250 milliGRAM(s) Oral two times a day  tamsulosin 0.4 milliGRAM(s) Oral at bedtime    MEDICATIONS  (PRN):  acetaminophen     Tablet .. 650 milliGRAM(s) Oral every 6 hours PRN Temp greater or equal to 38C (100.4F), Mild Pain (1 - 3)  aluminum hydroxide/magnesium hydroxide/simethicone Suspension 30 milliLiter(s) Oral every 4 hours PRN Dyspepsia  dextrose Oral Gel 15 Gram(s) Oral once PRN Blood Glucose LESS THAN 70 milliGRAM(s)/deciliter  sodium chloride 0.9% lock flush 10 milliLiter(s) IV Push every 1 hour PRN Pre/post blood products, medications, blood draw, and to maintain line patency    Pertinent Labs: 11-20 Na141 mmol/L Glu 137 mg/dL[H] K+ 4.3 mmol/L Cr  1.10 mg/dL BUN 35 mg/dL[H] 11-20 Phos 3.6 mg/dL     CAPILLARY BLOOD GLUCOSE  POCT Blood Glucose.: 114 mg/dL (22 Nov 2024 08:01)  POCT Blood Glucose.: 184 mg/dL (21 Nov 2024 22:19)  POCT Blood Glucose.: 111 mg/dL (21 Nov 2024 17:25)  POCT Blood Glucose.: 118 mg/dL (21 Nov 2024 12:08)    Skin: surgical incisions to bilat heels  edema: 4+ L and R foot    Estimated Needs:   [X ] no change since previous assessment  [ ] recalculated:     Previous Nutrition Diagnosis:   [ ] Inadequate Energy Intake [ ]Inadequate Oral Intake [ ] Excessive Energy Intake   [ ] Underweight [ ] Increased Nutrient Needs [ ] Overweight/Obesity   [ ] Altered GI Function [ ] Unintended Weight Loss [ ] Food & Nutrition Related Knowledge Deficit [ ] Malnutrition     Nutrition Diagnosis is [ ] ongoing  [ ] resolved [ ] not applicable     New Nutrition Diagnosis: [ ] not applicable       Interventions:   Recommend  [ ] Change Diet To:  [ ] Nutrition Supplement  [ ] Nutrition Support  [ ] Other:     Monitoring and Evaluation:   [ ] PO intake [ x ] Tolerance to diet prescription [ x ] weights [ x ] labs[ x ] follow up per protocol  [ ] other: Nutrition follow up ( chart reviewed, events noted) Brief hx: 83 YO M with hx of DM , s/p partial calcanectomy on 10/29 admitted w/ bilateral heel osteomyelitis per outpatient w/up- PICC line placed continues  meropenem until  Dec 9.      Factors impacting intake: [X ] none [ ] nausea  [ ] vomiting [ ] diarrhea [ ] constipation  [ ]chewing problems [ ] swallowing issues  [ ] other:     Diet Prescription: Diet, Regular:   Consistent Carbohydrate {Evening Snack}  DASH/TLC {Sodium & Cholesterol Restricted} (11-19-24 @ 17:09)    Intake: >/=75% served    Current Weight: Weight (kg): 122 kg ( 11-22-24)                                             117.5 kg ( 10-28-24)            range since admission has been 117.5- 128.6 kg most likely  r/t scale accuracy              Pertinent Medications: MEDICATIONS  (STANDING):  aspirin  chewable 81 milliGRAM(s) Oral daily  atorvastatin 80 milliGRAM(s) Oral at bedtime  chlorhexidine 2% Cloths 1 Application(s) Topical daily  clopidogrel Tablet 75 milliGRAM(s) Oral daily  cyanocobalamin 1000 MICROGram(s) Oral daily  dextrose 5%. 1000 milliLiter(s) (50 mL/Hr) IV Continuous <Continuous>  dextrose 5%. 1000 milliLiter(s) (100 mL/Hr) IV Continuous <Continuous>  dextrose 50% Injectable 25 Gram(s) IV Push once  dextrose 50% Injectable 12.5 Gram(s) IV Push once  dextrose 50% Injectable 25 Gram(s) IV Push once  enoxaparin Injectable 40 milliGRAM(s) SubCutaneous every 24 hours  famotidine    Tablet 20 milliGRAM(s) Oral daily  glucagon  Injectable 1 milliGRAM(s) IntraMuscular once  insulin glargine Injectable (LANTUS) 8 Unit(s) SubCutaneous at bedtime  insulin lispro (ADMELOG) corrective regimen sliding scale   SubCutaneous three times a day before meals  melatonin 3 milliGRAM(s) Oral at bedtime  meropenem  IVPB 1000 milliGRAM(s) IV Intermittent every 8 hours  metoprolol tartrate 25 milliGRAM(s) Oral two times a day  nystatin Powder 1 Application(s) Topical two times a day  saccharomyces boulardii 250 milliGRAM(s) Oral two times a day  tamsulosin 0.4 milliGRAM(s) Oral at bedtime    MEDICATIONS  (PRN):  acetaminophen     Tablet .. 650 milliGRAM(s) Oral every 6 hours PRN Temp greater or equal to 38C (100.4F), Mild Pain (1 - 3)  aluminum hydroxide/magnesium hydroxide/simethicone Suspension 30 milliLiter(s) Oral every 4 hours PRN Dyspepsia  dextrose Oral Gel 15 Gram(s) Oral once PRN Blood Glucose LESS THAN 70 milliGRAM(s)/deciliter  sodium chloride 0.9% lock flush 10 milliLiter(s) IV Push every 1 hour PRN Pre/post blood products, medications, blood draw, and to maintain line patency    Pertinent Labs: 11-20 Na141 mmol/L Glu 137 mg/dL[H] K+ 4.3 mmol/L Cr  1.10 mg/dL BUN 35 mg/dL[H] 11-20 Phos 3.6 mg/dL     CAPILLARY BLOOD GLUCOSE  POCT Blood Glucose.: 114 mg/dL (22 Nov 2024 08:01)  POCT Blood Glucose.: 184 mg/dL (21 Nov 2024 22:19)  POCT Blood Glucose.: 111 mg/dL (21 Nov 2024 17:25)  POCT Blood Glucose.: 118 mg/dL (21 Nov 2024 12:08)    Skin: surgical incisions to bilat heels  edema: 4+ L and R foot    Estimated Needs:   [ ] no change since previous assessment  [X ] recalculated: based on IBW of 196# /89 kg ( 23-28 kcals/kg) 2952-5905 kcals and ( 1.1-1.3 gm pro/kg IBW ) 100-115 gm protein     Previous Nutrition Diagnosis:   [ ] Inadequate Energy Intake [ ]Inadequate Oral Intake [ ] Excessive Energy Intake   [ ] Underweight [ ] Increased Nutrient Needs [ ] Overweight/Obesity   [ ] Altered GI Function [ ] Unintended Weight Loss [ ] Food & Nutrition Related Knowledge Deficit [ ] Malnutrition     Nutrition Diagnosis is [ ] ongoing  [ ] resolved [ ] not applicable     New Nutrition Diagnosis: [ ] not applicable       Interventions:   Recommend  [ ] Change Diet To:  [ ] Nutrition Supplement  [ ] Nutrition Support  [ ] Other:     Monitoring and Evaluation:   [ ] PO intake [ x ] Tolerance to diet prescription [ x ] weights [ x ] labs[ x ] follow up per protocol  [ ] other: Nutrition follow up ( chart reviewed, events noted) Brief hx: 83 YO M with hx of DM , s/p partial calcanectomy on 10/29 admitted w/ bilateral heel osteomyelitis per outpatient w/up- PICC line placed continues  meropenem until  Dec 9.      Factors impacting intake: [X ] none [ ] nausea  [ ] vomiting [ ] diarrhea [ ] constipation  [ ]chewing problems [ ] swallowing issues  [ ] other:     Diet Prescription: Diet, Regular:   Consistent Carbohydrate {Evening Snack}  DASH/TLC {Sodium & Cholesterol Restricted} (11-19-24 @ 17:09)    Intake: >/=75% served    Current Weight: Weight (kg): 122 kg ( 11-22-24)                                             117.5 kg ( 10-28-24)            range since admission has been 117.5- 128.6 kg most likely  r/t scale accuracy              Pertinent Medications: MEDICATIONS  (STANDING):  aspirin  chewable 81 milliGRAM(s) Oral daily  atorvastatin 80 milliGRAM(s) Oral at bedtime  chlorhexidine 2% Cloths 1 Application(s) Topical daily  clopidogrel Tablet 75 milliGRAM(s) Oral daily  cyanocobalamin 1000 MICROGram(s) Oral daily  dextrose 5%. 1000 milliLiter(s) (50 mL/Hr) IV Continuous <Continuous>  dextrose 5%. 1000 milliLiter(s) (100 mL/Hr) IV Continuous <Continuous>  dextrose 50% Injectable 25 Gram(s) IV Push once  dextrose 50% Injectable 12.5 Gram(s) IV Push once  dextrose 50% Injectable 25 Gram(s) IV Push once  enoxaparin Injectable 40 milliGRAM(s) SubCutaneous every 24 hours  famotidine    Tablet 20 milliGRAM(s) Oral daily  glucagon  Injectable 1 milliGRAM(s) IntraMuscular once  insulin glargine Injectable (LANTUS) 8 Unit(s) SubCutaneous at bedtime  insulin lispro (ADMELOG) corrective regimen sliding scale   SubCutaneous three times a day before meals  melatonin 3 milliGRAM(s) Oral at bedtime  meropenem  IVPB 1000 milliGRAM(s) IV Intermittent every 8 hours  metoprolol tartrate 25 milliGRAM(s) Oral two times a day  nystatin Powder 1 Application(s) Topical two times a day  saccharomyces boulardii 250 milliGRAM(s) Oral two times a day  tamsulosin 0.4 milliGRAM(s) Oral at bedtime    MEDICATIONS  (PRN):  acetaminophen     Tablet .. 650 milliGRAM(s) Oral every 6 hours PRN Temp greater or equal to 38C (100.4F), Mild Pain (1 - 3)  aluminum hydroxide/magnesium hydroxide/simethicone Suspension 30 milliLiter(s) Oral every 4 hours PRN Dyspepsia  dextrose Oral Gel 15 Gram(s) Oral once PRN Blood Glucose LESS THAN 70 milliGRAM(s)/deciliter  sodium chloride 0.9% lock flush 10 milliLiter(s) IV Push every 1 hour PRN Pre/post blood products, medications, blood draw, and to maintain line patency    Pertinent Labs: 11-20 Na141 mmol/L Glu 137 mg/dL[H] K+ 4.3 mmol/L Cr  1.10 mg/dL BUN 35 mg/dL[H] 11-20 Phos 3.6 mg/dL     CAPILLARY BLOOD GLUCOSE  POCT Blood Glucose.: 114 mg/dL (22 Nov 2024 08:01)  POCT Blood Glucose.: 184 mg/dL (21 Nov 2024 22:19)  POCT Blood Glucose.: 111 mg/dL (21 Nov 2024 17:25)  POCT Blood Glucose.: 118 mg/dL (21 Nov 2024 12:08)    Skin: surgical incisions to bilat heels  edema: 4+ L and R foot    Estimated Needs:   [ ] no change since previous assessment  [X ] recalculated: based on IBW of 196# /89 kg ( 23-28 kcals/kg) 6545-6685 kcals and ( 1.1-1.3 gm pro/kg IBW ) 100-115 gm protein     Previous Nutrition Diagnosis:   [ ] Inadequate Energy Intake [ ]Inadequate Oral Intake [ ] Excessive Energy Intake   [ ] Underweight [ ] Increased Nutrient Needs [X ] Overweight/Obesity   [ ] Altered GI Function [ ] Unintended Weight Loss [ ] Food & Nutrition Related Knowledge Deficit [ ] Malnutrition     Nutrition Diagnosis is [X ] ongoing  [ ] resolved [ ] not applicable     New Nutrition Diagnosis: [ X] not applicable       Interventions:   Recommend  [ ] Change Diet To:  [ ] Nutrition Supplement  [ ] Nutrition Support  [X ] Other: continue current diet     Monitoring and Evaluation:   [ X] PO intake [ x ] Tolerance to diet prescription [ x ] weights [ x ] labs[ x ] follow up per protocol  [X ] other: skin integrity

## 2024-11-22 NOTE — SOCIAL WORK PROGRESS NOTE - NSSWPROGRESSNOTE_GEN_ALL_CORE
As per medical team, Pt remains on IV antibiotics until 12/9/24. Plan is for new assisted living facility placement as pt has exhausted his subacute rehab benefit. Clinicals sent to [facility] for review as per pt/family request. SW will continue to follow for transitional planning.

## 2024-11-22 NOTE — PROGRESS NOTE ADULT - SUBJECTIVE AND OBJECTIVE BOX
Chief Complaint: Heel ulcer    Interval Events: No events overnight.    Review of Systems:  General: No fevers, chills, weight gain  Skin: No rashes, color changes  Cardiovascular: No chest pain, orthopnea  Respiratory: No shortness of breath, cough  Gastrointestinal: No nausea, abdominal pain  Genitourinary: No incontinence, pain with urination  Musculoskeletal: No pain, swelling, decreased range of motion  Neurological: No headache, weakness  Psychiatric: No depression, anxiety  Endocrine: No weight gain, increased thirst  All other systems are comprehensively negative.    Physical Exam:  Vital Signs Last 24 Hrs  T(C): 36.5 (22 Nov 2024 06:14), Max: 36.7 (21 Nov 2024 11:07)  T(F): 97.7 (22 Nov 2024 06:14), Max: 98 (21 Nov 2024 11:07)  HR: 84 (22 Nov 2024 06:14) (84 - 86)  BP: 124/69 (22 Nov 2024 06:14) (123/76 - 152/78)  BP(mean): --  RR: 17 (22 Nov 2024 06:14) (17 - 17)  SpO2: 96% (22 Nov 2024 06:14) (96% - 100%)  Parameters below as of 22 Nov 2024 06:14  Patient On (Oxygen Delivery Method): room air  General: NAD  HEENT: MMM  Neck: No JVD, no carotid bruit  Lungs: CTAB  CV: RRR, nl S1/S2, no M/R/G  Abdomen: S/NT/ND, +BS  Extremities: +Lymphedema, no cyanosis  Neuro: AAOx3, non-focal  Skin: No rash    Labs:

## 2024-11-23 LAB
ALBUMIN SERPL ELPH-MCNC: 2.5 G/DL — LOW (ref 3.3–5)
ALP SERPL-CCNC: 33 U/L — LOW (ref 40–120)
ALT FLD-CCNC: 16 U/L — SIGNIFICANT CHANGE UP (ref 12–78)
ANION GAP SERPL CALC-SCNC: 0 MMOL/L — LOW (ref 5–17)
AST SERPL-CCNC: 15 U/L — SIGNIFICANT CHANGE UP (ref 15–37)
BASOPHILS # BLD AUTO: 0.01 K/UL — SIGNIFICANT CHANGE UP (ref 0–0.2)
BASOPHILS NFR BLD AUTO: 0.2 % — SIGNIFICANT CHANGE UP (ref 0–2)
BILIRUB SERPL-MCNC: 0.6 MG/DL — SIGNIFICANT CHANGE UP (ref 0.2–1.2)
BUN SERPL-MCNC: 41 MG/DL — HIGH (ref 7–23)
CALCIUM SERPL-MCNC: 9.9 MG/DL — SIGNIFICANT CHANGE UP (ref 8.5–10.1)
CHLORIDE SERPL-SCNC: 108 MMOL/L — SIGNIFICANT CHANGE UP (ref 96–108)
CO2 SERPL-SCNC: 33 MMOL/L — HIGH (ref 22–31)
CREAT SERPL-MCNC: 1.2 MG/DL — SIGNIFICANT CHANGE UP (ref 0.5–1.3)
CRP SERPL-MCNC: <3 MG/L — SIGNIFICANT CHANGE UP
EGFR: 60 ML/MIN/1.73M2 — SIGNIFICANT CHANGE UP
EOSINOPHIL # BLD AUTO: 0.36 K/UL — SIGNIFICANT CHANGE UP (ref 0–0.5)
EOSINOPHIL NFR BLD AUTO: 7.9 % — HIGH (ref 0–6)
ERYTHROCYTE [SEDIMENTATION RATE] IN BLOOD: 38 MM/HR — HIGH (ref 0–20)
GLUCOSE SERPL-MCNC: 89 MG/DL — SIGNIFICANT CHANGE UP (ref 70–99)
HCT VFR BLD CALC: 27.6 % — LOW (ref 39–50)
HGB BLD-MCNC: 8.9 G/DL — LOW (ref 13–17)
IMM GRANULOCYTES NFR BLD AUTO: 0.2 % — SIGNIFICANT CHANGE UP (ref 0–0.9)
LYMPHOCYTES # BLD AUTO: 1.75 K/UL — SIGNIFICANT CHANGE UP (ref 1–3.3)
LYMPHOCYTES # BLD AUTO: 38.4 % — SIGNIFICANT CHANGE UP (ref 13–44)
MCHC RBC-ENTMCNC: 28.8 PG — SIGNIFICANT CHANGE UP (ref 27–34)
MCHC RBC-ENTMCNC: 32.2 G/DL — SIGNIFICANT CHANGE UP (ref 32–36)
MCV RBC AUTO: 89.3 FL — SIGNIFICANT CHANGE UP (ref 80–100)
MONOCYTES # BLD AUTO: 0.31 K/UL — SIGNIFICANT CHANGE UP (ref 0–0.9)
MONOCYTES NFR BLD AUTO: 6.8 % — SIGNIFICANT CHANGE UP (ref 2–14)
NEUTROPHILS # BLD AUTO: 2.12 K/UL — SIGNIFICANT CHANGE UP (ref 1.8–7.4)
NEUTROPHILS NFR BLD AUTO: 46.5 % — SIGNIFICANT CHANGE UP (ref 43–77)
NRBC # BLD: 0 /100 WBCS — SIGNIFICANT CHANGE UP (ref 0–0)
PLATELET # BLD AUTO: 185 K/UL — SIGNIFICANT CHANGE UP (ref 150–400)
POTASSIUM SERPL-MCNC: 4.2 MMOL/L — SIGNIFICANT CHANGE UP (ref 3.5–5.3)
POTASSIUM SERPL-SCNC: 4.2 MMOL/L — SIGNIFICANT CHANGE UP (ref 3.5–5.3)
PROT SERPL-MCNC: 6.6 G/DL — SIGNIFICANT CHANGE UP (ref 6–8.3)
RBC # BLD: 3.09 M/UL — LOW (ref 4.2–5.8)
RBC # FLD: 15.7 % — HIGH (ref 10.3–14.5)
SODIUM SERPL-SCNC: 141 MMOL/L — SIGNIFICANT CHANGE UP (ref 135–145)
WBC # BLD: 4.56 K/UL — SIGNIFICANT CHANGE UP (ref 3.8–10.5)
WBC # FLD AUTO: 4.56 K/UL — SIGNIFICANT CHANGE UP (ref 3.8–10.5)

## 2024-11-23 PROCEDURE — 99232 SBSQ HOSP IP/OBS MODERATE 35: CPT

## 2024-11-23 RX ADMIN — Medication 250 MILLIGRAM(S): at 05:42

## 2024-11-23 RX ADMIN — INSULIN GLARGINE 8 UNIT(S): 100 INJECTION, SOLUTION SUBCUTANEOUS at 21:16

## 2024-11-23 RX ADMIN — Medication 81 MILLIGRAM(S): at 12:12

## 2024-11-23 RX ADMIN — ACETAMINOPHEN, DIPHENHYDRAMINE HCL, PHENYLEPHRINE HCL 3 MILLIGRAM(S): 325; 25; 5 TABLET ORAL at 21:12

## 2024-11-23 RX ADMIN — Medication 250 MILLIGRAM(S): at 17:50

## 2024-11-23 RX ADMIN — NYSTATIN 1 APPLICATION(S): 100000 POWDER TOPICAL at 17:51

## 2024-11-23 RX ADMIN — METOPROLOL TARTRATE 25 MILLIGRAM(S): 100 TABLET, FILM COATED ORAL at 05:42

## 2024-11-23 RX ADMIN — CHLORHEXIDINE GLUCONATE 1 APPLICATION(S): 1.2 RINSE ORAL at 12:13

## 2024-11-23 RX ADMIN — Medication 1: at 17:50

## 2024-11-23 RX ADMIN — MEROPENEM 100 MILLIGRAM(S): 500 INJECTION, POWDER, FOR SOLUTION INTRAVENOUS at 05:42

## 2024-11-23 RX ADMIN — Medication 1000 MICROGRAM(S): at 12:11

## 2024-11-23 RX ADMIN — MEROPENEM 100 MILLIGRAM(S): 500 INJECTION, POWDER, FOR SOLUTION INTRAVENOUS at 14:17

## 2024-11-23 RX ADMIN — METOPROLOL TARTRATE 25 MILLIGRAM(S): 100 TABLET, FILM COATED ORAL at 17:51

## 2024-11-23 RX ADMIN — CLOPIDOGREL 75 MILLIGRAM(S): 75 TABLET, FILM COATED ORAL at 12:11

## 2024-11-23 RX ADMIN — NYSTATIN 1 APPLICATION(S): 100000 POWDER TOPICAL at 06:07

## 2024-11-23 RX ADMIN — Medication 80 MILLIGRAM(S): at 21:12

## 2024-11-23 RX ADMIN — ENOXAPARIN SODIUM 40 MILLIGRAM(S): 30 INJECTION SUBCUTANEOUS at 21:15

## 2024-11-23 RX ADMIN — MEROPENEM 100 MILLIGRAM(S): 500 INJECTION, POWDER, FOR SOLUTION INTRAVENOUS at 21:12

## 2024-11-23 RX ADMIN — TAMSULOSIN HYDROCHLORIDE 0.4 MILLIGRAM(S): 0.4 CAPSULE ORAL at 21:15

## 2024-11-23 RX ADMIN — FAMOTIDINE 20 MILLIGRAM(S): 20 TABLET, FILM COATED ORAL at 12:11

## 2024-11-23 NOTE — PROGRESS NOTE ADULT - SUBJECTIVE AND OBJECTIVE BOX
Patient is a 84y old  Male who presents with a chief complaint of Bilateral Heel Osteomyelitis (22 Nov 2024 12:34)      Subjective:  INTERVAL HPI/OVERNIGHT EVENTS: Patient seen and examined at bedside.  Patient has no complaints at this time.   MEDICATIONS  (STANDING):  aspirin  chewable 81 milliGRAM(s) Oral daily  atorvastatin 80 milliGRAM(s) Oral at bedtime  chlorhexidine 2% Cloths 1 Application(s) Topical daily  clopidogrel Tablet 75 milliGRAM(s) Oral daily  cyanocobalamin 1000 MICROGram(s) Oral daily  dextrose 5%. 1000 milliLiter(s) (50 mL/Hr) IV Continuous <Continuous>  dextrose 5%. 1000 milliLiter(s) (100 mL/Hr) IV Continuous <Continuous>  dextrose 50% Injectable 25 Gram(s) IV Push once  dextrose 50% Injectable 12.5 Gram(s) IV Push once  dextrose 50% Injectable 25 Gram(s) IV Push once  enoxaparin Injectable 40 milliGRAM(s) SubCutaneous every 24 hours  famotidine    Tablet 20 milliGRAM(s) Oral daily  glucagon  Injectable 1 milliGRAM(s) IntraMuscular once  insulin glargine Injectable (LANTUS) 8 Unit(s) SubCutaneous at bedtime  insulin lispro (ADMELOG) corrective regimen sliding scale   SubCutaneous three times a day before meals  melatonin 3 milliGRAM(s) Oral at bedtime  meropenem  IVPB 1000 milliGRAM(s) IV Intermittent every 8 hours  metoprolol tartrate 25 milliGRAM(s) Oral two times a day  nystatin Powder 1 Application(s) Topical two times a day  saccharomyces boulardii 250 milliGRAM(s) Oral two times a day  tamsulosin 0.4 milliGRAM(s) Oral at bedtime    MEDICATIONS  (PRN):  acetaminophen     Tablet .. 650 milliGRAM(s) Oral every 6 hours PRN Temp greater or equal to 38C (100.4F), Mild Pain (1 - 3)  aluminum hydroxide/magnesium hydroxide/simethicone Suspension 30 milliLiter(s) Oral every 4 hours PRN Dyspepsia  dextrose Oral Gel 15 Gram(s) Oral once PRN Blood Glucose LESS THAN 70 milliGRAM(s)/deciliter  sodium chloride 0.9% lock flush 10 milliLiter(s) IV Push every 1 hour PRN Pre/post blood products, medications, blood draw, and to maintain line patency      Allergies    No Known Allergies    Intolerances        REVIEW OF SYSTEMS:  CONSTITUTIONAL: No fever or chills  HEENT:  No headache, no sore throat  RESPIRATORY: No cough or shortness of breath  CARDIOVASCULAR: No chest pain or palpitations  GASTROINTESTINAL: No abd pain, nausea, vomiting, or diarrhea      Objective:  Vital Signs Last 24 Hrs  T(C): 36.4 (23 Nov 2024 11:02), Max: 36.7 (22 Nov 2024 20:12)  T(F): 97.6 (23 Nov 2024 11:02), Max: 98.1 (22 Nov 2024 20:12)  HR: 78 (23 Nov 2024 11:02) (78 - 82)  BP: 118/70 (23 Nov 2024 11:02) (111/69 - 135/75)  BP(mean): --  RR: 18 (23 Nov 2024 11:02) (17 - 18)  SpO2: 94% (23 Nov 2024 11:02) (94% - 95%)    Parameters below as of 23 Nov 2024 11:02  Patient On (Oxygen Delivery Method): room air        GENERAL: NAD, lying in bed comfortably  HEAD:  Normocephalic  EYES:  conjunctiva and sclera clear  ENT: Moist mucous membranes  NECK: Supple  CHEST/LUNG: Clear to auscultation bilaterally; No rales or rhonchi; no wheezing. Unlabored respirations  HEART: Regular rate and rhythm; S1S2+  ABDOMEN: Bowel sounds present; Soft, Nontender, Nondistended.   EXTREMITIES:  + distal Peripheral Pulses;  No cyanosis,b/l LE in clean dressing   NERVOUS SYSTEM:  Alert & Oriented X3;  No gross focal deficits   MSK: moves all extremities  SKIN: No rashes     LABS:                        8.9    4.56  )-----------( 185      ( 23 Nov 2024 07:50 )             27.6     23 Nov 2024 07:50    141    |  108    |  41     ----------------------------<  89     4.2     |  33     |  1.20     Ca    9.9        23 Nov 2024 07:50    TPro  6.6    /  Alb  2.5    /  TBili  0.6    /  DBili  x      /  AST  15     /  ALT  16     /  AlkPhos  33     23 Nov 2024 07:50      Urinalysis Basic - ( 23 Nov 2024 07:50 )    Color: x / Appearance: x / SG: x / pH: x  Gluc: 89 mg/dL / Ketone: x  / Bili: x / Urobili: x   Blood: x / Protein: x / Nitrite: x   Leuk Esterase: x / RBC: x / WBC x   Sq Epi: x / Non Sq Epi: x / Bacteria: x      CAPILLARY BLOOD GLUCOSE      POCT Blood Glucose.: 104 mg/dL (23 Nov 2024 11:45)  POCT Blood Glucose.: 89 mg/dL (23 Nov 2024 08:29)  POCT Blood Glucose.: 130 mg/dL (22 Nov 2024 21:26)  POCT Blood Glucose.: 102 mg/dL (22 Nov 2024 17:24)        Culture - Tissue with Gram Stain (collected 11-19-24 @ 16:50)  Source: Tissue  Gram Stain (11-21-24 @ 14:49):    No polymorphonuclear cells seen per low power field    No organisms seen per oil power field  Preliminary Report (11-21-24 @ 14:49):    Few Pseudomonas aeruginosa        RADIOLOGY & ADDITIONAL TESTS:    Personally reviewed.     Consultant(s) Notes Reviewed:  [x] YES  [ ] NO    Plan of care discussed with patient; all questions answered

## 2024-11-23 NOTE — PROGRESS NOTE ADULT - ASSESSMENT
82yo M with a PMH of HTN, HLD, Type 2 DM, CKD3a, hx of GI bleed, COPD, SANTO, BPH, CAD s/p PCI, PAD, multiple myeloma, lymphedema who presents with b/l heel ulcers and suspected OM planned for bilateral foot surgery with podiatry, Gabriel Deshpande, on 10/29 for his suspected bilateral heel osteomyelitis now s/p partial calcanectomy.        Problem/Plan - 1:  ·  Problem: Osteomyelitis of foot.   ·  Plan: - Bilateral heel osteomyelitis per outpatient w/up and admitted with a plan for OR with podiatry, Dr. Rios on 10/29  - Wound Cx (10/23): Proteus mirabilis, Pseudomonas aeruginosa, ESBL Klebsiella pneumoniae, Enterococcus faecalis  - Blood Cx NG  - s/p partial calcanectomy on 10/29.    - f/up OR cultures +rare pseudomonas aeruginosa.  Pathology: bilateral heel acute and chronic osteomyelitis.  - Tylenol PRN for mild pain.  - Consulted ID Dr. Casillas, recs appreciated   - PICC line placed.  Continue meropenem till Dec 9.   - Continue Florastor  - afebrile, no leukocytosis, dressing/wound care as per podiatry recommendation   - cardiology Dr. Rock consulted, recs appreciated.  - Patient scheduled for right heel debridement on 11/19. doing well post op , tissue culture 11/19 pseudomonas sensitive to meropenem    blood test level stable       Problem/Plan - 2:  ·  Problem: DM (diabetes mellitus).   ·  Plan: - type 2 DM on insulin  - continue decreased lantus dose 8units QHS   - continue low-dose lispro corrective ISS  - controlled      Problem/Plan - 3:  ·  Problem: HTN (hypertension).   ·  Plan: Chronic   - continue metoprolol 25mg BID.     Problem/Plan - 4:  ·  Problem: HLD (hyperlipidemia).   ·  Plan: Chronic   - Continue atorvastatin 80mg QD.     Problem/Plan - 5:  ·  Problem: CAD (coronary artery disease).   ·  Plan: CAD sp stents x4  - Held ASA and Plavix on 10/29; Restarted on ASA and Plavix on 10/30  - c/w lipitor  - cardiology eval noted      Problem/Plan - 6:  ·  Problem: Benign prostatic hyperplasia with lower urinary tract symptoms, symptom details unspecified.   ·  Plan: Chronic  -Continue Flomax.     Problem/Plan - 7:  ·  Problem: Multiple myeloma.   ·  Plan: Chronic  - Chemotherapy held      Problem/Plan - 8:   DVT ppx: resume lovenox

## 2024-11-24 LAB
-  AMPICILLIN: SIGNIFICANT CHANGE UP
-  AZTREONAM: SIGNIFICANT CHANGE UP
-  CEFEPIME: SIGNIFICANT CHANGE UP
-  CEFTAZIDIME/AVIBACTAM: SIGNIFICANT CHANGE UP
-  CEFTAZIDIME: SIGNIFICANT CHANGE UP
-  CEFTOLOZANE/TAZOBACTAM: SIGNIFICANT CHANGE UP
-  CIPROFLOXACIN: SIGNIFICANT CHANGE UP
-  IMIPENEM: SIGNIFICANT CHANGE UP
-  LEVOFLOXACIN: SIGNIFICANT CHANGE UP
-  MEROPENEM: SIGNIFICANT CHANGE UP
-  PIPERACILLIN/TAZOBACTAM: SIGNIFICANT CHANGE UP
-  VANCOMYCIN: SIGNIFICANT CHANGE UP
BLANDM BLD POS QL PROBE: SIGNIFICANT CHANGE UP
METHOD TYPE: SIGNIFICANT CHANGE UP

## 2024-11-24 PROCEDURE — 99232 SBSQ HOSP IP/OBS MODERATE 35: CPT

## 2024-11-24 RX ADMIN — FAMOTIDINE 20 MILLIGRAM(S): 20 TABLET, FILM COATED ORAL at 12:06

## 2024-11-24 RX ADMIN — Medication 250 MILLIGRAM(S): at 18:02

## 2024-11-24 RX ADMIN — Medication 80 MILLIGRAM(S): at 21:27

## 2024-11-24 RX ADMIN — ENOXAPARIN SODIUM 40 MILLIGRAM(S): 30 INJECTION SUBCUTANEOUS at 21:27

## 2024-11-24 RX ADMIN — INSULIN GLARGINE 8 UNIT(S): 100 INJECTION, SOLUTION SUBCUTANEOUS at 21:28

## 2024-11-24 RX ADMIN — METOPROLOL TARTRATE 25 MILLIGRAM(S): 100 TABLET, FILM COATED ORAL at 18:02

## 2024-11-24 RX ADMIN — Medication 81 MILLIGRAM(S): at 12:06

## 2024-11-24 RX ADMIN — MEROPENEM 100 MILLIGRAM(S): 500 INJECTION, POWDER, FOR SOLUTION INTRAVENOUS at 14:05

## 2024-11-24 RX ADMIN — MEROPENEM 100 MILLIGRAM(S): 500 INJECTION, POWDER, FOR SOLUTION INTRAVENOUS at 21:27

## 2024-11-24 RX ADMIN — ACETAMINOPHEN, DIPHENHYDRAMINE HCL, PHENYLEPHRINE HCL 3 MILLIGRAM(S): 325; 25; 5 TABLET ORAL at 21:27

## 2024-11-24 RX ADMIN — Medication 1000 MICROGRAM(S): at 12:06

## 2024-11-24 RX ADMIN — NYSTATIN 1 APPLICATION(S): 100000 POWDER TOPICAL at 18:02

## 2024-11-24 RX ADMIN — NYSTATIN 1 APPLICATION(S): 100000 POWDER TOPICAL at 05:45

## 2024-11-24 RX ADMIN — Medication 250 MILLIGRAM(S): at 05:44

## 2024-11-24 RX ADMIN — TAMSULOSIN HYDROCHLORIDE 0.4 MILLIGRAM(S): 0.4 CAPSULE ORAL at 21:27

## 2024-11-24 RX ADMIN — CLOPIDOGREL 75 MILLIGRAM(S): 75 TABLET, FILM COATED ORAL at 12:06

## 2024-11-24 RX ADMIN — MEROPENEM 100 MILLIGRAM(S): 500 INJECTION, POWDER, FOR SOLUTION INTRAVENOUS at 05:44

## 2024-11-24 RX ADMIN — CHLORHEXIDINE GLUCONATE 1 APPLICATION(S): 1.2 RINSE ORAL at 12:07

## 2024-11-24 RX ADMIN — METOPROLOL TARTRATE 25 MILLIGRAM(S): 100 TABLET, FILM COATED ORAL at 05:44

## 2024-11-24 NOTE — PROGRESS NOTE ADULT - SUBJECTIVE AND OBJECTIVE BOX
Patient is a 84y old  Male who presents with a chief complaint of Bilateral Heel Osteomyelitis (23 Nov 2024 14:44)      Subjective:  INTERVAL HPI/OVERNIGHT EVENTS: Patient seen and examined at bedside.  Patient has no complaints at this time.   MEDICATIONS  (STANDING):  aspirin  chewable 81 milliGRAM(s) Oral daily  atorvastatin 80 milliGRAM(s) Oral at bedtime  chlorhexidine 2% Cloths 1 Application(s) Topical daily  clopidogrel Tablet 75 milliGRAM(s) Oral daily  cyanocobalamin 1000 MICROGram(s) Oral daily  dextrose 5%. 1000 milliLiter(s) (50 mL/Hr) IV Continuous <Continuous>  dextrose 5%. 1000 milliLiter(s) (100 mL/Hr) IV Continuous <Continuous>  dextrose 50% Injectable 25 Gram(s) IV Push once  dextrose 50% Injectable 25 Gram(s) IV Push once  dextrose 50% Injectable 12.5 Gram(s) IV Push once  enoxaparin Injectable 40 milliGRAM(s) SubCutaneous every 24 hours  famotidine    Tablet 20 milliGRAM(s) Oral daily  glucagon  Injectable 1 milliGRAM(s) IntraMuscular once  insulin glargine Injectable (LANTUS) 8 Unit(s) SubCutaneous at bedtime  insulin lispro (ADMELOG) corrective regimen sliding scale   SubCutaneous three times a day before meals  melatonin 3 milliGRAM(s) Oral at bedtime  meropenem  IVPB 1000 milliGRAM(s) IV Intermittent every 8 hours  metoprolol tartrate 25 milliGRAM(s) Oral two times a day  nystatin Powder 1 Application(s) Topical two times a day  saccharomyces boulardii 250 milliGRAM(s) Oral two times a day  tamsulosin 0.4 milliGRAM(s) Oral at bedtime    MEDICATIONS  (PRN):  acetaminophen     Tablet .. 650 milliGRAM(s) Oral every 6 hours PRN Temp greater or equal to 38C (100.4F), Mild Pain (1 - 3)  aluminum hydroxide/magnesium hydroxide/simethicone Suspension 30 milliLiter(s) Oral every 4 hours PRN Dyspepsia  dextrose Oral Gel 15 Gram(s) Oral once PRN Blood Glucose LESS THAN 70 milliGRAM(s)/deciliter  sodium chloride 0.9% lock flush 10 milliLiter(s) IV Push every 1 hour PRN Pre/post blood products, medications, blood draw, and to maintain line patency      Allergies    No Known Allergies    Intolerances        REVIEW OF SYSTEMS:  CONSTITUTIONAL: No fever or chills  HEENT:  No headache, no sore throat  RESPIRATORY: No cough or shortness of breath  CARDIOVASCULAR: No chest pain or palpitations  GASTROINTESTINAL: No abd pain, nausea, vomiting, or diarrhea      Objective:  Vital Signs Last 24 Hrs  T(C): 36.3 (24 Nov 2024 11:46), Max: 36.8 (23 Nov 2024 20:38)  T(F): 97.4 (24 Nov 2024 11:46), Max: 98.3 (23 Nov 2024 20:38)  HR: 103 (24 Nov 2024 11:46) (78 - 103)  BP: 113/72 (24 Nov 2024 11:46) (113/72 - 155/72)  BP(mean): --  RR: 17 (24 Nov 2024 11:46) (17 - 18)  SpO2: 94% (24 Nov 2024 11:46) (94% - 97%)    Parameters below as of 24 Nov 2024 11:46  Patient On (Oxygen Delivery Method): room air        GENERAL: NAD, lying in bed   HEAD:  Normocephalic  EYES:  conjunctiva and sclera clear  ENT: Moist mucous membranes  NECK: Supple  CHEST/LUNG: Clear to auscultation bilaterally; No rales or rhonchi; no wheezing. Unlabored respirations  HEART: Regular rate and rhythm; S1S2+  ABDOMEN: Bowel sounds present; Soft, Nontender, Nondistended.   EXTREMITIES:  + distal Peripheral Pulses;  No cyanosis, much improved edema, R heel Sx site clean , antibiotic beeds in heal wound. no active bleeding/drainage /odor ,  foot wound clean , no erythema, sutures on, mid Sx line small ulcer no drainage/odor   NERVOUS SYSTEM:  Alert & Oriented X3;  No gross focal deficits   MSK: moves all extremities  SKIN: No rashes     LABS:      Ca    9.9        23 Nov 2024 07:50        Urinalysis Basic - ( 23 Nov 2024 07:50 )    Color: x / Appearance: x / SG: x / pH: x  Gluc: 89 mg/dL / Ketone: x  / Bili: x / Urobili: x   Blood: x / Protein: x / Nitrite: x   Leuk Esterase: x / RBC: x / WBC x   Sq Epi: x / Non Sq Epi: x / Bacteria: x      CAPILLARY BLOOD GLUCOSE      POCT Blood Glucose.: 103 mg/dL (24 Nov 2024 12:12)  POCT Blood Glucose.: 85 mg/dL (24 Nov 2024 08:29)  POCT Blood Glucose.: 169 mg/dL (23 Nov 2024 21:12)  POCT Blood Glucose.: 173 mg/dL (23 Nov 2024 17:20)        Culture - Tissue with Gram Stain (collected 11-19-24 @ 16:50)  Source: Tissue  Gram Stain (11-21-24 @ 14:49):    No polymorphonuclear cells seen per low power field    No organisms seen per oil power field  Preliminary Report (11-24-24 @ 11:37):    Few Pseudomonas aeruginosa (Carbapenem Resistant)    Rare Enterococcus faecalis  Organism: Pseudomonas aeruginosa (Carbapenem Resistant) (11-24-24 @ 11:38)      Method Type: CarbaR      -  Resistance Gene to Carbapenem: Nondet  Organism: Pseudomonas aeruginosa (Carbapenem Resistant)  Enterococcus faecalis (11-24-24 @ 11:37)  Organism: Pseudomonas aeruginosa (Carbapenem Resistant) (11-24-24 @ 11:37)      Method Type: VICKY      -  Aztreonam: R >16      -  Cefepime: R >16      -  Ceftazidime: R >16      -  Ceftazidime/Avibactam: S 8      -  Ceftolozane/tazobactam: S <=2      -  Ciprofloxacin: S 0.5      -  Imipenem: R >8      -  Levofloxacin: I 2      -  Meropenem: R >8      -  Piperacillin/Tazobactam: R >64  Organism: Enterococcus faecalis (11-24-24 @ 11:31)      Method Type: VICKY      -  Ampicillin: S <=2 Predicts results to ampicillin/sulbactam, amoxacillin-clavulanate and  piperacillin-tazobactam.      -  Vancomycin: S 1        RADIOLOGY & ADDITIONAL TESTS:    Personally reviewed.     Consultant(s) Notes Reviewed:  [x] YES  [ ] NO    Plan of care discussed with patient ; all questions answered

## 2024-11-25 LAB
CULTURE RESULTS: ABNORMAL
ORGANISM # SPEC MICROSCOPIC CNT: ABNORMAL
ORGANISM # SPEC MICROSCOPIC CNT: SIGNIFICANT CHANGE UP
SPECIMEN SOURCE: SIGNIFICANT CHANGE UP

## 2024-11-25 PROCEDURE — 99232 SBSQ HOSP IP/OBS MODERATE 35: CPT

## 2024-11-25 RX ADMIN — MEROPENEM 100 MILLIGRAM(S): 500 INJECTION, POWDER, FOR SOLUTION INTRAVENOUS at 05:31

## 2024-11-25 RX ADMIN — METOPROLOL TARTRATE 25 MILLIGRAM(S): 100 TABLET, FILM COATED ORAL at 05:31

## 2024-11-25 RX ADMIN — MEROPENEM 100 MILLIGRAM(S): 500 INJECTION, POWDER, FOR SOLUTION INTRAVENOUS at 21:48

## 2024-11-25 RX ADMIN — ACETAMINOPHEN, DIPHENHYDRAMINE HCL, PHENYLEPHRINE HCL 3 MILLIGRAM(S): 325; 25; 5 TABLET ORAL at 21:49

## 2024-11-25 RX ADMIN — FAMOTIDINE 20 MILLIGRAM(S): 20 TABLET, FILM COATED ORAL at 12:09

## 2024-11-25 RX ADMIN — INSULIN GLARGINE 8 UNIT(S): 100 INJECTION, SOLUTION SUBCUTANEOUS at 21:48

## 2024-11-25 RX ADMIN — MEROPENEM 100 MILLIGRAM(S): 500 INJECTION, POWDER, FOR SOLUTION INTRAVENOUS at 13:22

## 2024-11-25 RX ADMIN — Medication 81 MILLIGRAM(S): at 12:10

## 2024-11-25 RX ADMIN — NYSTATIN 1 APPLICATION(S): 100000 POWDER TOPICAL at 05:31

## 2024-11-25 RX ADMIN — Medication 250 MILLIGRAM(S): at 05:31

## 2024-11-25 RX ADMIN — METOPROLOL TARTRATE 25 MILLIGRAM(S): 100 TABLET, FILM COATED ORAL at 17:27

## 2024-11-25 RX ADMIN — TAMSULOSIN HYDROCHLORIDE 0.4 MILLIGRAM(S): 0.4 CAPSULE ORAL at 21:49

## 2024-11-25 RX ADMIN — CLOPIDOGREL 75 MILLIGRAM(S): 75 TABLET, FILM COATED ORAL at 12:09

## 2024-11-25 RX ADMIN — NYSTATIN 1 APPLICATION(S): 100000 POWDER TOPICAL at 17:27

## 2024-11-25 RX ADMIN — Medication 1000 MICROGRAM(S): at 12:09

## 2024-11-25 RX ADMIN — Medication 250 MILLIGRAM(S): at 17:27

## 2024-11-25 RX ADMIN — ENOXAPARIN SODIUM 40 MILLIGRAM(S): 30 INJECTION SUBCUTANEOUS at 21:47

## 2024-11-25 RX ADMIN — Medication 80 MILLIGRAM(S): at 21:49

## 2024-11-25 RX ADMIN — CHLORHEXIDINE GLUCONATE 1 APPLICATION(S): 1.2 RINSE ORAL at 12:10

## 2024-11-25 NOTE — PROGRESS NOTE ADULT - SUBJECTIVE AND OBJECTIVE BOX
[facility] INFECTIOUS DISEASE  Michoacano Casillas MD PhD, Cherry Vincent MD, Queta Ngo MD, Roselia Mcmanus MD, Andrez Bolden MD  and providing coverage with Trey Montelongo MD  Providing Infectious Disease Consultations at [facility], [facility], [facility], [facility]    Office# [phone] to schedule follow up appointments  Answering Service for urgent calls or New Consults [phone]  Cell# to text for urgent issues Michoacano Casillas [phone]     infectious diseases progress note:    DALILA HERNADEZ is a 84y y. o. Male patient    Overnight and events of the last 24hrs reviewed    Allergies    No Known Allergies    Intolerances        ANTIBIOTICS/RELEVANT:  antimicrobials  meropenem  IVPB 1000 milliGRAM(s) IV Intermittent every 8 hours    immunologic:    OTHER:  acetaminophen     Tablet .. 650 milliGRAM(s) Oral every 6 hours PRN  aluminum hydroxide/magnesium hydroxide/simethicone Suspension 30 milliLiter(s) Oral every 4 hours PRN  aspirin  chewable 81 milliGRAM(s) Oral daily  atorvastatin 80 milliGRAM(s) Oral at bedtime  chlorhexidine 2% Cloths 1 Application(s) Topical daily  clopidogrel Tablet 75 milliGRAM(s) Oral daily  cyanocobalamin 1000 MICROGram(s) Oral daily  dextrose 5%. 1000 milliLiter(s) IV Continuous <Continuous>  dextrose 5%. 1000 milliLiter(s) IV Continuous <Continuous>  dextrose 50% Injectable 25 Gram(s) IV Push once  dextrose 50% Injectable 25 Gram(s) IV Push once  dextrose 50% Injectable 12.5 Gram(s) IV Push once  dextrose Oral Gel 15 Gram(s) Oral once PRN  enoxaparin Injectable 40 milliGRAM(s) SubCutaneous every 24 hours  famotidine    Tablet 20 milliGRAM(s) Oral daily  glucagon  Injectable 1 milliGRAM(s) IntraMuscular once  insulin glargine Injectable (LANTUS) 8 Unit(s) SubCutaneous at bedtime  insulin lispro (ADMELOG) corrective regimen sliding scale   SubCutaneous three times a day before meals  melatonin 3 milliGRAM(s) Oral at bedtime  metoprolol tartrate 25 milliGRAM(s) Oral two times a day  nystatin Powder 1 Application(s) Topical two times a day  saccharomyces boulardii 250 milliGRAM(s) Oral two times a day  sodium chloride 0.9% lock flush 10 milliLiter(s) IV Push every 1 hour PRN  tamsulosin 0.4 milliGRAM(s) Oral at bedtime      Objective:  Vital Signs Last 24 Hrs  T(C): 36.6 (25 Nov 2024 11:23), Max: 36.6 (25 Nov 2024 11:23)  T(F): 97.9 (25 Nov 2024 11:23), Max: 97.9 (25 Nov 2024 11:23)  HR: 79 (25 Nov 2024 11:23) (79 - 99)  BP: 112/79 (25 Nov 2024 11:23) (112/79 - 136/80)  BP(mean): --  RR: 18 (25 Nov 2024 11:23) (16 - 18)  SpO2: 98% (25 Nov 2024 11:23) (95% - 98%)    Parameters below as of 25 Nov 2024 11:23  Patient On (Oxygen Delivery Method): room air        T(C): 36.6 (11-25-24 @ 11:23), Max: 36.8 (11-23-24 @ 20:38)  T(C): 36.6 (11-25-24 @ 11:23), Max: 36.8 (11-23-24 @ 20:38)  T(C): 36.6 (11-25-24 @ 11:23), Max: 36.8 (11-23-24 @ 20:38)    PHYSICAL EXAM:  HEENT: NC atraumatic  Neck: supple  Respiratory: no accessory muscle use, breathing comfortably  Cardiovascular: distant  Gastrointestinal: normal appearing, nondistended  Extremities: no clubbing, no cyanosis,        LABS:        WBC  4.56 11-23 @ 07:50  5.98 11-20 @ 09:39  5.10 11-19 @ 08:20              Creatinine: 1.20 mg/dL (11-23-24 @ 07:50)  Creatinine: 1.10 mg/dL (11-20-24 @ 09:39)  Creatinine: 1.30 mg/dL (11-19-24 @ 08:20)                INFLAMMATORY MARKERS      MICROBIOLOGY:              RADIOLOGY & ADDITIONAL STUDIES:

## 2024-11-25 NOTE — PROGRESS NOTE ADULT - SUBJECTIVE AND OBJECTIVE BOX
PROGRESS NOTE   Patient is a 84y old  Male who presents with a chief complaint of Bilateral Heel Osteomyelitis (2024 15:29)      HPI:  Patient with a past medical history of hypertension, diabetes, hyperlipidemia, bilateral heel osteomyelitis currently on outpatient antibiotics through [facility] is presenting for surgery.  He is scheduled for surgery with Dr. frias tomorrow.  Denies any fevers.  Endorsing pain to his heels but no other areas of pain.  No nausea or vomiting.  No other acute complaints today.    Denies fever, chills, chest pain, palpitations, SOB, cough, abdominal pain, nausea, vomiting, diarrhea, constipation, urinary frequency, urgency, or dysuria, headaches, changes in vision, dizziness, numbness, tingling.  Denies recent travel, recent antibiotic use, or sick contacts.    ED Course:   Vitals: BP: 161/87, HR 69: , Temp: 97.8 , RR: 18, SpO2: 96% on   Labs:  H/H: 10.6/34.4, PTT: 36.2, Albumin: 2.7  Tissue culture: (incomplete)      Surgical pathology report: (incomplete)  MR foot:   Xray foot:   EKBPM normal sinus rhythm, QTc: 477  Received in the ED:       HPI: Patient presents to [facility] from rehab for bilateral foot surgery planned with podiatry, Gabriel Deshpande, tomorrow for his bilateral heel osteomyelitis. States his osteomyelitis started 6 months ago, Currently c/o pain in b/l heels. Denies any numbness or tingling down LLE/RLE. Denies any trauma. Patient currently does not walk due to deconditioning. Denies any other complaints. Denies fevers, chills, HA, Dizziness, chest pain, SOB, N/V/D, bladder symptoms,     (28 Oct 2024 14:08)      Vital Signs Last 24 Hrs  T(C): 36.6 (2024 11:23), Max: 36.6 (2024 11:23)  T(F): 97.9 (2024 11:23), Max: 97.9 (2024 11:23)  HR: 79 (2024 11:23) (79 - 99)  BP: 112/79 (2024 11:23) (112/79 - 136/80)  BP(mean): --  RR: 18 (2024 11:23) (16 - 18)  SpO2: 98% (2024 11:23) (95% - 98%)    Parameters below as of 2024 11:23  Patient On (Oxygen Delivery Method): room air                            PHYSICAL EXAM  Vascular: DP & PT palpable bilaterally, Capillary refill 3 seconds  Neurological: Light touch sensation not intact bilaterally  Musculoskeletal: 4/5 strength in all quadrants bilaterally, AJ & STJ ROM intact  Dermatological: Bilateral heel wounds down to skin, subcutaneous tissue, fat, bone (left healing well, right healthy with granular wound base), no proximal streaking, no fluctuance, no malodor, no signs of acute infection at this time.

## 2024-11-25 NOTE — PROGRESS NOTE ADULT - SUBJECTIVE AND OBJECTIVE BOX
Patient is a 84y old  Male who presents with a chief complaint of Bilateral Heel Osteomyelitis (25 Nov 2024 12:08)      Subjective:  INTERVAL HPI/OVERNIGHT EVENTS: Patient seen and examined at bedside.  Patient has no complaints at this time.   MEDICATIONS  (STANDING):  aspirin  chewable 81 milliGRAM(s) Oral daily  atorvastatin 80 milliGRAM(s) Oral at bedtime  chlorhexidine 2% Cloths 1 Application(s) Topical daily  clopidogrel Tablet 75 milliGRAM(s) Oral daily  cyanocobalamin 1000 MICROGram(s) Oral daily  dextrose 5%. 1000 milliLiter(s) (50 mL/Hr) IV Continuous <Continuous>  dextrose 5%. 1000 milliLiter(s) (100 mL/Hr) IV Continuous <Continuous>  dextrose 50% Injectable 25 Gram(s) IV Push once  dextrose 50% Injectable 12.5 Gram(s) IV Push once  dextrose 50% Injectable 25 Gram(s) IV Push once  enoxaparin Injectable 40 milliGRAM(s) SubCutaneous every 24 hours  famotidine    Tablet 20 milliGRAM(s) Oral daily  glucagon  Injectable 1 milliGRAM(s) IntraMuscular once  insulin glargine Injectable (LANTUS) 8 Unit(s) SubCutaneous at bedtime  insulin lispro (ADMELOG) corrective regimen sliding scale   SubCutaneous three times a day before meals  melatonin 3 milliGRAM(s) Oral at bedtime  meropenem  IVPB 1000 milliGRAM(s) IV Intermittent every 8 hours  metoprolol tartrate 25 milliGRAM(s) Oral two times a day  nystatin Powder 1 Application(s) Topical two times a day  saccharomyces boulardii 250 milliGRAM(s) Oral two times a day  tamsulosin 0.4 milliGRAM(s) Oral at bedtime    MEDICATIONS  (PRN):  acetaminophen     Tablet .. 650 milliGRAM(s) Oral every 6 hours PRN Temp greater or equal to 38C (100.4F), Mild Pain (1 - 3)  aluminum hydroxide/magnesium hydroxide/simethicone Suspension 30 milliLiter(s) Oral every 4 hours PRN Dyspepsia  dextrose Oral Gel 15 Gram(s) Oral once PRN Blood Glucose LESS THAN 70 milliGRAM(s)/deciliter  sodium chloride 0.9% lock flush 10 milliLiter(s) IV Push every 1 hour PRN Pre/post blood products, medications, blood draw, and to maintain line patency      Allergies    No Known Allergies    Intolerances        REVIEW OF SYSTEMS:  CONSTITUTIONAL: No fever or chills  HEENT:  No headache, no sore throat  RESPIRATORY: No cough or shortness of breath  CARDIOVASCULAR: No chest pain or palpitations  GASTROINTESTINAL: No abd pain, nausea, vomiting, or diarrhea      Objective:  Vital Signs Last 24 Hrs  T(C): 36.6 (25 Nov 2024 11:23), Max: 36.6 (25 Nov 2024 11:23)  T(F): 97.9 (25 Nov 2024 11:23), Max: 97.9 (25 Nov 2024 11:23)  HR: 79 (25 Nov 2024 11:23) (79 - 99)  BP: 112/79 (25 Nov 2024 11:23) (112/79 - 136/80)  BP(mean): --  RR: 18 (25 Nov 2024 11:23) (16 - 18)  SpO2: 98% (25 Nov 2024 11:23) (95% - 98%)    Parameters below as of 25 Nov 2024 11:23  Patient On (Oxygen Delivery Method): room air        GENERAL: NAD, lying in bed comfortably  HEAD:  Normocephalic  EYES:  conjunctiva and sclera clear  ENT: Moist mucous membranes  NECK: Supple  CHEST/LUNG: Clear to auscultation bilaterally; No rales or rhonchi; no wheezing. Unlabored respirations  HEART: Regular rate and rhythm; S1S2+  ABDOMEN: Bowel sounds present; Soft, Nontender, Nondistended.   EXTREMITIES:  + distal Peripheral Pulses;  b/l leg edema improved , B/L feet dressing on   NERVOUS SYSTEM:  Alert & Oriented X3;  No gross focal deficits   MSK: moves all extremities  SKIN: No rashes     LABS:              CAPILLARY BLOOD GLUCOSE      POCT Blood Glucose.: 134 mg/dL (25 Nov 2024 12:39)  POCT Blood Glucose.: 93 mg/dL (25 Nov 2024 08:16)  POCT Blood Glucose.: 203 mg/dL (24 Nov 2024 21:13)  POCT Blood Glucose.: 142 mg/dL (24 Nov 2024 17:11)        Culture - Tissue with Gram Stain (collected 11-19-24 @ 16:50)  Source: Tissue  Gram Stain (11-21-24 @ 14:49):    No polymorphonuclear cells seen per low power field    No organisms seen per oil power field  Final Report (11-25-24 @ 09:27):    Few Pseudomonas aeruginosa (Carbapenem Resistant)    Rare Enterococcus faecalis  Organism: Pseudomonas aeruginosa (Carbapenem Resistant)  Enterococcus faecalis (11-25-24 @ 09:27)  Organism: Enterococcus faecalis (11-25-24 @ 09:27)      Method Type: VICKY      -  Ampicillin: S <=2 Predicts results to ampicillin/sulbactam, amoxacillin-clavulanate and  piperacillin-tazobactam.      -  Vancomycin: S 1  Organism: Pseudomonas aeruginosa (Carbapenem Resistant) (11-25-24 @ 09:27)      Method Type: CarbaR      -  Resistance Gene to Carbapenem: Nondet  Organism: Pseudomonas aeruginosa (Carbapenem Resistant) (11-25-24 @ 09:27)      Method Type: VICKY      -  Aztreonam: R >16      -  Cefepime: R >16      -  Ceftazidime: R >16      -  Ceftazidime/Avibactam: S 8      -  Ceftolozane/tazobactam: S <=2      -  Ciprofloxacin: S 0.5      -  Imipenem: R >8      -  Levofloxacin: I 2      -  Meropenem: R >8      -  Piperacillin/Tazobactam: R >64        RADIOLOGY & ADDITIONAL TESTS:    Personally reviewed.     Consultant(s) Notes Reviewed:  [x] YES  [ ] NO    Plan of care discussed with patient ; all questions answered

## 2024-11-25 NOTE — PROGRESS NOTE ADULT - ASSESSMENT
84 yo male with hypertension, diabetes, hyperlipidemia, bilateral heel osteomyelitis currently on outpatient antibiotics through midline is presented for surgery planned with podiatry, Gabriel Deshpande, for his bilateral heel osteomyelitis. States his osteomyelitis started 6 months ago.   Calcanectomy, partial 29-Oct-2024 12:58:43  Jona Mason    bilateral heel osteomyelitis  prior micro with Proteus mirabilis, Pseudomonas aeruginosa, Enterococcus faecalis, Klebsiella pneumoniae ESBL  Culture - Tissue with Gram Stain (10.29.24 @ 12:00) Rare Pseudomonas aeruginosa  -  Ciprofloxacin: S <=0.25-  Levofloxacin: S <=0.5-  Meropenem: S <=1  sp surgery  Path- Right heel bone proximal margin- Acute and chronic osteomyelitis, Left heel bone proximal margin- Acute and chronic osteomyelitis    Recommendations:   Residual osteo so rec Meropenem 1 gram IV q8 x 6 weeks so last day 12/9  weekly CBC w diff, CMP, ESR  PICC can be removed at end of Rx    Thank you for consulting us and involving us in the management of this most interesting and challenging case.  We will follow along in the care of this patient. Please call us at [phone] or text me directly on my cell# at [phone] with any concerns.

## 2024-11-25 NOTE — PROGRESS NOTE ADULT - SUBJECTIVE AND OBJECTIVE BOX
Chief Complaint: Heel ulcer    Interval Events: No events overnight.    Review of Systems:  General: No fevers, chills, weight gain  Skin: No rashes, color changes  Cardiovascular: No chest pain, orthopnea  Respiratory: No shortness of breath, cough  Gastrointestinal: No nausea, abdominal pain  Genitourinary: No incontinence, pain with urination  Musculoskeletal: No pain, swelling, decreased range of motion  Neurological: No headache, weakness  Psychiatric: No depression, anxiety  Endocrine: No weight gain, increased thirst  All other systems are comprehensively negative.    Physical Exam:  Vital Signs Last 24 Hrs  T(C): 36.4 (25 Nov 2024 04:38), Max: 36.5 (24 Nov 2024 20:37)  T(F): 97.5 (25 Nov 2024 04:38), Max: 97.7 (24 Nov 2024 20:37)  HR: 82 (25 Nov 2024 04:38) (80 - 103)  BP: 119/78 (25 Nov 2024 04:38) (113/72 - 136/80)  BP(mean): --  RR: 16 (25 Nov 2024 04:38) (16 - 18)  SpO2: 95% (25 Nov 2024 04:38) (94% - 96%)  Parameters below as of 25 Nov 2024 04:38  Patient On (Oxygen Delivery Method): room air  General: NAD  HEENT: MMM  Neck: No JVD, no carotid bruit  Lungs: CTAB  CV: RRR, nl S1/S2, no M/R/G  Abdomen: S/NT/ND, +BS  Extremities: +Lymphedema, no cyanosis  Neuro: AAOx3, non-focal  Skin: No rash    Labs:

## 2024-11-26 ENCOUNTER — TRANSCRIPTION ENCOUNTER (OUTPATIENT)
Age: 84
End: 2024-11-26

## 2024-11-26 PROCEDURE — 99232 SBSQ HOSP IP/OBS MODERATE 35: CPT

## 2024-11-26 RX ORDER — SENNOSIDES 8.6 MG
1 TABLET ORAL
Refills: 0 | Status: DISCONTINUED | OUTPATIENT
Start: 2024-11-26 | End: 2024-12-10

## 2024-11-26 RX ORDER — POLYETHYLENE GLYCOL 3350 17 G/17G
17 POWDER, FOR SOLUTION ORAL DAILY
Refills: 0 | Status: DISCONTINUED | OUTPATIENT
Start: 2024-11-26 | End: 2024-12-10

## 2024-11-26 RX ADMIN — MEROPENEM 100 MILLIGRAM(S): 500 INJECTION, POWDER, FOR SOLUTION INTRAVENOUS at 05:58

## 2024-11-26 RX ADMIN — Medication 250 MILLIGRAM(S): at 05:59

## 2024-11-26 RX ADMIN — CHLORHEXIDINE GLUCONATE 1 APPLICATION(S): 1.2 RINSE ORAL at 14:53

## 2024-11-26 RX ADMIN — MEROPENEM 100 MILLIGRAM(S): 500 INJECTION, POWDER, FOR SOLUTION INTRAVENOUS at 22:09

## 2024-11-26 RX ADMIN — MEROPENEM 100 MILLIGRAM(S): 500 INJECTION, POWDER, FOR SOLUTION INTRAVENOUS at 14:55

## 2024-11-26 RX ADMIN — CLOPIDOGREL 75 MILLIGRAM(S): 75 TABLET, FILM COATED ORAL at 12:29

## 2024-11-26 RX ADMIN — Medication 81 MILLIGRAM(S): at 12:28

## 2024-11-26 RX ADMIN — TAMSULOSIN HYDROCHLORIDE 0.4 MILLIGRAM(S): 0.4 CAPSULE ORAL at 22:09

## 2024-11-26 RX ADMIN — INSULIN GLARGINE 8 UNIT(S): 100 INJECTION, SOLUTION SUBCUTANEOUS at 22:09

## 2024-11-26 RX ADMIN — ENOXAPARIN SODIUM 40 MILLIGRAM(S): 30 INJECTION SUBCUTANEOUS at 22:09

## 2024-11-26 RX ADMIN — NYSTATIN 1 APPLICATION(S): 100000 POWDER TOPICAL at 18:07

## 2024-11-26 RX ADMIN — FAMOTIDINE 20 MILLIGRAM(S): 20 TABLET, FILM COATED ORAL at 12:29

## 2024-11-26 RX ADMIN — METOPROLOL TARTRATE 25 MILLIGRAM(S): 100 TABLET, FILM COATED ORAL at 05:59

## 2024-11-26 RX ADMIN — Medication 1 TABLET(S): at 12:29

## 2024-11-26 RX ADMIN — ACETAMINOPHEN, DIPHENHYDRAMINE HCL, PHENYLEPHRINE HCL 3 MILLIGRAM(S): 325; 25; 5 TABLET ORAL at 22:09

## 2024-11-26 RX ADMIN — NYSTATIN 1 APPLICATION(S): 100000 POWDER TOPICAL at 05:59

## 2024-11-26 RX ADMIN — POLYETHYLENE GLYCOL 3350 17 GRAM(S): 17 POWDER, FOR SOLUTION ORAL at 12:28

## 2024-11-26 RX ADMIN — Medication 1000 MICROGRAM(S): at 12:29

## 2024-11-26 RX ADMIN — METOPROLOL TARTRATE 25 MILLIGRAM(S): 100 TABLET, FILM COATED ORAL at 18:07

## 2024-11-26 RX ADMIN — Medication 80 MILLIGRAM(S): at 22:09

## 2024-11-26 RX ADMIN — Medication 250 MILLIGRAM(S): at 18:07

## 2024-11-26 NOTE — PROGRESS NOTE ADULT - SUBJECTIVE AND OBJECTIVE BOX
Patient is a 84y old  Male who presents with a chief complaint of Bilateral Heel Osteomyelitis (26 Nov 2024 10:26)      Subjective:  INTERVAL HPI/OVERNIGHT EVENTS: Patient seen and examined at bedside.  Patient has no complaints at this time.     MEDICATIONS  (STANDING):  aspirin  chewable 81 milliGRAM(s) Oral daily  atorvastatin 80 milliGRAM(s) Oral at bedtime  chlorhexidine 2% Cloths 1 Application(s) Topical daily  clopidogrel Tablet 75 milliGRAM(s) Oral daily  cyanocobalamin 1000 MICROGram(s) Oral daily  dextrose 5%. 1000 milliLiter(s) (50 mL/Hr) IV Continuous <Continuous>  dextrose 5%. 1000 milliLiter(s) (100 mL/Hr) IV Continuous <Continuous>  dextrose 50% Injectable 25 Gram(s) IV Push once  dextrose 50% Injectable 12.5 Gram(s) IV Push once  dextrose 50% Injectable 25 Gram(s) IV Push once  enoxaparin Injectable 40 milliGRAM(s) SubCutaneous every 24 hours  famotidine    Tablet 20 milliGRAM(s) Oral daily  glucagon  Injectable 1 milliGRAM(s) IntraMuscular once  insulin glargine Injectable (LANTUS) 8 Unit(s) SubCutaneous at bedtime  insulin lispro (ADMELOG) corrective regimen sliding scale   SubCutaneous three times a day before meals  melatonin 3 milliGRAM(s) Oral at bedtime  meropenem  IVPB 1000 milliGRAM(s) IV Intermittent every 8 hours  metoprolol tartrate 25 milliGRAM(s) Oral two times a day  nystatin Powder 1 Application(s) Topical two times a day  polyethylene glycol 3350 17 Gram(s) Oral daily  saccharomyces boulardii 250 milliGRAM(s) Oral two times a day  senna 1 Tablet(s) Oral with breakfast  tamsulosin 0.4 milliGRAM(s) Oral at bedtime    MEDICATIONS  (PRN):  acetaminophen     Tablet .. 650 milliGRAM(s) Oral every 6 hours PRN Temp greater or equal to 38C (100.4F), Mild Pain (1 - 3)  aluminum hydroxide/magnesium hydroxide/simethicone Suspension 30 milliLiter(s) Oral every 4 hours PRN Dyspepsia  dextrose Oral Gel 15 Gram(s) Oral once PRN Blood Glucose LESS THAN 70 milliGRAM(s)/deciliter  sodium chloride 0.9% lock flush 10 milliLiter(s) IV Push every 1 hour PRN Pre/post blood products, medications, blood draw, and to maintain line patency      Allergies    No Known Allergies    Intolerances        REVIEW OF SYSTEMS:  CONSTITUTIONAL: No fever or chills  HEENT:  No headache, no sore throat  RESPIRATORY: No cough or shortness of breath  CARDIOVASCULAR: No chest pain or palpitations  GASTROINTESTINAL: No abd pain, nausea, vomiting, or diarrhea      Objective:  Vital Signs Last 24 Hrs  T(C): 36.6 (26 Nov 2024 12:07), Max: 36.7 (25 Nov 2024 20:42)  T(F): 97.8 (26 Nov 2024 12:07), Max: 98.1 (25 Nov 2024 20:42)  HR: 82 (26 Nov 2024 12:07) (82 - 92)  BP: 120/77 (26 Nov 2024 12:07) (108/67 - 130/76)  BP(mean): --  RR: 18 (26 Nov 2024 12:07) (17 - 18)  SpO2: 99% (26 Nov 2024 12:07) (96% - 99%)    Parameters below as of 26 Nov 2024 12:07  Patient On (Oxygen Delivery Method): room air        GENERAL: NAD, lying in bed comfortably  HEAD:  Normocephalic  EYES:  conjunctiva and sclera clear  ENT: Moist mucous membranes  NECK: Supple  CHEST/LUNG: Clear to auscultation bilaterally; No rales or rhonchi; no wheezing. Unlabored respirations  HEART: Regular rate and rhythm; S1S2+  ABDOMEN: Bowel sounds present; Soft, Nontender, Nondistended.   EXTREMITIES:  + distal Peripheral Pulses;  b/l feet In clean dressing   NERVOUS SYSTEM:  Alert & Oriented X3;  No gross focal deficits   MSK: moves all extremities  SKIN: No rashes     LABS:              CAPILLARY BLOOD GLUCOSE      POCT Blood Glucose.: 99 mg/dL (26 Nov 2024 12:01)  POCT Blood Glucose.: 97 mg/dL (26 Nov 2024 08:15)  POCT Blood Glucose.: 150 mg/dL (25 Nov 2024 21:14)  POCT Blood Glucose.: 91 mg/dL (25 Nov 2024 17:14)        Culture - Tissue with Gram Stain (collected 11-19-24 @ 16:50)  Source: Tissue  Gram Stain (11-21-24 @ 14:49):    No polymorphonuclear cells seen per low power field    No organisms seen per oil power field  Final Report (11-25-24 @ 09:27):    Few Pseudomonas aeruginosa (Carbapenem Resistant)    Rare Enterococcus faecalis  Organism: Pseudomonas aeruginosa (Carbapenem Resistant)  Enterococcus faecalis (11-25-24 @ 09:27)  Organism: Enterococcus faecalis (11-25-24 @ 09:27)      Method Type: VICKY      -  Ampicillin: S <=2 Predicts results to ampicillin/sulbactam, amoxacillin-clavulanate and  piperacillin-tazobactam.      -  Vancomycin: S 1  Organism: Pseudomonas aeruginosa (Carbapenem Resistant) (11-25-24 @ 09:27)      Method Type: CarbaR      -  Resistance Gene to Carbapenem: Nondet  Organism: Pseudomonas aeruginosa (Carbapenem Resistant) (11-25-24 @ 09:27)      Method Type: VICKY      -  Aztreonam: R >16      -  Cefepime: R >16      -  Ceftazidime: R >16      -  Ceftazidime/Avibactam: S 8      -  Ceftolozane/tazobactam: S <=2      -  Ciprofloxacin: S 0.5      -  Imipenem: R >8      -  Levofloxacin: I 2      -  Meropenem: R >8      -  Piperacillin/Tazobactam: R >64        RADIOLOGY & ADDITIONAL TESTS:    Personally reviewed.     Consultant(s) Notes Reviewed:  [x] YES  [ ] NO    Plan of care discussed with patient ; all questions answered

## 2024-11-26 NOTE — PROGRESS NOTE ADULT - SUBJECTIVE AND OBJECTIVE BOX
Chief Complaint: Heel ulcer    Interval Events: No events overnight.    Review of Systems:  General: No fevers, chills, weight gain  Skin: No rashes, color changes  Cardiovascular: No chest pain, orthopnea  Respiratory: No shortness of breath, cough  Gastrointestinal: No nausea, abdominal pain  Genitourinary: No incontinence, pain with urination  Musculoskeletal: No pain, swelling, decreased range of motion  Neurological: No headache, weakness  Psychiatric: No depression, anxiety  Endocrine: No weight gain, increased thirst  All other systems are comprehensively negative.    Physical Exam:  Vital Signs Last 24 Hrs  T(C): 36.6 (26 Nov 2024 04:52), Max: 36.7 (25 Nov 2024 20:42)  T(F): 97.8 (26 Nov 2024 04:52), Max: 98.1 (25 Nov 2024 20:42)  HR: 87 (26 Nov 2024 04:52) (79 - 92)  BP: 108/67 (26 Nov 2024 04:52) (108/67 - 130/76)  BP(mean): --  RR: 17 (26 Nov 2024 04:52) (17 - 18)  SpO2: 96% (26 Nov 2024 04:52) (96% - 98%)  Parameters below as of 26 Nov 2024 04:52  Patient On (Oxygen Delivery Method): room air  General: NAD  HEENT: MMM  Neck: No JVD, no carotid bruit  Lungs: CTAB  CV: RRR, nl S1/S2, no M/R/G  Abdomen: S/NT/ND, +BS  Extremities: +Lymphedema, no cyanosis  Neuro: AAOx3, non-focal  Skin: No rash    Labs:

## 2024-11-26 NOTE — DISCHARGE NOTE NURSING/CASE MANAGEMENT/SOCIAL WORK - NSDCPEFALRISK_GEN_ALL_CORE
For information on Fall & Injury Prevention, visit: [email or web address] OR  [email or web address] OR  https://www.cdc.gov/steadi/patient.html

## 2024-11-26 NOTE — PROGRESS NOTE ADULT - ASSESSMENT
82 yo male with hypertension, diabetes, hyperlipidemia, bilateral heel osteomyelitis currently on outpatient antibiotics through midline is presented for surgery planned with podiatry, Gabriel Deshpande, for his bilateral heel osteomyelitis. States his osteomyelitis started 6 months ago.   Calcanectomy, partial 29-Oct-2024 12:58:43  Jona Mason    bilateral heel osteomyelitis  prior micro with Proteus mirabilis, Pseudomonas aeruginosa, Enterococcus faecalis, Klebsiella pneumoniae ESBL  Culture - Tissue with Gram Stain (10.29.24 @ 12:00) Rare Pseudomonas aeruginosa  -  Ciprofloxacin: S <=0.25-  Levofloxacin: S <=0.5-  Meropenem: S <=1  sp surgery  Path- Right heel bone proximal margin- Acute and chronic osteomyelitis, Left heel bone proximal margin- Acute and chronic osteomyelitis    Recommendations:   Residual osteo so rec Meropenem 1 gram IV q8 x 6 weeks so last day 12/9  weekly CBC w diff, CMP, ESR  PICC can be removed at end of Rx    Thank you for consulting us and involving us in the management of this most interesting and challenging case.  We will follow along in the care of this patient. Please call us at [phone] or text me directly on my cell# at [phone] with any concerns.

## 2024-11-26 NOTE — DISCHARGE NOTE NURSING/CASE MANAGEMENT/SOCIAL WORK - PATIENT PORTAL LINK FT
You can access the FollowMyHealth Patient Portal offered by [facility] by registering at the following website: [email or web address]. By joining [facility]’s FollowMyHealth portal, you will also be able to view your health information using other applications (apps) compatible with our system.

## 2024-11-26 NOTE — PROGRESS NOTE ADULT - ASSESSMENT
84yo M with a PMH of HTN, HLD, Type 2 DM, CKD3a, hx of GI bleed, COPD, SANTO, BPH, CAD s/p PCI, PAD, multiple myeloma, lymphedema who presents with b/l heel ulcers and suspected OM planned for bilateral foot surgery with podiatry, Gabriel Deshpande, on 10/29 for his suspected bilateral heel osteomyelitis now s/p partial calcanectomy.        Problem/Plan - 1:  ·  Problem: Osteomyelitis of foot.   ·  Plan: - Bilateral heel osteomyelitis per outpatient w/up and admitted with a plan for OR with podiatry, Dr. Rios on 10/29  - Wound Cx (10/23): Proteus mirabilis, Pseudomonas aeruginosa, ESBL Klebsiella pneumoniae, Enterococcus faecalis  - Blood Cx NG  - s/p partial calcanectomy on 10/29.    - f/up OR cultures +rare pseudomonas aeruginosa.  Pathology: bilateral heel acute and chronic osteomyelitis.  - Tylenol PRN for mild pain.  - Consulted ID Dr. Casillas, recs appreciated   - PICC line placed.  Continue meropenem till Dec 9.   - Continue Florastor  - afebrile, no leukocytosis, dressing/wound care as per podiatry recommendation   - cardiology Dr. Rock consulted, recs appreciated.  - Patient scheduled for right heel debridement on 11/19. doing well post op , tissue culture 11/19 pseudomonas sensitive to meropenem    blood test level stable       Problem/Plan - 2:  ·  Problem: DM (diabetes mellitus).   ·  Plan: - type 2 DM on insulin  - continue decreased lantus dose 8units QHS   - continue low-dose lispro corrective ISS  - controlled      Problem/Plan - 3:  ·  Problem: HTN (hypertension).   ·  Plan: Chronic   - continue metoprolol 25mg BID.     Problem/Plan - 4:  ·  Problem: HLD (hyperlipidemia).   ·  Plan: Chronic   - Continue atorvastatin 80mg QD.     Problem/Plan - 5:  ·  Problem: CAD (coronary artery disease).   ·  Plan: CAD sp stents x4  - Held ASA and Plavix on 10/29; Restarted on ASA and Plavix on 10/30  - c/w lipitor  - cardiology eval noted      Problem/Plan - 6:  ·  Problem: Benign prostatic hyperplasia with lower urinary tract symptoms, symptom details unspecified.   ·  Plan: Chronic  -Continue Flomax.     Problem/Plan - 7:  ·  Problem: Multiple myeloma.   ·  Plan: Chronic  - Chemotherapy held      Problem/Plan - 8:   DVT ppx: resume lovenox

## 2024-11-26 NOTE — DISCHARGE NOTE NURSING/CASE MANAGEMENT/SOCIAL WORK - FINANCIAL ASSISTANCE
[facility] provides services at a reduced cost to those who are determined to be eligible through [facility]’s financial assistance program. Information regarding [facility]’s financial assistance program can be found by going to [email or web address] or by calling [phone].

## 2024-11-26 NOTE — PROGRESS NOTE ADULT - SUBJECTIVE AND OBJECTIVE BOX
[facility] DIVISION of INFECTIOUS DISEASE  Michoacano Casillas MD PhD, Cherry Vincent MD, Queta Ngo MD, Roselia Mcmanus MD, Andrez Bolden MD  and providing coverage with Trey Montelongo MD  Providing Infectious Disease Consultations at [facility], [facility], [facility], [facility]'s    Office# [phone] to schedule follow up appointments  Answering Service for urgent calls or New Consults [phone]  Cell# to text for urgent issues Michoacano Casillas [phone]     infectious diseases progress note:    DALILA HERNADEZ is a 84y y. o. Male patient    Overnight and events of the last 24hrs reviewed    Allergies    No Known Allergies    Intolerances        ANTIBIOTICS/RELEVANT:  antimicrobials  meropenem  IVPB 1000 milliGRAM(s) IV Intermittent every 8 hours    immunologic:    OTHER:  acetaminophen     Tablet .. 650 milliGRAM(s) Oral every 6 hours PRN  aluminum hydroxide/magnesium hydroxide/simethicone Suspension 30 milliLiter(s) Oral every 4 hours PRN  aspirin  chewable 81 milliGRAM(s) Oral daily  atorvastatin 80 milliGRAM(s) Oral at bedtime  chlorhexidine 2% Cloths 1 Application(s) Topical daily  clopidogrel Tablet 75 milliGRAM(s) Oral daily  cyanocobalamin 1000 MICROGram(s) Oral daily  dextrose 5%. 1000 milliLiter(s) IV Continuous <Continuous>  dextrose 5%. 1000 milliLiter(s) IV Continuous <Continuous>  dextrose 50% Injectable 25 Gram(s) IV Push once  dextrose 50% Injectable 12.5 Gram(s) IV Push once  dextrose 50% Injectable 25 Gram(s) IV Push once  dextrose Oral Gel 15 Gram(s) Oral once PRN  enoxaparin Injectable 40 milliGRAM(s) SubCutaneous every 24 hours  famotidine    Tablet 20 milliGRAM(s) Oral daily  glucagon  Injectable 1 milliGRAM(s) IntraMuscular once  insulin glargine Injectable (LANTUS) 8 Unit(s) SubCutaneous at bedtime  insulin lispro (ADMELOG) corrective regimen sliding scale   SubCutaneous three times a day before meals  melatonin 3 milliGRAM(s) Oral at bedtime  metoprolol tartrate 25 milliGRAM(s) Oral two times a day  nystatin Powder 1 Application(s) Topical two times a day  polyethylene glycol 3350 17 Gram(s) Oral daily  saccharomyces boulardii 250 milliGRAM(s) Oral two times a day  senna 1 Tablet(s) Oral with breakfast  sodium chloride 0.9% lock flush 10 milliLiter(s) IV Push every 1 hour PRN  tamsulosin 0.4 milliGRAM(s) Oral at bedtime      Objective:  Vital Signs Last 24 Hrs  T(C): 36.6 (26 Nov 2024 04:52), Max: 36.7 (25 Nov 2024 20:42)  T(F): 97.8 (26 Nov 2024 04:52), Max: 98.1 (25 Nov 2024 20:42)  HR: 87 (26 Nov 2024 04:52) (79 - 92)  BP: 108/67 (26 Nov 2024 04:52) (108/67 - 130/76)  BP(mean): --  RR: 17 (26 Nov 2024 04:52) (17 - 18)  SpO2: 96% (26 Nov 2024 04:52) (96% - 98%)    Parameters below as of 26 Nov 2024 04:52  Patient On (Oxygen Delivery Method): room air        T(C): 36.6 (11-26-24 @ 04:52), Max: 36.7 (11-25-24 @ 20:42)  T(C): 36.6 (11-26-24 @ 04:52), Max: 36.8 (11-23-24 @ 20:38)  T(C): 36.6 (11-26-24 @ 04:52), Max: 36.8 (11-23-24 @ 20:38)    PHYSICAL EXAM:  HEENT: NC atraumatic  Neck: supple  Respiratory: no accessory muscle use, breathing comfortably  Cardiovascular: distant  Gastrointestinal: normal appearing, nondistended  Extremities: no clubbing, no cyanosis,        LABS:        WBC  4.56 11-23 @ 07:50  5.98 11-20 @ 09:39              Creatinine: 1.20 mg/dL (11-23-24 @ 07:50)  Creatinine: 1.10 mg/dL (11-20-24 @ 09:39)                INFLAMMATORY MARKERS      MICROBIOLOGY:              RADIOLOGY & ADDITIONAL STUDIES:

## 2024-11-27 LAB
ALBUMIN SERPL ELPH-MCNC: 2.5 G/DL — LOW (ref 3.3–5)
ALP SERPL-CCNC: 34 U/L — LOW (ref 40–120)
ALT FLD-CCNC: 17 U/L — SIGNIFICANT CHANGE UP (ref 12–78)
ANION GAP SERPL CALC-SCNC: 0 MMOL/L — LOW (ref 5–17)
AST SERPL-CCNC: 14 U/L — LOW (ref 15–37)
BASOPHILS # BLD AUTO: 0.01 K/UL — SIGNIFICANT CHANGE UP (ref 0–0.2)
BASOPHILS NFR BLD AUTO: 0.3 % — SIGNIFICANT CHANGE UP (ref 0–2)
BILIRUB SERPL-MCNC: 0.5 MG/DL — SIGNIFICANT CHANGE UP (ref 0.2–1.2)
BUN SERPL-MCNC: 48 MG/DL — HIGH (ref 7–23)
CALCIUM SERPL-MCNC: 9.7 MG/DL — SIGNIFICANT CHANGE UP (ref 8.5–10.1)
CHLORIDE SERPL-SCNC: 109 MMOL/L — HIGH (ref 96–108)
CO2 SERPL-SCNC: 35 MMOL/L — HIGH (ref 22–31)
CREAT SERPL-MCNC: 1.3 MG/DL — SIGNIFICANT CHANGE UP (ref 0.5–1.3)
EGFR: 55 ML/MIN/1.73M2 — LOW
EOSINOPHIL # BLD AUTO: 0.38 K/UL — SIGNIFICANT CHANGE UP (ref 0–0.5)
EOSINOPHIL NFR BLD AUTO: 9.7 % — HIGH (ref 0–6)
GLUCOSE SERPL-MCNC: 88 MG/DL — SIGNIFICANT CHANGE UP (ref 70–99)
HCT VFR BLD CALC: 28.1 % — LOW (ref 39–50)
HGB BLD-MCNC: 8.8 G/DL — LOW (ref 13–17)
IMM GRANULOCYTES NFR BLD AUTO: 0.3 % — SIGNIFICANT CHANGE UP (ref 0–0.9)
LYMPHOCYTES # BLD AUTO: 1.57 K/UL — SIGNIFICANT CHANGE UP (ref 1–3.3)
LYMPHOCYTES # BLD AUTO: 39.9 % — SIGNIFICANT CHANGE UP (ref 13–44)
MCHC RBC-ENTMCNC: 28.6 PG — SIGNIFICANT CHANGE UP (ref 27–34)
MCHC RBC-ENTMCNC: 31.3 G/DL — LOW (ref 32–36)
MCV RBC AUTO: 91.2 FL — SIGNIFICANT CHANGE UP (ref 80–100)
MONOCYTES # BLD AUTO: 0.3 K/UL — SIGNIFICANT CHANGE UP (ref 0–0.9)
MONOCYTES NFR BLD AUTO: 7.6 % — SIGNIFICANT CHANGE UP (ref 2–14)
NEUTROPHILS # BLD AUTO: 1.66 K/UL — LOW (ref 1.8–7.4)
NEUTROPHILS NFR BLD AUTO: 42.2 % — LOW (ref 43–77)
NRBC # BLD: 0 /100 WBCS — SIGNIFICANT CHANGE UP (ref 0–0)
PLATELET # BLD AUTO: 174 K/UL — SIGNIFICANT CHANGE UP (ref 150–400)
POTASSIUM SERPL-MCNC: 4.4 MMOL/L — SIGNIFICANT CHANGE UP (ref 3.5–5.3)
POTASSIUM SERPL-SCNC: 4.4 MMOL/L — SIGNIFICANT CHANGE UP (ref 3.5–5.3)
PROT SERPL-MCNC: 6.5 G/DL — SIGNIFICANT CHANGE UP (ref 6–8.3)
RBC # BLD: 3.08 M/UL — LOW (ref 4.2–5.8)
RBC # FLD: 16.1 % — HIGH (ref 10.3–14.5)
SODIUM SERPL-SCNC: 144 MMOL/L — SIGNIFICANT CHANGE UP (ref 135–145)
WBC # BLD: 3.93 K/UL — SIGNIFICANT CHANGE UP (ref 3.8–10.5)
WBC # FLD AUTO: 3.93 K/UL — SIGNIFICANT CHANGE UP (ref 3.8–10.5)

## 2024-11-27 PROCEDURE — 99232 SBSQ HOSP IP/OBS MODERATE 35: CPT

## 2024-11-27 RX ORDER — CIPROFLOXACIN HCL 750 MG
500 TABLET ORAL EVERY 12 HOURS
Refills: 0 | Status: COMPLETED | OUTPATIENT
Start: 2024-11-27 | End: 2024-12-09

## 2024-11-27 RX ADMIN — TAMSULOSIN HYDROCHLORIDE 0.4 MILLIGRAM(S): 0.4 CAPSULE ORAL at 22:22

## 2024-11-27 RX ADMIN — METOPROLOL TARTRATE 25 MILLIGRAM(S): 100 TABLET, FILM COATED ORAL at 19:00

## 2024-11-27 RX ADMIN — Medication 250 MILLIGRAM(S): at 19:00

## 2024-11-27 RX ADMIN — ACETAMINOPHEN, DIPHENHYDRAMINE HCL, PHENYLEPHRINE HCL 3 MILLIGRAM(S): 325; 25; 5 TABLET ORAL at 22:25

## 2024-11-27 RX ADMIN — CHLORHEXIDINE GLUCONATE 1 APPLICATION(S): 1.2 RINSE ORAL at 12:00

## 2024-11-27 RX ADMIN — CLOPIDOGREL 75 MILLIGRAM(S): 75 TABLET, FILM COATED ORAL at 12:15

## 2024-11-27 RX ADMIN — NYSTATIN 1 APPLICATION(S): 100000 POWDER TOPICAL at 19:00

## 2024-11-27 RX ADMIN — Medication 250 MILLIGRAM(S): at 06:56

## 2024-11-27 RX ADMIN — MEROPENEM 100 MILLIGRAM(S): 500 INJECTION, POWDER, FOR SOLUTION INTRAVENOUS at 14:07

## 2024-11-27 RX ADMIN — NYSTATIN 1 APPLICATION(S): 100000 POWDER TOPICAL at 06:56

## 2024-11-27 RX ADMIN — Medication 500 MILLIGRAM(S): at 22:25

## 2024-11-27 RX ADMIN — FAMOTIDINE 20 MILLIGRAM(S): 20 TABLET, FILM COATED ORAL at 12:16

## 2024-11-27 RX ADMIN — Medication 500 MILLIGRAM(S): at 14:07

## 2024-11-27 RX ADMIN — METOPROLOL TARTRATE 25 MILLIGRAM(S): 100 TABLET, FILM COATED ORAL at 06:56

## 2024-11-27 RX ADMIN — ENOXAPARIN SODIUM 40 MILLIGRAM(S): 30 INJECTION SUBCUTANEOUS at 22:21

## 2024-11-27 RX ADMIN — INSULIN GLARGINE 8 UNIT(S): 100 INJECTION, SOLUTION SUBCUTANEOUS at 22:25

## 2024-11-27 RX ADMIN — MEROPENEM 100 MILLIGRAM(S): 500 INJECTION, POWDER, FOR SOLUTION INTRAVENOUS at 22:21

## 2024-11-27 RX ADMIN — Medication 1000 MICROGRAM(S): at 12:15

## 2024-11-27 RX ADMIN — MEROPENEM 100 MILLIGRAM(S): 500 INJECTION, POWDER, FOR SOLUTION INTRAVENOUS at 06:56

## 2024-11-27 RX ADMIN — Medication 81 MILLIGRAM(S): at 12:16

## 2024-11-27 RX ADMIN — Medication 80 MILLIGRAM(S): at 22:25

## 2024-11-27 NOTE — SOCIAL WORK PROGRESS NOTE - NSSWPROGRESSNOTE_GEN_ALL_CORE
Plan remains for pt to be DC to assisted living facility. Family requested clinicals be sent to [facility] for review. As per Isabel from [facility] [phone], they will be coming to assess pt early next week and will contact  when date set. Pt remains on [facility] until 12/9/24. SW to continue to follow to ensure safe transitional planning.

## 2024-11-27 NOTE — PROGRESS NOTE ADULT - ASSESSMENT
82yo M with a PMH of HTN, HLD, Type 2 DM, CKD3a, hx of GI bleed, COPD, SANTO, BPH, CAD s/p PCI, PAD, multiple myeloma, lymphedema who presents with b/l heel ulcers and suspected OM planned for bilateral foot surgery with podiatry, Gabriel Deshpande, on 10/29 for his suspected bilateral heel osteomyelitis now s/p partial calcanectomy.        Problem/Plan - 1:  ·  Problem: Osteomyelitis of foot.   ·  Plan: - Bilateral heel osteomyelitis per outpatient w/up and admitted with a plan for OR with podiatry, Dr. Rios on 10/29  - Wound Cx (10/23): Proteus mirabilis, Pseudomonas aeruginosa, ESBL Klebsiella pneumoniae, Enterococcus faecalis  - Blood Cx NG  - s/p partial calcanectomy on 10/29.    - f/up OR cultures +rare pseudomonas aeruginosa.  Pathology: bilateral heel acute and chronic osteomyelitis.  - Tylenol PRN for mild pain.  - Consulted ID Dr. Casillas, recs appreciated   - PICC line placed.  Continue meropenem till Dec 9.   - Continue Florastor  - afebrile, no leukocytosis, dressing/wound care as per podiatry recommendation   - cardiology Dr. Rock consulted, recs appreciated.  - Patient scheduled for right heel debridement on 11/19. doing well post op , tissue culture 11/19 pseudomonas sensitive to meropenem       Problem/Plan - 2:  ·  Problem: DM (diabetes mellitus).   ·  Plan: - type 2 DM on insulin  - continue decreased lantus dose 8units QHS   - continue low-dose lispro corrective ISS  - controlled      Problem/Plan - 3:  ·  Problem: HTN (hypertension).   ·  Plan: Chronic   - continue metoprolol 25mg BID.     Problem/Plan - 4:  ·  Problem: HLD (hyperlipidemia).   ·  Plan: Chronic   - Continue atorvastatin 80mg QD.     Problem/Plan - 5:  ·  Problem: CAD (coronary artery disease).   ·  Plan: CAD sp stents x4  - Held ASA and Plavix on 10/29; Restarted on ASA and Plavix on 10/30  - c/w lipitor  - cardiology eval noted      Problem/Plan - 6:  ·  Problem: Benign prostatic hyperplasia with lower urinary tract symptoms, symptom details unspecified.   ·  Plan: Chronic  -Continue Flomax.     Problem/Plan - 7:  ·  Problem: Multiple myeloma.   ·  Plan: Chronic  - Chemotherapy held      Problem/Plan - 8:   DVT ppx: continue lovenox 40mg SQ daily

## 2024-11-27 NOTE — PROGRESS NOTE ADULT - ASSESSMENT
82 yo male with hypertension, diabetes, hyperlipidemia, bilateral heel osteomyelitis currently on outpatient antibiotics through midline presented for surgery planned with podiatry, Gabriel Deshpande, for his bilateral heel osteomyelitis. His osteomyelitis started 6 months PTA.   Calcanectomy, partial 29-Oct-2024 12:58:43  Jona Mason    bilateral heel osteomyelitis  prior micro with Proteus mirabilis, Pseudomonas aeruginosa, Enterococcus faecalis, Klebsiella pneumoniae ESBL  Culture - Tissue with Gram Stain (10.29.24 @ 12:00) Rare Pseudomonas aeruginosa  -  Ciprofloxacin: S <=0.25-  Levofloxacin: S <=0.5-  Meropenem: S <=1  Culture - Tissue with Gram Stain (11.19.24 @ 16:50) Pseudomonas aeruginosa (Carbapenem Resistant), Enterococcus faecalis -Ciprofloxacin: S 0.5, Meropenem: R >8, Piperacillin/Tazobactam: R >64   Path- Right heel bone proximal margin- Acute and chronic osteomyelitis, Left heel bone proximal margin- Acute and chronic osteomyelitis    Recommendations:   Residual osteo so reccommended Meropenem 1 gram IV q8 x 6 weeks so last day 12/9 but with Pseudomonas aeruginosa (Carbapenem Resistant) will add cipro 500 mg PO BID  weekly CBC w diff, CMP, ESR  PICC can be removed at end of Rx    Thank you for consulting us and involving us in the management of this most interesting and challenging case.  We will follow along in the care of this patient. Please call us at [phone] or text me directly on my cell# at [phone] with any concerns.

## 2024-11-27 NOTE — PROGRESS NOTE ADULT - SUBJECTIVE AND OBJECTIVE BOX
[facility] DIV[facility] of INFECTIOUS DISEASE  Michoacano Casillas MD PhD, Cherry Vincent MD, Queta Ngo MD, Roselia Mcmanus MD, Andrez Bolden MD  and providing coverage with Trey Montelongo MD  Providing Infectious Disease Consultations at [facility], [facility], [facility], [facility]'s    Office# [phone] to schedule follow up appointments  Answering Service for urgent calls or New Consults [phone]  Cell# to text for urgent issues Michoacano Casillas [phone]     infectious diseases progress note:    DALILA HERNADEZ is a 84y y. o. Male patient    Overnight and events of the last 24hrs reviewed    Allergies    No Known Allergies    Intolerances        ANTIBIOTICS/RELEVANT:  antimicrobials  meropenem  IVPB 1000 milliGRAM(s) IV Intermittent every 8 hours    immunologic:    OTHER:  acetaminophen     Tablet .. 650 milliGRAM(s) Oral every 6 hours PRN  aluminum hydroxide/magnesium hydroxide/simethicone Suspension 30 milliLiter(s) Oral every 4 hours PRN  aspirin  chewable 81 milliGRAM(s) Oral daily  atorvastatin 80 milliGRAM(s) Oral at bedtime  chlorhexidine 2% Cloths 1 Application(s) Topical daily  clopidogrel Tablet 75 milliGRAM(s) Oral daily  cyanocobalamin 1000 MICROGram(s) Oral daily  dextrose 5%. 1000 milliLiter(s) IV Continuous <Continuous>  dextrose 5%. 1000 milliLiter(s) IV Continuous <Continuous>  dextrose 50% Injectable 25 Gram(s) IV Push once  dextrose 50% Injectable 12.5 Gram(s) IV Push once  dextrose 50% Injectable 25 Gram(s) IV Push once  dextrose Oral Gel 15 Gram(s) Oral once PRN  enoxaparin Injectable 40 milliGRAM(s) SubCutaneous every 24 hours  famotidine    Tablet 20 milliGRAM(s) Oral daily  glucagon  Injectable 1 milliGRAM(s) IntraMuscular once  insulin glargine Injectable (LANTUS) 8 Unit(s) SubCutaneous at bedtime  insulin lispro (ADMELOG) corrective regimen sliding scale   SubCutaneous three times a day before meals  melatonin 3 milliGRAM(s) Oral at bedtime  metoprolol tartrate 25 milliGRAM(s) Oral two times a day  nystatin Powder 1 Application(s) Topical two times a day  polyethylene glycol 3350 17 Gram(s) Oral daily  saccharomyces boulardii 250 milliGRAM(s) Oral two times a day  senna 1 Tablet(s) Oral with breakfast  sodium chloride 0.9% lock flush 10 milliLiter(s) IV Push every 1 hour PRN  tamsulosin 0.4 milliGRAM(s) Oral at bedtime      Objective:  Vital Signs Last 24 Hrs  T(C): 36.4 (27 Nov 2024 11:36), Max: 36.7 (26 Nov 2024 21:14)  T(F): 97.5 (27 Nov 2024 11:36), Max: 98 (26 Nov 2024 21:14)  HR: 81 (27 Nov 2024 11:36) (76 - 91)  BP: 137/77 (27 Nov 2024 11:36) (118/73 - 137/77)  BP(mean): --  RR: 18 (27 Nov 2024 11:36) (18 - 18)  SpO2: 99% (27 Nov 2024 11:36) (93% - 99%)    Parameters below as of 27 Nov 2024 11:36  Patient On (Oxygen Delivery Method): room air        T(C): 36.4 (11-27-24 @ 11:36), Max: 36.7 (11-25-24 @ 20:42)  T(C): 36.4 (11-27-24 @ 11:36), Max: 36.7 (11-25-24 @ 20:42)  T(C): 36.4 (11-27-24 @ 11:36), Max: 36.8 (11-23-24 @ 20:38)    PHYSICAL EXAM:  HEENT: NC atraumatic  Neck: supple  Respiratory: no accessory muscle use, breathing comfortably  Cardiovascular: distant  Gastrointestinal: normal appearing, nondistended  Extremities: no clubbing, no cyanosis,        LABS:                          8.8    3.93  )-----------( 174      ( 27 Nov 2024 05:45 )             28.1       WBC  3.93 11-27 @ 05:45  4.56 11-23 @ 07:50      11-27    144  |  109[H]  |  48[H]  ----------------------------<  88  4.4   |  35[H]  |  1.30    Ca    9.7      27 Nov 2024 05:45    TPro  6.5  /  Alb  2.5[L]  /  TBili  0.5  /  DBili  x   /  AST  14[L]  /  ALT  17  /  AlkPhos  34[L]  11-27      Creatinine: 1.30 mg/dL (11-27-24 @ 05:45)  Creatinine: 1.20 mg/dL (11-23-24 @ 07:50)        Urinalysis Basic - ( 27 Nov 2024 05:45 )    Color: x / Appearance: x / SG: x / pH: x  Gluc: 88 mg/dL / Ketone: x  / Bili: x / Urobili: x   Blood: x / Protein: x / Nitrite: x   Leuk Esterase: x / RBC: x / WBC x   Sq Epi: x / Non Sq Epi: x / Bacteria: x            INFLAMMATORY MARKERS      MICROBIOLOGY:    Culture - Tissue with Gram Stain (11.19.24 @ 16:50)    -  Ceftolozane/tazobactam: S <=2   -  Ceftazidime/Avibactam: S 8   -  Resistance Gene to Carbapenem: Nondet   Gram Stain:   No polymorphonuclear cells seen per low power field  No organisms seen per oil power field   -  Ampicillin: S <=2 Predicts results to ampicillin/sulbactam, amoxacillin-clavulanate and  piperacillin-tazobactam.   -  Aztreonam: R >16   -  Cefepime: R >16   -  Ceftazidime: R >16   -  Ciprofloxacin: S 0.5   -  Imipenem: R >8   -  Levofloxacin: I 2   -  Meropenem: R >8   -  Piperacillin/Tazobactam: R >64   -  Vancomycin: S 1   Specimen Source: Tissue   Culture Results:   Few Pseudomonas aeruginosa (Carbapenem Resistant)  Rare Enterococcus faecalis   Organism Identification: Pseudomonas aeruginosa (Carbapenem Resistant)  Enterococcus faecalis   Organism: Pseudomonas aeruginosa (Carbapenem Resistant)   Organism: Pseudomonas aeruginosa (Carbapenem Resistant)   Organism: Enterococcus faecalis   Method Type: VICKY   Method Type: VICKY   Method Type: CarbaR        RADIOLOGY & ADDITIONAL STUDIES:

## 2024-11-27 NOTE — PROGRESS NOTE ADULT - SUBJECTIVE AND OBJECTIVE BOX
Chief Complaint: Heel ulcer    Interval Events: No events overnight.    Review of Systems:  General: No fevers, chills, weight gain  Skin: No rashes, color changes  Cardiovascular: No chest pain, orthopnea  Respiratory: No shortness of breath, cough  Gastrointestinal: No nausea, abdominal pain  Genitourinary: No incontinence, pain with urination  Musculoskeletal: No pain, swelling, decreased range of motion  Neurological: No headache, weakness  Psychiatric: No depression, anxiety  Endocrine: No weight gain, increased thirst  All other systems are comprehensively negative.    Physical Exam:  Vital Signs Last 24 Hrs  T(C): 36.5 (27 Nov 2024 06:40), Max: 36.7 (26 Nov 2024 21:14)  T(F): 97.7 (27 Nov 2024 06:40), Max: 98 (26 Nov 2024 21:14)  HR: 91 (27 Nov 2024 06:40) (76 - 91)  BP: 118/73 (27 Nov 2024 06:40) (118/73 - 135/78)  BP(mean): --  RR: 18 (27 Nov 2024 06:40) (18 - 18)  SpO2: 93% (27 Nov 2024 06:40) (93% - 99%)  Parameters below as of 26 Nov 2024 21:14  Patient On (Oxygen Delivery Method): room air  General: NAD  HEENT: MMM  Neck: No JVD, no carotid bruit  Lungs: CTAB  CV: RRR, nl S1/S2, no M/R/G  Abdomen: S/NT/ND, +BS  Extremities: +Lymphedema, no cyanosis  Neuro: AAOx3, non-focal  Skin: No rash    Labs:    11-27    144  |  109[H]  |  48[H]  ----------------------------<  88  4.4   |  35[H]  |  1.30    Ca    9.7      27 Nov 2024 05:45    TPro  6.5  /  Alb  2.5[L]  /  TBili  0.5  /  DBili  x   /  AST  14[L]  /  ALT  17  /  AlkPhos  34[L]  11-27                        8.8    3.93  )-----------( 174      ( 27 Nov 2024 05:45 )             28.1

## 2024-11-27 NOTE — PROGRESS NOTE ADULT - SUBJECTIVE AND OBJECTIVE BOX
CHIEF COMPLAINT/INTERVAL HISTORY:  Pt. seen and evaluated for bilateral heel osteomyelitis.  Pt. is in no distress.  Denies having any CP or SOB.  Tolerating IV antibiotics.     REVIEW OF SYSTEMS:  No fever, CP, SOB, or abdominal pain     Vital Signs Last 24 Hrs  T(C): 36.5 (27 Nov 2024 06:40), Max: 36.7 (26 Nov 2024 21:14)  T(F): 97.7 (27 Nov 2024 06:40), Max: 98 (26 Nov 2024 21:14)  HR: 91 (27 Nov 2024 06:40) (76 - 91)  BP: 118/73 (27 Nov 2024 06:40) (118/73 - 135/78)  BP(mean): --  RR: 18 (27 Nov 2024 06:40) (18 - 18)  SpO2: 93% (27 Nov 2024 06:40) (93% - 99%)    Parameters below as of 26 Nov 2024 21:14  Patient On (Oxygen Delivery Method): room air        PHYSICAL EXAM:  GENERAL: NAD  HEENT: EOMI, hearing normal, conjunctiva and sclera clear  Chest: CTA bilaterally, no wheezing  CV: S1S2, RRR,   GI: soft, +BS, NT/ND  Musculoskeletal:  ACE wrapped dressing over bilateral feet  Psychiatric: affect nL, mood nL  Skin: warm and dry    LABS:                        8.8    3.93  )-----------( 174      ( 27 Nov 2024 05:45 )             28.1     11-27    144  |  109[H]  |  48[H]  ----------------------------<  88  4.4   |  35[H]  |  1.30    Ca    9.7      27 Nov 2024 05:45    TPro  6.5  /  Alb  2.5[L]  /  TBili  0.5  /  DBili  x   /  AST  14[L]  /  ALT  17  /  AlkPhos  34[L]  11-27      Urinalysis Basic - ( 27 Nov 2024 05:45 )    Color: x / Appearance: x / SG: x / pH: x  Gluc: 88 mg/dL / Ketone: x  / Bili: x / Urobili: x   Blood: x / Protein: x / Nitrite: x   Leuk Esterase: x / RBC: x / WBC x   Sq Epi: x / Non Sq Epi: x / Bacteria: x

## 2024-11-28 PROCEDURE — 99232 SBSQ HOSP IP/OBS MODERATE 35: CPT

## 2024-11-28 RX ADMIN — CHLORHEXIDINE GLUCONATE 1 APPLICATION(S): 1.2 RINSE ORAL at 05:12

## 2024-11-28 RX ADMIN — METOPROLOL TARTRATE 25 MILLIGRAM(S): 100 TABLET, FILM COATED ORAL at 05:12

## 2024-11-28 RX ADMIN — Medication 1000 MICROGRAM(S): at 10:32

## 2024-11-28 RX ADMIN — FAMOTIDINE 20 MILLIGRAM(S): 20 TABLET, FILM COATED ORAL at 10:32

## 2024-11-28 RX ADMIN — CLOPIDOGREL 75 MILLIGRAM(S): 75 TABLET, FILM COATED ORAL at 10:32

## 2024-11-28 RX ADMIN — TAMSULOSIN HYDROCHLORIDE 0.4 MILLIGRAM(S): 0.4 CAPSULE ORAL at 10:16

## 2024-11-28 RX ADMIN — NYSTATIN 1 APPLICATION(S): 100000 POWDER TOPICAL at 18:17

## 2024-11-28 RX ADMIN — Medication 80 MILLIGRAM(S): at 22:16

## 2024-11-28 RX ADMIN — ENOXAPARIN SODIUM 40 MILLIGRAM(S): 30 INJECTION SUBCUTANEOUS at 22:16

## 2024-11-28 RX ADMIN — Medication 250 MILLIGRAM(S): at 05:12

## 2024-11-28 RX ADMIN — Medication 81 MILLIGRAM(S): at 10:31

## 2024-11-28 RX ADMIN — MEROPENEM 100 MILLIGRAM(S): 500 INJECTION, POWDER, FOR SOLUTION INTRAVENOUS at 22:16

## 2024-11-28 RX ADMIN — Medication 250 MILLIGRAM(S): at 18:16

## 2024-11-28 RX ADMIN — INSULIN GLARGINE 8 UNIT(S): 100 INJECTION, SOLUTION SUBCUTANEOUS at 22:19

## 2024-11-28 RX ADMIN — ACETAMINOPHEN, DIPHENHYDRAMINE HCL, PHENYLEPHRINE HCL 3 MILLIGRAM(S): 325; 25; 5 TABLET ORAL at 22:16

## 2024-11-28 RX ADMIN — MEROPENEM 100 MILLIGRAM(S): 500 INJECTION, POWDER, FOR SOLUTION INTRAVENOUS at 05:12

## 2024-11-28 RX ADMIN — MEROPENEM 100 MILLIGRAM(S): 500 INJECTION, POWDER, FOR SOLUTION INTRAVENOUS at 14:15

## 2024-11-28 RX ADMIN — Medication 500 MILLIGRAM(S): at 10:32

## 2024-11-28 RX ADMIN — Medication 500 MILLIGRAM(S): at 22:16

## 2024-11-28 RX ADMIN — METOPROLOL TARTRATE 25 MILLIGRAM(S): 100 TABLET, FILM COATED ORAL at 18:16

## 2024-11-28 RX ADMIN — NYSTATIN 1 APPLICATION(S): 100000 POWDER TOPICAL at 05:12

## 2024-11-28 NOTE — PROGRESS NOTE ADULT - ASSESSMENT
82 yo male with hypertension, diabetes, hyperlipidemia, bilateral heel osteomyelitis currently on outpatient antibiotics through midline presented for surgery planned with podiatry, Gabriel Deshpande, for his bilateral heel osteomyelitis. His osteomyelitis started 6 months PTA.   Calcanectomy, partial 29-Oct-2024 12:58:43  Jona Mason    bilateral heel osteomyelitis  prior micro with Proteus mirabilis, Pseudomonas aeruginosa, Enterococcus faecalis, Klebsiella pneumoniae ESBL  Culture - Tissue with Gram Stain (10.29.24 @ 12:00) Rare Pseudomonas aeruginosa  -  Ciprofloxacin: S <=0.25-  Levofloxacin: S <=0.5-  Meropenem: S <=1  Culture - Tissue with Gram Stain (11.19.24 @ 16:50) Pseudomonas aeruginosa (Carbapenem Resistant), Enterococcus faecalis -Ciprofloxacin: S 0.5, Meropenem: R >8, Piperacillin/Tazobactam: R >64   Path- Right heel bone proximal margin- Acute and chronic osteomyelitis, Left heel bone proximal margin- Acute and chronic osteomyelitis    Recommendations:   Residual osteo so recommended   Meropenem 1 gram IV q8 and  cipro 500 mg PO BID with last day 12/9  weekly CBC w diff, CMP, ESR  PICC can be removed at end of Rx    Thank you for consulting us and involving us in the management of this most interesting and challenging case.  We will follow along in the care of this patient. Please call us at [phone] or text me directly on my cell# at [phone] with any concerns.    Starting tomorrow Dr Queta Ngo will be assuming care of this patient so please contact her with any questions, concerns or new micro data.

## 2024-11-28 NOTE — PROGRESS NOTE ADULT - SUBJECTIVE AND OBJECTIVE BOX
Chief Complaint: Heel ulcer    Interval Events: No events overnight.    Review of Systems:  General: No fevers, chills, weight gain  Skin: No rashes, color changes  Cardiovascular: No chest pain, orthopnea  Respiratory: No shortness of breath, cough  Gastrointestinal: No nausea, abdominal pain  Genitourinary: No incontinence, pain with urination  Musculoskeletal: No pain, swelling, decreased range of motion  Neurological: No headache, weakness  Psychiatric: No depression, anxiety  Endocrine: No weight gain, increased thirst  All other systems are comprehensively negative.    Physical Exam:  Vital Signs Last 24 Hrs  T(C): 36.4 (28 Nov 2024 04:57), Max: 36.9 (27 Nov 2024 21:48)  T(F): 97.6 (28 Nov 2024 04:57), Max: 98.5 (27 Nov 2024 21:48)  HR: 84 (28 Nov 2024 04:57) (76 - 84)  BP: 138/81 (28 Nov 2024 04:57) (137/77 - 149/83)  BP(mean): --  RR: 18 (28 Nov 2024 04:57) (18 - 18)  SpO2: 96% (28 Nov 2024 04:57) (96% - 99%)  Parameters below as of 28 Nov 2024 04:57  Patient On (Oxygen Delivery Method): room air  General: NAD  HEENT: MMM  Neck: No JVD, no carotid bruit  Lungs: CTAB  CV: RRR, nl S1/S2, no M/R/G  Abdomen: S/NT/ND, +BS  Extremities: +Lymphedema, no cyanosis  Neuro: AAOx3, non-focal  Skin: No rash    Labs:    11-27    144  |  109[H]  |  48[H]  ----------------------------<  88  4.4   |  35[H]  |  1.30    Ca    9.7      27 Nov 2024 05:45    TPro  6.5  /  Alb  2.5[L]  /  TBili  0.5  /  DBili  x   /  AST  14[L]  /  ALT  17  /  AlkPhos  34[L]  11-27                        8.8    3.93  )-----------( 174      ( 27 Nov 2024 05:45 )             28.1

## 2024-11-28 NOTE — PROGRESS NOTE ADULT - SUBJECTIVE AND OBJECTIVE BOX
CHIEF COMPLAINT/INTERVAL HISTORY:  Pt. seen and evaluated for bilateral heel osteomyelitis.  Pt. is in no distress.  Denies having any significant pain.  Tolerating antibiotics.      REVIEW OF SYSTEMS:  No fever, CP, SOB, or abdominal pain      Vital Signs Last 24 Hrs  T(C): 36.4 (28 Nov 2024 04:57), Max: 36.9 (27 Nov 2024 21:48)  T(F): 97.6 (28 Nov 2024 04:57), Max: 98.5 (27 Nov 2024 21:48)  HR: 84 (28 Nov 2024 04:57) (76 - 84)  BP: 138/81 (28 Nov 2024 04:57) (138/81 - 149/83)  BP(mean): --  RR: 18 (28 Nov 2024 04:57) (18 - 18)  SpO2: 96% (28 Nov 2024 04:57) (96% - 98%)    Parameters below as of 28 Nov 2024 04:57  Patient On (Oxygen Delivery Method): room air        PHYSICAL EXAM:  GENERAL: NAD  HEENT: EOMI, hearing normal, conjunctiva and sclera clear  Chest: CTA bilaterally, no wheezing  CV: S1S2, RRR,   GI: soft, +BS, NT/ND  Musculoskeletal: ACE wrapped dressing over bilateral feet  Psychiatric: affect nL, mood nL  Skin: warm and dry    LABS:                        8.8    3.93  )-----------( 174      ( 27 Nov 2024 05:45 )             28.1     11-27    144  |  109[H]  |  48[H]  ----------------------------<  88  4.4   |  35[H]  |  1.30    Ca    9.7      27 Nov 2024 05:45    TPro  6.5  /  Alb  2.5[L]  /  TBili  0.5  /  DBili  x   /  AST  14[L]  /  ALT  17  /  AlkPhos  34[L]  11-27      Urinalysis Basic - ( 27 Nov 2024 05:45 )    Color: x / Appearance: x / SG: x / pH: x  Gluc: 88 mg/dL / Ketone: x  / Bili: x / Urobili: x   Blood: x / Protein: x / Nitrite: x   Leuk Esterase: x / RBC: x / WBC x   Sq Epi: x / Non Sq Epi: x / Bacteria: x

## 2024-11-28 NOTE — PROGRESS NOTE ADULT - ASSESSMENT
84yo M with a PMH of HTN, HLD, Type 2 DM, CKD3a, hx of GI bleed, COPD, SANTO, BPH, CAD s/p PCI, PAD, multiple myeloma, lymphedema who presents with b/l heel ulcers and suspected OM planned for bilateral foot surgery with podiatry, Gabriel Deshpande, on 10/29 for his suspected bilateral heel osteomyelitis now s/p partial calcanectomy.        Problem/Plan - 1:  ·  Problem: Osteomyelitis of foot.   ·  Plan: - Bilateral heel osteomyelitis per outpatient w/up and admitted with a plan for OR with podiatry, Dr. Rios on 10/29  - Wound Cx (10/23): Proteus mirabilis, Pseudomonas aeruginosa, ESBL Klebsiella pneumoniae, Enterococcus faecalis  - Blood Cx NG  - s/p partial calcanectomy on 10/29.    - f/up OR cultures +rare pseudomonas aeruginosa.  Pathology: bilateral heel acute and chronic osteomyelitis.  - Tylenol PRN for mild pain.  - Consulted ID Dr. Casillas, recs appreciated   - PICC line placed.  Continue meropenem till Dec 9.    - Continue Florastor  - afebrile, no leukocytosis, dressing/wound care as per podiatry recommendation   - cardiology Dr. Rock consulted, recs appreciated.  - Patient scheduled for right heel debridement on 11/19. doing well post op , tissue culture 11/19 pseudomonas (carbapenem resistant).  Started on Cipro 500mg PO Q12h.     Problem/Plan - 2:  ·  Problem: DM (diabetes mellitus).   ·  Plan: - type 2 DM on insulin  - continue decreased lantus dose 8units QHS   - continue low-dose lispro corrective ISS  - controlled      Problem/Plan - 3:  ·  Problem: HTN (hypertension).   ·  Plan: Chronic   - continue metoprolol 25mg BID.     Problem/Plan - 4:  ·  Problem: HLD (hyperlipidemia).   ·  Plan: Chronic   - Continue atorvastatin 80mg QD.     Problem/Plan - 5:  ·  Problem: CAD (coronary artery disease).   ·  Plan: CAD sp stents x4  - Held ASA and Plavix on 10/29; Restarted on ASA and Plavix on 10/30  - c/w lipitor  - cardiology eval noted      Problem/Plan - 6:  ·  Problem: Benign prostatic hyperplasia with lower urinary tract symptoms, symptom details unspecified.   ·  Plan: Chronic  -Continue Flomax.     Problem/Plan - 7:  ·  Problem: Multiple myeloma.   ·  Plan: Chronic  - Chemotherapy held      Problem/Plan - 8:   DVT ppx: continue lovenox 40mg SQ daily

## 2024-11-28 NOTE — PROGRESS NOTE ADULT - SUBJECTIVE AND OBJECTIVE BOX
[facility] DIVISION of INFECTIOUS DISEASE  Michoacano Casillas MD PhD, Cherry Vincent MD, Queta Ngo MD, Roselia Mcmanus MD, Andrez Bolden MD  and providing coverage with Trey Montelongo MD  Providing Infectious Disease Consultations at [facility], [facility], [facility], [facility]'s    Office# [phone] to schedule follow up appointments  Answering Service for urgent calls or New Consults [phone]  Cell# to text for urgent issues Michoacano Casillas [phone]     infectious diseases progress note:    DALILA HERNADEZ is a 84y y. o. Male patient    Overnight and events of the last 24hrs reviewed    Allergies    No Known Allergies    Intolerances        ANTIBIOTICS/RELEVANT:  antimicrobials  ciprofloxacin     Tablet 500 milliGRAM(s) Oral every 12 hours  meropenem  IVPB 1000 milliGRAM(s) IV Intermittent every 8 hours    immunologic:    OTHER:  acetaminophen     Tablet .. 650 milliGRAM(s) Oral every 6 hours PRN  aluminum hydroxide/magnesium hydroxide/simethicone Suspension 30 milliLiter(s) Oral every 4 hours PRN  aspirin  chewable 81 milliGRAM(s) Oral daily  atorvastatin 80 milliGRAM(s) Oral at bedtime  chlorhexidine 2% Cloths 1 Application(s) Topical daily  clopidogrel Tablet 75 milliGRAM(s) Oral daily  cyanocobalamin 1000 MICROGram(s) Oral daily  dextrose 5%. 1000 milliLiter(s) IV Continuous <Continuous>  dextrose 5%. 1000 milliLiter(s) IV Continuous <Continuous>  dextrose 50% Injectable 25 Gram(s) IV Push once  dextrose 50% Injectable 12.5 Gram(s) IV Push once  dextrose 50% Injectable 25 Gram(s) IV Push once  dextrose Oral Gel 15 Gram(s) Oral once PRN  enoxaparin Injectable 40 milliGRAM(s) SubCutaneous every 24 hours  famotidine    Tablet 20 milliGRAM(s) Oral daily  glucagon  Injectable 1 milliGRAM(s) IntraMuscular once  insulin glargine Injectable (LANTUS) 8 Unit(s) SubCutaneous at bedtime  insulin lispro (ADMELOG) corrective regimen sliding scale   SubCutaneous three times a day before meals  melatonin 3 milliGRAM(s) Oral at bedtime  metoprolol tartrate 25 milliGRAM(s) Oral two times a day  nystatin Powder 1 Application(s) Topical two times a day  polyethylene glycol 3350 17 Gram(s) Oral daily  saccharomyces boulardii 250 milliGRAM(s) Oral two times a day  senna 1 Tablet(s) Oral with breakfast  sodium chloride 0.9% lock flush 10 milliLiter(s) IV Push every 1 hour PRN  tamsulosin 0.4 milliGRAM(s) Oral at bedtime      Objective:  Vital Signs Last 24 Hrs  T(C): 36.4 (28 Nov 2024 04:57), Max: 36.9 (27 Nov 2024 21:48)  T(F): 97.6 (28 Nov 2024 04:57), Max: 98.5 (27 Nov 2024 21:48)  HR: 84 (28 Nov 2024 04:57) (76 - 84)  BP: 138/81 (28 Nov 2024 04:57) (138/81 - 149/83)  BP(mean): --  RR: 18 (28 Nov 2024 04:57) (18 - 18)  SpO2: 96% (28 Nov 2024 04:57) (96% - 98%)    Parameters below as of 28 Nov 2024 04:57  Patient On (Oxygen Delivery Method): room air        T(C): 36.4 (11-28-24 @ 04:57), Max: 36.9 (11-27-24 @ 21:48)  T(C): 36.4 (11-28-24 @ 04:57), Max: 36.9 (11-27-24 @ 21:48)  T(C): 36.4 (11-28-24 @ 04:57), Max: 36.9 (11-27-24 @ 21:48)    PHYSICAL EXAM:  HEENT: NC atraumatic  Neck: supple  Respiratory: no accessory muscle use, breathing comfortably  Cardiovascular: distant  Gastrointestinal: normal appearing, nondistended  Extremities: no clubbing, no cyanosis,        LABS:                          8.8    3.93  )-----------( 174      ( 27 Nov 2024 05:45 )             28.1       WBC  3.93 11-27 @ 05:45  4.56 11-23 @ 07:50      11-27    144  |  109[H]  |  48[H]  ----------------------------<  88  4.4   |  35[H]  |  1.30    Ca    9.7      27 Nov 2024 05:45    TPro  6.5  /  Alb  2.5[L]  /  TBili  0.5  /  DBili  x   /  AST  14[L]  /  ALT  17  /  AlkPhos  34[L]  11-27      Creatinine: 1.30 mg/dL (11-27-24 @ 05:45)  Creatinine: 1.20 mg/dL (11-23-24 @ 07:50)        Urinalysis Basic - ( 27 Nov 2024 05:45 )    Color: x / Appearance: x / SG: x / pH: x  Gluc: 88 mg/dL / Ketone: x  / Bili: x / Urobili: x   Blood: x / Protein: x / Nitrite: x   Leuk Esterase: x / RBC: x / WBC x   Sq Epi: x / Non Sq Epi: x / Bacteria: x            INFLAMMATORY MARKERS      MICROBIOLOGY:              RADIOLOGY & ADDITIONAL STUDIES:

## 2024-11-28 NOTE — CHART NOTE - NSCHARTNOTEFT_GEN_A_CORE
Assessment: Pt seen for nutrition follow-up. Chart reviewed, hospital course noted.    Brief hx: Pt is a "84 yo M with a PMH of HTN, HLD, Type 2 DM, CKD3a, hx of GI bleed, COPD, SANTO, BPH, CAD s/p PCI, PAD, multiple myeloma, lymphedema who presents with b/l heel ulcers and suspected OM planned for bilateral foot surgery with podiatry, Gabriel Deshpande, on 10/29 for his suspected bilateral heel osteomyelitis now s/p partial calcanectomy."    Visited pt at beside this am. Pt resting during visit, did not disturb at this time. Good appetite/intake noted. PO intakes % per nursing documentation. No report N/V. +BM 11/25; bowel regimen rx. Pt continues on consistent carbohydrate, DASH/TLC diet. Will continue to honor pt's food preferences as able to optimize po intake/tolerance. RD remains available and will continue to follow-up.     Factors impacting intake: [X] none [ ] nausea  [ ] vomiting [ ] diarrhea [ ] constipation  [ ]chewing problems [ ] swallowing issues  [ ] other:     Diet Prescription: Diet, Regular:   Consistent Carbohydrate {Evening Snack}  DASH/TLC {Sodium & Cholesterol Restricted} (11-19-24 @ 17:09)    Intake: good    Current Weight: 10/28 259#, 11/20 270#, 11/25 276.9# (4+ BL foot edema noted)  % Weight Change    Pertinent Medications: MEDICATIONS  (STANDING):  aspirin  chewable 81 milliGRAM(s) Oral daily  atorvastatin 80 milliGRAM(s) Oral at bedtime  chlorhexidine 2% Cloths 1 Application(s) Topical daily  ciprofloxacin     Tablet 500 milliGRAM(s) Oral every 12 hours  clopidogrel Tablet 75 milliGRAM(s) Oral daily  cyanocobalamin 1000 MICROGram(s) Oral daily  dextrose 5%. 1000 milliLiter(s) (50 mL/Hr) IV Continuous <Continuous>  dextrose 5%. 1000 milliLiter(s) (100 mL/Hr) IV Continuous <Continuous>  dextrose 50% Injectable 25 Gram(s) IV Push once  dextrose 50% Injectable 12.5 Gram(s) IV Push once  dextrose 50% Injectable 25 Gram(s) IV Push once  enoxaparin Injectable 40 milliGRAM(s) SubCutaneous every 24 hours  famotidine    Tablet 20 milliGRAM(s) Oral daily  glucagon  Injectable 1 milliGRAM(s) IntraMuscular once  insulin glargine Injectable (LANTUS) 8 Unit(s) SubCutaneous at bedtime  insulin lispro (ADMELOG) corrective regimen sliding scale   SubCutaneous three times a day before meals  melatonin 3 milliGRAM(s) Oral at bedtime  meropenem  IVPB 1000 milliGRAM(s) IV Intermittent every 8 hours  metoprolol tartrate 25 milliGRAM(s) Oral two times a day  nystatin Powder 1 Application(s) Topical two times a day  polyethylene glycol 3350 17 Gram(s) Oral daily  saccharomyces boulardii 250 milliGRAM(s) Oral two times a day  senna 1 Tablet(s) Oral with breakfast  tamsulosin 0.4 milliGRAM(s) Oral at bedtime    MEDICATIONS  (PRN):  acetaminophen     Tablet .. 650 milliGRAM(s) Oral every 6 hours PRN Temp greater or equal to 38C (100.4F), Mild Pain (1 - 3)  aluminum hydroxide/magnesium hydroxide/simethicone Suspension 30 milliLiter(s) Oral every 4 hours PRN Dyspepsia  dextrose Oral Gel 15 Gram(s) Oral once PRN Blood Glucose LESS THAN 70 milliGRAM(s)/deciliter  sodium chloride 0.9% lock flush 10 milliLiter(s) IV Push every 1 hour PRN Pre/post blood products, medications, blood draw, and to maintain line patency    Pertinent Labs: 11-27 Na144 mmol/L Glu 88 mg/dL K+ 4.4 mmol/L Cr  1.30 mg/dL BUN 48 mg/dL[H] 11-27 Alb 2.5 g/dL[L]    CAPILLARY BLOOD GLUCOSE  POCT Blood Glucose.: 103 mg/dL (28 Nov 2024 12:16)  POCT Blood Glucose.: 96 mg/dL (28 Nov 2024 08:13)  POCT Blood Glucose.: 153 mg/dL (27 Nov 2024 22:23)  POCT Blood Glucose.: 127 mg/dL (27 Nov 2024 17:33)    Skin: R leg abrasion, R inner ankle wound    Estimated Needs:   [X] no change since previous assessment: based on IBW of 196# /89 kg ( 23-28 kcals/kg) 7230-2528 kcals and ( 1.1-1.3 gm pro/kg IBW ) 100-115 gm protein   [ ] recalculated:     Previous Nutrition Diagnosis:   [ ] Inadequate Energy Intake [ ]Inadequate Oral Intake [ ] Excessive Energy Intake   [ ] Underweight [ ] Increased Nutrient Needs [X] Overweight/Obesity   [ ] Altered GI Function [ ] Unintended Weight Loss [ ] Food & Nutrition Related Knowledge Deficit [ ] Malnutrition     Nutrition Diagnosis is [X] ongoing  [ ] resolved [ ] not applicable     New Nutrition Diagnosis: [X] not applicable     Interventions:   Recommend  [ ] Change Diet To:  [ ] Nutrition Supplement  [ ] Nutrition Support  [X] Other: Continue current diet as tolerated; honor food preferences as able to optimize po intake/tolerance  Recommend MVI daily     Monitoring and Evaluation:   [X] PO intake [ x ] Tolerance to diet prescription [ x ] weights [ x ] labs[ x ] follow up per protocol  [X] other: s/s GI distress, bowel function, skin integrity/ edema

## 2024-11-29 PROCEDURE — 99232 SBSQ HOSP IP/OBS MODERATE 35: CPT

## 2024-11-29 RX ORDER — MEROPENEM 500 MG/1
1000 INJECTION, POWDER, FOR SOLUTION INTRAVENOUS EVERY 8 HOURS
Refills: 0 | Status: COMPLETED | OUTPATIENT
Start: 2024-11-29 | End: 2024-12-09

## 2024-11-29 RX ORDER — MEROPENEM 500 MG/1
1000 INJECTION, POWDER, FOR SOLUTION INTRAVENOUS EVERY 8 HOURS
Refills: 0 | Status: DISCONTINUED | OUTPATIENT
Start: 2024-11-29 | End: 2024-11-29

## 2024-11-29 RX ADMIN — Medication 500 MILLIGRAM(S): at 09:33

## 2024-11-29 RX ADMIN — FAMOTIDINE 20 MILLIGRAM(S): 20 TABLET, FILM COATED ORAL at 12:11

## 2024-11-29 RX ADMIN — METOPROLOL TARTRATE 25 MILLIGRAM(S): 100 TABLET, FILM COATED ORAL at 18:21

## 2024-11-29 RX ADMIN — CHLORHEXIDINE GLUCONATE 1 APPLICATION(S): 1.2 RINSE ORAL at 05:47

## 2024-11-29 RX ADMIN — METOPROLOL TARTRATE 25 MILLIGRAM(S): 100 TABLET, FILM COATED ORAL at 05:46

## 2024-11-29 RX ADMIN — ACETAMINOPHEN, DIPHENHYDRAMINE HCL, PHENYLEPHRINE HCL 3 MILLIGRAM(S): 325; 25; 5 TABLET ORAL at 22:34

## 2024-11-29 RX ADMIN — TAMSULOSIN HYDROCHLORIDE 0.4 MILLIGRAM(S): 0.4 CAPSULE ORAL at 22:34

## 2024-11-29 RX ADMIN — Medication 81 MILLIGRAM(S): at 12:12

## 2024-11-29 RX ADMIN — ENOXAPARIN SODIUM 40 MILLIGRAM(S): 30 INJECTION SUBCUTANEOUS at 22:35

## 2024-11-29 RX ADMIN — NYSTATIN 1 APPLICATION(S): 100000 POWDER TOPICAL at 18:22

## 2024-11-29 RX ADMIN — MEROPENEM 100 MILLIGRAM(S): 500 INJECTION, POWDER, FOR SOLUTION INTRAVENOUS at 12:15

## 2024-11-29 RX ADMIN — MEROPENEM 100 MILLIGRAM(S): 500 INJECTION, POWDER, FOR SOLUTION INTRAVENOUS at 05:46

## 2024-11-29 RX ADMIN — CLOPIDOGREL 75 MILLIGRAM(S): 75 TABLET, FILM COATED ORAL at 12:11

## 2024-11-29 RX ADMIN — NYSTATIN 1 APPLICATION(S): 100000 POWDER TOPICAL at 05:47

## 2024-11-29 RX ADMIN — Medication 250 MILLIGRAM(S): at 18:22

## 2024-11-29 RX ADMIN — MEROPENEM 100 MILLIGRAM(S): 500 INJECTION, POWDER, FOR SOLUTION INTRAVENOUS at 22:34

## 2024-11-29 RX ADMIN — Medication 250 MILLIGRAM(S): at 05:46

## 2024-11-29 RX ADMIN — Medication 80 MILLIGRAM(S): at 22:34

## 2024-11-29 RX ADMIN — Medication 1000 MICROGRAM(S): at 12:10

## 2024-11-29 RX ADMIN — Medication 500 MILLIGRAM(S): at 22:34

## 2024-11-29 RX ADMIN — INSULIN GLARGINE 8 UNIT(S): 100 INJECTION, SOLUTION SUBCUTANEOUS at 22:36

## 2024-11-29 NOTE — PROGRESS NOTE ADULT - PROBLEM SELECTOR PLAN 2
- type 2 DM on insulin  - Home basal insulin 23 U at bedtime, given half dose of 12un last night but mild hypoglycemia in the mid-60s this morning and in PACU -- pt given D50; started on D5 1/2NS and will maintain overnight   - decreased lantus dose further to 8un QHS and pt not starting to eat   - will not give pre-meal standing lispro for now, just low-dose lispro corrective ISS  - monitor FSG  - continue gabapentin for b/l LE neuropathy
Patient examined and evaluated this time. Wounds are healing well and right wound now has healthy granular wound bed. Patient advised regarding etiology of his symptoms and treatment plan.  Continue with local care and offloading to bilateral heels at this time.  Continue with antibiotics as per infectious disease recommendations.    Wound Care Instructions:  1. Remove bilateral heel wound dressings.  2. Dress wound with aquacel Ag, and dry, sterile dressing, and ACE bandage.  3. Change dressings three times a week (MWF or TThSat) and monitor for any signs of infection.  4. Patient is to follow up in [facility] within 1 week upon discharge.
Patient examined and evaluated this time. Wounds are healing well and right wound now has healthy granular wound bed. Patient advised regarding etiology of his symptoms and treatment plan.  Continue with local care and offloading to bilateral heels at this time.  Continue with antibiotics as per infectious disease recommendations.  Patient can be full weight bearing to left foot and partial weight bearing to right forefoot in surgical shoes.     Wound Care Instructions:  1. Remove bilateral heel wound dressings.  2. Dress wound with aquacel Ag, and dry, sterile dressing, and ACE bandage.  3. Change dressings three times a week (MWF or TThSat) and monitor for any signs of infection.  4. Patient is to follow up in the [facility] within 1 week upon discharge.

## 2024-11-29 NOTE — PROGRESS NOTE ADULT - ASSESSMENT
84 yo male with hypertension, diabetes, hyperlipidemia, bilateral heel osteomyelitis currently on outpatient antibiotics through [facility] presented for surgery planned with podiatry, Gabriel Deshpande, for his bilateral heel osteomyelitis. His osteomyelitis started 6 months PTA.   Calcanectomy, partial 29-Oct-2024 12:58:43  Jona Mason    bilateral heel osteomyelitis  prior micro with Proteus mirabilis, Pseudomonas aeruginosa, Enterococcus faecalis, Klebsiella pneumoniae ESBL  Culture - Tissue with Gram Stain (10.29.24 @ 12:00) Rare Pseudomonas aeruginosa  -  Ciprofloxacin: S <=0.25-  Levofloxacin: S <=0.5-  Meropenem: S <=1  Culture - Tissue with Gram Stain (11.19.24 @ 16:50) Pseudomonas aeruginosa (Carbapenem Resistant), Enterococcus faecalis -Ciprofloxacin: S 0.5, Meropenem: R >8, Piperacillin/Tazobactam: R >64   Path- Right heel bone proximal margin- Acute and chronic osteomyelitis, Left heel bone proximal margin- Acute and chronic osteomyelitis    Recommendations:   Meropenem 1 gram IV q8h  Ciprofloxacin 500 mg PO BID  Last day of Abx 12/9  weekly CBC w diff, CMP, ESR  PICC can be removed at end of Rx  Additional care per primary team    Infectious Diseases will follow. Please call with any questions.  Queta Ngo M.D.  [facility] Division of Infectious Diseases [phone]  For after 5 P.M. and weekends, please call [phone]  Available on [employer] TEAMS

## 2024-11-29 NOTE — SOCIAL WORK PROGRESS NOTE - NSSWPROGRESSNOTE_GEN_ALL_CORE
Per conversation with Family, pt is being assessed by [facility] . Sw spoke with [facility] ([phone]) who will be coming to assess the patient for placement on Tuesday 12/3 at 11am. CM on unit also informed and family is in agreement.

## 2024-11-29 NOTE — PROGRESS NOTE ADULT - SUBJECTIVE AND OBJECTIVE BOX
Chief Complaint: Heel ulcer    Interval Events: No events overnight.    Review of Systems:  General: No fevers, chills, weight gain  Skin: No rashes, color changes  Cardiovascular: No chest pain, orthopnea  Respiratory: No shortness of breath, cough  Gastrointestinal: No nausea, abdominal pain  Genitourinary: No incontinence, pain with urination  Musculoskeletal: No pain, swelling, decreased range of motion  Neurological: No headache, weakness  Psychiatric: No depression, anxiety  Endocrine: No weight gain, increased thirst  All other systems are comprehensively negative.    Physical Exam:  Vital Signs Last 24 Hrs  T(C): 36.5 (29 Nov 2024 05:33), Max: 36.8 (28 Nov 2024 20:28)  T(F): 97.7 (29 Nov 2024 05:33), Max: 98.2 (28 Nov 2024 20:28)  HR: 83 (29 Nov 2024 05:33) (74 - 86)  BP: 124/77 (29 Nov 2024 05:33) (124/77 - 136/76)  BP(mean): --  RR: 18 (29 Nov 2024 05:33) (18 - 18)  SpO2: 97% (29 Nov 2024 05:33) (96% - 97%)  Parameters below as of 29 Nov 2024 05:33  Patient On (Oxygen Delivery Method): room air  General: NAD  HEENT: MMM  Neck: No JVD, no carotid bruit  Lungs: CTAB  CV: RRR, nl S1/S2, no M/R/G  Abdomen: S/NT/ND, +BS  Extremities: +Lymphedema, no cyanosis  Neuro: AAOx3, non-focal  Skin: No rash    Labs:

## 2024-11-29 NOTE — PROGRESS NOTE ADULT - SUBJECTIVE AND OBJECTIVE BOX
PROGRESS NOTE   Patient is a 84y old  Male who presents with a chief complaint of Bilateral Heel Osteomyelitis (2024 11:22)      HPI:  Patient with a past medical history of hypertension, diabetes, hyperlipidemia, bilateral heel osteomyelitis currently on outpatient antibiotics through [facility] is presenting for surgery.  He is scheduled for surgery with Dr. frias tomorrow.  Denies any fevers.  Endorsing pain to his heels but no other areas of pain.  No nausea or vomiting.  No other acute complaints today.    Denies fever, chills, chest pain, palpitations, SOB, cough, abdominal pain, nausea, vomiting, diarrhea, constipation, urinary frequency, urgency, or dysuria, headaches, changes in vision, dizziness, numbness, tingling.  Denies recent travel, recent antibiotic use, or sick contacts.    ED Course:   Vitals: BP: 161/87, HR 69: , Temp: 97.8 , RR: 18, SpO2: 96% on   Labs:  H/H: 10.6/34.4, PTT: 36.2, Albumin: 2.7  Tissue culture: (incomplete)      Surgical pathology report: (incomplete)  MR foot:   Xray foot:   EKBPM normal sinus rhythm, QTc: 477  Received in the ED:       HPI: Patient presents to [facility] from rehab for bilateral foot surgery planned with podiatry, Gabriel Deshpande, tomorrow for his bilateral heel osteomyelitis. States his osteomyelitis started 6 months ago, Currently c/o pain in b/l heels. Denies any numbness or tingling down LLE/RLE. Denies any trauma. Patient currently does not walk due to deconditioning. Denies any other complaints. Denies fevers, chills, HA, Dizziness, chest pain, SOB, N/V/D, bladder symptoms,     (28 Oct 2024 14:08)      Vital Signs Last 24 Hrs  T(C): 36.6 (2024 11:48), Max: 36.8 (2024 20:28)  T(F): 97.8 (2024 11:48), Max: 98.2 (2024 20:28)  HR: 76 (2024 11:48) (74 - 86)  BP: 127/79 (2024 11:48) (124/77 - 136/76)  BP(mean): --  RR: 18 (2024 11:48) (18 - 18)  SpO2: 97% (2024 11:48) (96% - 97%)    Parameters below as of 2024 11:48  Patient On (Oxygen Delivery Method): room air                            PHYSICAL EXAM  Vascular: DP & PT palpable bilaterally, Capillary refill 3 seconds  Neurological: Light touch sensation not intact bilaterally  Musculoskeletal: 4/5 strength in all quadrants bilaterally, AJ & STJ ROM intact  Dermatological: Bilateral heel wounds down to skin, subcutaneous tissue, fat, bone (left healing well, right healthy with granular wound base), no proximal streaking, no fluctuance, no malodor, no signs of acute infection at this time.

## 2024-11-29 NOTE — PROGRESS NOTE ADULT - SUBJECTIVE AND OBJECTIVE BOX
[facility], Division of Infectious Diseases  MARLEEN Graves Y. Patel, S. Shah, G. [facility]  [phone]    Name: DALILA HERNADEZ  Age: 84y  Gender: Male  MRN: [id number]    Interval History:  No acute overnight events.   Notes reviewed    Antibiotics:  ciprofloxacin     Tablet 500 milliGRAM(s) Oral every 12 hours  meropenem  IVPB 1000 milliGRAM(s) IV Intermittent every 8 hours      Medications:  acetaminophen     Tablet .. 650 milliGRAM(s) Oral every 6 hours PRN  aluminum hydroxide/magnesium hydroxide/simethicone Suspension 30 milliLiter(s) Oral every 4 hours PRN  aspirin  chewable 81 milliGRAM(s) Oral daily  atorvastatin 80 milliGRAM(s) Oral at bedtime  chlorhexidine 2% Cloths 1 Application(s) Topical daily  ciprofloxacin     Tablet 500 milliGRAM(s) Oral every 12 hours  clopidogrel Tablet 75 milliGRAM(s) Oral daily  cyanocobalamin 1000 MICROGram(s) Oral daily  dextrose 5%. 1000 milliLiter(s) IV Continuous <Continuous>  dextrose 5%. 1000 milliLiter(s) IV Continuous <Continuous>  dextrose 50% Injectable 25 Gram(s) IV Push once  dextrose 50% Injectable 12.5 Gram(s) IV Push once  dextrose 50% Injectable 25 Gram(s) IV Push once  dextrose Oral Gel 15 Gram(s) Oral once PRN  enoxaparin Injectable 40 milliGRAM(s) SubCutaneous every 24 hours  famotidine    Tablet 20 milliGRAM(s) Oral daily  glucagon  Injectable 1 milliGRAM(s) IntraMuscular once  insulin glargine Injectable (LANTUS) 8 Unit(s) SubCutaneous at bedtime  insulin lispro (ADMELOG) corrective regimen sliding scale   SubCutaneous three times a day before meals  melatonin 3 milliGRAM(s) Oral at bedtime  meropenem  IVPB 1000 milliGRAM(s) IV Intermittent every 8 hours  metoprolol tartrate 25 milliGRAM(s) Oral two times a day  nystatin Powder 1 Application(s) Topical two times a day  polyethylene glycol 3350 17 Gram(s) Oral daily  saccharomyces boulardii 250 milliGRAM(s) Oral two times a day  senna 1 Tablet(s) Oral with breakfast  sodium chloride 0.9% lock flush 10 milliLiter(s) IV Push every 1 hour PRN  tamsulosin 0.4 milliGRAM(s) Oral at bedtime      Review of Systems:  A 10-point review of systems was obtained.   Review of systems otherwise negative except as previously noted.    Allergies: No Known Allergies    For details regarding the patient's past medical history, social history, family history, and other miscellaneous elements, please refer the initial infectious diseases consultation and/or the admitting history and physical examination for this admission.    Objective:  Vitals:   T(C): 36.5 (11-29-24 @ 05:33), Max: 36.8 (11-28-24 @ 20:28)  HR: 83 (11-29-24 @ 05:33) (74 - 86)  BP: 124/77 (11-29-24 @ 05:33) (124/77 - 136/76)  RR: 18 (11-29-24 @ 05:33) (18 - 18)  SpO2: 97% (11-29-24 @ 05:33) (96% - 97%)    Physical Examination:  General: no acute distress  HEENT: NC/AT, EOMI,   Cardio: S1, S2 heard, RRR, no murmurs  Resp: breath sounds heard bilaterally, no rales, wheezes or rhonchi  Abd: soft, NT, ND  Ext: no edema or cyanosis, foot in dressing  Skin: warm, dry, no visible rash      Laboratory Studies:  CBC:   CMP:             Microbiology: reviewed    Culture - Tissue with Gram Stain (collected 11-19-24 @ 16:50)  Source: Tissue  Gram Stain (11-21-24 @ 14:49):    No polymorphonuclear cells seen per low power field    No organisms seen per oil power field  Final Report (11-25-24 @ 09:27):    Few Pseudomonas aeruginosa (Carbapenem Resistant)    Rare Enterococcus faecalis  Organism: Pseudomonas aeruginosa (Carbapenem Resistant)  Enterococcus faecalis (11-25-24 @ 09:27)  Organism: Enterococcus faecalis (11-25-24 @ 09:27)      Method Type: VICKY      -  Ampicillin: S <=2 Predicts results to ampicillin/sulbactam, amoxacillin-clavulanate and  piperacillin-tazobactam.      -  Vancomycin: S 1  Organism: Pseudomonas aeruginosa (Carbapenem Resistant) (11-25-24 @ 09:27)      Method Type: CarbaR      -  Resistance Gene to Carbapenem: Nondet  Organism: Pseudomonas aeruginosa (Carbapenem Resistant) (11-25-24 @ 09:27)      Method Type: VICKY      -  Aztreonam: R >16      -  Cefepime: R >16      -  Ceftazidime: R >16      -  Ceftazidime/Avibactam: S 8      -  Ceftolozane/tazobactam: S <=2      -  Ciprofloxacin: S 0.5      -  Imipenem: R >8      -  Levofloxacin: I 2      -  Meropenem: R >8      -  Piperacillin/Tazobactam: R >64          Radiology: reviewed

## 2024-11-29 NOTE — PROGRESS NOTE ADULT - SUBJECTIVE AND OBJECTIVE BOX
CHIEF COMPLAINT/INTERVAL HISTORY:  Pt. seen and evaluated for bilateral heel osteomyelitis.  Pt. is in no distress.  Awake and responsive.  Denies having any significant pain.   Tolerating antibiotics.     REVIEW OF SYSTEMS:  No fever, CP, SOB, or abdominal pain.      Vital Signs Last 24 Hrs  T(C): 36.5 (29 Nov 2024 05:33), Max: 36.8 (28 Nov 2024 20:28)  T(F): 97.7 (29 Nov 2024 05:33), Max: 98.2 (28 Nov 2024 20:28)  HR: 83 (29 Nov 2024 05:33) (74 - 86)  BP: 124/77 (29 Nov 2024 05:33) (124/77 - 136/76)  BP(mean): --  RR: 18 (29 Nov 2024 05:33) (18 - 18)  SpO2: 97% (29 Nov 2024 05:33) (96% - 97%)    Parameters below as of 29 Nov 2024 05:33  Patient On (Oxygen Delivery Method): room air        PHYSICAL EXAM:  GENERAL: NAD  HEENT: EOMI, hearing normal, conjunctiva and sclera clear  Chest: CTA bilaterally, no wheezing  CV: S1S2, RRR,   GI: soft, +BS, NT/ND  Musculoskeletal: ACE wrapped dressing over bilateral feet  Psychiatric: affect nL, mood nL  Skin: warm and dry

## 2024-11-29 NOTE — CASE MANAGEMENT PROGRESS NOTE - NSCMPROGRESSNOTE_GEN_ALL_CORE
Patient discussed in rounds and remains acute on Meropenem till 12/9. Discharge disposition is new [facility].  SW involved.  CM remains available throughout hospital stay.

## 2024-11-30 LAB
ANION GAP SERPL CALC-SCNC: 5 MMOL/L — SIGNIFICANT CHANGE UP (ref 5–17)
BASOPHILS # BLD AUTO: 0.01 K/UL — SIGNIFICANT CHANGE UP (ref 0–0.2)
BASOPHILS NFR BLD AUTO: 0.3 % — SIGNIFICANT CHANGE UP (ref 0–2)
BUN SERPL-MCNC: 42 MG/DL — HIGH (ref 7–23)
CALCIUM SERPL-MCNC: 9.8 MG/DL — SIGNIFICANT CHANGE UP (ref 8.5–10.1)
CHLORIDE SERPL-SCNC: 107 MMOL/L — SIGNIFICANT CHANGE UP (ref 96–108)
CO2 SERPL-SCNC: 32 MMOL/L — HIGH (ref 22–31)
CREAT SERPL-MCNC: 1.5 MG/DL — HIGH (ref 0.5–1.3)
EGFR: 46 ML/MIN/1.73M2 — LOW
EOSINOPHIL # BLD AUTO: 0.45 K/UL — SIGNIFICANT CHANGE UP (ref 0–0.5)
EOSINOPHIL NFR BLD AUTO: 11.9 % — HIGH (ref 0–6)
GLUCOSE SERPL-MCNC: 74 MG/DL — SIGNIFICANT CHANGE UP (ref 70–99)
HCT VFR BLD CALC: 29.1 % — LOW (ref 39–50)
HGB BLD-MCNC: 9.1 G/DL — LOW (ref 13–17)
IMM GRANULOCYTES NFR BLD AUTO: 0.3 % — SIGNIFICANT CHANGE UP (ref 0–0.9)
LYMPHOCYTES # BLD AUTO: 1.34 K/UL — SIGNIFICANT CHANGE UP (ref 1–3.3)
LYMPHOCYTES # BLD AUTO: 35.4 % — SIGNIFICANT CHANGE UP (ref 13–44)
MAGNESIUM SERPL-MCNC: 1.9 MG/DL — SIGNIFICANT CHANGE UP (ref 1.6–2.6)
MCHC RBC-ENTMCNC: 28.6 PG — SIGNIFICANT CHANGE UP (ref 27–34)
MCHC RBC-ENTMCNC: 31.3 G/DL — LOW (ref 32–36)
MCV RBC AUTO: 91.5 FL — SIGNIFICANT CHANGE UP (ref 80–100)
MONOCYTES # BLD AUTO: 0.31 K/UL — SIGNIFICANT CHANGE UP (ref 0–0.9)
MONOCYTES NFR BLD AUTO: 8.2 % — SIGNIFICANT CHANGE UP (ref 2–14)
NEUTROPHILS # BLD AUTO: 1.66 K/UL — LOW (ref 1.8–7.4)
NEUTROPHILS NFR BLD AUTO: 43.9 % — SIGNIFICANT CHANGE UP (ref 43–77)
NRBC # BLD: 0 /100 WBCS — SIGNIFICANT CHANGE UP (ref 0–0)
PLATELET # BLD AUTO: 166 K/UL — SIGNIFICANT CHANGE UP (ref 150–400)
POTASSIUM SERPL-MCNC: 4.5 MMOL/L — SIGNIFICANT CHANGE UP (ref 3.5–5.3)
POTASSIUM SERPL-SCNC: 4.5 MMOL/L — SIGNIFICANT CHANGE UP (ref 3.5–5.3)
RBC # BLD: 3.18 M/UL — LOW (ref 4.2–5.8)
RBC # FLD: 16.1 % — HIGH (ref 10.3–14.5)
SODIUM SERPL-SCNC: 144 MMOL/L — SIGNIFICANT CHANGE UP (ref 135–145)
WBC # BLD: 3.78 K/UL — LOW (ref 3.8–10.5)
WBC # FLD AUTO: 3.78 K/UL — LOW (ref 3.8–10.5)

## 2024-11-30 PROCEDURE — 99232 SBSQ HOSP IP/OBS MODERATE 35: CPT

## 2024-11-30 RX ORDER — 0.9 % SODIUM CHLORIDE 0.9 %
1000 INTRAVENOUS SOLUTION INTRAVENOUS
Refills: 0 | Status: DISCONTINUED | OUTPATIENT
Start: 2024-11-30 | End: 2024-12-01

## 2024-11-30 RX ADMIN — Medication 500 MILLIGRAM(S): at 09:05

## 2024-11-30 RX ADMIN — FAMOTIDINE 20 MILLIGRAM(S): 20 TABLET, FILM COATED ORAL at 12:40

## 2024-11-30 RX ADMIN — MEROPENEM 100 MILLIGRAM(S): 500 INJECTION, POWDER, FOR SOLUTION INTRAVENOUS at 21:28

## 2024-11-30 RX ADMIN — CLOPIDOGREL 75 MILLIGRAM(S): 75 TABLET, FILM COATED ORAL at 12:40

## 2024-11-30 RX ADMIN — Medication 250 MILLIGRAM(S): at 17:57

## 2024-11-30 RX ADMIN — INSULIN GLARGINE 8 UNIT(S): 100 INJECTION, SOLUTION SUBCUTANEOUS at 22:22

## 2024-11-30 RX ADMIN — TAMSULOSIN HYDROCHLORIDE 0.4 MILLIGRAM(S): 0.4 CAPSULE ORAL at 21:28

## 2024-11-30 RX ADMIN — Medication 500 MILLIGRAM(S): at 21:27

## 2024-11-30 RX ADMIN — Medication 250 MILLIGRAM(S): at 05:31

## 2024-11-30 RX ADMIN — Medication 80 MILLIGRAM(S): at 21:27

## 2024-11-30 RX ADMIN — Medication 75 MILLILITER(S): at 15:39

## 2024-11-30 RX ADMIN — MEROPENEM 100 MILLIGRAM(S): 500 INJECTION, POWDER, FOR SOLUTION INTRAVENOUS at 05:31

## 2024-11-30 RX ADMIN — ENOXAPARIN SODIUM 40 MILLIGRAM(S): 30 INJECTION SUBCUTANEOUS at 22:48

## 2024-11-30 RX ADMIN — Medication 1000 MICROGRAM(S): at 12:40

## 2024-11-30 RX ADMIN — CHLORHEXIDINE GLUCONATE 1 APPLICATION(S): 1.2 RINSE ORAL at 05:31

## 2024-11-30 RX ADMIN — METOPROLOL TARTRATE 25 MILLIGRAM(S): 100 TABLET, FILM COATED ORAL at 17:56

## 2024-11-30 RX ADMIN — ACETAMINOPHEN, DIPHENHYDRAMINE HCL, PHENYLEPHRINE HCL 3 MILLIGRAM(S): 325; 25; 5 TABLET ORAL at 21:27

## 2024-11-30 RX ADMIN — MEROPENEM 100 MILLIGRAM(S): 500 INJECTION, POWDER, FOR SOLUTION INTRAVENOUS at 14:39

## 2024-11-30 RX ADMIN — METOPROLOL TARTRATE 25 MILLIGRAM(S): 100 TABLET, FILM COATED ORAL at 05:31

## 2024-11-30 RX ADMIN — NYSTATIN 1 APPLICATION(S): 100000 POWDER TOPICAL at 05:31

## 2024-11-30 RX ADMIN — Medication 81 MILLIGRAM(S): at 12:41

## 2024-11-30 RX ADMIN — NYSTATIN 1 APPLICATION(S): 100000 POWDER TOPICAL at 17:57

## 2024-11-30 NOTE — PROGRESS NOTE ADULT - SUBJECTIVE AND OBJECTIVE BOX
CHIEF COMPLAINT/INTERVAL HISTORY:  Pt. seen and evaluated for bilateral heel osteomyelitis.  Pt. is in no distress.  Denies having any pain.  Tolerating antibiotics.     REVIEW OF SYSTEMS:  No fever, CP, SOB, or abdominal pain      Vital Signs Last 24 Hrs  T(C): 36.4 (30 Nov 2024 04:48), Max: 36.7 (29 Nov 2024 20:20)  T(F): 97.6 (30 Nov 2024 04:48), Max: 98 (29 Nov 2024 20:20)  HR: 83 (30 Nov 2024 04:48) (76 - 83)  BP: 114/70 (30 Nov 2024 04:48) (114/70 - 127/79)  BP(mean): --  RR: 17 (30 Nov 2024 04:48) (17 - 18)  SpO2: 97% (30 Nov 2024 04:48) (96% - 97%)    Parameters below as of 30 Nov 2024 04:48  Patient On (Oxygen Delivery Method): room air        PHYSICAL EXAM:  GENERAL: NAD  HEENT: EOMI, hearing normal, conjunctiva and sclera clear  Chest: CTA bilaterally, no wheezing  CV: S1S2, RRR,   GI: soft, +BS, NT/ND  Musculoskeletal: ACE wrapped dressing over bilateral feet  Psychiatric: affect nL, mood nL  Skin: warm and dry

## 2024-11-30 NOTE — PROGRESS NOTE ADULT - ASSESSMENT
82yo M with a PMH of HTN, HLD, Type 2 DM, CKD3a, hx of GI bleed, COPD, SANTO, BPH, CAD s/p PCI, PAD, multiple myeloma, lymphedema who presents with b/l heel ulcers and suspected OM planned for bilateral foot surgery with podiatry, Gabriel Deshpande, on 10/29 for his suspected bilateral heel osteomyelitis now s/p partial calcanectomy.        Problem/Plan - 1:  ·  Problem: Osteomyelitis of foot.   ·  Plan: - Bilateral heel osteomyelitis per outpatient w/up and admitted with a plan for OR with podiatry, Dr. Rios on 10/29  - Wound Cx (10/23): Proteus mirabilis, Pseudomonas aeruginosa, ESBL Klebsiella pneumoniae, Enterococcus faecalis  - Blood Cx NG  - s/p partial calcanectomy on 10/29.    - f/up OR cultures +rare pseudomonas aeruginosa.  Pathology: bilateral heel acute and chronic osteomyelitis.  - Tylenol PRN for mild pain.  - Consulted ID Dr. Casillas, recs appreciated   - PICC line placed.  Continue meropenem till Dec 9.    - Continue Florastor  - afebrile, no leukocytosis, dressing/wound care as per podiatry recommendation   - cardiology Dr. Rock consulted, recs appreciated.  - Patient scheduled for right heel debridement on 11/19. doing well post op , tissue culture 11/19 pseudomonas (carbapenem resistant).  Continue on Cipro 500mg PO Q12h.     Problem/Plan - 2:  ·  Problem: DM (diabetes mellitus).   ·  Plan: - type 2 DM on insulin  - continue decreased lantus dose 8units QHS   - continue low-dose lispro corrective ISS  - controlled      Problem/Plan - 3:  ·  Problem: HTN (hypertension).   ·  Plan: Chronic   - continue metoprolol 25mg BID.     Problem/Plan - 4:  ·  Problem: HLD (hyperlipidemia).   ·  Plan: Chronic   - Continue atorvastatin 80mg QD.     Problem/Plan - 5:  ·  Problem: CAD (coronary artery disease).   ·  Plan: CAD sp stents x4  - Held ASA and Plavix on 10/29; Restarted on ASA and Plavix on 10/30  - c/w lipitor  - cardiology eval noted      Problem/Plan - 6:  ·  Problem: Benign prostatic hyperplasia with lower urinary tract symptoms, symptom details unspecified.   ·  Plan: Chronic  -Continue Flomax.     Problem/Plan - 7:  ·  Problem: Multiple myeloma.   ·  Plan: Chronic  - Chemotherapy held      Problem/Plan - 8:   DVT ppx: continue lovenox 40mg SQ daily

## 2024-11-30 NOTE — PROGRESS NOTE ADULT - SUBJECTIVE AND OBJECTIVE BOX
Chief Complaint: Heel ulcer    Interval Events: No events overnight.    Review of Systems:  General: No fevers, chills, weight gain  Skin: No rashes, color changes  Cardiovascular: No chest pain, orthopnea  Respiratory: No shortness of breath, cough  Gastrointestinal: No nausea, abdominal pain  Genitourinary: No incontinence, pain with urination  Musculoskeletal: No pain, swelling, decreased range of motion  Neurological: No headache, weakness  Psychiatric: No depression, anxiety  Endocrine: No weight gain, increased thirst  All other systems are comprehensively negative.    Physical Exam:  Vital Signs Last 24 Hrs  T(C): 36.4 (30 Nov 2024 04:48), Max: 36.7 (29 Nov 2024 20:20)  T(F): 97.6 (30 Nov 2024 04:48), Max: 98 (29 Nov 2024 20:20)  HR: 83 (30 Nov 2024 04:48) (76 - 83)  BP: 114/70 (30 Nov 2024 04:48) (114/70 - 127/79)  BP(mean): --  RR: 17 (30 Nov 2024 04:48) (17 - 18)  SpO2: 97% (30 Nov 2024 04:48) (96% - 97%)  Parameters below as of 30 Nov 2024 04:48  Patient On (Oxygen Delivery Method): room air  General: NAD  HEENT: MMM  Neck: No JVD, no carotid bruit  Lungs: CTAB  CV: RRR, nl S1/S2, no M/R/G  Abdomen: S/NT/ND, +BS  Extremities: +Lymphedema, no cyanosis  Neuro: AAOx3, non-focal  Skin: No rash    Labs:

## 2024-11-30 NOTE — SOCIAL WORK PROGRESS NOTE - NSSWPROGRESSNOTE_GEN_ALL_CORE
Received a call from [facility] that [facility] will be evaluating patient Wednesday around 10:aM.They Emailed paperwork for New Assisted Living placement.

## 2024-12-01 LAB
ANION GAP SERPL CALC-SCNC: 2 MMOL/L — LOW (ref 5–17)
BASOPHILS # BLD AUTO: 0.01 K/UL — SIGNIFICANT CHANGE UP (ref 0–0.2)
BASOPHILS NFR BLD AUTO: 0.3 % — SIGNIFICANT CHANGE UP (ref 0–2)
BUN SERPL-MCNC: 43 MG/DL — HIGH (ref 7–23)
CALCIUM SERPL-MCNC: 10.1 MG/DL — SIGNIFICANT CHANGE UP (ref 8.5–10.1)
CHLORIDE SERPL-SCNC: 109 MMOL/L — HIGH (ref 96–108)
CO2 SERPL-SCNC: 31 MMOL/L — SIGNIFICANT CHANGE UP (ref 22–31)
CREAT SERPL-MCNC: 1.4 MG/DL — HIGH (ref 0.5–1.3)
EGFR: 50 ML/MIN/1.73M2 — LOW
EOSINOPHIL # BLD AUTO: 0.43 K/UL — SIGNIFICANT CHANGE UP (ref 0–0.5)
EOSINOPHIL NFR BLD AUTO: 10.9 % — HIGH (ref 0–6)
GLUCOSE SERPL-MCNC: 97 MG/DL — SIGNIFICANT CHANGE UP (ref 70–99)
HCT VFR BLD CALC: 29 % — LOW (ref 39–50)
HGB BLD-MCNC: 9.2 G/DL — LOW (ref 13–17)
IMM GRANULOCYTES NFR BLD AUTO: 0.5 % — SIGNIFICANT CHANGE UP (ref 0–0.9)
LYMPHOCYTES # BLD AUTO: 1.4 K/UL — SIGNIFICANT CHANGE UP (ref 1–3.3)
LYMPHOCYTES # BLD AUTO: 35.6 % — SIGNIFICANT CHANGE UP (ref 13–44)
MCHC RBC-ENTMCNC: 28.6 PG — SIGNIFICANT CHANGE UP (ref 27–34)
MCHC RBC-ENTMCNC: 31.7 G/DL — LOW (ref 32–36)
MCV RBC AUTO: 90.1 FL — SIGNIFICANT CHANGE UP (ref 80–100)
MONOCYTES # BLD AUTO: 0.26 K/UL — SIGNIFICANT CHANGE UP (ref 0–0.9)
MONOCYTES NFR BLD AUTO: 6.6 % — SIGNIFICANT CHANGE UP (ref 2–14)
NEUTROPHILS # BLD AUTO: 1.81 K/UL — SIGNIFICANT CHANGE UP (ref 1.8–7.4)
NEUTROPHILS NFR BLD AUTO: 46.1 % — SIGNIFICANT CHANGE UP (ref 43–77)
NRBC # BLD: 0 /100 WBCS — SIGNIFICANT CHANGE UP (ref 0–0)
PLATELET # BLD AUTO: 162 K/UL — SIGNIFICANT CHANGE UP (ref 150–400)
POTASSIUM SERPL-MCNC: 4.2 MMOL/L — SIGNIFICANT CHANGE UP (ref 3.5–5.3)
POTASSIUM SERPL-SCNC: 4.2 MMOL/L — SIGNIFICANT CHANGE UP (ref 3.5–5.3)
RBC # BLD: 3.22 M/UL — LOW (ref 4.2–5.8)
RBC # FLD: 16.1 % — HIGH (ref 10.3–14.5)
SODIUM SERPL-SCNC: 142 MMOL/L — SIGNIFICANT CHANGE UP (ref 135–145)
WBC # BLD: 3.93 K/UL — SIGNIFICANT CHANGE UP (ref 3.8–10.5)
WBC # FLD AUTO: 3.93 K/UL — SIGNIFICANT CHANGE UP (ref 3.8–10.5)

## 2024-12-01 PROCEDURE — 99232 SBSQ HOSP IP/OBS MODERATE 35: CPT

## 2024-12-01 RX ORDER — 0.9 % SODIUM CHLORIDE 0.9 %
1000 INTRAVENOUS SOLUTION INTRAVENOUS
Refills: 0 | Status: DISCONTINUED | OUTPATIENT
Start: 2024-12-01 | End: 2024-12-02

## 2024-12-01 RX ADMIN — NYSTATIN 1 APPLICATION(S): 100000 POWDER TOPICAL at 05:27

## 2024-12-01 RX ADMIN — INSULIN GLARGINE 8 UNIT(S): 100 INJECTION, SOLUTION SUBCUTANEOUS at 21:53

## 2024-12-01 RX ADMIN — Medication 500 MILLIGRAM(S): at 10:07

## 2024-12-01 RX ADMIN — TAMSULOSIN HYDROCHLORIDE 0.4 MILLIGRAM(S): 0.4 CAPSULE ORAL at 21:52

## 2024-12-01 RX ADMIN — Medication 500 MILLIGRAM(S): at 21:53

## 2024-12-01 RX ADMIN — ACETAMINOPHEN, DIPHENHYDRAMINE HCL, PHENYLEPHRINE HCL 3 MILLIGRAM(S): 325; 25; 5 TABLET ORAL at 21:53

## 2024-12-01 RX ADMIN — METOPROLOL TARTRATE 25 MILLIGRAM(S): 100 TABLET, FILM COATED ORAL at 05:27

## 2024-12-01 RX ADMIN — Medication 1000 MICROGRAM(S): at 11:39

## 2024-12-01 RX ADMIN — METOPROLOL TARTRATE 25 MILLIGRAM(S): 100 TABLET, FILM COATED ORAL at 17:21

## 2024-12-01 RX ADMIN — MEROPENEM 100 MILLIGRAM(S): 500 INJECTION, POWDER, FOR SOLUTION INTRAVENOUS at 21:52

## 2024-12-01 RX ADMIN — Medication 80 MILLIGRAM(S): at 21:52

## 2024-12-01 RX ADMIN — Medication 250 MILLIGRAM(S): at 17:21

## 2024-12-01 RX ADMIN — Medication 81 MILLIGRAM(S): at 11:39

## 2024-12-01 RX ADMIN — Medication 1 TABLET(S): at 08:45

## 2024-12-01 RX ADMIN — CHLORHEXIDINE GLUCONATE 1 APPLICATION(S): 1.2 RINSE ORAL at 05:27

## 2024-12-01 RX ADMIN — MEROPENEM 100 MILLIGRAM(S): 500 INJECTION, POWDER, FOR SOLUTION INTRAVENOUS at 13:43

## 2024-12-01 RX ADMIN — NYSTATIN 1 APPLICATION(S): 100000 POWDER TOPICAL at 17:21

## 2024-12-01 RX ADMIN — Medication 75 MILLILITER(S): at 12:19

## 2024-12-01 RX ADMIN — ENOXAPARIN SODIUM 40 MILLIGRAM(S): 30 INJECTION SUBCUTANEOUS at 21:52

## 2024-12-01 RX ADMIN — CLOPIDOGREL 75 MILLIGRAM(S): 75 TABLET, FILM COATED ORAL at 11:39

## 2024-12-01 RX ADMIN — FAMOTIDINE 20 MILLIGRAM(S): 20 TABLET, FILM COATED ORAL at 11:40

## 2024-12-01 RX ADMIN — Medication 1: at 12:19

## 2024-12-01 RX ADMIN — MEROPENEM 100 MILLIGRAM(S): 500 INJECTION, POWDER, FOR SOLUTION INTRAVENOUS at 05:27

## 2024-12-01 RX ADMIN — Medication 250 MILLIGRAM(S): at 05:27

## 2024-12-01 RX ADMIN — POLYETHYLENE GLYCOL 3350 17 GRAM(S): 17 POWDER, FOR SOLUTION ORAL at 11:39

## 2024-12-01 NOTE — PROGRESS NOTE ADULT - SUBJECTIVE AND OBJECTIVE BOX
CHIEF COMPLAINT/INTERVAL HISTORY:  Pt. seen and evaluated for bilateral heel osteomyelitis.  Pt. is in no distress.  Tolerating IV antibiotics.  Denies having CP or SOB.      REVIEW OF SYSTEMS:  No fever, CP, SOB, or abdominal pain    Vital Signs Last 24 Hrs  T(C): 36.4 (01 Dec 2024 04:53), Max: 36.6 (30 Nov 2024 22:03)  T(F): 97.5 (01 Dec 2024 04:53), Max: 97.8 (30 Nov 2024 22:03)  HR: 81 (01 Dec 2024 04:53) (76 - 81)  BP: 118/71 (01 Dec 2024 04:53) (100/62 - 150/70)  BP(mean): --  RR: 18 (01 Dec 2024 04:53) (17 - 18)  SpO2: 96% (01 Dec 2024 04:53) (96% - 98%)    Parameters below as of 01 Dec 2024 04:53  Patient On (Oxygen Delivery Method): room air        PHYSICAL EXAM:  GENERAL: NAD  HEENT: EOMI, hearing normal, conjunctiva and sclera clear  Chest: CTA bilaterally, no wheezing  CV: S1S2, RRR,   GI: soft, +BS, NT/ND  Musculoskeletal: ACE wrapped dressing over bilateral feet  Psychiatric: affect nL, mood nL  Skin: warm and dry    LABS:                        9.2    3.93  )-----------( 162      ( 01 Dec 2024 08:38 )             29.0     12-01    142  |  109[H]  |  43[H]  ----------------------------<  97  4.2   |  31  |  1.40[H]    Ca    10.1      01 Dec 2024 08:38  Mg     1.9     11-30        Urinalysis Basic - ( 01 Dec 2024 08:38 )    Color: x / Appearance: x / SG: x / pH: x  Gluc: 97 mg/dL / Ketone: x  / Bili: x / Urobili: x   Blood: x / Protein: x / Nitrite: x   Leuk Esterase: x / RBC: x / WBC x   Sq Epi: x / Non Sq Epi: x / Bacteria: x

## 2024-12-01 NOTE — PROGRESS NOTE ADULT - ASSESSMENT
The patient is an 83 year old male with a history of HTN, HL, DM, CKD, GI bleed, COPD, SATNO, BPH, CAD s/p PCI, PAD, multiple myeloma, lymphedema who presents with heel ulcer.     Plan:  - ECG with sinus rhythm and no evidence of ischemia/infarction  - Echo 6/30/24 with normal LV systolic function, mild pulm HTN  - CXR with grossly clear lungs  - Lower extremity swelling consistent with known history of lymphedema  - Continue aspirin 81 mg daily  - Continue clopidogrel 75 mg daily  - Continue atorvastatin 80 mg daily  - Continue metoprolol tartrate 25 mg bid  - Podiatry and ID follow-up  - Path results with acute on chronic osteomyelitis  - IV antibiotics  - Mild BRITT - can give gentle IV fluids if needed

## 2024-12-01 NOTE — PROGRESS NOTE ADULT - ASSESSMENT
84yo M with a PMH of HTN, HLD, Type 2 DM, CKD3a, hx of GI bleed, COPD, SANTO, BPH, CAD s/p PCI, PAD, multiple myeloma, lymphedema who presents with b/l heel ulcers and suspected OM planned for bilateral foot surgery with podiatry, Gabriel Desphande, on 10/29 for his suspected bilateral heel osteomyelitis now s/p partial calcanectomy.        Problem/Plan - 1:  ·  Problem: Osteomyelitis of foot.   ·  Plan: - Bilateral heel osteomyelitis per outpatient w/up and admitted with a plan for OR with podiatry, Dr. Rios on 10/29  - Wound Cx (10/23): Proteus mirabilis, Pseudomonas aeruginosa, ESBL Klebsiella pneumoniae, Enterococcus faecalis  - Blood Cx NG  - s/p partial calcanectomy on 10/29.    - f/up OR cultures +rare pseudomonas aeruginosa.  Pathology: bilateral heel acute and chronic osteomyelitis.  - Tylenol PRN for mild pain.  - Consulted ID Dr. Casillas, recs appreciated   - PICC line placed.  Continue meropenem till Dec 9.    - Continue Florastor  - afebrile, no leukocytosis, dressing/wound care as per podiatry recommendation   - cardiology Dr. Rock consulted, recs appreciated.  - Patient scheduled for right heel debridement on 11/19. doing well post op , tissue culture 11/19 pseudomonas (carbapenem resistant).  Continue on Cipro 500mg PO Q12h.     Problem/Plan - 2:  ·  Problem: DM (diabetes mellitus).   ·  Plan: - type 2 DM on insulin  - continue decreased lantus dose 8units QHS   - continue low-dose lispro corrective ISS  - controlled      Problem/Plan - 3:  ·  Problem: HTN (hypertension).   ·  Plan: Chronic   - continue metoprolol 25mg BID.     Problem/Plan - 4:  ·  Problem: HLD (hyperlipidemia).   ·  Plan: Chronic   - Continue atorvastatin 80mg QD.     Problem/Plan - 5:  ·  Problem: CAD (coronary artery disease).   ·  Plan: CAD sp stents x4  - Held ASA and Plavix on 10/29; Restarted on ASA and Plavix on 10/30  - c/w lipitor  - cardiology eval noted      Problem/Plan - 6:  ·  Problem: Benign prostatic hyperplasia with lower urinary tract symptoms, symptom details unspecified.   ·  Plan: Chronic  -Continue Flomax.     Problem/Plan - 7:  ·  Problem: Multiple myeloma.   ·  Plan: Chronic  - Chemotherapy held      Problem/Plan - 8:   DVT ppx: continue lovenox 40mg SQ daily    #BRITT:  likely due to dehydration.  Pt. with decrease fluid intake.  Encouraged patient to drink more today.  LR@75cc/hr x 10 hours.

## 2024-12-01 NOTE — CONSULT NOTE ADULT - SUBJECTIVE AND OBJECTIVE BOX
[facility]  Dr. Blum, Dr. Marshall, Dr. Cevallos, Dr. Arroyo, Dr. Darling, Dr. Cordova                                        [facility], [facility], [address]                                                 [address] [place], [place] [id number] [place] [address]  Ph: [phone]  Fax: [phone]                                         Ph: [phone]  Fax: [phone]      Patient is a 84y old  Male who presents with a chief complaint of Bilateral Heel Osteomyelitis (01 Dec 2024 11:09)    HPI:  Patient with a past medical history of hypertension, diabetes, hyperlipidemia, bilateral heel osteomyelitis currently on outpatient antibiotics through [facility] is presenting for surgery.  He is scheduled for surgery with Dr. frias tomorrow.  Denies any fevers.  Endorsing pain to his heels but no other areas of pain.  No nausea or vomiting.  No other acute complaints today.    Denies fever, chills, chest pain, palpitations, SOB, cough, abdominal pain, nausea, vomiting, diarrhea, constipation, urinary frequency, urgency, or dysuria, headaches, changes in vision, dizziness, numbness, tingling.  Denies recent travel, recent antibiotic use, or sick contacts.    ED Course:   Vitals: BP: 161/87, HR 69: , Temp: 97.8 , RR: 18, SpO2: 96% on   Labs:  H/H: 10.6/34.4, PTT: 36.2, Albumin: 2.7  Tissue culture: (incomplete)      Surgical pathology report: (incomplete)  MR foot:   Xray foot:   EKBPM normal sinus rhythm, QTc: 477  Received in the ED:       HPI: Patient presents to [facility] from rehab for bilateral foot surgery planned with podiatry, Gabriel Deshpande, tomorrow for his bilateral heel osteomyelitis. States his osteomyelitis started 6 months ago, Currently c/o pain in b/l heels. Denies any numbness or tingling down LLE/RLE. Denies any trauma. Patient currently does not walk due to deconditioning. Denies any other complaints. Denies fevers, chills, HA, Dizziness, chest pain, SOB, N/V/D, bladder symptoms,     (28 Oct 2024 14:08)      PAST MEDICAL HISTORY:  HTN (hypertension)    CAD (coronary artery disease)    HLD (hyperlipidemia)    DM (diabetes mellitus)    Benign prostatic hyperplasia with lower urinary tract symptoms, symptom details unspecified    Stented coronary artery    SANTO on CPAP    Chronic obstructive pulmonary disease, unspecified COPD type    Obesity, morbid, BMI 40.0-49.9    Difficulty walking    GI bleed    History of blood transfusion    DM2 (diabetes mellitus, type 2)    COPD, mild    Lymphedema    Multiple myeloma    Chronic obstructive pulmonary disease, unspecified COPD type        PAST SURGICAL HISTORY:  H/O knee surgery    S/P angioplasty with stent    History of prostate surgery    No significant past surgical history        FAMILY HISTORY:  Family history of essential hypertension (Father)    No pertinent family history in first degree relatives        SOCIAL HISTORY:    Allergies    No Known Allergies    Intolerances      Home Medications:  acetaminophen 325 mg oral tablet: 2 tab(s) orally every 6 hours As needed Temp greater or equal to 38C (100.4F), Mild Pain (1 - 3) (2024 16:50)  aspirin 81 mg oral tablet, chewable: 1 tab(s) orally once a day (2024 16:50)  atorvastatin 80 mg oral tablet: 1 tab(s) orally once a day (at bedtime) (2024 16:50)  clopidogrel 75 mg oral tablet: 1 tab(s) orally once a day (2024 16:50)  cyanocobalamin 1000 mcg oral tablet: 1 tab(s) orally once a day (2024 16:50)  famotidine 20 mg oral tablet: 1 tab(s) orally once a day (2024 16:50)  heparin: 5,000 unit(s) subcutaneous every 12 hours (2024 16:50)  insulin glargine 100 units/mL subcutaneous solution: 25 unit(s) subcutaneous once a day (at bedtime) (2024 16:50)  insulin lispro 100 units/mL injectable solution: 8 international unit(s) injectable 3 times a day (before meals) (2024 16:50)  insulin lispro 100 units/mL injectable solution: 1 unit(s) injectable once a day (at bedtime) 0 Unit(s) if Glucose 0 - 250  1 Unit(s) if Glucose 251 - 300  2 Unit(s) if Glucose 301 - 350  3 Unit(s) if Glucose 351 - 400  4 Unit(s) if Glucose 400 (2024 16:50)  insulin lispro 100 units/mL injectable solution: 1 unit(s) injectable 3 times a day (before meals) 1 Unit(s) if Glucose 151 - 200  2 Unit(s) if Glucose 201 - 250  3 Unit(s) if Glucose 251 - 300  4 Unit(s) if Glucose 301 - 350  5 Unit(s) if Glucose 351 - 400  6 Unit(s) if Glucose Greater Than 400 (2024 16:50)  melatonin 3 mg oral tablet: 3 tab(s) orally once a day (at bedtime) (2024 16:50)  metoprolol tartrate 25 mg oral tablet: 1 tab(s) orally 2 times a day (2024 16:50)  sodium bicarbonate 650 mg oral tablet: 1 tab(s) orally 3 times a day (2024 16:50)  tamsulosin 0.4 mg oral capsule: 1 cap(s) orally once a day (at bedtime) (2024 16:50)    MEDICATIONS  (STANDING):  aspirin  chewable 81 milliGRAM(s) Oral daily  atorvastatin 80 milliGRAM(s) Oral at bedtime  chlorhexidine 2% Cloths 1 Application(s) Topical daily  ciprofloxacin     Tablet 500 milliGRAM(s) Oral every 12 hours  clopidogrel Tablet 75 milliGRAM(s) Oral daily  cyanocobalamin 1000 MICROGram(s) Oral daily  dextrose 5%. 1000 milliLiter(s) (50 mL/Hr) IV Continuous <Continuous>  dextrose 5%. 1000 milliLiter(s) (100 mL/Hr) IV Continuous <Continuous>  dextrose 50% Injectable 25 Gram(s) IV Push once  dextrose 50% Injectable 12.5 Gram(s) IV Push once  dextrose 50% Injectable 25 Gram(s) IV Push once  enoxaparin Injectable 40 milliGRAM(s) SubCutaneous every 24 hours  famotidine    Tablet 20 milliGRAM(s) Oral daily  glucagon  Injectable 1 milliGRAM(s) IntraMuscular once  insulin glargine Injectable (LANTUS) 8 Unit(s) SubCutaneous at bedtime  insulin lispro (ADMELOG) corrective regimen sliding scale   SubCutaneous three times a day before meals  lactated ringers. 1000 milliLiter(s) (75 mL/Hr) IV Continuous <Continuous>  melatonin 3 milliGRAM(s) Oral at bedtime  meropenem  IVPB 1000 milliGRAM(s) IV Intermittent every 8 hours  metoprolol tartrate 25 milliGRAM(s) Oral two times a day  nystatin Powder 1 Application(s) Topical two times a day  polyethylene glycol 3350 17 Gram(s) Oral daily  saccharomyces boulardii 250 milliGRAM(s) Oral two times a day  senna 1 Tablet(s) Oral with breakfast  tamsulosin 0.4 milliGRAM(s) Oral at bedtime    MEDICATIONS  (PRN):  acetaminophen     Tablet .. 650 milliGRAM(s) Oral every 6 hours PRN Temp greater or equal to 38C (100.4F), Mild Pain (1 - 3)  aluminum hydroxide/magnesium hydroxide/simethicone Suspension 30 milliLiter(s) Oral every 4 hours PRN Dyspepsia  dextrose Oral Gel 15 Gram(s) Oral once PRN Blood Glucose LESS THAN 70 milliGRAM(s)/deciliter  sodium chloride 0.9% lock flush 10 milliLiter(s) IV Push every 1 hour PRN Pre/post blood products, medications, blood draw, and to maintain line patency      REVIEW OF SYSTEMS:  General:   Respiratory: No cough, SOB  Cardiovascular: No CP or Palpitations	  Gastrointestinal: No nausea, Vomiting. No diarrhea  Genitourinary: No urinary complaints	  Musculoskeletal: No leg swelling, No new rash or lesions	  Neurological: 	  all other systems negative    T(F): 97.5 (24 @ 12:27), Max: 97.8 (24 @ 22:03)  HR: 85 (24 @ 12:27) (77 - 85)  BP: 124/78 (24 @ 12:27) (100/62 - 124/78)  RR: 18 (24 @ 12:27) (17 - 18)  SpO2: 96% (24 @ 12:27) (96% - 96%)  Wt(kg): --    PHYSICAL EXAM:  General: NAD  Respiratory: b/l air entry  Cardiovascular: S1 S2  Gastrointestinal: soft  Extremities: edema            142  |  109[H]  |  43[H]  ----------------------------<  97  4.2   |  31  |  1.40[H]    Ca    10.1      01 Dec 2024 08:38  Mg     1.9                                 9.2    3.93  )-----------( 162      ( 01 Dec 2024 08:38 )             29.0       Potassium: 4.2 mmol/L ( @ 08:38)  Blood Urea Nitrogen: 43 mg/dL ( @ 08:38)  Calcium: 10.1 mg/dL ( @ 08:38)  Hemoglobin: 9.2 g/dL ( @ 08:38)      Creatinine, Serum: 1.40 ( @ 08:38)  Creatinine, Serum: 1.50 ( @ 07:30)      Urinalysis Basic - ( 01 Dec 2024 08:38 )    Color: x / Appearance: x / SG: x / pH: x  Gluc: 97 mg/dL / Ketone: x  / Bili: x / Urobili: x   Blood: x / Protein: x / Nitrite: x   Leuk Esterase: x / RBC: x / WBC x   Sq Epi: x / Non Sq Epi: x / Bacteria: x                    I&O's Detail    2024 07:01  -  01 Dec 2024 07:00  --------------------------------------------------------  IN:  Total IN: 0 mL    OUT:    Voided (mL): 300 mL  Total OUT: 300 mL    Total NET: -300 mL                 [facility]  Dr. Blum, Dr. Marshall, Dr. Cevallos, Dr. Arroyo, Dr. Darling, Dr. Cordova                                        [place], [facility], [address]                                                 [address] [address]  Ph: [phone]  Fax: [phone]                                         Ph: [phone]  Fax: [phone]      Patient is a 84y old  Male who presents with a chief complaint of Bilateral Heel Osteomyelitis (01 Dec 2024 11:09)    HPI:  Patient with a past medical history of hypertension, diabetes, hyperlipidemia, bilateral heel osteomyelitis currently on outpatient antibiotics through [facility] is presenting for surgery.  He is scheduled for surgery with Dr. frias tomorrow.  Denies any fevers.  Endorsing pain to his heels but no other areas of pain.  No nausea or vomiting.  No other acute complaints today.    Denies fever, chills, chest pain, palpitations, SOB, cough, abdominal pain, nausea, vomiting, diarrhea, constipation, urinary frequency, urgency, or dysuria, headaches, changes in vision, dizziness, numbness, tingling.  Denies recent travel, recent antibiotic use, or sick contacts.      Renal consult called for BRITT. History obtained from chart and patient.     PAST MEDICAL HISTORY:  HTN (hypertension)    CAD (coronary artery disease)    HLD (hyperlipidemia)    DM (diabetes mellitus)    Benign prostatic hyperplasia with lower urinary tract symptoms, symptom details unspecified    Stented coronary artery    SANTO on CPAP    Chronic obstructive pulmonary disease, unspecified COPD type    Obesity, morbid, BMI 40.0-49.9    Difficulty walking    GI bleed    History of blood transfusion    DM2 (diabetes mellitus, type 2)    COPD, mild    Lymphedema    Multiple myeloma    Chronic obstructive pulmonary disease, unspecified COPD type        PAST SURGICAL HISTORY:  H/O knee surgery    S/P angioplasty with stent    History of prostate surgery    No significant past surgical history        FAMILY HISTORY:  Family history of essential hypertension (Father)    No pertinent family history in first degree relatives        SOCIAL HISTORY: No smoking or alcohol use     Allergies    No Known Allergies    Intolerances      Home Medications:  acetaminophen 325 mg oral tablet: 2 tab(s) orally every 6 hours As needed Temp greater or equal to 38C (100.4F), Mild Pain (1 - 3) (16 Jul 2024 16:50)  aspirin 81 mg oral tablet, chewable: 1 tab(s) orally once a day (16 Jul 2024 16:50)  atorvastatin 80 mg oral tablet: 1 tab(s) orally once a day (at bedtime) (16 Jul 2024 16:50)  clopidogrel 75 mg oral tablet: 1 tab(s) orally once a day (16 Jul 2024 16:50)  cyanocobalamin 1000 mcg oral tablet: 1 tab(s) orally once a day (16 Jul 2024 16:50)  famotidine 20 mg oral tablet: 1 tab(s) orally once a day (16 Jul 2024 16:50)  heparin: 5,000 unit(s) subcutaneous every 12 hours (16 Jul 2024 16:50)  insulin glargine 100 units/mL subcutaneous solution: 25 unit(s) subcutaneous once a day (at bedtime) (16 Jul 2024 16:50)  insulin lispro 100 units/mL injectable solution: 8 international unit(s) injectable 3 times a day (before meals) (16 Jul 2024 16:50)  insulin lispro 100 units/mL injectable solution: 1 unit(s) injectable once a day (at bedtime) 0 Unit(s) if Glucose 0 - 250  1 Unit(s) if Glucose 251 - 300  2 Unit(s) if Glucose 301 - 350  3 Unit(s) if Glucose 351 - 400  4 Unit(s) if Glucose 400 (16 Jul 2024 16:50)  insulin lispro 100 units/mL injectable solution: 1 unit(s) injectable 3 times a day (before meals) 1 Unit(s) if Glucose 151 - 200  2 Unit(s) if Glucose 201 - 250  3 Unit(s) if Glucose 251 - 300  4 Unit(s) if Glucose 301 - 350  5 Unit(s) if Glucose 351 - 400  6 Unit(s) if Glucose Greater Than 400 (16 Jul 2024 16:50)  melatonin 3 mg oral tablet: 3 tab(s) orally once a day (at bedtime) (16 Jul 2024 16:50)  metoprolol tartrate 25 mg oral tablet: 1 tab(s) orally 2 times a day (16 Jul 2024 16:50)  sodium bicarbonate 650 mg oral tablet: 1 tab(s) orally 3 times a day (16 Jul 2024 16:50)  tamsulosin 0.4 mg oral capsule: 1 cap(s) orally once a day (at bedtime) (16 Jul 2024 16:50)    MEDICATIONS  (STANDING):  aspirin  chewable 81 milliGRAM(s) Oral daily  atorvastatin 80 milliGRAM(s) Oral at bedtime  chlorhexidine 2% Cloths 1 Application(s) Topical daily  ciprofloxacin     Tablet 500 milliGRAM(s) Oral every 12 hours  clopidogrel Tablet 75 milliGRAM(s) Oral daily  cyanocobalamin 1000 MICROGram(s) Oral daily  dextrose 5%. 1000 milliLiter(s) (50 mL/Hr) IV Continuous <Continuous>  dextrose 5%. 1000 milliLiter(s) (100 mL/Hr) IV Continuous <Continuous>  dextrose 50% Injectable 25 Gram(s) IV Push once  dextrose 50% Injectable 12.5 Gram(s) IV Push once  dextrose 50% Injectable 25 Gram(s) IV Push once  enoxaparin Injectable 40 milliGRAM(s) SubCutaneous every 24 hours  famotidine    Tablet 20 milliGRAM(s) Oral daily  glucagon  Injectable 1 milliGRAM(s) IntraMuscular once  insulin glargine Injectable (LANTUS) 8 Unit(s) SubCutaneous at bedtime  insulin lispro (ADMELOG) corrective regimen sliding scale   SubCutaneous three times a day before meals  lactated ringers. 1000 milliLiter(s) (75 mL/Hr) IV Continuous <Continuous>  melatonin 3 milliGRAM(s) Oral at bedtime  meropenem  IVPB 1000 milliGRAM(s) IV Intermittent every 8 hours  metoprolol tartrate 25 milliGRAM(s) Oral two times a day  nystatin Powder 1 Application(s) Topical two times a day  polyethylene glycol 3350 17 Gram(s) Oral daily  saccharomyces boulardii 250 milliGRAM(s) Oral two times a day  senna 1 Tablet(s) Oral with breakfast  tamsulosin 0.4 milliGRAM(s) Oral at bedtime    MEDICATIONS  (PRN):  acetaminophen     Tablet .. 650 milliGRAM(s) Oral every 6 hours PRN Temp greater or equal to 38C (100.4F), Mild Pain (1 - 3)  aluminum hydroxide/magnesium hydroxide/simethicone Suspension 30 milliLiter(s) Oral every 4 hours PRN Dyspepsia  dextrose Oral Gel 15 Gram(s) Oral once PRN Blood Glucose LESS THAN 70 milliGRAM(s)/deciliter  sodium chloride 0.9% lock flush 10 milliLiter(s) IV Push every 1 hour PRN Pre/post blood products, medications, blood draw, and to maintain line patency      REVIEW OF SYSTEMS:  General: no distress  Respiratory: No cough, SOB  Cardiovascular: No CP or Palpitations	  Gastrointestinal: No nausea, Vomiting. No diarrhea  Genitourinary: No urinary complaints	  Musculoskeletal: No new rash or lesions		  all other systems negative    T(F): 97.5 (12-01-24 @ 12:27), Max: 97.8 (11-30-24 @ 22:03)  HR: 85 (12-01-24 @ 12:27) (77 - 85)  BP: 124/78 (12-01-24 @ 12:27) (100/62 - 124/78)  RR: 18 (12-01-24 @ 12:27) (17 - 18)  SpO2: 96% (12-01-24 @ 12:27) (96% - 96%)  Wt(kg): --    PHYSICAL EXAM:  General: NAD  Respiratory: b/l air entry  Cardiovascular: S1 S2  Gastrointestinal: soft  Extremities: + edema        12-01    142  |  109[H]  |  43[H]  ----------------------------<  97  4.2   |  31  |  1.40[H]    Ca    10.1      01 Dec 2024 08:38  Mg     1.9     11-30                            9.2    3.93  )-----------( 162      ( 01 Dec 2024 08:38 )             29.0       Potassium: 4.2 mmol/L (12-01 @ 08:38)  Blood Urea Nitrogen: 43 mg/dL (12-01 @ 08:38)  Calcium: 10.1 mg/dL (12-01 @ 08:38)  Hemoglobin: 9.2 g/dL (12-01 @ 08:38)      Creatinine, Serum: 1.40 (12-01 @ 08:38)  Creatinine, Serum: 1.50 (11-30 @ 07:30)      Urinalysis Basic - ( 01 Dec 2024 08:38 )    Color: x / Appearance: x / SG: x / pH: x  Gluc: 97 mg/dL / Ketone: x  / Bili: x / Urobili: x   Blood: x / Protein: x / Nitrite: x   Leuk Esterase: x / RBC: x / WBC x   Sq Epi: x / Non Sq Epi: x / Bacteria: x                    I&O's Detail    30 Nov 2024 07:01  -  01 Dec 2024 07:00  --------------------------------------------------------  IN:  Total IN: 0 mL    OUT:    Voided (mL): 300 mL  Total OUT: 300 mL    Total NET: -300 mL

## 2024-12-01 NOTE — PROGRESS NOTE ADULT - SUBJECTIVE AND OBJECTIVE BOX
Chief Complaint: Heel ulcer    Interval Events: No events overnight.    Review of Systems:  General: No fevers, chills, weight gain  Skin: No rashes, color changes  Cardiovascular: No chest pain, orthopnea  Respiratory: No shortness of breath, cough  Gastrointestinal: No nausea, abdominal pain  Genitourinary: No incontinence, pain with urination  Musculoskeletal: No pain, swelling, decreased range of motion  Neurological: No headache, weakness  Psychiatric: No depression, anxiety  Endocrine: No weight gain, increased thirst  All other systems are comprehensively negative.    Physical Exam:  Vital Signs Last 24 Hrs  T(C): 36.4 (01 Dec 2024 04:53), Max: 36.6 (30 Nov 2024 22:03)  T(F): 97.5 (01 Dec 2024 04:53), Max: 97.8 (30 Nov 2024 22:03)  HR: 81 (01 Dec 2024 04:53) (76 - 81)  BP: 118/71 (01 Dec 2024 04:53) (100/62 - 150/70)  BP(mean): --  RR: 18 (01 Dec 2024 04:53) (17 - 18)  SpO2: 96% (01 Dec 2024 04:53) (96% - 98%)  Parameters below as of 01 Dec 2024 04:53  Patient On (Oxygen Delivery Method): room air  General: NAD  HEENT: MMM  Neck: No JVD, no carotid bruit  Lungs: CTAB  CV: RRR, nl S1/S2, no M/R/G  Abdomen: S/NT/ND, +BS  Extremities: +Lymphedema, no cyanosis  Neuro: AAOx3, non-focal  Skin: No rash    Labs:    11-30    144  |  107  |  42[H]  ----------------------------<  74  4.5   |  32[H]  |  1.50[H]    Ca    9.8      30 Nov 2024 07:30  Mg     1.9     11-30                          9.1    3.78  )-----------( 166      ( 30 Nov 2024 07:30 )             29.1

## 2024-12-01 NOTE — CONSULT NOTE ADULT - ASSESSMENT
BRITT Baseline creatinine ~ 1.0  B/L Heel OM, s/p partial calcanectomy  h/o Anemia, Multiple myeloma  Diabetes  Hypertension    Started on IV hydration. Will follow creatinine trend. Monitor UO. Monitor blood sugar levels. Insulin coverage as needed. Dietary restriction.   Trend BP and titrate BP meds as needed. Encourage PO intake as tolerated. Avoid nephrotoxic meds as possible. IV abx, ID follow up.   Will follow electrolytes and renal function trend.     Further recommendations pending clinical course. Thank you for the courtesy of this referral.

## 2024-12-02 LAB
ANION GAP SERPL CALC-SCNC: 3 MMOL/L — LOW (ref 5–17)
BASOPHILS # BLD AUTO: 0.01 K/UL — SIGNIFICANT CHANGE UP (ref 0–0.2)
BASOPHILS NFR BLD AUTO: 0.2 % — SIGNIFICANT CHANGE UP (ref 0–2)
BUN SERPL-MCNC: 42 MG/DL — HIGH (ref 7–23)
CALCIUM SERPL-MCNC: 9.3 MG/DL — SIGNIFICANT CHANGE UP (ref 8.5–10.1)
CHLORIDE SERPL-SCNC: 107 MMOL/L — SIGNIFICANT CHANGE UP (ref 96–108)
CO2 SERPL-SCNC: 32 MMOL/L — HIGH (ref 22–31)
CREAT SERPL-MCNC: 1.3 MG/DL — SIGNIFICANT CHANGE UP (ref 0.5–1.3)
EGFR: 55 ML/MIN/1.73M2 — LOW
EOSINOPHIL # BLD AUTO: 0.43 K/UL — SIGNIFICANT CHANGE UP (ref 0–0.5)
EOSINOPHIL NFR BLD AUTO: 10.4 % — HIGH (ref 0–6)
GLUCOSE SERPL-MCNC: 94 MG/DL — SIGNIFICANT CHANGE UP (ref 70–99)
HCT VFR BLD CALC: 28 % — LOW (ref 39–50)
HGB BLD-MCNC: 8.9 G/DL — LOW (ref 13–17)
IMM GRANULOCYTES NFR BLD AUTO: 0.5 % — SIGNIFICANT CHANGE UP (ref 0–0.9)
LYMPHOCYTES # BLD AUTO: 1.59 K/UL — SIGNIFICANT CHANGE UP (ref 1–3.3)
LYMPHOCYTES # BLD AUTO: 38.5 % — SIGNIFICANT CHANGE UP (ref 13–44)
MCHC RBC-ENTMCNC: 29.1 PG — SIGNIFICANT CHANGE UP (ref 27–34)
MCHC RBC-ENTMCNC: 31.8 G/DL — LOW (ref 32–36)
MCV RBC AUTO: 91.5 FL — SIGNIFICANT CHANGE UP (ref 80–100)
MONOCYTES # BLD AUTO: 0.34 K/UL — SIGNIFICANT CHANGE UP (ref 0–0.9)
MONOCYTES NFR BLD AUTO: 8.2 % — SIGNIFICANT CHANGE UP (ref 2–14)
NEUTROPHILS # BLD AUTO: 1.74 K/UL — LOW (ref 1.8–7.4)
NEUTROPHILS NFR BLD AUTO: 42.2 % — LOW (ref 43–77)
NRBC # BLD: 0 /100 WBCS — SIGNIFICANT CHANGE UP (ref 0–0)
PLATELET # BLD AUTO: 154 K/UL — SIGNIFICANT CHANGE UP (ref 150–400)
POTASSIUM SERPL-MCNC: 4.2 MMOL/L — SIGNIFICANT CHANGE UP (ref 3.5–5.3)
POTASSIUM SERPL-SCNC: 4.2 MMOL/L — SIGNIFICANT CHANGE UP (ref 3.5–5.3)
RBC # BLD: 3.06 M/UL — LOW (ref 4.2–5.8)
RBC # FLD: 16.4 % — HIGH (ref 10.3–14.5)
SODIUM SERPL-SCNC: 142 MMOL/L — SIGNIFICANT CHANGE UP (ref 135–145)
WBC # BLD: 4.13 K/UL — SIGNIFICANT CHANGE UP (ref 3.8–10.5)
WBC # FLD AUTO: 4.13 K/UL — SIGNIFICANT CHANGE UP (ref 3.8–10.5)

## 2024-12-02 PROCEDURE — 99232 SBSQ HOSP IP/OBS MODERATE 35: CPT

## 2024-12-02 RX ADMIN — ACETAMINOPHEN, DIPHENHYDRAMINE HCL, PHENYLEPHRINE HCL 3 MILLIGRAM(S): 325; 25; 5 TABLET ORAL at 21:46

## 2024-12-02 RX ADMIN — INSULIN GLARGINE 8 UNIT(S): 100 INJECTION, SOLUTION SUBCUTANEOUS at 22:56

## 2024-12-02 RX ADMIN — TAMSULOSIN HYDROCHLORIDE 0.4 MILLIGRAM(S): 0.4 CAPSULE ORAL at 21:46

## 2024-12-02 RX ADMIN — CHLORHEXIDINE GLUCONATE 1 APPLICATION(S): 1.2 RINSE ORAL at 06:21

## 2024-12-02 RX ADMIN — Medication 81 MILLIGRAM(S): at 11:49

## 2024-12-02 RX ADMIN — Medication 1 TABLET(S): at 08:31

## 2024-12-02 RX ADMIN — NYSTATIN 1 APPLICATION(S): 100000 POWDER TOPICAL at 18:19

## 2024-12-02 RX ADMIN — Medication 500 MILLIGRAM(S): at 10:16

## 2024-12-02 RX ADMIN — Medication 250 MILLIGRAM(S): at 06:20

## 2024-12-02 RX ADMIN — ENOXAPARIN SODIUM 40 MILLIGRAM(S): 30 INJECTION SUBCUTANEOUS at 21:45

## 2024-12-02 RX ADMIN — CLOPIDOGREL 75 MILLIGRAM(S): 75 TABLET, FILM COATED ORAL at 11:49

## 2024-12-02 RX ADMIN — METOPROLOL TARTRATE 25 MILLIGRAM(S): 100 TABLET, FILM COATED ORAL at 18:19

## 2024-12-02 RX ADMIN — Medication 1: at 12:33

## 2024-12-02 RX ADMIN — MEROPENEM 100 MILLIGRAM(S): 500 INJECTION, POWDER, FOR SOLUTION INTRAVENOUS at 06:20

## 2024-12-02 RX ADMIN — MEROPENEM 100 MILLIGRAM(S): 500 INJECTION, POWDER, FOR SOLUTION INTRAVENOUS at 13:53

## 2024-12-02 RX ADMIN — Medication 1000 MICROGRAM(S): at 11:49

## 2024-12-02 RX ADMIN — NYSTATIN 1 APPLICATION(S): 100000 POWDER TOPICAL at 06:20

## 2024-12-02 RX ADMIN — FAMOTIDINE 20 MILLIGRAM(S): 20 TABLET, FILM COATED ORAL at 11:49

## 2024-12-02 RX ADMIN — MEROPENEM 100 MILLIGRAM(S): 500 INJECTION, POWDER, FOR SOLUTION INTRAVENOUS at 21:46

## 2024-12-02 RX ADMIN — Medication 250 MILLIGRAM(S): at 18:19

## 2024-12-02 RX ADMIN — Medication 500 MILLIGRAM(S): at 21:46

## 2024-12-02 RX ADMIN — METOPROLOL TARTRATE 25 MILLIGRAM(S): 100 TABLET, FILM COATED ORAL at 06:20

## 2024-12-02 RX ADMIN — Medication 80 MILLIGRAM(S): at 21:46

## 2024-12-02 NOTE — PROGRESS NOTE ADULT - SUBJECTIVE AND OBJECTIVE BOX
[facility], Division of Infectious Diseases  MARLEEN Graves Y. Patel, S. Shah, G. [facility]  [phone]    Name: DALILA HERNADEZ  Age: 84y  Gender: Male  MRN: [id number]    Interval History:  Patient seen and examined at bedside   No acute overnight events. Afebrile  No complaints  Notes reviewed    Antibiotics:  ciprofloxacin     Tablet 500 milliGRAM(s) Oral every 12 hours  meropenem  IVPB 1000 milliGRAM(s) IV Intermittent every 8 hours      Medications:  acetaminophen     Tablet .. 650 milliGRAM(s) Oral every 6 hours PRN  aluminum hydroxide/magnesium hydroxide/simethicone Suspension 30 milliLiter(s) Oral every 4 hours PRN  aspirin  chewable 81 milliGRAM(s) Oral daily  atorvastatin 80 milliGRAM(s) Oral at bedtime  chlorhexidine 2% Cloths 1 Application(s) Topical daily  ciprofloxacin     Tablet 500 milliGRAM(s) Oral every 12 hours  clopidogrel Tablet 75 milliGRAM(s) Oral daily  cyanocobalamin 1000 MICROGram(s) Oral daily  dextrose 5%. 1000 milliLiter(s) IV Continuous <Continuous>  dextrose 5%. 1000 milliLiter(s) IV Continuous <Continuous>  dextrose 50% Injectable 25 Gram(s) IV Push once  dextrose 50% Injectable 12.5 Gram(s) IV Push once  dextrose 50% Injectable 25 Gram(s) IV Push once  dextrose Oral Gel 15 Gram(s) Oral once PRN  enoxaparin Injectable 40 milliGRAM(s) SubCutaneous every 24 hours  famotidine    Tablet 20 milliGRAM(s) Oral daily  glucagon  Injectable 1 milliGRAM(s) IntraMuscular once  insulin glargine Injectable (LANTUS) 8 Unit(s) SubCutaneous at bedtime  insulin lispro (ADMELOG) corrective regimen sliding scale   SubCutaneous three times a day before meals  melatonin 3 milliGRAM(s) Oral at bedtime  meropenem  IVPB 1000 milliGRAM(s) IV Intermittent every 8 hours  metoprolol tartrate 25 milliGRAM(s) Oral two times a day  nystatin Powder 1 Application(s) Topical two times a day  polyethylene glycol 3350 17 Gram(s) Oral daily  saccharomyces boulardii 250 milliGRAM(s) Oral two times a day  senna 1 Tablet(s) Oral with breakfast  sodium chloride 0.9% lock flush 10 milliLiter(s) IV Push every 1 hour PRN  tamsulosin 0.4 milliGRAM(s) Oral at bedtime      Review of Systems:  A 10-point review of systems was obtained.     Pertinent positives and negatives--  Constitutional: No fevers. No Chills. No Rigors.   Cardiovascular: No chest pain. No palpitations.  Respiratory: No shortness of breath. No cough.  Gastrointestinal: No nausea or vomiting. No diarrhea or constipation.   Psychiatric: Pleasant. Appropriate affect.    Review of systems otherwise negative except as previously noted.    Allergies: No Known Allergies    For details regarding the patient's past medical history, social history, family history, and other miscellaneous elements, please refer the initial infectious diseases consultation and/or the admitting history and physical examination for this admission.    Objective:  Vitals:   T(C): 36.5 (12-02-24 @ 11:08), Max: 36.7 (12-01-24 @ 21:23)  HR: 83 (12-02-24 @ 11:08) (81 - 92)  BP: 123/71 (12-02-24 @ 11:08) (120/71 - 136/78)  RR: 18 (12-02-24 @ 11:08) (18 - 18)  SpO2: 96% (12-02-24 @ 11:08) (93% - 96%)    Physical Examination:  General: no acute distress  HEENT: NC/AT, EOMI,   Cardio: S1, S2 heard, RRR, no murmurs  Resp: breath sounds heard bilaterally, no rales, wheezes or rhonchi  Abd: soft, NT, ND,  Ext: foot in dressing  Skin: warm, dry, no visible rash      Laboratory Studies:  CBC:                       8.9    4.13  )-----------( 154      ( 02 Dec 2024 08:40 )             28.0     CMP: 12-02    142  |  107  |  42[H]  ----------------------------<  94  4.2   |  32[H]  |  1.30    Ca    9.3      02 Dec 2024 08:40        Urinalysis Basic - ( 02 Dec 2024 08:40 )    Color: x / Appearance: x / SG: x / pH: x  Gluc: 94 mg/dL / Ketone: x  / Bili: x / Urobili: x   Blood: x / Protein: x / Nitrite: x   Leuk Esterase: x / RBC: x / WBC x   Sq Epi: x / Non Sq Epi: x / Bacteria: x        Microbiology: reviewed    Culture - Tissue with Gram Stain (collected 11-19-24 @ 16:50)  Source: Tissue  Gram Stain (11-21-24 @ 14:49):    No polymorphonuclear cells seen per low power field    No organisms seen per oil power field  Final Report (11-25-24 @ 09:27):    Few Pseudomonas aeruginosa (Carbapenem Resistant)    Rare Enterococcus faecalis  Organism: Pseudomonas aeruginosa (Carbapenem Resistant)  Enterococcus faecalis (11-25-24 @ 09:27)  Organism: Enterococcus faecalis (11-25-24 @ 09:27)      Method Type: VICKY      -  Ampicillin: S <=2 Predicts results to ampicillin/sulbactam, amoxacillin-clavulanate and  piperacillin-tazobactam.      -  Vancomycin: S 1  Organism: Pseudomonas aeruginosa (Carbapenem Resistant) (11-25-24 @ 09:27)      Method Type: CarbaR      -  Resistance Gene to Carbapenem: Nondet  Organism: Pseudomonas aeruginosa (Carbapenem Resistant) (11-25-24 @ 09:27)      Method Type: VICKY      -  Aztreonam: R >16      -  Cefepime: R >16      -  Ceftazidime: R >16      -  Ceftazidime/Avibactam: S 8      -  Ceftolozane/tazobactam: S <=2      -  Ciprofloxacin: S 0.5      -  Imipenem: R >8      -  Levofloxacin: I 2      -  Meropenem: R >8      -  Piperacillin/Tazobactam: R >64          Radiology: reviewed

## 2024-12-02 NOTE — PROGRESS NOTE ADULT - ASSESSMENT
The patient is an 83 year old male with a history of HTN, HL, DM, CKD, GI bleed, COPD, SANTO, BPH, CAD s/p PCI, PAD, multiple myeloma, lymphedema who presents with heel ulcer.     Plan:  - ECG with sinus rhythm and no evidence of ischemia/infarction  - Echo 6/30/24 with normal LV systolic function, mild pulm HTN  - CXR with grossly clear lungs  - Lower extremity swelling consistent with known history of lymphedema  - Continue aspirin 81 mg daily  - Continue clopidogrel 75 mg daily  - Continue atorvastatin 80 mg daily  - Continue metoprolol tartrate 25 mg bid  - Podiatry and ID follow-up  - Path results with acute on chronic osteomyelitis  - IV antibiotics  - Mild BRITT - can give gentle IV fluids if needed

## 2024-12-02 NOTE — PROGRESS NOTE ADULT - SUBJECTIVE AND OBJECTIVE BOX
[facility]                                                       Dr. Blum, Dr. Marshall, Dr. Cevallos, Dr. Arroyo, Dr. Darling, Dr. Cordova                                      [facility] [place], [facility], [address]                                                 [address] [address]                                      Ph: [phone]  Fax: [phone]                                         Ph: [phone]  Fax: [phone]      Patient is a 84y old  Male who presents with a chief complaint of Bilateral Heel Osteomyelitis (02 Dec 2024 12:02)  Patient seen in follow up for CKD.        PAST MEDICAL HISTORY:  HTN (hypertension)    CAD (coronary artery disease)    HLD (hyperlipidemia)    DM (diabetes mellitus)    Benign prostatic hyperplasia with lower urinary tract symptoms, symptom details unspecified    Stented coronary artery    SANTO on CPAP    Chronic obstructive pulmonary disease, unspecified COPD type    Obesity, morbid, BMI 40.0-49.9    Difficulty walking    GI bleed    History of blood transfusion    DM2 (diabetes mellitus, type 2)    COPD, mild    Lymphedema    Multiple myeloma    Chronic obstructive pulmonary disease, unspecified COPD type      MEDICATIONS  (STANDING):  aspirin  chewable 81 milliGRAM(s) Oral daily  atorvastatin 80 milliGRAM(s) Oral at bedtime  chlorhexidine 2% Cloths 1 Application(s) Topical daily  ciprofloxacin     Tablet 500 milliGRAM(s) Oral every 12 hours  clopidogrel Tablet 75 milliGRAM(s) Oral daily  cyanocobalamin 1000 MICROGram(s) Oral daily  dextrose 5%. 1000 milliLiter(s) (100 mL/Hr) IV Continuous <Continuous>  dextrose 5%. 1000 milliLiter(s) (50 mL/Hr) IV Continuous <Continuous>  dextrose 50% Injectable 25 Gram(s) IV Push once  dextrose 50% Injectable 12.5 Gram(s) IV Push once  dextrose 50% Injectable 25 Gram(s) IV Push once  enoxaparin Injectable 40 milliGRAM(s) SubCutaneous every 24 hours  famotidine    Tablet 20 milliGRAM(s) Oral daily  glucagon  Injectable 1 milliGRAM(s) IntraMuscular once  insulin glargine Injectable (LANTUS) 8 Unit(s) SubCutaneous at bedtime  insulin lispro (ADMELOG) corrective regimen sliding scale   SubCutaneous three times a day before meals  lactated ringers. 1000 milliLiter(s) (75 mL/Hr) IV Continuous <Continuous>  melatonin 3 milliGRAM(s) Oral at bedtime  meropenem  IVPB 1000 milliGRAM(s) IV Intermittent every 8 hours  metoprolol tartrate 25 milliGRAM(s) Oral two times a day  nystatin Powder 1 Application(s) Topical two times a day  polyethylene glycol 3350 17 Gram(s) Oral daily  saccharomyces boulardii 250 milliGRAM(s) Oral two times a day  senna 1 Tablet(s) Oral with breakfast  tamsulosin 0.4 milliGRAM(s) Oral at bedtime    MEDICATIONS  (PRN):  acetaminophen     Tablet .. 650 milliGRAM(s) Oral every 6 hours PRN Temp greater or equal to 38C (100.4F), Mild Pain (1 - 3)  aluminum hydroxide/magnesium hydroxide/simethicone Suspension 30 milliLiter(s) Oral every 4 hours PRN Dyspepsia  dextrose Oral Gel 15 Gram(s) Oral once PRN Blood Glucose LESS THAN 70 milliGRAM(s)/deciliter  sodium chloride 0.9% lock flush 10 milliLiter(s) IV Push every 1 hour PRN Pre/post blood products, medications, blood draw, and to maintain line patency    T(C): 36.5 (12-02-24 @ 11:08), Max: 36.7 (12-01-24 @ 21:23)  HR: 83 (12-02-24 @ 11:08) (77 - 92)  BP: 123/71 (12-02-24 @ 11:08) (100/62 - 136/78)  RR: 18 (12-02-24 @ 11:08)  SpO2: 96% (12-02-24 @ 11:08)  Wt(kg): --  I&O's Detail    01 Dec 2024 07:01  -  02 Dec 2024 07:00  --------------------------------------------------------  IN:    IV PiggyBack: 100 mL    Oral Fluid: 480 mL  Total IN: 580 mL    OUT:    Voided (mL): 800 mL  Total OUT: 800 mL    Total NET: -220 mL              PHYSICAL EXAM:  General: No distress  Respiratory: b/l air entry  Cardiovascular: S1 S2  Gastrointestinal: soft  Extremities:  edema                          LABORATORY:                        8.9    4.13  )-----------( 154      ( 02 Dec 2024 08:40 )             28.0     12-02    142  |  107  |  42[H]  ----------------------------<  94  4.2   |  32[H]  |  1.30    Ca    9.3      02 Dec 2024 08:40      Sodium: 142 mmol/L (12-02 @ 08:40)  Sodium: 142 mmol/L (12-01 @ 08:38)    Potassium: 4.2 mmol/L (12-02 @ 08:40)  Potassium: 4.2 mmol/L (12-01 @ 08:38)    Hemoglobin: 8.9 g/dL (12-02 @ 08:40)  Hemoglobin: 9.2 g/dL (12-01 @ 08:38)  Hemoglobin: 9.1 g/dL (11-30 @ 07:30)    Creatinine, Serum 1.30 (12-02 @ 08:40)  Creatinine, Serum 1.40 (12-01 @ 08:38)  Creatinine, Serum 1.50 (11-30 @ 07:30)          Urinalysis Basic - ( 02 Dec 2024 08:40 )    Color: x / Appearance: x / SG: x / pH: x  Gluc: 94 mg/dL / Ketone: x  / Bili: x / Urobili: x   Blood: x / Protein: x / Nitrite: x   Leuk Esterase: x / RBC: x / WBC x   Sq Epi: x / Non Sq Epi: x / Bacteria: x

## 2024-12-02 NOTE — PROGRESS NOTE ADULT - ASSESSMENT
84yo M with a PMH of HTN, HLD, Type 2 DM, CKD3a, hx of GI bleed, COPD, SANTO, BPH, CAD s/p PCI, PAD, multiple myeloma, lymphedema who presents with b/l heel ulcers and suspected OM planned for bilateral foot surgery with podiatry, Gabriel Deshpande, on 10/29 for his suspected bilateral heel osteomyelitis now s/p partial calcanectomy.        Problem/Plan - 1:  ·  Problem: Osteomyelitis of foot.   ·  Plan: - Bilateral heel osteomyelitis per outpatient w/up and admitted with a plan for OR with podiatry, Dr. Rios on 10/29  - Wound Cx (10/23): Proteus mirabilis, Pseudomonas aeruginosa, ESBL Klebsiella pneumoniae, Enterococcus faecalis  - Blood Cx NG  - s/p partial calcanectomy on 10/29.    - f/up OR cultures +rare pseudomonas aeruginosa.  Pathology: bilateral heel acute and chronic osteomyelitis.  - Tylenol PRN for mild pain.  - Consulted ID Dr. Casillas, recs appreciated   - PICC line placed.  Continue meropenem till Dec 9.    - Continue Florastor  - afebrile, no leukocytosis, dressing/wound care as per podiatry recommendation   - cardiology Dr. Rock consulted, recs appreciated.  - Patient scheduled for right heel debridement on 11/19. doing well post op , tissue culture 11/19 pseudomonas (carbapenem resistant).  Continue on Cipro 500mg PO Q12h and Meropenem.     Problem/Plan - 2:  ·  Problem: DM (diabetes mellitus).   ·  Plan: - type 2 DM on insulin  - continue decreased lantus dose 8units QHS   - continue low-dose lispro corrective ISS  - controlled      Problem/Plan - 3:  ·  Problem: HTN (hypertension).   ·  Plan: Chronic   - continue metoprolol 25mg BID.     Problem/Plan - 4:  ·  Problem: HLD (hyperlipidemia).   ·  Plan: Chronic   - Continue atorvastatin 80mg QD.     Problem/Plan - 5:  ·  Problem: CAD (coronary artery disease).   ·  Plan: CAD sp stents x4  - Held ASA and Plavix on 10/29; Restarted on ASA and Plavix on 10/30  - c/w lipitor  - cardiology eval noted      Problem/Plan - 6:  ·  Problem: Benign prostatic hyperplasia with lower urinary tract symptoms, symptom details unspecified.   ·  Plan: Chronic  -Continue Flomax.     Problem/Plan - 7:  ·  Problem: Multiple myeloma.   ·  Plan: Chronic  - Chemotherapy held      Problem/Plan - 8:   DVT ppx: continue lovenox 40mg SQ daily    #BRITT:  likely due to dehydration.  Pt. with decrease fluid intake.  Encouraged patient to drink more fluids.  s/p LR@75cc/hr x 10 hours with improving renal function.

## 2024-12-02 NOTE — PROGRESS NOTE ADULT - SUBJECTIVE AND OBJECTIVE BOX
Chief Complaint: Heel ulcer    Interval Events: No events overnight.    Review of Systems:  General: No fevers, chills, weight gain  Skin: No rashes, color changes  Cardiovascular: No chest pain, orthopnea  Respiratory: No shortness of breath, cough  Gastrointestinal: No nausea, abdominal pain  Genitourinary: No incontinence, pain with urination  Musculoskeletal: No pain, swelling, decreased range of motion  Neurological: No headache, weakness  Psychiatric: No depression, anxiety  Endocrine: No weight gain, increased thirst  All other systems are comprehensively negative.    Physical Exam:  Vital Signs Last 24 Hrs  T(C): 36.3 (02 Dec 2024 05:42), Max: 36.7 (01 Dec 2024 21:23)  T(F): 97.4 (02 Dec 2024 05:42), Max: 98 (01 Dec 2024 21:23)  HR: 81 (02 Dec 2024 05:42) (81 - 92)  BP: 120/71 (02 Dec 2024 05:42) (120/71 - 136/78)  BP(mean): --  RR: 18 (02 Dec 2024 05:42) (18 - 18)  SpO2: 93% (02 Dec 2024 05:42) (93% - 96%)  Parameters below as of 02 Dec 2024 05:42  Patient On (Oxygen Delivery Method): room air  General: NAD  HEENT: MMM  Neck: No JVD, no carotid bruit  Lungs: CTAB  CV: RRR, nl S1/S2, no M/R/G  Abdomen: S/NT/ND, +BS  Extremities: +Lymphedema, no cyanosis  Neuro: AAOx3, non-focal  Skin: No rash    Labs:    12-01    142  |  109[H]  |  43[H]  ----------------------------<  97  4.2   |  31  |  1.40[H]    Ca    10.1      01 Dec 2024 08:38                          9.2    3.93  )-----------( 162      ( 01 Dec 2024 08:38 )             29.0

## 2024-12-02 NOTE — PROGRESS NOTE ADULT - PROBLEM SELECTOR PLAN 1
Patient examined and evaluated this time. Wounds are healing well and right wound now has healthy granular wound bed. Patient advised regarding etiology of his symptoms and treatment plan.  Continue with local care and offloading to bilateral heels at this time.  Continue with antibiotics as per infectious disease recommendations.  Patient can be full weight bearing to left foot and partial weight bearing to right forefoot in surgical shoes.     Wound Care Instructions:  1. Remove bilateral heel wound dressings.  2. Dress wound with aquacel Ag, and dry, sterile dressing, and ACE bandage.  3. Change dressings three times a week (MWF or TThSat) and monitor for any signs of infection.  4. Patient is to [facility] within 1 week upon discharge.

## 2024-12-02 NOTE — SOCIAL WORK PROGRESS NOTE - NSSWPROGRESSNOTE_GEN_ALL_CORE
NADYA spoke with Gloria from [facility]- she is set to come on Wendesday at 10am, asking for PT to be there alongside their evaluation team. 4882 forms left on chart. NADYA to follow.

## 2024-12-02 NOTE — PROGRESS NOTE ADULT - SUBJECTIVE AND OBJECTIVE BOX
PROGRESS NOTE   Patient is a 84y old  Male who presents with a chief complaint of Bilateral Heel Osteomyelitis (02 Dec 2024 11:35)      HPI:  Patient with a past medical history of hypertension, diabetes, hyperlipidemia, bilateral heel osteomyelitis currently on outpatient antibiotics through [facility] is presenting for surgery.  He is scheduled for surgery with Dr. frias tomorrow.  Denies any fevers.  Endorsing pain to his heels but no other areas of pain.  No nausea or vomiting.  No other acute complaints today.    Denies fever, chills, chest pain, palpitations, SOB, cough, abdominal pain, nausea, vomiting, diarrhea, constipation, urinary frequency, urgency, or dysuria, headaches, changes in vision, dizziness, numbness, tingling.  Denies recent travel, recent antibiotic use, or sick contacts.    ED Course:   Vitals: BP: 161/87, HR 69: , Temp: 97.8 , RR: 18, SpO2: 96% on   Labs:  H/H: 10.6/34.4, PTT: 36.2, Albumin: 2.7  Tissue culture: (incomplete)      Surgical pathology report: (incomplete)  MR foot:   Xray foot:   EKBPM normal sinus rhythm, QTc: 477  Received in the ED:       HPI: Patient presents to [facility] from rehab for bilateral foot surgery planned with podiatry, Gabriel Deshpande, tomorrow for his bilateral heel osteomyelitis. States his osteomyelitis started 6 months ago, Currently c/o pain in b/l heels. Denies any numbness or tingling down LLE/RLE. Denies any trauma. Patient currently does not walk due to deconditioning. Denies any other complaints. Denies fevers, chills, HA, Dizziness, chest pain, SOB, N/V/D, bladder symptoms,     (28 Oct 2024 14:08)      Vital Signs Last 24 Hrs  T(C): 36.5 (02 Dec 2024 11:08), Max: 36.7 (01 Dec 2024 21:23)  T(F): 97.7 (02 Dec 2024 11:08), Max: 98 (01 Dec 2024 21:23)  HR: 83 (02 Dec 2024 11:08) (81 - 92)  BP: 123/71 (02 Dec 2024 11:08) (120/71 - 136/78)  BP(mean): --  RR: 18 (02 Dec 2024 11:08) (18 - 18)  SpO2: 96% (02 Dec 2024 11:08) (93% - 96%)    Parameters below as of 02 Dec 2024 11:08  Patient On (Oxygen Delivery Method): room air                              8.9    4.13  )-----------( 154      ( 02 Dec 2024 08:40 )             28.0               12    142  |  107  |  42[H]  ----------------------------<  94  4.2   |  32[H]  |  1.30    Ca    9.3      02 Dec 2024 08:40        PHYSICAL EXAM  Vascular: DP & PT palpable bilaterally, Capillary refill 3 seconds  Neurological: Light touch sensation not intact bilaterally  Musculoskeletal: 4/5 strength in all quadrants bilaterally, AJ & STJ ROM intact  Dermatological: Bilateral heel wounds down to skin, subcutaneous tissue, fat, bone (left healing well, right healthy with granular wound base), no proximal streaking, no fluctuance, no malodor, no signs of acute infection at this time.

## 2024-12-02 NOTE — PROGRESS NOTE ADULT - ASSESSMENT
84 yo male with hypertension, diabetes, hyperlipidemia, bilateral heel osteomyelitis currently on outpatient antibiotics through [facility] presented for surgery planned with podiatry, Gabriel Deshpande, for his bilateral heel osteomyelitis. His osteomyelitis started 6 months PTA.   Calcanectomy, partial 29-Oct-2024 12:58:43  Jona Mason    bilateral heel osteomyelitis  prior micro with Proteus mirabilis, Pseudomonas aeruginosa, Enterococcus faecalis, Klebsiella pneumoniae ESBL  Culture - Tissue with Gram Stain (10.29.24 @ 12:00) Rare Pseudomonas aeruginosa  -  Ciprofloxacin: S <=0.25-  Levofloxacin: S <=0.5-  Meropenem: S <=1  Culture - Tissue with Gram Stain (11.19.24 @ 16:50) Pseudomonas aeruginosa (Carbapenem Resistant), Enterococcus faecalis -Ciprofloxacin: S 0.5, Meropenem: R >8, Piperacillin/Tazobactam: R >64   Path- Right heel bone proximal margin- Acute and chronic osteomyelitis, Left heel bone proximal margin- Acute and chronic osteomyelitis    Recommendations:   Meropenem 1 gram IV q8h  Ciprofloxacin 500 mg PO BID  Last day of Abx 12/9  weekly CBC w diff, CMP, ESR  PICC can be removed at end of Rx  Additional care per primary team    Infectious Diseases will follow. Please call with any questions.  Queta Ngo M.D.  [facility] Division of Infectious Diseases [phone]  For after 5 P.M. and weekends, please call [phone]  Available on [employer] TEAMS

## 2024-12-02 NOTE — PROGRESS NOTE ADULT - ASSESSMENT
BRITT Baseline creatinine ~ 1.0  B/L Heel OM, s/p partial calcanectomy  h/o Anemia, Multiple myeloma  Diabetes  Hypertension    12/01/24: Started on IV hydration. Will follow creatinine trend. Monitor UO. Monitor blood sugar levels. Insulin coverage as needed. Dietary restriction.   Trend BP and titrate BP meds as needed. Encourage PO intake as tolerated. Avoid nephrotoxic meds as possible. IV abx, ID follow up.   Will follow electrolytes and renal function trend.   12/02/24; Stable renal indices. To continue current meds. Monitor h/h trend. Transfuse PRBC's PRN. Will lower IVF.  BRITT Baseline creatinine ~ 1.0  B/L Heel OM, s/p partial calcanectomy  h/o Anemia, Multiple myeloma  Diabetes  Hypertension    12/01/24: Started on IV hydration. Will follow creatinine trend. Monitor UO. Monitor blood sugar levels. Insulin coverage as needed. Dietary restriction.   Trend BP and titrate BP meds as needed. Encourage PO intake as tolerated. Avoid nephrotoxic meds as possible. IV abx, ID follow up.   Will follow electrolytes and renal function trend.   12/02/24; Stable renal indices. To continue current meds. Monitor h/h trend. Transfuse PRBC's PRN. Avoid nephrotoxic meds as possible.

## 2024-12-02 NOTE — PHARMACOTHERAPY INTERVENTION NOTE - COMMENTS
Patient is an 84y M presenting with Pseudomonas aeruginosa OM being treated with meropenem 1g IV q8h + ciprofloxacin 500 mg PO BID. Cultures and susceptibilities reviewed. Discussed with ID Dr. Queta Ngo, all organisms susceptible to ciprofloxacin, discussed potential discontinuation of meropenem and completion of treatment with ciprofloxacin monotherapy. Dr. Ngo to review and de-escalate as clinically appropriate.

## 2024-12-02 NOTE — PROGRESS NOTE ADULT - SUBJECTIVE AND OBJECTIVE BOX
CHIEF COMPLAINT/INTERVAL HISTORY:  Pt. seen and evaluated for bilateral heel osteomyelitis.  Pt. is in no distress.  Tolerating IV antibiotics.  Denies having any pain.      REVIEW OF SYSTEMS:  No fever, CP, SOB, or abdominal pain.      Vital Signs Last 24 Hrs  T(C): 36.5 (02 Dec 2024 11:08), Max: 36.7 (01 Dec 2024 21:23)  T(F): 97.7 (02 Dec 2024 11:08), Max: 98 (01 Dec 2024 21:23)  HR: 83 (02 Dec 2024 11:08) (81 - 92)  BP: 123/71 (02 Dec 2024 11:08) (120/71 - 136/78)  BP(mean): --  RR: 18 (02 Dec 2024 11:08) (18 - 18)  SpO2: 96% (02 Dec 2024 11:08) (93% - 96%)    Parameters below as of 02 Dec 2024 11:08  Patient On (Oxygen Delivery Method): room air        PHYSICAL EXAM:  GENERAL: NAD  HEENT: EOMI, hearing normal, conjunctiva and sclera clear  Chest: CTA bilaterally, no wheezing  CV: S1S2, RRR,   GI: soft, +BS, NT/ND  Musculoskeletal: ACE wrapped dressing over bilateral feet  Psychiatric: affect nL, mood nL  Skin: warm and dry    LABS:                        8.9    4.13  )-----------( 154      ( 02 Dec 2024 08:40 )             28.0     12-02    142  |  107  |  42[H]  ----------------------------<  94  4.2   |  32[H]  |  1.30    Ca    9.3      02 Dec 2024 08:40        Urinalysis Basic - ( 02 Dec 2024 08:40 )    Color: x / Appearance: x / SG: x / pH: x  Gluc: 94 mg/dL / Ketone: x  / Bili: x / Urobili: x   Blood: x / Protein: x / Nitrite: x   Leuk Esterase: x / RBC: x / WBC x   Sq Epi: x / Non Sq Epi: x / Bacteria: x

## 2024-12-02 NOTE — SOCIAL WORK PROGRESS NOTE - NSSWPROGRESSNOTE_GEN_ALL_CORE
SW spoke with pts dtr briefly, asked for her to call back. The [facility] called asking for additional documention, trying to see which [facility] is the top priority- [place], [place] or [place]. Awaiting call back.

## 2024-12-03 PROCEDURE — 99232 SBSQ HOSP IP/OBS MODERATE 35: CPT

## 2024-12-03 RX ADMIN — Medication 80 MILLIGRAM(S): at 21:58

## 2024-12-03 RX ADMIN — MEROPENEM 100 MILLIGRAM(S): 500 INJECTION, POWDER, FOR SOLUTION INTRAVENOUS at 05:55

## 2024-12-03 RX ADMIN — ACETAMINOPHEN, DIPHENHYDRAMINE HCL, PHENYLEPHRINE HCL 3 MILLIGRAM(S): 325; 25; 5 TABLET ORAL at 21:58

## 2024-12-03 RX ADMIN — Medication 500 MILLIGRAM(S): at 21:58

## 2024-12-03 RX ADMIN — INSULIN GLARGINE 8 UNIT(S): 100 INJECTION, SOLUTION SUBCUTANEOUS at 21:59

## 2024-12-03 RX ADMIN — FAMOTIDINE 20 MILLIGRAM(S): 20 TABLET, FILM COATED ORAL at 13:10

## 2024-12-03 RX ADMIN — NYSTATIN 1 APPLICATION(S): 100000 POWDER TOPICAL at 17:53

## 2024-12-03 RX ADMIN — METOPROLOL TARTRATE 25 MILLIGRAM(S): 100 TABLET, FILM COATED ORAL at 17:52

## 2024-12-03 RX ADMIN — Medication 500 MILLIGRAM(S): at 13:10

## 2024-12-03 RX ADMIN — MEROPENEM 100 MILLIGRAM(S): 500 INJECTION, POWDER, FOR SOLUTION INTRAVENOUS at 21:57

## 2024-12-03 RX ADMIN — METOPROLOL TARTRATE 25 MILLIGRAM(S): 100 TABLET, FILM COATED ORAL at 05:54

## 2024-12-03 RX ADMIN — Medication 250 MILLIGRAM(S): at 05:54

## 2024-12-03 RX ADMIN — MEROPENEM 100 MILLIGRAM(S): 500 INJECTION, POWDER, FOR SOLUTION INTRAVENOUS at 13:11

## 2024-12-03 RX ADMIN — CLOPIDOGREL 75 MILLIGRAM(S): 75 TABLET, FILM COATED ORAL at 13:10

## 2024-12-03 RX ADMIN — ENOXAPARIN SODIUM 40 MILLIGRAM(S): 30 INJECTION SUBCUTANEOUS at 21:57

## 2024-12-03 RX ADMIN — Medication 81 MILLIGRAM(S): at 13:10

## 2024-12-03 RX ADMIN — TAMSULOSIN HYDROCHLORIDE 0.4 MILLIGRAM(S): 0.4 CAPSULE ORAL at 21:58

## 2024-12-03 RX ADMIN — NYSTATIN 1 APPLICATION(S): 100000 POWDER TOPICAL at 05:55

## 2024-12-03 RX ADMIN — Medication 1: at 17:52

## 2024-12-03 RX ADMIN — CHLORHEXIDINE GLUCONATE 1 APPLICATION(S): 1.2 RINSE ORAL at 05:55

## 2024-12-03 RX ADMIN — Medication 1000 MICROGRAM(S): at 13:10

## 2024-12-03 RX ADMIN — Medication 250 MILLIGRAM(S): at 17:52

## 2024-12-03 NOTE — PROGRESS NOTE ADULT - ASSESSMENT
BRITT Baseline creatinine ~ 1.0  B/L Heel OM, s/p partial calcanectomy  h/o Anemia, Multiple myeloma  Diabetes  Hypertension    12/01/24: Started on IV hydration. Will follow creatinine trend. Monitor UO. Monitor blood sugar levels. Insulin coverage as needed. Dietary restriction.   Trend BP and titrate BP meds as needed. Encourage PO intake as tolerated. Avoid nephrotoxic meds as possible. IV abx, ID follow up.   Will follow electrolytes and renal function trend.   12/02/24; Stable renal indices. To continue current meds. Monitor h/h trend. Transfuse PRBC's PRN. Avoid nephrotoxic meds as possible.   12/03/24: Recheck am labs. To continue current meds. Encourage PO intake as tolerated.

## 2024-12-03 NOTE — PROGRESS NOTE ADULT - SUBJECTIVE AND OBJECTIVE BOX
CHIEF COMPLAINT/INTERVAL HISTORY:  Pt. seen and evaluated for bilateral heel osteomyelitis.  Pt. is in no distress.  Tolerating IV antibiotics.  Denies having any pain or SOB.      REVIEW OF SYSTEMS:  No fever, CP, SOB, or abdominal pain.     Vital Signs Last 24 Hrs  T(C): 36.5 (03 Dec 2024 11:13), Max: 36.7 (02 Dec 2024 22:45)  T(F): 97.7 (03 Dec 2024 11:13), Max: 98 (02 Dec 2024 22:45)  HR: 76 (03 Dec 2024 11:13) (76 - 87)  BP: 121/65 (03 Dec 2024 11:13) (120/68 - 122/73)  BP(mean): --  RR: 18 (03 Dec 2024 11:13) (18 - 18)  SpO2: 93% (03 Dec 2024 11:13) (93% - 96%)    Parameters below as of 03 Dec 2024 11:13  Patient On (Oxygen Delivery Method): room air        PHYSICAL EXAM:  GENERAL: NAD  HEENT: EOMI, hearing normal, conjunctiva and sclera clear  Chest: CTA bilaterally, no wheezing  CV: S1S2, RRR,   GI: soft, +BS, NT/ND  Musculoskeletal: ACE wrapped dressing over bilateral feet  Psychiatric: affect nL, mood nL  Skin: warm and dry    LABS:                        8.9    4.13  )-----------( 154      ( 02 Dec 2024 08:40 )             28.0     12-02    142  |  107  |  42[H]  ----------------------------<  94  4.2   |  32[H]  |  1.30    Ca    9.3      02 Dec 2024 08:40        Urinalysis Basic - ( 02 Dec 2024 08:40 )    Color: x / Appearance: x / SG: x / pH: x  Gluc: 94 mg/dL / Ketone: x  / Bili: x / Urobili: x   Blood: x / Protein: x / Nitrite: x   Leuk Esterase: x / RBC: x / WBC x   Sq Epi: x / Non Sq Epi: x / Bacteria: x

## 2024-12-03 NOTE — PROGRESS NOTE ADULT - SUBJECTIVE AND OBJECTIVE BOX
[facility]                                                       Dr. Blum, Dr. Marshall, Dr. Cevallos, Dr. Arroyo, Dr. Darling, Dr. Cordova                                      [facility], [facility], [address]                                                 [address] [address]                                      Ph: [phone]  Fax: [phone]                                         Ph: [phone]  Fax: [phone]      Patient is a 84y old  Male who presents with a chief complaint of Bilateral Heel Osteomyelitis (02 Dec 2024 12:02)  Patient seen in follow up for CKD.        PAST MEDICAL HISTORY:  HTN (hypertension)    CAD (coronary artery disease)    HLD (hyperlipidemia)    DM (diabetes mellitus)    Benign prostatic hyperplasia with lower urinary tract symptoms, symptom details unspecified    Stented coronary artery    SANTO on CPAP    Chronic obstructive pulmonary disease, unspecified COPD type    Obesity, morbid, BMI 40.0-49.9    Difficulty walking    GI bleed    History of blood transfusion    DM2 (diabetes mellitus, type 2)    COPD, mild    Lymphedema    Multiple myeloma    Chronic obstructive pulmonary disease, unspecified COPD type      MEDICATIONS  (STANDING):  aspirin  chewable 81 milliGRAM(s) Oral daily  atorvastatin 80 milliGRAM(s) Oral at bedtime  chlorhexidine 2% Cloths 1 Application(s) Topical daily  ciprofloxacin     Tablet 500 milliGRAM(s) Oral every 12 hours  clopidogrel Tablet 75 milliGRAM(s) Oral daily  cyanocobalamin 1000 MICROGram(s) Oral daily  dextrose 5%. 1000 milliLiter(s) (100 mL/Hr) IV Continuous <Continuous>  dextrose 5%. 1000 milliLiter(s) (50 mL/Hr) IV Continuous <Continuous>  dextrose 50% Injectable 25 Gram(s) IV Push once  dextrose 50% Injectable 25 Gram(s) IV Push once  dextrose 50% Injectable 12.5 Gram(s) IV Push once  enoxaparin Injectable 40 milliGRAM(s) SubCutaneous every 24 hours  famotidine    Tablet 20 milliGRAM(s) Oral daily  glucagon  Injectable 1 milliGRAM(s) IntraMuscular once  insulin glargine Injectable (LANTUS) 8 Unit(s) SubCutaneous at bedtime  insulin lispro (ADMELOG) corrective regimen sliding scale   SubCutaneous three times a day before meals  melatonin 3 milliGRAM(s) Oral at bedtime  meropenem  IVPB 1000 milliGRAM(s) IV Intermittent every 8 hours  metoprolol tartrate 25 milliGRAM(s) Oral two times a day  nystatin Powder 1 Application(s) Topical two times a day  polyethylene glycol 3350 17 Gram(s) Oral daily  saccharomyces boulardii 250 milliGRAM(s) Oral two times a day  senna 1 Tablet(s) Oral with breakfast  tamsulosin 0.4 milliGRAM(s) Oral at bedtime    MEDICATIONS  (PRN):  acetaminophen     Tablet .. 650 milliGRAM(s) Oral every 6 hours PRN Temp greater or equal to 38C (100.4F), Mild Pain (1 - 3)  aluminum hydroxide/magnesium hydroxide/simethicone Suspension 30 milliLiter(s) Oral every 4 hours PRN Dyspepsia  dextrose Oral Gel 15 Gram(s) Oral once PRN Blood Glucose LESS THAN 70 milliGRAM(s)/deciliter  sodium chloride 0.9% lock flush 10 milliLiter(s) IV Push every 1 hour PRN Pre/post blood products, medications, blood draw, and to maintain line patency    T(C): 36.5 (12-03-24 @ 11:13), Max: 36.7 (12-01-24 @ 21:23)  HR: 76 (12-03-24 @ 11:13) (76 - 92)  BP: 121/65 (12-03-24 @ 11:13) (120/68 - 136/78)  RR: 18 (12-03-24 @ 11:13)  SpO2: 93% (12-03-24 @ 11:13)  Wt(kg): --  I&O's Detail    02 Dec 2024 07:01  -  03 Dec 2024 07:00  --------------------------------------------------------  IN:  Total IN: 0 mL    OUT:    Voided (mL): 350 mL  Total OUT: 350 mL    Total NET: -350 mL                    PHYSICAL EXAM:  General: No distress  Respiratory: b/l air entry  Cardiovascular: S1 S2  Gastrointestinal: soft  Extremities:  edema                          LABORATORY:                        8.9    4.13  )-----------( 154      ( 02 Dec 2024 08:40 )             28.0     12-02    142  |  107  |  42[H]  ----------------------------<  94  4.2   |  32[H]  |  1.30    Ca    9.3      02 Dec 2024 08:40      Sodium: 142 mmol/L (12-02 @ 08:40)    Potassium: 4.2 mmol/L (12-02 @ 08:40)    Hemoglobin: 8.9 g/dL (12-02 @ 08:40)  Hemoglobin: 9.2 g/dL (12-01 @ 08:38)    Creatinine, Serum 1.30 (12-02 @ 08:40)  Creatinine, Serum 1.40 (12-01 @ 08:38)          Urinalysis Basic - ( 02 Dec 2024 08:40 )    Color: x / Appearance: x / SG: x / pH: x  Gluc: 94 mg/dL / Ketone: x  / Bili: x / Urobili: x   Blood: x / Protein: x / Nitrite: x   Leuk Esterase: x / RBC: x / WBC x   Sq Epi: x / Non Sq Epi: x / Bacteria: x

## 2024-12-03 NOTE — PROGRESS NOTE ADULT - SUBJECTIVE AND OBJECTIVE BOX
Chief Complaint: Heel ulcer    Interval Events: No events overnight.    Review of Systems:  General: No fevers, chills, weight gain  Skin: No rashes, color changes  Cardiovascular: No chest pain, orthopnea  Respiratory: No shortness of breath, cough  Gastrointestinal: No nausea, abdominal pain  Genitourinary: No incontinence, pain with urination  Musculoskeletal: No pain, swelling, decreased range of motion  Neurological: No headache, weakness  Psychiatric: No depression, anxiety  Endocrine: No weight gain, increased thirst  All other systems are comprehensively negative.    Physical Exam:  Vital Signs Last 24 Hrs  T(C): 36.4 (03 Dec 2024 05:34), Max: 36.7 (02 Dec 2024 22:45)  T(F): 97.6 (03 Dec 2024 05:34), Max: 98 (02 Dec 2024 22:45)  HR: 82 (03 Dec 2024 05:34) (82 - 87)  BP: 122/73 (03 Dec 2024 05:34) (120/68 - 123/71)  BP(mean): --  RR: 18 (03 Dec 2024 05:34) (18 - 18)  SpO2: 96% (03 Dec 2024 05:34) (96% - 96%)  Parameters below as of 03 Dec 2024 05:34  Patient On (Oxygen Delivery Method): room air  General: NAD  HEENT: MMM  Neck: No JVD, no carotid bruit  Lungs: CTAB  CV: RRR, nl S1/S2, no M/R/G  Abdomen: S/NT/ND, +BS  Extremities: +Lymphedema, no cyanosis  Neuro: AAOx3, non-focal  Skin: No rash    Labs:    12-02    142  |  107  |  42[H]  ----------------------------<  94  4.2   |  32[H]  |  1.30    Ca    9.3      02 Dec 2024 08:40                          8.9    4.13  )-----------( 154      ( 02 Dec 2024 08:40 )             28.0

## 2024-12-03 NOTE — SOCIAL WORK PROGRESS NOTE - NSSWPROGRESSNOTE_GEN_ALL_CORE
SW spoke with this pts spouse- discussed ROSY coming today and tomorrow. Spouse concerned custodial may not accommodate pt, discussed alternative of pt returning home upon DC with private home aides. SW to follow.

## 2024-12-04 LAB
ANION GAP SERPL CALC-SCNC: 0 MMOL/L — LOW (ref 5–17)
BASOPHILS # BLD AUTO: 0.01 K/UL — SIGNIFICANT CHANGE UP (ref 0–0.2)
BASOPHILS NFR BLD AUTO: 0.2 % — SIGNIFICANT CHANGE UP (ref 0–2)
BUN SERPL-MCNC: 43 MG/DL — HIGH (ref 7–23)
CALCIUM SERPL-MCNC: 9.7 MG/DL — SIGNIFICANT CHANGE UP (ref 8.5–10.1)
CHLORIDE SERPL-SCNC: 109 MMOL/L — HIGH (ref 96–108)
CO2 SERPL-SCNC: 33 MMOL/L — HIGH (ref 22–31)
CREAT SERPL-MCNC: 1.4 MG/DL — HIGH (ref 0.5–1.3)
EGFR: 50 ML/MIN/1.73M2 — LOW
EOSINOPHIL # BLD AUTO: 0.42 K/UL — SIGNIFICANT CHANGE UP (ref 0–0.5)
EOSINOPHIL NFR BLD AUTO: 9.9 % — HIGH (ref 0–6)
GLUCOSE SERPL-MCNC: 82 MG/DL — SIGNIFICANT CHANGE UP (ref 70–99)
HCT VFR BLD CALC: 28.9 % — LOW (ref 39–50)
HGB BLD-MCNC: 9 G/DL — LOW (ref 13–17)
IMM GRANULOCYTES NFR BLD AUTO: 0.2 % — SIGNIFICANT CHANGE UP (ref 0–0.9)
LYMPHOCYTES # BLD AUTO: 1.71 K/UL — SIGNIFICANT CHANGE UP (ref 1–3.3)
LYMPHOCYTES # BLD AUTO: 40.2 % — SIGNIFICANT CHANGE UP (ref 13–44)
MAGNESIUM SERPL-MCNC: 1.9 MG/DL — SIGNIFICANT CHANGE UP (ref 1.6–2.6)
MCHC RBC-ENTMCNC: 28.5 PG — SIGNIFICANT CHANGE UP (ref 27–34)
MCHC RBC-ENTMCNC: 31.1 G/DL — LOW (ref 32–36)
MCV RBC AUTO: 91.5 FL — SIGNIFICANT CHANGE UP (ref 80–100)
MONOCYTES # BLD AUTO: 0.41 K/UL — SIGNIFICANT CHANGE UP (ref 0–0.9)
MONOCYTES NFR BLD AUTO: 9.6 % — SIGNIFICANT CHANGE UP (ref 2–14)
NEUTROPHILS # BLD AUTO: 1.69 K/UL — LOW (ref 1.8–7.4)
NEUTROPHILS NFR BLD AUTO: 39.9 % — LOW (ref 43–77)
NRBC # BLD: 0 /100 WBCS — SIGNIFICANT CHANGE UP (ref 0–0)
PLATELET # BLD AUTO: 153 K/UL — SIGNIFICANT CHANGE UP (ref 150–400)
POTASSIUM SERPL-MCNC: 4.5 MMOL/L — SIGNIFICANT CHANGE UP (ref 3.5–5.3)
POTASSIUM SERPL-SCNC: 4.5 MMOL/L — SIGNIFICANT CHANGE UP (ref 3.5–5.3)
RBC # BLD: 3.16 M/UL — LOW (ref 4.2–5.8)
RBC # FLD: 16.3 % — HIGH (ref 10.3–14.5)
SODIUM SERPL-SCNC: 142 MMOL/L — SIGNIFICANT CHANGE UP (ref 135–145)
WBC # BLD: 4.25 K/UL — SIGNIFICANT CHANGE UP (ref 3.8–10.5)
WBC # FLD AUTO: 4.25 K/UL — SIGNIFICANT CHANGE UP (ref 3.8–10.5)

## 2024-12-04 PROCEDURE — 99232 SBSQ HOSP IP/OBS MODERATE 35: CPT

## 2024-12-04 RX ADMIN — Medication 250 MILLIGRAM(S): at 17:31

## 2024-12-04 RX ADMIN — INSULIN GLARGINE 8 UNIT(S): 100 INJECTION, SOLUTION SUBCUTANEOUS at 21:35

## 2024-12-04 RX ADMIN — ENOXAPARIN SODIUM 40 MILLIGRAM(S): 30 INJECTION SUBCUTANEOUS at 21:40

## 2024-12-04 RX ADMIN — ACETAMINOPHEN, DIPHENHYDRAMINE HCL, PHENYLEPHRINE HCL 3 MILLIGRAM(S): 325; 25; 5 TABLET ORAL at 21:36

## 2024-12-04 RX ADMIN — METOPROLOL TARTRATE 25 MILLIGRAM(S): 100 TABLET, FILM COATED ORAL at 05:47

## 2024-12-04 RX ADMIN — Medication 80 MILLIGRAM(S): at 21:36

## 2024-12-04 RX ADMIN — METOPROLOL TARTRATE 25 MILLIGRAM(S): 100 TABLET, FILM COATED ORAL at 17:31

## 2024-12-04 RX ADMIN — Medication 250 MILLIGRAM(S): at 05:46

## 2024-12-04 RX ADMIN — Medication 500 MILLIGRAM(S): at 08:51

## 2024-12-04 RX ADMIN — NYSTATIN 1 APPLICATION(S): 100000 POWDER TOPICAL at 05:46

## 2024-12-04 RX ADMIN — MEROPENEM 100 MILLIGRAM(S): 500 INJECTION, POWDER, FOR SOLUTION INTRAVENOUS at 05:46

## 2024-12-04 RX ADMIN — MEROPENEM 100 MILLIGRAM(S): 500 INJECTION, POWDER, FOR SOLUTION INTRAVENOUS at 13:06

## 2024-12-04 RX ADMIN — FAMOTIDINE 20 MILLIGRAM(S): 20 TABLET, FILM COATED ORAL at 13:01

## 2024-12-04 RX ADMIN — Medication 81 MILLIGRAM(S): at 13:01

## 2024-12-04 RX ADMIN — NYSTATIN 1 APPLICATION(S): 100000 POWDER TOPICAL at 17:33

## 2024-12-04 RX ADMIN — MEROPENEM 100 MILLIGRAM(S): 500 INJECTION, POWDER, FOR SOLUTION INTRAVENOUS at 21:37

## 2024-12-04 RX ADMIN — Medication 1: at 17:30

## 2024-12-04 RX ADMIN — CHLORHEXIDINE GLUCONATE 1 APPLICATION(S): 1.2 RINSE ORAL at 05:46

## 2024-12-04 RX ADMIN — TAMSULOSIN HYDROCHLORIDE 0.4 MILLIGRAM(S): 0.4 CAPSULE ORAL at 21:36

## 2024-12-04 RX ADMIN — Medication 1000 MICROGRAM(S): at 13:00

## 2024-12-04 RX ADMIN — CLOPIDOGREL 75 MILLIGRAM(S): 75 TABLET, FILM COATED ORAL at 13:01

## 2024-12-04 RX ADMIN — Medication 500 MILLIGRAM(S): at 21:36

## 2024-12-04 RX ADMIN — Medication 1 TABLET(S): at 08:51

## 2024-12-04 NOTE — PROGRESS NOTE ADULT - SUBJECTIVE AND OBJECTIVE BOX
[facility], Division of Infectious Diseases  MARLEEN Graves Y. Patel, S. Shah, G. [facility]  [phone]    Name: DALILA HERNADEZ  Age: 84y  Gender: Male  MRN: [id number]    Interval History:  Patient seen and examined at bedside  No acute overnight events.   Notes reviewed    Antibiotics:  ciprofloxacin     Tablet 500 milliGRAM(s) Oral every 12 hours  meropenem  IVPB 1000 milliGRAM(s) IV Intermittent every 8 hours      Medications:  acetaminophen     Tablet .. 650 milliGRAM(s) Oral every 6 hours PRN  aluminum hydroxide/magnesium hydroxide/simethicone Suspension 30 milliLiter(s) Oral every 4 hours PRN  aspirin  chewable 81 milliGRAM(s) Oral daily  atorvastatin 80 milliGRAM(s) Oral at bedtime  chlorhexidine 2% Cloths 1 Application(s) Topical daily  ciprofloxacin     Tablet 500 milliGRAM(s) Oral every 12 hours  clopidogrel Tablet 75 milliGRAM(s) Oral daily  cyanocobalamin 1000 MICROGram(s) Oral daily  dextrose 5%. 1000 milliLiter(s) IV Continuous <Continuous>  dextrose 5%. 1000 milliLiter(s) IV Continuous <Continuous>  dextrose 50% Injectable 25 Gram(s) IV Push once  dextrose 50% Injectable 12.5 Gram(s) IV Push once  dextrose 50% Injectable 25 Gram(s) IV Push once  dextrose Oral Gel 15 Gram(s) Oral once PRN  enoxaparin Injectable 40 milliGRAM(s) SubCutaneous every 24 hours  famotidine    Tablet 20 milliGRAM(s) Oral daily  glucagon  Injectable 1 milliGRAM(s) IntraMuscular once  insulin glargine Injectable (LANTUS) 8 Unit(s) SubCutaneous at bedtime  insulin lispro (ADMELOG) corrective regimen sliding scale   SubCutaneous three times a day before meals  melatonin 3 milliGRAM(s) Oral at bedtime  meropenem  IVPB 1000 milliGRAM(s) IV Intermittent every 8 hours  metoprolol tartrate 25 milliGRAM(s) Oral two times a day  nystatin Powder 1 Application(s) Topical two times a day  polyethylene glycol 3350 17 Gram(s) Oral daily  saccharomyces boulardii 250 milliGRAM(s) Oral two times a day  senna 1 Tablet(s) Oral with breakfast  sodium chloride 0.9% lock flush 10 milliLiter(s) IV Push every 1 hour PRN  tamsulosin 0.4 milliGRAM(s) Oral at bedtime      Review of Systems:  A 10-point review of systems was obtained.   Review of systems otherwise negative except as previously noted.    Allergies: No Known Allergies    For details regarding the patient's past medical history, social history, family history, and other miscellaneous elements, please refer the initial infectious diseases consultation and/or the admitting history and physical examination for this admission.    Objective:  Vitals:   T(C): 36.6 (12-04-24 @ 05:16), Max: 36.8 (12-03-24 @ 20:50)  HR: 79 (12-04-24 @ 05:16) (79 - 86)  BP: 110/69 (12-04-24 @ 05:16) (110/69 - 128/75)  RR: 18 (12-04-24 @ 05:16) (18 - 18)  SpO2: 97% (12-04-24 @ 05:16) (95% - 97%)    Physical Examination:  General: no acute distress  HEENT: NC/AT, EOMI,   Cardio: RRR  Resp: decreased breath sounds   Abd: soft, NT, ND,  Ext: no edema or cyanosis, foot in dressing   Skin: warm, dry, no visible rash      Laboratory Studies:  CBC:                       9.0    4.25  )-----------( 153      ( 04 Dec 2024 07:44 )             28.9     CMP: 12-04    142  |  109[H]  |  43[H]  ----------------------------<  82  4.5   |  33[H]  |  1.40[H]    Ca    9.7      04 Dec 2024 07:44  Mg     1.9     12-04        Urinalysis Basic - ( 04 Dec 2024 07:44 )    Color: x / Appearance: x / SG: x / pH: x  Gluc: 82 mg/dL / Ketone: x  / Bili: x / Urobili: x   Blood: x / Protein: x / Nitrite: x   Leuk Esterase: x / RBC: x / WBC x   Sq Epi: x / Non Sq Epi: x / Bacteria: x        Microbiology: reviewed        Radiology: reviewed

## 2024-12-04 NOTE — PROGRESS NOTE ADULT - PROBLEM SELECTOR PLAN 1
Patient examined and evaluated this time. Wounds are healing well and right wound now has healthy granular wound bed. Patient advised regarding etiology of his symptoms and treatment plan.  Continue with local care and offloading to bilateral heels at this time.  Continue with antibiotics as per infectious disease recommendations.  Patient can be full weight bearing bilaterally in surgical shoes.     Wound Care Instructions:  1. Remove bilateral heel wound dressings.  2. Dress wound with aquacel Ag, and dry, sterile dressing, and ACE bandage.  3. Change dressings three times a week (MWF or TThSat) and monitor for any signs of infection.  4. Patient is to follow up in [facility] within 3-5 days of discharge.

## 2024-12-04 NOTE — PROGRESS NOTE ADULT - ASSESSMENT
84 yo male with hypertension, diabetes, hyperlipidemia, bilateral heel osteomyelitis currently on outpatient antibiotics through [facility] presented for surgery planned with podiatry, Gabriel Deshpande, for his bilateral heel osteomyelitis. His osteomyelitis started 6 months PTA.   Calcanectomy, partial 29-Oct-2024 12:58:43  Jona Mason    bilateral heel osteomyelitis  prior micro with Proteus mirabilis, Pseudomonas aeruginosa, Enterococcus faecalis, Klebsiella pneumoniae ESBL  Culture - Tissue with Gram Stain (10.29.24 @ 12:00) Rare Pseudomonas aeruginosa  -  Ciprofloxacin: S <=0.25-  Levofloxacin: S <=0.5-  Meropenem: S <=1  Culture - Tissue with Gram Stain (11.19.24 @ 16:50) Pseudomonas aeruginosa (Carbapenem Resistant), Enterococcus faecalis -Ciprofloxacin: S 0.5, Meropenem: R >8, Piperacillin/Tazobactam: R >64   Path- Right heel bone proximal margin- Acute and chronic osteomyelitis, Left heel bone proximal margin- Acute and chronic osteomyelitis    Recommendations:   Meropenem 1 gram IV q8h  Ciprofloxacin 500 mg PO BID  Last day of Abx 12/9  weekly CBC w diff, CMP, ESR  PICC can be removed at end of Rx  Additional care per primary team    Infectious Diseases will follow. Please call with any questions.  Queta Ngo M.D.  [facility] Division of Infectious Diseases [phone]  For after 5 P.M. and weekends, please call [phone]  Available on [employer] TEAMS

## 2024-12-04 NOTE — PROGRESS NOTE ADULT - SUBJECTIVE AND OBJECTIVE BOX
PROGRESS NOTE   Patient is a 84y old  Male who presents with a chief complaint of Bilateral Heel Osteomyelitis (03 Dec 2024 12:27)      HPI:  Patient with a past medical history of hypertension, diabetes, hyperlipidemia, bilateral heel osteomyelitis currently on outpatient antibiotics through [facility] is presenting for surgery.  He is scheduled for surgery with Dr. frias tomorrow.  Denies any fevers.  Endorsing pain to his heels but no other areas of pain.  No nausea or vomiting.  No other acute complaints today.    Denies fever, chills, chest pain, palpitations, SOB, cough, abdominal pain, nausea, vomiting, diarrhea, constipation, urinary frequency, urgency, or dysuria, headaches, changes in vision, dizziness, numbness, tingling.  Denies recent travel, recent antibiotic use, or sick contacts.    ED Course:   Vitals: BP: 161/87, HR 69: , Temp: 97.8 , RR: 18, SpO2: 96% on   Labs:  H/H: 10.6/34.4, PTT: 36.2, Albumin: 2.7  Tissue culture: (incomplete)      Surgical pathology report: (incomplete)  MR foot:   Xray foot:   EKBPM normal sinus rhythm, QTc: 477  Received in the ED:       HPI: Patient presents to [facility] from rehab for bilateral foot surgery planned with podiatry, Gabriel Deshpande, tomorrow for his bilateral heel osteomyelitis. States his osteomyelitis started 6 months ago, Currently c/o pain in b/l heels. Denies any numbness or tingling down LLE/RLE. Denies any trauma. Patient currently does not walk due to deconditioning. Denies any other complaints. Denies fevers, chills, HA, Dizziness, chest pain, SOB, N/V/D, bladder symptoms,     (28 Oct 2024 14:08)      Vital Signs Last 24 Hrs  T(C): 36.6 (04 Dec 2024 05:16), Max: 36.8 (03 Dec 2024 20:50)  T(F): 97.9 (04 Dec 2024 05:16), Max: 98.2 (03 Dec 2024 20:50)  HR: 79 (04 Dec 2024 05:16) (79 - 86)  BP: 110/69 (04 Dec 2024 05:16) (110/69 - 128/75)  BP(mean): --  RR: 18 (04 Dec 2024 05:16) (18 - 18)  SpO2: 97% (04 Dec 2024 05:16) (95% - 97%)    Parameters below as of 04 Dec 2024 05:16  Patient On (Oxygen Delivery Method): room air                              9.0    4.25  )-----------( 153      ( 04 Dec 2024 07:44 )             28.9               12-04    142  |  109[H]  |  43[H]  ----------------------------<  82  4.5   |  33[H]  |  1.40[H]    Ca    9.7      04 Dec 2024 07:44  Mg     1.9     12-04        PHYSICAL EXAM  Vascular: DP & PT palpable bilaterally, Capillary refill 3 seconds  Neurological: Light touch sensation not intact bilaterally  Musculoskeletal: 4/5 strength in all quadrants bilaterally, AJ & STJ ROM intact  Dermatological: Bilateral heel wounds down to skin, subcutaneous tissue, fat, bone (left healing well, right healthy with granular wound base), no proximal streaking, no fluctuance, no malodor, no signs of acute infection at this time.

## 2024-12-04 NOTE — PROGRESS NOTE ADULT - ASSESSMENT
BRITT Baseline creatinine ~ 1.0  B/L Heel OM, s/p partial calcanectomy  h/o Anemia, Multiple myeloma  Diabetes  Hypertension    Stable renal indices. To continue current meds. Monitor blood sugar levels. Insulin coverage as needed. Dietary restriction.   Trend BP and titrate BP meds as needed. Monitor h/h trend. Avoid nephrotoxic meds as possible. Will follow electrolytes and renal function trend.

## 2024-12-04 NOTE — PROGRESS NOTE ADULT - SUBJECTIVE AND OBJECTIVE BOX
[facility] Nephrology Services                                                       Dr. Blum, Dr. Marshall, Dr. Cevallos, Dr. Arroyo, Dr. Darling, Dr. Cordova                                      [facility] [place][note 2], [facility], [address]                                                 [address] [address]                                      Ph: [phone]  Fax: [phone]                                         Ph: [phone]  Fax: [phone]      Patient is a 84y old  Male who presents with a chief complaint of Bilateral Heel Osteomyelitis (02 Dec 2024 12:02)  Patient seen in follow up for CKD.        PAST MEDICAL HISTORY:  HTN (hypertension)    CAD (coronary artery disease)    HLD (hyperlipidemia)    DM (diabetes mellitus)    Benign prostatic hyperplasia with lower urinary tract symptoms, symptom details unspecified    Stented coronary artery    SANTO on CPAP    Chronic obstructive pulmonary disease, unspecified COPD type    Obesity, morbid, BMI 40.0-49.9    Difficulty walking    GI bleed    History of blood transfusion    DM2 (diabetes mellitus, type 2)    COPD, mild    Lymphedema    Multiple myeloma    Chronic obstructive pulmonary disease, unspecified COPD type      MEDICATIONS  (STANDING):  aspirin  chewable 81 milliGRAM(s) Oral daily  atorvastatin 80 milliGRAM(s) Oral at bedtime  chlorhexidine 2% Cloths 1 Application(s) Topical daily  ciprofloxacin     Tablet 500 milliGRAM(s) Oral every 12 hours  clopidogrel Tablet 75 milliGRAM(s) Oral daily  cyanocobalamin 1000 MICROGram(s) Oral daily  dextrose 5%. 1000 milliLiter(s) (50 mL/Hr) IV Continuous <Continuous>  dextrose 5%. 1000 milliLiter(s) (100 mL/Hr) IV Continuous <Continuous>  dextrose 50% Injectable 25 Gram(s) IV Push once  dextrose 50% Injectable 12.5 Gram(s) IV Push once  dextrose 50% Injectable 25 Gram(s) IV Push once  enoxaparin Injectable 40 milliGRAM(s) SubCutaneous every 24 hours  famotidine    Tablet 20 milliGRAM(s) Oral daily  glucagon  Injectable 1 milliGRAM(s) IntraMuscular once  insulin glargine Injectable (LANTUS) 8 Unit(s) SubCutaneous at bedtime  insulin lispro (ADMELOG) corrective regimen sliding scale   SubCutaneous three times a day before meals  melatonin 3 milliGRAM(s) Oral at bedtime  meropenem  IVPB 1000 milliGRAM(s) IV Intermittent every 8 hours  metoprolol tartrate 25 milliGRAM(s) Oral two times a day  nystatin Powder 1 Application(s) Topical two times a day  polyethylene glycol 3350 17 Gram(s) Oral daily  saccharomyces boulardii 250 milliGRAM(s) Oral two times a day  senna 1 Tablet(s) Oral with breakfast  tamsulosin 0.4 milliGRAM(s) Oral at bedtime    MEDICATIONS  (PRN):  acetaminophen     Tablet .. 650 milliGRAM(s) Oral every 6 hours PRN Temp greater or equal to 38C (100.4F), Mild Pain (1 - 3)  aluminum hydroxide/magnesium hydroxide/simethicone Suspension 30 milliLiter(s) Oral every 4 hours PRN Dyspepsia  dextrose Oral Gel 15 Gram(s) Oral once PRN Blood Glucose LESS THAN 70 milliGRAM(s)/deciliter  sodium chloride 0.9% lock flush 10 milliLiter(s) IV Push every 1 hour PRN Pre/post blood products, medications, blood draw, and to maintain line patency    T(C): 36.6 (12-04-24 @ 05:16), Max: 36.8 (12-03-24 @ 20:50)  HR: 79 (12-04-24 @ 05:16) (76 - 87)  BP: 110/69 (12-04-24 @ 05:16) (110/69 - 128/75)  RR: 18 (12-04-24 @ 05:16)  SpO2: 97% (12-04-24 @ 05:16)  Wt(kg): --  I&O's Detail                    PHYSICAL EXAM:  General: No distress  Respiratory: b/l air entry  Cardiovascular: S1 S2  Gastrointestinal: soft  Extremities:  edema        LABORATORY:                        9.0    4.25  )-----------( 153      ( 04 Dec 2024 07:44 )             28.9     12-04    142  |  109[H]  |  43[H]  ----------------------------<  82  4.5   |  33[H]  |  1.40[H]    Ca    9.7      04 Dec 2024 07:44  Mg     1.9     12-04      Sodium: 142 mmol/L (12-04 @ 07:44)    Potassium: 4.5 mmol/L (12-04 @ 07:44)    Hemoglobin: 9.0 g/dL (12-04 @ 07:44)  Hemoglobin: 8.9 g/dL (12-02 @ 08:40)    Creatinine, Serum 1.40 (12-04 @ 07:44)  Creatinine, Serum 1.30 (12-02 @ 08:40)          Urinalysis Basic - ( 04 Dec 2024 07:44 )    Color: x / Appearance: x / SG: x / pH: x  Gluc: 82 mg/dL / Ketone: x  / Bili: x / Urobili: x   Blood: x / Protein: x / Nitrite: x   Leuk Esterase: x / RBC: x / WBC x   Sq Epi: x / Non Sq Epi: x / Bacteria: x

## 2024-12-04 NOTE — PROGRESS NOTE ADULT - ASSESSMENT
82yo M with a PMH of HTN, HLD, Type 2 DM, CKD3a, hx of GI bleed, COPD, SANTO, BPH, CAD s/p PCI, PAD, multiple myeloma, lymphedema who presents with b/l heel ulcers and suspected OM planned for bilateral foot surgery with podiatry, Gabriel Deshpande, on 10/29 for his suspected bilateral heel osteomyelitis now s/p partial calcanectomy.        Problem/Plan - 1:  ·  Problem: Osteomyelitis of foot.   ·  Plan: - Bilateral heel osteomyelitis per outpatient w/up and admitted with a plan for OR with podiatry, Dr. Rios on 10/29  - Wound Cx (10/23): Proteus mirabilis, Pseudomonas aeruginosa, ESBL Klebsiella pneumoniae, Enterococcus faecalis  - Blood Cx NG  - s/p partial calcanectomy on 10/29.    - f/up OR cultures +rare pseudomonas aeruginosa.  Pathology: bilateral heel acute and chronic osteomyelitis.  - Tylenol PRN for mild pain.  - Consulted ID Dr. Casillas, recs appreciated   - PICC line placed.  Continue meropenem till Dec 9.    - Continue Florastor  - afebrile, no leukocytosis, dressing/wound care as per podiatry recommendation   - cardiology Dr. Rock consulted, recs appreciated.  - Patient had right heel debridement on 11/19. doing well post op , tissue culture 11/19 pseudomonas (carbapenem resistant).  Continue on Cipro 500mg PO Q12h and Meropenem.  blood test level stable      Problem/Plan - 2:  ·  Problem: DM (diabetes mellitus).   ·  Plan: - type 2 DM on insulin  - continue decreased lantus dose 8units QHS   - continue low-dose lispro corrective ISS  - controlled      Problem/Plan - 3:  ·  Problem: HTN (hypertension).   ·  Plan: Chronic   - continue metoprolol 25mg BID.     Problem/Plan - 4:  ·  Problem: HLD (hyperlipidemia).   ·  Plan: Chronic   - Continue atorvastatin 80mg QD.     Problem/Plan - 5:  ·  Problem: CAD (coronary artery disease).   ·  Plan: CAD sp stents x4  - Held ASA and Plavix on 10/29; Restarted on ASA and Plavix on 10/30  - c/w lipitor  - cardiology eval noted      Problem/Plan - 6:  ·  Problem: Benign prostatic hyperplasia with lower urinary tract symptoms, symptom details unspecified.   ·  Plan: Chronic  -Continue Flomax.     Problem/Plan - 7:  ·  Problem: Multiple myeloma.   ·  Plan: Chronic  - Chemotherapy held      Problem/Plan - 8:   DVT ppx: continue lovenox 40mg SQ daily    #BRITT:  likely due to dehydration.  Pt. with decrease fluid intake.  Encouraged patient to drink more fluids.

## 2024-12-04 NOTE — PROGRESS NOTE ADULT - SUBJECTIVE AND OBJECTIVE BOX
Patient is a 84y old  Male who presents with a chief complaint of Bilateral Heel Osteomyelitis (04 Dec 2024 15:42)      Subjective:  INTERVAL HPI/OVERNIGHT EVENTS: Patient seen and examined at bedside.  Patient has no complaints at this time.   MEDICATIONS  (STANDING):  aspirin  chewable 81 milliGRAM(s) Oral daily  atorvastatin 80 milliGRAM(s) Oral at bedtime  chlorhexidine 2% Cloths 1 Application(s) Topical daily  ciprofloxacin     Tablet 500 milliGRAM(s) Oral every 12 hours  clopidogrel Tablet 75 milliGRAM(s) Oral daily  cyanocobalamin 1000 MICROGram(s) Oral daily  dextrose 5%. 1000 milliLiter(s) (50 mL/Hr) IV Continuous <Continuous>  dextrose 5%. 1000 milliLiter(s) (100 mL/Hr) IV Continuous <Continuous>  dextrose 50% Injectable 25 Gram(s) IV Push once  dextrose 50% Injectable 12.5 Gram(s) IV Push once  dextrose 50% Injectable 25 Gram(s) IV Push once  enoxaparin Injectable 40 milliGRAM(s) SubCutaneous every 24 hours  famotidine    Tablet 20 milliGRAM(s) Oral daily  glucagon  Injectable 1 milliGRAM(s) IntraMuscular once  insulin glargine Injectable (LANTUS) 8 Unit(s) SubCutaneous at bedtime  insulin lispro (ADMELOG) corrective regimen sliding scale   SubCutaneous three times a day before meals  melatonin 3 milliGRAM(s) Oral at bedtime  meropenem  IVPB 1000 milliGRAM(s) IV Intermittent every 8 hours  metoprolol tartrate 25 milliGRAM(s) Oral two times a day  nystatin Powder 1 Application(s) Topical two times a day  polyethylene glycol 3350 17 Gram(s) Oral daily  saccharomyces boulardii 250 milliGRAM(s) Oral two times a day  senna 1 Tablet(s) Oral with breakfast  tamsulosin 0.4 milliGRAM(s) Oral at bedtime    MEDICATIONS  (PRN):  acetaminophen     Tablet .. 650 milliGRAM(s) Oral every 6 hours PRN Temp greater or equal to 38C (100.4F), Mild Pain (1 - 3)  aluminum hydroxide/magnesium hydroxide/simethicone Suspension 30 milliLiter(s) Oral every 4 hours PRN Dyspepsia  dextrose Oral Gel 15 Gram(s) Oral once PRN Blood Glucose LESS THAN 70 milliGRAM(s)/deciliter  sodium chloride 0.9% lock flush 10 milliLiter(s) IV Push every 1 hour PRN Pre/post blood products, medications, blood draw, and to maintain line patency      Allergies    No Known Allergies    Intolerances        REVIEW OF SYSTEMS:  CONSTITUTIONAL: No fever or chills  HEENT:  No headache, no sore throat  RESPIRATORY: No cough or shortness of breath  CARDIOVASCULAR: No chest pain or palpitations  GASTROINTESTINAL: No abd pain, nausea, vomiting, or diarrhea      Objective:  Vital Signs Last 24 Hrs  T(C): 36.6 (04 Dec 2024 05:16), Max: 36.8 (03 Dec 2024 20:50)  T(F): 97.9 (04 Dec 2024 05:16), Max: 98.2 (03 Dec 2024 20:50)  HR: 79 (04 Dec 2024 05:16) (79 - 86)  BP: 110/69 (04 Dec 2024 05:16) (110/69 - 128/75)  BP(mean): --  RR: 18 (04 Dec 2024 05:16) (18 - 18)  SpO2: 97% (04 Dec 2024 05:16) (95% - 97%)    Parameters below as of 04 Dec 2024 05:16  Patient On (Oxygen Delivery Method): room air        GENERAL: NAD, lying in bed comfortably  HEAD:  Normocephalic  EYES:  conjunctiva and sclera clear  ENT: Moist mucous membranes  NECK: Supple  CHEST/LUNG: Clear to auscultation bilaterally; No rales or rhonchi; no wheezing. Unlabored respirations  HEART: Regular rate and rhythm; S1S2+  ABDOMEN: Bowel sounds present; Soft, Nontender, Nondistended.   EXTREMITIES:  + distal Peripheral Pulses;  No cyanosis, or edema  NERVOUS SYSTEM:  Alert & Oriented X3;  No gross focal deficits   MSK: moves all extremities  SKIN: No rashes   b/l foot in clean dressing , toes color normal.     LABS:                        9.0    4.25  )-----------( 153      ( 04 Dec 2024 07:44 )             28.9     04 Dec 2024 07:44    142    |  109    |  43     ----------------------------<  82     4.5     |  33     |  1.40     Ca    9.7        04 Dec 2024 07:44  Mg     1.9       04 Dec 2024 07:44        Urinalysis Basic - ( 04 Dec 2024 07:44 )    Color: x / Appearance: x / SG: x / pH: x  Gluc: 82 mg/dL / Ketone: x  / Bili: x / Urobili: x   Blood: x / Protein: x / Nitrite: x   Leuk Esterase: x / RBC: x / WBC x   Sq Epi: x / Non Sq Epi: x / Bacteria: x      CAPILLARY BLOOD GLUCOSE      POCT Blood Glucose.: 115 mg/dL (04 Dec 2024 11:58)  POCT Blood Glucose.: 90 mg/dL (04 Dec 2024 08:26)  POCT Blood Glucose.: 134 mg/dL (03 Dec 2024 21:52)  POCT Blood Glucose.: 182 mg/dL (03 Dec 2024 17:23)          RADIOLOGY & ADDITIONAL TESTS:    Personally reviewed.     Consultant(s) Notes Reviewed:  [x] YES  [ ] NO    Plan of care discussed with patient /family wife at bedside ; all questions answered

## 2024-12-05 PROCEDURE — 99232 SBSQ HOSP IP/OBS MODERATE 35: CPT

## 2024-12-05 RX ADMIN — INSULIN GLARGINE 8 UNIT(S): 100 INJECTION, SOLUTION SUBCUTANEOUS at 22:26

## 2024-12-05 RX ADMIN — CHLORHEXIDINE GLUCONATE 1 APPLICATION(S): 1.2 RINSE ORAL at 06:28

## 2024-12-05 RX ADMIN — MEROPENEM 100 MILLIGRAM(S): 500 INJECTION, POWDER, FOR SOLUTION INTRAVENOUS at 13:34

## 2024-12-05 RX ADMIN — ENOXAPARIN SODIUM 40 MILLIGRAM(S): 30 INJECTION SUBCUTANEOUS at 22:27

## 2024-12-05 RX ADMIN — FAMOTIDINE 20 MILLIGRAM(S): 20 TABLET, FILM COATED ORAL at 13:21

## 2024-12-05 RX ADMIN — METOPROLOL TARTRATE 25 MILLIGRAM(S): 100 TABLET, FILM COATED ORAL at 17:12

## 2024-12-05 RX ADMIN — Medication 80 MILLIGRAM(S): at 22:27

## 2024-12-05 RX ADMIN — Medication 250 MILLIGRAM(S): at 17:12

## 2024-12-05 RX ADMIN — NYSTATIN 1 APPLICATION(S): 100000 POWDER TOPICAL at 06:28

## 2024-12-05 RX ADMIN — Medication 500 MILLIGRAM(S): at 22:27

## 2024-12-05 RX ADMIN — Medication 500 MILLIGRAM(S): at 11:18

## 2024-12-05 RX ADMIN — CLOPIDOGREL 75 MILLIGRAM(S): 75 TABLET, FILM COATED ORAL at 11:17

## 2024-12-05 RX ADMIN — TAMSULOSIN HYDROCHLORIDE 0.4 MILLIGRAM(S): 0.4 CAPSULE ORAL at 22:26

## 2024-12-05 RX ADMIN — Medication 1000 MICROGRAM(S): at 11:17

## 2024-12-05 RX ADMIN — Medication 250 MILLIGRAM(S): at 06:27

## 2024-12-05 RX ADMIN — METOPROLOL TARTRATE 25 MILLIGRAM(S): 100 TABLET, FILM COATED ORAL at 06:28

## 2024-12-05 RX ADMIN — MEROPENEM 100 MILLIGRAM(S): 500 INJECTION, POWDER, FOR SOLUTION INTRAVENOUS at 06:27

## 2024-12-05 RX ADMIN — NYSTATIN 1 APPLICATION(S): 100000 POWDER TOPICAL at 17:13

## 2024-12-05 RX ADMIN — ACETAMINOPHEN, DIPHENHYDRAMINE HCL, PHENYLEPHRINE HCL 3 MILLIGRAM(S): 325; 25; 5 TABLET ORAL at 22:26

## 2024-12-05 RX ADMIN — Medication 81 MILLIGRAM(S): at 11:17

## 2024-12-05 RX ADMIN — MEROPENEM 100 MILLIGRAM(S): 500 INJECTION, POWDER, FOR SOLUTION INTRAVENOUS at 22:26

## 2024-12-05 NOTE — SOCIAL WORK PROGRESS NOTE - NSSWPROGRESSNOTE_GEN_ALL_CORE
NADYA spoke with Venu at [facility]- they are not accepting pt because they cannot accommodate his wounds. They will only do Stage 2 or less. MEssage left for Starla at [facility] to see what their answer is. SW to follow.

## 2024-12-05 NOTE — PROGRESS NOTE ADULT - ASSESSMENT
84yo M with a PMH of HTN, HLD, Type 2 DM, CKD3a, hx of GI bleed, COPD, SANTO, BPH, CAD s/p PCI, PAD, multiple myeloma, lymphedema who presents with b/l heel ulcers and suspected OM planned for bilateral foot surgery with podiatry, Gabriel Deshpande, on 10/29 for his suspected bilateral heel osteomyelitis now s/p partial calcanectomy.        Problem/Plan - 1:  ·  Problem: Osteomyelitis of foot.   ·  Plan: - Bilateral heel osteomyelitis per outpatient w/up and admitted with a plan for OR with podiatry, Dr. Rios on 10/29  - Wound Cx (10/23): Proteus mirabilis, Pseudomonas aeruginosa, ESBL Klebsiella pneumoniae, Enterococcus faecalis  - Blood Cx NG  - s/p partial calcanectomy on 10/29.    - f/up OR cultures +rare pseudomonas aeruginosa.  Pathology: bilateral heel acute and chronic osteomyelitis.  - Tylenol PRN for mild pain.  - Consulted ID Dr. Casillas, recs appreciated   - PICC line placed.  Continue meropenem till Dec 9.    - Continue Florastor  - afebrile, no leukocytosis, dressing/wound care as per podiatry recommendation   - cardiology Dr. Rock consulted, recs appreciated.  - Patient had right heel debridement on 11/19. doing well post op , tissue culture 11/19 pseudomonas (carbapenem resistant).  Continue on Cipro 500mg PO Q12h and Meropenem.  blood test level stable      Problem/Plan - 2:  ·  Problem: DM (diabetes mellitus).   ·  Plan: - type 2 DM on insulin  - continue decreased lantus dose 8units QHS   - continue low-dose lispro corrective ISS  - controlled      Problem/Plan - 3:  ·  Problem: HTN (hypertension).   ·  Plan: Chronic   - continue metoprolol 25mg BID.     Problem/Plan - 4:  ·  Problem: HLD (hyperlipidemia).   ·  Plan: Chronic   - Continue atorvastatin 80mg QD.     Problem/Plan - 5:  ·  Problem: CAD (coronary artery disease).   ·  Plan: CAD sp stents x4  - Held ASA and Plavix on 10/29; Restarted on ASA and Plavix on 10/30  - c/w lipitor  - cardiology eval noted      Problem/Plan - 6:  ·  Problem: Benign prostatic hyperplasia with lower urinary tract symptoms, symptom details unspecified.   ·  Plan: Chronic  -Continue Flomax.     Problem/Plan - 7:  ·  Problem: Multiple myeloma.   ·  Plan: Chronic  - Chemotherapy held      Problem/Plan - 8:   DVT ppx: continue lovenox 40mg SQ daily    #BRITT:  likely due to dehydration.  Pt. with decrease fluid intake.  Encouraged patient to drink more fluids.  will repeat lab in am

## 2024-12-05 NOTE — PROGRESS NOTE ADULT - SUBJECTIVE AND OBJECTIVE BOX
Chief Complaint: Heel ulcer    Interval Events: No events overnight.    Review of Systems:  General: No fevers, chills, weight gain  Skin: No rashes, color changes  Cardiovascular: No chest pain, orthopnea  Respiratory: No shortness of breath, cough  Gastrointestinal: No nausea, abdominal pain  Genitourinary: No incontinence, pain with urination  Musculoskeletal: No pain, swelling, decreased range of motion  Neurological: No headache, weakness  Psychiatric: No depression, anxiety  Endocrine: No weight gain, increased thirst  All other systems are comprehensively negative.    Physical Exam:  Vital Signs Last 24 Hrs  T(C): 36.4 (05 Dec 2024 05:14), Max: 36.8 (04 Dec 2024 20:06)  T(F): 97.5 (05 Dec 2024 05:14), Max: 98.3 (04 Dec 2024 20:06)  HR: 80 (05 Dec 2024 05:14) (80 - 80)  BP: 120/75 (05 Dec 2024 05:14) (120/75 - 127/77)  BP(mean): --  RR: 18 (05 Dec 2024 05:14) (18 - 18)  SpO2: 96% (05 Dec 2024 05:14) (96% - 96%)  Parameters below as of 05 Dec 2024 05:14  Patient On (Oxygen Delivery Method): room air  General: NAD  HEENT: MMM  Neck: No JVD, no carotid bruit  Lungs: CTAB  CV: RRR, nl S1/S2, no M/R/G  Abdomen: S/NT/ND, +BS  Extremities: +Lymphedema, no cyanosis  Neuro: AAOx3, non-focal  Skin: No rash    Labs:

## 2024-12-05 NOTE — PROGRESS NOTE ADULT - SUBJECTIVE AND OBJECTIVE BOX
[facility], Division of Infectious Diseases  MARLEEN Graves Y. Patel, S. Shah, G. [facility]  [phone]    Name: DALILA HERNADEZ  Age: 84y  Gender: Male  MRN: [id number]    Interval History:  No acute overnight events.   Notes reviewed    Antibiotics:  ciprofloxacin     Tablet 500 milliGRAM(s) Oral every 12 hours  meropenem  IVPB 1000 milliGRAM(s) IV Intermittent every 8 hours      Medications:  acetaminophen     Tablet .. 650 milliGRAM(s) Oral every 6 hours PRN  aluminum hydroxide/magnesium hydroxide/simethicone Suspension 30 milliLiter(s) Oral every 4 hours PRN  aspirin  chewable 81 milliGRAM(s) Oral daily  atorvastatin 80 milliGRAM(s) Oral at bedtime  chlorhexidine 2% Cloths 1 Application(s) Topical daily  ciprofloxacin     Tablet 500 milliGRAM(s) Oral every 12 hours  clopidogrel Tablet 75 milliGRAM(s) Oral daily  cyanocobalamin 1000 MICROGram(s) Oral daily  dextrose 5%. 1000 milliLiter(s) IV Continuous <Continuous>  dextrose 5%. 1000 milliLiter(s) IV Continuous <Continuous>  dextrose 50% Injectable 25 Gram(s) IV Push once  dextrose 50% Injectable 12.5 Gram(s) IV Push once  dextrose 50% Injectable 25 Gram(s) IV Push once  dextrose Oral Gel 15 Gram(s) Oral once PRN  enoxaparin Injectable 40 milliGRAM(s) SubCutaneous every 24 hours  famotidine    Tablet 20 milliGRAM(s) Oral daily  glucagon  Injectable 1 milliGRAM(s) IntraMuscular once  insulin glargine Injectable (LANTUS) 8 Unit(s) SubCutaneous at bedtime  insulin lispro (ADMELOG) corrective regimen sliding scale   SubCutaneous three times a day before meals  melatonin 3 milliGRAM(s) Oral at bedtime  meropenem  IVPB 1000 milliGRAM(s) IV Intermittent every 8 hours  metoprolol tartrate 25 milliGRAM(s) Oral two times a day  nystatin Powder 1 Application(s) Topical two times a day  polyethylene glycol 3350 17 Gram(s) Oral daily  saccharomyces boulardii 250 milliGRAM(s) Oral two times a day  senna 1 Tablet(s) Oral with breakfast  sodium chloride 0.9% lock flush 10 milliLiter(s) IV Push every 1 hour PRN  tamsulosin 0.4 milliGRAM(s) Oral at bedtime      Review of Systems:  A 10-point review of systems was obtained.   Review of systems otherwise negative except as previously noted.    Allergies: No Known Allergies    For details regarding the patient's past medical history, social history, family history, and other miscellaneous elements, please refer the initial infectious diseases consultation and/or the admitting history and physical examination for this admission.    Objective:  Vitals:   T(C): 36.4 (12-05-24 @ 11:18), Max: 36.8 (12-04-24 @ 20:06)  HR: 81 (12-05-24 @ 11:18) (80 - 81)  BP: 113/72 (12-05-24 @ 11:18) (113/72 - 127/77)  RR: 18 (12-05-24 @ 11:18) (18 - 18)  SpO2: 97% (12-05-24 @ 11:18) (96% - 97%)    Physical Examination:  General: no acute distress  HEENT: NC/AT, EOMI, anicteric, no oral lesions  Neck: supple, no palpable LAD  Cardio: S1, S2 heard, RRR, no murmurs  Resp: breath sounds heard bilaterally, no rales, wheezes or rhonchi  Abd: soft, NT, ND, + bowel sounds  Neuro: no obvious focal deficits  Ext: no edema or cyanosis  Skin: warm, dry, no visible rash      Laboratory Studies:  CBC:                       9.0    4.25  )-----------( 153      ( 04 Dec 2024 07:44 )             28.9     CMP: 12-04    142  |  109[H]  |  43[H]  ----------------------------<  82  4.5   |  33[H]  |  1.40[H]    Ca    9.7      04 Dec 2024 07:44  Mg     1.9     12-04        Urinalysis Basic - ( 04 Dec 2024 07:44 )    Color: x / Appearance: x / SG: x / pH: x  Gluc: 82 mg/dL / Ketone: x  / Bili: x / Urobili: x   Blood: x / Protein: x / Nitrite: x   Leuk Esterase: x / RBC: x / WBC x   Sq Epi: x / Non Sq Epi: x / Bacteria: x        Microbiology: reviewed        Radiology: reviewed       [facility], Division of Infectious Diseases  MARLEEN Graves Y. Patel, S. Shah, G. [facility]  [phone]    Name: DALILA HERNADEZ  Age: 84y  Gender: Male  MRN: [id number]    Interval History:  No acute overnight events  Wants to sit up at the edge of the bed.   Notes reviewed    Antibiotics:  ciprofloxacin     Tablet 500 milliGRAM(s) Oral every 12 hours  meropenem  IVPB 1000 milliGRAM(s) IV Intermittent every 8 hours      Medications:  acetaminophen     Tablet .. 650 milliGRAM(s) Oral every 6 hours PRN  aluminum hydroxide/magnesium hydroxide/simethicone Suspension 30 milliLiter(s) Oral every 4 hours PRN  aspirin  chewable 81 milliGRAM(s) Oral daily  atorvastatin 80 milliGRAM(s) Oral at bedtime  chlorhexidine 2% Cloths 1 Application(s) Topical daily  ciprofloxacin     Tablet 500 milliGRAM(s) Oral every 12 hours  clopidogrel Tablet 75 milliGRAM(s) Oral daily  cyanocobalamin 1000 MICROGram(s) Oral daily  dextrose 5%. 1000 milliLiter(s) IV Continuous <Continuous>  dextrose 5%. 1000 milliLiter(s) IV Continuous <Continuous>  dextrose 50% Injectable 25 Gram(s) IV Push once  dextrose 50% Injectable 12.5 Gram(s) IV Push once  dextrose 50% Injectable 25 Gram(s) IV Push once  dextrose Oral Gel 15 Gram(s) Oral once PRN  enoxaparin Injectable 40 milliGRAM(s) SubCutaneous every 24 hours  famotidine    Tablet 20 milliGRAM(s) Oral daily  glucagon  Injectable 1 milliGRAM(s) IntraMuscular once  insulin glargine Injectable (LANTUS) 8 Unit(s) SubCutaneous at bedtime  insulin lispro (ADMELOG) corrective regimen sliding scale   SubCutaneous three times a day before meals  melatonin 3 milliGRAM(s) Oral at bedtime  meropenem  IVPB 1000 milliGRAM(s) IV Intermittent every 8 hours  metoprolol tartrate 25 milliGRAM(s) Oral two times a day  nystatin Powder 1 Application(s) Topical two times a day  polyethylene glycol 3350 17 Gram(s) Oral daily  saccharomyces boulardii 250 milliGRAM(s) Oral two times a day  senna 1 Tablet(s) Oral with breakfast  sodium chloride 0.9% lock flush 10 milliLiter(s) IV Push every 1 hour PRN  tamsulosin 0.4 milliGRAM(s) Oral at bedtime      Review of Systems:  A 10-point review of systems was obtained.   Review of systems otherwise negative except as previously noted.    Allergies: No Known Allergies    For details regarding the patient's past medical history, social history, family history, and other miscellaneous elements, please refer the initial infectious diseases consultation and/or the admitting history and physical examination for this admission.    Objective:  Vitals:   T(C): 36.4 (12-05-24 @ 11:18), Max: 36.8 (12-04-24 @ 20:06)  HR: 81 (12-05-24 @ 11:18) (80 - 81)  BP: 113/72 (12-05-24 @ 11:18) (113/72 - 127/77)  RR: 18 (12-05-24 @ 11:18) (18 - 18)  SpO2: 97% (12-05-24 @ 11:18) (96% - 97%)    Physical Examination:  General: no acute distress  HEENT: NC/AT, EOMI,  Cardio: S1, S2 heard, RRR, no murmurs  Resp: breath sounds heard bilaterally, no rales, wheezes or rhonchi  Abd: soft, NT, ND  Ext: no edema or cyanosis  Skin: warm, dry, no visible rash      Laboratory Studies:  CBC:                       9.0    4.25  )-----------( 153      ( 04 Dec 2024 07:44 )             28.9     CMP: 12-04    142  |  109[H]  |  43[H]  ----------------------------<  82  4.5   |  33[H]  |  1.40[H]    Ca    9.7      04 Dec 2024 07:44  Mg     1.9     12-04        Urinalysis Basic - ( 04 Dec 2024 07:44 )    Color: x / Appearance: x / SG: x / pH: x  Gluc: 82 mg/dL / Ketone: x  / Bili: x / Urobili: x   Blood: x / Protein: x / Nitrite: x   Leuk Esterase: x / RBC: x / WBC x   Sq Epi: x / Non Sq Epi: x / Bacteria: x        Microbiology: reviewed        Radiology: reviewed

## 2024-12-05 NOTE — PROGRESS NOTE ADULT - SUBJECTIVE AND OBJECTIVE BOX
Patient is a 84y old  Male who presents with a chief complaint of Bilateral Heel Osteomyelitis (05 Dec 2024 12:24)      Subjective:  INTERVAL HPI/OVERNIGHT EVENTS: Patient seen and examined at bedside.  Patient has no complaints at this time.     MEDICATIONS  (STANDING):  aspirin  chewable 81 milliGRAM(s) Oral daily  atorvastatin 80 milliGRAM(s) Oral at bedtime  chlorhexidine 2% Cloths 1 Application(s) Topical daily  ciprofloxacin     Tablet 500 milliGRAM(s) Oral every 12 hours  clopidogrel Tablet 75 milliGRAM(s) Oral daily  cyanocobalamin 1000 MICROGram(s) Oral daily  dextrose 5%. 1000 milliLiter(s) (50 mL/Hr) IV Continuous <Continuous>  dextrose 5%. 1000 milliLiter(s) (100 mL/Hr) IV Continuous <Continuous>  dextrose 50% Injectable 25 Gram(s) IV Push once  dextrose 50% Injectable 12.5 Gram(s) IV Push once  dextrose 50% Injectable 25 Gram(s) IV Push once  enoxaparin Injectable 40 milliGRAM(s) SubCutaneous every 24 hours  famotidine    Tablet 20 milliGRAM(s) Oral daily  glucagon  Injectable 1 milliGRAM(s) IntraMuscular once  insulin glargine Injectable (LANTUS) 8 Unit(s) SubCutaneous at bedtime  insulin lispro (ADMELOG) corrective regimen sliding scale   SubCutaneous three times a day before meals  melatonin 3 milliGRAM(s) Oral at bedtime  meropenem  IVPB 1000 milliGRAM(s) IV Intermittent every 8 hours  metoprolol tartrate 25 milliGRAM(s) Oral two times a day  nystatin Powder 1 Application(s) Topical two times a day  polyethylene glycol 3350 17 Gram(s) Oral daily  saccharomyces boulardii 250 milliGRAM(s) Oral two times a day  senna 1 Tablet(s) Oral with breakfast  tamsulosin 0.4 milliGRAM(s) Oral at bedtime    MEDICATIONS  (PRN):  acetaminophen     Tablet .. 650 milliGRAM(s) Oral every 6 hours PRN Temp greater or equal to 38C (100.4F), Mild Pain (1 - 3)  aluminum hydroxide/magnesium hydroxide/simethicone Suspension 30 milliLiter(s) Oral every 4 hours PRN Dyspepsia  dextrose Oral Gel 15 Gram(s) Oral once PRN Blood Glucose LESS THAN 70 milliGRAM(s)/deciliter  sodium chloride 0.9% lock flush 10 milliLiter(s) IV Push every 1 hour PRN Pre/post blood products, medications, blood draw, and to maintain line patency      Allergies    No Known Allergies    Intolerances        REVIEW OF SYSTEMS:  CONSTITUTIONAL: No fever or chills  HEENT:  No headache, no sore throat  RESPIRATORY: No cough or shortness of breath  CARDIOVASCULAR: No chest pain or palpitations  GASTROINTESTINAL: No abd pain, nausea, vomiting, or diarrhea      Objective:  Vital Signs Last 24 Hrs  T(C): 36.4 (05 Dec 2024 11:18), Max: 36.8 (04 Dec 2024 20:06)  T(F): 97.5 (05 Dec 2024 11:18), Max: 98.3 (04 Dec 2024 20:06)  HR: 81 (05 Dec 2024 11:18) (80 - 81)  BP: 113/72 (05 Dec 2024 11:18) (113/72 - 127/77)  BP(mean): --  RR: 18 (05 Dec 2024 11:18) (18 - 18)  SpO2: 97% (05 Dec 2024 11:18) (96% - 97%)    Parameters below as of 05 Dec 2024 11:18  Patient On (Oxygen Delivery Method): room air        GENERAL: NAD, lying in bed comfortably  HEAD:  Normocephalic  EYES:  conjunctiva and sclera clear  ENT: Moist mucous membranes  NECK: Supple  CHEST/LUNG: Clear to auscultation bilaterally; No rales or rhonchi; no wheezing. Unlabored respirations  HEART: Regular rate and rhythm; S1S2+  ABDOMEN: Bowel sounds present; Soft, Nontender, Nondistended.   EXTREMITIES:  + distal Peripheral Pulses;  B/L feet dressing clean   NERVOUS SYSTEM:  Alert & Oriented X3;  No gross focal deficits   MSK: moves all extremities  SKIN: No rashes     LABS:      Ca    9.7        04 Dec 2024 07:44        Urinalysis Basic - ( 04 Dec 2024 07:44 )    Color: x / Appearance: x / SG: x / pH: x  Gluc: 82 mg/dL / Ketone: x  / Bili: x / Urobili: x   Blood: x / Protein: x / Nitrite: x   Leuk Esterase: x / RBC: x / WBC x   Sq Epi: x / Non Sq Epi: x / Bacteria: x      CAPILLARY BLOOD GLUCOSE      POCT Blood Glucose.: 108 mg/dL (05 Dec 2024 12:27)  POCT Blood Glucose.: 94 mg/dL (05 Dec 2024 08:11)  POCT Blood Glucose.: 118 mg/dL (04 Dec 2024 21:10)  POCT Blood Glucose.: 165 mg/dL (04 Dec 2024 17:15)          RADIOLOGY & ADDITIONAL TESTS:    Personally reviewed.     Consultant(s) Notes Reviewed:  [x] YES  [ ] NO    Plan of care discussed with patient ; all questions answered

## 2024-12-05 NOTE — CHART NOTE - NSCHARTNOTEFT_GEN_A_CORE
Nutrition follow up ( chart reviewed, events noted) Brief      Factors impacting intake: [ ] none [ ] nausea  [ ] vomiting [ ] diarrhea [ ] constipation  [ ]chewing problems [ ] swallowing issues  [ ] other:     Diet Presciption: Diet, Regular:   Consistent Carbohydrate {Evening Snack}  DASH/TLC {Sodium & Cholesterol Restricted} (11-19-24 @ 17:09)    Intake:     Current Weight:     Pertinent Medications: MEDICATIONS  (STANDING):  aspirin  chewable 81 milliGRAM(s) Oral daily  atorvastatin 80 milliGRAM(s) Oral at bedtime  chlorhexidine 2% Cloths 1 Application(s) Topical daily  ciprofloxacin     Tablet 500 milliGRAM(s) Oral every 12 hours  clopidogrel Tablet 75 milliGRAM(s) Oral daily  cyanocobalamin 1000 MICROGram(s) Oral daily  dextrose 5%. 1000 milliLiter(s) (50 mL/Hr) IV Continuous <Continuous>  dextrose 5%. 1000 milliLiter(s) (100 mL/Hr) IV Continuous <Continuous>  dextrose 50% Injectable 25 Gram(s) IV Push once  dextrose 50% Injectable 12.5 Gram(s) IV Push once  dextrose 50% Injectable 25 Gram(s) IV Push once  enoxaparin Injectable 40 milliGRAM(s) SubCutaneous every 24 hours  famotidine    Tablet 20 milliGRAM(s) Oral daily  glucagon  Injectable 1 milliGRAM(s) IntraMuscular once  insulin glargine Injectable (LANTUS) 8 Unit(s) SubCutaneous at bedtime  insulin lispro (ADMELOG) corrective regimen sliding scale   SubCutaneous three times a day before meals  melatonin 3 milliGRAM(s) Oral at bedtime  meropenem  IVPB 1000 milliGRAM(s) IV Intermittent every 8 hours  metoprolol tartrate 25 milliGRAM(s) Oral two times a day  nystatin Powder 1 Application(s) Topical two times a day  polyethylene glycol 3350 17 Gram(s) Oral daily  saccharomyces boulardii 250 milliGRAM(s) Oral two times a day  senna 1 Tablet(s) Oral with breakfast  tamsulosin 0.4 milliGRAM(s) Oral at bedtime    MEDICATIONS  (PRN):  acetaminophen     Tablet .. 650 milliGRAM(s) Oral every 6 hours PRN Temp greater or equal to 38C (100.4F), Mild Pain (1 - 3)  aluminum hydroxide/magnesium hydroxide/simethicone Suspension 30 milliLiter(s) Oral every 4 hours PRN Dyspepsia  dextrose Oral Gel 15 Gram(s) Oral once PRN Blood Glucose LESS THAN 70 milliGRAM(s)/deciliter  sodium chloride 0.9% lock flush 10 milliLiter(s) IV Push every 1 hour PRN Pre/post blood products, medications, blood draw, and to maintain line patency    Pertinent Labs: 12-04 Na142 mmol/L Glu 82 mg/dL K+ 4.5 mmol/L Cr  1.40 mg/dL[H] BUN 43 mg/dL[H]     CAPILLARY BLOOD GLUCOSE      POCT Blood Glucose.: 94 mg/dL (05 Dec 2024 08:11)  POCT Blood Glucose.: 118 mg/dL (04 Dec 2024 21:10)  POCT Blood Glucose.: 165 mg/dL (04 Dec 2024 17:15)  POCT Blood Glucose.: 115 mg/dL (04 Dec 2024 11:58)    Skin:     Estimated Needs:   [ ] no change since previous assessment  [ ] recalculated:     Previous Nutrition Diagnosis:   [ ] Inadequate Energy Intake [ ]Inadequate Oral Intake [ ] Excessive Energy Intake   [ ] Underweight [ ] Increased Nutrient Needs [ ] Overweight/Obesity   [ ] Altered GI Function [ ] Unintended Weight Loss [ ] Food & Nutrition Related Knowledge Deficit [ ] Malnutrition     Nutrition Diagnosis is [ ] ongoing  [ ] resolved [ ] not applicable     New Nutrition Diagnosis: [ ] not applicable       Interventions:   Recommend  [ ] Change Diet To:  [ ] Nutrition Supplement  [ ] Nutrition Support  [ ] Other:     Monitoring and Evaluation:   [ ] PO intake [ x ] Tolerance to diet prescription [ x ] weights [ x ] labs[ x ] follow up per protocol  [ ] other: Nutrition follow up ( chart reviewed, events noted) Brief hx: 84yo M with a PMH of HTN, HLD, Type 2 DM, CKD3a, hx of GI bleed, COPD, SANTO, BPH, CAD s/p PCI, PAD, multiple myeloma, lymphedema who presents with b/l heel ulcers and suspected OM planned for bilateral foot surgery with podiatry, Gabriel Deshpande, on 10/29 for his suspected bilateral heel osteomyelitis now s/p partial calcanectomy.       Factors impacting intake: [ ] none [ ] nausea  [ ] vomiting [ ] diarrhea [ ] constipation  [ ]chewing problems [ ] swallowing issues  [ ] other:     Diet Presciption: Diet, Regular:   Consistent Carbohydrate {Evening Snack}  DASH/TLC {Sodium & Cholesterol Restricted} (11-19-24 @ 17:09)    Intake:     Current Weight:     Pertinent Medications: MEDICATIONS  (STANDING):  aspirin  chewable 81 milliGRAM(s) Oral daily  atorvastatin 80 milliGRAM(s) Oral at bedtime  chlorhexidine 2% Cloths 1 Application(s) Topical daily  ciprofloxacin     Tablet 500 milliGRAM(s) Oral every 12 hours  clopidogrel Tablet 75 milliGRAM(s) Oral daily  cyanocobalamin 1000 MICROGram(s) Oral daily  dextrose 5%. 1000 milliLiter(s) (50 mL/Hr) IV Continuous <Continuous>  dextrose 5%. 1000 milliLiter(s) (100 mL/Hr) IV Continuous <Continuous>  dextrose 50% Injectable 25 Gram(s) IV Push once  dextrose 50% Injectable 12.5 Gram(s) IV Push once  dextrose 50% Injectable 25 Gram(s) IV Push once  enoxaparin Injectable 40 milliGRAM(s) SubCutaneous every 24 hours  famotidine    Tablet 20 milliGRAM(s) Oral daily  glucagon  Injectable 1 milliGRAM(s) IntraMuscular once  insulin glargine Injectable (LANTUS) 8 Unit(s) SubCutaneous at bedtime  insulin lispro (ADMELOG) corrective regimen sliding scale   SubCutaneous three times a day before meals  melatonin 3 milliGRAM(s) Oral at bedtime  meropenem  IVPB 1000 milliGRAM(s) IV Intermittent every 8 hours  metoprolol tartrate 25 milliGRAM(s) Oral two times a day  nystatin Powder 1 Application(s) Topical two times a day  polyethylene glycol 3350 17 Gram(s) Oral daily  saccharomyces boulardii 250 milliGRAM(s) Oral two times a day  senna 1 Tablet(s) Oral with breakfast  tamsulosin 0.4 milliGRAM(s) Oral at bedtime    MEDICATIONS  (PRN):  acetaminophen     Tablet .. 650 milliGRAM(s) Oral every 6 hours PRN Temp greater or equal to 38C (100.4F), Mild Pain (1 - 3)  aluminum hydroxide/magnesium hydroxide/simethicone Suspension 30 milliLiter(s) Oral every 4 hours PRN Dyspepsia  dextrose Oral Gel 15 Gram(s) Oral once PRN Blood Glucose LESS THAN 70 milliGRAM(s)/deciliter  sodium chloride 0.9% lock flush 10 milliLiter(s) IV Push every 1 hour PRN Pre/post blood products, medications, blood draw, and to maintain line patency    Pertinent Labs: 12-04 Na142 mmol/L Glu 82 mg/dL K+ 4.5 mmol/L Cr  1.40 mg/dL[H] BUN 43 mg/dL[H]     CAPILLARY BLOOD GLUCOSE      POCT Blood Glucose.: 94 mg/dL (05 Dec 2024 08:11)  POCT Blood Glucose.: 118 mg/dL (04 Dec 2024 21:10)  POCT Blood Glucose.: 165 mg/dL (04 Dec 2024 17:15)  POCT Blood Glucose.: 115 mg/dL (04 Dec 2024 11:58)    Skin:     Estimated Needs:   [ ] no change since previous assessment  [ ] recalculated:     Previous Nutrition Diagnosis:   [ ] Inadequate Energy Intake [ ]Inadequate Oral Intake [ ] Excessive Energy Intake   [ ] Underweight [ ] Increased Nutrient Needs [ ] Overweight/Obesity   [ ] Altered GI Function [ ] Unintended Weight Loss [ ] Food & Nutrition Related Knowledge Deficit [ ] Malnutrition     Nutrition Diagnosis is [ ] ongoing  [ ] resolved [ ] not applicable     New Nutrition Diagnosis: [ ] not applicable       Interventions:   Recommend  [ ] Change Diet To:  [ ] Nutrition Supplement  [ ] Nutrition Support  [ ] Other:     Monitoring and Evaluation:   [ ] PO intake [ x ] Tolerance to diet prescription [ x ] weights [ x ] labs[ x ] follow up per protocol  [ ] other: Nutrition follow up ( chart reviewed, events noted) Brief hx: 82yo M with a PMH of HTN, HLD, Type 2 DM, CKD3a, hx of GI bleed, COPD, SANTO, BPH, CAD s/p PCI, PAD, multiple myeloma, lymphedema who presents with b/l heel ulcers and suspected OM. Pt is  now s/p partial calcanectomy.     At time of RD visit to pts bedside, he reports his appetite is good , eating well . He had a normal BM as recently as yesterday .    Factors impacting intake: [X ] none [ ] nausea  [ ] vomiting [ ] diarrhea [ ] constipation  [ ]chewing problems [ ] swallowing issues  [ ] other:     Diet Prescription: Diet, Regular:   Consistent Carbohydrate {Evening Snack}  DASH/TLC {Sodium & Cholesterol Restricted} (11-19-24 @ 17:09)    Intake: >/=75%    Current Weight: wt since admission 116.7 kg  ( 10/29/24) - with documented as high as 130.6 kg ( 11/29/24)   admission wt 117.5 kg ( 10/28/24 ) most recent wt available 129.8 kg ( 12/1/23)     edema: 4+ bilat ankles. wt changes likely multifactorial - edema, eating well with little physical activity. There are be a component of scale inaccuracy    Pertinent Medications: MEDICATIONS  (STANDING):  aspirin  chewable 81 milliGRAM(s) Oral daily  atorvastatin 80 milliGRAM(s) Oral at bedtime  chlorhexidine 2% Cloths 1 Application(s) Topical daily  ciprofloxacin     Tablet 500 milliGRAM(s) Oral every 12 hours  clopidogrel Tablet 75 milliGRAM(s) Oral daily  cyanocobalamin 1000 MICROGram(s) Oral daily  dextrose 5%. 1000 milliLiter(s) (50 mL/Hr) IV Continuous <Continuous>  dextrose 5%. 1000 milliLiter(s) (100 mL/Hr) IV Continuous <Continuous>  dextrose 50% Injectable 25 Gram(s) IV Push once  dextrose 50% Injectable 12.5 Gram(s) IV Push once  dextrose 50% Injectable 25 Gram(s) IV Push once  enoxaparin Injectable 40 milliGRAM(s) SubCutaneous every 24 hours  famotidine    Tablet 20 milliGRAM(s) Oral daily  glucagon  Injectable 1 milliGRAM(s) IntraMuscular once  insulin glargine Injectable (LANTUS) 8 Unit(s) SubCutaneous at bedtime  insulin lispro (ADMELOG) corrective regimen sliding scale   SubCutaneous three times a day before meals  melatonin 3 milliGRAM(s) Oral at bedtime  meropenem  IVPB 1000 milliGRAM(s) IV Intermittent every 8 hours  metoprolol tartrate 25 milliGRAM(s) Oral two times a day  nystatin Powder 1 Application(s) Topical two times a day  polyethylene glycol 3350 17 Gram(s) Oral daily  saccharomyces boulardii 250 milliGRAM(s) Oral two times a day  senna 1 Tablet(s) Oral with breakfast  tamsulosin 0.4 milliGRAM(s) Oral at bedtime    MEDICATIONS  (PRN):  acetaminophen     Tablet .. 650 milliGRAM(s) Oral every 6 hours PRN Temp greater or equal to 38C (100.4F), Mild Pain (1 - 3)  aluminum hydroxide/magnesium hydroxide/simethicone Suspension 30 milliLiter(s) Oral every 4 hours PRN Dyspepsia  dextrose Oral Gel 15 Gram(s) Oral once PRN Blood Glucose LESS THAN 70 milliGRAM(s)/deciliter  sodium chloride 0.9% lock flush 10 milliLiter(s) IV Push every 1 hour PRN Pre/post blood products, medications, blood draw, and to maintain line patency    Pertinent Labs: 12-04 Na142 mmol/L Glu 82 mg/dL K+ 4.5 mmol/L Cr  1.40 mg/dL[H] BUN 43 mg/dL[H]     CAPILLARY BLOOD GLUCOSE :  POCT Blood Glucose.: 94 mg/dL (05 Dec 2024 08:11)  POCT Blood Glucose.: 118 mg/dL (04 Dec 2024 21:10)  POCT Blood Glucose.: 165 mg/dL (04 Dec 2024 17:15)  POCT Blood Glucose.: 115 mg/dL (04 Dec 2024 11:58)  DM appears well controlled     Skin: s/p bilat heel debridements , wounds are healing well per podiatry     Estimated Needs:   [ X] no change since previous assessment, based on IBW of 89 kg/~ 196# ( 23 - 28 kcals/kg) 8388-6312 kcals and ( 1-1.3 gm pro/kg) 100-115 gm protein     Previous Nutrition Diagnosis:   [ ] Inadequate Energy Intake [ ]Inadequate Oral Intake [ ] Excessive Energy Intake   [ ] Underweight [X ] Increased Nutrient Needs [X ] Overweight/Obesity   [ ] Altered GI Function [ ] Unintended Weight Loss [ ] Food & Nutrition Related Knowledge Deficit [ ] Malnutrition     Nutrition Diagnosis is [X ] ongoing  [ ] resolved [ ] not applicable     New Nutrition Diagnosis: [X ] not applicable     Interventions:   Recommend  [X ] Other: continue current POC     Monitoring and Evaluation:   [X ] PO intake [ x ] Tolerance to diet prescription [ x ] weights [ x ] labs[ x ] follow up per protocol  [X ] other:skin integrity

## 2024-12-05 NOTE — PROGRESS NOTE ADULT - ASSESSMENT
84 yo male with hypertension, diabetes, hyperlipidemia, bilateral heel osteomyelitis currently on outpatient antibiotics through midline presented for surgery planned with podiatry, Gabriel Deshpande, for his bilateral heel osteomyelitis. His osteomyelitis started 6 months PTA.   Calcanectomy, partial 29-Oct-2024 12:58:43  Jona Mason    bilateral heel osteomyelitis  prior micro with Proteus mirabilis, Pseudomonas aeruginosa, Enterococcus faecalis, Klebsiella pneumoniae ESBL  Culture - Tissue with Gram Stain (10.29.24 @ 12:00) Rare Pseudomonas aeruginosa  -  Ciprofloxacin: S <=0.25-  Levofloxacin: S <=0.5-  Meropenem: S <=1  Culture - Tissue with Gram Stain (11.19.24 @ 16:50) Pseudomonas aeruginosa (Carbapenem Resistant), Enterococcus faecalis -Ciprofloxacin: S 0.5, Meropenem: R >8, Piperacillin/Tazobactam: R >64   Path- Right heel bone proximal margin- Acute and chronic osteomyelitis, Left heel bone proximal margin- Acute and chronic osteomyelitis    Recommendations:   Meropenem 1 gram IV q8h  Ciprofloxacin 500 mg PO BID  Last day of Abx 12/9  weekly CBC w diff, CMP, ESR  PICC can be removed at end of Rx  Additional care per primary team    Infectious Diseases will follow. Please call with any questions.  Queta Ngo M.D.  [facility] of Infectious Diseases [phone]  For after 5 P.M. and weekends, please call [phone]  Available on [employer] TEAMS         84 yo male with hypertension, diabetes, hyperlipidemia, bilateral heel osteomyelitis currently on outpatient antibiotics through midline presented for surgery planned with podiatry, Gabriel Deshpande, for his bilateral heel osteomyelitis. His osteomyelitis started 6 months PTA.   Calcanectomy, partial 29-Oct-2024 12:58:43  Jona Mason    bilateral heel osteomyelitis  prior micro with Proteus mirabilis, Pseudomonas aeruginosa, Enterococcus faecalis, Klebsiella pneumoniae ESBL  Culture - Tissue with Gram Stain (10.29.24 @ 12:00) Rare Pseudomonas aeruginosa  -  Ciprofloxacin: S <=0.25-  Levofloxacin: S <=0.5-  Meropenem: S <=1  Culture - Tissue with Gram Stain (11.19.24 @ 16:50) Pseudomonas aeruginosa (Carbapenem Resistant), Enterococcus faecalis -Ciprofloxacin: S 0.5, Meropenem: R >8, Piperacillin/Tazobactam: R >64   Path- Right heel bone proximal margin- Acute and chronic osteomyelitis, Left heel bone proximal margin- Acute and chronic osteomyelitis    Recommendations:   Meropenem 1 gram IV q8h to cover prior micro as well  Ciprofloxacin 500 mg PO BID to cover Pseudomonas  Last day of Abx 12/9  weekly CBC w diff, CMP, ESR  PICC can be removed at end of Rx  Additional care per primary team    Infectious Diseases will follow. Please call with any questions.  Queta Ngo M.D.  [facility] Infect[facility] [phone]  For after 5 P.M. and weekends, please call [phone]  Available on [employer] TEAMS

## 2024-12-06 LAB
ALBUMIN SERPL ELPH-MCNC: 2.5 G/DL — LOW (ref 3.3–5)
ALP SERPL-CCNC: 34 U/L — LOW (ref 40–120)
ALT FLD-CCNC: 20 U/L — SIGNIFICANT CHANGE UP (ref 12–78)
ANION GAP SERPL CALC-SCNC: 5 MMOL/L — SIGNIFICANT CHANGE UP (ref 5–17)
AST SERPL-CCNC: 21 U/L — SIGNIFICANT CHANGE UP (ref 15–37)
BASOPHILS # BLD AUTO: 0.01 K/UL — SIGNIFICANT CHANGE UP (ref 0–0.2)
BASOPHILS NFR BLD AUTO: 0.2 % — SIGNIFICANT CHANGE UP (ref 0–2)
BILIRUB SERPL-MCNC: 0.5 MG/DL — SIGNIFICANT CHANGE UP (ref 0.2–1.2)
BUN SERPL-MCNC: 39 MG/DL — HIGH (ref 7–23)
CALCIUM SERPL-MCNC: 9.4 MG/DL — SIGNIFICANT CHANGE UP (ref 8.5–10.1)
CHLORIDE SERPL-SCNC: 108 MMOL/L — SIGNIFICANT CHANGE UP (ref 96–108)
CO2 SERPL-SCNC: 27 MMOL/L — SIGNIFICANT CHANGE UP (ref 22–31)
CREAT SERPL-MCNC: 1.4 MG/DL — HIGH (ref 0.5–1.3)
EGFR: 50 ML/MIN/1.73M2 — LOW
EOSINOPHIL # BLD AUTO: 0.33 K/UL — SIGNIFICANT CHANGE UP (ref 0–0.5)
EOSINOPHIL NFR BLD AUTO: 7.7 % — HIGH (ref 0–6)
GLUCOSE SERPL-MCNC: 137 MG/DL — HIGH (ref 70–99)
HCT VFR BLD CALC: 30 % — LOW (ref 39–50)
HGB BLD-MCNC: 9.5 G/DL — LOW (ref 13–17)
IMM GRANULOCYTES NFR BLD AUTO: 0.2 % — SIGNIFICANT CHANGE UP (ref 0–0.9)
LYMPHOCYTES # BLD AUTO: 1.58 K/UL — SIGNIFICANT CHANGE UP (ref 1–3.3)
LYMPHOCYTES # BLD AUTO: 36.9 % — SIGNIFICANT CHANGE UP (ref 13–44)
MCHC RBC-ENTMCNC: 28.5 PG — SIGNIFICANT CHANGE UP (ref 27–34)
MCHC RBC-ENTMCNC: 31.7 G/DL — LOW (ref 32–36)
MCV RBC AUTO: 90.1 FL — SIGNIFICANT CHANGE UP (ref 80–100)
MONOCYTES # BLD AUTO: 0.3 K/UL — SIGNIFICANT CHANGE UP (ref 0–0.9)
MONOCYTES NFR BLD AUTO: 7 % — SIGNIFICANT CHANGE UP (ref 2–14)
NEUTROPHILS # BLD AUTO: 2.05 K/UL — SIGNIFICANT CHANGE UP (ref 1.8–7.4)
NEUTROPHILS NFR BLD AUTO: 48 % — SIGNIFICANT CHANGE UP (ref 43–77)
NRBC # BLD: 0 /100 WBCS — SIGNIFICANT CHANGE UP (ref 0–0)
PLATELET # BLD AUTO: 150 K/UL — SIGNIFICANT CHANGE UP (ref 150–400)
POTASSIUM SERPL-MCNC: 4.4 MMOL/L — SIGNIFICANT CHANGE UP (ref 3.5–5.3)
POTASSIUM SERPL-SCNC: 4.4 MMOL/L — SIGNIFICANT CHANGE UP (ref 3.5–5.3)
PROT SERPL-MCNC: 6.7 G/DL — SIGNIFICANT CHANGE UP (ref 6–8.3)
RBC # BLD: 3.33 M/UL — LOW (ref 4.2–5.8)
RBC # FLD: 16.6 % — HIGH (ref 10.3–14.5)
SODIUM SERPL-SCNC: 140 MMOL/L — SIGNIFICANT CHANGE UP (ref 135–145)
WBC # BLD: 4.28 K/UL — SIGNIFICANT CHANGE UP (ref 3.8–10.5)
WBC # FLD AUTO: 4.28 K/UL — SIGNIFICANT CHANGE UP (ref 3.8–10.5)

## 2024-12-06 PROCEDURE — 99232 SBSQ HOSP IP/OBS MODERATE 35: CPT

## 2024-12-06 RX ADMIN — NYSTATIN 1 APPLICATION(S): 100000 POWDER TOPICAL at 06:15

## 2024-12-06 RX ADMIN — CHLORHEXIDINE GLUCONATE 1 APPLICATION(S): 1.2 RINSE ORAL at 06:14

## 2024-12-06 RX ADMIN — CLOPIDOGREL 75 MILLIGRAM(S): 75 TABLET, FILM COATED ORAL at 11:24

## 2024-12-06 RX ADMIN — FAMOTIDINE 20 MILLIGRAM(S): 20 TABLET, FILM COATED ORAL at 11:24

## 2024-12-06 RX ADMIN — Medication 500 MILLIGRAM(S): at 10:44

## 2024-12-06 RX ADMIN — Medication 1000 MICROGRAM(S): at 11:25

## 2024-12-06 RX ADMIN — Medication 500 MILLIGRAM(S): at 21:36

## 2024-12-06 RX ADMIN — Medication 80 MILLIGRAM(S): at 21:36

## 2024-12-06 RX ADMIN — MEROPENEM 100 MILLIGRAM(S): 500 INJECTION, POWDER, FOR SOLUTION INTRAVENOUS at 14:16

## 2024-12-06 RX ADMIN — INSULIN GLARGINE 8 UNIT(S): 100 INJECTION, SOLUTION SUBCUTANEOUS at 21:49

## 2024-12-06 RX ADMIN — MEROPENEM 100 MILLIGRAM(S): 500 INJECTION, POWDER, FOR SOLUTION INTRAVENOUS at 06:15

## 2024-12-06 RX ADMIN — Medication 81 MILLIGRAM(S): at 11:24

## 2024-12-06 RX ADMIN — ENOXAPARIN SODIUM 40 MILLIGRAM(S): 30 INJECTION SUBCUTANEOUS at 21:49

## 2024-12-06 RX ADMIN — METOPROLOL TARTRATE 25 MILLIGRAM(S): 100 TABLET, FILM COATED ORAL at 17:59

## 2024-12-06 RX ADMIN — METOPROLOL TARTRATE 25 MILLIGRAM(S): 100 TABLET, FILM COATED ORAL at 06:14

## 2024-12-06 RX ADMIN — Medication 250 MILLIGRAM(S): at 17:58

## 2024-12-06 RX ADMIN — ACETAMINOPHEN, DIPHENHYDRAMINE HCL, PHENYLEPHRINE HCL 3 MILLIGRAM(S): 325; 25; 5 TABLET ORAL at 21:35

## 2024-12-06 RX ADMIN — Medication 250 MILLIGRAM(S): at 06:15

## 2024-12-06 RX ADMIN — TAMSULOSIN HYDROCHLORIDE 0.4 MILLIGRAM(S): 0.4 CAPSULE ORAL at 21:35

## 2024-12-06 RX ADMIN — MEROPENEM 100 MILLIGRAM(S): 500 INJECTION, POWDER, FOR SOLUTION INTRAVENOUS at 21:35

## 2024-12-06 RX ADMIN — NYSTATIN 1 APPLICATION(S): 100000 POWDER TOPICAL at 17:30

## 2024-12-06 NOTE — SOCIAL WORK PROGRESS NOTE - NSSWPROGRESSNOTE_GEN_ALL_CORE
NADYA spoke with pts dtr Rhonda and Fallon from [facility]- we are moving forward with [facility] green in anticipation for DC on Tuesday 12/10. 3122 on the chart, MD aware. ALL meds to be sent to [facility] pharmacy. Referral for [facility] to be sent to [facility] upon DC ready and will need to order Wheelchair, walker, shower chair for ptaitnet, as per Fallon likely will not need a aaron. NADYA to fax completed 3122 to . NADYA to follow.

## 2024-12-06 NOTE — PROGRESS NOTE ADULT - PROBLEM SELECTOR PROBLEM 3
Open wound of left heel
HTN (hypertension)
Open wound of left heel

## 2024-12-06 NOTE — PROGRESS NOTE ADULT - PROBLEM SELECTOR PROBLEM 2
Open wound of right heel
Open wound of right heel
Open wound of left heel
Open wound of right heel
Open wound of left heel
Open wound of right heel
DM (diabetes mellitus)
Open wound of right heel

## 2024-12-06 NOTE — PROGRESS NOTE ADULT - SUBJECTIVE AND OBJECTIVE BOX
Chief Complaint: Heel ulcer    Interval Events: No events overnight.    Review of Systems:  General: No fevers, chills, weight gain  Skin: No rashes, color changes  Cardiovascular: No chest pain, orthopnea  Respiratory: No shortness of breath, cough  Gastrointestinal: No nausea, abdominal pain  Genitourinary: No incontinence, pain with urination  Musculoskeletal: No pain, swelling, decreased range of motion  Neurological: No headache, weakness  Psychiatric: No depression, anxiety  Endocrine: No weight gain, increased thirst  All other systems are comprehensively negative.    Physical Exam:  Vital Signs Last 24 Hrs  T(C): 36.4 (06 Dec 2024 05:10), Max: 36.8 (05 Dec 2024 20:34)  T(F): 97.5 (06 Dec 2024 05:10), Max: 98.3 (05 Dec 2024 20:34)  HR: 80 (06 Dec 2024 05:10) (80 - 88)  BP: 105/67 (06 Dec 2024 05:10) (105/67 - 135/77)  BP(mean): --  RR: 18 (06 Dec 2024 05:10) (18 - 18)  SpO2: 97% (06 Dec 2024 05:10) (95% - 97%)  Parameters below as of 06 Dec 2024 05:10  Patient On (Oxygen Delivery Method): room air  General: NAD  HEENT: MMM  Neck: No JVD, no carotid bruit  Lungs: CTAB  CV: RRR, nl S1/S2, no M/R/G  Abdomen: S/NT/ND, +BS  Extremities: +Lymphedema, no cyanosis  Neuro: AAOx3, non-focal  Skin: No rash    Labs:

## 2024-12-06 NOTE — PROGRESS NOTE ADULT - SUBJECTIVE AND OBJECTIVE BOX
Patient is a 84y old  Male who presents with a chief complaint of Bilateral Heel Osteomyelitis (06 Dec 2024 13:39)      Subjective:  INTERVAL HPI/OVERNIGHT EVENTS: Patient seen and examined at bedside.  Patient has no complains    MEDICATIONS  (STANDING):  aspirin  chewable 81 milliGRAM(s) Oral daily  atorvastatin 80 milliGRAM(s) Oral at bedtime  chlorhexidine 2% Cloths 1 Application(s) Topical daily  ciprofloxacin     Tablet 500 milliGRAM(s) Oral every 12 hours  clopidogrel Tablet 75 milliGRAM(s) Oral daily  cyanocobalamin 1000 MICROGram(s) Oral daily  dextrose 5%. 1000 milliLiter(s) (50 mL/Hr) IV Continuous <Continuous>  dextrose 5%. 1000 milliLiter(s) (100 mL/Hr) IV Continuous <Continuous>  dextrose 50% Injectable 25 Gram(s) IV Push once  dextrose 50% Injectable 12.5 Gram(s) IV Push once  dextrose 50% Injectable 25 Gram(s) IV Push once  enoxaparin Injectable 40 milliGRAM(s) SubCutaneous every 24 hours  famotidine    Tablet 20 milliGRAM(s) Oral daily  glucagon  Injectable 1 milliGRAM(s) IntraMuscular once  insulin glargine Injectable (LANTUS) 8 Unit(s) SubCutaneous at bedtime  insulin lispro (ADMELOG) corrective regimen sliding scale   SubCutaneous three times a day before meals  melatonin 3 milliGRAM(s) Oral at bedtime  meropenem  IVPB 1000 milliGRAM(s) IV Intermittent every 8 hours  metoprolol tartrate 25 milliGRAM(s) Oral two times a day  nystatin Powder 1 Application(s) Topical two times a day  polyethylene glycol 3350 17 Gram(s) Oral daily  saccharomyces boulardii 250 milliGRAM(s) Oral two times a day  senna 1 Tablet(s) Oral with breakfast  tamsulosin 0.4 milliGRAM(s) Oral at bedtime    MEDICATIONS  (PRN):  acetaminophen     Tablet .. 650 milliGRAM(s) Oral every 6 hours PRN Temp greater or equal to 38C (100.4F), Mild Pain (1 - 3)  aluminum hydroxide/magnesium hydroxide/simethicone Suspension 30 milliLiter(s) Oral every 4 hours PRN Dyspepsia  dextrose Oral Gel 15 Gram(s) Oral once PRN Blood Glucose LESS THAN 70 milliGRAM(s)/deciliter  sodium chloride 0.9% lock flush 10 milliLiter(s) IV Push every 1 hour PRN Pre/post blood products, medications, blood draw, and to maintain line patency      Allergies    No Known Allergies    Intolerances        REVIEW OF SYSTEMS:  CONSTITUTIONAL: No fever or chills  HEENT:  No headache, no sore throat  RESPIRATORY: No cough or shortness of breath  CARDIOVASCULAR: No chest pain or palpitations  GASTROINTESTINAL: no abd pain, nausea      Objective:  Vital Signs Last 24 Hrs  T(C): 36.3 (06 Dec 2024 11:09), Max: 36.8 (05 Dec 2024 20:34)  T(F): 97.4 (06 Dec 2024 11:09), Max: 98.3 (05 Dec 2024 20:34)  HR: 74 (06 Dec 2024 11:09) (74 - 88)  BP: 127/73 (06 Dec 2024 11:09) (105/67 - 135/77)  BP(mean): --  RR: 18 (06 Dec 2024 11:09) (18 - 18)  SpO2: 98% (06 Dec 2024 11:09) (95% - 98%)    Parameters below as of 06 Dec 2024 11:09  Patient On (Oxygen Delivery Method): room air        GENERAL: NAD, lying in bed comfortably  HEAD:  Normocephalic  EYES:  conjunctiva and sclera clear  ENT: Moist mucous membranes  NECK: Supple  CHEST/LUNG: Clear to auscultation bilaterally; No rales or rhonchi; no wheezing. Unlabored respirations  HEART: Regular rate and rhythm; S1S2+  ABDOMEN: Bowel sounds present; Soft, RLQ mild tender, no rebound, Nondistended.   EXTREMITIES:  +distal Peripheral Pulses;  B/L LE mild edema, B/L FEET in clean dressing   NERVOUS SYSTEM:  Alert & Oriented X3;  No gross focal deficits   MSK: moves all extremities  SKIN: No rashes     LABS:                        9.5    4.28  )-----------( 150      ( 06 Dec 2024 10:15 )             30.0     06 Dec 2024 10:15    140    |  108    |  39     ----------------------------<  137    4.4     |  27     |  1.40     Ca    9.4        06 Dec 2024 10:15    TPro  6.7    /  Alb  2.5    /  TBili  0.5    /  DBili  x      /  AST  21     /  ALT  20     /  AlkPhos  34     06 Dec 2024 10:15      Urinalysis Basic - ( 06 Dec 2024 10:15 )    Color: x / Appearance: x / SG: x / pH: x  Gluc: 137 mg/dL / Ketone: x  / Bili: x / Urobili: x   Blood: x / Protein: x / Nitrite: x   Leuk Esterase: x / RBC: x / WBC x   Sq Epi: x / Non Sq Epi: x / Bacteria: x      CAPILLARY BLOOD GLUCOSE      POCT Blood Glucose.: 124 mg/dL (06 Dec 2024 12:17)  POCT Blood Glucose.: 93 mg/dL (06 Dec 2024 08:16)  POCT Blood Glucose.: 141 mg/dL (05 Dec 2024 21:48)  POCT Blood Glucose.: 132 mg/dL (05 Dec 2024 17:19)          RADIOLOGY & ADDITIONAL TESTS:    Personally reviewed.     Consultant(s) Notes Reviewed:  [x] YES  [ ] NO    Plan of care discussed with patient ; all questions answered

## 2024-12-06 NOTE — PROGRESS NOTE ADULT - SUBJECTIVE AND OBJECTIVE BOX
[facility]                                                       Dr. Blum, Dr. Marshall, Dr. Cevallos, Dr. Arroyo, Dr. Darling, Dr. Cordova                                      [facility], [facility], [address]                                                 [address] [address]                                      Ph: [phone]  Fax: [phone]                                         Ph: [phone]  Fax: [phone]      Patient is a 84y old  Male who presents with a chief complaint of Bilateral Heel Osteomyelitis (02 Dec 2024 12:02)  Patient seen in follow up for CKD.        PAST MEDICAL HISTORY:  HTN (hypertension)    CAD (coronary artery disease)    HLD (hyperlipidemia)    DM (diabetes mellitus)    Benign prostatic hyperplasia with lower urinary tract symptoms, symptom details unspecified    Stented coronary artery    SANTO on CPAP    Chronic obstructive pulmonary disease, unspecified COPD type    Obesity, morbid, BMI 40.0-49.9    Difficulty walking    GI bleed    History of blood transfusion    DM2 (diabetes mellitus, type 2)    COPD, mild    Lymphedema    Multiple myeloma    Chronic obstructive pulmonary disease, unspecified COPD type      MEDICATIONS  (STANDING):  aspirin  chewable 81 milliGRAM(s) Oral daily  atorvastatin 80 milliGRAM(s) Oral at bedtime  chlorhexidine 2% Cloths 1 Application(s) Topical daily  ciprofloxacin     Tablet 500 milliGRAM(s) Oral every 12 hours  clopidogrel Tablet 75 milliGRAM(s) Oral daily  cyanocobalamin 1000 MICROGram(s) Oral daily  dextrose 5%. 1000 milliLiter(s) (50 mL/Hr) IV Continuous <Continuous>  dextrose 5%. 1000 milliLiter(s) (100 mL/Hr) IV Continuous <Continuous>  dextrose 50% Injectable 25 Gram(s) IV Push once  dextrose 50% Injectable 12.5 Gram(s) IV Push once  dextrose 50% Injectable 25 Gram(s) IV Push once  enoxaparin Injectable 40 milliGRAM(s) SubCutaneous every 24 hours  famotidine    Tablet 20 milliGRAM(s) Oral daily  glucagon  Injectable 1 milliGRAM(s) IntraMuscular once  insulin glargine Injectable (LANTUS) 8 Unit(s) SubCutaneous at bedtime  insulin lispro (ADMELOG) corrective regimen sliding scale   SubCutaneous three times a day before meals  melatonin 3 milliGRAM(s) Oral at bedtime  meropenem  IVPB 1000 milliGRAM(s) IV Intermittent every 8 hours  metoprolol tartrate 25 milliGRAM(s) Oral two times a day  nystatin Powder 1 Application(s) Topical two times a day  polyethylene glycol 3350 17 Gram(s) Oral daily  saccharomyces boulardii 250 milliGRAM(s) Oral two times a day  senna 1 Tablet(s) Oral with breakfast  tamsulosin 0.4 milliGRAM(s) Oral at bedtime    MEDICATIONS  (PRN):  acetaminophen     Tablet .. 650 milliGRAM(s) Oral every 6 hours PRN Temp greater or equal to 38C (100.4F), Mild Pain (1 - 3)  aluminum hydroxide/magnesium hydroxide/simethicone Suspension 30 milliLiter(s) Oral every 4 hours PRN Dyspepsia  dextrose Oral Gel 15 Gram(s) Oral once PRN Blood Glucose LESS THAN 70 milliGRAM(s)/deciliter  sodium chloride 0.9% lock flush 10 milliLiter(s) IV Push every 1 hour PRN Pre/post blood products, medications, blood draw, and to maintain line patency    T(C): 36.3 (12-06-24 @ 11:09), Max: 36.8 (12-04-24 @ 20:06)  HR: 74 (12-06-24 @ 11:09) (74 - 88)  BP: 127/73 (12-06-24 @ 11:09) (105/67 - 135/77)  RR: 18 (12-06-24 @ 11:09)  SpO2: 98% (12-06-24 @ 11:09)  Wt(kg): --  I&O's Detail    05 Dec 2024 07:01  -  06 Dec 2024 07:00  --------------------------------------------------------  IN:  Total IN: 0 mL    OUT:    Voided (mL): 1000 mL  Total OUT: 1000 mL    Total NET: -1000 mL        PHYSICAL EXAM:  General: No distress  Respiratory: b/l air entry  Cardiovascular: S1 S2  Gastrointestinal: soft  Extremities:  edema          LABORATORY:                        9.5    4.28  )-----------( 150      ( 06 Dec 2024 10:15 )             30.0     12-06    140  |  108  |  39[H]  ----------------------------<  137[H]  4.4   |  27  |  1.40[H]    Ca    9.4      06 Dec 2024 10:15    TPro  6.7  /  Alb  2.5[L]  /  TBili  0.5  /  DBili  x   /  AST  21  /  ALT  20  /  AlkPhos  34[L]  12-06    Sodium: 140 mmol/L (12-06 @ 10:15)    Potassium: 4.4 mmol/L (12-06 @ 10:15)    Hemoglobin: 9.5 g/dL (12-06 @ 10:15)  Hemoglobin: 9.0 g/dL (12-04 @ 07:44)    Creatinine, Serum 1.40 (12-06 @ 10:15)  Creatinine, Serum 1.40 (12-04 @ 07:44)        LIVER FUNCTIONS - ( 06 Dec 2024 10:15 )  Alb: 2.5 g/dL / Pro: 6.7 g/dL / ALK PHOS: 34 U/L / ALT: 20 U/L / AST: 21 U/L / GGT: x           Urinalysis Basic - ( 06 Dec 2024 10:15 )    Color: x / Appearance: x / SG: x / pH: x  Gluc: 137 mg/dL / Ketone: x  / Bili: x / Urobili: x   Blood: x / Protein: x / Nitrite: x   Leuk Esterase: x / RBC: x / WBC x   Sq Epi: x / Non Sq Epi: x / Bacteria: x

## 2024-12-06 NOTE — PROGRESS NOTE ADULT - SUBJECTIVE AND OBJECTIVE BOX
[facility], Division of Infectious Diseases  MARLEEN Graves Y. Patel, S. Shah, G. [facility]  [phone]    Name: DALILA HERNADEZ  Age: 84y  Gender: Male  MRN: [id number]    Interval History:  Patient seen and examined at bedside  No acute overnight events. Afebrile  No complaints  Notes reviewed    Antibiotics:  ciprofloxacin     Tablet 500 milliGRAM(s) Oral every 12 hours  meropenem  IVPB 1000 milliGRAM(s) IV Intermittent every 8 hours      Medications:  acetaminophen     Tablet .. 650 milliGRAM(s) Oral every 6 hours PRN  aluminum hydroxide/magnesium hydroxide/simethicone Suspension 30 milliLiter(s) Oral every 4 hours PRN  aspirin  chewable 81 milliGRAM(s) Oral daily  atorvastatin 80 milliGRAM(s) Oral at bedtime  chlorhexidine 2% Cloths 1 Application(s) Topical daily  ciprofloxacin     Tablet 500 milliGRAM(s) Oral every 12 hours  clopidogrel Tablet 75 milliGRAM(s) Oral daily  cyanocobalamin 1000 MICROGram(s) Oral daily  dextrose 5%. 1000 milliLiter(s) IV Continuous <Continuous>  dextrose 5%. 1000 milliLiter(s) IV Continuous <Continuous>  dextrose 50% Injectable 25 Gram(s) IV Push once  dextrose 50% Injectable 12.5 Gram(s) IV Push once  dextrose 50% Injectable 25 Gram(s) IV Push once  dextrose Oral Gel 15 Gram(s) Oral once PRN  enoxaparin Injectable 40 milliGRAM(s) SubCutaneous every 24 hours  famotidine    Tablet 20 milliGRAM(s) Oral daily  glucagon  Injectable 1 milliGRAM(s) IntraMuscular once  insulin glargine Injectable (LANTUS) 8 Unit(s) SubCutaneous at bedtime  insulin lispro (ADMELOG) corrective regimen sliding scale   SubCutaneous three times a day before meals  melatonin 3 milliGRAM(s) Oral at bedtime  meropenem  IVPB 1000 milliGRAM(s) IV Intermittent every 8 hours  metoprolol tartrate 25 milliGRAM(s) Oral two times a day  nystatin Powder 1 Application(s) Topical two times a day  polyethylene glycol 3350 17 Gram(s) Oral daily  saccharomyces boulardii 250 milliGRAM(s) Oral two times a day  senna 1 Tablet(s) Oral with breakfast  sodium chloride 0.9% lock flush 10 milliLiter(s) IV Push every 1 hour PRN  tamsulosin 0.4 milliGRAM(s) Oral at bedtime      Review of Systems:  A 10-point review of systems was obtained.   Review of systems otherwise negative except as previously noted.    Allergies: No Known Allergies    For details regarding the patient's past medical history, social history, family history, and other miscellaneous elements, please refer the initial infectious diseases consultation and/or the admitting history and physical examination for this admission.    Objective:  Vitals:   T(C): 36.4 (12-06-24 @ 05:10), Max: 36.8 (12-05-24 @ 20:34)  HR: 80 (12-06-24 @ 05:10) (80 - 88)  BP: 105/67 (12-06-24 @ 05:10) (105/67 - 135/77)  RR: 18 (12-06-24 @ 05:10) (18 - 18)  SpO2: 97% (12-06-24 @ 05:10) (95% - 97%)    Physical Examination:  General: no acute distress  HEENT: NC/AT, EOMI,  Cardio: S1, S2 heard, RRR, no murmurs  Resp: no use of resp acc muscles  Abd: soft, NT, ND,  Ext: b/l swelling  Skin: warm, dry, no visible rash      Laboratory Studies:  CBC:                       9.5    4.28  )-----------( 150      ( 06 Dec 2024 10:15 )             30.0     CMP:             Microbiology: reviewed        Radiology: reviewed

## 2024-12-06 NOTE — PROGRESS NOTE ADULT - ATTENDING COMMENTS
Agree with above findings and plan
Agreed as above
Agree with above findings and plan
Agreed as above

## 2024-12-06 NOTE — PROGRESS NOTE ADULT - PROBLEM SELECTOR PROBLEM 1
Osteomyelitis of foot
Open wound of right heel
Open wound of right heel
Osteomyelitis of foot

## 2024-12-06 NOTE — PROGRESS NOTE ADULT - ASSESSMENT
84 yo male with hypertension, diabetes, hyperlipidemia, bilateral heel osteomyelitis currently on outpatient antibiotics through [facility] presented for surgery planned with podiatry, Gabriel Deshpande, for his bilateral heel osteomyelitis. His osteomyelitis started 6 months PTA.   Calcanectomy, partial 29-Oct-2024 12:58:43  Jona Mason    bilateral heel osteomyelitis  prior micro with Proteus mirabilis, Pseudomonas aeruginosa, Enterococcus faecalis, Klebsiella pneumoniae ESBL  Culture - Tissue with Gram Stain (10.29.24 @ 12:00) Rare Pseudomonas aeruginosa  -  Ciprofloxacin: S <=0.25-  Levofloxacin: S <=0.5-  Meropenem: S <=1  Culture - Tissue with Gram Stain (11.19.24 @ 16:50) Pseudomonas aeruginosa (Carbapenem Resistant), Enterococcus faecalis -Ciprofloxacin: S 0.5, Meropenem: R >8, Piperacillin/Tazobactam: R >64   Path- Right heel bone proximal margin- Acute and chronic osteomyelitis, Left heel bone proximal margin- Acute and chronic osteomyelitis    Recommendations:   Meropenem 1 gram IV q8h to cover prior micro as well  Ciprofloxacin 500 mg PO BID to cover Pseudomonas  Last day of Abx 12/9  weekly CBC w diff, CMP, ESR  PICC can be removed at end of Rx  Additional care per primary team    Infectious Diseases will follow. Please call with any questions.  Queta Ngo M.D.  [facility] Division of Infectious Diseases [phone]  For after 5 P.M. and weekends, please call [phone]  Available on [employer] TEAMS

## 2024-12-06 NOTE — PROGRESS NOTE ADULT - ASSESSMENT
BRITT Baseline creatinine ~ 1.0  B/L Heel OM, s/p partial calcanectomy  h/o Anemia, Multiple myeloma  Diabetes  Hypertension    Stable renal indices. To continue current meds. Monitor blood sugar levels. Insulin coverage as needed. Dietary restriction.   Trend BP and titrate BP meds as needed. Monitor h/h trend. Avoid nephrotoxic meds as possible. Will follow electrolytes and renal function trend.  Surgeon/Pathologist Verbiage (Will Incorporate Name Of Surgeon From Intro If Not Blank): operated in two distinct and integrated capacities as the surgeon and pathologist.

## 2024-12-06 NOTE — PROGRESS NOTE ADULT - SUBJECTIVE AND OBJECTIVE BOX
PROGRESS NOTE   Patient is a 84y old  Male who presents with a chief complaint of Bilateral Heel Osteomyelitis (06 Dec 2024 10:52)      HPI:  Patient with a past medical history of hypertension, diabetes, hyperlipidemia, bilateral heel osteomyelitis currently on outpatient antibiotics through [facility] is presenting for surgery.  He is scheduled for surgery with Dr. frias tomorrow.  Denies any fevers.  Endorsing pain to his heels but no other areas of pain.  No nausea or vomiting.  No other acute complaints today.    Denies fever, chills, chest pain, palpitations, SOB, cough, abdominal pain, nausea, vomiting, diarrhea, constipation, urinary frequency, urgency, or dysuria, headaches, changes in vision, dizziness, numbness, tingling.  Denies recent travel, recent antibiotic use, or sick contacts.    ED Course:   Vitals: BP: 161/87, HR 69: , Temp: 97.8 , RR: 18, SpO2: 96% on   Labs:  H/H: 10.6/34.4, PTT: 36.2, Albumin: 2.7  Tissue culture: (incomplete)      Surgical pathology report: (incomplete)  MR foot:   Xray foot:   EKBPM normal sinus rhythm, QTc: 477  Received in the ED:       HPI: Patient presents to [facility] from rehab for bilateral foot surgery planned with podiatry, Gabriel Deshpande, tomorrow for his bilateral heel osteomyelitis. States his osteomyelitis started 6 months ago, Currently c/o pain in b/l heels. Denies any numbness or tingling down LLE/RLE. Denies any trauma. Patient currently does not walk due to deconditioning. Denies any other complaints. Denies fevers, chills, HA, Dizziness, chest pain, SOB, N/V/D, bladder symptoms,     (28 Oct 2024 14:08)      Vital Signs Last 24 Hrs  T(C): 36.3 (06 Dec 2024 11:09), Max: 36.8 (05 Dec 2024 20:34)  T(F): 97.4 (06 Dec 2024 11:09), Max: 98.3 (05 Dec 2024 20:34)  HR: 74 (06 Dec 2024 11:09) (74 - 88)  BP: 127/73 (06 Dec 2024 11:09) (105/67 - 135/77)  BP(mean): --  RR: 18 (06 Dec 2024 11:09) (18 - 18)  SpO2: 98% (06 Dec 2024 11:09) (95% - 98%)    Parameters below as of 06 Dec 2024 11:09  Patient On (Oxygen Delivery Method): room air                              9.5    4.28  )-----------( 150      ( 06 Dec 2024 10:15 )             30.0               12-    140  |  108  |  39[H]  ----------------------------<  137[H]  4.4   |  27  |  1.40[H]    Ca    9.4      06 Dec 2024 10:15    TPro  6.7  /  Alb  2.5[L]  /  TBili  0.5  /  DBili  x   /  AST  21  /  ALT  20  /  AlkPhos  34[L]  12-06      PHYSICAL EXAM  Vascular: DP & PT palpable bilaterally, Capillary refill 3 seconds  Neurological: Light touch sensation not intact bilaterally  Musculoskeletal: 4/5 strength in all quadrants bilaterally, AJ & STJ ROM intact  Dermatological: Bilateral heel wounds down to skin, subcutaneous tissue, fat, bone (left healing well, right healthy with granular wound base), no proximal streaking, no fluctuance, no malodor, no signs of acute infection at this time.

## 2024-12-06 NOTE — PROGRESS NOTE ADULT - ASSESSMENT
84yo M with a PMH of HTN, HLD, Type 2 DM, CKD3a, hx of GI bleed, COPD, SANTO, BPH, CAD s/p PCI, PAD, multiple myeloma, lymphedema who presents with b/l heel ulcers and suspected OM planned for bilateral foot surgery with podiatry, Gabriel Deshpande, on 10/29 for his suspected bilateral heel osteomyelitis now s/p partial calcanectomy.        Problem/Plan - 1:  ·  Problem: Osteomyelitis of foot.   ·  Plan: - Bilateral heel osteomyelitis per outpatient w/up and admitted with a plan for OR with podiatry, Dr. Rios on 10/29  - Wound Cx (10/23): Proteus mirabilis, Pseudomonas aeruginosa, ESBL Klebsiella pneumoniae, Enterococcus faecalis  - Blood Cx NG  - s/p partial calcanectomy on 10/29.    - f/up OR cultures +rare pseudomonas aeruginosa.  Pathology: bilateral heel acute and chronic osteomyelitis.  - Tylenol PRN for mild pain.  - Consulted ID Dr. Casillas, recs appreciated   - PICC line placed.  Continue meropenem till Dec 9.    - Continue Florastor  - afebrile, no leukocytosis, dressing/wound care as per podiatry recommendation   - cardiology Dr. Rock consulted, recs appreciated.  - Patient had right heel debridement on 11/19. doing well post op , tissue culture 11/19 pseudomonas (carbapenem resistant).  Continue on Cipro 500mg PO Q12h and Meropenem.  blood test level stable      Problem/Plan - 2:  ·  Problem: DM (diabetes mellitus).   ·  Plan: - type 2 DM on insulin  - continue decreased lantus dose 8units QHS   - continue low-dose lispro corrective ISS  - controlled      Problem/Plan - 3:  ·  Problem: HTN (hypertension).   ·  Plan: Chronic   - continue metoprolol 25mg BID.     Problem/Plan - 4:  ·  Problem: HLD (hyperlipidemia).   ·  Plan: Chronic   - Continue atorvastatin 80mg QD.     Problem/Plan - 5:  ·  Problem: CAD (coronary artery disease).   ·  Plan: CAD sp stents x4  - Held ASA and Plavix on 10/29; Restarted on ASA and Plavix on 10/30  - c/w lipitor  - cardiology eval noted      Problem/Plan - 6:  ·  Problem: Benign prostatic hyperplasia with lower urinary tract symptoms, symptom details unspecified.   ·  Plan: Chronic  -Continue Flomax.     Problem/Plan - 7:  ·  Problem: Multiple myeloma.   ·  Plan: Chronic  - Chemotherapy held      Problem/Plan - 8:   DVT ppx: continue lovenox 40mg SQ daily    #BRITT:  likely due to dehydration.  Pt. with decrease fluid intake.  Encouraged patient to drink more fluids. repeat alb today stable

## 2024-12-07 PROCEDURE — 99232 SBSQ HOSP IP/OBS MODERATE 35: CPT

## 2024-12-07 RX ADMIN — INSULIN GLARGINE 8 UNIT(S): 100 INJECTION, SOLUTION SUBCUTANEOUS at 21:24

## 2024-12-07 RX ADMIN — METOPROLOL TARTRATE 25 MILLIGRAM(S): 100 TABLET, FILM COATED ORAL at 05:31

## 2024-12-07 RX ADMIN — NYSTATIN 1 APPLICATION(S): 100000 POWDER TOPICAL at 05:38

## 2024-12-07 RX ADMIN — Medication 500 MILLIGRAM(S): at 09:11

## 2024-12-07 RX ADMIN — ACETAMINOPHEN, DIPHENHYDRAMINE HCL, PHENYLEPHRINE HCL 3 MILLIGRAM(S): 325; 25; 5 TABLET ORAL at 21:25

## 2024-12-07 RX ADMIN — Medication 500 MILLIGRAM(S): at 21:25

## 2024-12-07 RX ADMIN — CHLORHEXIDINE GLUCONATE 1 APPLICATION(S): 1.2 RINSE ORAL at 05:30

## 2024-12-07 RX ADMIN — METOPROLOL TARTRATE 25 MILLIGRAM(S): 100 TABLET, FILM COATED ORAL at 17:54

## 2024-12-07 RX ADMIN — NYSTATIN 1 APPLICATION(S): 100000 POWDER TOPICAL at 17:54

## 2024-12-07 RX ADMIN — MEROPENEM 100 MILLIGRAM(S): 500 INJECTION, POWDER, FOR SOLUTION INTRAVENOUS at 21:25

## 2024-12-07 RX ADMIN — FAMOTIDINE 20 MILLIGRAM(S): 20 TABLET, FILM COATED ORAL at 11:50

## 2024-12-07 RX ADMIN — Medication 80 MILLIGRAM(S): at 21:25

## 2024-12-07 RX ADMIN — MEROPENEM 100 MILLIGRAM(S): 500 INJECTION, POWDER, FOR SOLUTION INTRAVENOUS at 05:31

## 2024-12-07 RX ADMIN — CLOPIDOGREL 75 MILLIGRAM(S): 75 TABLET, FILM COATED ORAL at 11:50

## 2024-12-07 RX ADMIN — TAMSULOSIN HYDROCHLORIDE 0.4 MILLIGRAM(S): 0.4 CAPSULE ORAL at 21:25

## 2024-12-07 RX ADMIN — Medication 250 MILLIGRAM(S): at 17:54

## 2024-12-07 RX ADMIN — Medication 1000 MICROGRAM(S): at 11:50

## 2024-12-07 RX ADMIN — MEROPENEM 100 MILLIGRAM(S): 500 INJECTION, POWDER, FOR SOLUTION INTRAVENOUS at 13:20

## 2024-12-07 RX ADMIN — Medication 81 MILLIGRAM(S): at 11:50

## 2024-12-07 RX ADMIN — Medication 250 MILLIGRAM(S): at 05:30

## 2024-12-07 RX ADMIN — ENOXAPARIN SODIUM 40 MILLIGRAM(S): 30 INJECTION SUBCUTANEOUS at 21:25

## 2024-12-07 NOTE — CHART NOTE - NSCHARTNOTEFT_GEN_A_CORE
Hospital Bed: Patient requires a hospital bed due to patient require frequent and immediate repositioning changes that are not feasible in a regular bed with pillows or wedges. patient need head of bed elevated more than 30 degrees most of the time due to  COPD, Patient need Gel overlay to prevent break downs/decubitus ulcer because the patient cannot make position changes without assistance.

## 2024-12-07 NOTE — SOCIAL WORK PROGRESS NOTE - NSSWPROGRESSNOTE_GEN_ALL_CORE
NADYA met with this pt and dtr Rhonda at bedside- plan remains for DC tuesday, pt only needs hospital bed- spoke with [facility] to make them aware. CM to order hospital bed prior to DC. Pt reports he has wheelchair walker and shower chair at home. All meds to be sent to pts home pharmacy. SW to follow.

## 2024-12-07 NOTE — PROGRESS NOTE ADULT - SUBJECTIVE AND OBJECTIVE BOX
Patient is a 84y old  Male who presents with a chief complaint of Bilateral Heel Osteomyelitis (06 Dec 2024 14:44)      Subjective:  INTERVAL HPI/OVERNIGHT EVENTS: Patient seen and examined at bedside.  Patient has no complaints at this time.   MEDICATIONS  (STANDING):  aspirin  chewable 81 milliGRAM(s) Oral daily  atorvastatin 80 milliGRAM(s) Oral at bedtime  chlorhexidine 2% Cloths 1 Application(s) Topical daily  ciprofloxacin     Tablet 500 milliGRAM(s) Oral every 12 hours  clopidogrel Tablet 75 milliGRAM(s) Oral daily  cyanocobalamin 1000 MICROGram(s) Oral daily  dextrose 5%. 1000 milliLiter(s) (50 mL/Hr) IV Continuous <Continuous>  dextrose 5%. 1000 milliLiter(s) (100 mL/Hr) IV Continuous <Continuous>  dextrose 50% Injectable 25 Gram(s) IV Push once  dextrose 50% Injectable 12.5 Gram(s) IV Push once  dextrose 50% Injectable 25 Gram(s) IV Push once  enoxaparin Injectable 40 milliGRAM(s) SubCutaneous every 24 hours  famotidine    Tablet 20 milliGRAM(s) Oral daily  glucagon  Injectable 1 milliGRAM(s) IntraMuscular once  insulin glargine Injectable (LANTUS) 8 Unit(s) SubCutaneous at bedtime  insulin lispro (ADMELOG) corrective regimen sliding scale   SubCutaneous three times a day before meals  melatonin 3 milliGRAM(s) Oral at bedtime  meropenem  IVPB 1000 milliGRAM(s) IV Intermittent every 8 hours  metoprolol tartrate 25 milliGRAM(s) Oral two times a day  nystatin Powder 1 Application(s) Topical two times a day  polyethylene glycol 3350 17 Gram(s) Oral daily  saccharomyces boulardii 250 milliGRAM(s) Oral two times a day  senna 1 Tablet(s) Oral with breakfast  tamsulosin 0.4 milliGRAM(s) Oral at bedtime    MEDICATIONS  (PRN):  acetaminophen     Tablet .. 650 milliGRAM(s) Oral every 6 hours PRN Temp greater or equal to 38C (100.4F), Mild Pain (1 - 3)  aluminum hydroxide/magnesium hydroxide/simethicone Suspension 30 milliLiter(s) Oral every 4 hours PRN Dyspepsia  dextrose Oral Gel 15 Gram(s) Oral once PRN Blood Glucose LESS THAN 70 milliGRAM(s)/deciliter  sodium chloride 0.9% lock flush 10 milliLiter(s) IV Push every 1 hour PRN Pre/post blood products, medications, blood draw, and to maintain line patency      Allergies    No Known Allergies    Intolerances        REVIEW OF SYSTEMS:  CONSTITUTIONAL: No fever or chills  HEENT:  No headache, no sore throat  RESPIRATORY: No cough or shortness of breath  CARDIOVASCULAR: No chest pain or palpitations  GASTROINTESTINAL: No abd pain, nausea, vomiting, or diarrhea      Objective:  Vital Signs Last 24 Hrs  T(C): 36.5 (07 Dec 2024 12:27), Max: 36.6 (06 Dec 2024 20:25)  T(F): 97.7 (07 Dec 2024 12:27), Max: 97.8 (06 Dec 2024 20:25)  HR: 86 (07 Dec 2024 12:27) (64 - 86)  BP: 122/73 (07 Dec 2024 12:27) (122/73 - 130/76)  BP(mean): --  RR: 18 (07 Dec 2024 12:27) (17 - 18)  SpO2: 98% (07 Dec 2024 12:27) (95% - 98%)    Parameters below as of 07 Dec 2024 12:27  Patient On (Oxygen Delivery Method): room air        GENERAL: NAD, lying in bed   HEAD:  Normocephalic  EYES:  conjunctiva and sclera clear  ENT: Moist mucous membranes  NECK: Supple  CHEST/LUNG: Clear to auscultation bilaterally; No rales or rhonchi; no wheezing. Unlabored respirations  HEART: Regular rate and rhythm; S1S2+  ABDOMEN: Bowel sounds present; Soft, Nontender, Nondistended.   EXTREMITIES:  + distal Peripheral Pulses;  b/l LE edema improved , dressing clean   NERVOUS SYSTEM:  Alert & Oriented X3;  No gross focal deficits   MSK: moves all extremities  SKIN: No rashes     LABS:  Ca    9.4        06 Dec 2024 10:15        Urinalysis Basic - ( 06 Dec 2024 10:15 )    Color: x / Appearance: x / SG: x / pH: x  Gluc: 137 mg/dL / Ketone: x  / Bili: x / Urobili: x   Blood: x / Protein: x / Nitrite: x   Leuk Esterase: x / RBC: x / WBC x   Sq Epi: x / Non Sq Epi: x / Bacteria: x      CAPILLARY BLOOD GLUCOSE      POCT Blood Glucose.: 145 mg/dL (07 Dec 2024 12:14)  POCT Blood Glucose.: 89 mg/dL (07 Dec 2024 08:33)  POCT Blood Glucose.: 185 mg/dL (06 Dec 2024 21:46)  POCT Blood Glucose.: 115 mg/dL (06 Dec 2024 17:17)          RADIOLOGY & ADDITIONAL TESTS:    Personally reviewed.     Consultant(s) Notes Reviewed:  [x] YES  [ ] NO    Plan of care discussed with patient /family wife at bedside ; all questions answered

## 2024-12-07 NOTE — PROGRESS NOTE ADULT - ASSESSMENT
82yo M with a PMH of HTN, HLD, Type 2 DM, CKD3a, hx of GI bleed, COPD, SANTO, BPH, CAD s/p PCI, PAD, multiple myeloma, lymphedema who presents with b/l heel ulcers and suspected OM planned for bilateral foot surgery with podiatry, Gabriel Deshpande, on 10/29 for his suspected bilateral heel osteomyelitis now s/p partial calcanectomy.        Problem/Plan - 1:  ·  Problem: Osteomyelitis of foot.   ·  Plan: - Bilateral heel osteomyelitis per outpatient w/up and admitted with a plan for OR with podiatry, Dr. Rios on 10/29  - Wound Cx (10/23): Proteus mirabilis, Pseudomonas aeruginosa, ESBL Klebsiella pneumoniae, Enterococcus faecalis  - Blood Cx NG  - s/p partial calcanectomy on 10/29.    - f/up OR cultures +rare pseudomonas aeruginosa.  Pathology: bilateral heel acute and chronic osteomyelitis.  - Tylenol PRN for mild pain.  - Consulted ID Dr. Casillas, recs appreciated   - PICC line placed.  Continue meropenem till Dec 9.    - Continue Florastor  - afebrile, no leukocytosis, dressing/wound care as per podiatry recommendation   - cardiology Dr. Rock consulted, recs appreciated.  - Patient had right heel debridement on 11/19. doing well post op , tissue culture 11/19 pseudomonas (carbapenem resistant).  Continue on Cipro 500mg PO Q12h and Meropenem.  blood test level stable      Problem/Plan - 2:  ·  Problem: DM (diabetes mellitus).   ·  Plan: - type 2 DM on insulin  - continue decreased lantus dose 8units QHS   - continue low-dose lispro corrective ISS  - controlled      Problem/Plan - 3:  ·  Problem: HTN (hypertension).   ·  Plan: Chronic   - continue metoprolol 25mg BID.     Problem/Plan - 4:  ·  Problem: HLD (hyperlipidemia).   ·  Plan: Chronic   - Continue atorvastatin 80mg QD.     Problem/Plan - 5:  ·  Problem: CAD (coronary artery disease).   ·  Plan: CAD sp stents x4  - Held ASA and Plavix on 10/29; Restarted on ASA and Plavix on 10/30  - c/w lipitor  - cardiology eval noted      Problem/Plan - 6:  ·  Problem: Benign prostatic hyperplasia with lower urinary tract symptoms, symptom details unspecified.   ·  Plan: Chronic  -Continue Flomax.     Problem/Plan - 7:  ·  Problem: Multiple myeloma.   ·  Plan: Chronic  - Chemotherapy held      Problem/Plan - 8:   DVT ppx: continue lovenox 40mg SQ daily    #BRITT:  likely due to dehydration.  Pt. with decrease fluid intake.  Encouraged patient to drink more fluids. repeat lab  stable

## 2024-12-08 LAB
ALBUMIN SERPL ELPH-MCNC: 2.6 G/DL — LOW (ref 3.3–5)
ALP SERPL-CCNC: 32 U/L — LOW (ref 40–120)
ALT FLD-CCNC: 18 U/L — SIGNIFICANT CHANGE UP (ref 12–78)
ANION GAP SERPL CALC-SCNC: 4 MMOL/L — LOW (ref 5–17)
AST SERPL-CCNC: 18 U/L — SIGNIFICANT CHANGE UP (ref 15–37)
BASOPHILS # BLD AUTO: 0.01 K/UL — SIGNIFICANT CHANGE UP (ref 0–0.2)
BASOPHILS NFR BLD AUTO: 0.2 % — SIGNIFICANT CHANGE UP (ref 0–2)
BILIRUB SERPL-MCNC: 0.5 MG/DL — SIGNIFICANT CHANGE UP (ref 0.2–1.2)
BUN SERPL-MCNC: 37 MG/DL — HIGH (ref 7–23)
CALCIUM SERPL-MCNC: 9.5 MG/DL — SIGNIFICANT CHANGE UP (ref 8.5–10.1)
CHLORIDE SERPL-SCNC: 106 MMOL/L — SIGNIFICANT CHANGE UP (ref 96–108)
CO2 SERPL-SCNC: 33 MMOL/L — HIGH (ref 22–31)
CREAT SERPL-MCNC: 1.3 MG/DL — SIGNIFICANT CHANGE UP (ref 0.5–1.3)
EGFR: 55 ML/MIN/1.73M2 — LOW
EOSINOPHIL # BLD AUTO: 0.32 K/UL — SIGNIFICANT CHANGE UP (ref 0–0.5)
EOSINOPHIL NFR BLD AUTO: 7.7 % — HIGH (ref 0–6)
GLUCOSE SERPL-MCNC: 113 MG/DL — HIGH (ref 70–99)
HCT VFR BLD CALC: 29.8 % — LOW (ref 39–50)
HGB BLD-MCNC: 9.3 G/DL — LOW (ref 13–17)
IMM GRANULOCYTES NFR BLD AUTO: 0.2 % — SIGNIFICANT CHANGE UP (ref 0–0.9)
LYMPHOCYTES # BLD AUTO: 1.55 K/UL — SIGNIFICANT CHANGE UP (ref 1–3.3)
LYMPHOCYTES # BLD AUTO: 37.5 % — SIGNIFICANT CHANGE UP (ref 13–44)
MCHC RBC-ENTMCNC: 28.7 PG — SIGNIFICANT CHANGE UP (ref 27–34)
MCHC RBC-ENTMCNC: 31.2 G/DL — LOW (ref 32–36)
MCV RBC AUTO: 92 FL — SIGNIFICANT CHANGE UP (ref 80–100)
MONOCYTES # BLD AUTO: 0.29 K/UL — SIGNIFICANT CHANGE UP (ref 0–0.9)
MONOCYTES NFR BLD AUTO: 7 % — SIGNIFICANT CHANGE UP (ref 2–14)
NEUTROPHILS # BLD AUTO: 1.95 K/UL — SIGNIFICANT CHANGE UP (ref 1.8–7.4)
NEUTROPHILS NFR BLD AUTO: 47.4 % — SIGNIFICANT CHANGE UP (ref 43–77)
NRBC # BLD: 0 /100 WBCS — SIGNIFICANT CHANGE UP (ref 0–0)
PLATELET # BLD AUTO: 138 K/UL — LOW (ref 150–400)
POTASSIUM SERPL-MCNC: 4.2 MMOL/L — SIGNIFICANT CHANGE UP (ref 3.5–5.3)
POTASSIUM SERPL-SCNC: 4.2 MMOL/L — SIGNIFICANT CHANGE UP (ref 3.5–5.3)
PROT SERPL-MCNC: 6.5 G/DL — SIGNIFICANT CHANGE UP (ref 6–8.3)
RBC # BLD: 3.24 M/UL — LOW (ref 4.2–5.8)
RBC # FLD: 16.5 % — HIGH (ref 10.3–14.5)
SODIUM SERPL-SCNC: 143 MMOL/L — SIGNIFICANT CHANGE UP (ref 135–145)
WBC # BLD: 4.13 K/UL — SIGNIFICANT CHANGE UP (ref 3.8–10.5)
WBC # FLD AUTO: 4.13 K/UL — SIGNIFICANT CHANGE UP (ref 3.8–10.5)

## 2024-12-08 PROCEDURE — 99232 SBSQ HOSP IP/OBS MODERATE 35: CPT

## 2024-12-08 RX ADMIN — FAMOTIDINE 20 MILLIGRAM(S): 20 TABLET, FILM COATED ORAL at 11:01

## 2024-12-08 RX ADMIN — Medication 500 MILLIGRAM(S): at 11:01

## 2024-12-08 RX ADMIN — MEROPENEM 100 MILLIGRAM(S): 500 INJECTION, POWDER, FOR SOLUTION INTRAVENOUS at 05:35

## 2024-12-08 RX ADMIN — Medication 1000 MICROGRAM(S): at 11:01

## 2024-12-08 RX ADMIN — CLOPIDOGREL 75 MILLIGRAM(S): 75 TABLET, FILM COATED ORAL at 11:01

## 2024-12-08 RX ADMIN — METOPROLOL TARTRATE 25 MILLIGRAM(S): 100 TABLET, FILM COATED ORAL at 05:36

## 2024-12-08 RX ADMIN — Medication 250 MILLIGRAM(S): at 17:24

## 2024-12-08 RX ADMIN — Medication 500 MILLIGRAM(S): at 21:44

## 2024-12-08 RX ADMIN — ACETAMINOPHEN, DIPHENHYDRAMINE HCL, PHENYLEPHRINE HCL 3 MILLIGRAM(S): 325; 25; 5 TABLET ORAL at 21:44

## 2024-12-08 RX ADMIN — MEROPENEM 100 MILLIGRAM(S): 500 INJECTION, POWDER, FOR SOLUTION INTRAVENOUS at 13:59

## 2024-12-08 RX ADMIN — NYSTATIN 1 APPLICATION(S): 100000 POWDER TOPICAL at 17:24

## 2024-12-08 RX ADMIN — TAMSULOSIN HYDROCHLORIDE 0.4 MILLIGRAM(S): 0.4 CAPSULE ORAL at 21:44

## 2024-12-08 RX ADMIN — ENOXAPARIN SODIUM 40 MILLIGRAM(S): 30 INJECTION SUBCUTANEOUS at 21:47

## 2024-12-08 RX ADMIN — Medication 81 MILLIGRAM(S): at 11:01

## 2024-12-08 RX ADMIN — Medication 80 MILLIGRAM(S): at 21:44

## 2024-12-08 RX ADMIN — NYSTATIN 1 APPLICATION(S): 100000 POWDER TOPICAL at 05:35

## 2024-12-08 RX ADMIN — CHLORHEXIDINE GLUCONATE 1 APPLICATION(S): 1.2 RINSE ORAL at 05:35

## 2024-12-08 RX ADMIN — Medication 250 MILLIGRAM(S): at 05:36

## 2024-12-08 RX ADMIN — INSULIN GLARGINE 8 UNIT(S): 100 INJECTION, SOLUTION SUBCUTANEOUS at 21:43

## 2024-12-08 RX ADMIN — MEROPENEM 100 MILLIGRAM(S): 500 INJECTION, POWDER, FOR SOLUTION INTRAVENOUS at 21:44

## 2024-12-08 RX ADMIN — METOPROLOL TARTRATE 25 MILLIGRAM(S): 100 TABLET, FILM COATED ORAL at 17:23

## 2024-12-08 NOTE — PROGRESS NOTE ADULT - SUBJECTIVE AND OBJECTIVE BOX
Patient is a 84y old  Male who presents with a chief complaint of Bilateral Heel Osteomyelitis (07 Dec 2024 15:45)      Subjective:  INTERVAL HPI/OVERNIGHT EVENTS: Patient seen and examined at bedside.  Patient has no complaints at this time.   MEDICATIONS  (STANDING):  aspirin  chewable 81 milliGRAM(s) Oral daily  atorvastatin 80 milliGRAM(s) Oral at bedtime  chlorhexidine 2% Cloths 1 Application(s) Topical daily  ciprofloxacin     Tablet 500 milliGRAM(s) Oral every 12 hours  clopidogrel Tablet 75 milliGRAM(s) Oral daily  cyanocobalamin 1000 MICROGram(s) Oral daily  dextrose 5%. 1000 milliLiter(s) (50 mL/Hr) IV Continuous <Continuous>  dextrose 5%. 1000 milliLiter(s) (100 mL/Hr) IV Continuous <Continuous>  dextrose 50% Injectable 25 Gram(s) IV Push once  dextrose 50% Injectable 12.5 Gram(s) IV Push once  dextrose 50% Injectable 25 Gram(s) IV Push once  enoxaparin Injectable 40 milliGRAM(s) SubCutaneous every 24 hours  famotidine    Tablet 20 milliGRAM(s) Oral daily  glucagon  Injectable 1 milliGRAM(s) IntraMuscular once  insulin glargine Injectable (LANTUS) 8 Unit(s) SubCutaneous at bedtime  insulin lispro (ADMELOG) corrective regimen sliding scale   SubCutaneous three times a day before meals  melatonin 3 milliGRAM(s) Oral at bedtime  meropenem  IVPB 1000 milliGRAM(s) IV Intermittent every 8 hours  metoprolol tartrate 25 milliGRAM(s) Oral two times a day  nystatin Powder 1 Application(s) Topical two times a day  polyethylene glycol 3350 17 Gram(s) Oral daily  saccharomyces boulardii 250 milliGRAM(s) Oral two times a day  senna 1 Tablet(s) Oral with breakfast  tamsulosin 0.4 milliGRAM(s) Oral at bedtime    MEDICATIONS  (PRN):  acetaminophen     Tablet .. 650 milliGRAM(s) Oral every 6 hours PRN Temp greater or equal to 38C (100.4F), Mild Pain (1 - 3)  aluminum hydroxide/magnesium hydroxide/simethicone Suspension 30 milliLiter(s) Oral every 4 hours PRN Dyspepsia  dextrose Oral Gel 15 Gram(s) Oral once PRN Blood Glucose LESS THAN 70 milliGRAM(s)/deciliter  sodium chloride 0.9% lock flush 10 milliLiter(s) IV Push every 1 hour PRN Pre/post blood products, medications, blood draw, and to maintain line patency      Allergies    No Known Allergies    Intolerances        REVIEW OF SYSTEMS:  CONSTITUTIONAL: No fever or chills  HEENT:  No headache, no sore throat  RESPIRATORY: No cough or shortness of breath  CARDIOVASCULAR: No chest pain or palpitations  GASTROINTESTINAL: No abd pain, nausea, vomiting, or diarrhea      Objective:  Vital Signs Last 24 Hrs  T(C): 36.4 (08 Dec 2024 04:54), Max: 36.5 (07 Dec 2024 12:27)  T(F): 97.6 (08 Dec 2024 04:54), Max: 97.7 (07 Dec 2024 12:27)  HR: 73 (08 Dec 2024 04:54) (68 - 86)  BP: 113/71 (08 Dec 2024 04:54) (113/71 - 143/76)  BP(mean): --  RR: 16 (08 Dec 2024 04:54) (16 - 18)  SpO2: 97% (08 Dec 2024 04:54) (96% - 98%)    Parameters below as of 08 Dec 2024 04:54  Patient On (Oxygen Delivery Method): room air        GENERAL: NAD, lying in bed comfortably  HEAD:  Normocephalic  EYES:  conjunctiva and sclera clear  ENT: Moist mucous membranes  NECK: Supple  CHEST/LUNG: Clear to auscultation bilaterally; No rales or rhonchi; no wheezing. Unlabored respirations  HEART: Regular rate and rhythm; S1S2+  ABDOMEN: Bowel sounds present; Soft, Nontender, Nondistended.   EXTREMITIES:  + distal Peripheral Pulses;  B/L feet in clean dressing, edema improved   NERVOUS SYSTEM:  Alert & Oriented X3;  No gross focal deficits   MSK: moves all extremities  SKIN: No rashes     LABS:                        9.3    4.13  )-----------( 138      ( 08 Dec 2024 09:35 )             29.8     08 Dec 2024 09:35    143    |  106    |  37     ----------------------------<  113    4.2     |  33     |  1.30     Ca    9.5        08 Dec 2024 09:35    TPro  6.5    /  Alb  2.6    /  TBili  0.5    /  DBili  x      /  AST  18     /  ALT  18     /  AlkPhos  32     08 Dec 2024 09:35      Urinalysis Basic - ( 08 Dec 2024 09:35 )    Color: x / Appearance: x / SG: x / pH: x  Gluc: 113 mg/dL / Ketone: x  / Bili: x / Urobili: x   Blood: x / Protein: x / Nitrite: x   Leuk Esterase: x / RBC: x / WBC x   Sq Epi: x / Non Sq Epi: x / Bacteria: x      CAPILLARY BLOOD GLUCOSE      POCT Blood Glucose.: 87 mg/dL (08 Dec 2024 08:10)  POCT Blood Glucose.: 358 mg/dL (07 Dec 2024 21:13)  POCT Blood Glucose.: 131 mg/dL (07 Dec 2024 17:16)  POCT Blood Glucose.: 145 mg/dL (07 Dec 2024 12:14)          RADIOLOGY & ADDITIONAL TESTS:    Personally reviewed.     Consultant(s) Notes Reviewed:  [x] YES  [ ] NO    Plan of care discussed with patient ; all questions answered

## 2024-12-08 NOTE — PROGRESS NOTE ADULT - ASSESSMENT
82yo M with a PMH of HTN, HLD, Type 2 DM, CKD3a, hx of GI bleed, COPD, SANTO, BPH, CAD s/p PCI, PAD, multiple myeloma, lymphedema who presents with b/l heel ulcers and suspected OM planned for bilateral foot surgery with podiatry, Gabriel Deshpande, on 10/29 for his suspected bilateral heel osteomyelitis now s/p partial calcanectomy.        Problem/Plan - 1:  ·  Problem: Osteomyelitis of foot.   ·  Plan: - Bilateral heel osteomyelitis per outpatient w/up and admitted with a plan for OR with podiatry, Dr. Rios on 10/29  - Wound Cx (10/23): Proteus mirabilis, Pseudomonas aeruginosa, ESBL Klebsiella pneumoniae, Enterococcus faecalis  - Blood Cx NG  - s/p partial calcanectomy on 10/29.    - f/up OR cultures +rare pseudomonas aeruginosa.  Pathology: bilateral heel acute and chronic osteomyelitis.  - Tylenol PRN for mild pain.  - Consulted ID Dr. Casillas, recs appreciated   - PICC line placed.  Continue meropenem till Dec 9.    - Continue Florastor  - afebrile, no leukocytosis, dressing/wound care as per podiatry recommendation   - cardiology Dr. Rock consulted, recs appreciated.  - Patient had right heel debridement on 11/19. doing well post op , tissue culture 11/19 pseudomonas (carbapenem resistant).  Continue on Cipro 500mg PO Q12h and Meropenem.  blood test level stable      Problem/Plan - 2:  ·  Problem: DM (diabetes mellitus).   ·  Plan: - type 2 DM on insulin  - continue decreased lantus dose 8units QHS   - continue low-dose lispro corrective ISS  - controlled      Problem/Plan - 3:  ·  Problem: HTN (hypertension).   ·  Plan: Chronic   - continue metoprolol 25mg BID.     Problem/Plan - 4:  ·  Problem: HLD (hyperlipidemia).   ·  Plan: Chronic   - Continue atorvastatin 80mg QD.     Problem/Plan - 5:  ·  Problem: CAD (coronary artery disease).   ·  Plan: CAD sp stents x4  - Held ASA and Plavix on 10/29; Restarted on ASA and Plavix on 10/30  - c/w lipitor  - cardiology eval noted      Problem/Plan - 6:  ·  Problem: Benign prostatic hyperplasia with lower urinary tract symptoms, symptom details unspecified.   ·  Plan: Chronic  -Continue Flomax.     Problem/Plan - 7:  ·  Problem: Multiple myeloma.   ·  Plan: Chronic  - Chemotherapy held      Problem/Plan - 8:   DVT ppx: continue lovenox 40mg SQ daily    #BRITT on CKD :  likely due to dehydration.   Encouraged patient to drink more fluids. repeat lab  stable

## 2024-12-09 DIAGNOSIS — E11.9 TYPE 2 DIABETES MELLITUS WITHOUT COMPLICATIONS: ICD-10-CM

## 2024-12-09 PROCEDURE — 99221 1ST HOSP IP/OBS SF/LOW 40: CPT

## 2024-12-09 PROCEDURE — 99232 SBSQ HOSP IP/OBS MODERATE 35: CPT

## 2024-12-09 RX ORDER — SENNOSIDES 8.6 MG
1 TABLET ORAL
Qty: 30 | Refills: 0
Start: 2024-12-09 | End: 2025-01-07

## 2024-12-09 RX ORDER — TAMSULOSIN HYDROCHLORIDE 0.4 MG/1
1 CAPSULE ORAL
Qty: 30 | Refills: 0
Start: 2024-12-09 | End: 2025-01-07

## 2024-12-09 RX ORDER — ACETAMINOPHEN, DIPHENHYDRAMINE HCL, PHENYLEPHRINE HCL 325; 25; 5 MG/1; MG/1; MG/1
3 TABLET ORAL
Qty: 90 | Refills: 0
Start: 2024-12-09 | End: 2025-01-07

## 2024-12-09 RX ORDER — ACETAMINOPHEN 500MG 500 MG/1
2 TABLET, COATED ORAL
Qty: 80 | Refills: 0
Start: 2024-12-09 | End: 2024-12-18

## 2024-12-09 RX ORDER — INSULIN GLARGINE 100 [IU]/ML
8 INJECTION, SOLUTION SUBCUTANEOUS
Qty: 1 | Refills: 0
Start: 2024-12-09 | End: 2025-01-07

## 2024-12-09 RX ORDER — FAMOTIDINE 20 MG/1
1 TABLET, FILM COATED ORAL
Qty: 30 | Refills: 0
Start: 2024-12-09 | End: 2025-01-07

## 2024-12-09 RX ORDER — POLYETHYLENE GLYCOL 3350 17 G/17G
17 POWDER, FOR SOLUTION ORAL
Qty: 510 | Refills: 0
Start: 2024-12-09 | End: 2025-01-07

## 2024-12-09 RX ORDER — CLOPIDOGREL 75 MG/1
1 TABLET, FILM COATED ORAL
Qty: 30 | Refills: 0
Start: 2024-12-09 | End: 2025-01-07

## 2024-12-09 RX ORDER — METOPROLOL TARTRATE 100 MG/1
1 TABLET, FILM COATED ORAL
Qty: 60 | Refills: 0
Start: 2024-12-09 | End: 2025-01-07

## 2024-12-09 RX ADMIN — METOPROLOL TARTRATE 25 MILLIGRAM(S): 100 TABLET, FILM COATED ORAL at 17:58

## 2024-12-09 RX ADMIN — Medication 80 MILLIGRAM(S): at 21:38

## 2024-12-09 RX ADMIN — FAMOTIDINE 20 MILLIGRAM(S): 20 TABLET, FILM COATED ORAL at 12:09

## 2024-12-09 RX ADMIN — NYSTATIN 1 APPLICATION(S): 100000 POWDER TOPICAL at 17:58

## 2024-12-09 RX ADMIN — MEROPENEM 100 MILLIGRAM(S): 500 INJECTION, POWDER, FOR SOLUTION INTRAVENOUS at 13:54

## 2024-12-09 RX ADMIN — Medication 81 MILLIGRAM(S): at 12:08

## 2024-12-09 RX ADMIN — CLOPIDOGREL 75 MILLIGRAM(S): 75 TABLET, FILM COATED ORAL at 12:08

## 2024-12-09 RX ADMIN — Medication 250 MILLIGRAM(S): at 17:56

## 2024-12-09 RX ADMIN — Medication 1000 MICROGRAM(S): at 12:09

## 2024-12-09 RX ADMIN — Medication 500 MILLIGRAM(S): at 21:38

## 2024-12-09 RX ADMIN — TAMSULOSIN HYDROCHLORIDE 0.4 MILLIGRAM(S): 0.4 CAPSULE ORAL at 21:38

## 2024-12-09 RX ADMIN — INSULIN GLARGINE 8 UNIT(S): 100 INJECTION, SOLUTION SUBCUTANEOUS at 21:54

## 2024-12-09 RX ADMIN — Medication 500 MILLIGRAM(S): at 09:46

## 2024-12-09 RX ADMIN — MEROPENEM 100 MILLIGRAM(S): 500 INJECTION, POWDER, FOR SOLUTION INTRAVENOUS at 05:29

## 2024-12-09 RX ADMIN — Medication 250 MILLIGRAM(S): at 05:29

## 2024-12-09 RX ADMIN — MEROPENEM 100 MILLIGRAM(S): 500 INJECTION, POWDER, FOR SOLUTION INTRAVENOUS at 21:37

## 2024-12-09 RX ADMIN — ACETAMINOPHEN, DIPHENHYDRAMINE HCL, PHENYLEPHRINE HCL 3 MILLIGRAM(S): 325; 25; 5 TABLET ORAL at 21:38

## 2024-12-09 RX ADMIN — METOPROLOL TARTRATE 25 MILLIGRAM(S): 100 TABLET, FILM COATED ORAL at 05:29

## 2024-12-09 RX ADMIN — CHLORHEXIDINE GLUCONATE 1 APPLICATION(S): 1.2 RINSE ORAL at 05:29

## 2024-12-09 RX ADMIN — ENOXAPARIN SODIUM 40 MILLIGRAM(S): 30 INJECTION SUBCUTANEOUS at 21:54

## 2024-12-09 RX ADMIN — NYSTATIN 1 APPLICATION(S): 100000 POWDER TOPICAL at 05:29

## 2024-12-09 NOTE — PROGRESS NOTE ADULT - ASSESSMENT
82 yo male with hypertension, diabetes, hyperlipidemia, bilateral heel osteomyelitis currently on outpatient antibiotics through midline presented for surgery planned with podiatry, Gabriel Deshpande, for his bilateral heel osteomyelitis. His osteomyelitis started 6 months PTA.   Calcanectomy, partial 29-Oct-2024 12:58:43  Jona Mason    bilateral heel osteomyelitis  prior micro with Proteus mirabilis, Pseudomonas aeruginosa, Enterococcus faecalis, Klebsiella pneumoniae ESBL  Culture - Tissue with Gram Stain (10.29.24 @ 12:00) Rare Pseudomonas aeruginosa  -  Ciprofloxacin: S <=0.25-  Levofloxacin: S <=0.5-  Meropenem: S <=1  Culture - Tissue with Gram Stain (11.19.24 @ 16:50) Pseudomonas aeruginosa (Carbapenem Resistant), Enterococcus faecalis -Ciprofloxacin: S 0.5, Meropenem: R >8, Piperacillin/Tazobactam: R >64   Path- Right heel bone proximal margin- Acute and chronic osteomyelitis, Left heel bone proximal margin- Acute and chronic osteomyelitis    Recommendations:   Meropenem 1 gram IV q8h to cover prior micro as well  Ciprofloxacin 500 mg PO BID to cover Pseudomonas  Last day of Abx 12/9 TODAY  PICC can be removed at end of Rx  Additional care per primary team    Stable from ID standpoint  D/c planning tomorrow once ABx complete    D/w Dr. Wilde  Infectious Diseases will follow. Please call with any questions.  Queta Ngo M.D.  [facility] Division of Infectious Diseases [phone]  For after 5 P.M. and weekends, please call [phone]  Available on [employer] TEAMS

## 2024-12-09 NOTE — PROGRESS NOTE ADULT - TIME BILLING
direct patient care including but not limited to reviewing chart, medications ,laboratory data, imaging reports, discussion of plan of care with consultants on the case, coordination of care with multidisciplinary team involved in the case and discussion of plan with patient.  Patient and family agreeable to plan of care and verbalized understanding the anticipated hospital course and treatment plan.
Pt seen + examined on 10/29. Note written by attending, see above.  Time spent: 51min. Time spent discussing/coordinating care with consultants, CM/SW team, and counseling the patient on medical condition -- T2DM, insulin dosing, hypoglycemia, b/l heel OM, surgery, f/up OR cultures and pathology.

## 2024-12-09 NOTE — CASE MANAGEMENT PROGRESS NOTE - NSCMPROGRESSNOTE_GEN_ALL_CORE
Script for a aaron lift and hospital bed were sent to [facility] for delivery today to the new [facility]. SW team aware.

## 2024-12-09 NOTE — PROGRESS NOTE ADULT - SUBJECTIVE AND OBJECTIVE BOX
Chief Complaint: Heel ulcer    Interval Events: No events overnight.    Review of Systems:  General: No fevers, chills, weight gain  Skin: No rashes, color changes  Cardiovascular: No chest pain, orthopnea  Respiratory: No shortness of breath, cough  Gastrointestinal: No nausea, abdominal pain  Genitourinary: No incontinence, pain with urination  Musculoskeletal: No pain, swelling, decreased range of motion  Neurological: No headache, weakness  Psychiatric: No depression, anxiety  Endocrine: No weight gain, increased thirst  All other systems are comprehensively negative.    Physical Exam:  Vital Signs Last 24 Hrs  T(C): 36.4 (09 Dec 2024 04:51), Max: 36.7 (08 Dec 2024 20:01)  T(F): 97.5 (09 Dec 2024 04:51), Max: 98.1 (08 Dec 2024 20:01)  HR: 78 (09 Dec 2024 04:51) (78 - 89)  BP: 112/71 (09 Dec 2024 04:51) (112/71 - 126/74)  BP(mean): --  RR: 16 (09 Dec 2024 04:51) (16 - 19)  SpO2: 98% (09 Dec 2024 04:51) (93% - 99%)  Parameters below as of 09 Dec 2024 04:51  Patient On (Oxygen Delivery Method): room air  General: NAD  HEENT: MMM  Neck: No JVD, no carotid bruit  Lungs: CTAB  CV: RRR, nl S1/S2, no M/R/G  Abdomen: S/NT/ND, +BS  Extremities: +Lymphedema, no cyanosis  Neuro: AAOx3, non-focal  Skin: No rash    Labs:

## 2024-12-09 NOTE — CONSULT NOTE ADULT - PROBLEM SELECTOR RECOMMENDATION 9
Given the patient's severity of symptoms, will plan for surgical intervention at this time. Discussed risks, benefits, and potential complications with patient and family members regarding surgical intervention including sepsis, loss of limb, and loss of life. All questions answered to satisfaction at this time. Will plan for bilateral heel wound debridement with application of antibiotic beads tomorrow.
Type 2 A1c 5.9% adm b/l heel OM  c/w lantus 8 units @ HS  c/w CARLOS ACHS  CC diet and accucheck ACHS  Recommend endocrine-Perlman onconsult  FU appt: dsc to ROSY  DSC recommendations: dsc to half-way on Lantus 8 units @ HS and continuous glucose monitoring, lifestyle and diet modifications  diabetes education provided as documented above  Diabetes support info and cell # [phone] given   Goal 100-180 mg/dL; 140-180 mg/dL in critical care areas

## 2024-12-09 NOTE — CONSULT NOTE ADULT - SUBJECTIVE AND OBJECTIVE BOX
Patient is a 84y old  Male who presents with a chief complaint of Bilateral Heel Osteomyelitis (09 Dec 2024 12:35)    Type: 2 DM DX 25 years known complications, neuropathy, DFU w/ OM, PCP Dr Rachel Lester, last a1c 5.9%, was hospitalized at [facility] and then went to [facility] rehab. Rx home previously novolog 70/30 BID, pt reports 15 units dose. did f/s BID, reports readigns usually 120, occasional hypoglycemia, discussed need for tight glycemic control to avoid wound complications, risk of hypoglycemia w/ mixed insulin, has been on 8 units Lantus @ HS and CARLOS ACHS, well tolerated. planned discharge to [facility], [facility] continuing with lantus 8 units @ HS. discussed CGM for improved BG trending, preempting hypoglycemia. will send script for coverage. The patient has completed sufficient training about the importance of daily use of the device prescribed and supplies. The patient is compliant to the diabetes treatment plan. reviewed CC diet, carb identification and portion control, nutritional requirements, priortizing lean protein and nonstarchy vegetables. verbal education and handouts provided.  diabetes education provided- A1c measure and BG targets  fasting, <180 2 hours postmeal. medication MOA and considerations/side effects, inhospital BGM frequency and insulin administration, s/s of hyperglycemia/hypoglycemia and management, glycemic control and preventing complications, consistent carb diet, balanced plate method, consistent meal planning. sick day management, provider f/u      HPI:  Patient with a past medical history of hypertension, diabetes, hyperlipidemia, bilateral heel osteomyelitis currently on outpatient antibiotics through midline is presenting for surgery.  He is scheduled for surgery with Dr. frias tomorrow.  Denies any fevers.  Endorsing pain to his heels but no other areas of pain.  No nausea or vomiting.  No other acute complaints today.    Denies fever, chills, chest pain, palpitations, SOB, cough, abdominal pain, nausea, vomiting, diarrhea, constipation, urinary frequency, urgency, or dysuria, headaches, changes in vision, dizziness, numbness, tingling.  Denies recent travel, recent antibiotic use, or sick contacts.    ED Course:   Vitals: BP: 161/87, HR 69: , Temp: 97.8 , RR: 18, SpO2: 96% on   Labs:  H/H: 10.6/34.4, PTT: 36.2, Albumin: 2.7  Tissue culture: (incomplete)      Surgical pathology report: (incomplete)  MR foot:   Xray foot:   EKBPM normal sinus rhythm, QTc: 477  Received in the ED:       HPI: Patient presents to [facility] from rehab for bilateral foot surgery planned with podiatry, Gabriel Deshpande, tomorrow for his bilateral heel osteomyelitis. States his osteomyelitis started 6 months ago, Currently c/o pain in b/l heels. Denies any numbness or tingling down LLE/RLE. Denies any trauma. Patient currently does not walk due to deconditioning. Denies any other complaints. Denies fevers, chills, HA, Dizziness, chest pain, SOB, N/V/D, bladder symptoms,     (28 Oct 2024 14:08)      PAST MEDICAL & SURGICAL HISTORY:  HTN (hypertension)      CAD (coronary artery disease)  w/ 4 stents      HLD (hyperlipidemia)      DM (diabetes mellitus)      Benign prostatic hyperplasia with lower urinary tract symptoms, symptom details unspecified      Stented coronary artery      SANTO on CPAP      Chronic obstructive pulmonary disease, unspecified COPD type      Obesity, morbid, BMI 40.0-49.9      Difficulty walking      GI bleed        History of blood transfusion  Transfusion of Plasma      DM2 (diabetes mellitus, type 2)      COPD, mild      Lymphedema      Multiple myeloma      Chronic obstructive pulmonary disease, unspecified COPD type      H/O knee surgery  Left - 2017      S/P angioplasty with stent   and  (Has 4 stents)      History of prostate surgery  laser to relieve a blockage.      No significant past surgical history        Allergies    No Known Allergies    Intolerances        MEDICATIONS  (STANDING):  aspirin  chewable 81 milliGRAM(s) Oral daily  atorvastatin 80 milliGRAM(s) Oral at bedtime  chlorhexidine 2% Cloths 1 Application(s) Topical daily  ciprofloxacin     Tablet 500 milliGRAM(s) Oral every 12 hours  clopidogrel Tablet 75 milliGRAM(s) Oral daily  cyanocobalamin 1000 MICROGram(s) Oral daily  dextrose 5%. 1000 milliLiter(s) (100 mL/Hr) IV Continuous <Continuous>  dextrose 5%. 1000 milliLiter(s) (50 mL/Hr) IV Continuous <Continuous>  dextrose 50% Injectable 25 Gram(s) IV Push once  dextrose 50% Injectable 25 Gram(s) IV Push once  dextrose 50% Injectable 12.5 Gram(s) IV Push once  enoxaparin Injectable 40 milliGRAM(s) SubCutaneous every 24 hours  famotidine    Tablet 20 milliGRAM(s) Oral daily  glucagon  Injectable 1 milliGRAM(s) IntraMuscular once  insulin glargine Injectable (LANTUS) 8 Unit(s) SubCutaneous at bedtime  insulin lispro (ADMELOG) corrective regimen sliding scale   SubCutaneous three times a day before meals  melatonin 3 milliGRAM(s) Oral at bedtime  meropenem  IVPB 1000 milliGRAM(s) IV Intermittent every 8 hours  metoprolol tartrate 25 milliGRAM(s) Oral two times a day  nystatin Powder 1 Application(s) Topical two times a day  polyethylene glycol 3350 17 Gram(s) Oral daily  saccharomyces boulardii 250 milliGRAM(s) Oral two times a day  senna 1 Tablet(s) Oral with breakfast  tamsulosin 0.4 milliGRAM(s) Oral at bedtime

## 2024-12-09 NOTE — PROGRESS NOTE ADULT - ASSESSMENT
84yo M with a PMH of HTN, HLD, Type 2 DM, CKD3a, hx of GI bleed, COPD, SANTO, BPH, CAD s/p PCI, PAD, multiple myeloma, lymphedema who presents with b/l heel ulcers and suspected OM planned for bilateral foot surgery with podiatry, Gabriel Deshpande, on 10/29 for his suspected bilateral heel osteomyelitis now s/p partial calcanectomy.        Problem/Plan - 1:  ·  Problem: Osteomyelitis of foot.   ·  Plan: - Bilateral heel osteomyelitis per outpatient w/up and admitted with a plan for OR with podiatry, Dr. Rios on 10/29  - Wound Cx (10/23): Proteus mirabilis, Pseudomonas aeruginosa, ESBL Klebsiella pneumoniae, Enterococcus faecalis  - Blood Cx NG  - s/p partial calcanectomy on 10/29.    - f/up OR cultures +rare pseudomonas aeruginosa.  Pathology: bilateral heel acute and chronic osteomyelitis.  - Tylenol PRN for mild pain.  - Consulted ID Dr. Casillas, recs appreciated   - PICC line placed.  Continue meropenem till Dec 9.    - Continue Florastor  - afebrile, no leukocytosis, dressing/wound care as per podiatry recommendation   - cardiology Dr. Rock consulted, recs appreciated.  - Patient had right heel debridement on 11/19. doing well post op , tissue culture 11/19 pseudomonas (carbapenem resistant).  complete  Cipro 500mg PO Q12h and Meropenem today.   blood test level stable      Problem/Plan - 2:  ·  Problem: DM (diabetes mellitus).   ·  Plan: - type 2 DM on insulin  - continue decreased lantus dose 8units QHS   - continue low-dose lispro corrective ISS  - controlled      Problem/Plan - 3:  ·  Problem: HTN (hypertension).   ·  Plan: Chronic   - continue metoprolol 25mg BID.     Problem/Plan - 4:  ·  Problem: HLD (hyperlipidemia).   ·  Plan: Chronic   - Continue atorvastatin 80mg QD.     Problem/Plan - 5:  ·  Problem: CAD (coronary artery disease).   ·  Plan: CAD sp stents x4  - Held ASA and Plavix on 10/29; Restarted on ASA and Plavix on 10/30  - c/w lipitor  - cardiology eval noted      Problem/Plan - 6:  ·  Problem: Benign prostatic hyperplasia with lower urinary tract symptoms, symptom details unspecified.   ·  Plan: Chronic  -Continue Flomax.     Problem/Plan - 7:  ·  Problem: Multiple myeloma.   ·  Plan: Chronic  - Chemotherapy held      Problem/Plan - 8:   DVT ppx: continue lovenox 40mg SQ daily    #BRITT on CKD :  likely due to dehydration.   Encouraged patient to drink more fluids. repeat lab  stable

## 2024-12-09 NOTE — CONSULT NOTE ADULT - ASSESSMENT
Physical Exam:   Vital Signs Last 24 Hrs  T(C): 36.4 (09 Dec 2024 11:26), Max: 36.7 (08 Dec 2024 20:01)  T(F): 97.5 (09 Dec 2024 11:26), Max: 98.1 (08 Dec 2024 20:01)  HR: 105 (09 Dec 2024 11:26) (67 - 105)  BP: 120/76 (09 Dec 2024 11:26) (112/71 - 142/80)  BP(mean): --  RR: 17 (09 Dec 2024 11:26) (16 - 19)  SpO2: 93% (09 Dec 2024 11:26) (93% - 98%)    Parameters below as of 09 Dec 2024 11:26  Patient On (Oxygen Delivery Method): room air        General: NAD, denies Fever, chills  CVS: S1S2 no M/R/G  Resp: CTA in all fields  : no freq, no urgency, no dysuria  Extremeties: no edema, + pulses         CAPILLARY BLOOD GLUCOSE      POCT Blood Glucose.: 120 mg/dL (09 Dec 2024 12:07)  POCT Blood Glucose.: 88 mg/dL (09 Dec 2024 08:17)  POCT Blood Glucose.: 146 mg/dL (08 Dec 2024 21:09)  POCT Blood Glucose.: 137 mg/dL (08 Dec 2024 17:20)        DIET: CC  >50%

## 2024-12-09 NOTE — PROGRESS NOTE ADULT - ASSESSMENT
The patient is an 83 year old male with a history of HTN, HL, DM, CKD, GI bleed, COPD, SANTO, BPH, CAD s/p PCI, PAD, multiple myeloma, lymphedema who presents with heel ulcer.     Plan:  - ECG with sinus rhythm and no evidence of ischemia/infarction  - Echo 6/30/24 with normal LV systolic function, mild pulm HTN  - CXR with grossly clear lungs  - Lower extremity swelling consistent with known history of lymphedema  - Continue aspirin 81 mg daily  - Continue clopidogrel 75 mg daily  - Continue atorvastatin 80 mg daily  - Continue metoprolol tartrate 25 mg bid  - Podiatry and ID follow-up  - Path results with acute on chronic osteomyelitis  - Antibiotics completed today  - Discharge planning

## 2024-12-09 NOTE — SOCIAL WORK PROGRESS NOTE - NSSWPROGRESSNOTE_GEN_ALL_CORE
Message left for Fallon at [facility] for update on 3122, MD to order meds to [facility]. Awaiting confirmation for bed to be delivered to [facility] from [facility]. SW to follow.

## 2024-12-09 NOTE — PROGRESS NOTE ADULT - SUBJECTIVE AND OBJECTIVE BOX
Patient is a 84y old  Male who presents with a chief complaint of Bilateral Heel Osteomyelitis (09 Dec 2024 10:42)      Subjective:  INTERVAL HPI/OVERNIGHT EVENTS: Patient seen and examined at bedside.  Patient has no complaints at this time.   MEDICATIONS  (STANDING):  aspirin  chewable 81 milliGRAM(s) Oral daily  atorvastatin 80 milliGRAM(s) Oral at bedtime  chlorhexidine 2% Cloths 1 Application(s) Topical daily  ciprofloxacin     Tablet 500 milliGRAM(s) Oral every 12 hours  clopidogrel Tablet 75 milliGRAM(s) Oral daily  cyanocobalamin 1000 MICROGram(s) Oral daily  dextrose 5%. 1000 milliLiter(s) (100 mL/Hr) IV Continuous <Continuous>  dextrose 5%. 1000 milliLiter(s) (50 mL/Hr) IV Continuous <Continuous>  dextrose 50% Injectable 25 Gram(s) IV Push once  dextrose 50% Injectable 25 Gram(s) IV Push once  dextrose 50% Injectable 12.5 Gram(s) IV Push once  enoxaparin Injectable 40 milliGRAM(s) SubCutaneous every 24 hours  famotidine    Tablet 20 milliGRAM(s) Oral daily  glucagon  Injectable 1 milliGRAM(s) IntraMuscular once  insulin glargine Injectable (LANTUS) 8 Unit(s) SubCutaneous at bedtime  insulin lispro (ADMELOG) corrective regimen sliding scale   SubCutaneous three times a day before meals  melatonin 3 milliGRAM(s) Oral at bedtime  meropenem  IVPB 1000 milliGRAM(s) IV Intermittent every 8 hours  metoprolol tartrate 25 milliGRAM(s) Oral two times a day  nystatin Powder 1 Application(s) Topical two times a day  polyethylene glycol 3350 17 Gram(s) Oral daily  saccharomyces boulardii 250 milliGRAM(s) Oral two times a day  senna 1 Tablet(s) Oral with breakfast  tamsulosin 0.4 milliGRAM(s) Oral at bedtime    MEDICATIONS  (PRN):  acetaminophen     Tablet .. 650 milliGRAM(s) Oral every 6 hours PRN Temp greater or equal to 38C (100.4F), Mild Pain (1 - 3)  aluminum hydroxide/magnesium hydroxide/simethicone Suspension 30 milliLiter(s) Oral every 4 hours PRN Dyspepsia  dextrose Oral Gel 15 Gram(s) Oral once PRN Blood Glucose LESS THAN 70 milliGRAM(s)/deciliter  sodium chloride 0.9% lock flush 10 milliLiter(s) IV Push every 1 hour PRN Pre/post blood products, medications, blood draw, and to maintain line patency      Allergies    No Known Allergies    Intolerances        REVIEW OF SYSTEMS:  CONSTITUTIONAL: No fever or chills  HEENT:  No headache, no sore throat  RESPIRATORY: No cough or shortness of breath  CARDIOVASCULAR: No chest pain or palpitations  GASTROINTESTINAL: No abd pain, nausea, vomiting, or diarrhea      Objective:  Vital Signs Last 24 Hrs  T(C): 36.4 (09 Dec 2024 11:26), Max: 36.7 (08 Dec 2024 20:01)  T(F): 97.5 (09 Dec 2024 11:26), Max: 98.1 (08 Dec 2024 20:01)  HR: 105 (09 Dec 2024 11:26) (67 - 105)  BP: 120/76 (09 Dec 2024 11:26) (112/71 - 142/80)  BP(mean): --  RR: 17 (09 Dec 2024 11:26) (16 - 19)  SpO2: 93% (09 Dec 2024 11:26) (93% - 98%)    Parameters below as of 09 Dec 2024 11:26  Patient On (Oxygen Delivery Method): room air        GENERAL: NAD, lying in bed comfortably  HEAD:  Normocephalic  EYES:  conjunctiva and sclera clear  ENT: Moist mucous membranes  NECK: Supple  CHEST/LUNG: Clear to auscultation bilaterally; No rales or rhonchi; no wheezing. Unlabored respirations  HEART: Regular rate and rhythm; S1S2+  ABDOMEN: Bowel sounds present; Soft, Nontender, Nondistended.   EXTREMITIES:  + distal Peripheral Pulses;  No cyanosis, or edema, B/L feet in clean dressing   NERVOUS SYSTEM:  Alert & Oriented X3;  No gross focal deficits   MSK: moves all extremities  SKIN: No rashes     LABS:      Ca    9.5        08 Dec 2024 09:35        Urinalysis Basic - ( 08 Dec 2024 09:35 )    Color: x / Appearance: x / SG: x / pH: x  Gluc: 113 mg/dL / Ketone: x  / Bili: x / Urobili: x   Blood: x / Protein: x / Nitrite: x   Leuk Esterase: x / RBC: x / WBC x   Sq Epi: x / Non Sq Epi: x / Bacteria: x      CAPILLARY BLOOD GLUCOSE      POCT Blood Glucose.: 120 mg/dL (09 Dec 2024 12:07)  POCT Blood Glucose.: 88 mg/dL (09 Dec 2024 08:17)  POCT Blood Glucose.: 146 mg/dL (08 Dec 2024 21:09)  POCT Blood Glucose.: 137 mg/dL (08 Dec 2024 17:20)          RADIOLOGY & ADDITIONAL TESTS:    Personally reviewed.     Consultant(s) Notes Reviewed:  [x] YES  [ ] NO    Plan of care discussed with patient; all questions answered

## 2024-12-09 NOTE — CONSULT NOTE ADULT - CONSULT REQUESTED DATE/TIME
01-Dec-2024 12:56
28-Oct-2024 12:17
29-Oct-2024 10:14
28-Oct-2024 17:53
30-Oct-2024 12:07
09-Dec-2024 13:16

## 2024-12-09 NOTE — CONSULT NOTE ADULT - CONSULT REASON
BRITT
Bilateral Heel Osteomyelitis
Pre-operative evaluation
84y A1C with Estimated Average Glucose Result: 5.9 % (10-29-24 @ 04:40)   diabetes mellitus uncontrolled type 2

## 2024-12-09 NOTE — CONSULT NOTE ADULT - REASON FOR ADMISSION
Bilateral Heel Osteomyelitis

## 2024-12-09 NOTE — PROGRESS NOTE ADULT - SUBJECTIVE AND OBJECTIVE BOX
[facility], Division of Infectious Diseases  MARLEEN Graves Y. Patel, S. Shah, G. [facility]  [phone]    Name: DALILA HERNADEZ  Age: 84y  Gender: Male  MRN: [id number]    Interval History:  Patient seen and examined at bedside this morning  No acute overnight events.   Notes reviewed    Antibiotics:  ciprofloxacin     Tablet 500 milliGRAM(s) Oral every 12 hours  meropenem  IVPB 1000 milliGRAM(s) IV Intermittent every 8 hours      Medications:  acetaminophen     Tablet .. 650 milliGRAM(s) Oral every 6 hours PRN  aluminum hydroxide/magnesium hydroxide/simethicone Suspension 30 milliLiter(s) Oral every 4 hours PRN  aspirin  chewable 81 milliGRAM(s) Oral daily  atorvastatin 80 milliGRAM(s) Oral at bedtime  chlorhexidine 2% Cloths 1 Application(s) Topical daily  ciprofloxacin     Tablet 500 milliGRAM(s) Oral every 12 hours  clopidogrel Tablet 75 milliGRAM(s) Oral daily  cyanocobalamin 1000 MICROGram(s) Oral daily  dextrose 5%. 1000 milliLiter(s) IV Continuous <Continuous>  dextrose 5%. 1000 milliLiter(s) IV Continuous <Continuous>  dextrose 50% Injectable 25 Gram(s) IV Push once  dextrose 50% Injectable 25 Gram(s) IV Push once  dextrose 50% Injectable 12.5 Gram(s) IV Push once  dextrose Oral Gel 15 Gram(s) Oral once PRN  enoxaparin Injectable 40 milliGRAM(s) SubCutaneous every 24 hours  famotidine    Tablet 20 milliGRAM(s) Oral daily  glucagon  Injectable 1 milliGRAM(s) IntraMuscular once  insulin glargine Injectable (LANTUS) 8 Unit(s) SubCutaneous at bedtime  insulin lispro (ADMELOG) corrective regimen sliding scale   SubCutaneous three times a day before meals  melatonin 3 milliGRAM(s) Oral at bedtime  meropenem  IVPB 1000 milliGRAM(s) IV Intermittent every 8 hours  metoprolol tartrate 25 milliGRAM(s) Oral two times a day  nystatin Powder 1 Application(s) Topical two times a day  polyethylene glycol 3350 17 Gram(s) Oral daily  saccharomyces boulardii 250 milliGRAM(s) Oral two times a day  senna 1 Tablet(s) Oral with breakfast  sodium chloride 0.9% lock flush 10 milliLiter(s) IV Push every 1 hour PRN  tamsulosin 0.4 milliGRAM(s) Oral at bedtime      Review of Systems:  A 10-point review of systems was obtained.   Review of systems otherwise negative except as previously noted.    Allergies: No Known Allergies    For details regarding the patient's past medical history, social history, family history, and other miscellaneous elements, please refer the initial infectious diseases consultation and/or the admitting history and physical examination for this admission.    Objective:  Vitals:   T(C): 36.4 (12-09-24 @ 04:51), Max: 36.7 (12-08-24 @ 20:01)  HR: 78 (12-09-24 @ 04:51) (78 - 89)  BP: 112/71 (12-09-24 @ 04:51) (112/71 - 126/74)  RR: 16 (12-09-24 @ 04:51) (16 - 19)  SpO2: 98% (12-09-24 @ 04:51) (93% - 99%)    Physical Examination:  General: no acute distress  HEENT: NC/AT, EOMI,   Cardio: S1, S2 heard, RRR, no murmurs  Resp: breath sounds heard bilaterally, no rales, wheezes or rhonchi  Abd: soft, NT, ND,  Ext: b/l LE swelling, legs in dressing  Skin: warm, dry, no visible rash      Laboratory Studies:  CBC:                       9.3    4.13  )-----------( 138      ( 08 Dec 2024 09:35 )             29.8     CMP: 12-08    143  |  106  |  37[H]  ----------------------------<  113[H]  4.2   |  33[H]  |  1.30    Ca    9.5      08 Dec 2024 09:35    TPro  6.5  /  Alb  2.6[L]  /  TBili  0.5  /  DBili  x   /  AST  18  /  ALT  18  /  AlkPhos  32[L]  12-08    LIVER FUNCTIONS - ( 08 Dec 2024 09:35 )  Alb: 2.6 g/dL / Pro: 6.5 g/dL / ALK PHOS: 32 U/L / ALT: 18 U/L / AST: 18 U/L / GGT: x           Urinalysis Basic - ( 08 Dec 2024 09:35 )    Color: x / Appearance: x / SG: x / pH: x  Gluc: 113 mg/dL / Ketone: x  / Bili: x / Urobili: x   Blood: x / Protein: x / Nitrite: x   Leuk Esterase: x / RBC: x / WBC x   Sq Epi: x / Non Sq Epi: x / Bacteria: x        Microbiology: reviewed        Radiology: reviewed

## 2024-12-10 VITALS
SYSTOLIC BLOOD PRESSURE: 128 MMHG | RESPIRATION RATE: 17 BRPM | DIASTOLIC BLOOD PRESSURE: 79 MMHG | TEMPERATURE: 97 F | OXYGEN SATURATION: 96 % | HEART RATE: 96 BPM

## 2024-12-10 PROCEDURE — 86480 TB TEST CELL IMMUN MEASURE: CPT

## 2024-12-10 PROCEDURE — C1751: CPT

## 2024-12-10 PROCEDURE — 87186 SC STD MICRODIL/AGAR DIL: CPT

## 2024-12-10 PROCEDURE — 86850 RBC ANTIBODY SCREEN: CPT

## 2024-12-10 PROCEDURE — 93005 ELECTROCARDIOGRAM TRACING: CPT

## 2024-12-10 PROCEDURE — 83036 HEMOGLOBIN GLYCOSYLATED A1C: CPT

## 2024-12-10 PROCEDURE — 83735 ASSAY OF MAGNESIUM: CPT

## 2024-12-10 PROCEDURE — 71045 X-RAY EXAM CHEST 1 VIEW: CPT

## 2024-12-10 PROCEDURE — 85610 PROTHROMBIN TIME: CPT

## 2024-12-10 PROCEDURE — 84100 ASSAY OF PHOSPHORUS: CPT

## 2024-12-10 PROCEDURE — 86901 BLOOD TYPING SEROLOGIC RH(D): CPT

## 2024-12-10 PROCEDURE — 85025 COMPLETE CBC W/AUTO DIFF WBC: CPT

## 2024-12-10 PROCEDURE — C1713: CPT

## 2024-12-10 PROCEDURE — 97110 THERAPEUTIC EXERCISES: CPT

## 2024-12-10 PROCEDURE — 36415 COLL VENOUS BLD VENIPUNCTURE: CPT

## 2024-12-10 PROCEDURE — 85730 THROMBOPLASTIN TIME PARTIAL: CPT

## 2024-12-10 PROCEDURE — 99239 HOSP IP/OBS DSCHRG MGMT >30: CPT

## 2024-12-10 PROCEDURE — 80048 BASIC METABOLIC PNL TOTAL CA: CPT

## 2024-12-10 PROCEDURE — 85652 RBC SED RATE AUTOMATED: CPT

## 2024-12-10 PROCEDURE — 86900 BLOOD TYPING SEROLOGIC ABO: CPT

## 2024-12-10 PROCEDURE — 99285 EMERGENCY DEPT VISIT HI MDM: CPT

## 2024-12-10 PROCEDURE — 87040 BLOOD CULTURE FOR BACTERIA: CPT

## 2024-12-10 PROCEDURE — 73630 X-RAY EXAM OF FOOT: CPT

## 2024-12-10 PROCEDURE — 88304 TISSUE EXAM BY PATHOLOGIST: CPT

## 2024-12-10 PROCEDURE — 97112 NEUROMUSCULAR REEDUCATION: CPT

## 2024-12-10 PROCEDURE — 86140 C-REACTIVE PROTEIN: CPT

## 2024-12-10 PROCEDURE — 36573 INSJ PICC RS&I 5 YR+: CPT

## 2024-12-10 PROCEDURE — 87077 CULTURE AEROBIC IDENTIFY: CPT

## 2024-12-10 PROCEDURE — 88311 DECALCIFY TISSUE: CPT

## 2024-12-10 PROCEDURE — 87070 CULTURE OTHR SPECIMN AEROBIC: CPT

## 2024-12-10 PROCEDURE — 80053 COMPREHEN METABOLIC PANEL: CPT

## 2024-12-10 PROCEDURE — 76937 US GUIDE VASCULAR ACCESS: CPT

## 2024-12-10 PROCEDURE — 82962 GLUCOSE BLOOD TEST: CPT

## 2024-12-10 PROCEDURE — 97530 THERAPEUTIC ACTIVITIES: CPT

## 2024-12-10 PROCEDURE — 97162 PT EVAL MOD COMPLEX 30 MIN: CPT

## 2024-12-10 PROCEDURE — 97116 GAIT TRAINING THERAPY: CPT

## 2024-12-10 PROCEDURE — 85027 COMPLETE CBC AUTOMATED: CPT

## 2024-12-10 PROCEDURE — 87075 CULTR BACTERIA EXCEPT BLOOD: CPT

## 2024-12-10 RX ADMIN — FAMOTIDINE 20 MILLIGRAM(S): 20 TABLET, FILM COATED ORAL at 11:36

## 2024-12-10 RX ADMIN — CHLORHEXIDINE GLUCONATE 1 APPLICATION(S): 1.2 RINSE ORAL at 06:15

## 2024-12-10 RX ADMIN — Medication 1000 MICROGRAM(S): at 11:35

## 2024-12-10 RX ADMIN — Medication 81 MILLIGRAM(S): at 11:36

## 2024-12-10 RX ADMIN — Medication 250 MILLIGRAM(S): at 06:15

## 2024-12-10 RX ADMIN — CLOPIDOGREL 75 MILLIGRAM(S): 75 TABLET, FILM COATED ORAL at 11:36

## 2024-12-10 RX ADMIN — NYSTATIN 1 APPLICATION(S): 100000 POWDER TOPICAL at 06:15

## 2024-12-10 NOTE — PROGRESS NOTE ADULT - ASSESSMENT
84 yo male with hypertension, diabetes, hyperlipidemia, bilateral heel osteomyelitis currently on outpatient antibiotics through midline presented for surgery planned with podiatry, Gabriel Deshpande, for his bilateral heel osteomyelitis. His osteomyelitis started 6 months PTA.   Calcanectomy, partial 29-Oct-2024 12:58:43  Jona Mason    bilateral heel osteomyelitis  prior micro with Proteus mirabilis, Pseudomonas aeruginosa, Enterococcus faecalis, Klebsiella pneumoniae ESBL  Culture - Tissue with Gram Stain (10.29.24 @ 12:00) Rare Pseudomonas aeruginosa  -  Ciprofloxacin: S <=0.25-  Levofloxacin: S <=0.5-  Meropenem: S <=1  Culture - Tissue with Gram Stain (11.19.24 @ 16:50) Pseudomonas aeruginosa (Carbapenem Resistant), Enterococcus faecalis -Ciprofloxacin: S 0.5, Meropenem: R >8, Piperacillin/Tazobactam: R >64   Path- Right heel bone proximal margin- Acute and chronic osteomyelitis, Left heel bone proximal margin- Acute and chronic osteomyelitis    Recommendations:   S/p Meropenem 1 gram IV q8h to cover prior micro as well  S/p Ciprofloxacin 500 mg PO BID to cover Pseudomonas  Abx completed  PICC can be removed at end of Rx  Additional care per primary team    Stable from ID standpoint  D/c planning     Please call with any further questions.  Queta Ngo M.D.  [facility] Division of Infectious Diseases [phone]  For after 5 P.M. and weekends, please call [phone]  Available on [employer] TEAMS

## 2024-12-10 NOTE — PROGRESS NOTE ADULT - SUBJECTIVE AND OBJECTIVE BOX
[facility], Division of Infectious Diseases  MARLEEN Graves Y. Patel, S. Shah, G. [facility]  [phone]    Name: DALILA HERNADEZ  Age: 84y  Gender: Male  MRN: [id number]    Interval History:  Patient seen and examined at bedside this morning  No acute overnight events.   Notes reviewed    Antibiotics:      Medications:  acetaminophen     Tablet .. 650 milliGRAM(s) Oral every 6 hours PRN  aluminum hydroxide/magnesium hydroxide/simethicone Suspension 30 milliLiter(s) Oral every 4 hours PRN  aspirin  chewable 81 milliGRAM(s) Oral daily  atorvastatin 80 milliGRAM(s) Oral at bedtime  chlorhexidine 2% Cloths 1 Application(s) Topical daily  clopidogrel Tablet 75 milliGRAM(s) Oral daily  cyanocobalamin 1000 MICROGram(s) Oral daily  dextrose 5%. 1000 milliLiter(s) IV Continuous <Continuous>  dextrose 5%. 1000 milliLiter(s) IV Continuous <Continuous>  dextrose 50% Injectable 25 Gram(s) IV Push once  dextrose 50% Injectable 25 Gram(s) IV Push once  dextrose 50% Injectable 12.5 Gram(s) IV Push once  dextrose Oral Gel 15 Gram(s) Oral once PRN  enoxaparin Injectable 40 milliGRAM(s) SubCutaneous every 24 hours  famotidine    Tablet 20 milliGRAM(s) Oral daily  glucagon  Injectable 1 milliGRAM(s) IntraMuscular once  insulin glargine Injectable (LANTUS) 8 Unit(s) SubCutaneous at bedtime  insulin lispro (ADMELOG) corrective regimen sliding scale   SubCutaneous three times a day before meals  melatonin 3 milliGRAM(s) Oral at bedtime  metoprolol tartrate 25 milliGRAM(s) Oral two times a day  nystatin Powder 1 Application(s) Topical two times a day  polyethylene glycol 3350 17 Gram(s) Oral daily  saccharomyces boulardii 250 milliGRAM(s) Oral two times a day  senna 1 Tablet(s) Oral with breakfast  sodium chloride 0.9% lock flush 10 milliLiter(s) IV Push every 1 hour PRN  tamsulosin 0.4 milliGRAM(s) Oral at bedtime      Review of Systems:  A 10-point review of systems was obtained.     Pertinent positives and negatives--  Constitutional: No fevers. No Chills. No Rigors.   Cardiovascular: No chest pain. No palpitations.  Respiratory: No shortness of breath. No cough.  Gastrointestinal: No nausea or vomiting. No diarrhea or constipation.   Psychiatric: Pleasant. Appropriate affect.    Review of systems otherwise negative except as previously noted.    Allergies: No Known Allergies    For details regarding the patient's past medical history, social history, family history, and other miscellaneous elements, please refer the initial infectious diseases consultation and/or the admitting history and physical examination for this admission.    Objective:  Vitals:   T(C): 36.3 (12-10-24 @ 12:31), Max: 36.7 (12-09-24 @ 20:42)  HR: 96 (12-10-24 @ 12:31) (84 - 96)  BP: 128/79 (12-10-24 @ 12:31) (107/68 - 129/70)  RR: 17 (12-10-24 @ 12:31) (16 - 17)  SpO2: 96% (12-10-24 @ 12:31) (94% - 96%)    Physical Examination:  General: no acute distress  HEENT: NC/AT, EOMI, anicteric, no oral lesions  Neck: supple, no palpable LAD  Cardio: S1, S2 heard, RRR, no murmurs  Resp: breath sounds heard bilaterally, no rales, wheezes or rhonchi  Abd: soft, NT, ND, + bowel sounds  Neuro: no obvious focal deficits  Ext: no edema or cyanosis  Skin: warm, dry, no visible rash      Laboratory Studies:  CBC:   CMP:             Microbiology: reviewed        Radiology: reviewed       Spoke with patient, she will call back to schedule. 1st attempt.  Letter sent via mail and [employer] if active, 2nd attempt.     Urgency: ROUTINE  Referral Type: INTERNAL  Location: [place]  Procedure: Colonoscopy  Diagnosis: Malignant neoplasm of overlapping sites of left breast in female, estrogen receptor positive (CMD) [C50.812, Z17.0]   Colon cancer screening [Z12.11]   Iron deficiency [E61.1]   Iron deficiency anemia, unspecified iron deficiency anemia type [D50.9]   Referring Provider: Dr. Venancio Ruffin  Referral To: UNSPECIFIED  Referral Notes:    Previous Procedure: NO    Attention [employer]CO Reps: If patient calls back to schedule, please route this encounter to the scheduling inbox at [facility] GI PROCEDURE SCHEDULING [facility].

## 2024-12-10 NOTE — PROGRESS NOTE ADULT - ASSESSMENT
BRITT on CKD 3  B/L Heel OM, s/p partial calcanectomy  h/o Anemia, Multiple myeloma  Diabetes  Hypertension    Stable renal indices. To continue current meds. Monitor blood sugar levels. Insulin coverage as needed. Dietary restriction.   Trend BP and titrate BP meds as needed. Monitor h/h trend. Avoid nephrotoxic meds as possible. Will follow electrolytes and renal function trend.   Discussed with patients wife at the bedside. D/w patient regarding need for out patient nephrology follow up.

## 2024-12-10 NOTE — PROGRESS NOTE ADULT - REASON FOR ADMISSION
Bilateral Heel Osteomyelitis

## 2024-12-10 NOTE — SOCIAL WORK PROGRESS NOTE - NSSWPROGRESSNOTE_GEN_ALL_CORE
NADYA spoke with community rep - family refused delivery of aaron lift, bed should be delivered today. Message left for Fallon at [facility]. NADYA to follow.

## 2024-12-10 NOTE — CHART NOTE - NSCHARTNOTEFT_GEN_A_CORE
84 yo male with hypertension, diabetes, hyperlipidemia, bilateral heel osteomyelitis currently on outpatient antibiotics through midline presented for surgery planned with podiatry, Gabriel Deshpande, for his bilateral heel osteomyelitis. His osteomyelitis started 6 months PTA.   Calcanectomy, partial. Path- Right heel bone proximal margin- Acute and chronic osteomyelitis, Left heel bone proximal margin- Acute and chronic osteomyelitis. Patient followed by ID, completed Meropenem and Ciprofloxacin 12/9/24.    Pt was seen at bedside. Pt was resting comfortably. The dressing over the right basilic vein PICC line was removed, the PICC line was gently pulled out and hemostasis was achieved with direct pressure over the access site 10 minutes. A new dry sterile dressing was placed. Patient tolerated procedure well.

## 2024-12-10 NOTE — PROGRESS NOTE ADULT - SUBJECTIVE AND OBJECTIVE BOX
Chief Complaint: Heel ulcer    Interval Events: No events overnight.    Review of Systems:  General: No fevers, chills, weight gain  Skin: No rashes, color changes  Cardiovascular: No chest pain, orthopnea  Respiratory: No shortness of breath, cough  Gastrointestinal: No nausea, abdominal pain  Genitourinary: No incontinence, pain with urination  Musculoskeletal: No pain, swelling, decreased range of motion  Neurological: No headache, weakness  Psychiatric: No depression, anxiety  Endocrine: No weight gain, increased thirst  All other systems are comprehensively negative.    Physical Exam:  Vital Signs Last 24 Hrs  T(C): 36.6 (10 Dec 2024 04:33), Max: 36.7 (09 Dec 2024 11:08)  T(F): 97.8 (10 Dec 2024 04:33), Max: 98 (09 Dec 2024 11:08)  HR: 84 (10 Dec 2024 04:33) (67 - 105)  BP: 107/68 (10 Dec 2024 04:33) (107/68 - 142/80)  BP(mean): --  RR: 16 (10 Dec 2024 04:33) (16 - 17)  SpO2: 94% (10 Dec 2024 04:33) (93% - 97%)  Parameters below as of 10 Dec 2024 04:33  Patient On (Oxygen Delivery Method): room air  General: NAD  HEENT: MMM  Neck: No JVD, no carotid bruit  Lungs: CTAB  CV: RRR, nl S1/S2, no M/R/G  Abdomen: S/NT/ND, +BS  Extremities: +Lymphedema, no cyanosis  Neuro: AAOx3, non-focal  Skin: No rash    Labs:

## 2024-12-10 NOTE — CHART NOTE - NSCHARTNOTESELECT_GEN_ALL_CORE
Event Note
Nutrition Services
Event Note
Nutrition Services
d/c PICC Line/Event Note

## 2024-12-10 NOTE — PROGRESS NOTE ADULT - PROVIDER SPECIALTY LIST ADULT
Cardiology
Hospitalist
Infectious Disease
Nephrology
Podiatry
Cardiology
Hospitalist
Infectious Disease
Nephrology
Podiatry
Cardiology
Hospitalist
Infectious Disease
Nephrology
Nephrology
Cardiology
Hospitalist
Infectious Disease
Nephrology
Podiatry
Hospitalist
Podiatry
Podiatry
Hospitalist
Podiatry
Hospitalist

## 2024-12-10 NOTE — PROGRESS NOTE ADULT - SUBJECTIVE AND OBJECTIVE BOX
[facility] Nephrology Services                                                       Dr. Blum, Dr. Marshall, Dr. Cevallos, Dr. Arroyo, Dr. Darling, Dr. Cordova                                      [facility], [facility], [address]                                                 [address] [address]                                      Ph: [phone]  Fax: [phone]                                         Ph: [phone]  Fax: [phone]      Patient is a 84y old  Male who presents with a chief complaint of Bilateral Heel Osteomyelitis (02 Dec 2024 12:02)  Patient seen in follow up for CKD.        PAST MEDICAL HISTORY:  HTN (hypertension)    CAD (coronary artery disease)    HLD (hyperlipidemia)    DM (diabetes mellitus)    Benign prostatic hyperplasia with lower urinary tract symptoms, symptom details unspecified    Stented coronary artery    SANTO on CPAP    Chronic obstructive pulmonary disease, unspecified COPD type    Obesity, morbid, BMI 40.0-49.9    Difficulty walking    GI bleed      History of blood transfusion    DM2 (diabetes mellitus, type 2)    COPD, mild    Lymphedema    Multiple myeloma    Chronic obstructive pulmonary disease, unspecified COPD type              MEDICATIONS  (STANDING):  aspirin  chewable 81 milliGRAM(s) Oral daily  atorvastatin 80 milliGRAM(s) Oral at bedtime  chlorhexidine 2% Cloths 1 Application(s) Topical daily  clopidogrel Tablet 75 milliGRAM(s) Oral daily  cyanocobalamin 1000 MICROGram(s) Oral daily  dextrose 5%. 1000 milliLiter(s) (100 mL/Hr) IV Continuous <Continuous>  dextrose 5%. 1000 milliLiter(s) (50 mL/Hr) IV Continuous <Continuous>  dextrose 50% Injectable 25 Gram(s) IV Push once  dextrose 50% Injectable 25 Gram(s) IV Push once  dextrose 50% Injectable 12.5 Gram(s) IV Push once  enoxaparin Injectable 40 milliGRAM(s) SubCutaneous every 24 hours  famotidine    Tablet 20 milliGRAM(s) Oral daily  glucagon  Injectable 1 milliGRAM(s) IntraMuscular once  insulin glargine Injectable (LANTUS) 8 Unit(s) SubCutaneous at bedtime  insulin lispro (ADMELOG) corrective regimen sliding scale   SubCutaneous three times a day before meals  melatonin 3 milliGRAM(s) Oral at bedtime  metoprolol tartrate 25 milliGRAM(s) Oral two times a day  nystatin Powder 1 Application(s) Topical two times a day  polyethylene glycol 3350 17 Gram(s) Oral daily  saccharomyces boulardii 250 milliGRAM(s) Oral two times a day  senna 1 Tablet(s) Oral with breakfast  tamsulosin 0.4 milliGRAM(s) Oral at bedtime    MEDICATIONS  (PRN):  acetaminophen     Tablet .. 650 milliGRAM(s) Oral every 6 hours PRN Temp greater or equal to 38C (100.4F), Mild Pain (1 - 3)  aluminum hydroxide/magnesium hydroxide/simethicone Suspension 30 milliLiter(s) Oral every 4 hours PRN Dyspepsia  dextrose Oral Gel 15 Gram(s) Oral once PRN Blood Glucose LESS THAN 70 milliGRAM(s)/deciliter  sodium chloride 0.9% lock flush 10 milliLiter(s) IV Push every 1 hour PRN Pre/post blood products, medications, blood draw, and to maintain line patency    T(C): 36.6 (12-10-24 @ 04:33), Max: 36.7 (12-08-24 @ 20:01)  HR: 84 (12-10-24 @ 04:33) (67 - 105)  BP: 107/68 (12-10-24 @ 04:33) (107/68 - 142/80)  RR: 16 (12-10-24 @ 04:33)  SpO2: 94% (12-10-24 @ 04:33)  Wt(kg): --  I&O's Detail    09 Dec 2024 07:01  -  10 Dec 2024 07:00  --------------------------------------------------------  IN:  Total IN: 0 mL    OUT:    Voided (mL): 450 mL  Total OUT: 450 mL    Total NET: -450 mL              PHYSICAL EXAM:  General: No distress  Respiratory: b/l air entry  Cardiovascular: S1 S2  Gastrointestinal: soft  Extremities:  edema          LABORATORY:              Hemoglobin: 9.3 g/dL (12-08 @ 09:35)    Creatinine, Serum 1.30 (12-08 @ 09:35)

## 2024-12-19 ENCOUNTER — APPOINTMENT (OUTPATIENT)
Dept: WOUND CARE | Facility: HOSPITAL | Age: 84
End: 2024-12-19
Payer: MEDICARE

## 2024-12-19 ENCOUNTER — OUTPATIENT (OUTPATIENT)
Dept: OUTPATIENT SERVICES | Facility: HOSPITAL | Age: 84
LOS: 1 days | Discharge: HOME | End: 2024-12-19
Payer: MEDICARE

## 2024-12-19 VITALS
SYSTOLIC BLOOD PRESSURE: 144 MMHG | DIASTOLIC BLOOD PRESSURE: 80 MMHG | OXYGEN SATURATION: 98 % | TEMPERATURE: 98.1 F | BODY MASS INDEX: 38.17 KG/M2 | HEIGHT: 73 IN | HEART RATE: 90 BPM | WEIGHT: 288 LBS | RESPIRATION RATE: 20 BRPM

## 2024-12-19 DIAGNOSIS — I87.2 VENOUS INSUFFICIENCY (CHRONIC) (PERIPHERAL): ICD-10-CM

## 2024-12-19 DIAGNOSIS — L97.422 NON-PRESSURE CHRONIC ULCER OF LEFT HEEL AND MIDFOOT WITH FAT LAYER EXPOSED: ICD-10-CM

## 2024-12-19 DIAGNOSIS — Z98.890 OTHER SPECIFIED POSTPROCEDURAL STATES: Chronic | ICD-10-CM

## 2024-12-19 DIAGNOSIS — Z98.89 OTHER SPECIFIED POSTPROCEDURAL STATES: Chronic | ICD-10-CM

## 2024-12-19 DIAGNOSIS — Z95.9 PRESENCE OF CARDIAC AND VASCULAR IMPLANT AND GRAFT, UNSPECIFIED: Chronic | ICD-10-CM

## 2024-12-19 DIAGNOSIS — E11.621 TYPE 2 DIABETES MELLITUS WITH FOOT ULCER: ICD-10-CM

## 2024-12-19 DIAGNOSIS — L97.412 NON-PRESSURE CHRONIC ULCER OF RIGHT HEEL AND MIDFOOT WITH FAT LAYER EXPOSED: ICD-10-CM

## 2024-12-19 DIAGNOSIS — L97.509 TYPE 2 DIABETES MELLITUS WITH FOOT ULCER: ICD-10-CM

## 2024-12-19 DIAGNOSIS — R60.0 LOCALIZED EDEMA: ICD-10-CM

## 2024-12-19 PROCEDURE — 99213 OFFICE O/P EST LOW 20 MIN: CPT

## 2024-12-19 PROCEDURE — G0463: CPT

## 2024-12-22 DIAGNOSIS — Z79.84 LONG TERM (CURRENT) USE OF ORAL HYPOGLYCEMIC DRUGS: ICD-10-CM

## 2024-12-22 DIAGNOSIS — Z83.3 FAMILY HISTORY OF DIABETES MELLITUS: ICD-10-CM

## 2024-12-22 DIAGNOSIS — E78.5 HYPERLIPIDEMIA, UNSPECIFIED: ICD-10-CM

## 2024-12-22 DIAGNOSIS — Z79.4 LONG TERM (CURRENT) USE OF INSULIN: ICD-10-CM

## 2024-12-22 DIAGNOSIS — I87.2 VENOUS INSUFFICIENCY (CHRONIC) (PERIPHERAL): ICD-10-CM

## 2024-12-22 DIAGNOSIS — Z79.899 OTHER LONG TERM (CURRENT) DRUG THERAPY: ICD-10-CM

## 2024-12-22 DIAGNOSIS — Z98.890 OTHER SPECIFIED POSTPROCEDURAL STATES: ICD-10-CM

## 2024-12-22 DIAGNOSIS — I10 ESSENTIAL (PRIMARY) HYPERTENSION: ICD-10-CM

## 2024-12-22 DIAGNOSIS — Z82.3 FAMILY HISTORY OF STROKE: ICD-10-CM

## 2024-12-22 DIAGNOSIS — Z79.82 LONG TERM (CURRENT) USE OF ASPIRIN: ICD-10-CM

## 2024-12-22 DIAGNOSIS — R60.0 LOCALIZED EDEMA: ICD-10-CM

## 2024-12-22 DIAGNOSIS — Z82.49 FAMILY HISTORY OF ISCHEMIC HEART DISEASE AND OTHER DISEASES OF THE CIRCULATORY SYSTEM: ICD-10-CM

## 2024-12-22 DIAGNOSIS — I73.9 PERIPHERAL VASCULAR DISEASE, UNSPECIFIED: ICD-10-CM

## 2024-12-22 DIAGNOSIS — Z87.891 PERSONAL HISTORY OF NICOTINE DEPENDENCE: ICD-10-CM

## 2024-12-22 DIAGNOSIS — L97.422 NON-PRESSURE CHRONIC ULCER OF LEFT HEEL AND MIDFOOT WITH FAT LAYER EXPOSED: ICD-10-CM

## 2024-12-22 DIAGNOSIS — E11.51 TYPE 2 DIABETES MELLITUS WITH DIABETIC PERIPHERAL ANGIOPATHY WITHOUT GANGRENE: ICD-10-CM

## 2024-12-22 DIAGNOSIS — I25.10 ATHEROSCLEROTIC HEART DISEASE OF NATIVE CORONARY ARTERY WITHOUT ANGINA PECTORIS: ICD-10-CM

## 2024-12-22 DIAGNOSIS — E11.621 TYPE 2 DIABETES MELLITUS WITH FOOT ULCER: ICD-10-CM

## 2024-12-22 DIAGNOSIS — E11.42 TYPE 2 DIABETES MELLITUS WITH DIABETIC POLYNEUROPATHY: ICD-10-CM

## 2025-01-03 ENCOUNTER — NON-APPOINTMENT (OUTPATIENT)
Age: 85
End: 2025-01-03

## 2025-01-03 ENCOUNTER — OUTPATIENT (OUTPATIENT)
Dept: OUTPATIENT SERVICES | Facility: HOSPITAL | Age: 85
LOS: 1 days | Discharge: HOME | End: 2025-01-03
Payer: MEDICARE

## 2025-01-03 ENCOUNTER — APPOINTMENT (OUTPATIENT)
Dept: WOUND CARE | Facility: HOSPITAL | Age: 85
End: 2025-01-03
Payer: MEDICARE

## 2025-01-03 VITALS
SYSTOLIC BLOOD PRESSURE: 142 MMHG | OXYGEN SATURATION: 97 % | TEMPERATURE: 97.8 F | DIASTOLIC BLOOD PRESSURE: 77 MMHG | HEART RATE: 94 BPM | RESPIRATION RATE: 20 BRPM

## 2025-01-03 DIAGNOSIS — R60.0 LOCALIZED EDEMA: ICD-10-CM

## 2025-01-03 DIAGNOSIS — L97.422 NON-PRESSURE CHRONIC ULCER OF LEFT HEEL AND MIDFOOT WITH FAT LAYER EXPOSED: ICD-10-CM

## 2025-01-03 DIAGNOSIS — Z98.890 OTHER SPECIFIED POSTPROCEDURAL STATES: Chronic | ICD-10-CM

## 2025-01-03 DIAGNOSIS — L97.509 TYPE 2 DIABETES MELLITUS WITH FOOT ULCER: ICD-10-CM

## 2025-01-03 DIAGNOSIS — E11.621 TYPE 2 DIABETES MELLITUS WITH FOOT ULCER: ICD-10-CM

## 2025-01-03 DIAGNOSIS — L97.412 NON-PRESSURE CHRONIC ULCER OF RIGHT HEEL AND MIDFOOT WITH FAT LAYER EXPOSED: ICD-10-CM

## 2025-01-03 DIAGNOSIS — Z98.89 OTHER SPECIFIED POSTPROCEDURAL STATES: Chronic | ICD-10-CM

## 2025-01-03 DIAGNOSIS — Z95.9 PRESENCE OF CARDIAC AND VASCULAR IMPLANT AND GRAFT, UNSPECIFIED: Chronic | ICD-10-CM

## 2025-01-03 DIAGNOSIS — I87.2 VENOUS INSUFFICIENCY (CHRONIC) (PERIPHERAL): ICD-10-CM

## 2025-01-03 PROCEDURE — 99213 OFFICE O/P EST LOW 20 MIN: CPT

## 2025-01-03 PROCEDURE — G0463: CPT

## 2025-01-05 DIAGNOSIS — Z87.09 PERSONAL HISTORY OF OTHER DISEASES OF THE RESPIRATORY SYSTEM: ICD-10-CM

## 2025-01-05 DIAGNOSIS — Z79.84 LONG TERM (CURRENT) USE OF ORAL HYPOGLYCEMIC DRUGS: ICD-10-CM

## 2025-01-05 DIAGNOSIS — I73.9 PERIPHERAL VASCULAR DISEASE, UNSPECIFIED: ICD-10-CM

## 2025-01-05 DIAGNOSIS — Z79.4 LONG TERM (CURRENT) USE OF INSULIN: ICD-10-CM

## 2025-01-05 DIAGNOSIS — R60.0 LOCALIZED EDEMA: ICD-10-CM

## 2025-01-05 DIAGNOSIS — E78.5 HYPERLIPIDEMIA, UNSPECIFIED: ICD-10-CM

## 2025-01-05 DIAGNOSIS — L97.422 NON-PRESSURE CHRONIC ULCER OF LEFT HEEL AND MIDFOOT WITH FAT LAYER EXPOSED: ICD-10-CM

## 2025-01-05 DIAGNOSIS — Z83.3 FAMILY HISTORY OF DIABETES MELLITUS: ICD-10-CM

## 2025-01-05 DIAGNOSIS — I10 ESSENTIAL (PRIMARY) HYPERTENSION: ICD-10-CM

## 2025-01-05 DIAGNOSIS — E11.621 TYPE 2 DIABETES MELLITUS WITH FOOT ULCER: ICD-10-CM

## 2025-01-05 DIAGNOSIS — Z79.82 LONG TERM (CURRENT) USE OF ASPIRIN: ICD-10-CM

## 2025-01-05 DIAGNOSIS — E11.51 TYPE 2 DIABETES MELLITUS WITH DIABETIC PERIPHERAL ANGIOPATHY WITHOUT GANGRENE: ICD-10-CM

## 2025-01-05 DIAGNOSIS — Z82.3 FAMILY HISTORY OF STROKE: ICD-10-CM

## 2025-01-05 DIAGNOSIS — L97.412 NON-PRESSURE CHRONIC ULCER OF RIGHT HEEL AND MIDFOOT WITH FAT LAYER EXPOSED: ICD-10-CM

## 2025-01-05 DIAGNOSIS — I87.2 VENOUS INSUFFICIENCY (CHRONIC) (PERIPHERAL): ICD-10-CM

## 2025-01-05 DIAGNOSIS — Z87.891 PERSONAL HISTORY OF NICOTINE DEPENDENCE: ICD-10-CM

## 2025-01-05 DIAGNOSIS — E11.42 TYPE 2 DIABETES MELLITUS WITH DIABETIC POLYNEUROPATHY: ICD-10-CM

## 2025-01-05 DIAGNOSIS — Z82.49 FAMILY HISTORY OF ISCHEMIC HEART DISEASE AND OTHER DISEASES OF THE CIRCULATORY SYSTEM: ICD-10-CM

## 2025-01-05 DIAGNOSIS — Z79.899 OTHER LONG TERM (CURRENT) DRUG THERAPY: ICD-10-CM

## 2025-01-05 DIAGNOSIS — Z98.890 OTHER SPECIFIED POSTPROCEDURAL STATES: ICD-10-CM

## 2025-01-05 DIAGNOSIS — I25.10 ATHEROSCLEROTIC HEART DISEASE OF NATIVE CORONARY ARTERY WITHOUT ANGINA PECTORIS: ICD-10-CM

## 2025-01-24 ENCOUNTER — OUTPATIENT (OUTPATIENT)
Dept: OUTPATIENT SERVICES | Facility: HOSPITAL | Age: 85
LOS: 1 days | Discharge: HOME | End: 2025-01-24
Payer: MEDICARE

## 2025-01-24 ENCOUNTER — NON-APPOINTMENT (OUTPATIENT)
Age: 85
End: 2025-01-24

## 2025-01-24 ENCOUNTER — APPOINTMENT (OUTPATIENT)
Dept: WOUND CARE | Facility: HOSPITAL | Age: 85
End: 2025-01-24

## 2025-01-24 VITALS
WEIGHT: 288 LBS | OXYGEN SATURATION: 98 % | HEIGHT: 73 IN | HEART RATE: 90 BPM | TEMPERATURE: 97.9 F | RESPIRATION RATE: 18 BRPM | BODY MASS INDEX: 38.17 KG/M2 | DIASTOLIC BLOOD PRESSURE: 67 MMHG | SYSTOLIC BLOOD PRESSURE: 118 MMHG

## 2025-01-24 DIAGNOSIS — R60.0 LOCALIZED EDEMA: ICD-10-CM

## 2025-01-24 DIAGNOSIS — L97.422 NON-PRESSURE CHRONIC ULCER OF LEFT HEEL AND MIDFOOT WITH FAT LAYER EXPOSED: ICD-10-CM

## 2025-01-24 DIAGNOSIS — Z98.890 OTHER SPECIFIED POSTPROCEDURAL STATES: Chronic | ICD-10-CM

## 2025-01-24 DIAGNOSIS — I87.2 VENOUS INSUFFICIENCY (CHRONIC) (PERIPHERAL): ICD-10-CM

## 2025-01-24 DIAGNOSIS — L97.412 NON-PRESSURE CHRONIC ULCER OF RIGHT HEEL AND MIDFOOT WITH FAT LAYER EXPOSED: ICD-10-CM

## 2025-01-24 DIAGNOSIS — E11.621 TYPE 2 DIABETES MELLITUS WITH FOOT ULCER: ICD-10-CM

## 2025-01-24 DIAGNOSIS — Z98.89 OTHER SPECIFIED POSTPROCEDURAL STATES: Chronic | ICD-10-CM

## 2025-01-24 DIAGNOSIS — Z95.9 PRESENCE OF CARDIAC AND VASCULAR IMPLANT AND GRAFT, UNSPECIFIED: Chronic | ICD-10-CM

## 2025-01-24 DIAGNOSIS — L97.509 TYPE 2 DIABETES MELLITUS WITH FOOT ULCER: ICD-10-CM

## 2025-01-24 PROCEDURE — 99213 OFFICE O/P EST LOW 20 MIN: CPT

## 2025-01-24 PROCEDURE — G0463: CPT

## 2025-01-24 RX ORDER — APIXABAN 5 MG/1
TABLET, FILM COATED ORAL
Refills: 0 | Status: ACTIVE | COMMUNITY

## 2025-01-28 DIAGNOSIS — E78.5 HYPERLIPIDEMIA, UNSPECIFIED: ICD-10-CM

## 2025-01-28 DIAGNOSIS — L97.412 NON-PRESSURE CHRONIC ULCER OF RIGHT HEEL AND MIDFOOT WITH FAT LAYER EXPOSED: ICD-10-CM

## 2025-01-28 DIAGNOSIS — L97.422 NON-PRESSURE CHRONIC ULCER OF LEFT HEEL AND MIDFOOT WITH FAT LAYER EXPOSED: ICD-10-CM

## 2025-01-28 DIAGNOSIS — R60.0 LOCALIZED EDEMA: ICD-10-CM

## 2025-01-28 DIAGNOSIS — Z82.49 FAMILY HISTORY OF ISCHEMIC HEART DISEASE AND OTHER DISEASES OF THE CIRCULATORY SYSTEM: ICD-10-CM

## 2025-01-28 DIAGNOSIS — E11.42 TYPE 2 DIABETES MELLITUS WITH DIABETIC POLYNEUROPATHY: ICD-10-CM

## 2025-01-28 DIAGNOSIS — I25.10 ATHEROSCLEROTIC HEART DISEASE OF NATIVE CORONARY ARTERY WITHOUT ANGINA PECTORIS: ICD-10-CM

## 2025-01-28 DIAGNOSIS — Z79.84 LONG TERM (CURRENT) USE OF ORAL HYPOGLYCEMIC DRUGS: ICD-10-CM

## 2025-01-28 DIAGNOSIS — Z83.3 FAMILY HISTORY OF DIABETES MELLITUS: ICD-10-CM

## 2025-01-28 DIAGNOSIS — Z87.891 PERSONAL HISTORY OF NICOTINE DEPENDENCE: ICD-10-CM

## 2025-01-28 DIAGNOSIS — E11.51 TYPE 2 DIABETES MELLITUS WITH DIABETIC PERIPHERAL ANGIOPATHY WITHOUT GANGRENE: ICD-10-CM

## 2025-01-28 DIAGNOSIS — Z79.899 OTHER LONG TERM (CURRENT) DRUG THERAPY: ICD-10-CM

## 2025-01-28 DIAGNOSIS — I10 ESSENTIAL (PRIMARY) HYPERTENSION: ICD-10-CM

## 2025-01-28 DIAGNOSIS — E11.621 TYPE 2 DIABETES MELLITUS WITH FOOT ULCER: ICD-10-CM

## 2025-01-28 DIAGNOSIS — Z79.82 LONG TERM (CURRENT) USE OF ASPIRIN: ICD-10-CM

## 2025-01-28 DIAGNOSIS — Z82.3 FAMILY HISTORY OF STROKE: ICD-10-CM

## 2025-01-28 DIAGNOSIS — I73.9 PERIPHERAL VASCULAR DISEASE, UNSPECIFIED: ICD-10-CM

## 2025-01-28 DIAGNOSIS — Z98.890 OTHER SPECIFIED POSTPROCEDURAL STATES: ICD-10-CM

## 2025-01-28 DIAGNOSIS — Z79.4 LONG TERM (CURRENT) USE OF INSULIN: ICD-10-CM

## 2025-01-28 DIAGNOSIS — I87.2 VENOUS INSUFFICIENCY (CHRONIC) (PERIPHERAL): ICD-10-CM

## 2025-02-06 ENCOUNTER — APPOINTMENT (OUTPATIENT)
Dept: WOUND CARE | Facility: HOSPITAL | Age: 85
End: 2025-02-06
Payer: MEDICARE

## 2025-02-06 ENCOUNTER — NON-APPOINTMENT (OUTPATIENT)
Age: 85
End: 2025-02-06

## 2025-02-06 ENCOUNTER — OUTPATIENT (OUTPATIENT)
Dept: OUTPATIENT SERVICES | Facility: HOSPITAL | Age: 85
LOS: 1 days | Discharge: HOME | End: 2025-02-06
Payer: MEDICARE

## 2025-02-06 VITALS
RESPIRATION RATE: 18 BRPM | DIASTOLIC BLOOD PRESSURE: 81 MMHG | TEMPERATURE: 98.3 F | HEART RATE: 90 BPM | OXYGEN SATURATION: 95 % | HEIGHT: 73 IN | WEIGHT: 288 LBS | SYSTOLIC BLOOD PRESSURE: 121 MMHG | BODY MASS INDEX: 38.17 KG/M2

## 2025-02-06 DIAGNOSIS — L97.412 NON-PRESSURE CHRONIC ULCER OF RIGHT HEEL AND MIDFOOT WITH FAT LAYER EXPOSED: ICD-10-CM

## 2025-02-06 DIAGNOSIS — Z79.84 LONG TERM (CURRENT) USE OF ORAL HYPOGLYCEMIC DRUGS: ICD-10-CM

## 2025-02-06 DIAGNOSIS — Z82.3 FAMILY HISTORY OF STROKE: ICD-10-CM

## 2025-02-06 DIAGNOSIS — Z98.890 OTHER SPECIFIED POSTPROCEDURAL STATES: Chronic | ICD-10-CM

## 2025-02-06 DIAGNOSIS — I10 ESSENTIAL (PRIMARY) HYPERTENSION: ICD-10-CM

## 2025-02-06 DIAGNOSIS — E11.621 TYPE 2 DIABETES MELLITUS WITH FOOT ULCER: ICD-10-CM

## 2025-02-06 DIAGNOSIS — I87.2 VENOUS INSUFFICIENCY (CHRONIC) (PERIPHERAL): ICD-10-CM

## 2025-02-06 DIAGNOSIS — L97.422 NON-PRESSURE CHRONIC ULCER OF LEFT HEEL AND MIDFOOT WITH FAT LAYER EXPOSED: ICD-10-CM

## 2025-02-06 DIAGNOSIS — E78.5 HYPERLIPIDEMIA, UNSPECIFIED: ICD-10-CM

## 2025-02-06 DIAGNOSIS — Z79.4 LONG TERM (CURRENT) USE OF INSULIN: ICD-10-CM

## 2025-02-06 DIAGNOSIS — I73.9 PERIPHERAL VASCULAR DISEASE, UNSPECIFIED: ICD-10-CM

## 2025-02-06 DIAGNOSIS — R60.0 LOCALIZED EDEMA: ICD-10-CM

## 2025-02-06 DIAGNOSIS — Z79.82 LONG TERM (CURRENT) USE OF ASPIRIN: ICD-10-CM

## 2025-02-06 DIAGNOSIS — L97.509 TYPE 2 DIABETES MELLITUS WITH FOOT ULCER: ICD-10-CM

## 2025-02-06 DIAGNOSIS — E11.42 TYPE 2 DIABETES MELLITUS WITH DIABETIC POLYNEUROPATHY: ICD-10-CM

## 2025-02-06 DIAGNOSIS — Z98.89 OTHER SPECIFIED POSTPROCEDURAL STATES: Chronic | ICD-10-CM

## 2025-02-06 DIAGNOSIS — Z98.890 OTHER SPECIFIED POSTPROCEDURAL STATES: ICD-10-CM

## 2025-02-06 DIAGNOSIS — Z83.3 FAMILY HISTORY OF DIABETES MELLITUS: ICD-10-CM

## 2025-02-06 DIAGNOSIS — Z79.899 OTHER LONG TERM (CURRENT) DRUG THERAPY: ICD-10-CM

## 2025-02-06 DIAGNOSIS — E11.51 TYPE 2 DIABETES MELLITUS WITH DIABETIC PERIPHERAL ANGIOPATHY WITHOUT GANGRENE: ICD-10-CM

## 2025-02-06 DIAGNOSIS — Z95.9 PRESENCE OF CARDIAC AND VASCULAR IMPLANT AND GRAFT, UNSPECIFIED: Chronic | ICD-10-CM

## 2025-02-06 DIAGNOSIS — Z87.891 PERSONAL HISTORY OF NICOTINE DEPENDENCE: ICD-10-CM

## 2025-02-06 DIAGNOSIS — Z82.49 FAMILY HISTORY OF ISCHEMIC HEART DISEASE AND OTHER DISEASES OF THE CIRCULATORY SYSTEM: ICD-10-CM

## 2025-02-06 PROCEDURE — 99213 OFFICE O/P EST LOW 20 MIN: CPT

## 2025-02-06 PROCEDURE — G0463: CPT

## 2025-02-18 NOTE — PROGRESS NOTE ADULT - ASSESSMENT
Occupational Therapy Visit    Visit Type: Daily Treatment Note  Visit: 7  Referring Provider: Anahy Jacobo MD  Medical Diagnosis (from order): S49.91XA - Injury of right shoulder, initial encounter  S79.911A - Hip injury, right, initial encounter     SUBJECTIVE                                                                                                               Patient reports her right shoulder is always in pain, but not as severe right now. Has been trying to stretch it consistently. Reports left shoulder starting to get sore due to compensation.     Pain / Symptoms  - Pain rating (out of 10): Current: 5      OBJECTIVE                                                                                                                                    Treatment       Therapeutic Exercise  Seated table slides into shoulder 1x5 with drag of hands coming back with reduced pain  Pulleys into shoulder flexion 2 min x 1 set  AROM shoulder flexion with elbows full flexion 1x10  AROM shoulder abduction with elbows full flexion 1x10      Manual Therapy   STM R shoulder deltoid, UT, LS, proximal biceps, pec insertion, dorsal forearm/elbow    Skilled input: verbal instruction/cues and tactile instruction/cues    Writer verbally educated and received verbal consent for hand placement, positioning of patient, and techniques to be performed today from patient for clothing adjustments for techniques and therapist position for techniques as described above and how they are pertinent to the patient's plan of care.  Home Exercise Program  *above indicates provided as part of home exercise program      ASSESSMENT                                                                                                            Patient tolerated therapy fair this date. She continues to have significant pain in the right shoulder and has struggled to progress motion or strength at this time. I do think she would benefit from an  The patient is an 83 year old male with a history of HTN, HL, DM, CKD, GI bleed, COPD, SANTO, BPH, CAD s/p PCI, PAD, multiple myeloma, lymphedema who presents with heel ulcer.     11/9/24  Seen at [facility]  Sitting in chair  No complaints offered    Plan:  - ECG with sinus rhythm and no evidence of ischemia/infarction  - Echo 6/30/24 with normal LV systolic function, mild pulm HTN  - CXR with grossly clear lungs  - Lower extremity swelling consistent with known history of lymphedema  - Continue aspirin 81 mg daily  - Continue clopidogrel 75 mg daily  - Continue atorvastatin 80 mg daily  - Continue metoprolol tartrate 25 mg bid  - Podiatry and ID follow-up  - Path results with acute on chronic osteomyelitis  - IV antibiotics-meropenem  - Discharge planning orthopedic referral to consider other treatment options and may benefit from return to therapy if pain improves.   Pain/symptoms after session (out of 10): 6  Education:   - Results of above outlined education: Demonstrates understanding, Verbalizes understanding and Needs reinforcement    PLAN                                                                                                                           Suggestions for next session as indicated: Progress per plan of care, stretching, strengthening, STM, modalities prn, joint mobilizations prn, k-tape prn, body mechanics education and training       Therapy procedure time and total treatment time can be found documented on the Time Entry flowsheet

## 2025-02-24 ENCOUNTER — OUTPATIENT (OUTPATIENT)
Dept: OUTPATIENT SERVICES | Facility: HOSPITAL | Age: 85
LOS: 1 days | Discharge: HOME | End: 2025-02-24
Payer: MEDICARE

## 2025-02-24 ENCOUNTER — APPOINTMENT (OUTPATIENT)
Dept: WOUND CARE | Facility: HOSPITAL | Age: 85
End: 2025-02-24
Payer: MEDICARE

## 2025-02-24 VITALS
RESPIRATION RATE: 18 BRPM | WEIGHT: 288 LBS | BODY MASS INDEX: 38.17 KG/M2 | HEART RATE: 86 BPM | DIASTOLIC BLOOD PRESSURE: 100 MMHG | OXYGEN SATURATION: 96 % | TEMPERATURE: 98 F | HEIGHT: 73 IN | SYSTOLIC BLOOD PRESSURE: 171 MMHG

## 2025-02-24 DIAGNOSIS — L97.509 TYPE 2 DIABETES MELLITUS WITH FOOT ULCER: ICD-10-CM

## 2025-02-24 DIAGNOSIS — I87.2 VENOUS INSUFFICIENCY (CHRONIC) (PERIPHERAL): ICD-10-CM

## 2025-02-24 DIAGNOSIS — L97.412 NON-PRESSURE CHRONIC ULCER OF RIGHT HEEL AND MIDFOOT WITH FAT LAYER EXPOSED: ICD-10-CM

## 2025-02-24 DIAGNOSIS — Z98.89 OTHER SPECIFIED POSTPROCEDURAL STATES: Chronic | ICD-10-CM

## 2025-02-24 DIAGNOSIS — R60.0 LOCALIZED EDEMA: ICD-10-CM

## 2025-02-24 DIAGNOSIS — Z95.9 PRESENCE OF CARDIAC AND VASCULAR IMPLANT AND GRAFT, UNSPECIFIED: Chronic | ICD-10-CM

## 2025-02-24 DIAGNOSIS — E11.621 TYPE 2 DIABETES MELLITUS WITH FOOT ULCER: ICD-10-CM

## 2025-02-24 DIAGNOSIS — Z98.890 OTHER SPECIFIED POSTPROCEDURAL STATES: Chronic | ICD-10-CM

## 2025-02-24 DIAGNOSIS — L97.422 NON-PRESSURE CHRONIC ULCER OF LEFT HEEL AND MIDFOOT WITH FAT LAYER EXPOSED: ICD-10-CM

## 2025-02-24 PROCEDURE — 99213 OFFICE O/P EST LOW 20 MIN: CPT

## 2025-02-24 PROCEDURE — G0463: CPT

## 2025-02-25 DIAGNOSIS — L97.412 NON-PRESSURE CHRONIC ULCER OF RIGHT HEEL AND MIDFOOT WITH FAT LAYER EXPOSED: ICD-10-CM

## 2025-02-25 DIAGNOSIS — Z79.4 LONG TERM (CURRENT) USE OF INSULIN: ICD-10-CM

## 2025-02-25 DIAGNOSIS — I25.10 ATHEROSCLEROTIC HEART DISEASE OF NATIVE CORONARY ARTERY WITHOUT ANGINA PECTORIS: ICD-10-CM

## 2025-02-25 DIAGNOSIS — Z83.3 FAMILY HISTORY OF DIABETES MELLITUS: ICD-10-CM

## 2025-02-25 DIAGNOSIS — Z79.899 OTHER LONG TERM (CURRENT) DRUG THERAPY: ICD-10-CM

## 2025-02-25 DIAGNOSIS — Z87.891 PERSONAL HISTORY OF NICOTINE DEPENDENCE: ICD-10-CM

## 2025-02-25 DIAGNOSIS — L97.422 NON-PRESSURE CHRONIC ULCER OF LEFT HEEL AND MIDFOOT WITH FAT LAYER EXPOSED: ICD-10-CM

## 2025-02-25 DIAGNOSIS — E11.42 TYPE 2 DIABETES MELLITUS WITH DIABETIC POLYNEUROPATHY: ICD-10-CM

## 2025-02-25 DIAGNOSIS — Z87.09 PERSONAL HISTORY OF OTHER DISEASES OF THE RESPIRATORY SYSTEM: ICD-10-CM

## 2025-02-25 DIAGNOSIS — I87.2 VENOUS INSUFFICIENCY (CHRONIC) (PERIPHERAL): ICD-10-CM

## 2025-02-25 DIAGNOSIS — E11.621 TYPE 2 DIABETES MELLITUS WITH FOOT ULCER: ICD-10-CM

## 2025-02-25 DIAGNOSIS — Z79.82 LONG TERM (CURRENT) USE OF ASPIRIN: ICD-10-CM

## 2025-02-25 DIAGNOSIS — I73.9 PERIPHERAL VASCULAR DISEASE, UNSPECIFIED: ICD-10-CM

## 2025-02-25 DIAGNOSIS — Z98.890 OTHER SPECIFIED POSTPROCEDURAL STATES: ICD-10-CM

## 2025-02-25 DIAGNOSIS — Z82.3 FAMILY HISTORY OF STROKE: ICD-10-CM

## 2025-02-25 DIAGNOSIS — E78.5 HYPERLIPIDEMIA, UNSPECIFIED: ICD-10-CM

## 2025-02-25 DIAGNOSIS — E11.51 TYPE 2 DIABETES MELLITUS WITH DIABETIC PERIPHERAL ANGIOPATHY WITHOUT GANGRENE: ICD-10-CM

## 2025-02-25 DIAGNOSIS — I10 ESSENTIAL (PRIMARY) HYPERTENSION: ICD-10-CM

## 2025-02-25 DIAGNOSIS — R60.0 LOCALIZED EDEMA: ICD-10-CM

## 2025-02-25 DIAGNOSIS — Z82.49 FAMILY HISTORY OF ISCHEMIC HEART DISEASE AND OTHER DISEASES OF THE CIRCULATORY SYSTEM: ICD-10-CM

## 2025-02-25 DIAGNOSIS — Z79.84 LONG TERM (CURRENT) USE OF ORAL HYPOGLYCEMIC DRUGS: ICD-10-CM

## 2025-03-10 ENCOUNTER — APPOINTMENT (OUTPATIENT)
Dept: WOUND CARE | Facility: HOSPITAL | Age: 85
End: 2025-03-10
Payer: MEDICARE

## 2025-03-10 ENCOUNTER — OUTPATIENT (OUTPATIENT)
Dept: OUTPATIENT SERVICES | Facility: HOSPITAL | Age: 85
LOS: 1 days | Discharge: HOME | End: 2025-03-10
Payer: MEDICARE

## 2025-03-10 VITALS
BODY MASS INDEX: 38.17 KG/M2 | WEIGHT: 288 LBS | OXYGEN SATURATION: 96 % | DIASTOLIC BLOOD PRESSURE: 76 MMHG | RESPIRATION RATE: 18 BRPM | HEIGHT: 73 IN | TEMPERATURE: 98 F | HEART RATE: 71 BPM | SYSTOLIC BLOOD PRESSURE: 116 MMHG

## 2025-03-10 DIAGNOSIS — L97.412 NON-PRESSURE CHRONIC ULCER OF RIGHT HEEL AND MIDFOOT WITH FAT LAYER EXPOSED: ICD-10-CM

## 2025-03-10 DIAGNOSIS — Z95.9 PRESENCE OF CARDIAC AND VASCULAR IMPLANT AND GRAFT, UNSPECIFIED: Chronic | ICD-10-CM

## 2025-03-10 DIAGNOSIS — E11.51 TYPE 2 DIABETES MELLITUS WITH DIABETIC PERIPHERAL ANGIOPATHY W/OUT GANGRENE: ICD-10-CM

## 2025-03-10 DIAGNOSIS — Z98.890 OTHER SPECIFIED POSTPROCEDURAL STATES: Chronic | ICD-10-CM

## 2025-03-10 DIAGNOSIS — Z98.89 OTHER SPECIFIED POSTPROCEDURAL STATES: Chronic | ICD-10-CM

## 2025-03-10 DIAGNOSIS — L97.509 TYPE 2 DIABETES MELLITUS WITH FOOT ULCER: ICD-10-CM

## 2025-03-10 DIAGNOSIS — I87.2 VENOUS INSUFFICIENCY (CHRONIC) (PERIPHERAL): ICD-10-CM

## 2025-03-10 DIAGNOSIS — L97.422 NON-PRESSURE CHRONIC ULCER OF LEFT HEEL AND MIDFOOT WITH FAT LAYER EXPOSED: ICD-10-CM

## 2025-03-10 DIAGNOSIS — R60.0 LOCALIZED EDEMA: ICD-10-CM

## 2025-03-10 DIAGNOSIS — E11.621 TYPE 2 DIABETES MELLITUS WITH FOOT ULCER: ICD-10-CM

## 2025-03-10 PROCEDURE — 99213 OFFICE O/P EST LOW 20 MIN: CPT

## 2025-03-10 PROCEDURE — G0463: CPT

## 2025-03-12 DIAGNOSIS — Z83.3 FAMILY HISTORY OF DIABETES MELLITUS: ICD-10-CM

## 2025-03-12 DIAGNOSIS — I10 ESSENTIAL (PRIMARY) HYPERTENSION: ICD-10-CM

## 2025-03-12 DIAGNOSIS — Z87.891 PERSONAL HISTORY OF NICOTINE DEPENDENCE: ICD-10-CM

## 2025-03-12 DIAGNOSIS — E78.5 HYPERLIPIDEMIA, UNSPECIFIED: ICD-10-CM

## 2025-03-12 DIAGNOSIS — Z79.84 LONG TERM (CURRENT) USE OF ORAL HYPOGLYCEMIC DRUGS: ICD-10-CM

## 2025-03-12 DIAGNOSIS — Z98.890 OTHER SPECIFIED POSTPROCEDURAL STATES: ICD-10-CM

## 2025-03-12 DIAGNOSIS — L97.422 NON-PRESSURE CHRONIC ULCER OF LEFT HEEL AND MIDFOOT WITH FAT LAYER EXPOSED: ICD-10-CM

## 2025-03-12 DIAGNOSIS — I73.9 PERIPHERAL VASCULAR DISEASE, UNSPECIFIED: ICD-10-CM

## 2025-03-12 DIAGNOSIS — E11.42 TYPE 2 DIABETES MELLITUS WITH DIABETIC POLYNEUROPATHY: ICD-10-CM

## 2025-03-12 DIAGNOSIS — I25.10 ATHEROSCLEROTIC HEART DISEASE OF NATIVE CORONARY ARTERY WITHOUT ANGINA PECTORIS: ICD-10-CM

## 2025-03-12 DIAGNOSIS — Z82.3 FAMILY HISTORY OF STROKE: ICD-10-CM

## 2025-03-12 DIAGNOSIS — Z82.49 FAMILY HISTORY OF ISCHEMIC HEART DISEASE AND OTHER DISEASES OF THE CIRCULATORY SYSTEM: ICD-10-CM

## 2025-03-12 DIAGNOSIS — R60.0 LOCALIZED EDEMA: ICD-10-CM

## 2025-03-12 DIAGNOSIS — I87.2 VENOUS INSUFFICIENCY (CHRONIC) (PERIPHERAL): ICD-10-CM

## 2025-03-12 DIAGNOSIS — Z79.4 LONG TERM (CURRENT) USE OF INSULIN: ICD-10-CM

## 2025-03-12 DIAGNOSIS — Z79.899 OTHER LONG TERM (CURRENT) DRUG THERAPY: ICD-10-CM

## 2025-03-12 DIAGNOSIS — E11.621 TYPE 2 DIABETES MELLITUS WITH FOOT ULCER: ICD-10-CM

## 2025-03-12 DIAGNOSIS — Z79.82 LONG TERM (CURRENT) USE OF ASPIRIN: ICD-10-CM

## 2025-03-12 DIAGNOSIS — E11.51 TYPE 2 DIABETES MELLITUS WITH DIABETIC PERIPHERAL ANGIOPATHY WITHOUT GANGRENE: ICD-10-CM

## 2025-03-12 DIAGNOSIS — L97.412 NON-PRESSURE CHRONIC ULCER OF RIGHT HEEL AND MIDFOOT WITH FAT LAYER EXPOSED: ICD-10-CM

## 2025-03-18 ENCOUNTER — NON-APPOINTMENT (OUTPATIENT)
Age: 85
End: 2025-03-18

## 2025-03-20 ENCOUNTER — NON-APPOINTMENT (OUTPATIENT)
Age: 85
End: 2025-03-20

## 2025-03-24 ENCOUNTER — APPOINTMENT (OUTPATIENT)
Dept: WOUND CARE | Facility: HOSPITAL | Age: 85
End: 2025-03-24

## 2025-04-03 NOTE — BRIEF OPERATIVE NOTE - NSICDXBRIEFPREOP_GEN_ALL_CORE_FT
PRE-OP DIAGNOSIS:  Osteomyelitis of ankle or foot, acute 29-Oct-2024 12:58:54  Jona Mason  
PRE-OP DIAGNOSIS:  Osteomyelitis of ankle or foot, acute 29-Oct-2024 12:58:54  Jona Mason  
Pt here s/p fall at [facility]. NO ac use. denies pain.+ HT. Pt awake alert and cooperative on arrival

## 2025-05-08 ENCOUNTER — EMERGENCY (EMERGENCY)
Facility: HOSPITAL | Age: 85
LOS: 1 days | End: 2025-05-08
Attending: EMERGENCY MEDICINE | Admitting: INTERNAL MEDICINE
Payer: MEDICARE

## 2025-05-08 VITALS
TEMPERATURE: 98 F | OXYGEN SATURATION: 96 % | HEIGHT: 73 IN | RESPIRATION RATE: 16 BRPM | DIASTOLIC BLOOD PRESSURE: 77 MMHG | WEIGHT: 244.93 LBS | SYSTOLIC BLOOD PRESSURE: 122 MMHG | HEART RATE: 73 BPM

## 2025-05-08 VITALS
RESPIRATION RATE: 17 BRPM | HEART RATE: 96 BPM | TEMPERATURE: 98 F | DIASTOLIC BLOOD PRESSURE: 72 MMHG | SYSTOLIC BLOOD PRESSURE: 162 MMHG | OXYGEN SATURATION: 95 %

## 2025-05-08 DIAGNOSIS — Z95.9 PRESENCE OF CARDIAC AND VASCULAR IMPLANT AND GRAFT, UNSPECIFIED: Chronic | ICD-10-CM

## 2025-05-08 DIAGNOSIS — Z98.89 OTHER SPECIFIED POSTPROCEDURAL STATES: Chronic | ICD-10-CM

## 2025-05-08 DIAGNOSIS — S91.302A UNSPECIFIED OPEN WOUND, LEFT FOOT, INITIAL ENCOUNTER: ICD-10-CM

## 2025-05-08 DIAGNOSIS — Z98.890 OTHER SPECIFIED POSTPROCEDURAL STATES: Chronic | ICD-10-CM

## 2025-05-08 LAB
ALBUMIN SERPL ELPH-MCNC: 2.9 G/DL — LOW (ref 3.3–5)
ALP SERPL-CCNC: 42 U/L — SIGNIFICANT CHANGE UP (ref 40–120)
ALT FLD-CCNC: 21 U/L — SIGNIFICANT CHANGE UP (ref 12–78)
ANION GAP SERPL CALC-SCNC: 4 MMOL/L — LOW (ref 5–17)
AST SERPL-CCNC: 39 U/L — HIGH (ref 15–37)
BASOPHILS # BLD AUTO: 0.01 K/UL — SIGNIFICANT CHANGE UP (ref 0–0.2)
BASOPHILS NFR BLD AUTO: 0.2 % — SIGNIFICANT CHANGE UP (ref 0–2)
BILIRUB SERPL-MCNC: 0.9 MG/DL — SIGNIFICANT CHANGE UP (ref 0.2–1.2)
BUN SERPL-MCNC: 30 MG/DL — HIGH (ref 7–23)
CALCIUM SERPL-MCNC: 9.7 MG/DL — SIGNIFICANT CHANGE UP (ref 8.5–10.1)
CHLORIDE SERPL-SCNC: 102 MMOL/L — SIGNIFICANT CHANGE UP (ref 96–108)
CO2 SERPL-SCNC: 34 MMOL/L — HIGH (ref 22–31)
CREAT SERPL-MCNC: 1.2 MG/DL — SIGNIFICANT CHANGE UP (ref 0.5–1.3)
EGFR: 60 ML/MIN/1.73M2 — SIGNIFICANT CHANGE UP
EGFR: 60 ML/MIN/1.73M2 — SIGNIFICANT CHANGE UP
EOSINOPHIL # BLD AUTO: 0.18 K/UL — SIGNIFICANT CHANGE UP (ref 0–0.5)
EOSINOPHIL NFR BLD AUTO: 3.6 % — SIGNIFICANT CHANGE UP (ref 0–6)
GLUCOSE SERPL-MCNC: 108 MG/DL — HIGH (ref 70–99)
HCT VFR BLD CALC: 33.4 % — LOW (ref 39–50)
HGB BLD-MCNC: 10.5 G/DL — LOW (ref 13–17)
IMM GRANULOCYTES NFR BLD AUTO: 0.2 % — SIGNIFICANT CHANGE UP (ref 0–0.9)
LIDOCAIN IGE QN: 23 U/L — SIGNIFICANT CHANGE UP (ref 13–75)
LYMPHOCYTES # BLD AUTO: 1.46 K/UL — SIGNIFICANT CHANGE UP (ref 1–3.3)
LYMPHOCYTES # BLD AUTO: 29.3 % — SIGNIFICANT CHANGE UP (ref 13–44)
MCHC RBC-ENTMCNC: 29.7 PG — SIGNIFICANT CHANGE UP (ref 27–34)
MCHC RBC-ENTMCNC: 31.4 G/DL — LOW (ref 32–36)
MCV RBC AUTO: 94.6 FL — SIGNIFICANT CHANGE UP (ref 80–100)
MONOCYTES # BLD AUTO: 0.28 K/UL — SIGNIFICANT CHANGE UP (ref 0–0.9)
MONOCYTES NFR BLD AUTO: 5.6 % — SIGNIFICANT CHANGE UP (ref 2–14)
NEUTROPHILS # BLD AUTO: 3.04 K/UL — SIGNIFICANT CHANGE UP (ref 1.8–7.4)
NEUTROPHILS NFR BLD AUTO: 61.1 % — SIGNIFICANT CHANGE UP (ref 43–77)
NRBC BLD AUTO-RTO: 0 /100 WBCS — SIGNIFICANT CHANGE UP (ref 0–0)
PLATELET # BLD AUTO: 191 K/UL — SIGNIFICANT CHANGE UP (ref 150–400)
POTASSIUM SERPL-MCNC: 4.8 MMOL/L — SIGNIFICANT CHANGE UP (ref 3.5–5.3)
POTASSIUM SERPL-SCNC: 4.8 MMOL/L — SIGNIFICANT CHANGE UP (ref 3.5–5.3)
PROT SERPL-MCNC: 7.4 G/DL — SIGNIFICANT CHANGE UP (ref 6–8.3)
RBC # BLD: 3.53 M/UL — LOW (ref 4.2–5.8)
RBC # FLD: 16 % — HIGH (ref 10.3–14.5)
SODIUM SERPL-SCNC: 140 MMOL/L — SIGNIFICANT CHANGE UP (ref 135–145)
WBC # BLD: 4.98 K/UL — SIGNIFICANT CHANGE UP (ref 3.8–10.5)
WBC # FLD AUTO: 4.98 K/UL — SIGNIFICANT CHANGE UP (ref 3.8–10.5)

## 2025-05-08 PROCEDURE — 80053 COMPREHEN METABOLIC PANEL: CPT

## 2025-05-08 PROCEDURE — 71045 X-RAY EXAM CHEST 1 VIEW: CPT

## 2025-05-08 PROCEDURE — 85025 COMPLETE CBC W/AUTO DIFF WBC: CPT

## 2025-05-08 PROCEDURE — 74177 CT ABD & PELVIS W/CONTRAST: CPT

## 2025-05-08 PROCEDURE — 99283 EMERGENCY DEPT VISIT LOW MDM: CPT

## 2025-05-08 PROCEDURE — 99285 EMERGENCY DEPT VISIT HI MDM: CPT

## 2025-05-08 PROCEDURE — 36415 COLL VENOUS BLD VENIPUNCTURE: CPT

## 2025-05-08 PROCEDURE — 74177 CT ABD & PELVIS W/CONTRAST: CPT | Mod: 26

## 2025-05-08 PROCEDURE — 71045 X-RAY EXAM CHEST 1 VIEW: CPT | Mod: 26

## 2025-05-08 PROCEDURE — 99284 EMERGENCY DEPT VISIT MOD MDM: CPT | Mod: 25

## 2025-05-08 PROCEDURE — 71250 CT THORAX DX C-: CPT | Mod: 26

## 2025-05-08 PROCEDURE — 83690 ASSAY OF LIPASE: CPT

## 2025-05-08 PROCEDURE — 71250 CT THORAX DX C-: CPT

## 2025-05-08 RX ORDER — IOHEXOL 350 MG/ML
30 INJECTION, SOLUTION INTRAVENOUS ONCE
Refills: 0 | Status: COMPLETED | OUTPATIENT
Start: 2025-05-08 | End: 2025-05-08

## 2025-05-08 RX ADMIN — IOHEXOL 30 MILLILITER(S): 350 INJECTION, SOLUTION INTRAVENOUS at 13:09

## 2025-05-08 RX ADMIN — Medication 1000 MILLILITER(S): at 13:09

## 2025-05-27 NOTE — H&P ADULT - NSHPPOADEEPVENOUSTHROMB_GEN_A_CORE
Here for rhinaer procedure.  No changes in symptoms since last visit    Procedure: VivAer procedure  Indications:  Nasal valve collapse   Surgeon: Yadiel Biggs MD  Procedure note/findings: After informed discussion of the risks, benefits, and alternatives after indications as noted above, bilateral nasal vestibules were topically anesthetized with 2% lidocaine.  Injection was then undertaken with 1% lidocaine with [id number] of epinephrine after allowing this to sit for awhile. RhinAer radiofrequency Wand was then used at the junction of keratinized and mucosal vestibule starting on the left in 3 different positions along the area of maximal collapse with full radiofrequency for the 12 sec done in all 3 areas.  This was repeated on the right side.  He tolerated this well.    
no

## 2025-06-05 NOTE — DIETITIAN INITIAL EVALUATION ADULT. - PERTINENT LABORATORY DATA
Subjective: I still have a little numbness in my right hand.      Objective:     Current level of performance:  ADL: Independent  Work: On leave  Leisure: Family    Measurements/Tests:  ROM:  Testing By: jc   Strength Right: 15 #      Strength Left: 25 #      Treatment Provided this day: Reviewed HEP:  AROM:   X 20 reps per set, x 5 sets daily:    Tendon glides:  X 10 reps per set, x 8 sets daily:    PROM:  To all digits:  X 15 reps each digit per set, x 5 sets daily:  RE-reviewed cold cream scar massage technique.  Physician follow-up.    Treatment Time: 23 minutes      Summary/Analysis of Treatment session: Progressing well with OT goals and objectives.      Plan: To return to unrestricted work activity 06/23/2025      Follow up in:  x 1 week.          Rogelio Macias  OTR/L    
H/H 11.0/33.6 TIBC 197  HgbA1C 7.8

## 2025-06-10 NOTE — H&P ADULT - NSCORESITESY/N_GEN_A_CORE_RD
Slightly elevated therefore going to start losartan 25 mg daily  Orders:    Basic metabolic panel; Future    losartan (COZAAR) 25 mg tablet; Take 1 tablet (25 mg total) by mouth daily    Basic metabolic panel; Future    Magnesium; Future    Comprehensive metabolic panel; Future    Magnesium; Future    CBC; Future    Protein / creatinine ratio, urine; Future    PTH, intact; Future     Yes

## 2025-06-21 ENCOUNTER — EMERGENCY (EMERGENCY)
Facility: HOSPITAL | Age: 85
LOS: 1 days | End: 2025-06-21
Attending: STUDENT IN AN ORGANIZED HEALTH CARE EDUCATION/TRAINING PROGRAM | Admitting: STUDENT IN AN ORGANIZED HEALTH CARE EDUCATION/TRAINING PROGRAM
Payer: MEDICARE

## 2025-06-21 VITALS
HEART RATE: 98 BPM | DIASTOLIC BLOOD PRESSURE: 75 MMHG | WEIGHT: 250 LBS | OXYGEN SATURATION: 92 % | HEIGHT: 73 IN | RESPIRATION RATE: 20 BRPM | SYSTOLIC BLOOD PRESSURE: 120 MMHG | TEMPERATURE: 98 F

## 2025-06-21 DIAGNOSIS — Z98.89 OTHER SPECIFIED POSTPROCEDURAL STATES: Chronic | ICD-10-CM

## 2025-06-21 DIAGNOSIS — Z98.890 OTHER SPECIFIED POSTPROCEDURAL STATES: Chronic | ICD-10-CM

## 2025-06-21 DIAGNOSIS — Z95.9 PRESENCE OF CARDIAC AND VASCULAR IMPLANT AND GRAFT, UNSPECIFIED: Chronic | ICD-10-CM

## 2025-06-21 LAB
ALBUMIN SERPL ELPH-MCNC: 3.2 G/DL — LOW (ref 3.3–5)
ALP SERPL-CCNC: 43 U/L — SIGNIFICANT CHANGE UP (ref 40–120)
ALT FLD-CCNC: 18 U/L — SIGNIFICANT CHANGE UP (ref 12–78)
ANION GAP SERPL CALC-SCNC: 7 MMOL/L — SIGNIFICANT CHANGE UP (ref 5–17)
AST SERPL-CCNC: 22 U/L — SIGNIFICANT CHANGE UP (ref 15–37)
BASOPHILS # BLD AUTO: 0.01 K/UL — SIGNIFICANT CHANGE UP (ref 0–0.2)
BASOPHILS NFR BLD AUTO: 0.1 % — SIGNIFICANT CHANGE UP (ref 0–2)
BILIRUB SERPL-MCNC: 0.8 MG/DL — SIGNIFICANT CHANGE UP (ref 0.2–1.2)
BUN SERPL-MCNC: 44 MG/DL — HIGH (ref 7–23)
CALCIUM SERPL-MCNC: 9.4 MG/DL — SIGNIFICANT CHANGE UP (ref 8.5–10.1)
CHLORIDE SERPL-SCNC: 102 MMOL/L — SIGNIFICANT CHANGE UP (ref 96–108)
CO2 SERPL-SCNC: 31 MMOL/L — SIGNIFICANT CHANGE UP (ref 22–31)
CREAT SERPL-MCNC: 1.5 MG/DL — HIGH (ref 0.5–1.3)
EGFR: 46 ML/MIN/1.73M2 — LOW
EGFR: 46 ML/MIN/1.73M2 — LOW
EOSINOPHIL # BLD AUTO: 0.03 K/UL — SIGNIFICANT CHANGE UP (ref 0–0.5)
EOSINOPHIL NFR BLD AUTO: 0.3 % — SIGNIFICANT CHANGE UP (ref 0–6)
FLUAV AG NPH QL: SIGNIFICANT CHANGE UP
FLUBV AG NPH QL: SIGNIFICANT CHANGE UP
GLUCOSE SERPL-MCNC: 172 MG/DL — HIGH (ref 70–99)
HCT VFR BLD CALC: 33.9 % — LOW (ref 39–50)
HGB BLD-MCNC: 11.1 G/DL — LOW (ref 13–17)
IMM GRANULOCYTES # BLD AUTO: 0.04 K/UL — SIGNIFICANT CHANGE UP (ref 0–0.07)
IMM GRANULOCYTES NFR BLD AUTO: 0.4 % — SIGNIFICANT CHANGE UP (ref 0–0.9)
LYMPHOCYTES # BLD AUTO: 1.19 K/UL — SIGNIFICANT CHANGE UP (ref 1–3.3)
LYMPHOCYTES NFR BLD AUTO: 12 % — LOW (ref 13–44)
MAGNESIUM SERPL-MCNC: 1.8 MG/DL — SIGNIFICANT CHANGE UP (ref 1.6–2.6)
MCHC RBC-ENTMCNC: 30.2 PG — SIGNIFICANT CHANGE UP (ref 27–34)
MCHC RBC-ENTMCNC: 32.7 G/DL — SIGNIFICANT CHANGE UP (ref 32–36)
MCV RBC AUTO: 92.4 FL — SIGNIFICANT CHANGE UP (ref 80–100)
MONOCYTES # BLD AUTO: 0.45 K/UL — SIGNIFICANT CHANGE UP (ref 0–0.9)
MONOCYTES NFR BLD AUTO: 4.5 % — SIGNIFICANT CHANGE UP (ref 2–14)
NEUTROPHILS # BLD AUTO: 8.23 K/UL — HIGH (ref 1.8–7.4)
NEUTROPHILS NFR BLD AUTO: 82.7 % — HIGH (ref 43–77)
NRBC # BLD AUTO: 0 K/UL — SIGNIFICANT CHANGE UP (ref 0–0)
NRBC # FLD: 0 K/UL — SIGNIFICANT CHANGE UP (ref 0–0)
NRBC BLD AUTO-RTO: 0 /100 WBCS — SIGNIFICANT CHANGE UP (ref 0–0)
PHOSPHATE SERPL-MCNC: 3.7 MG/DL — SIGNIFICANT CHANGE UP (ref 2.5–4.5)
PLATELET # BLD AUTO: 142 K/UL — LOW (ref 150–400)
PMV BLD: 10 FL — SIGNIFICANT CHANGE UP (ref 7–13)
POTASSIUM SERPL-MCNC: 3.9 MMOL/L — SIGNIFICANT CHANGE UP (ref 3.5–5.3)
POTASSIUM SERPL-SCNC: 3.9 MMOL/L — SIGNIFICANT CHANGE UP (ref 3.5–5.3)
PROT SERPL-MCNC: 7.3 G/DL — SIGNIFICANT CHANGE UP (ref 6–8.3)
RBC # BLD: 3.67 M/UL — LOW (ref 4.2–5.8)
RBC # FLD: 14.6 % — HIGH (ref 10.3–14.5)
RSV RNA NPH QL NAA+NON-PROBE: SIGNIFICANT CHANGE UP
SARS-COV-2 RNA SPEC QL NAA+PROBE: SIGNIFICANT CHANGE UP
SODIUM SERPL-SCNC: 140 MMOL/L — SIGNIFICANT CHANGE UP (ref 135–145)
SOURCE RESPIRATORY: SIGNIFICANT CHANGE UP
TROPONIN I, HIGH SENSITIVITY RESULT: 11.2 NG/L — SIGNIFICANT CHANGE UP
TROPONIN I, HIGH SENSITIVITY RESULT: 12 NG/L — SIGNIFICANT CHANGE UP
WBC # BLD: 9.95 K/UL — SIGNIFICANT CHANGE UP (ref 3.8–10.5)
WBC # FLD AUTO: 9.95 K/UL — SIGNIFICANT CHANGE UP (ref 3.8–10.5)

## 2025-06-21 PROCEDURE — 99285 EMERGENCY DEPT VISIT HI MDM: CPT | Mod: 25

## 2025-06-21 PROCEDURE — 82962 GLUCOSE BLOOD TEST: CPT

## 2025-06-21 PROCEDURE — 71045 X-RAY EXAM CHEST 1 VIEW: CPT | Mod: 26

## 2025-06-21 PROCEDURE — 93010 ELECTROCARDIOGRAM REPORT: CPT

## 2025-06-21 PROCEDURE — 84100 ASSAY OF PHOSPHORUS: CPT

## 2025-06-21 PROCEDURE — 83735 ASSAY OF MAGNESIUM: CPT

## 2025-06-21 PROCEDURE — 84484 ASSAY OF TROPONIN QUANT: CPT

## 2025-06-21 PROCEDURE — 0241U: CPT

## 2025-06-21 PROCEDURE — 80053 COMPREHEN METABOLIC PANEL: CPT

## 2025-06-21 PROCEDURE — 93005 ELECTROCARDIOGRAM TRACING: CPT

## 2025-06-21 PROCEDURE — 94640 AIRWAY INHALATION TREATMENT: CPT

## 2025-06-21 PROCEDURE — 99285 EMERGENCY DEPT VISIT HI MDM: CPT

## 2025-06-21 PROCEDURE — 87637 SARSCOV2&INF A&B&RSV AMP PRB: CPT

## 2025-06-21 PROCEDURE — 36415 COLL VENOUS BLD VENIPUNCTURE: CPT

## 2025-06-21 PROCEDURE — 71045 X-RAY EXAM CHEST 1 VIEW: CPT

## 2025-06-21 PROCEDURE — 85025 COMPLETE CBC W/AUTO DIFF WBC: CPT

## 2025-06-21 RX ORDER — INSULIN GLARGINE-YFGN 100 [IU]/ML
8 INJECTION, SOLUTION SUBCUTANEOUS AT BEDTIME
Refills: 0 | Status: DISCONTINUED | OUTPATIENT
Start: 2025-06-21 | End: 2025-06-25

## 2025-06-21 RX ORDER — IPRATROPIUM BROMIDE AND ALBUTEROL SULFATE .5; 2.5 MG/3ML; MG/3ML
3 SOLUTION RESPIRATORY (INHALATION) ONCE
Refills: 0 | Status: COMPLETED | OUTPATIENT
Start: 2025-06-21 | End: 2025-06-21

## 2025-06-21 RX ADMIN — Medication 500 MILLILITER(S): at 20:19

## 2025-06-21 RX ADMIN — INSULIN GLARGINE-YFGN 8 UNIT(S): 100 INJECTION, SOLUTION SUBCUTANEOUS at 23:33

## 2025-06-21 RX ADMIN — IPRATROPIUM BROMIDE AND ALBUTEROL SULFATE 3 MILLILITER(S): .5; 2.5 SOLUTION RESPIRATORY (INHALATION) at 16:48

## 2025-06-21 RX ADMIN — Medication 2000 MILLILITER(S): at 16:49

## 2025-06-21 NOTE — ED PROVIDER NOTE - PATIENT PORTAL LINK FT
You can access the FollowMyHealth Patient Portal offered by [facility] by registering at the following website: [email or web address]. By joining [facility]’s FollowMyHealth portal, you will also be able to view your health information using other applications (apps) compatible with our system.

## 2025-06-21 NOTE — ED PROVIDER NOTE - CLINICAL SUMMARY MEDICAL DECISION MAKING FREE TEXT BOX
patient is a 84 YOM with PMH of COPD, CAD s/p stents, HLD, DM, HTN, lymphedema, multiple myeloma, BPH, SANTO, difficulty walking 2/2 deconditioning who presents to the ED for weakness. patient states he did not eat well today. did not have lunch or breakfast. the house was also hot without AC. was using a fan. wife was concerned that he appeared weak in his chair. checked his sugar was 250, then called EMS to have medica check and was 209. patient had some water and ate some food and was feeling better but was recommended to come to the ED. has no complaints in the ED. patient has extensive hx including multiple ICU stays.    denies fever, chills, CP, sob, cough, congestion, sore throat, rhinorrhea, abdominal pain, n/v/d/c, urinary sxs, rash, sick contacts. patient 2 weeks ago returned from rehab.    will obtain labs, EKG, CXR, give fluids and reeval.

## 2025-06-21 NOTE — ED PROVIDER NOTE - NSFOLLOWUPINSTRUCTIONS_ED_ALL_ED_FT
Please follow up with you PCP in the next 2 days  Please follow up with nephro within the next 2 days    Please take medications as prescribed.    Return to the Emergency Department for worsening or persistent symptoms, and/or ANY NEW OR CONCERNING SYMPTOMS. If you have issues obtaining follow up, please call: [phone] or [phone]  to obtain a doctor or specialist who takes your insurance in your area.    Please return to the ED for any new or worsening problems including but not limited to: fever, chills, headache, chest pain, shortness of breath, palpitations, abdominal pain, nausea, vomiting, diarrhea, numbness or weakness.

## 2025-06-21 NOTE — ED ADULT NURSE NOTE - NSICDXPASTMEDICALHX_GEN_ALL_CORE_FT
PAST MEDICAL HISTORY:  Benign prostatic hyperplasia with lower urinary tract symptoms, symptom details unspecified     CAD (coronary artery disease) w/ 4 stents    Chronic obstructive pulmonary disease, unspecified COPD type     Chronic obstructive pulmonary disease, unspecified COPD type     COPD, mild     Difficulty walking     DM (diabetes mellitus)     DM2 (diabetes mellitus, type 2)     GI bleed 2020    History of blood transfusion Transfusion of Plasma    HLD (hyperlipidemia)     HTN (hypertension)     Lymphedema     Multiple myeloma     Obesity, morbid, BMI 40.0-49.9     SANTO on CPAP     Stented coronary artery

## 2025-06-21 NOTE — ED ADULT TRIAGE NOTE - CHIEF COMPLAINT QUOTE
brought in by EMS for weakness- was recently discharged from rehab- as per ems, patient has poor intake, AC was off in the house- BGL- 203

## 2025-06-21 NOTE — ED ADULT NURSE NOTE - NSFALLRISKINTERV_ED_ALL_ED

## 2025-06-21 NOTE — ED PROVIDER NOTE - CARE PROVIDER_API CALL
Abel Blum  Nephrology  [address], [address]  [place] [address]  Phone: [phone]  Fax: [phone]  Follow Up Time: 1-3 Days    your, PCP  Phone: (   )    -  Fax: (   )    -  Follow Up Time: 1-3 Days

## 2025-06-21 NOTE — ED ADULT NURSE NOTE - OBJECTIVE STATEMENT
Pt received in 11B, wife present at bedside. Pt is a&ox3, comes from home, is not ambulatory at baseline. Pt presenting with ftt, hasn't been eating, drinking, having weakness. Pt dx home from rehab 2 weeks ago. Pt ate food prior to coming to the ED but wife states that he still seemed weak so he came to the ED. IV placed, labs drawn and sent. Medicated per orders.

## 2025-06-21 NOTE — ED PROVIDER NOTE - PROVIDER TOKENS
PROVIDER:[TOKEN:[[id number]],FOLLOWUP:[1-3 Days]],FREE:[LAST:[your],FIRST:[PCP],PHONE:[(   )    -],FAX:[(   )    -],FOLLOWUP:[1-3 Days]]

## 2025-06-21 NOTE — ED PROVIDER NOTE - PROGRESS NOTE DETAILS
Patient is feeling well at this time.  Having no complaints.  Creatinine was mildly elevated.  Likely secondary to dehydration.  Patient given fluid boluses.  Patient has been eating and drinking well here.  Patient had diaper on, urinated in the diaper.  Bladder is now empty on bladder scan.  Patient does not feel he has to urinate again.  Patient does not want a wait to give a urine sample.  Patient will like to go home.  Serial troponins negative.   Will discharge patient home with follow-up and return precautions.

## 2025-06-21 NOTE — ED PROVIDER NOTE - DIFFERENTIAL DIAGNOSIS
DDX includes but not limited to:  heat exhaustion vs dehydration vs anorexia vs COPD exacerbation Differential Diagnosis

## 2025-06-21 NOTE — ED PROVIDER NOTE - PHYSICAL EXAMINATION
General: well appearing, no acute distress, overweight  Head: normocephalic, atraumatic.   Cardiac: heart RRR, no murmurs, rubs, gallops, pulses 2+ x4. chest has no TTP. + BL LE edema, 2+/ or calf TTP  Pulm: lungs mild wheezing on inspiration BL. decreased breath sounds BL.  GI: abdomen soft, nontender, negative rebound, not distended  Skin: warm, dry, no rash, laceration, abrasion, contusion. well healing ulcers to BL heels.

## 2025-06-22 VITALS
DIASTOLIC BLOOD PRESSURE: 51 MMHG | HEART RATE: 69 BPM | RESPIRATION RATE: 18 BRPM | TEMPERATURE: 99 F | SYSTOLIC BLOOD PRESSURE: 105 MMHG | OXYGEN SATURATION: 86 %

## 2025-06-22 NOTE — ED ADULT NURSE REASSESSMENT NOTE - NS ED NURSE REASSESS COMMENT FT1
received pt with troponin to be repeated  and sent to lab  pt with bladder scan done and no urine noted MD aware and pt medicated with IVf infusing well  awaiting
Discharged to home with instruct to hydrate and follow up with PCP>verbalized understanding. Pt requested to be discharged with external catheter

## 2025-06-22 NOTE — ED ADULT NURSE REASSESSMENT NOTE - NSFALLRISKINTERV_ED_ALL_ED
Assistance OOB with selected safe patient handling equipment if applicable/Assistance with ambulation/Communicate fall risk and risk factors to all staff, patient, and family/Monitor gait and stability/Provide visual cue: yellow wristband, yellow gown, etc/Reinforce activity limits and safety measures with patient and family/Call bell, personal items and telephone in reach/Instruct patient to call for assistance before getting out of bed/chair/stretcher/Non-slip footwear applied when patient is off stretcher/[place] to call system/Physically safe environment - no spills, clutter or unnecessary equipment/Purposeful Proactive Rounding/Room/bathroom lighting operational, light cord in reach

## 2025-06-22 NOTE — ED ADULT NURSE REASSESSMENT NOTE - ED CARDIAC RHYTHM
Interventional and Vascular Radiology History and Physical    Patient: Nancy Sun. 81 y.o. male       Chief Complaint: Lethargy      History of Present Illness: antibiotics     History:    Past Medical History:   Diagnosis Date    Arthritis     BPH (benign prostatic hyperplasia)     Chronic kidney disease     Chronic pain     BACK NEUROPATHY FEET HANDS    DM neuropathy, type II diabetes mellitus ([facility] [address]     DM type 2 causing CKD stage 3 ([facility] [address] 12/17/2012    DM type 2 causing vascular disease ([facility] [address]     Dyslipidemia     Foot ulcer due to secondary DM ([facility] [address] 12/1/2012    GERD (gastroesophageal reflux disease)     History of esophageal dilatation     HTN     Ill-defined condition 2013    MRSA in wound right leg (BKA)    S/P BKA (below knee amputation) ([facility] [address]     right BKA due to non-healing ulcer    Sleep apnea     Doesnt use CPAP, patient hasnt been retested since weight loss    Thromboembolus ([facility] [address] 1970'S    BLOOD CLOT LEFT LEG     Family History   Problem Relation Age of Onset    Hypertension Mother    Leesa Salines Gout Mother     Cancer Father         leukemia    Diabetes Father     Hypertension Sister     Diabetes Maternal Grandmother     Hypertension Maternal Grandmother     Diabetes Paternal Grandmother     Hypertension Paternal Grandmother     Anesth Problems Neg Hx      Social History     Socioeconomic History    Marital status:      Spouse name: Not on file    Number of children: Not on file    Years of education: Not on file    Highest education level: Not on file   Occupational History    Not on file   Social Needs    Financial resource strain: Not on file    Food insecurity     Worry: Not on file     Inability: Not on file    Transportation needs     Medical: Not on file     Non-medical: Not on file   Tobacco Use    Smoking status: Never Smoker    Smokeless tobacco: Never Used   Substance and Sexual Activity    Alcohol use: No    Drug use: No    Sexual activity: Yes Partners: Female     Birth control/protection: None   Lifestyle    Physical activity     Days per week: Not on file     Minutes per session: Not on file    Stress: Not on file   Relationships    Social connections     Talks on phone: Not on file     Gets together: Not on file     Attends [employer] service: Not on file     Active member of club or organization: Not on file     Attends meetings of clubs or organizations: Not on file     Relationship status: Not on file    Intimate partner violence     Fear of current or ex partner: Not on file     Emotionally abused: Not on file     Physically abused: Not on file     Forced sexual activity: Not on file   Other Topics Concern    Not on file   Social History Narrative    Not on file       Allergies:    Allergies   Allergen Reactions    Adhesive Tape-Silicones Hives    Gabapentin Anxiety     Anxiety w/ hallucinations    Sulfa (Sulfonamide Antibiotics) Swelling     Lips eyes       Current Medications:  Current Facility-Administered Medications   Medication Dose Route Frequency    amLODIPine (NORVASC) tablet 5 mg  5 mg Oral DAILY    albumin human 25% (BUMINATE) solution 12.5 g  12.5 g IntraVENous Q6H    bumetanide (BUMEX) injection 1 mg  1 mg IntraVENous DAILY    potassium bicarb-citric acid (EFFER-K) tablet 40 mEq  40 mEq Oral DAILY    sodium bicarbonate tablet 325 mg  325 mg Oral BID    sodium chloride (NS) flush 5-40 mL  5-40 mL IntraVENous Q8H    sodium chloride (NS) flush 5-40 mL  5-40 mL IntraVENous PRN    thiamine HCL (B-1) tablet 100 mg  100 mg Oral DAILY    0.9% sodium chloride infusion 250 mL  250 mL IntraVENous PRN    therapeutic multivitamin (THERAGRAN) tablet 1 Tab  1 Tab Oral DAILY    DAPTOmycin (CUBICIN) 600 mg in 0.9% sodium chloride 50 mL IVPB RF formulation  600 mg IntraVENous Q48H    fentaNYL citrate (PF) injection 50 mcg  50 mcg IntraVENous Q3H PRN    senna-docusate (PERICOLACE) 8.6-50 mg per tablet 1 Tab  1 Tab Oral DAILY    tamsulosin (FLOMAX) capsule 0.4 mg  0.4 mg Oral DAILY    cyclobenzaprine (FLEXERIL) tablet 5 mg  5 mg Oral TID PRN    oxyCODONE IR (ROXICODONE) tablet 15 mg  15 mg Oral Q4H PRN    0.9% sodium chloride infusion 250 mL  250 mL IntraVENous PRN    epoetin naman-epbx (RETACRIT) injection 20,000 Units  20,000 Units SubCUTAneous Q MON, WED & FRI    albuterol-ipratropium (DUO-NEB) 2.5 MG-0.5 MG/3 ML  3 mL Nebulization Q4H PRN    aspirin chewable tablet 81 mg  81 mg Oral DAILY    ergocalciferol capsule 50,000 Units  50,000 Units Oral every Friday    ferrous sulfate tablet 325 mg  1 Tab Oral BID WITH MEALS    pantoprazole (PROTONIX) tablet 40 mg  40 mg Oral ACB    sodium chloride (NS) flush 5-40 mL  5-40 mL IntraVENous Q8H    sodium chloride (NS) flush 5-40 mL  5-40 mL IntraVENous PRN    acetaminophen (TYLENOL) tablet 650 mg  650 mg Oral Q6H PRN    Or    acetaminophen (TYLENOL) suppository 650 mg  650 mg Rectal Q6H PRN    polyethylene glycol (MIRALAX) packet 17 g  17 g Oral DAILY PRN    promethazine (PHENERGAN) tablet 12.5 mg  12.5 mg Oral Q6H PRN    Or    ondansetron (ZOFRAN) injection 4 mg  4 mg IntraVENous Q6H PRN    [Held by provider] heparin (porcine) injection 5,000 Units  5,000 Units SubCUTAneous Q8H    insulin lispro (HUMALOG) injection   SubCUTAneous AC&HS    glucose chewable tablet 16 g  4 Tab Oral PRN    dextrose (D50W) injection syrg 12.5-25 g  12.5-25 g IntraVENous PRN    glucagon (GLUCAGEN) injection 1 mg  1 mg IntraMUSCular PRN        Physical Exam:  Blood pressure (!) 164/85, pulse 87, temperature 97.9 °F (36.6 °C), resp. rate 16, height 5' 11\" (1.803 m), weight 61.4 kg (135 lb 4.8 oz), SpO2 98 %.   LUNG: clear to auscultation bilaterally, HEART: regular rate and rhythm, S1, S2 normal, no murmur, click, rub or gallop      Alerts:    Hospital Problems  Date Reviewed: 5/1/2021          Codes Class Noted POA    Severe protein-calorie malnutrition ([facility] [address] (Chronic) ICD-10-CM: N13  ICD-9-CM: 370 5/13/2021 Yes        * (Principal) Acute delirium ICD-10-CM: R41.0  ICD-9-CM: 780.09  5/1/2021 Yes              Laboratory:      Recent Labs     05/18/21  1259 05/17/21  0316   HGB 8.7*  --    HCT 29.5*  --    WBC 11.5*  --      --    BUN  --  28*   CREA  --  2.28*   K  --  3.2*         Plan of Care/Planned Procedure:  Risks, benefits, and alternatives reviewed with patient and he agrees to proceed with the procedure. Conscious sedation will be performed with IV fentanyl and versed.  Plan is for tunneled PICC placement       Kristine Cruz MD regular

## 2025-06-24 ENCOUNTER — TRANSCRIPTION ENCOUNTER (OUTPATIENT)
Age: 85
End: 2025-06-24

## 2025-06-25 ENCOUNTER — NON-APPOINTMENT (OUTPATIENT)
Age: 85
End: 2025-06-25

## 2025-07-11 ENCOUNTER — APPOINTMENT (OUTPATIENT)
Dept: HOME HEALTH SERVICES | Facility: HOME HEALTH | Age: 85
End: 2025-07-11
Payer: MEDICARE

## 2025-07-11 VITALS
WEIGHT: 250 LBS | HEART RATE: 97 BPM | BODY MASS INDEX: 33.13 KG/M2 | HEIGHT: 73 IN | RESPIRATION RATE: 18 BRPM | SYSTOLIC BLOOD PRESSURE: 100 MMHG | DIASTOLIC BLOOD PRESSURE: 68 MMHG | OXYGEN SATURATION: 97 %

## 2025-07-11 PROCEDURE — 99344 HOME/RES VST NEW MOD MDM 60: CPT | Mod: 2W

## 2025-07-11 RX ORDER — APIXABAN 5 MG/1
5 TABLET, FILM COATED ORAL
Qty: 180 | Refills: 3 | Status: ACTIVE | COMMUNITY
Start: 2025-07-11

## 2025-07-11 RX ORDER — FUROSEMIDE 40 MG/1
40 TABLET ORAL
Qty: 90 | Refills: 0 | Status: ACTIVE | COMMUNITY
Start: 2025-07-11 | End: 1900-01-01

## 2025-07-11 RX ORDER — INSULIN GLARGINE 300 [IU]/ML
300 INJECTION, SOLUTION SUBCUTANEOUS
Refills: 0 | Status: ACTIVE | COMMUNITY
Start: 2025-07-11

## 2025-07-11 RX ORDER — METOPROLOL TARTRATE 50 MG/1
50 TABLET ORAL
Refills: 0 | Status: ACTIVE | COMMUNITY
Start: 2025-07-11

## 2025-07-11 RX ORDER — MULTIVIT-MIN/IRON/FOLIC ACID/K 18-600-40
500 CAPSULE ORAL
Refills: 0 | Status: ACTIVE | COMMUNITY
Start: 2025-07-11

## 2025-07-12 PROBLEM — R04.0 RECURRENT EPISTAXIS: Status: RESOLVED | Noted: 2022-09-20 | Resolved: 2025-07-12

## 2025-07-12 PROBLEM — E11.622 CONTROLLED TYPE 2 DIABETES MELLITUS WITH OTHER SKIN ULCER, UNSPECIFIED WHETHER LONG TERM INSULIN USE: Status: RESOLVED | Noted: 2024-10-09 | Resolved: 2025-07-12

## 2025-07-12 PROBLEM — R22.2 CHEST WALL MASS: Status: ACTIVE | Noted: 2025-07-12

## 2025-07-14 ENCOUNTER — NON-APPOINTMENT (OUTPATIENT)
Age: 85
End: 2025-07-14

## 2025-07-14 PROBLEM — N40.1 BPH WITH OBSTRUCTION/LOWER URINARY TRACT SYMPTOMS: Status: ACTIVE | Noted: 2025-07-14

## 2025-07-15 LAB
ALBUMIN SERPL ELPH-MCNC: 3.5 G/DL
ALP BLD-CCNC: 42 U/L
ALT SERPL-CCNC: 17 U/L
ANION GAP SERPL CALC-SCNC: 16 MMOL/L
AST SERPL-CCNC: 21 U/L
BASOPHILS # BLD AUTO: 0.01 K/UL
BASOPHILS NFR BLD AUTO: 0.2 %
BILIRUB SERPL-MCNC: 1 MG/DL
BUN SERPL-MCNC: 39 MG/DL
CALCIUM SERPL-MCNC: 9.5 MG/DL
CHLORIDE SERPL-SCNC: 103 MMOL/L
CHOLEST SERPL-MCNC: 96 MG/DL
CO2 SERPL-SCNC: 25 MMOL/L
CREAT SERPL-MCNC: 1.37 MG/DL
EGFRCR SERPLBLD CKD-EPI 2021: 52 ML/MIN/1.73M2
EOSINOPHIL # BLD AUTO: 0.19 K/UL
EOSINOPHIL NFR BLD AUTO: 4.3 %
ESTIMATED AVERAGE GLUCOSE: 140 MG/DL
GLUCOSE SERPL-MCNC: 207 MG/DL
HBA1C MFR BLD HPLC: 6.5 %
HCT VFR BLD CALC: 25.3 %
HDLC SERPL-MCNC: 30 MG/DL
HGB BLD-MCNC: 7.4 G/DL
IMM GRANULOCYTES NFR BLD AUTO: 0.5 %
LDLC SERPL-MCNC: 35 MG/DL
LYMPHOCYTES # BLD AUTO: 1.33 K/UL
LYMPHOCYTES NFR BLD AUTO: 30.2 %
MAN DIFF?: NORMAL
MCHC RBC-ENTMCNC: 29.2 G/DL
MCHC RBC-ENTMCNC: 30.3 PG
MCV RBC AUTO: 103.7 FL
MONOCYTES # BLD AUTO: 0.21 K/UL
MONOCYTES NFR BLD AUTO: 4.8 %
NEUTROPHILS # BLD AUTO: 2.64 K/UL
NEUTROPHILS NFR BLD AUTO: 60 %
NONHDLC SERPL-MCNC: 65 MG/DL
PLATELET # BLD AUTO: 262 K/UL
POTASSIUM SERPL-SCNC: 4.6 MMOL/L
PROT SERPL-MCNC: 6.6 G/DL
RBC # BLD: 2.44 M/UL
RBC # FLD: 19.6 %
SODIUM SERPL-SCNC: 143 MMOL/L
TRIGL SERPL-MCNC: 184 MG/DL
TSH SERPL-ACNC: 2.07 UIU/ML
WBC # FLD AUTO: 4.4 K/UL

## 2025-07-16 ENCOUNTER — LABORATORY RESULT (OUTPATIENT)
Age: 85
End: 2025-07-16

## 2025-07-21 ENCOUNTER — NON-APPOINTMENT (OUTPATIENT)
Age: 85
End: 2025-07-21

## 2025-07-22 ENCOUNTER — NON-APPOINTMENT (OUTPATIENT)
Age: 85
End: 2025-07-22

## 2025-07-23 ENCOUNTER — LABORATORY RESULT (OUTPATIENT)
Age: 85
End: 2025-07-23

## 2025-07-24 DIAGNOSIS — D50.8 OTHER IRON DEFICIENCY ANEMIAS: ICD-10-CM

## 2025-07-24 LAB
BASOPHILS # BLD AUTO: 0.01 K/UL
BASOPHILS NFR BLD AUTO: 0.3 %
EOSINOPHIL # BLD AUTO: 0.14 K/UL
EOSINOPHIL NFR BLD AUTO: 3.9 %
HCT VFR BLD CALC: 30.8 %
HGB BLD-MCNC: 8.8 G/DL
IMM GRANULOCYTES NFR BLD AUTO: 0.3 %
LYMPHOCYTES # BLD AUTO: 1.21 K/UL
LYMPHOCYTES NFR BLD AUTO: 33.4 %
MAN DIFF?: NORMAL
MCHC RBC-ENTMCNC: 28.6 G/DL
MCHC RBC-ENTMCNC: 30.9 PG
MCV RBC AUTO: 108.1 FL
MONOCYTES # BLD AUTO: 0.16 K/UL
MONOCYTES NFR BLD AUTO: 4.4 %
NEUTROPHILS # BLD AUTO: 2.09 K/UL
NEUTROPHILS NFR BLD AUTO: 57.7 %
PLATELET # BLD AUTO: 195 K/UL
RBC # BLD: 2.85 M/UL
RBC # FLD: 19.1 %
WBC # FLD AUTO: 3.62 K/UL

## 2025-07-25 ENCOUNTER — APPOINTMENT (OUTPATIENT)
Dept: HOME HEALTH SERVICES | Facility: HOME HEALTH | Age: 85
End: 2025-07-25
Payer: MEDICARE

## 2025-07-25 VITALS
OXYGEN SATURATION: 98 % | TEMPERATURE: 98.5 F | RESPIRATION RATE: 18 BRPM | DIASTOLIC BLOOD PRESSURE: 62 MMHG | HEART RATE: 86 BPM | SYSTOLIC BLOOD PRESSURE: 112 MMHG

## 2025-07-25 DIAGNOSIS — K21.00 GASTRO-ESOPHAGEAL REFLUX DISEASE WITH ESOPHAGITIS, WITHOUT BLEEDING: ICD-10-CM

## 2025-07-25 DIAGNOSIS — I10 ESSENTIAL (PRIMARY) HYPERTENSION: ICD-10-CM

## 2025-07-25 DIAGNOSIS — R54 AGE-RELATED PHYSICAL DEBILITY: ICD-10-CM

## 2025-07-25 DIAGNOSIS — E11.69 TYPE 2 DIABETES MELLITUS WITH OTHER SPECIFIED COMPLICATION: ICD-10-CM

## 2025-07-25 DIAGNOSIS — S20.212A CONTUSION OF LEFT FRONT WALL OF THORAX, INITIAL ENCOUNTER: ICD-10-CM

## 2025-07-25 DIAGNOSIS — J44.9 CHRONIC OBSTRUCTIVE PULMONARY DISEASE, UNSPECIFIED: ICD-10-CM

## 2025-07-25 DIAGNOSIS — I82.513 CHRONIC EMBOLISM AND THROMBOSIS OF FEMORAL VEIN, BILATERAL: ICD-10-CM

## 2025-07-25 DIAGNOSIS — Z79.4 TYPE 2 DIABETES MELLITUS WITH OTHER SPECIFIED COMPLICATION: ICD-10-CM

## 2025-07-25 DIAGNOSIS — I25.10 ATHEROSCLEROTIC HEART DISEASE OF NATIVE CORONARY ARTERY W/OUT ANGINA PECTORIS: ICD-10-CM

## 2025-07-25 DIAGNOSIS — G47.33 OBSTRUCTIVE SLEEP APNEA (ADULT) (PEDIATRIC): ICD-10-CM

## 2025-07-25 DIAGNOSIS — K59.09 OTHER CONSTIPATION: ICD-10-CM

## 2025-07-25 DIAGNOSIS — D63.8 ANEMIA IN OTHER CHRONIC DISEASES CLASSIFIED ELSEWHERE: ICD-10-CM

## 2025-07-25 PROCEDURE — 99350 HOME/RES VST EST HIGH MDM 60: CPT | Mod: 25

## 2025-07-25 PROCEDURE — G0318: CPT

## 2025-07-25 RX ORDER — SENNOSIDES 8.6 MG/1
8.6 TABLET ORAL
Qty: 180 | Refills: 3 | Status: ACTIVE | COMMUNITY
Start: 2025-07-25 | End: 1900-01-01

## 2025-07-25 RX ORDER — ALBUTEROL SULFATE 90 UG/1
108 (90 BASE) INHALANT RESPIRATORY (INHALATION)
Qty: 1 | Refills: 3 | Status: ACTIVE | COMMUNITY
Start: 2025-07-25 | End: 1900-01-01

## 2025-07-25 RX ORDER — GABAPENTIN 100 MG/1
100 CAPSULE ORAL
Qty: 90 | Refills: 3 | Status: ACTIVE | COMMUNITY
Start: 2025-07-25 | End: 1900-01-01

## 2025-07-25 RX ORDER — FAMOTIDINE 20 MG/1
20 TABLET, FILM COATED ORAL
Qty: 90 | Refills: 3 | Status: ACTIVE | COMMUNITY
Start: 2025-07-25 | End: 1900-01-01

## 2025-07-27 PROBLEM — G47.33 OBSTRUCTIVE SLEEP APNEA, ADULT: Status: ACTIVE | Noted: 2025-07-27

## 2025-07-27 PROBLEM — K21.00 GASTROESOPHAGEAL REFLUX DISEASE WITH ESOPHAGITIS WITHOUT HEMORRHAGE: Status: ACTIVE | Noted: 2025-07-25

## 2025-07-27 PROBLEM — D63.8 ANEMIA, CHRONIC DISEASE: Status: ACTIVE | Noted: 2025-07-25

## 2025-07-27 PROBLEM — S20.212A: Status: ACTIVE | Noted: 2025-07-27

## 2025-07-27 PROBLEM — R54 FRAILTY: Status: ACTIVE | Noted: 2025-07-27

## 2025-07-27 PROBLEM — E11.69 TYPE 2 DIABETES MELLITUS WITH OTHER SPECIFIED COMPLICATION, WITH LONG-TERM CURRENT USE OF INSULIN: Status: ACTIVE | Noted: 2025-07-25

## 2025-07-27 PROBLEM — K59.09 CHRONIC CONSTIPATION: Status: ACTIVE | Noted: 2025-07-25

## 2025-07-27 PROBLEM — I10 BENIGN HYPERTENSION: Status: ACTIVE | Noted: 2025-07-25

## 2025-07-27 PROBLEM — J44.9 CHRONIC OBSTRUCTIVE PULMONARY DISEASE, UNSPECIFIED COPD TYPE: Status: ACTIVE | Noted: 2025-07-25

## 2025-07-27 PROBLEM — I82.513 CHRONIC DEEP VEIN THROMBOSIS (DVT) OF FEMORAL VEIN OF BOTH LOWER EXTREMITIES: Status: ACTIVE | Noted: 2025-07-25

## 2025-07-28 RX ORDER — UMECLIDINIUM BROMIDE AND VILANTEROL TRIFENATATE 62.5; 25 UG/1; UG/1
62.5-25 POWDER RESPIRATORY (INHALATION)
Qty: 2 | Refills: 11 | Status: ACTIVE | COMMUNITY
Start: 2025-07-25 | End: 1900-01-01

## 2025-08-07 ENCOUNTER — APPOINTMENT (OUTPATIENT)
Dept: HOME HEALTH SERVICES | Facility: HOME HEALTH | Age: 85
End: 2025-08-07
Payer: MEDICARE

## 2025-08-07 VITALS
DIASTOLIC BLOOD PRESSURE: 58 MMHG | HEART RATE: 80 BPM | TEMPERATURE: 98.7 F | OXYGEN SATURATION: 95 % | RESPIRATION RATE: 18 BRPM | SYSTOLIC BLOOD PRESSURE: 102 MMHG

## 2025-08-07 DIAGNOSIS — H11.31 CONJUNCTIVAL HEMORRHAGE, RIGHT EYE: ICD-10-CM

## 2025-08-07 DIAGNOSIS — L85.3 XEROSIS CUTIS: ICD-10-CM

## 2025-08-07 DIAGNOSIS — D69.2 OTHER NONTHROMBOCYTOPENIC PURPURA: ICD-10-CM

## 2025-08-07 DIAGNOSIS — Z74.09 OTHER REDUCED MOBILITY: ICD-10-CM

## 2025-08-07 DIAGNOSIS — L98.429 NON-PRESSURE CHRONIC ULCER OF BACK WITH UNSPECIFIED SEVERITY: ICD-10-CM

## 2025-08-07 DIAGNOSIS — C90.00 MULTIPLE MYELOMA NOT HAVING ACHIEVED REMISSION: ICD-10-CM

## 2025-08-07 DIAGNOSIS — L82.1 OTHER SEBORRHEIC KERATOSIS: ICD-10-CM

## 2025-08-07 PROCEDURE — 99349 HOME/RES VST EST MOD MDM 40: CPT

## 2025-08-08 ENCOUNTER — NON-APPOINTMENT (OUTPATIENT)
Age: 85
End: 2025-08-08

## 2025-08-08 ENCOUNTER — INPATIENT (INPATIENT)
Facility: HOSPITAL | Age: 85
LOS: 6 days | Discharge: HOME | DRG: 594 | End: 2025-08-15
Attending: FAMILY MEDICINE | Admitting: FAMILY MEDICINE
Payer: MEDICARE

## 2025-08-08 ENCOUNTER — TRANSCRIPTION ENCOUNTER (OUTPATIENT)
Age: 85
End: 2025-08-08

## 2025-08-08 VITALS
DIASTOLIC BLOOD PRESSURE: 88 MMHG | WEIGHT: 250 LBS | HEART RATE: 84 BPM | RESPIRATION RATE: 16 BRPM | TEMPERATURE: 98 F | HEIGHT: 73 IN | SYSTOLIC BLOOD PRESSURE: 131 MMHG | OXYGEN SATURATION: 97 %

## 2025-08-08 DIAGNOSIS — E78.5 HYPERLIPIDEMIA, UNSPECIFIED: ICD-10-CM

## 2025-08-08 DIAGNOSIS — J44.9 CHRONIC OBSTRUCTIVE PULMONARY DISEASE, UNSPECIFIED: ICD-10-CM

## 2025-08-08 DIAGNOSIS — Z86.718 PERSONAL HISTORY OF OTHER VENOUS THROMBOSIS AND EMBOLISM: ICD-10-CM

## 2025-08-08 DIAGNOSIS — Z98.890 OTHER SPECIFIED POSTPROCEDURAL STATES: Chronic | ICD-10-CM

## 2025-08-08 DIAGNOSIS — Z98.89 OTHER SPECIFIED POSTPROCEDURAL STATES: Chronic | ICD-10-CM

## 2025-08-08 DIAGNOSIS — E11.9 TYPE 2 DIABETES MELLITUS WITHOUT COMPLICATIONS: ICD-10-CM

## 2025-08-08 DIAGNOSIS — Z29.9 ENCOUNTER FOR PROPHYLACTIC MEASURES, UNSPECIFIED: ICD-10-CM

## 2025-08-08 DIAGNOSIS — I10 ESSENTIAL (PRIMARY) HYPERTENSION: ICD-10-CM

## 2025-08-08 DIAGNOSIS — E11.621 TYPE 2 DIABETES MELLITUS WITH FOOT ULCER: ICD-10-CM

## 2025-08-08 DIAGNOSIS — D64.9 ANEMIA, UNSPECIFIED: ICD-10-CM

## 2025-08-08 DIAGNOSIS — Z95.9 PRESENCE OF CARDIAC AND VASCULAR IMPLANT AND GRAFT, UNSPECIFIED: Chronic | ICD-10-CM

## 2025-08-08 DIAGNOSIS — N40.0 BENIGN PROSTATIC HYPERPLASIA WITHOUT LOWER URINARY TRACT SYMPTOMS: ICD-10-CM

## 2025-08-08 DIAGNOSIS — L89.90 PRESSURE ULCER OF UNSPECIFIED SITE, UNSPECIFIED STAGE: ICD-10-CM

## 2025-08-08 DIAGNOSIS — L03.90 CELLULITIS, UNSPECIFIED: ICD-10-CM

## 2025-08-08 DIAGNOSIS — Z51.89 ENCOUNTER FOR OTHER SPECIFIED AFTERCARE: ICD-10-CM

## 2025-08-08 DIAGNOSIS — I25.10 ATHEROSCLEROTIC HEART DISEASE OF NATIVE CORONARY ARTERY WITHOUT ANGINA PECTORIS: ICD-10-CM

## 2025-08-08 DIAGNOSIS — R60.0 LOCALIZED EDEMA: ICD-10-CM

## 2025-08-08 LAB
ALBUMIN SERPL ELPH-MCNC: 3 G/DL — LOW (ref 3.3–5)
ALP SERPL-CCNC: 34 U/L — LOW (ref 40–120)
ALT FLD-CCNC: 14 U/L — SIGNIFICANT CHANGE UP (ref 12–78)
ANION GAP SERPL CALC-SCNC: 6 MMOL/L — SIGNIFICANT CHANGE UP (ref 5–17)
APTT BLD: 38.9 SEC — HIGH (ref 26.1–36.8)
AST SERPL-CCNC: 17 U/L — SIGNIFICANT CHANGE UP (ref 15–37)
BASOPHILS # BLD AUTO: 0 K/UL — SIGNIFICANT CHANGE UP (ref 0–0.2)
BASOPHILS NFR BLD AUTO: 0 % — SIGNIFICANT CHANGE UP (ref 0–2)
BILIRUB SERPL-MCNC: 0.8 MG/DL — SIGNIFICANT CHANGE UP (ref 0.2–1.2)
BUN SERPL-MCNC: 45 MG/DL — HIGH (ref 7–23)
CALCIUM SERPL-MCNC: 9.5 MG/DL — SIGNIFICANT CHANGE UP (ref 8.5–10.1)
CHLORIDE SERPL-SCNC: 102 MMOL/L — SIGNIFICANT CHANGE UP (ref 96–108)
CO2 SERPL-SCNC: 33 MMOL/L — HIGH (ref 22–31)
CREAT SERPL-MCNC: 1.3 MG/DL — SIGNIFICANT CHANGE UP (ref 0.5–1.3)
EGFR: 55 ML/MIN/1.73M2 — LOW
EGFR: 55 ML/MIN/1.73M2 — LOW
EOSINOPHIL # BLD AUTO: 0.04 K/UL — SIGNIFICANT CHANGE UP (ref 0–0.5)
EOSINOPHIL NFR BLD AUTO: 0.6 % — SIGNIFICANT CHANGE UP (ref 0–6)
GLUCOSE SERPL-MCNC: 159 MG/DL — HIGH (ref 70–99)
HCT VFR BLD CALC: 30.6 % — LOW (ref 39–50)
HGB BLD-MCNC: 9.3 G/DL — LOW (ref 13–17)
IMM GRANULOCYTES # BLD AUTO: 0.02 K/UL — SIGNIFICANT CHANGE UP (ref 0–0.07)
IMM GRANULOCYTES NFR BLD AUTO: 0.3 % — SIGNIFICANT CHANGE UP (ref 0–0.9)
INR BLD: 1.84 RATIO — HIGH (ref 0.85–1.16)
LACTATE SERPL-SCNC: 1 MMOL/L — SIGNIFICANT CHANGE UP (ref 0.7–2)
LYMPHOCYTES # BLD AUTO: 0.81 K/UL — LOW (ref 1–3.3)
LYMPHOCYTES NFR BLD AUTO: 12.4 % — LOW (ref 13–44)
MCHC RBC-ENTMCNC: 30.4 G/DL — LOW (ref 32–36)
MCHC RBC-ENTMCNC: 30.8 PG — SIGNIFICANT CHANGE UP (ref 27–34)
MCV RBC AUTO: 101.3 FL — HIGH (ref 80–100)
MONOCYTES # BLD AUTO: 0.25 K/UL — SIGNIFICANT CHANGE UP (ref 0–0.9)
MONOCYTES NFR BLD AUTO: 3.8 % — SIGNIFICANT CHANGE UP (ref 2–14)
NEUTROPHILS # BLD AUTO: 5.41 K/UL — SIGNIFICANT CHANGE UP (ref 1.8–7.4)
NEUTROPHILS NFR BLD AUTO: 82.9 % — HIGH (ref 43–77)
NRBC # BLD AUTO: 0 K/UL — SIGNIFICANT CHANGE UP (ref 0–0)
NRBC # FLD: 0 K/UL — SIGNIFICANT CHANGE UP (ref 0–0)
NRBC BLD AUTO-RTO: 0 /100 WBCS — SIGNIFICANT CHANGE UP (ref 0–0)
PLATELET # BLD AUTO: 158 K/UL — SIGNIFICANT CHANGE UP (ref 150–400)
PMV BLD: 9.8 FL — SIGNIFICANT CHANGE UP (ref 7–13)
POTASSIUM SERPL-MCNC: 4 MMOL/L — SIGNIFICANT CHANGE UP (ref 3.5–5.3)
POTASSIUM SERPL-SCNC: 4 MMOL/L — SIGNIFICANT CHANGE UP (ref 3.5–5.3)
PROT SERPL-MCNC: 7.1 G/DL — SIGNIFICANT CHANGE UP (ref 6–8.3)
PROTHROM AB SERPL-ACNC: 21.4 SEC — HIGH (ref 9.9–13.4)
RBC # BLD: 3.02 M/UL — LOW (ref 4.2–5.8)
RBC # FLD: 15.3 % — HIGH (ref 10.3–14.5)
SODIUM SERPL-SCNC: 141 MMOL/L — SIGNIFICANT CHANGE UP (ref 135–145)
WBC # BLD: 6.53 K/UL — SIGNIFICANT CHANGE UP (ref 3.8–10.5)
WBC # FLD AUTO: 6.53 K/UL — SIGNIFICANT CHANGE UP (ref 3.8–10.5)

## 2025-08-08 PROCEDURE — 99223 1ST HOSP IP/OBS HIGH 75: CPT | Mod: GC

## 2025-08-08 PROCEDURE — 71045 X-RAY EXAM CHEST 1 VIEW: CPT | Mod: 26

## 2025-08-08 PROCEDURE — 99222 1ST HOSP IP/OBS MODERATE 55: CPT

## 2025-08-08 PROCEDURE — 85610 PROTHROMBIN TIME: CPT

## 2025-08-08 PROCEDURE — 85730 THROMBOPLASTIN TIME PARTIAL: CPT

## 2025-08-08 PROCEDURE — 36415 COLL VENOUS BLD VENIPUNCTURE: CPT

## 2025-08-08 PROCEDURE — 71045 X-RAY EXAM CHEST 1 VIEW: CPT

## 2025-08-08 PROCEDURE — 99285 EMERGENCY DEPT VISIT HI MDM: CPT

## 2025-08-08 PROCEDURE — 83605 ASSAY OF LACTIC ACID: CPT

## 2025-08-08 PROCEDURE — 73620 X-RAY EXAM OF FOOT: CPT | Mod: 26,50

## 2025-08-08 PROCEDURE — 80053 COMPREHEN METABOLIC PANEL: CPT

## 2025-08-08 PROCEDURE — 93010 ELECTROCARDIOGRAM REPORT: CPT

## 2025-08-08 PROCEDURE — 85025 COMPLETE CBC W/AUTO DIFF WBC: CPT

## 2025-08-08 PROCEDURE — 73620 X-RAY EXAM OF FOOT: CPT

## 2025-08-08 RX ORDER — FUROSEMIDE 10 MG/ML
1 INJECTION INTRAMUSCULAR; INTRAVENOUS
Refills: 0 | DISCHARGE

## 2025-08-08 RX ORDER — DEXTROSE 50 % IN WATER 50 %
12.5 SYRINGE (ML) INTRAVENOUS ONCE
Refills: 0 | Status: DISCONTINUED | OUTPATIENT
Start: 2025-08-08 | End: 2025-08-15

## 2025-08-08 RX ORDER — GLUCAGON 3 MG/1
1 POWDER NASAL ONCE
Refills: 0 | Status: DISCONTINUED | OUTPATIENT
Start: 2025-08-08 | End: 2025-08-15

## 2025-08-08 RX ORDER — METOPROLOL SUCCINATE 50 MG/1
50 TABLET, EXTENDED RELEASE ORAL
Refills: 0 | Status: DISCONTINUED | OUTPATIENT
Start: 2025-08-08 | End: 2025-08-15

## 2025-08-08 RX ORDER — ATORVASTATIN CALCIUM 80 MG/1
80 TABLET, FILM COATED ORAL AT BEDTIME
Refills: 0 | Status: DISCONTINUED | OUTPATIENT
Start: 2025-08-08 | End: 2025-08-15

## 2025-08-08 RX ORDER — UMECLIDINIUM BROMIDE AND VILANTEROL TRIFENATATE 62.5; 25 UG/1; UG/1
1 POWDER RESPIRATORY (INHALATION)
Refills: 0 | DISCHARGE

## 2025-08-08 RX ORDER — VANCOMYCIN HCL IN 5 % DEXTROSE 1.5G/250ML
1000 PLASTIC BAG, INJECTION (ML) INTRAVENOUS ONCE
Refills: 0 | Status: COMPLETED | OUTPATIENT
Start: 2025-08-08 | End: 2025-08-08

## 2025-08-08 RX ORDER — ACETAMINOPHEN 500 MG/5ML
650 LIQUID (ML) ORAL EVERY 6 HOURS
Refills: 0 | Status: DISCONTINUED | OUTPATIENT
Start: 2025-08-08 | End: 2025-08-15

## 2025-08-08 RX ORDER — SENNA 187 MG
1 TABLET ORAL
Refills: 0 | Status: DISCONTINUED | OUTPATIENT
Start: 2025-08-08 | End: 2025-08-15

## 2025-08-08 RX ORDER — INSULIN LISPRO 100 U/ML
INJECTION, SOLUTION INTRAVENOUS; SUBCUTANEOUS
Refills: 0 | Status: DISCONTINUED | OUTPATIENT
Start: 2025-08-08 | End: 2025-08-15

## 2025-08-08 RX ORDER — MELATONIN 5 MG
3 TABLET ORAL AT BEDTIME
Refills: 0 | Status: DISCONTINUED | OUTPATIENT
Start: 2025-08-08 | End: 2025-08-15

## 2025-08-08 RX ORDER — CEFEPIME 2 G/20ML
2000 INJECTION, POWDER, FOR SOLUTION INTRAVENOUS EVERY 8 HOURS
Refills: 0 | Status: DISCONTINUED | OUTPATIENT
Start: 2025-08-09 | End: 2025-08-09

## 2025-08-08 RX ORDER — TIOTROPIUM BROMIDE INHALATION SPRAY 3.12 UG/1
2 SPRAY, METERED RESPIRATORY (INHALATION) DAILY
Refills: 0 | Status: DISCONTINUED | OUTPATIENT
Start: 2025-08-08 | End: 2025-08-15

## 2025-08-08 RX ORDER — CEFEPIME 2 G/20ML
2000 INJECTION, POWDER, FOR SOLUTION INTRAVENOUS ONCE
Refills: 0 | Status: COMPLETED | OUTPATIENT
Start: 2025-08-08 | End: 2025-08-08

## 2025-08-08 RX ORDER — SODIUM CHLORIDE 9 G/1000ML
1000 INJECTION, SOLUTION INTRAVENOUS
Refills: 0 | Status: DISCONTINUED | OUTPATIENT
Start: 2025-08-08 | End: 2025-08-15

## 2025-08-08 RX ORDER — INSULIN LISPRO 100 U/ML
INJECTION, SOLUTION INTRAVENOUS; SUBCUTANEOUS AT BEDTIME
Refills: 0 | Status: DISCONTINUED | OUTPATIENT
Start: 2025-08-08 | End: 2025-08-15

## 2025-08-08 RX ORDER — DEXTROSE 50 % IN WATER 50 %
25 SYRINGE (ML) INTRAVENOUS ONCE
Refills: 0 | Status: DISCONTINUED | OUTPATIENT
Start: 2025-08-08 | End: 2025-08-15

## 2025-08-08 RX ORDER — TAMSULOSIN HYDROCHLORIDE 0.4 MG/1
0.4 CAPSULE ORAL AT BEDTIME
Refills: 0 | Status: DISCONTINUED | OUTPATIENT
Start: 2025-08-08 | End: 2025-08-15

## 2025-08-08 RX ORDER — METOPROLOL SUCCINATE 50 MG/1
1 TABLET, EXTENDED RELEASE ORAL
Refills: 0 | DISCHARGE

## 2025-08-08 RX ORDER — DEXTROSE 50 % IN WATER 50 %
15 SYRINGE (ML) INTRAVENOUS ONCE
Refills: 0 | Status: DISCONTINUED | OUTPATIENT
Start: 2025-08-08 | End: 2025-08-15

## 2025-08-08 RX ORDER — ASPIRIN 325 MG
81 TABLET ORAL DAILY
Refills: 0 | Status: DISCONTINUED | OUTPATIENT
Start: 2025-08-08 | End: 2025-08-14

## 2025-08-08 RX ORDER — VANCOMYCIN HCL IN 5 % DEXTROSE 1.5G/250ML
1000 PLASTIC BAG, INJECTION (ML) INTRAVENOUS EVERY 12 HOURS
Refills: 0 | Status: DISCONTINUED | OUTPATIENT
Start: 2025-08-09 | End: 2025-08-10

## 2025-08-08 RX ORDER — GABAPENTIN 400 MG/1
100 CAPSULE ORAL
Refills: 0 | Status: DISCONTINUED | OUTPATIENT
Start: 2025-08-08 | End: 2025-08-15

## 2025-08-08 RX ORDER — FUROSEMIDE 10 MG/ML
40 INJECTION INTRAMUSCULAR; INTRAVENOUS DAILY
Refills: 0 | Status: DISCONTINUED | OUTPATIENT
Start: 2025-08-08 | End: 2025-08-15

## 2025-08-08 RX ORDER — INSULIN GLARGINE-YFGN 100 [IU]/ML
5 INJECTION, SOLUTION SUBCUTANEOUS AT BEDTIME
Refills: 0 | Status: DISCONTINUED | OUTPATIENT
Start: 2025-08-08 | End: 2025-08-15

## 2025-08-08 RX ORDER — APIXABAN 5 MG/1
5 TABLET, FILM COATED ORAL EVERY 12 HOURS
Refills: 0 | Status: DISCONTINUED | OUTPATIENT
Start: 2025-08-08 | End: 2025-08-13

## 2025-08-08 RX ORDER — GABAPENTIN 400 MG/1
1 CAPSULE ORAL
Refills: 0 | DISCHARGE

## 2025-08-08 RX ADMIN — Medication 1000 MILLILITER(S): at 18:49

## 2025-08-08 RX ADMIN — Medication 250 MILLIGRAM(S): at 20:42

## 2025-08-08 RX ADMIN — CEFEPIME 100 MILLIGRAM(S): 2 INJECTION, POWDER, FOR SOLUTION INTRAVENOUS at 19:47

## 2025-08-09 DIAGNOSIS — E11.621 TYPE 2 DIABETES MELLITUS WITH FOOT ULCER: ICD-10-CM

## 2025-08-09 LAB
A1C WITH ESTIMATED AVERAGE GLUCOSE RESULT: 5.9 % — HIGH (ref 4–5.6)
ALBUMIN SERPL ELPH-MCNC: 2.5 G/DL — LOW (ref 3.3–5)
ALP SERPL-CCNC: 29 U/L — LOW (ref 40–120)
ALT FLD-CCNC: 13 U/L — SIGNIFICANT CHANGE UP (ref 12–78)
ANION GAP SERPL CALC-SCNC: 5 MMOL/L — SIGNIFICANT CHANGE UP (ref 5–17)
APTT BLD: 37.3 SEC — HIGH (ref 26.1–36.8)
AST SERPL-CCNC: 13 U/L — LOW (ref 15–37)
BASOPHILS # BLD AUTO: 0 K/UL — SIGNIFICANT CHANGE UP (ref 0–0.2)
BASOPHILS NFR BLD AUTO: 0 % — SIGNIFICANT CHANGE UP (ref 0–2)
BILIRUB SERPL-MCNC: 0.7 MG/DL — SIGNIFICANT CHANGE UP (ref 0.2–1.2)
BUN SERPL-MCNC: 41 MG/DL — HIGH (ref 7–23)
CALCIUM SERPL-MCNC: 9.3 MG/DL — SIGNIFICANT CHANGE UP (ref 8.5–10.1)
CHLORIDE SERPL-SCNC: 107 MMOL/L — SIGNIFICANT CHANGE UP (ref 96–108)
CHOLEST SERPL-MCNC: 92 MG/DL — SIGNIFICANT CHANGE UP
CO2 SERPL-SCNC: 31 MMOL/L — SIGNIFICANT CHANGE UP (ref 22–31)
CREAT SERPL-MCNC: 1.2 MG/DL — SIGNIFICANT CHANGE UP (ref 0.5–1.3)
EGFR: 60 ML/MIN/1.73M2 — SIGNIFICANT CHANGE UP
EGFR: 60 ML/MIN/1.73M2 — SIGNIFICANT CHANGE UP
EOSINOPHIL # BLD AUTO: 0.09 K/UL — SIGNIFICANT CHANGE UP (ref 0–0.5)
EOSINOPHIL NFR BLD AUTO: 2.2 % — SIGNIFICANT CHANGE UP (ref 0–6)
ESTIMATED AVERAGE GLUCOSE: 123 MG/DL — HIGH (ref 68–114)
GLUCOSE SERPL-MCNC: 112 MG/DL — HIGH (ref 70–99)
GRAM STN FLD: ABNORMAL
HCT VFR BLD CALC: 27.7 % — LOW (ref 39–50)
HDLC SERPL-MCNC: 30 MG/DL — LOW
HGB BLD-MCNC: 8.5 G/DL — LOW (ref 13–17)
IMM GRANULOCYTES # BLD AUTO: 0.02 K/UL — SIGNIFICANT CHANGE UP (ref 0–0.07)
IMM GRANULOCYTES NFR BLD AUTO: 0.5 % — SIGNIFICANT CHANGE UP (ref 0–0.9)
INR BLD: 1.88 RATIO — HIGH (ref 0.85–1.16)
IRON SATN MFR SERPL: 12 % — LOW (ref 16–55)
IRON SATN MFR SERPL: 24 UG/DL — LOW (ref 45–165)
LDLC SERPL-MCNC: 45 MG/DL — SIGNIFICANT CHANGE UP
LIPID PNL WITH DIRECT LDL SERPL: 45 MG/DL — SIGNIFICANT CHANGE UP
LYMPHOCYTES # BLD AUTO: 1.02 K/UL — SIGNIFICANT CHANGE UP (ref 1–3.3)
LYMPHOCYTES NFR BLD AUTO: 25.4 % — SIGNIFICANT CHANGE UP (ref 13–44)
MCHC RBC-ENTMCNC: 30.7 G/DL — LOW (ref 32–36)
MCHC RBC-ENTMCNC: 30.9 PG — SIGNIFICANT CHANGE UP (ref 27–34)
MCV RBC AUTO: 100.7 FL — HIGH (ref 80–100)
MONOCYTES # BLD AUTO: 0.22 K/UL — SIGNIFICANT CHANGE UP (ref 0–0.9)
MONOCYTES NFR BLD AUTO: 5.5 % — SIGNIFICANT CHANGE UP (ref 2–14)
MRSA PCR RESULT.: SIGNIFICANT CHANGE UP
NEUTROPHILS # BLD AUTO: 2.67 K/UL — SIGNIFICANT CHANGE UP (ref 1.8–7.4)
NEUTROPHILS NFR BLD AUTO: 66.4 % — SIGNIFICANT CHANGE UP (ref 43–77)
NONHDLC SERPL-MCNC: 61 MG/DL — SIGNIFICANT CHANGE UP
NRBC # BLD AUTO: 0 K/UL — SIGNIFICANT CHANGE UP (ref 0–0)
NRBC # FLD: 0 K/UL — SIGNIFICANT CHANGE UP (ref 0–0)
NRBC BLD AUTO-RTO: 0 /100 WBCS — SIGNIFICANT CHANGE UP (ref 0–0)
PLATELET # BLD AUTO: 130 K/UL — LOW (ref 150–400)
PMV BLD: 9.7 FL — SIGNIFICANT CHANGE UP (ref 7–13)
POTASSIUM SERPL-MCNC: 3.7 MMOL/L — SIGNIFICANT CHANGE UP (ref 3.5–5.3)
POTASSIUM SERPL-SCNC: 3.7 MMOL/L — SIGNIFICANT CHANGE UP (ref 3.5–5.3)
PROT SERPL-MCNC: 6.2 G/DL — SIGNIFICANT CHANGE UP (ref 6–8.3)
PROTHROM AB SERPL-ACNC: 22 SEC — HIGH (ref 9.9–13.4)
RBC # BLD: 2.75 M/UL — LOW (ref 4.2–5.8)
RBC # FLD: 15.2 % — HIGH (ref 10.3–14.5)
S AUREUS DNA NOSE QL NAA+PROBE: DETECTED
SODIUM SERPL-SCNC: 143 MMOL/L — SIGNIFICANT CHANGE UP (ref 135–145)
SPECIMEN SOURCE: SIGNIFICANT CHANGE UP
TIBC SERPL-MCNC: 194 UG/DL — LOW (ref 220–430)
TRIGL SERPL-MCNC: 82 MG/DL — SIGNIFICANT CHANGE UP
UIBC SERPL-MCNC: 171 UG/DL — SIGNIFICANT CHANGE UP (ref 110–370)
WBC # BLD: 4.02 K/UL — SIGNIFICANT CHANGE UP (ref 3.8–10.5)
WBC # FLD AUTO: 4.02 K/UL — SIGNIFICANT CHANGE UP (ref 3.8–10.5)

## 2025-08-09 PROCEDURE — 85610 PROTHROMBIN TIME: CPT

## 2025-08-09 PROCEDURE — 83036 HEMOGLOBIN GLYCOSYLATED A1C: CPT

## 2025-08-09 PROCEDURE — 85730 THROMBOPLASTIN TIME PARTIAL: CPT

## 2025-08-09 PROCEDURE — 73620 X-RAY EXAM OF FOOT: CPT

## 2025-08-09 PROCEDURE — 99232 SBSQ HOSP IP/OBS MODERATE 35: CPT

## 2025-08-09 PROCEDURE — 87070 CULTURE OTHR SPECIMN AEROBIC: CPT

## 2025-08-09 PROCEDURE — 83540 ASSAY OF IRON: CPT

## 2025-08-09 PROCEDURE — 71045 X-RAY EXAM CHEST 1 VIEW: CPT

## 2025-08-09 PROCEDURE — 83550 IRON BINDING TEST: CPT

## 2025-08-09 PROCEDURE — 99222 1ST HOSP IP/OBS MODERATE 55: CPT

## 2025-08-09 PROCEDURE — 94640 AIRWAY INHALATION TREATMENT: CPT

## 2025-08-09 PROCEDURE — 97162 PT EVAL MOD COMPLEX 30 MIN: CPT

## 2025-08-09 PROCEDURE — 87040 BLOOD CULTURE FOR BACTERIA: CPT

## 2025-08-09 PROCEDURE — 85025 COMPLETE CBC W/AUTO DIFF WBC: CPT

## 2025-08-09 PROCEDURE — 87641 MR-STAPH DNA AMP PROBE: CPT

## 2025-08-09 PROCEDURE — 36415 COLL VENOUS BLD VENIPUNCTURE: CPT

## 2025-08-09 PROCEDURE — 87640 STAPH A DNA AMP PROBE: CPT

## 2025-08-09 PROCEDURE — 99233 SBSQ HOSP IP/OBS HIGH 50: CPT

## 2025-08-09 PROCEDURE — 82962 GLUCOSE BLOOD TEST: CPT

## 2025-08-09 PROCEDURE — 80061 LIPID PANEL: CPT

## 2025-08-09 PROCEDURE — G0545: CPT

## 2025-08-09 PROCEDURE — 83605 ASSAY OF LACTIC ACID: CPT

## 2025-08-09 PROCEDURE — 80053 COMPREHEN METABOLIC PANEL: CPT

## 2025-08-09 PROCEDURE — 93005 ELECTROCARDIOGRAM TRACING: CPT

## 2025-08-09 RX ORDER — CIPROFLOXACIN HCL 250 MG
400 TABLET ORAL EVERY 12 HOURS
Refills: 0 | Status: DISCONTINUED | OUTPATIENT
Start: 2025-08-09 | End: 2025-08-12

## 2025-08-09 RX ADMIN — INSULIN GLARGINE-YFGN 5 UNIT(S): 100 INJECTION, SOLUTION SUBCUTANEOUS at 22:06

## 2025-08-09 RX ADMIN — FUROSEMIDE 40 MILLIGRAM(S): 10 INJECTION INTRAMUSCULAR; INTRAVENOUS at 06:28

## 2025-08-09 RX ADMIN — GABAPENTIN 100 MILLIGRAM(S): 400 CAPSULE ORAL at 06:27

## 2025-08-09 RX ADMIN — INSULIN LISPRO 1: 100 INJECTION, SOLUTION INTRAVENOUS; SUBCUTANEOUS at 12:42

## 2025-08-09 RX ADMIN — Medication 20 MILLIGRAM(S): at 11:04

## 2025-08-09 RX ADMIN — CEFEPIME 100 MILLIGRAM(S): 2 INJECTION, POWDER, FOR SOLUTION INTRAVENOUS at 06:28

## 2025-08-09 RX ADMIN — ATORVASTATIN CALCIUM 80 MILLIGRAM(S): 80 TABLET, FILM COATED ORAL at 21:42

## 2025-08-09 RX ADMIN — INSULIN LISPRO 1: 100 INJECTION, SOLUTION INTRAVENOUS; SUBCUTANEOUS at 17:23

## 2025-08-09 RX ADMIN — GABAPENTIN 100 MILLIGRAM(S): 400 CAPSULE ORAL at 17:35

## 2025-08-09 RX ADMIN — TIOTROPIUM BROMIDE INHALATION SPRAY 2 PUFF(S): 3.12 SPRAY, METERED RESPIRATORY (INHALATION) at 06:30

## 2025-08-09 RX ADMIN — APIXABAN 5 MILLIGRAM(S): 5 TABLET, FILM COATED ORAL at 06:28

## 2025-08-09 RX ADMIN — Medication 250 MILLIGRAM(S): at 21:42

## 2025-08-09 RX ADMIN — METOPROLOL SUCCINATE 50 MILLIGRAM(S): 50 TABLET, EXTENDED RELEASE ORAL at 06:28

## 2025-08-09 RX ADMIN — METOPROLOL SUCCINATE 50 MILLIGRAM(S): 50 TABLET, EXTENDED RELEASE ORAL at 17:35

## 2025-08-09 RX ADMIN — INSULIN GLARGINE-YFGN 5 UNIT(S): 100 INJECTION, SOLUTION SUBCUTANEOUS at 00:17

## 2025-08-09 RX ADMIN — APIXABAN 5 MILLIGRAM(S): 5 TABLET, FILM COATED ORAL at 17:35

## 2025-08-09 RX ADMIN — Medication 81 MILLIGRAM(S): at 11:04

## 2025-08-09 RX ADMIN — Medication 1 DOSE(S): at 06:30

## 2025-08-09 RX ADMIN — Medication 250 MILLIGRAM(S): at 09:11

## 2025-08-09 RX ADMIN — TAMSULOSIN HYDROCHLORIDE 0.4 MILLIGRAM(S): 0.4 CAPSULE ORAL at 21:42

## 2025-08-09 RX ADMIN — CEFEPIME 100 MILLIGRAM(S): 2 INJECTION, POWDER, FOR SOLUTION INTRAVENOUS at 13:27

## 2025-08-10 LAB
ANION GAP SERPL CALC-SCNC: 3 MMOL/L — LOW (ref 5–17)
BUN SERPL-MCNC: 43 MG/DL — HIGH (ref 7–23)
CALCIUM SERPL-MCNC: 9.1 MG/DL — SIGNIFICANT CHANGE UP (ref 8.5–10.1)
CHLORIDE SERPL-SCNC: 104 MMOL/L — SIGNIFICANT CHANGE UP (ref 96–108)
CO2 SERPL-SCNC: 35 MMOL/L — HIGH (ref 22–31)
CREAT SERPL-MCNC: 1.2 MG/DL — SIGNIFICANT CHANGE UP (ref 0.5–1.3)
CRP SERPL-MCNC: 25 MG/L — HIGH
EGFR: 60 ML/MIN/1.73M2 — SIGNIFICANT CHANGE UP
EGFR: 60 ML/MIN/1.73M2 — SIGNIFICANT CHANGE UP
ERYTHROCYTE [SEDIMENTATION RATE] IN BLOOD: 59 MM/HR — HIGH (ref 0–15)
GLUCOSE SERPL-MCNC: 143 MG/DL — HIGH (ref 70–99)
HCT VFR BLD CALC: 27.1 % — LOW (ref 39–50)
HGB BLD-MCNC: 8.3 G/DL — LOW (ref 13–17)
MCHC RBC-ENTMCNC: 30.4 PG — SIGNIFICANT CHANGE UP (ref 27–34)
MCHC RBC-ENTMCNC: 30.6 G/DL — LOW (ref 32–36)
MCV RBC AUTO: 99.3 FL — SIGNIFICANT CHANGE UP (ref 80–100)
NRBC # BLD AUTO: 0 K/UL — SIGNIFICANT CHANGE UP (ref 0–0)
NRBC # FLD: 0 K/UL — SIGNIFICANT CHANGE UP (ref 0–0)
NRBC BLD AUTO-RTO: 0 /100 WBCS — SIGNIFICANT CHANGE UP (ref 0–0)
PLATELET # BLD AUTO: 146 K/UL — LOW (ref 150–400)
PMV BLD: 9.7 FL — SIGNIFICANT CHANGE UP (ref 7–13)
POTASSIUM SERPL-MCNC: 3.5 MMOL/L — SIGNIFICANT CHANGE UP (ref 3.5–5.3)
POTASSIUM SERPL-SCNC: 3.5 MMOL/L — SIGNIFICANT CHANGE UP (ref 3.5–5.3)
RBC # BLD: 2.73 M/UL — LOW (ref 4.2–5.8)
RBC # FLD: 14.8 % — HIGH (ref 10.3–14.5)
SODIUM SERPL-SCNC: 142 MMOL/L — SIGNIFICANT CHANGE UP (ref 135–145)
VANCOMYCIN TROUGH SERPL-MCNC: 18.4 UG/ML — SIGNIFICANT CHANGE UP (ref 10–20)
WBC # BLD: 3.4 K/UL — LOW (ref 3.8–10.5)
WBC # FLD AUTO: 3.4 K/UL — LOW (ref 3.8–10.5)

## 2025-08-10 PROCEDURE — 99232 SBSQ HOSP IP/OBS MODERATE 35: CPT

## 2025-08-10 PROCEDURE — 99233 SBSQ HOSP IP/OBS HIGH 50: CPT

## 2025-08-10 RX ORDER — POVIDONE-IODINE 7.5 %
1 SOLUTION, NON-ORAL TOPICAL ONCE
Refills: 0 | Status: COMPLETED | OUTPATIENT
Start: 2025-08-10 | End: 2025-08-10

## 2025-08-10 RX ORDER — B1/B2/B3/B5/B6/B12/VIT C/FOLIC 500-0.5 MG
1 TABLET ORAL DAILY
Refills: 0 | Status: DISCONTINUED | OUTPATIENT
Start: 2025-08-10 | End: 2025-08-15

## 2025-08-10 RX ORDER — VANCOMYCIN HCL IN 5 % DEXTROSE 1.5G/250ML
750 PLASTIC BAG, INJECTION (ML) INTRAVENOUS EVERY 12 HOURS
Refills: 0 | Status: DISCONTINUED | OUTPATIENT
Start: 2025-08-10 | End: 2025-08-11

## 2025-08-10 RX ADMIN — Medication 1 APPLICATION(S): at 13:29

## 2025-08-10 RX ADMIN — GABAPENTIN 100 MILLIGRAM(S): 400 CAPSULE ORAL at 18:08

## 2025-08-10 RX ADMIN — Medication 200 MILLIGRAM(S): at 05:07

## 2025-08-10 RX ADMIN — Medication 200 MILLIGRAM(S): at 18:09

## 2025-08-10 RX ADMIN — TIOTROPIUM BROMIDE INHALATION SPRAY 2 PUFF(S): 3.12 SPRAY, METERED RESPIRATORY (INHALATION) at 05:07

## 2025-08-10 RX ADMIN — APIXABAN 5 MILLIGRAM(S): 5 TABLET, FILM COATED ORAL at 05:07

## 2025-08-10 RX ADMIN — FUROSEMIDE 40 MILLIGRAM(S): 10 INJECTION INTRAMUSCULAR; INTRAVENOUS at 05:07

## 2025-08-10 RX ADMIN — TAMSULOSIN HYDROCHLORIDE 0.4 MILLIGRAM(S): 0.4 CAPSULE ORAL at 21:56

## 2025-08-10 RX ADMIN — Medication 250 MILLIGRAM(S): at 09:24

## 2025-08-10 RX ADMIN — Medication 81 MILLIGRAM(S): at 11:11

## 2025-08-10 RX ADMIN — METOPROLOL SUCCINATE 50 MILLIGRAM(S): 50 TABLET, EXTENDED RELEASE ORAL at 18:08

## 2025-08-10 RX ADMIN — APIXABAN 5 MILLIGRAM(S): 5 TABLET, FILM COATED ORAL at 18:08

## 2025-08-10 RX ADMIN — Medication 1 DOSE(S): at 05:07

## 2025-08-10 RX ADMIN — Medication 250 MILLIGRAM(S): at 21:56

## 2025-08-10 RX ADMIN — INSULIN LISPRO 2: 100 INJECTION, SOLUTION INTRAVENOUS; SUBCUTANEOUS at 17:39

## 2025-08-10 RX ADMIN — ATORVASTATIN CALCIUM 80 MILLIGRAM(S): 80 TABLET, FILM COATED ORAL at 21:56

## 2025-08-10 RX ADMIN — METOPROLOL SUCCINATE 50 MILLIGRAM(S): 50 TABLET, EXTENDED RELEASE ORAL at 05:07

## 2025-08-10 RX ADMIN — INSULIN GLARGINE-YFGN 5 UNIT(S): 100 INJECTION, SOLUTION SUBCUTANEOUS at 22:13

## 2025-08-10 RX ADMIN — Medication 20 MILLIGRAM(S): at 11:11

## 2025-08-10 RX ADMIN — INSULIN LISPRO 1: 100 INJECTION, SOLUTION INTRAVENOUS; SUBCUTANEOUS at 08:38

## 2025-08-10 RX ADMIN — INSULIN LISPRO 2: 100 INJECTION, SOLUTION INTRAVENOUS; SUBCUTANEOUS at 13:26

## 2025-08-10 RX ADMIN — GABAPENTIN 100 MILLIGRAM(S): 400 CAPSULE ORAL at 05:07

## 2025-08-11 DIAGNOSIS — L89.152 PRESSURE ULCER OF SACRAL REGION, STAGE 2: ICD-10-CM

## 2025-08-11 LAB
-  AMOXICILLIN/CLAVULANIC ACID: SIGNIFICANT CHANGE UP
-  AMPICILLIN/SULBACTAM: SIGNIFICANT CHANGE UP
-  AMPICILLIN: SIGNIFICANT CHANGE UP
-  AZTREONAM: SIGNIFICANT CHANGE UP
-  CEFAZOLIN: SIGNIFICANT CHANGE UP
-  CEFEPIME: SIGNIFICANT CHANGE UP
-  CEFOXITIN: SIGNIFICANT CHANGE UP
-  CEFTRIAXONE: SIGNIFICANT CHANGE UP
-  CIPROFLOXACIN: SIGNIFICANT CHANGE UP
-  ERTAPENEM: SIGNIFICANT CHANGE UP
-  GENTAMICIN: SIGNIFICANT CHANGE UP
-  LEVOFLOXACIN: SIGNIFICANT CHANGE UP
-  MEROPENEM: SIGNIFICANT CHANGE UP
-  PIPERACILLIN/TAZOBACTAM: SIGNIFICANT CHANGE UP
-  TOBRAMYCIN: SIGNIFICANT CHANGE UP
-  TRIMETHOPRIM/SULFAMETHOXAZOLE: SIGNIFICANT CHANGE UP
ALBUMIN SERPL ELPH-MCNC: 2.4 G/DL — LOW (ref 3.3–5)
ALP SERPL-CCNC: 29 U/L — LOW (ref 40–120)
ALT FLD-CCNC: 16 U/L — SIGNIFICANT CHANGE UP (ref 12–78)
ANION GAP SERPL CALC-SCNC: 2 MMOL/L — LOW (ref 5–17)
AST SERPL-CCNC: 17 U/L — SIGNIFICANT CHANGE UP (ref 15–37)
BILIRUB SERPL-MCNC: 0.5 MG/DL — SIGNIFICANT CHANGE UP (ref 0.2–1.2)
BUN SERPL-MCNC: 40 MG/DL — HIGH (ref 7–23)
CALCIUM SERPL-MCNC: 9.2 MG/DL — SIGNIFICANT CHANGE UP (ref 8.5–10.1)
CHLORIDE SERPL-SCNC: 102 MMOL/L — SIGNIFICANT CHANGE UP (ref 96–108)
CO2 SERPL-SCNC: 35 MMOL/L — HIGH (ref 22–31)
CREAT SERPL-MCNC: 1.3 MG/DL — SIGNIFICANT CHANGE UP (ref 0.5–1.3)
EGFR: 55 ML/MIN/1.73M2 — LOW
EGFR: 55 ML/MIN/1.73M2 — LOW
GLUCOSE BLDC GLUCOMTR-MCNC: 149 MG/DL — HIGH (ref 70–99)
GLUCOSE BLDC GLUCOMTR-MCNC: 198 MG/DL — HIGH (ref 70–99)
GLUCOSE SERPL-MCNC: 141 MG/DL — HIGH (ref 70–99)
HCT VFR BLD CALC: 26.2 % — LOW (ref 39–50)
HGB BLD-MCNC: 8.2 G/DL — LOW (ref 13–17)
MAGNESIUM SERPL-MCNC: 1.9 MG/DL — SIGNIFICANT CHANGE UP (ref 1.6–2.6)
MCHC RBC-ENTMCNC: 30.7 PG — SIGNIFICANT CHANGE UP (ref 27–34)
MCHC RBC-ENTMCNC: 31.3 G/DL — LOW (ref 32–36)
MCV RBC AUTO: 98.1 FL — SIGNIFICANT CHANGE UP (ref 80–100)
METHOD TYPE: SIGNIFICANT CHANGE UP
NRBC # BLD AUTO: 0 K/UL — SIGNIFICANT CHANGE UP (ref 0–0)
NRBC # FLD: 0 K/UL — SIGNIFICANT CHANGE UP (ref 0–0)
NRBC BLD AUTO-RTO: 0 /100 WBCS — SIGNIFICANT CHANGE UP (ref 0–0)
PHOSPHATE SERPL-MCNC: 3.7 MG/DL — SIGNIFICANT CHANGE UP (ref 2.5–4.5)
PLATELET # BLD AUTO: 156 K/UL — SIGNIFICANT CHANGE UP (ref 150–400)
PMV BLD: 9.6 FL — SIGNIFICANT CHANGE UP (ref 7–13)
POTASSIUM SERPL-MCNC: 3.4 MMOL/L — LOW (ref 3.5–5.3)
POTASSIUM SERPL-SCNC: 3.4 MMOL/L — LOW (ref 3.5–5.3)
PROT SERPL-MCNC: 5.8 G/DL — LOW (ref 6–8.3)
RBC # BLD: 2.67 M/UL — LOW (ref 4.2–5.8)
RBC # FLD: 14.6 % — HIGH (ref 10.3–14.5)
SODIUM SERPL-SCNC: 139 MMOL/L — SIGNIFICANT CHANGE UP (ref 135–145)
VANCOMYCIN TROUGH SERPL-MCNC: 20.2 UG/ML — HIGH (ref 10–20)
WBC # BLD: 3.8 K/UL — SIGNIFICANT CHANGE UP (ref 3.8–10.5)
WBC # FLD AUTO: 3.8 K/UL — SIGNIFICANT CHANGE UP (ref 3.8–10.5)

## 2025-08-11 PROCEDURE — 99233 SBSQ HOSP IP/OBS HIGH 50: CPT

## 2025-08-11 PROCEDURE — 99221 1ST HOSP IP/OBS SF/LOW 40: CPT

## 2025-08-11 RX ORDER — VANCOMYCIN HCL IN 5 % DEXTROSE 1.5G/250ML
500 PLASTIC BAG, INJECTION (ML) INTRAVENOUS EVERY 12 HOURS
Refills: 0 | Status: DISCONTINUED | OUTPATIENT
Start: 2025-08-12 | End: 2025-08-12

## 2025-08-11 RX ADMIN — INSULIN LISPRO 2: 100 INJECTION, SOLUTION INTRAVENOUS; SUBCUTANEOUS at 12:59

## 2025-08-11 RX ADMIN — INSULIN GLARGINE-YFGN 5 UNIT(S): 100 INJECTION, SOLUTION SUBCUTANEOUS at 21:47

## 2025-08-11 RX ADMIN — Medication 40 MILLIEQUIVALENT(S): at 15:17

## 2025-08-11 RX ADMIN — APIXABAN 5 MILLIGRAM(S): 5 TABLET, FILM COATED ORAL at 05:08

## 2025-08-11 RX ADMIN — ATORVASTATIN CALCIUM 80 MILLIGRAM(S): 80 TABLET, FILM COATED ORAL at 21:46

## 2025-08-11 RX ADMIN — Medication 250 MILLIGRAM(S): at 21:46

## 2025-08-11 RX ADMIN — METOPROLOL SUCCINATE 50 MILLIGRAM(S): 50 TABLET, EXTENDED RELEASE ORAL at 05:08

## 2025-08-11 RX ADMIN — Medication 500 MILLIGRAM(S): at 11:38

## 2025-08-11 RX ADMIN — Medication 200 MILLIGRAM(S): at 05:08

## 2025-08-11 RX ADMIN — GABAPENTIN 100 MILLIGRAM(S): 400 CAPSULE ORAL at 05:08

## 2025-08-11 RX ADMIN — TAMSULOSIN HYDROCHLORIDE 0.4 MILLIGRAM(S): 0.4 CAPSULE ORAL at 21:46

## 2025-08-11 RX ADMIN — Medication 1 TABLET(S): at 11:38

## 2025-08-11 RX ADMIN — Medication 81 MILLIGRAM(S): at 11:38

## 2025-08-11 RX ADMIN — Medication 200 MILLIGRAM(S): at 18:04

## 2025-08-11 RX ADMIN — Medication 20 MILLIGRAM(S): at 11:39

## 2025-08-11 RX ADMIN — FUROSEMIDE 40 MILLIGRAM(S): 10 INJECTION INTRAMUSCULAR; INTRAVENOUS at 05:08

## 2025-08-11 RX ADMIN — Medication 500 MILLIGRAM(S): at 18:04

## 2025-08-11 RX ADMIN — TIOTROPIUM BROMIDE INHALATION SPRAY 2 PUFF(S): 3.12 SPRAY, METERED RESPIRATORY (INHALATION) at 05:09

## 2025-08-11 RX ADMIN — Medication 1 DOSE(S): at 05:08

## 2025-08-11 RX ADMIN — Medication 1 TABLET(S): at 11:39

## 2025-08-11 RX ADMIN — METOPROLOL SUCCINATE 50 MILLIGRAM(S): 50 TABLET, EXTENDED RELEASE ORAL at 18:04

## 2025-08-11 RX ADMIN — Medication 250 MILLIGRAM(S): at 11:38

## 2025-08-11 RX ADMIN — GABAPENTIN 100 MILLIGRAM(S): 400 CAPSULE ORAL at 18:04

## 2025-08-11 RX ADMIN — APIXABAN 5 MILLIGRAM(S): 5 TABLET, FILM COATED ORAL at 18:04

## 2025-08-11 RX ADMIN — Medication 40 MILLIEQUIVALENT(S): at 18:04

## 2025-08-12 LAB
ANION GAP SERPL CALC-SCNC: 4 MMOL/L — LOW (ref 5–17)
BUN SERPL-MCNC: 50 MG/DL — HIGH (ref 7–23)
CALCIUM SERPL-MCNC: 9.4 MG/DL — SIGNIFICANT CHANGE UP (ref 8.5–10.1)
CHLORIDE SERPL-SCNC: 101 MMOL/L — SIGNIFICANT CHANGE UP (ref 96–108)
CO2 SERPL-SCNC: 35 MMOL/L — HIGH (ref 22–31)
CREAT SERPL-MCNC: 1.2 MG/DL — SIGNIFICANT CHANGE UP (ref 0.5–1.3)
EGFR: 60 ML/MIN/1.73M2 — SIGNIFICANT CHANGE UP
EGFR: 60 ML/MIN/1.73M2 — SIGNIFICANT CHANGE UP
GLUCOSE BLDC GLUCOMTR-MCNC: 171 MG/DL — HIGH (ref 70–99)
GLUCOSE BLDC GLUCOMTR-MCNC: 179 MG/DL — HIGH (ref 70–99)
GLUCOSE BLDC GLUCOMTR-MCNC: 233 MG/DL — HIGH (ref 70–99)
GLUCOSE BLDC GLUCOMTR-MCNC: 283 MG/DL — HIGH (ref 70–99)
GLUCOSE SERPL-MCNC: 176 MG/DL — HIGH (ref 70–99)
HCT VFR BLD CALC: 26 % — LOW (ref 39–50)
HGB BLD-MCNC: 8.2 G/DL — LOW (ref 13–17)
MCHC RBC-ENTMCNC: 30.8 PG — SIGNIFICANT CHANGE UP (ref 27–34)
MCHC RBC-ENTMCNC: 31.5 G/DL — LOW (ref 32–36)
MCV RBC AUTO: 97.7 FL — SIGNIFICANT CHANGE UP (ref 80–100)
NRBC # BLD AUTO: 0 K/UL — SIGNIFICANT CHANGE UP (ref 0–0)
NRBC # FLD: 0 K/UL — SIGNIFICANT CHANGE UP (ref 0–0)
NRBC BLD AUTO-RTO: 0 /100 WBCS — SIGNIFICANT CHANGE UP (ref 0–0)
PLATELET # BLD AUTO: 162 K/UL — SIGNIFICANT CHANGE UP (ref 150–400)
PMV BLD: 9.7 FL — SIGNIFICANT CHANGE UP (ref 7–13)
POTASSIUM SERPL-MCNC: 4 MMOL/L — SIGNIFICANT CHANGE UP (ref 3.5–5.3)
POTASSIUM SERPL-SCNC: 4 MMOL/L — SIGNIFICANT CHANGE UP (ref 3.5–5.3)
RBC # BLD: 2.66 M/UL — LOW (ref 4.2–5.8)
RBC # FLD: 14.6 % — HIGH (ref 10.3–14.5)
SODIUM SERPL-SCNC: 140 MMOL/L — SIGNIFICANT CHANGE UP (ref 135–145)
VANCOMYCIN FLD-MCNC: 26.5 UG/ML — CRITICAL HIGH (ref 10–25)
WBC # BLD: 3.71 K/UL — LOW (ref 3.8–10.5)
WBC # FLD AUTO: 3.71 K/UL — LOW (ref 3.8–10.5)

## 2025-08-12 PROCEDURE — 71045 X-RAY EXAM CHEST 1 VIEW: CPT

## 2025-08-12 PROCEDURE — 99232 SBSQ HOSP IP/OBS MODERATE 35: CPT

## 2025-08-12 PROCEDURE — 73620 X-RAY EXAM OF FOOT: CPT

## 2025-08-12 PROCEDURE — 94640 AIRWAY INHALATION TREATMENT: CPT

## 2025-08-12 PROCEDURE — 86140 C-REACTIVE PROTEIN: CPT

## 2025-08-12 PROCEDURE — 97162 PT EVAL MOD COMPLEX 30 MIN: CPT

## 2025-08-12 PROCEDURE — 93005 ELECTROCARDIOGRAM TRACING: CPT

## 2025-08-12 PROCEDURE — 97530 THERAPEUTIC ACTIVITIES: CPT

## 2025-08-12 PROCEDURE — 83735 ASSAY OF MAGNESIUM: CPT

## 2025-08-12 PROCEDURE — 80048 BASIC METABOLIC PNL TOTAL CA: CPT

## 2025-08-12 PROCEDURE — 83605 ASSAY OF LACTIC ACID: CPT

## 2025-08-12 PROCEDURE — 83036 HEMOGLOBIN GLYCOSYLATED A1C: CPT

## 2025-08-12 PROCEDURE — 82962 GLUCOSE BLOOD TEST: CPT

## 2025-08-12 PROCEDURE — 87040 BLOOD CULTURE FOR BACTERIA: CPT

## 2025-08-12 PROCEDURE — 83550 IRON BINDING TEST: CPT

## 2025-08-12 PROCEDURE — 80061 LIPID PANEL: CPT

## 2025-08-12 PROCEDURE — 87186 SC STD MICRODIL/AGAR DIL: CPT

## 2025-08-12 PROCEDURE — 85610 PROTHROMBIN TIME: CPT

## 2025-08-12 PROCEDURE — 80053 COMPREHEN METABOLIC PANEL: CPT

## 2025-08-12 PROCEDURE — 84100 ASSAY OF PHOSPHORUS: CPT

## 2025-08-12 PROCEDURE — 85652 RBC SED RATE AUTOMATED: CPT

## 2025-08-12 PROCEDURE — 83540 ASSAY OF IRON: CPT

## 2025-08-12 PROCEDURE — 85730 THROMBOPLASTIN TIME PARTIAL: CPT

## 2025-08-12 PROCEDURE — 87070 CULTURE OTHR SPECIMN AEROBIC: CPT

## 2025-08-12 PROCEDURE — 87640 STAPH A DNA AMP PROBE: CPT

## 2025-08-12 PROCEDURE — 85025 COMPLETE CBC W/AUTO DIFF WBC: CPT

## 2025-08-12 PROCEDURE — 85027 COMPLETE CBC AUTOMATED: CPT

## 2025-08-12 PROCEDURE — 36415 COLL VENOUS BLD VENIPUNCTURE: CPT

## 2025-08-12 PROCEDURE — 80202 ASSAY OF VANCOMYCIN: CPT

## 2025-08-12 PROCEDURE — 87077 CULTURE AEROBIC IDENTIFY: CPT

## 2025-08-12 PROCEDURE — 97165 OT EVAL LOW COMPLEX 30 MIN: CPT

## 2025-08-12 PROCEDURE — 87641 MR-STAPH DNA AMP PROBE: CPT

## 2025-08-12 RX ORDER — AMPICILLIN SODIUM AND SULBACTAM SODIUM 1; .5 G/1; G/1
INJECTION, POWDER, FOR SOLUTION INTRAMUSCULAR; INTRAVENOUS
Refills: 0 | Status: DISCONTINUED | OUTPATIENT
Start: 2025-08-12 | End: 2025-08-15

## 2025-08-12 RX ORDER — AMPICILLIN SODIUM AND SULBACTAM SODIUM 1; .5 G/1; G/1
3 INJECTION, POWDER, FOR SOLUTION INTRAMUSCULAR; INTRAVENOUS ONCE
Refills: 0 | Status: COMPLETED | OUTPATIENT
Start: 2025-08-12 | End: 2025-08-12

## 2025-08-12 RX ORDER — CEFTRIAXONE 500 MG/1
1000 INJECTION, POWDER, FOR SOLUTION INTRAMUSCULAR; INTRAVENOUS ONCE
Refills: 0 | Status: DISCONTINUED | OUTPATIENT
Start: 2025-08-12 | End: 2025-08-12

## 2025-08-12 RX ORDER — AMPICILLIN SODIUM AND SULBACTAM SODIUM 1; .5 G/1; G/1
3 INJECTION, POWDER, FOR SOLUTION INTRAMUSCULAR; INTRAVENOUS EVERY 6 HOURS
Refills: 0 | Status: DISCONTINUED | OUTPATIENT
Start: 2025-08-12 | End: 2025-08-15

## 2025-08-12 RX ORDER — CEFTRIAXONE 500 MG/1
INJECTION, POWDER, FOR SOLUTION INTRAMUSCULAR; INTRAVENOUS
Refills: 0 | Status: DISCONTINUED | OUTPATIENT
Start: 2025-08-12 | End: 2025-08-12

## 2025-08-12 RX ADMIN — Medication 1 DOSE(S): at 05:14

## 2025-08-12 RX ADMIN — ATORVASTATIN CALCIUM 80 MILLIGRAM(S): 80 TABLET, FILM COATED ORAL at 21:51

## 2025-08-12 RX ADMIN — AMPICILLIN SODIUM AND SULBACTAM SODIUM 200 GRAM(S): 1; .5 INJECTION, POWDER, FOR SOLUTION INTRAMUSCULAR; INTRAVENOUS at 11:01

## 2025-08-12 RX ADMIN — Medication 81 MILLIGRAM(S): at 11:01

## 2025-08-12 RX ADMIN — Medication 200 MILLIGRAM(S): at 05:14

## 2025-08-12 RX ADMIN — INSULIN GLARGINE-YFGN 5 UNIT(S): 100 INJECTION, SOLUTION SUBCUTANEOUS at 21:51

## 2025-08-12 RX ADMIN — GABAPENTIN 100 MILLIGRAM(S): 400 CAPSULE ORAL at 17:26

## 2025-08-12 RX ADMIN — AMPICILLIN SODIUM AND SULBACTAM SODIUM 200 GRAM(S): 1; .5 INJECTION, POWDER, FOR SOLUTION INTRAMUSCULAR; INTRAVENOUS at 17:28

## 2025-08-12 RX ADMIN — INSULIN LISPRO 1: 100 INJECTION, SOLUTION INTRAVENOUS; SUBCUTANEOUS at 08:27

## 2025-08-12 RX ADMIN — GABAPENTIN 100 MILLIGRAM(S): 400 CAPSULE ORAL at 05:15

## 2025-08-12 RX ADMIN — TAMSULOSIN HYDROCHLORIDE 0.4 MILLIGRAM(S): 0.4 CAPSULE ORAL at 21:51

## 2025-08-12 RX ADMIN — Medication 1 TABLET(S): at 11:02

## 2025-08-12 RX ADMIN — APIXABAN 5 MILLIGRAM(S): 5 TABLET, FILM COATED ORAL at 17:26

## 2025-08-12 RX ADMIN — TIOTROPIUM BROMIDE INHALATION SPRAY 2 PUFF(S): 3.12 SPRAY, METERED RESPIRATORY (INHALATION) at 05:14

## 2025-08-12 RX ADMIN — Medication 500 MILLIGRAM(S): at 05:15

## 2025-08-12 RX ADMIN — Medication 500 MILLIGRAM(S): at 17:26

## 2025-08-12 RX ADMIN — APIXABAN 5 MILLIGRAM(S): 5 TABLET, FILM COATED ORAL at 05:15

## 2025-08-12 RX ADMIN — METOPROLOL SUCCINATE 50 MILLIGRAM(S): 50 TABLET, EXTENDED RELEASE ORAL at 17:26

## 2025-08-12 RX ADMIN — Medication 1 TABLET(S): at 08:28

## 2025-08-12 RX ADMIN — METOPROLOL SUCCINATE 50 MILLIGRAM(S): 50 TABLET, EXTENDED RELEASE ORAL at 05:15

## 2025-08-12 RX ADMIN — INSULIN LISPRO 3: 100 INJECTION, SOLUTION INTRAVENOUS; SUBCUTANEOUS at 17:27

## 2025-08-12 RX ADMIN — FUROSEMIDE 40 MILLIGRAM(S): 10 INJECTION INTRAMUSCULAR; INTRAVENOUS at 05:15

## 2025-08-12 RX ADMIN — INSULIN LISPRO 1: 100 INJECTION, SOLUTION INTRAVENOUS; SUBCUTANEOUS at 12:53

## 2025-08-12 RX ADMIN — Medication 20 MILLIGRAM(S): at 11:01

## 2025-08-13 LAB
ANION GAP SERPL CALC-SCNC: 3 MMOL/L — LOW (ref 5–17)
BUN SERPL-MCNC: 66 MG/DL — HIGH (ref 7–23)
CALCIUM SERPL-MCNC: 9.5 MG/DL — SIGNIFICANT CHANGE UP (ref 8.5–10.1)
CHLORIDE SERPL-SCNC: 103 MMOL/L — SIGNIFICANT CHANGE UP (ref 96–108)
CO2 SERPL-SCNC: 37 MMOL/L — HIGH (ref 22–31)
CREAT SERPL-MCNC: 1.2 MG/DL — SIGNIFICANT CHANGE UP (ref 0.5–1.3)
EGFR: 60 ML/MIN/1.73M2 — SIGNIFICANT CHANGE UP
EGFR: 60 ML/MIN/1.73M2 — SIGNIFICANT CHANGE UP
GLUCOSE BLDC GLUCOMTR-MCNC: 169 MG/DL — HIGH (ref 70–99)
GLUCOSE BLDC GLUCOMTR-MCNC: 204 MG/DL — HIGH (ref 70–99)
GLUCOSE BLDC GLUCOMTR-MCNC: 230 MG/DL — HIGH (ref 70–99)
GLUCOSE BLDC GLUCOMTR-MCNC: 232 MG/DL — HIGH (ref 70–99)
GLUCOSE BLDC GLUCOMTR-MCNC: 252 MG/DL — HIGH (ref 70–99)
GLUCOSE SERPL-MCNC: 147 MG/DL — HIGH (ref 70–99)
HCT VFR BLD CALC: 25.3 % — LOW (ref 39–50)
HCT VFR BLD CALC: 25.6 % — LOW (ref 39–50)
HGB BLD-MCNC: 7.9 G/DL — LOW (ref 13–17)
HGB BLD-MCNC: 8 G/DL — LOW (ref 13–17)
MCHC RBC-ENTMCNC: 30.9 G/DL — LOW (ref 32–36)
MCHC RBC-ENTMCNC: 30.9 PG — SIGNIFICANT CHANGE UP (ref 27–34)
MCV RBC AUTO: 100 FL — SIGNIFICANT CHANGE UP (ref 80–100)
NRBC # BLD AUTO: 0 K/UL — SIGNIFICANT CHANGE UP (ref 0–0)
NRBC # FLD: 0 K/UL — SIGNIFICANT CHANGE UP (ref 0–0)
NRBC BLD AUTO-RTO: 0 /100 WBCS — SIGNIFICANT CHANGE UP (ref 0–0)
OB PNL STL: NEGATIVE — SIGNIFICANT CHANGE UP
PLATELET # BLD AUTO: 175 K/UL — SIGNIFICANT CHANGE UP (ref 150–400)
PMV BLD: 9.2 FL — SIGNIFICANT CHANGE UP (ref 7–13)
POTASSIUM SERPL-MCNC: 4.5 MMOL/L — SIGNIFICANT CHANGE UP (ref 3.5–5.3)
POTASSIUM SERPL-SCNC: 4.5 MMOL/L — SIGNIFICANT CHANGE UP (ref 3.5–5.3)
RBC # BLD: 2.56 M/UL — LOW (ref 4.2–5.8)
RBC # FLD: 14.7 % — HIGH (ref 10.3–14.5)
SODIUM SERPL-SCNC: 143 MMOL/L — SIGNIFICANT CHANGE UP (ref 135–145)
WBC # BLD: 4.07 K/UL — SIGNIFICANT CHANGE UP (ref 3.8–10.5)
WBC # FLD AUTO: 4.07 K/UL — SIGNIFICANT CHANGE UP (ref 3.8–10.5)

## 2025-08-13 PROCEDURE — 99233 SBSQ HOSP IP/OBS HIGH 50: CPT

## 2025-08-13 RX ORDER — APIXABAN 5 MG/1
5 TABLET, FILM COATED ORAL EVERY 12 HOURS
Refills: 0 | Status: DISCONTINUED | OUTPATIENT
Start: 2025-08-13 | End: 2025-08-14

## 2025-08-13 RX ADMIN — FUROSEMIDE 40 MILLIGRAM(S): 10 INJECTION INTRAMUSCULAR; INTRAVENOUS at 05:50

## 2025-08-13 RX ADMIN — AMPICILLIN SODIUM AND SULBACTAM SODIUM 200 GRAM(S): 1; .5 INJECTION, POWDER, FOR SOLUTION INTRAMUSCULAR; INTRAVENOUS at 05:51

## 2025-08-13 RX ADMIN — INSULIN LISPRO 1: 100 INJECTION, SOLUTION INTRAVENOUS; SUBCUTANEOUS at 08:50

## 2025-08-13 RX ADMIN — METOPROLOL SUCCINATE 50 MILLIGRAM(S): 50 TABLET, EXTENDED RELEASE ORAL at 17:57

## 2025-08-13 RX ADMIN — AMPICILLIN SODIUM AND SULBACTAM SODIUM 200 GRAM(S): 1; .5 INJECTION, POWDER, FOR SOLUTION INTRAMUSCULAR; INTRAVENOUS at 17:58

## 2025-08-13 RX ADMIN — Medication 40 MILLIGRAM(S): at 17:57

## 2025-08-13 RX ADMIN — ATORVASTATIN CALCIUM 80 MILLIGRAM(S): 80 TABLET, FILM COATED ORAL at 22:07

## 2025-08-13 RX ADMIN — Medication 1 TABLET(S): at 11:25

## 2025-08-13 RX ADMIN — INSULIN LISPRO 2: 100 INJECTION, SOLUTION INTRAVENOUS; SUBCUTANEOUS at 13:07

## 2025-08-13 RX ADMIN — INSULIN LISPRO 3: 100 INJECTION, SOLUTION INTRAVENOUS; SUBCUTANEOUS at 17:45

## 2025-08-13 RX ADMIN — Medication 1 DOSE(S): at 05:50

## 2025-08-13 RX ADMIN — Medication 500 MILLIGRAM(S): at 17:57

## 2025-08-13 RX ADMIN — AMPICILLIN SODIUM AND SULBACTAM SODIUM 200 GRAM(S): 1; .5 INJECTION, POWDER, FOR SOLUTION INTRAMUSCULAR; INTRAVENOUS at 01:10

## 2025-08-13 RX ADMIN — APIXABAN 5 MILLIGRAM(S): 5 TABLET, FILM COATED ORAL at 17:57

## 2025-08-13 RX ADMIN — GABAPENTIN 100 MILLIGRAM(S): 400 CAPSULE ORAL at 17:57

## 2025-08-13 RX ADMIN — INSULIN GLARGINE-YFGN 5 UNIT(S): 100 INJECTION, SOLUTION SUBCUTANEOUS at 22:08

## 2025-08-13 RX ADMIN — Medication 500 MILLIGRAM(S): at 05:50

## 2025-08-13 RX ADMIN — AMPICILLIN SODIUM AND SULBACTAM SODIUM 200 GRAM(S): 1; .5 INJECTION, POWDER, FOR SOLUTION INTRAMUSCULAR; INTRAVENOUS at 11:25

## 2025-08-13 RX ADMIN — APIXABAN 5 MILLIGRAM(S): 5 TABLET, FILM COATED ORAL at 05:50

## 2025-08-13 RX ADMIN — TIOTROPIUM BROMIDE INHALATION SPRAY 2 PUFF(S): 3.12 SPRAY, METERED RESPIRATORY (INHALATION) at 05:50

## 2025-08-13 RX ADMIN — GABAPENTIN 100 MILLIGRAM(S): 400 CAPSULE ORAL at 05:50

## 2025-08-13 RX ADMIN — METOPROLOL SUCCINATE 50 MILLIGRAM(S): 50 TABLET, EXTENDED RELEASE ORAL at 05:51

## 2025-08-13 RX ADMIN — Medication 1 TABLET(S): at 08:51

## 2025-08-13 RX ADMIN — Medication 81 MILLIGRAM(S): at 11:25

## 2025-08-13 RX ADMIN — Medication 20 MILLIGRAM(S): at 11:25

## 2025-08-14 ENCOUNTER — NON-APPOINTMENT (OUTPATIENT)
Age: 85
End: 2025-08-14

## 2025-08-14 LAB
ANION GAP SERPL CALC-SCNC: 4 MMOL/L — LOW (ref 5–17)
BLD GP AB SCN SERPL QL: SIGNIFICANT CHANGE UP
BUN SERPL-MCNC: 71 MG/DL — HIGH (ref 7–23)
CALCIUM SERPL-MCNC: 9.3 MG/DL — SIGNIFICANT CHANGE UP (ref 8.5–10.1)
CHLORIDE SERPL-SCNC: 103 MMOL/L — SIGNIFICANT CHANGE UP (ref 96–108)
CO2 SERPL-SCNC: 36 MMOL/L — HIGH (ref 22–31)
CREAT SERPL-MCNC: 1.4 MG/DL — HIGH (ref 0.5–1.3)
CULTURE RESULTS: ABNORMAL
CULTURE RESULTS: SIGNIFICANT CHANGE UP
CULTURE RESULTS: SIGNIFICANT CHANGE UP
EGFR: 50 ML/MIN/1.73M2 — LOW
EGFR: 50 ML/MIN/1.73M2 — LOW
GLUCOSE BLDC GLUCOMTR-MCNC: 163 MG/DL — HIGH (ref 70–99)
GLUCOSE BLDC GLUCOMTR-MCNC: 224 MG/DL — HIGH (ref 70–99)
GLUCOSE BLDC GLUCOMTR-MCNC: 224 MG/DL — HIGH (ref 70–99)
GLUCOSE BLDC GLUCOMTR-MCNC: 237 MG/DL — HIGH (ref 70–99)
GLUCOSE SERPL-MCNC: 151 MG/DL — HIGH (ref 70–99)
HCT VFR BLD CALC: 25.5 % — LOW (ref 39–50)
HCT VFR BLD CALC: 26.6 % — LOW (ref 39–50)
HGB BLD-MCNC: 7.6 G/DL — LOW (ref 13–17)
HGB BLD-MCNC: 8.3 G/DL — LOW (ref 13–17)
MCHC RBC-ENTMCNC: 29.8 G/DL — LOW (ref 32–36)
MCHC RBC-ENTMCNC: 29.8 PG — SIGNIFICANT CHANGE UP (ref 27–34)
MCV RBC AUTO: 100 FL — SIGNIFICANT CHANGE UP (ref 80–100)
NRBC # BLD AUTO: 0 K/UL — SIGNIFICANT CHANGE UP (ref 0–0)
NRBC # FLD: 0 K/UL — SIGNIFICANT CHANGE UP (ref 0–0)
NRBC BLD AUTO-RTO: 0 /100 WBCS — SIGNIFICANT CHANGE UP (ref 0–0)
ORGANISM # SPEC MICROSCOPIC CNT: ABNORMAL
ORGANISM # SPEC MICROSCOPIC CNT: SIGNIFICANT CHANGE UP
PLATELET # BLD AUTO: 187 K/UL — SIGNIFICANT CHANGE UP (ref 150–400)
PMV BLD: 9.5 FL — SIGNIFICANT CHANGE UP (ref 7–13)
POTASSIUM SERPL-MCNC: 4 MMOL/L — SIGNIFICANT CHANGE UP (ref 3.5–5.3)
POTASSIUM SERPL-SCNC: 4 MMOL/L — SIGNIFICANT CHANGE UP (ref 3.5–5.3)
RBC # BLD: 2.55 M/UL — LOW (ref 4.2–5.8)
RBC # FLD: 14.7 % — HIGH (ref 10.3–14.5)
SODIUM SERPL-SCNC: 143 MMOL/L — SIGNIFICANT CHANGE UP (ref 135–145)
SPECIMEN SOURCE: SIGNIFICANT CHANGE UP
WBC # BLD: 4.55 K/UL — SIGNIFICANT CHANGE UP (ref 3.8–10.5)
WBC # FLD AUTO: 4.55 K/UL — SIGNIFICANT CHANGE UP (ref 3.8–10.5)

## 2025-08-14 PROCEDURE — 99232 SBSQ HOSP IP/OBS MODERATE 35: CPT

## 2025-08-14 PROCEDURE — 74176 CT ABD & PELVIS W/O CONTRAST: CPT | Mod: 26

## 2025-08-14 PROCEDURE — 71250 CT THORAX DX C-: CPT | Mod: 26

## 2025-08-14 PROCEDURE — 99233 SBSQ HOSP IP/OBS HIGH 50: CPT

## 2025-08-14 RX ADMIN — Medication 1 TABLET(S): at 11:57

## 2025-08-14 RX ADMIN — GABAPENTIN 100 MILLIGRAM(S): 400 CAPSULE ORAL at 18:44

## 2025-08-14 RX ADMIN — METOPROLOL SUCCINATE 50 MILLIGRAM(S): 50 TABLET, EXTENDED RELEASE ORAL at 18:44

## 2025-08-14 RX ADMIN — ATORVASTATIN CALCIUM 80 MILLIGRAM(S): 80 TABLET, FILM COATED ORAL at 21:13

## 2025-08-14 RX ADMIN — GABAPENTIN 100 MILLIGRAM(S): 400 CAPSULE ORAL at 05:42

## 2025-08-14 RX ADMIN — FUROSEMIDE 40 MILLIGRAM(S): 10 INJECTION INTRAMUSCULAR; INTRAVENOUS at 05:42

## 2025-08-14 RX ADMIN — Medication 1 DOSE(S): at 05:41

## 2025-08-14 RX ADMIN — AMPICILLIN SODIUM AND SULBACTAM SODIUM 200 GRAM(S): 1; .5 INJECTION, POWDER, FOR SOLUTION INTRAMUSCULAR; INTRAVENOUS at 23:55

## 2025-08-14 RX ADMIN — AMPICILLIN SODIUM AND SULBACTAM SODIUM 200 GRAM(S): 1; .5 INJECTION, POWDER, FOR SOLUTION INTRAMUSCULAR; INTRAVENOUS at 11:57

## 2025-08-14 RX ADMIN — INSULIN LISPRO 1: 100 INJECTION, SOLUTION INTRAVENOUS; SUBCUTANEOUS at 08:24

## 2025-08-14 RX ADMIN — Medication 500 MILLIGRAM(S): at 05:42

## 2025-08-14 RX ADMIN — Medication 40 MILLIGRAM(S): at 18:44

## 2025-08-14 RX ADMIN — Medication 81 MILLIGRAM(S): at 11:57

## 2025-08-14 RX ADMIN — AMPICILLIN SODIUM AND SULBACTAM SODIUM 200 GRAM(S): 1; .5 INJECTION, POWDER, FOR SOLUTION INTRAMUSCULAR; INTRAVENOUS at 00:03

## 2025-08-14 RX ADMIN — AMPICILLIN SODIUM AND SULBACTAM SODIUM 200 GRAM(S): 1; .5 INJECTION, POWDER, FOR SOLUTION INTRAMUSCULAR; INTRAVENOUS at 18:44

## 2025-08-14 RX ADMIN — APIXABAN 5 MILLIGRAM(S): 5 TABLET, FILM COATED ORAL at 05:42

## 2025-08-14 RX ADMIN — Medication 40 MILLIGRAM(S): at 05:42

## 2025-08-14 RX ADMIN — INSULIN GLARGINE-YFGN 5 UNIT(S): 100 INJECTION, SOLUTION SUBCUTANEOUS at 21:13

## 2025-08-14 RX ADMIN — INSULIN LISPRO 2: 100 INJECTION, SOLUTION INTRAVENOUS; SUBCUTANEOUS at 17:38

## 2025-08-14 RX ADMIN — INSULIN LISPRO 2: 100 INJECTION, SOLUTION INTRAVENOUS; SUBCUTANEOUS at 12:35

## 2025-08-14 RX ADMIN — TIOTROPIUM BROMIDE INHALATION SPRAY 2 PUFF(S): 3.12 SPRAY, METERED RESPIRATORY (INHALATION) at 05:41

## 2025-08-14 RX ADMIN — TAMSULOSIN HYDROCHLORIDE 0.4 MILLIGRAM(S): 0.4 CAPSULE ORAL at 21:13

## 2025-08-14 RX ADMIN — Medication 500 MILLIGRAM(S): at 18:44

## 2025-08-14 RX ADMIN — APIXABAN 5 MILLIGRAM(S): 5 TABLET, FILM COATED ORAL at 18:44

## 2025-08-14 RX ADMIN — METOPROLOL SUCCINATE 50 MILLIGRAM(S): 50 TABLET, EXTENDED RELEASE ORAL at 05:42

## 2025-08-14 RX ADMIN — AMPICILLIN SODIUM AND SULBACTAM SODIUM 200 GRAM(S): 1; .5 INJECTION, POWDER, FOR SOLUTION INTRAMUSCULAR; INTRAVENOUS at 05:41

## 2025-08-14 RX ADMIN — Medication 1 TABLET(S): at 08:25

## 2025-08-15 ENCOUNTER — TRANSCRIPTION ENCOUNTER (OUTPATIENT)
Age: 85
End: 2025-08-15

## 2025-08-15 VITALS
TEMPERATURE: 98 F | DIASTOLIC BLOOD PRESSURE: 72 MMHG | OXYGEN SATURATION: 94 % | RESPIRATION RATE: 16 BRPM | HEART RATE: 76 BPM | SYSTOLIC BLOOD PRESSURE: 119 MMHG

## 2025-08-15 LAB
ANION GAP SERPL CALC-SCNC: 4 MMOL/L — LOW (ref 5–17)
BUN SERPL-MCNC: 68 MG/DL — HIGH (ref 7–23)
CALCIUM SERPL-MCNC: 9.3 MG/DL — SIGNIFICANT CHANGE UP (ref 8.5–10.1)
CHLORIDE SERPL-SCNC: 102 MMOL/L — SIGNIFICANT CHANGE UP (ref 96–108)
CO2 SERPL-SCNC: 36 MMOL/L — HIGH (ref 22–31)
CREAT SERPL-MCNC: 1.3 MG/DL — SIGNIFICANT CHANGE UP (ref 0.5–1.3)
EGFR: 55 ML/MIN/1.73M2 — LOW
EGFR: 55 ML/MIN/1.73M2 — LOW
FERRITIN SERPL-MCNC: 104 NG/ML — SIGNIFICANT CHANGE UP (ref 30–400)
FOLATE SERPL-MCNC: 8.9 NG/ML — SIGNIFICANT CHANGE UP
GLUCOSE BLDC GLUCOMTR-MCNC: 184 MG/DL — HIGH (ref 70–99)
GLUCOSE BLDC GLUCOMTR-MCNC: 227 MG/DL — HIGH (ref 70–99)
GLUCOSE BLDC GLUCOMTR-MCNC: 246 MG/DL — HIGH (ref 70–99)
GLUCOSE SERPL-MCNC: 181 MG/DL — HIGH (ref 70–99)
HCT VFR BLD CALC: 26.7 % — LOW (ref 39–50)
HGB BLD-MCNC: 8.4 G/DL — LOW (ref 13–17)
MCHC RBC-ENTMCNC: 30.2 PG — SIGNIFICANT CHANGE UP (ref 27–34)
MCHC RBC-ENTMCNC: 31.5 G/DL — LOW (ref 32–36)
MCV RBC AUTO: 96 FL — SIGNIFICANT CHANGE UP (ref 80–100)
NRBC # BLD AUTO: 0 K/UL — SIGNIFICANT CHANGE UP (ref 0–0)
NRBC # FLD: 0 K/UL — SIGNIFICANT CHANGE UP (ref 0–0)
NRBC BLD AUTO-RTO: 0 /100 WBCS — SIGNIFICANT CHANGE UP (ref 0–0)
PLATELET # BLD AUTO: 169 K/UL — SIGNIFICANT CHANGE UP (ref 150–400)
PMV BLD: 9.3 FL — SIGNIFICANT CHANGE UP (ref 7–13)
POTASSIUM SERPL-MCNC: 3.8 MMOL/L — SIGNIFICANT CHANGE UP (ref 3.5–5.3)
POTASSIUM SERPL-SCNC: 3.8 MMOL/L — SIGNIFICANT CHANGE UP (ref 3.5–5.3)
RBC # BLD: 2.78 M/UL — LOW (ref 4.2–5.8)
RBC # FLD: 15.6 % — HIGH (ref 10.3–14.5)
SODIUM SERPL-SCNC: 142 MMOL/L — SIGNIFICANT CHANGE UP (ref 135–145)
VIT B12 SERPL-MCNC: 493 PG/ML — SIGNIFICANT CHANGE UP (ref 232–1245)
WBC # BLD: 3.99 K/UL — SIGNIFICANT CHANGE UP (ref 3.8–10.5)
WBC # FLD AUTO: 3.99 K/UL — SIGNIFICANT CHANGE UP (ref 3.8–10.5)

## 2025-08-15 PROCEDURE — 94640 AIRWAY INHALATION TREATMENT: CPT

## 2025-08-15 PROCEDURE — 87040 BLOOD CULTURE FOR BACTERIA: CPT

## 2025-08-15 PROCEDURE — 93005 ELECTROCARDIOGRAM TRACING: CPT

## 2025-08-15 PROCEDURE — 97530 THERAPEUTIC ACTIVITIES: CPT

## 2025-08-15 PROCEDURE — 82272 OCCULT BLD FECES 1-3 TESTS: CPT

## 2025-08-15 PROCEDURE — 86850 RBC ANTIBODY SCREEN: CPT

## 2025-08-15 PROCEDURE — 36415 COLL VENOUS BLD VENIPUNCTURE: CPT

## 2025-08-15 PROCEDURE — 82746 ASSAY OF FOLIC ACID SERUM: CPT

## 2025-08-15 PROCEDURE — 83540 ASSAY OF IRON: CPT

## 2025-08-15 PROCEDURE — 71045 X-RAY EXAM CHEST 1 VIEW: CPT

## 2025-08-15 PROCEDURE — 36430 TRANSFUSION BLD/BLD COMPNT: CPT

## 2025-08-15 PROCEDURE — P9016: CPT

## 2025-08-15 PROCEDURE — 97162 PT EVAL MOD COMPLEX 30 MIN: CPT

## 2025-08-15 PROCEDURE — 97165 OT EVAL LOW COMPLEX 30 MIN: CPT

## 2025-08-15 PROCEDURE — 97110 THERAPEUTIC EXERCISES: CPT

## 2025-08-15 PROCEDURE — 80053 COMPREHEN METABOLIC PANEL: CPT

## 2025-08-15 PROCEDURE — 86901 BLOOD TYPING SEROLOGIC RH(D): CPT

## 2025-08-15 PROCEDURE — 87186 SC STD MICRODIL/AGAR DIL: CPT

## 2025-08-15 PROCEDURE — 87077 CULTURE AEROBIC IDENTIFY: CPT

## 2025-08-15 PROCEDURE — 83605 ASSAY OF LACTIC ACID: CPT

## 2025-08-15 PROCEDURE — 96374 THER/PROPH/DIAG INJ IV PUSH: CPT

## 2025-08-15 PROCEDURE — 84100 ASSAY OF PHOSPHORUS: CPT

## 2025-08-15 PROCEDURE — 86140 C-REACTIVE PROTEIN: CPT

## 2025-08-15 PROCEDURE — 99285 EMERGENCY DEPT VISIT HI MDM: CPT

## 2025-08-15 PROCEDURE — 86923 COMPATIBILITY TEST ELECTRIC: CPT

## 2025-08-15 PROCEDURE — 86900 BLOOD TYPING SEROLOGIC ABO: CPT

## 2025-08-15 PROCEDURE — 82728 ASSAY OF FERRITIN: CPT

## 2025-08-15 PROCEDURE — 85730 THROMBOPLASTIN TIME PARTIAL: CPT

## 2025-08-15 PROCEDURE — 85027 COMPLETE CBC AUTOMATED: CPT

## 2025-08-15 PROCEDURE — 73620 X-RAY EXAM OF FOOT: CPT

## 2025-08-15 PROCEDURE — 80048 BASIC METABOLIC PNL TOTAL CA: CPT

## 2025-08-15 PROCEDURE — 80202 ASSAY OF VANCOMYCIN: CPT

## 2025-08-15 PROCEDURE — 80061 LIPID PANEL: CPT

## 2025-08-15 PROCEDURE — 74176 CT ABD & PELVIS W/O CONTRAST: CPT

## 2025-08-15 PROCEDURE — 71250 CT THORAX DX C-: CPT

## 2025-08-15 PROCEDURE — 85025 COMPLETE CBC W/AUTO DIFF WBC: CPT

## 2025-08-15 PROCEDURE — 83036 HEMOGLOBIN GLYCOSYLATED A1C: CPT

## 2025-08-15 PROCEDURE — 87070 CULTURE OTHR SPECIMN AEROBIC: CPT

## 2025-08-15 PROCEDURE — 85018 HEMOGLOBIN: CPT

## 2025-08-15 PROCEDURE — 85014 HEMATOCRIT: CPT

## 2025-08-15 PROCEDURE — 83735 ASSAY OF MAGNESIUM: CPT

## 2025-08-15 PROCEDURE — 82607 VITAMIN B-12: CPT

## 2025-08-15 PROCEDURE — 96372 THER/PROPH/DIAG INJ SC/IM: CPT

## 2025-08-15 PROCEDURE — 83550 IRON BINDING TEST: CPT

## 2025-08-15 PROCEDURE — 99239 HOSP IP/OBS DSCHRG MGMT >30: CPT

## 2025-08-15 PROCEDURE — 82962 GLUCOSE BLOOD TEST: CPT

## 2025-08-15 PROCEDURE — 87641 MR-STAPH DNA AMP PROBE: CPT

## 2025-08-15 PROCEDURE — 85652 RBC SED RATE AUTOMATED: CPT

## 2025-08-15 PROCEDURE — 87640 STAPH A DNA AMP PROBE: CPT

## 2025-08-15 PROCEDURE — 97112 NEUROMUSCULAR REEDUCATION: CPT

## 2025-08-15 PROCEDURE — 85610 PROTHROMBIN TIME: CPT

## 2025-08-15 RX ORDER — BUTYROSPERMUM PARKII(SHEA BUTTER), SIMMONDSIA CHINENSIS (JOJOBA) SEED OIL, ALOE BARBADENSIS LEAF EXTRACT .01; 1; 3.5 G/100G; G/100G; G/100G
1 LIQUID TOPICAL
Qty: 14 | Refills: 0
Start: 2025-08-15 | End: 2025-08-21

## 2025-08-15 RX ORDER — AMOXICILLIN AND CLAVULANATE POTASSIUM 500; 125 MG/1; MG/1
1 TABLET, FILM COATED ORAL
Qty: 12 | Refills: 0
Start: 2025-08-15 | End: 2025-08-20

## 2025-08-15 RX ORDER — APIXABAN 5 MG/1
1 TABLET, FILM COATED ORAL
Refills: 0 | DISCHARGE

## 2025-08-15 RX ORDER — FERROUS SULFATE 137(45) MG
1 TABLET, EXTENDED RELEASE ORAL
Qty: 0 | Refills: 0 | DISCHARGE

## 2025-08-15 RX ORDER — AMOXICILLIN AND CLAVULANATE POTASSIUM 500; 125 MG/1; MG/1
1 TABLET, FILM COATED ORAL
Refills: 0 | Status: DISCONTINUED | OUTPATIENT
Start: 2025-08-15 | End: 2025-08-15

## 2025-08-15 RX ADMIN — Medication 1 DOSE(S): at 06:01

## 2025-08-15 RX ADMIN — INSULIN LISPRO 1: 100 INJECTION, SOLUTION INTRAVENOUS; SUBCUTANEOUS at 08:45

## 2025-08-15 RX ADMIN — Medication 40 MILLIGRAM(S): at 06:01

## 2025-08-15 RX ADMIN — METOPROLOL SUCCINATE 50 MILLIGRAM(S): 50 TABLET, EXTENDED RELEASE ORAL at 06:00

## 2025-08-15 RX ADMIN — TIOTROPIUM BROMIDE INHALATION SPRAY 2 PUFF(S): 3.12 SPRAY, METERED RESPIRATORY (INHALATION) at 06:01

## 2025-08-15 RX ADMIN — Medication 1 TABLET(S): at 12:16

## 2025-08-15 RX ADMIN — AMPICILLIN SODIUM AND SULBACTAM SODIUM 200 GRAM(S): 1; .5 INJECTION, POWDER, FOR SOLUTION INTRAMUSCULAR; INTRAVENOUS at 05:59

## 2025-08-15 RX ADMIN — Medication 1 TABLET(S): at 08:54

## 2025-08-15 RX ADMIN — INSULIN LISPRO 2: 100 INJECTION, SOLUTION INTRAVENOUS; SUBCUTANEOUS at 14:02

## 2025-08-15 RX ADMIN — FUROSEMIDE 40 MILLIGRAM(S): 10 INJECTION INTRAMUSCULAR; INTRAVENOUS at 06:01

## 2025-08-15 RX ADMIN — AMPICILLIN SODIUM AND SULBACTAM SODIUM 200 GRAM(S): 1; .5 INJECTION, POWDER, FOR SOLUTION INTRAMUSCULAR; INTRAVENOUS at 12:16

## 2025-08-15 RX ADMIN — GABAPENTIN 100 MILLIGRAM(S): 400 CAPSULE ORAL at 05:59

## 2025-08-15 RX ADMIN — Medication 500 MILLIGRAM(S): at 06:00

## 2025-08-16 ENCOUNTER — TRANSCRIPTION ENCOUNTER (OUTPATIENT)
Age: 85
End: 2025-08-16

## 2025-08-16 ENCOUNTER — NON-APPOINTMENT (OUTPATIENT)
Age: 85
End: 2025-08-16

## 2025-08-16 RX ORDER — AMOXICILLIN 875 MG/1
875 TABLET, FILM COATED ORAL
Refills: 0 | Status: ACTIVE | COMMUNITY
Start: 2025-08-16

## 2025-08-16 RX ORDER — GABAPENTIN 100 MG/1
100 CAPSULE ORAL TWICE DAILY
Refills: 0 | Status: ACTIVE | COMMUNITY
Start: 2025-08-16

## 2025-08-19 ENCOUNTER — LABORATORY RESULT (OUTPATIENT)
Age: 85
End: 2025-08-19

## 2025-08-19 ENCOUNTER — APPOINTMENT (OUTPATIENT)
Dept: HOME HEALTH SERVICES | Facility: HOME HEALTH | Age: 85
End: 2025-08-19
Payer: MEDICARE

## 2025-08-19 DIAGNOSIS — N40.1 BENIGN PROSTATIC HYPERPLASIA WITH LOWER URINARY TRACT SYMPMS: ICD-10-CM

## 2025-08-19 DIAGNOSIS — S91.309A UNSPECIFIED OPEN WOUND, UNSPECIFIED FOOT, INITIAL ENCOUNTER: ICD-10-CM

## 2025-08-19 DIAGNOSIS — J44.9 CHRONIC OBSTRUCTIVE PULMONARY DISEASE, UNSPECIFIED: ICD-10-CM

## 2025-08-19 DIAGNOSIS — E11.51 TYPE 2 DIABETES MELLITUS WITH DIABETIC PERIPHERAL ANGIOPATHY W/OUT GANGRENE: ICD-10-CM

## 2025-08-19 DIAGNOSIS — N13.8 BENIGN PROSTATIC HYPERPLASIA WITH LOWER URINARY TRACT SYMPMS: ICD-10-CM

## 2025-08-19 DIAGNOSIS — I82.513 CHRONIC EMBOLISM AND THROMBOSIS OF FEMORAL VEIN, BILATERAL: ICD-10-CM

## 2025-08-19 PROCEDURE — 99495 TRANSJ CARE MGMT MOD F2F 14D: CPT

## 2025-08-20 VITALS
DIASTOLIC BLOOD PRESSURE: 56 MMHG | HEART RATE: 80 BPM | OXYGEN SATURATION: 94 % | SYSTOLIC BLOOD PRESSURE: 118 MMHG | RESPIRATION RATE: 17 BRPM | TEMPERATURE: 97.3 F

## 2025-08-20 PROBLEM — S91.309A WOUND OF FOOT: Status: ACTIVE | Noted: 2025-08-20

## 2025-08-21 ENCOUNTER — TRANSCRIPTION ENCOUNTER (OUTPATIENT)
Age: 85
End: 2025-08-21

## 2025-08-21 ENCOUNTER — NON-APPOINTMENT (OUTPATIENT)
Age: 85
End: 2025-08-21

## 2025-08-25 ENCOUNTER — LABORATORY RESULT (OUTPATIENT)
Age: 85
End: 2025-08-25

## 2025-08-25 ENCOUNTER — TRANSCRIPTION ENCOUNTER (OUTPATIENT)
Age: 85
End: 2025-08-25

## 2025-08-25 ENCOUNTER — NON-APPOINTMENT (OUTPATIENT)
Age: 85
End: 2025-08-25

## 2025-08-26 LAB — IRON WITH TOTAL BINDING CAPACITY: NORMAL

## 2025-08-28 ENCOUNTER — NON-APPOINTMENT (OUTPATIENT)
Age: 85
End: 2025-08-28

## 2025-08-28 ENCOUNTER — LABORATORY RESULT (OUTPATIENT)
Age: 85
End: 2025-08-28

## 2025-08-28 ENCOUNTER — TRANSCRIPTION ENCOUNTER (OUTPATIENT)
Age: 85
End: 2025-08-28

## 2025-08-29 ENCOUNTER — LABORATORY RESULT (OUTPATIENT)
Age: 85
End: 2025-08-29

## 2025-09-02 DIAGNOSIS — Z00.00 ENCOUNTER FOR GENERAL ADULT MEDICAL EXAMINATION W/OUT ABNORMAL FINDINGS: ICD-10-CM

## 2025-09-02 DIAGNOSIS — E53.8 DEFICIENCY OF OTHER SPECIFIED B GROUP VITAMINS: ICD-10-CM

## 2025-09-02 RX ORDER — CYANOCOBALAMIN (VITAMIN B-12) 1000 MCG
1000 TABLET ORAL DAILY
Qty: 90 | Refills: 0 | Status: ACTIVE | COMMUNITY
Start: 2025-09-02

## 2025-09-04 LAB
PSA FREE FLD-MCNC: 41 %
PSA FREE SERPL-MCNC: 0.28 NG/ML
PSA SERPL-MCNC: 0.67 NG/ML

## (undated) DEVICE — ELCTR BOVIE PENCIL SMOKE EVACUATION

## (undated) DEVICE — NDL HYPO SAFE 22G X 1.5" (BLACK)

## (undated) DEVICE — DRSG XEROFORM 1 X 8"

## (undated) DEVICE — VENODYNE/SCD SLEEVE CALF LARGE

## (undated) DEVICE — GLV 7.5 PROTEXIS (WHITE)

## (undated) DEVICE — DRAPE TOWEL BLUE 17" X 24"

## (undated) DEVICE — WARMING BLANKET UPPER ADULT

## (undated) DEVICE — DRSG TELFA 3 X 8

## (undated) DEVICE — DRSG COMBINE 5 X 9"

## (undated) DEVICE — DRSG KERLIX ROLL LG 4.5"

## (undated) DEVICE — BLADE SCALPEL SAFETYLOCK #15

## (undated) DEVICE — SYR LUER LOK 10CC

## (undated) DEVICE — SOL IRR POUR NS 0.9% 1000ML

## (undated) DEVICE — S&N VERSAJET II EXACT HANDPIECE 8MM X 45 DEGREE

## (undated) DEVICE — DRSG CURITY GAUZE SPONGE 4 X 4" 12-PLY

## (undated) DEVICE — DRAPE INSTRUMENT POUCH 6.75" X 11"

## (undated) DEVICE — NDL HYPO SAFE 18G X 1.5" (PINK)

## (undated) DEVICE — S&N VERSAJET II EXACT HANDPIECE 14MM X 45 DEGREE

## (undated) DEVICE — SOL INJ NS 0.9% 1000ML

## (undated) DEVICE — PACK LOWER EXTREMITY NS PLAINVI

## (undated) DEVICE — PREP BETADINE KIT

## (undated) DEVICE — DRAPE 3/4 SHEET W REINFORCEMENT 56X77"

## (undated) DEVICE — PLV-SCD MACHINE: Type: DURABLE MEDICAL EQUIPMENT

## (undated) DEVICE — ELCTR BOVIE TIP BLADE INSULATED 2.75" EDGE

## (undated) DEVICE — VENODYNE/SCD SLEEVE CALF MEDIUM